# Patient Record
Sex: FEMALE | Race: BLACK OR AFRICAN AMERICAN | NOT HISPANIC OR LATINO | Employment: FULL TIME | URBAN - METROPOLITAN AREA
[De-identification: names, ages, dates, MRNs, and addresses within clinical notes are randomized per-mention and may not be internally consistent; named-entity substitution may affect disease eponyms.]

---

## 2017-02-20 RX ORDER — QUETIAPINE 400 MG/1
800 TABLET, FILM COATED, EXTENDED RELEASE ORAL DAILY
Qty: 60 TABLET | Refills: 0 | Status: SHIPPED | OUTPATIENT
Start: 2017-02-20 | End: 2017-03-20 | Stop reason: SDUPTHER

## 2017-03-13 ENCOUNTER — TELEPHONE (OUTPATIENT)
Dept: PSYCHIATRY | Facility: CLINIC | Age: 62
End: 2017-03-13

## 2017-03-13 NOTE — TELEPHONE ENCOUNTER
Patient reports she has been having symptoms consistent with anxiety and became very irritable and yelled a threat while at the [employer] recently (3 days ago).  She also reports symptoms consistent with orthostatic hypotension.  Encouraged more frequent appointments with MsJazlyn Keena to work on stress management.

## 2017-03-13 NOTE — TELEPHONE ENCOUNTER
----- Message from Suzy Callahan sent at 3/13/2017 12:28 PM CDT -----  Contact: Self  Pt said she is having non stop Anxiety attack and  would like a call back because she needs to speak with you soon as possible.    Pt can be reached at [phone]

## 2017-03-16 RX ORDER — BUSPIRONE HYDROCHLORIDE 15 MG/1
30 TABLET ORAL 2 TIMES DAILY
Qty: 120 TABLET | Refills: 2 | Status: SHIPPED | OUTPATIENT
Start: 2017-03-16 | End: 2017-03-20 | Stop reason: SDUPTHER

## 2017-03-17 ENCOUNTER — OFFICE VISIT (OUTPATIENT)
Dept: PSYCHIATRY | Facility: CLINIC | Age: 62
End: 2017-03-17
Payer: COMMERCIAL

## 2017-03-17 DIAGNOSIS — F31.81 BIPOLAR II DISORDER: Primary | ICD-10-CM

## 2017-03-17 DIAGNOSIS — F41.9 ANXIETY: ICD-10-CM

## 2017-03-17 PROCEDURE — 90834 PSYTX W PT 45 MINUTES: CPT | Mod: S$GLB,,, | Performed by: SOCIAL WORKER

## 2017-03-17 NOTE — PROGRESS NOTES
"Individual Psychotherapy (PhD/LCSW)    3/17/2017    Site:  [facility]         Therapeutic Intervention: Met with patient.  Outpatient - Insight oriented psychotherapy 45 min - CPT code 45810    Chief complaint/reason for encounter: mood swings, depression, anxiety     Interval history and content of current session: Pt arrives late, reports she has been off her buproprion x 1 month and is about to run out of Seroquel but will see Dr. Colunga the day she runs out. She reports that after waiting 6 hours at [facility] to resolve some old tickets, she got impatient and shouted that if she had a gun she would shoot everyone in the room. Security escorted her outside where she apologized and was allowed to leave. Pt feels she had "no filter" because of being off her meds. Pt's daughter may take a job in [place]. Pt states this would be the first time they were in different cities, feels anxious about this but feels she needs to let daughter go. 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 Overall she feels she is doing well.      Treatment plan:  · Target symptoms: depression, anxiety , mood swings  · Why chosen therapy is appropriate versus another modality: relevant to diagnosis  · Outcome monitoring methods: self-report  · Therapeutic intervention type: insight oriented psychotherapy    Risk parameters:  Patient reports no suicidal ideation  Patient reports no homicidal ideation  Patient reports no self-injurious behavior  Patient reports no violent behavior    Verbal deficits: None    Patient's response to intervention:  The patient's response to intervention is motivated    Progress toward goals and other mental status changes:  The patient's progress toward goals is mixed    Diagnosis:   Bipolar II, Anxiety    Plan:  individual psychotherapy, family therapy    Return to clinic: as needed  "

## 2017-03-20 ENCOUNTER — OFFICE VISIT (OUTPATIENT)
Dept: PSYCHIATRY | Facility: CLINIC | Age: 62
End: 2017-03-20
Payer: COMMERCIAL

## 2017-03-20 VITALS
HEART RATE: 120 BPM | DIASTOLIC BLOOD PRESSURE: 68 MMHG | BODY MASS INDEX: 22.68 KG/M2 | SYSTOLIC BLOOD PRESSURE: 124 MMHG | HEIGHT: 71 IN | WEIGHT: 162 LBS

## 2017-03-20 DIAGNOSIS — F41.1 ANXIETY STATE, UNSPECIFIED: ICD-10-CM

## 2017-03-20 DIAGNOSIS — F31.81 BIPOLAR II DISORDER: Primary | ICD-10-CM

## 2017-03-20 DIAGNOSIS — Z79.899 ENCOUNTER FOR LONG-TERM (CURRENT) USE OF MEDICATIONS: ICD-10-CM

## 2017-03-20 PROCEDURE — 90833 PSYTX W PT W E/M 30 MIN: CPT | Mod: ,,, | Performed by: PSYCHIATRY & NEUROLOGY

## 2017-03-20 PROCEDURE — 99214 OFFICE O/P EST MOD 30 MIN: CPT | Mod: S$GLB,,, | Performed by: PSYCHIATRY & NEUROLOGY

## 2017-03-20 PROCEDURE — 99999 PR PBB SHADOW E&M-EST. PATIENT-LVL II: CPT | Mod: PBBFAC,,, | Performed by: PSYCHIATRY & NEUROLOGY

## 2017-03-20 RX ORDER — HYDROXYZINE PAMOATE 25 MG/1
50 CAPSULE ORAL 3 TIMES DAILY PRN
Qty: 180 CAPSULE | Refills: 3 | Status: SHIPPED | OUTPATIENT
Start: 2017-03-20 | End: 2017-05-04 | Stop reason: SDUPTHER

## 2017-03-20 RX ORDER — QUETIAPINE 400 MG/1
800 TABLET, FILM COATED, EXTENDED RELEASE ORAL DAILY
Qty: 60 TABLET | Refills: 3 | Status: SHIPPED | OUTPATIENT
Start: 2017-03-20 | End: 2017-05-04 | Stop reason: SDUPTHER

## 2017-03-20 RX ORDER — BUSPIRONE HYDROCHLORIDE 15 MG/1
30 TABLET ORAL 2 TIMES DAILY
Qty: 120 TABLET | Refills: 3 | Status: SHIPPED | OUTPATIENT
Start: 2017-03-20 | End: 2017-05-04 | Stop reason: SDUPTHER

## 2017-03-20 RX ORDER — ESCITALOPRAM OXALATE 20 MG/1
20 TABLET ORAL DAILY
Qty: 30 TABLET | Refills: 3 | Status: SHIPPED | OUTPATIENT
Start: 2017-03-20 | End: 2017-05-04 | Stop reason: SDUPTHER

## 2017-03-20 NOTE — PROGRESS NOTES
Outpatient Psychiatry Follow-Up Visit (MD/NP)    3/20/2017    Clinical Status of Patient:  Outpatient (Ambulatory)    Chief Complaint:  Alix Patel is a 61 y.o. female who presents today for follow-up of mood swings and anxiety.      Met with patient alone.      Interval History and Content of Current Session:  Interim Events/Subjective Report/Content of Current Session:  Pt describes losing her temper at the [facility] about 11 days ago.  She reports today that she has been out of buspirone for a month.  Other than anxiety and irritability she is without significant complaints.  She is not engaged in any other threatening behavior and in fact returned to the [facility] recently and got her 's license.  She is looking forward to buying a vehicle which will enable her to perform more frequently.      Work has improved.  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  Her daughter is doing well and may be moving to [place].    Reviewed her consistent history of elevated pulse.      Psychotherapy:  · Target symptoms: depression, anxiety , mood swings  · Why chosen therapy is appropriate versus another modality: relevant to diagnosis  · Outcome monitoring methods: self-report, observation  · Therapeutic intervention type: supportive psychotherapy   · Topics discussed/themes: Dwelling less on the past, work stress, parenting issues, building skills sets for symptom management, symptom recognition, financial stressors  · The patient's response to the intervention is accepting. The patient's progress toward treatment goals is fair.   · Duration of intervention: 16 mins    Review of Systems   · PSYCHIATRIC: Pertinant items are noted in the narrative.    Past Medical, Family and Social History: The patient's past medical, family and social history have been reviewed and updated as appropriate within the electronic medical record - see encounter notes.    Compliance: yes    Side effects: None    Risk  "Parameters:  Patient reports no suicidal ideation  Patient reports no homicidal ideation  Patient reports no self-injurious behavior  Patient reports no violent behavior    Exam (detailed: at least 9 elements; comprehensive: all 15 elements)   Constitutional  Vitals:  Most recent vital signs, dated less than 90 days prior to this appointment, were reviewed.    Vitals:    03/20/17 1432   BP: 124/68   Pulse: (!) 120   Weight: 73.5 kg (162 lb)   Height: 5' 11" (1.803 m)      General:  age appropriate, well dressed, neatly groomed,     Musculoskeletal  Muscle Strength/Tone:  no dyskinesia, no tremor   Gait & Station:  non-ataxic     Psychiatric  Speech:  not pressured, or loud, calm tone, not rapid   Mood:   Affect:  anxious  appropriate, midline   Thought Process:  goal-directed, logical   Associations:  intact   Thought Content:  No SI, HI, AH or VH, no delusions, slightly self aggrandizing   Insight  Judgement:  fair, has awareness of illness  fair behavior appropriate for circumstances   Orientation:  grossly intact   Memory: intact for content of interview   Language: grossly intact   Attention Span & Concentration:  able to focus   Fund of Knowledge:  intact and appropriate to age and level of education     Assessment and Diagnosis   Status/Progress: Based on the examination today, the patient's problem(s) is/are adequately but not ideally controlled.  New problems have not been presented today.   Comorbidities are complicating management of the primary condition.  There are no active rule-out diagnoses for this patient at this time.    General Impression: the patient is a 61 y.o. female who was diagnosed with bipolar disorder during her admission in 2012 with homicidal ideation towards her landlord.  She has a history of treatment for depression and anxiety.  Her past use of alprazolam has been problematic and she has been slowly tapered off of this medication.  She has had some flexing of her fingers possibly " c/w TD.  She has been transitioned from risperidone to Seroquel thus far successfully.    Diagnosis:  Bipolar 2 disorder  Anxiety disorder not otherwise specified  type 2 diabetes  Tardive dyskinesia      Intervention/Counseling/Treatment Plan   · Restart buspirone and titrate back to 30 mg twice daily  · Continue Lexapro 20 mg daily, Seroquel  mg nightly, and Vistaril 25-50 mg 3 times daily as needed for anxiety  · CMP and lipid panel which were previously ordered and in addition TSH.    Return to Clinic: 2 months

## 2017-04-17 RX ORDER — QUETIAPINE 400 MG/1
TABLET, FILM COATED, EXTENDED RELEASE ORAL
Qty: 60 TABLET | Refills: 0 | OUTPATIENT
Start: 2017-04-17

## 2017-04-26 ENCOUNTER — HOSPITAL ENCOUNTER (OUTPATIENT)
Facility: HOSPITAL | Age: 62
Discharge: HOME | End: 2017-04-27
Attending: EMERGENCY MEDICINE | Admitting: HOSPITALIST
Payer: COMMERCIAL

## 2017-04-26 DIAGNOSIS — R07.9 CHEST PAIN: ICD-10-CM

## 2017-04-26 DIAGNOSIS — R07.9 CHEST PAIN, UNSPECIFIED TYPE: Primary | ICD-10-CM

## 2017-04-26 DIAGNOSIS — M62.838 MUSCLE SPASM: ICD-10-CM

## 2017-04-26 PROBLEM — E03.9 ACQUIRED HYPOTHYROIDISM: Status: ACTIVE | Noted: 2017-04-26

## 2017-04-26 LAB
ALBUMIN SERPL BCP-MCNC: 4 G/DL
ALP SERPL-CCNC: 81 U/L
ALT SERPL W/O P-5'-P-CCNC: 23 U/L
ANION GAP SERPL CALC-SCNC: 10 MMOL/L
AST SERPL-CCNC: 21 U/L
BASOPHILS # BLD AUTO: 0.02 K/UL
BASOPHILS NFR BLD: 0.3 %
BILIRUB SERPL-MCNC: 0.4 MG/DL
BNP SERPL-MCNC: <10 PG/ML
BUN SERPL-MCNC: 17 MG/DL
CALCIUM SERPL-MCNC: 9.7 MG/DL
CHLORIDE SERPL-SCNC: 104 MMOL/L
CO2 SERPL-SCNC: 24 MMOL/L
CREAT SERPL-MCNC: 0.8 MG/DL
D DIMER PPP IA.FEU-MCNC: <0.19 MG/L FEU
DIFFERENTIAL METHOD: ABNORMAL
EOSINOPHIL # BLD AUTO: 0.1 K/UL
EOSINOPHIL NFR BLD: 1.2 %
ERYTHROCYTE [DISTWIDTH] IN BLOOD BY AUTOMATED COUNT: 12.5 %
EST. GFR  (AFRICAN AMERICAN): >60 ML/MIN/1.73 M^2
EST. GFR  (NON AFRICAN AMERICAN): >60 ML/MIN/1.73 M^2
GLUCOSE SERPL-MCNC: 149 MG/DL
HCT VFR BLD AUTO: 36.4 %
HGB BLD-MCNC: 12.6 G/DL
INR PPP: 1.1
LYMPHOCYTES # BLD AUTO: 2.3 K/UL
LYMPHOCYTES NFR BLD: 34.7 %
MCH RBC QN AUTO: 29.6 PG
MCHC RBC AUTO-ENTMCNC: 34.6 %
MCV RBC AUTO: 86 FL
MONOCYTES # BLD AUTO: 0.5 K/UL
MONOCYTES NFR BLD: 7 %
NEUTROPHILS # BLD AUTO: 3.8 K/UL
NEUTROPHILS NFR BLD: 56.7 %
PLATELET # BLD AUTO: 232 K/UL
PMV BLD AUTO: 10.1 FL
POCT GLUCOSE: 70 MG/DL (ref 70–110)
POCT GLUCOSE: 90 MG/DL (ref 70–110)
POTASSIUM SERPL-SCNC: 4 MMOL/L
PROT SERPL-MCNC: 6.9 G/DL
PROTHROMBIN TIME: 11.4 SEC
RBC # BLD AUTO: 4.25 M/UL
SODIUM SERPL-SCNC: 138 MMOL/L
TROPONIN I SERPL DL<=0.01 NG/ML-MCNC: <0.006 NG/ML
WBC # BLD AUTO: 6.75 K/UL

## 2017-04-26 PROCEDURE — 36415 COLL VENOUS BLD VENIPUNCTURE: CPT

## 2017-04-26 PROCEDURE — 25000003 PHARM REV CODE 250: Performed by: HOSPITALIST

## 2017-04-26 PROCEDURE — 25000003 PHARM REV CODE 250: Performed by: PHYSICIAN ASSISTANT

## 2017-04-26 PROCEDURE — 99285 EMERGENCY DEPT VISIT HI MDM: CPT | Mod: ,,, | Performed by: EMERGENCY MEDICINE

## 2017-04-26 PROCEDURE — 99220 PR INITIAL OBSERVATION CARE,LEVL III: CPT | Mod: ,,, | Performed by: HOSPITALIST

## 2017-04-26 PROCEDURE — 80053 COMPREHEN METABOLIC PANEL: CPT

## 2017-04-26 PROCEDURE — 99285 EMERGENCY DEPT VISIT HI MDM: CPT | Mod: 25

## 2017-04-26 PROCEDURE — 84484 ASSAY OF TROPONIN QUANT: CPT

## 2017-04-26 PROCEDURE — 85025 COMPLETE CBC W/AUTO DIFF WBC: CPT

## 2017-04-26 PROCEDURE — 25000003 PHARM REV CODE 250: Performed by: EMERGENCY MEDICINE

## 2017-04-26 PROCEDURE — 83880 ASSAY OF NATRIURETIC PEPTIDE: CPT

## 2017-04-26 PROCEDURE — 85610 PROTHROMBIN TIME: CPT

## 2017-04-26 PROCEDURE — 93010 ELECTROCARDIOGRAM REPORT: CPT | Mod: 76,,, | Performed by: INTERNAL MEDICINE

## 2017-04-26 PROCEDURE — 93005 ELECTROCARDIOGRAM TRACING: CPT

## 2017-04-26 PROCEDURE — 84484 ASSAY OF TROPONIN QUANT: CPT | Mod: 91

## 2017-04-26 PROCEDURE — G0378 HOSPITAL OBSERVATION PER HR: HCPCS

## 2017-04-26 PROCEDURE — 96360 HYDRATION IV INFUSION INIT: CPT

## 2017-04-26 PROCEDURE — 85379 FIBRIN DEGRADATION QUANT: CPT

## 2017-04-26 PROCEDURE — 93010 ELECTROCARDIOGRAM REPORT: CPT | Mod: ,,, | Performed by: INTERNAL MEDICINE

## 2017-04-26 PROCEDURE — 94761 N-INVAS EAR/PLS OXIMETRY MLT: CPT

## 2017-04-26 RX ORDER — QUETIAPINE 400 MG/1
800 TABLET, FILM COATED, EXTENDED RELEASE ORAL DAILY
Status: DISCONTINUED | OUTPATIENT
Start: 2017-04-26 | End: 2017-04-26

## 2017-04-26 RX ORDER — ACETAMINOPHEN 325 MG/1
650 TABLET ORAL EVERY 4 HOURS PRN
Status: DISCONTINUED | OUTPATIENT
Start: 2017-04-26 | End: 2017-04-27 | Stop reason: HOSPADM

## 2017-04-26 RX ORDER — MORPHINE SULFATE 2 MG/ML
3 INJECTION, SOLUTION INTRAMUSCULAR; INTRAVENOUS EVERY 4 HOURS PRN
Status: DISCONTINUED | OUTPATIENT
Start: 2017-04-26 | End: 2017-04-27 | Stop reason: HOSPADM

## 2017-04-26 RX ORDER — IBUPROFEN 200 MG
24 TABLET ORAL
Status: DISCONTINUED | OUTPATIENT
Start: 2017-04-26 | End: 2017-04-27 | Stop reason: HOSPADM

## 2017-04-26 RX ORDER — LEVOTHYROXINE SODIUM 100 UG/1
100 TABLET ORAL
Status: DISCONTINUED | OUTPATIENT
Start: 2017-04-27 | End: 2017-04-27 | Stop reason: HOSPADM

## 2017-04-26 RX ORDER — ROSUVASTATIN CALCIUM 20 MG/1
20 TABLET, COATED ORAL NIGHTLY
Status: DISCONTINUED | OUTPATIENT
Start: 2017-04-26 | End: 2017-04-27 | Stop reason: HOSPADM

## 2017-04-26 RX ORDER — INSULIN ASPART 100 [IU]/ML
0-5 INJECTION, SOLUTION INTRAVENOUS; SUBCUTANEOUS
Status: DISCONTINUED | OUTPATIENT
Start: 2017-04-26 | End: 2017-04-27 | Stop reason: HOSPADM

## 2017-04-26 RX ORDER — GLUCAGON 1 MG
1 KIT INJECTION
Status: DISCONTINUED | OUTPATIENT
Start: 2017-04-26 | End: 2017-04-27 | Stop reason: HOSPADM

## 2017-04-26 RX ORDER — SODIUM CHLORIDE 0.9 % (FLUSH) 0.9 %
3 SYRINGE (ML) INJECTION EVERY 8 HOURS
Status: DISCONTINUED | OUTPATIENT
Start: 2017-04-26 | End: 2017-04-27 | Stop reason: HOSPADM

## 2017-04-26 RX ORDER — ESCITALOPRAM OXALATE 10 MG/1
20 TABLET ORAL DAILY
Status: DISCONTINUED | OUTPATIENT
Start: 2017-04-26 | End: 2017-04-27 | Stop reason: HOSPADM

## 2017-04-26 RX ORDER — ASPIRIN 325 MG
325 TABLET ORAL
Status: COMPLETED | OUTPATIENT
Start: 2017-04-26 | End: 2017-04-26

## 2017-04-26 RX ORDER — IBUPROFEN 200 MG
16 TABLET ORAL
Status: DISCONTINUED | OUTPATIENT
Start: 2017-04-26 | End: 2017-04-27 | Stop reason: HOSPADM

## 2017-04-26 RX ORDER — QUETIAPINE 400 MG/1
800 TABLET, FILM COATED, EXTENDED RELEASE ORAL DAILY
Status: DISCONTINUED | OUTPATIENT
Start: 2017-04-26 | End: 2017-04-27 | Stop reason: HOSPADM

## 2017-04-26 RX ORDER — NITROGLYCERIN 0.4 MG/1
0.4 TABLET SUBLINGUAL EVERY 5 MIN PRN
Status: COMPLETED | OUTPATIENT
Start: 2017-04-26 | End: 2017-04-26

## 2017-04-26 RX ADMIN — NITROGLYCERIN 0.4 MG: 0.4 TABLET SUBLINGUAL at 08:04

## 2017-04-26 RX ADMIN — NITROGLYCERIN 0.4 MG: 0.4 TABLET SUBLINGUAL at 10:04

## 2017-04-26 RX ADMIN — SODIUM CHLORIDE, PRESERVATIVE FREE 3 ML: 5 INJECTION INTRAVENOUS at 09:04

## 2017-04-26 RX ADMIN — ESCITALOPRAM OXALATE 20 MG: 10 TABLET ORAL at 03:04

## 2017-04-26 RX ADMIN — SODIUM CHLORIDE: 0.9 INJECTION, SOLUTION INTRAVENOUS at 10:04

## 2017-04-26 RX ADMIN — ASPIRIN 325 MG ORAL TABLET 325 MG: 325 PILL ORAL at 10:04

## 2017-04-26 RX ADMIN — ROSUVASTATIN CALCIUM 20 MG: 20 TABLET, FILM COATED ORAL at 09:04

## 2017-04-26 RX ADMIN — QUETIAPINE 800 MG: 400 TABLET, EXTENDED RELEASE ORAL at 06:04

## 2017-04-26 NOTE — ED NOTES
Patient identifiers verified and correct for Ms Patel  C/C chest tightness  APPEARANCE: awake and alert in NAD.  SKIN: warm, dry and intact. No breakdown or bruising.  MUSCULOSKELETAL: Patient moving all extremities spontaneously, no obvious swelling or deformities noted. Ambulates independently.  RESPIRATORY:states positive shortness of breath.Respirations unlabored. All breath sounds CTA bilaterally.  CARDIAC: heart tones normal. Regular rate and rhythm; 2+ distal pulses; no peripheral edema Pain RLE calf  ABDOMEN: S/ND/NT, Denies nausea, normoactive bowel sounds present in all four quadrants. Normal stool pattern.  : voids spontaneously without difficulty.  Neurologic: AAO x 4; follows commands equal strength in all extremities; denies numbness/tingling. PERRLA

## 2017-04-26 NOTE — ED NOTES
"Patient states chest "tightness' 9/10onset 0800, positive "sour taste"  Main c/o "stabbing pain" Pain radiates under left armpit to back.   "

## 2017-04-26 NOTE — ED NOTES
BG=70, patient remains NPO,. Patient declines Seroquel dose at present, states she takes it at 1800, not now due to being too sleepy after med taken,message sent to pharmacy.

## 2017-04-26 NOTE — IP AVS SNAPSHOT
[facility]  [address]  [facility] [address]  Phone: [phone]           Patient Discharge Instructions   Our goal is to set you up for success. This packet includes information on your condition, medications, and your home care.  It will help you care for yourself to prevent having to return to the hospital.     Please ask your nurse if you have any questions.      There are many details to remember when preparing to leave the hospital. Here is what you will need to do:    1. Take your medicine. If you are prescribed medications, review your Medication List on the following pages. You may have new medications to  at the pharmacy and others that you'll need to stop taking. Review the instructions for how and when to take your medications. Talk with your doctor or nurses if you are unsure of what to do.     2. Go to your follow-up appointments. Specific follow-up information is listed in the following pages. Your may be contacted by a nurse or clinical provider about future appointments. Be sure we have all of the phone numbers to reach you. Please contact your provider's office if you are unable to make an appointment.     3. Watch for warning signs. Your doctor or nurse will give you detailed warning signs to watch for and when to call for assistance. These instructions may also include educational information about your condition. If you experience any of warning signs to your health, call your doctor.           [facility]  Unless otherwise directed by your provider, please   contact [facility], our nurse care line   that is available for 24/7 assistance.     [phone] (toll-free)     Registered nurses in the [facility]   provide: appointment scheduling, clinical advisement, health education, and other advisory services.                  ** Verify the list of medication(s) below is accurate and up to date. Carry this with you in case of  emergency. If your medications have changed, please notify your healthcare provider.             Medication List      CHANGE how you take these medications        Additional Info    Begin Date AM Noon PM Bedtime    cyclobenzaprine 10 MG tablet   Commonly known as:  FLEXERIL   Quantity:  30 tablet   Refills:  0   Dose:  10 mg   What changed:    - medication strength  - how much to take  - when to take this  - reasons to take this    Last time this was given:  10 mg on 4/27/2017  1:20 PM   Instructions:  Take 1 tablet (10 mg total) by mouth 3 (three) times daily as needed for Muscle spasms.                                     metformin 500 MG tablet   Commonly known as:  GLUCOPHAGE   Quantity:  30 tablet   Refills:  0   Dose:  500 mg   What changed:  how much to take    Instructions:  Take 1 tablet (500 mg total) by mouth daily with breakfast.                                 CONTINUE taking these medications        Additional Info    Begin Date AM Noon PM Bedtime    busPIRone 15 MG tablet   Commonly known as:  BUSPAR   Quantity:  120 tablet   Refills:  3   Dose:  30 mg    Instructions:  Take 2 tablets (30 mg total) by mouth 2 (two) times daily.                                  CRESTOR 20 MG tablet   Refills:  3   Generic drug:  rosuvastatin    Last time this was given:  20 mg on 4/26/2017  9:07 PM                               diphenhydrAMINE 12.5 mg chewable tablet   Commonly known as:  BENADRYL   Refills:  0   Dose:  12.5 mg    Instructions:  Take 12.5 mg by mouth 4 (four) times daily as needed for Allergies.                            escitalopram oxalate 20 MG tablet   Commonly known as:  LEXAPRO   Quantity:  30 tablet   Refills:  3   Dose:  20 mg    Last time this was given:  20 mg on 4/27/2017  8:33 AM   Instructions:  Take 1 tablet (20 mg total) by mouth once daily.                               hydrOXYzine pamoate 25 MG Cap   Commonly known as:  VISTARIL   Quantity:  180 capsule   Refills:  3   Dose:  50 mg     Instructions:  Take 2 capsules (50 mg total) by mouth 3 (three) times daily as needed.                            levothyroxine 100 MCG tablet   Commonly known as:  SYNTHROID   Refills:  3    Last time this was given:  100 mcg on 4/27/2017  6:28 AM                               quetiapine 400 MG Tb24   Commonly known as:  SEROQUEL XR   Quantity:  60 tablet   Refills:  3   Dose:  800 mg    Last time this was given:  800 mg on 4/26/2017  6:53 PM   Instructions:  Take 2 tablets (800 mg total) by mouth once daily. Take at 6 pm                                    Where to Get Your Medications      These medications were sent to [facility] [address] [place], [place] [address]  [address] [place], [place] [address] [address]     Phone:  [phone]     cyclobenzaprine 10 MG tablet                  Please bring to all follow up appointments:    1. A copy of your discharge instructions.  2. All medicines you are currently taking in their original bottles.  3. Identification and insurance card.    Please arrive 15 minutes ahead of scheduled appointment time.    Please call 24 hours in advance if you must reschedule your appointment and/or time.        Follow-up Information     Follow up with Angélica Barakat MD.    Specialty:  Internal Medicine    Why:  dr angélica sheppard on MAY 22 at @2 PM as a new patient.    Contact information:    [address] [place] [address] [phone]          Discharge Instructions     Future Orders    Diet general     Questions:    Total calories:      Fat restriction, if any:      Protein restriction, if any:      Na restriction, if any:      Fluid restriction:      Additional restrictions:          Discharge Instructions       [facility]. The primary doctor who is taking care of you at the time of your discharge is Dawson Richards MD.     You were admitted to the hospital with Muscle  "spasm.     Please note your discharge instructions, including diet/activity restrictions, follow-up appointments, and medication changes.  If you have any questions about your medical issues, prescriptions, or any other questions, please feel free to contact the [facility] Medicine Dept at [phone] or after hours at [phone] and we will help.    If you are previously with Home health, outpatient PT/OT or under a therapy program, you are cleared to return to those programs.    Please direct all long term medication refills and follow up to your primary care provider, Hamilton Hayden Jr, MD. Thank you again for letting us take care of your health care needs.  Follow up appointment info with Dr. Barakat as follows.        Roshni Lindsay MD     Doctor in [place], [place]  Address: [address], [place] [address]  Phone: [phone]  Fax is [phone]      May 22 at 2pm is first available appt    Discharge References/Attachments     CHEST PAIN, NONCARDIAC  (ENGLISH)    MUSCLE SPASM (ENGLISH)        Primary Diagnosis     Your primary diagnosis was:  Muscle Spasm      Admission Information     Date & Time Provider Department CSN    4/26/2017  9:11 AM Dawson Richards MD [facility] [id number]      Care Providers     Provider Role Specialty Primary office phone    Dawson Richards MD Attending Provider Rheumatology [phone]    Dawson Richards MD Team Attending  Rheumatology [phone]      Your Vitals Were     BP Pulse Temp Resp Height Weight    112/69 101 98.6 °F (37 °C) (Oral) 20 5' 11" (1.803 m) 68.5 kg (151 lb)    SpO2 BMI             89% 21.06 kg/m2         Recent Lab Values        9/12/2013 1/12/2016 4/27/2017                     6:09 PM  6:52 AM  6:53 AM         A1C 6.8 (H) 5.9 6.7 (H)         Comment for A1C at  6:53 AM on 4/27/2017:  According to ADA guidelines, hemoglobin A1C <7.0% represents  optimal control in non-pregnant diabetic patients.  Different  metrics " may apply to specific populations.   Standards of Medical Care in Diabetes - 2016.  For the purpose of screening for the presence of diabetes:  <5.7%     Consistent with the absence of diabetes  5.7-6.4%  Consistent with increasing risk for diabetes   (prediabetes)  >or=6.5%  Consistent with diabetes  Currently no consensus exists for use of hemoglobin A1C  for diagnosis of diabetes for children.        Allergies as of 4/27/2017        Reactions    Lamictal [Lamotrigine] Rash    Per psychiatry notes      Advance Directives     An advance directive is a document which, in the event you are no longer able to make decisions for yourself, tells your healthcare team what kind of treatment you do or do not want to receive, or who you would like to make those decisions for you.  If you do not currently have an advance directive, [facility] encourages you to create one.  For more information call:  [phone]), [phone] ([phone]),  or log on to [email or web address].        Smoking Cessation     If you would like to quit smoking:   You may be eligible for free services if you are a [place] resident and started smoking cigarettes before September 1, 1988.  Call the Smoking Cessation Trust ([facility]) toll free at ([phone]) [phone] or ([phone]) [phone].   Call 5-427-QUIT-NOW if you do not meet the above criteria.   Contact us via email: [email or web address]   View our website for more information: [email or web address]        Language Assistance Services     ATTENTION: Language assistance services are available, free of charge. Please call [phone].      ATENCIÓN: Si habla español, tiene a chavarria disposición servicios gratuitos de asistencia lingüística. Llame al [phone].     CHÚ Ý: N?u b?n nói Ti?ng Vi?t, có các d?ch v? h? tr? ngôn ng? mi?n phí dành cho b?n. G?i s? [phone].        Diabetes Discharge Instructions                                   [facility] Sign-Up     Activating  your [facility] account is as easy as 1-2-3!     1) Visit [email or web address], select Sign Up Now, enter this activation code and your date of birth, then select Next.  [email or web address]  Expires: 6/11/2017  4:00 PM      2) Create a username and password to use when you visit [facility] in the future and select a security question in case you lose your password and select Next.    3) Enter your e-mail address and click Sign Up!    Additional Information  If you have questions, please e-mail [email or web address] or call [phone] to talk to our [facility] staff. Remember, [facility] is NOT to be used for urgent needs. For medical emergencies, dial 911.          [facility] complies with applicable Federal civil rights laws and does not discriminate on the basis of race, color, national origin, age, disability, or sex.

## 2017-04-26 NOTE — CONSULTS
"      Cardiology ER Consult Note  Attending Physician: Yin Ennis MD  Reason for Consult: Chest pain    HPI:   61 y.o. woman with history of DM, HLD, FHx CAD presenting with sudden onset chest pain. Episode started while walking around 8am, describes as "squeezing", felt clammy, + jaw radiation, SOB, has lasted >3 hr, 9/10 initially -> 8/10, positional component as well as pleuritic, NTG-> subjectively improved " less squeezing". EKG without change and initial troponin negative.     ROS:    Constitution: negative for - fever, chills, weight loss or weight gain  HENT: negative for - sore throat, rhinorrhea, or headache  Eyes: negative for - blurred or double vision  Cardiovascular: negative for - edema, loss of consciousness or palpitations  Pulmonary: negative for - cough, sputum changes or wheezing  Gastrointestinal: negative for - abdominal pain, nausea, vomiting, or diarrhea  : negative for - dysuria  Neurological: negative for - focal weakness or sensory changes  PMH:     Past Medical History:   Diagnosis Date    Anxiety     Depression     Diabetes mellitus     HEARING LOSS     pt states she has loss slighty in rt ear.    Insomnia     Rash      Past Surgical History:   Procedure Laterality Date    APPENDECTOMY      COLONOSCOPY      CYST REMOVAL      HYSTERECTOMY       Allergies:     Review of patient's allergies indicates:   Allergen Reactions    Lamictal [lamotrigine] Rash     Per psychiatry notes     Medications:     No current facility-administered medications on file prior to encounter.      Current Outpatient Prescriptions on File Prior to Encounter   Medication Sig Dispense Refill    busPIRone (BUSPAR) 15 MG tablet Take 2 tablets (30 mg total) by mouth 2 (two) times daily. 120 tablet 3    CRESTOR 20 mg tablet   3    escitalopram oxalate (LEXAPRO) 20 MG tablet Take 1 tablet (20 mg total) by mouth once daily. 30 tablet 3    hydrOXYzine pamoate (VISTARIL) 25 MG Cap Take 2 capsules " (50 mg total) by mouth 3 (three) times daily as needed. 180 capsule 3    levothyroxine (SYNTHROID) 100 MCG tablet   3    metformin (GLUCOPHAGE) 500 MG tablet Take 1 tablet (500 mg total) by mouth daily with breakfast. (Patient taking differently: Take 1,000 mg by mouth daily with breakfast. ) 30 tablet 0    quetiapine (SEROQUEL XR) 400 MG Tb24 Take 2 tablets (800 mg total) by mouth once daily. Take at 6 pm 60 tablet 3    CYCLOBENZAPRINE HCL (FLEXERIL ORAL) Take by mouth.      diphenhydrAMINE (BENADRYL) 12.5 mg chewable tablet Take 12.5 mg by mouth 4 (four) times daily as needed for Allergies.       Social History:     Social History   Substance Use Topics    Smoking status: Former Smoker     Packs/day: 0.25     Years: 34.00     Types: Cigarettes     Quit date: 8/27/2004    Smokeless tobacco: Never Used    Alcohol use 0.6 oz/week     1 Glasses of wine per week      Comment: very seldom only wine with dinner     Family History:     Family History   Problem Relation Age of Onset    Heart failure Father      Physical Exam:     Vitals:  Temp:  [98.2 °F (36.8 °C)]   Pulse:  []   Resp:  [16-18]   BP: ()/(56-84)   SpO2:  [94 %-99 %]  on RA I/O's:  No intake or output data in the 24 hours ending 04/26/17 1048     Constitutional: Appearing uncomfortable  HEENT: Sclera anicteric, PERRLA, EOMI  Neck: No JVD, no carotid bruits  CV: RRR, no murmur, normal S1/S2  Pulm: CTAB, no wheezes, rales, or ronchi  GI: Abdomen soft, NTND, +BS  Extremities: No LE edema, warm and well perfused  Skin: No ecchymosis, erythema, or ulcers  Psych: AOx3, appropriate affect  Neuro: CNII-XII intact, no focal deficits    Labs:       Recent Labs  Lab 04/26/17  0927      K 4.0      CO2 24   BUN 17   CREATININE 0.8   *   ANIONGAP 10       Recent Labs  Lab 04/26/17  0927   TROPONINI <0.006   BNP <10      Recent Labs  Lab 04/26/17 0927   WBC 6.75   HGB 12.6   HCT 36.4*          Recent Labs  Lab 04/26/17  0912    AST 21   ALT 23   ALKPHOS 81   BILITOT 0.4   ALBUMIN 4.0        Imaging:   No results found for: EF    EKG: normal sinus rhythm, no blocks or conduction defects, no ischemic changes    Emergency Department Assessment of Chest Pain Score (EDACS):     1. Age: 61-65  +10  2. Sex: Female  0  3. Known CAD or ?3 risk factors (FHx, HLD, DM, HTN, smoker): Yes  +4  4. Diaphoresis: Yes  +3  5. Pain radiates to arm, shoulder, neck or jaw: Yes  +5  6. Pain occurred or worsened with inspiration: Yes  -4  7. Pain is reproduced by palpation: No  0    EDACS Score: 18    Not Low Risk Cohort:  EDACS ?16 or   EKG shows new ischemia   0h or 2h troponin positive   Recommendation: further observation and evaluation    Than NINA et al. Emerg Med [place]. Development and validation of the Emergency Department Assessment of Chest pain Score and 2 h accelerated diagnostic protocol. 2014 Feb;26(1):34-44.    HEART Score for Major Cardiac Events:     1. History: Moderately suspicious  +1  2. EKG: Normal  3. Age: 45-65  +1  4. Risk factors (HLD, HTN, DM, smoker, FHx, obesity): 3 or more  +2  5. Troponin:  normal  0    HEART Score: 4    0-3: 0.9-1.7% risk of adverse cardiac event. In the HEART Score, these patients were discharged.   4-6: 12-16.6% risk of adverse cardiac event. In the HEART Score, these patients were admitted to the hospital.   ?7: 50-65% risk of adverse cardiac event. In the HEART Score, these patients were candidates for early invasive measures.    Gopi AJ, Portis BE, Pilar RANDY. Chest pain in the emergency room: value of the HEART score. Neth Heart J. 2008 Damon;16(6):191-6. PubMed PMID: [id number]; PubMed Central PMCID: [id number].    Assessment:   61 y.o. woman with history of DM, HLD, FHx CAD presenting with sudden onset chest pain with mixed features. Patient feels it was exertional, with radiation, clamminess, SOB, and relived by NTG. On the other hand, it is positional and has pleuritic component. She still has ongoing 8/10  chest pain. Doubt cardiac etiology but cannot exclude and has intermediate HEART and EDACS scores. PE considered but Well's score is 0.    Plan:   - Admit to obs for chest pain rule-out  - Trend troponins  - Would not start ACS therapy unless objective evidence of ischemia  - Consider alternative causes for chest pain, ? D dimer -- defer to ER    Signed:    Mj Swenson MD  Cardiology Fellow, PGY-6  Pager: [phone]  4/26/2017 10:48 AM

## 2017-04-26 NOTE — ED PROVIDER NOTES
"Encounter Date: 4/26/2017    SCRIBE #1 NOTE: I, Sofia Gallardo, am scribing for, and in the presence of,  Dr. Monteiro. I have scribed the following portions of the note - the EKG reading. Other sections scribed: the attending ED note.       History     Chief Complaint   Patient presents with    Chest Pain     Pt c/o CP since approx 0800, squeezing pain     Review of patient's allergies indicates:   Allergen Reactions    Lamictal [lamotrigine] Rash     Per psychiatry notes     HPI Comments: Patient is a 61-year-old female with a past medical history of diabetes, hyperlipidemia, anxiety who presents the ED with chest pain.  Patient states that she was walking to a coffee shop when she developed sudden onset chest pain at 800 today.  She complains of constant left-sided chest pain that radiates under her left arm.  Currently, her pain is 8/10 and she describes it as "fist squeezing her heart" and sharp/stabbing.  Patient endorses associated dizziness, but none currently. She notes onset of LLE pain 1 minute after onset of chest pains that have been constant. She has no history of chest pain.  She denies any recent travel, injury or trauma, estrogen use, tobacco use, hx of DVT/PE, nausea/vomiting, cough, SOB.    The history is provided by the patient.     Past Medical History:   Diagnosis Date    Anxiety     Anxiety     Bipolar 1 disorder     Depression     Diabetes mellitus     HEARING LOSS     pt states she has loss slighty in rt ear.    Insomnia     Rash      Past Surgical History:   Procedure Laterality Date    APPENDECTOMY      COLONOSCOPY      CYST REMOVAL      HYSTERECTOMY       Family History   Problem Relation Age of Onset    Heart failure Father      Social History   Substance Use Topics    Smoking status: Former Smoker     Packs/day: 0.25     Years: 34.00     Types: Cigarettes     Quit date: 8/27/2004    Smokeless tobacco: Never Used    Alcohol use 0.6 oz/week     1 Glasses of wine per week "      Comment: very seldom only wine with dinner     Review of Systems   Constitutional: Negative for chills and fever.   HENT: Negative for sore throat.    Eyes: Negative for photophobia.   Respiratory: Negative for cough and shortness of breath.    Cardiovascular: Positive for chest pain.   Gastrointestinal: Negative for abdominal pain and nausea.   Endocrine: Negative for polyuria.   Genitourinary: Negative for hematuria and urgency.   Musculoskeletal: Positive for myalgias.   Skin: Negative for pallor.   Neurological: Negative for weakness and numbness.       Physical Exam   Initial Vitals   BP Pulse Resp Temp SpO2   04/26/17 0908 04/26/17 0908 04/26/17 0908 04/26/17 0908 04/26/17 0908   109/81 94 16 98.2 °F (36.8 °C) 97 %     Physical Exam    Vitals reviewed.  Constitutional: She appears well-developed and well-nourished. She is not diaphoretic.   HENT:   Head: Normocephalic and atraumatic.   Nose: Nose normal.   Eyes: Conjunctivae and EOM are normal.   Neck: Normal range of motion.   Cardiovascular: Normal rate, regular rhythm and normal heart sounds. Exam reveals no friction rub.    No murmur heard.  Pulses:       Femoral pulses are 2+ on the right side, and 2+ on the left side.       Dorsalis pedis pulses are 2+ on the right side, and 2+ on the left side.   Pulmonary/Chest: Breath sounds normal. No respiratory distress. She has no wheezes. She has no rales. She exhibits tenderness.       Abdominal: Soft. Bowel sounds are normal. She exhibits no distension. There is no tenderness. There is no rebound.   Musculoskeletal: Normal range of motion.   Generalized muscular TTP of LLE from hip to ankle   Neurological: She is alert and oriented to person, place, and time. She has normal strength. No sensory deficit.   Skin: Skin is warm and dry. No erythema. No pallor.   Psychiatric: She has a normal mood and affect. Her behavior is normal. Judgment and thought content normal.         ED Course   Procedures  Labs  Reviewed   CBC W/ AUTO DIFFERENTIAL - Abnormal; Notable for the following:        Result Value    Hematocrit 36.4 (*)     All other components within normal limits    Narrative:     PLEASE REVIEW ORDER START TIME BEFORE MARKING SPECIMEN  COLLECTED.   COMPREHENSIVE METABOLIC PANEL - Abnormal; Notable for the following:     Glucose 149 (*)     All other components within normal limits    Narrative:     PLEASE REVIEW ORDER START TIME BEFORE MARKING SPECIMEN  COLLECTED.   PROTIME-INR    Narrative:     PLEASE REVIEW ORDER START TIME BEFORE MARKING SPECIMEN  COLLECTED.   TROPONIN I    Narrative:     PLEASE REVIEW ORDER START TIME BEFORE MARKING SPECIMEN  COLLECTED.   B-TYPE NATRIURETIC PEPTIDE    Narrative:     PLEASE REVIEW ORDER START TIME BEFORE MARKING SPECIMEN  COLLECTED.   TROPONIN I    Narrative:     PLEASE REVIEW ORDER START TIME BEFORE MARKING SPECIMEN  COLLECTED.   D DIMER, QUANTITATIVE   D DIMER, QUANTITATIVE    Narrative:     PLEASE REVIEW ORDER START TIME BEFORE MARKING SPECIMEN  COLLECTED.  Add on order [id number] D Dimer, Per Yin Monteiro MD  04/26/2017    12:00      EKG Readings: (Independently Interpreted)   NSR at 90, with no significant ST elevation and no T wave inversion. No change when compared to October 13th, 2016.          Medical Decision Making:   History:   Old Medical Records: I decided to obtain old medical records.  Independently Interpreted Test(s):   I have ordered and independently interpreted EKG Reading(s) - see prior notes  Clinical Tests:   Lab Tests: Ordered and Reviewed  Radiological Study: Ordered  Medical Tests: Ordered and Reviewed  Other:   I have discussed this case with another health care provider.       <> Summary of the Discussion: Cardiology    Imaging Results         X-Ray Chest AP Portable (Final result) Result time:  04/26/17 10:18:54    Final result by Irwin Gaytan MD (04/26/17 10:18:54)    Impression:     No significant intrathoracic abnormality referable to the  current history of chest pain.  No significant detrimental interval change in the appearance of the chest since 1/11/16.      Electronically signed by: Irwin Gaytan MD  Date:     04/26/17  Time:    10:18     Narrative:    Comparison is made to 1/11/16.    Heart size is normal, as is the appearance of the pulmonary vascularity.  Lung zones are clear, and free of significant airspace consolidation or volume loss.  No pleural fluid.  Mediastinal contour is unchanged from the examination referenced above, with no abnormal interval mediastinal widening noted.  No pneumothorax.  Calcification in the wall of the aortic arch is incidentally observed once again.                 APC / Resident Notes:    Patient is a 61-year-old female with past medical history of diabetes and hyperlipidemia who presents the ED with chest pain.  On exam, vital signs stable.  Chest wall tenderness to palpation.  Regular rate and rhythm without murmurs.  Lungs clear to auscultations bilaterally. Generalized muscular TTP from L hip to ankle. No peripheral edema.   DDX includes but is not limited to ACS, arrhythmias, pneumonia, bronchitis, pleurisy, costochondritis.  Will order labs and imaging and reassess.     ECG with NSR with rate of 93 and no T wave inversion.  CBC with no leukocytosis or anemia.  CMP with no electrolyte abnormalities.  Troponin negative. Will plan to trend given early onset. BNP negative. Negative CXR.   D-dimer pending. Negative D-dimer. Less likely PE.    Cardiology consult. The rec placing patient in observation to trending troponin and further evaluate. Consult appreciated.       Scribe Attestation:   Scribe #1: I performed the above scribed service and the documentation accurately describes the services I performed. I attest to the accuracy of the note.    Attending Attestation:           Physician Attestation for Scribe:  Physician Attestation Statement for Scribe #1: I, Dr. Monteiro, reviewed documentation, as scribed by  Sofia Gallardo in my presence, and it is both accurate and complete.         Attending ED Notes:   10:43 AM- I discussed with cardiology. They will come and evaluate the patient.          ED Course     Clinical Impression:   The primary encounter diagnosis was Chest pain, unspecified type. A diagnosis of Chest pain was also pertinent to this visit.    Disposition:   Disposition: Placed in Observation  Condition: Stable       Mita Doran PA-C  04/26/17 1402

## 2017-04-26 NOTE — ED TRIAGE NOTES
Pt experienced a squeezing pain left side of her chest radiating to her back while walking at work today.

## 2017-04-26 NOTE — ED NOTES
Patient with sudden onset stabbing left Cp radiating to left jaw, med per orders with nitro, pain 9/10.

## 2017-04-26 NOTE — ED NOTES
Update to physician regarding current B/P after 2 nitros, [facility] decr, Third EKG done

## 2017-04-27 VITALS
BODY MASS INDEX: 21.14 KG/M2 | DIASTOLIC BLOOD PRESSURE: 69 MMHG | SYSTOLIC BLOOD PRESSURE: 112 MMHG | TEMPERATURE: 99 F | WEIGHT: 151 LBS | OXYGEN SATURATION: 89 % | HEIGHT: 71 IN | RESPIRATION RATE: 20 BRPM | HEART RATE: 101 BPM

## 2017-04-27 PROBLEM — M62.838 MUSCLE SPASM: Status: ACTIVE | Noted: 2017-04-27

## 2017-04-27 LAB
CK SERPL-CCNC: 32 U/L
ESTIMATED AVG GLUCOSE: 146 MG/DL
HBA1C MFR BLD HPLC: 6.7 %
MAGNESIUM SERPL-MCNC: 2 MG/DL
POCT GLUCOSE: 167 MG/DL (ref 70–110)
POCT GLUCOSE: 226 MG/DL (ref 70–110)
POCT GLUCOSE: 94 MG/DL (ref 70–110)
POTASSIUM SERPL-SCNC: 4 MMOL/L

## 2017-04-27 PROCEDURE — 36415 COLL VENOUS BLD VENIPUNCTURE: CPT

## 2017-04-27 PROCEDURE — 84132 ASSAY OF SERUM POTASSIUM: CPT

## 2017-04-27 PROCEDURE — G0378 HOSPITAL OBSERVATION PER HR: HCPCS

## 2017-04-27 PROCEDURE — 99226 PR SUBSEQUENT OBSERVATION CARE,LEVEL III: CPT | Mod: ,,, | Performed by: HOSPITALIST

## 2017-04-27 PROCEDURE — 83735 ASSAY OF MAGNESIUM: CPT

## 2017-04-27 PROCEDURE — 25000003 PHARM REV CODE 250: Performed by: HOSPITALIST

## 2017-04-27 PROCEDURE — 83036 HEMOGLOBIN GLYCOSYLATED A1C: CPT

## 2017-04-27 PROCEDURE — 63600175 PHARM REV CODE 636 W HCPCS: Performed by: HOSPITALIST

## 2017-04-27 PROCEDURE — 82550 ASSAY OF CK (CPK): CPT

## 2017-04-27 RX ORDER — LORAZEPAM 2 MG/ML
1 INJECTION INTRAMUSCULAR ONCE
Status: COMPLETED | OUTPATIENT
Start: 2017-04-27 | End: 2017-04-27

## 2017-04-27 RX ORDER — CYCLOBENZAPRINE HCL 5 MG
10 TABLET ORAL 3 TIMES DAILY
Status: DISCONTINUED | OUTPATIENT
Start: 2017-04-27 | End: 2017-04-27 | Stop reason: HOSPADM

## 2017-04-27 RX ORDER — CYCLOBENZAPRINE HCL 10 MG
10 TABLET ORAL 3 TIMES DAILY PRN
Qty: 30 TABLET | Refills: 0 | Status: SHIPPED | OUTPATIENT
Start: 2017-04-27 | End: 2017-05-07

## 2017-04-27 RX ORDER — CYCLOBENZAPRINE HCL 5 MG
5 TABLET ORAL 3 TIMES DAILY
Status: DISCONTINUED | OUTPATIENT
Start: 2017-04-27 | End: 2017-04-27

## 2017-04-27 RX ADMIN — INSULIN ASPART 2 UNITS: 100 INJECTION, SOLUTION INTRAVENOUS; SUBCUTANEOUS at 04:04

## 2017-04-27 RX ADMIN — CYCLOBENZAPRINE HYDROCHLORIDE 10 MG: 5 TABLET, FILM COATED ORAL at 01:04

## 2017-04-27 RX ADMIN — MORPHINE SULFATE 3 MG: 2 INJECTION, SOLUTION INTRAMUSCULAR; INTRAVENOUS at 06:04

## 2017-04-27 RX ADMIN — SODIUM CHLORIDE, SODIUM LACTATE, POTASSIUM CHLORIDE, AND CALCIUM CHLORIDE 1000 ML: .6; .31; .03; .02 INJECTION, SOLUTION INTRAVENOUS at 12:04

## 2017-04-27 RX ADMIN — SODIUM CHLORIDE, PRESERVATIVE FREE 3 ML: 5 INJECTION INTRAVENOUS at 06:04

## 2017-04-27 RX ADMIN — ESCITALOPRAM OXALATE 20 MG: 10 TABLET ORAL at 08:04

## 2017-04-27 RX ADMIN — LORAZEPAM 1 MG: 2 INJECTION INTRAMUSCULAR; INTRAVENOUS at 11:04

## 2017-04-27 RX ADMIN — MORPHINE SULFATE 3 MG: 2 INJECTION, SOLUTION INTRAMUSCULAR; INTRAVENOUS at 11:04

## 2017-04-27 RX ADMIN — LEVOTHYROXINE SODIUM 100 MCG: 100 TABLET ORAL at 06:04

## 2017-04-27 RX ADMIN — CYCLOBENZAPRINE HYDROCHLORIDE 5 MG: 5 TABLET, FILM COATED ORAL at 09:04

## 2017-04-27 NOTE — ASSESSMENT & PLAN NOTE
Check blood glucose level q AC/HS.  Use Novolog Insulin Sliding Scale as needed.   Continue American Diabetic Association 1800 Kcal diet.

## 2017-04-27 NOTE — H&P
[facility] Hospital Medicine  History & Physical    Patient Name: Alix Patel  MRN: [id number]  Admission Date: 4/26/2017  Attending Physician: Dawson Richards MD  Primary Care Provider: Hamilton Hayden Jr, MD    [facility] Medicine Team: [facility] Dawson Richards MD     Patient information was obtained from patient and ER records.     Subjective:     Principal Problem:Chest pain    Chief Complaint:   Chief Complaint   Patient presents with    Chest Pain     Pt c/o CP since approx 0800, squeezing pain        HPI: 62 yo woman with h/o hypothyroidism, HLD, bipolar disorder who presents from coffee shop with acute onset chest pain since 8am.  She had a cup of coffee and then was leaving the coffee shop at this time. She walked to sidewalk and had sudden onset left sided chest pain that felt like a tearing pressure, 10/10 in severity, radiating to her jaw and her left axilla and around her back.  She fell to the ground and could not get up or move her pain was so severe.  Bystanders who saw her alerted EMS who came and brought her to [facility] ED. Pt states any movement whatsoever including breathing, shifting her weight make her pain much worse.      Prior to this event she had been in her normal state of health without any chest pain, fevers, chills, nausea, vomiting. She had not changed her meds other than starting a new diet which included taking potassium tablets.      ED course:  She was brought to the ED, given two of nitro and eventually her pain subsided to 6/10 but remained persistent. EKG without ischemic changes, first set of enzymes and D dimer negative.       Past Medical History:   Diagnosis Date    Anxiety     Anxiety     Bipolar 1 disorder     Depression     Diabetes mellitus     HEARING LOSS     pt states she has loss slighty in rt ear.    Insomnia     Rash        Past Surgical History:   Procedure Laterality Date    APPENDECTOMY      COLONOSCOPY      CYST REMOVAL       HYSTERECTOMY         Review of patient's allergies indicates:   Allergen Reactions    Lamictal [lamotrigine] Rash     Per psychiatry notes       No current facility-administered medications on file prior to encounter.      Current Outpatient Prescriptions on File Prior to Encounter   Medication Sig    busPIRone (BUSPAR) 15 MG tablet Take 2 tablets (30 mg total) by mouth 2 (two) times daily.    CRESTOR 20 mg tablet     escitalopram oxalate (LEXAPRO) 20 MG tablet Take 1 tablet (20 mg total) by mouth once daily.    hydrOXYzine pamoate (VISTARIL) 25 MG Cap Take 2 capsules (50 mg total) by mouth 3 (three) times daily as needed.    levothyroxine (SYNTHROID) 100 MCG tablet     metformin (GLUCOPHAGE) 500 MG tablet Take 1 tablet (500 mg total) by mouth daily with breakfast. (Patient taking differently: Take 1,000 mg by mouth daily with breakfast. )    quetiapine (SEROQUEL XR) 400 MG Tb24 Take 2 tablets (800 mg total) by mouth once daily. Take at 6 pm    CYCLOBENZAPRINE HCL (FLEXERIL ORAL) Take by mouth.    diphenhydrAMINE (BENADRYL) 12.5 mg chewable tablet Take 12.5 mg by mouth 4 (four) times daily as needed for Allergies.     Family History     Problem Relation (Age of Onset)    Heart failure Father        Social History Main Topics    Smoking status: Former Smoker     Packs/day: 0.25     Years: 34.00     Types: Cigarettes     Quit date: 8/27/2004    Smokeless tobacco: Never Used    Alcohol use 0.6 oz/week     1 Glasses of wine per week      Comment: very seldom only wine with dinner    Drug use: No    Sexual activity: Not Currently     Partners: Male     Birth control/ protection: None     Review of Systems   Constitutional: Negative for chills, diaphoresis, fatigue and fever.   HENT: Negative for nosebleeds, sinus pressure, sore throat and trouble swallowing.    Eyes: Negative for pain, redness and visual disturbance.   Respiratory: Negative for cough, shortness of breath and wheezing.    Cardiovascular:  Positive for chest pain (radiating to left neck and her left axilla to her arm. ). Negative for palpitations and leg swelling.   Gastrointestinal: Negative for abdominal distention, abdominal pain, diarrhea, nausea and vomiting.   Genitourinary: Negative for difficulty urinating and dysuria.   Musculoskeletal: Negative for arthralgias, back pain, joint swelling, myalgias, neck pain and neck stiffness.   Skin: Negative for color change and rash.   Neurological: Negative for weakness, numbness and headaches.   Hematological: Negative for adenopathy. Does not bruise/bleed easily.   Psychiatric/Behavioral: Positive for sleep disturbance. Negative for decreased concentration and dysphoric mood.     Objective:     Vital Signs (Most Recent):  Temp: 98.7 °F (37.1 °C) (04/26/17 1854)  Pulse: 93 (04/26/17 1854)  Resp: 18 (04/26/17 1854)  BP: 115/70 (04/26/17 1854)  SpO2: 99 % (04/26/17 1932) Vital Signs (24h Range):  Temp:  [97.9 °F (36.6 °C)-98.7 °F (37.1 °C)] 98.7 °F (37.1 °C)  Pulse:  [] 93  Resp:  [11-29] 18  SpO2:  [94 %-100 %] 99 %  BP: ()/(56-84) 115/70     Weight: 68.5 kg (151 lb)  Body mass index is 21.06 kg/(m^2).    Physical Exam   Constitutional: She is oriented to person, place, and time. She appears well-developed and well-nourished.   HENT:   Head: Normocephalic and atraumatic.   Eyes: Conjunctivae and EOM are normal. Pupils are equal, round, and reactive to light.   Neck: Normal range of motion. Neck supple. No JVD present.   Cardiovascular: Normal rate.    No murmur heard.  Pulmonary/Chest: Effort normal and breath sounds normal. No stridor. She has no wheezes. She has no rales. She exhibits tenderness (tender to palpation to left chest at area of described pain).   Abdominal: Soft. Bowel sounds are normal. She exhibits no distension. There is no tenderness.   Musculoskeletal: Normal range of motion. She exhibits no edema.   Neurological: She is alert and oriented to person, place, and time. No  cranial nerve deficit.   Skin: Skin is warm and dry. She is not diaphoretic. No erythema.   Psychiatric: She has a normal mood and affect. Her behavior is normal.        Significant Labs: All pertinent labs within the past 24 hours have been reviewed.    Significant Imaging: I have reviewed all pertinent imaging results/findings within the past 24 hours.    Assessment/Plan:     * Chest pain  Pain is reproducible on exam, left sided and sudden onset of severe intensity is atypical for acute coronary syndrome. No evidence of widened mediastinum on radiograph and hemodynamically stable.  Neg d dimer suggestive against DVT or PE.     Likely musculoskeletal pain. Prn analgesia and serial cardiac enzymes. Cardiology evaluated patient in ED, appreciate assistance.       Bipolar disorder  Stable. Chronic problem. Will continue chronic medications and monitor for any changes, adjusting as needed.        DM II (diabetes mellitus, type II), controlled  Check blood glucose level q AC/HS.  Use Novolog Insulin Sliding Scale as needed.   Continue American Diabetic Association 1800 Kcal diet.        Hyperlipidemia with target low density lipoprotein (LDL) cholesterol less than 100 mg/dL   Patient is chronically on statin. Will continue for now. Monitor clinically. Last LDL was   Lab Results   Component Value Date    LDLCALC 149.6 09/12/2013            Acquired hypothyroidism  Stable. Chronic problem. Will continue chronic medications and monitor for any changes, adjusting as needed.  TSH wnl      VTE Risk Mitigation         Ordered     Low Risk of VTE  Once      04/26/17 1928        Dawson Richards MD  Department of Hospital Medicine   [facility]

## 2017-04-27 NOTE — ASSESSMENT & PLAN NOTE
Pain is reproducible on exam, left sided and sudden onset of severe intensity is atypical for acute coronary syndrome. No evidence of widened mediastinum on radiograph and hemodynamically stable.  Neg d dimer suggestive against DVT or PE.     Likely musculoskeletal pain. Prn analgesia and serial cardiac enzymes. Cardiology evaluated patient in ED, appreciate assistance.

## 2017-04-27 NOTE — PLAN OF CARE
Problem: Patient Care Overview  Goal: Plan of Care Review  Outcome: Ongoing (interventions implemented as appropriate)  POC reviewed with pt. VS stable. Received LR 1L bolus. Pt remains free from falls, trauma, injury; pt tolerating POC well. Will continue to monitor.

## 2017-04-27 NOTE — SUBJECTIVE & OBJECTIVE
Past Medical History:   Diagnosis Date    Anxiety     Anxiety     Bipolar 1 disorder     Depression     Diabetes mellitus     HEARING LOSS     pt states she has loss slighty in rt ear.    Insomnia     Rash        Past Surgical History:   Procedure Laterality Date    APPENDECTOMY      COLONOSCOPY      CYST REMOVAL      HYSTERECTOMY         Review of patient's allergies indicates:   Allergen Reactions    Lamictal [lamotrigine] Rash     Per psychiatry notes       No current facility-administered medications on file prior to encounter.      Current Outpatient Prescriptions on File Prior to Encounter   Medication Sig    busPIRone (BUSPAR) 15 MG tablet Take 2 tablets (30 mg total) by mouth 2 (two) times daily.    CRESTOR 20 mg tablet     escitalopram oxalate (LEXAPRO) 20 MG tablet Take 1 tablet (20 mg total) by mouth once daily.    hydrOXYzine pamoate (VISTARIL) 25 MG Cap Take 2 capsules (50 mg total) by mouth 3 (three) times daily as needed.    levothyroxine (SYNTHROID) 100 MCG tablet     metformin (GLUCOPHAGE) 500 MG tablet Take 1 tablet (500 mg total) by mouth daily with breakfast. (Patient taking differently: Take 1,000 mg by mouth daily with breakfast. )    quetiapine (SEROQUEL XR) 400 MG Tb24 Take 2 tablets (800 mg total) by mouth once daily. Take at 6 pm    CYCLOBENZAPRINE HCL (FLEXERIL ORAL) Take by mouth.    diphenhydrAMINE (BENADRYL) 12.5 mg chewable tablet Take 12.5 mg by mouth 4 (four) times daily as needed for Allergies.     Family History     Problem Relation (Age of Onset)    Heart failure Father        Social History Main Topics    Smoking status: Former Smoker     Packs/day: 0.25     Years: 34.00     Types: Cigarettes     Quit date: 8/27/2004    Smokeless tobacco: Never Used    Alcohol use 0.6 oz/week     1 Glasses of wine per week      Comment: very seldom only wine with dinner    Drug use: No    Sexual activity: Not Currently     Partners: Male     Birth control/ protection:  None     Review of Systems   Constitutional: Negative for chills, diaphoresis, fatigue and fever.   HENT: Negative for nosebleeds, sinus pressure, sore throat and trouble swallowing.    Eyes: Negative for pain, redness and visual disturbance.   Respiratory: Negative for cough, shortness of breath and wheezing.    Cardiovascular: Positive for chest pain (radiating to left neck and her left axilla to her arm. ). Negative for palpitations and leg swelling.   Gastrointestinal: Negative for abdominal distention, abdominal pain, diarrhea, nausea and vomiting.   Genitourinary: Negative for difficulty urinating and dysuria.   Musculoskeletal: Negative for arthralgias, back pain, joint swelling, myalgias, neck pain and neck stiffness.   Skin: Negative for color change and rash.   Neurological: Negative for weakness, numbness and headaches.   Hematological: Negative for adenopathy. Does not bruise/bleed easily.   Psychiatric/Behavioral: Positive for sleep disturbance. Negative for decreased concentration and dysphoric mood.     Objective:     Vital Signs (Most Recent):  Temp: 98.7 °F (37.1 °C) (04/26/17 1854)  Pulse: 93 (04/26/17 1854)  Resp: 18 (04/26/17 1854)  BP: 115/70 (04/26/17 1854)  SpO2: 99 % (04/26/17 1932) Vital Signs (24h Range):  Temp:  [97.9 °F (36.6 °C)-98.7 °F (37.1 °C)] 98.7 °F (37.1 °C)  Pulse:  [] 93  Resp:  [11-29] 18  SpO2:  [94 %-100 %] 99 %  BP: ()/(56-84) 115/70     Weight: 68.5 kg (151 lb)  Body mass index is 21.06 kg/(m^2).    Physical Exam   Constitutional: She is oriented to person, place, and time. She appears well-developed and well-nourished.   HENT:   Head: Normocephalic and atraumatic.   Eyes: Conjunctivae and EOM are normal. Pupils are equal, round, and reactive to light.   Neck: Normal range of motion. Neck supple. No JVD present.   Cardiovascular: Normal rate.    No murmur heard.  Pulmonary/Chest: Effort normal and breath sounds normal. No stridor. She has no wheezes. She has no  rales. She exhibits tenderness (tender to palpation to left chest at area of described pain).   Abdominal: Soft. Bowel sounds are normal. She exhibits no distension. There is no tenderness.   Musculoskeletal: Normal range of motion. She exhibits no edema.   Neurological: She is alert and oriented to person, place, and time. No cranial nerve deficit.   Skin: Skin is warm and dry. She is not diaphoretic. No erythema.   Psychiatric: She has a normal mood and affect. Her behavior is normal.        Significant Labs: All pertinent labs within the past 24 hours have been reviewed.    Significant Imaging: I have reviewed all pertinent imaging results/findings within the past 24 hours.

## 2017-04-27 NOTE — PROGRESS NOTES
"Pt arrived to the floor from ED c/o constant chest pain 7/10, that "feels like an elephant is sitting on my chest". Pt /73 HR 87 O2 97% on 2L. STAT EKG ordered. Nitro given x1. Pt BP dropped to 117/72 HR 90 after 5mins with pt rating pain a 6/10. Rechecked BP 93/50. Notified Dr. Stevens that pt chest pain unrelieved by 1 nitro but BP has dropped. Instructed not to give another nitro, to get a STAT EKG and troponin, and to call with troponin results. Ordered MS 3mg for chest pain. Will monitor pt.    After about 15mins pt resting rating chest pain a 3/10. /83 HR 97 O2 95 on room air. Will continue to monitor pt.    EKG sinus tach, Troponin <0.006, notified Dr. Stevens with results. Does not believe chest pain to be cardiac. Will continue to monitor pt.  "

## 2017-04-27 NOTE — DISCHARGE INSTRUCTIONS
Thank you for choosing [facility] for your medical care. The primary doctor who is taking care of you at the time of your discharge is Dawson Richards MD.     You were admitted to the hospital with Muscle spasm.     Please note your discharge instructions, including diet/activity restrictions, follow-up appointments, and medication changes.  If you have any questions about your medical issues, prescriptions, or any other questions, please feel free to contact the [facility] Medicine Dept at [phone] or after hours at [phone] and we will help.    If you are previously with Home health, outpatient PT/OT or under a therapy program, you are cleared to return to those programs.    Please direct all long term medication refills and follow up to your primary care provider, Hamilton Hayden Jr, MD. Thank you again for letting us take care of your health care needs.  Follow up appointment info with Dr. Barakat as follows.        Roshni Lindsay MD     Doctor in [place], [place]  Address: [address] [place] [address] [address], [place] [address]  Phone: [phone]  Fax is [phone]      May 22 at 2pm is first available appt

## 2017-04-27 NOTE — ASSESSMENT & PLAN NOTE
Stable. Chronic problem. Will continue chronic medications and monitor for any changes, adjusting as needed.  TSH wnl

## 2017-04-27 NOTE — ASSESSMENT & PLAN NOTE
Stable. Chronic problem. Will continue chronic medications and monitor for any changes, adjusting as needed.

## 2017-04-27 NOTE — PROGRESS NOTES
Pt discharged per MD orders.  Tele and IV discontinued.  Catheter tip intact x1.  Medication list and prescriptions reviewed; prescriptions sent to pt preferred pharmacy and printed prescriptions provided.  Pt verbalizes understanding of all written and verbal discharge instructions.  MIS performed and documented in chart. Pt discharged with daughter.

## 2017-04-27 NOTE — ASSESSMENT & PLAN NOTE
Patient is chronically on statin. Will continue for now. Monitor clinically. Last LDL was   Lab Results   Component Value Date    LDLCALC 149.6 09/12/2013

## 2017-04-28 NOTE — PLAN OF CARE
Roshni Lindsay MD      Doctor in [place], [place]  Address: [address], [place] [address]  Phone: [phone]  Fax is [phone]        May 22 at 2pm is first available appt     04/28/17 1550   Final Note   Assessment Type Final Discharge Note   Discharge planning education complete? Yes   Hospital Follow Up  Appt(s) scheduled? Yes   Discharge plans and expectations educations in teach back method with documentation complete? Yes   Offered [facility] -- Bedside Delivery? n/a   Discharge/Hospital Encounter Summary to (non-[facility]) PCP n/a   Referral to Outpatient Case Management complete? n/a   Referral to / orders for Home Health Complete? n/a   30 day supply of medicines given at discharge, if documented non-compliance / non-adherence? n/a   Any social issues identified prior to discharge? n/a   Did you assess the readiness or willingness of the family or caregiver to support self management of care? Yes   Right Care Referral Info   Post Acute Recommendation No Care

## 2017-04-28 NOTE — PLAN OF CARE
Pt dc yesterday but appt was made prior.  Roshni Lindsay MD      Doctor in [place], [place]  Address: [address], [place] [address]  Phone: [phone]  Fax is [phone]        May 22 at 2pm is first available appt  Will fax dc summary to PCP

## 2017-04-28 NOTE — DISCHARGE SUMMARY
[facility] Hospital Medicine  Discharge Summary      Patient Name: Alix Patel  MRN: [id number]  Admission Date: 4/26/2017  Hospital Length of Stay: 0 days  Discharge Date and Time: 4/27/2017  5:59 PM  Attending Physician: Teressa att. providers found   Discharging Provider: Dawson Richards MD  Primary Care Provider: Hamilton Hayden Jr, MD  [facility] Medicine Team: [facility] Dawson Richards MD    HPI:   62 yo woman with h/o hypothyroidism, HLD, bipolar disorder who presents from coffee shop with acute onset chest pain since 8am.  She had a cup of coffee and then was leaving the coffee shop at this time. She walked to sidewalk and had sudden onset left sided chest pain that felt like a tearing pressure, 10/10 in severity, radiating to her jaw and her left axilla and around her back.  She fell to the ground and could not get up or move her pain was so severe.  Bystanders who saw her alerted EMS who came and brought her to [facility]. Pt states any movement whatsoever including breathing, shifting her weight make her pain much worse.      Prior to this event she had been in her normal state of health without any chest pain, fevers, chills, nausea, vomiting. She had not changed her meds other than starting a new diet which included taking potassium tablets.      ED course:  She was brought to the ED, given two of nitro and eventually her pain subsided to 6/10 but remained persistent. EKG without ischemic changes, first set of enzymes and D dimer negative.       * No surgery found *      Indwelling Lines/Drains at time of discharge:   Lines/Drains/Airways          No matching active lines, drains, or airways        Hospital Course:   Patient admitted overnight.  Serial enzymes obtained in patient with ongoing pain not totally relieved were normal as well as normal EKG.  CPK normal on subsequent am assessment. On am assessment she had several other areas of muscle spasm.  With administration of ativan,  flexeril and IVF her pain totally resolved.  Likely she was experiencing muscle spasm related to new ketogenic diet which provided her with potassium in hopes of staving off potential spasm/muscle cramping experienced with ketogenic diet.  Advised her to continue her statin and outpatient medication regimen and added prn flexeril for time being and advised her to adjust her diet regimen.  Discussed case with daughter and with cardiology fellow who had seen patient previously prior to discharge; no stress testing indicated, pain very atypical for cardiac etiology and more likely alternative explanation present.      Consults:   Consults         Status Ordering Provider     Inpatient consult to Cardiology  Once     Provider:  (Not yet assigned)    ROBIN Lao          Significant Diagnostic Studies: Labs:   Lipid Panel   Lab Results   Component Value Date    CHOL 222 (H) 09/12/2013    HDL 34 (L) 09/12/2013    LDLCALC 149.6 09/12/2013    TRIG 192 (H) 09/12/2013    CHOLHDL 15.3 (L) 09/12/2013   , Troponin   Recent Labs  Lab 04/26/17  2037   TROPONINI <0.006   , A1C:   Recent Labs  Lab 04/27/17  0653   HGBA1C 6.7*    and All labs within the past 24 hours have been reviewed    Pending Diagnostic Studies:     None        Final Active Diagnoses:    Diagnosis Date Noted POA    PRINCIPAL PROBLEM:  Muscle spasm [M62.838] 04/27/2017 Yes    Chest pain [R07.9] 04/26/2017 Yes    Acquired hypothyroidism [E03.9] 04/26/2017 Yes    Hyperlipidemia with target low density lipoprotein (LDL) cholesterol less than 100 mg/dL [E78.5] 09/13/2013 Yes    Bipolar disorder [F31.9] 04/28/2013 Yes    DM II (diabetes mellitus, type II), controlled [E11.9] 04/28/2013 Yes      Problems Resolved During this Admission:    Diagnosis Date Noted Date Resolved POA      * Muscle spasm  Improved with ativan, flexeril. Advised diet change and follow up with PCP.       Bipolar disorder  Stable. Chronic problem. Will continue chronic  medications and monitor for any changes, adjusting as needed.        DM II (diabetes mellitus, type II), controlled  Check blood glucose level q AC/HS.  Use Novolog Insulin Sliding Scale as needed.   Continue American Diabetic Association 1800 Kcal diet.        Hyperlipidemia with target low density lipoprotein (LDL) cholesterol less than 100 mg/dL   Patient is chronically on statin. Will continue for now. Monitor clinically. Last LDL was   Lab Results   Component Value Date    LDLCALC 149.6 09/12/2013            Chest pain  Pain is reproducible on exam, left sided and sudden onset of severe intensity is atypical for acute coronary syndrome. No evidence of widened mediastinum on radiograph and hemodynamically stable.  Neg d dimer suggestive against DVT or PE. Serial cardiac enzymes negative    Likely musculoskeletal pain.  Flexeril    Resolved presently      Acquired hypothyroidism  Stable. Chronic problem. Will continue chronic medications and monitor for any changes, adjusting as needed.  TSH wnl        Discharged Condition: good    Disposition: Home or Self Care    Follow Up:  Follow-up Information     Follow up with Angélica Barakat MD.    Specialty:  Internal Medicine    Why:  dr angélica sheppard on MAY 22 at @2 PM as a new patient.    Contact information:    [address] [place] [address] [phone]          Patient Instructions:     Diet general       Medications:  Reconciled Home Medications:   Discharge Medication List as of 4/27/2017  5:11 PM      CONTINUE these medications which have CHANGED    Details   cyclobenzaprine (FLEXERIL) 10 MG tablet Take 1 tablet (10 mg total) by mouth 3 (three) times daily as needed for Muscle spasms., Starting 4/27/2017, Until Sun 5/7/17, Normal         CONTINUE these medications which have NOT CHANGED    Details   busPIRone (BUSPAR) 15 MG tablet Take 2 tablets (30 mg total) by mouth 2 (two) times daily., Starting 3/20/2017, Until Discontinued, Normal       CRESTOR 20 mg tablet Starting 3/31/2015, Until Discontinued, Historical Med      escitalopram oxalate (LEXAPRO) 20 MG tablet Take 1 tablet (20 mg total) by mouth once daily., Starting 3/20/2017, Until Discontinued, Normal      hydrOXYzine pamoate (VISTARIL) 25 MG Cap Take 2 capsules (50 mg total) by mouth 3 (three) times daily as needed., Starting 3/20/2017, Until Discontinued, Normal      levothyroxine (SYNTHROID) 100 MCG tablet Starting 3/31/2015, Until Discontinued, Historical Med      metformin (GLUCOPHAGE) 500 MG tablet Take 1 tablet (500 mg total) by mouth daily with breakfast., Starting 11/7/2013, Until Discontinued, Normal      quetiapine (SEROQUEL XR) 400 MG Tb24 Take 2 tablets (800 mg total) by mouth once daily. Take at 6 pm, Starting 3/20/2017, Until Tue 3/20/18, Normal      diphenhydrAMINE (BENADRYL) 12.5 mg chewable tablet Take 12.5 mg by mouth 4 (four) times daily as needed for Allergies., Until Discontinued, Historical Med           Time spent on the discharge of patient: 33 minutes    Dawson Richards MD  Department of Hospital Medicine  [facility]

## 2017-05-04 ENCOUNTER — OFFICE VISIT (OUTPATIENT)
Dept: PSYCHIATRY | Facility: CLINIC | Age: 62
End: 2017-05-04
Payer: COMMERCIAL

## 2017-05-04 VITALS
DIASTOLIC BLOOD PRESSURE: 66 MMHG | BODY MASS INDEX: 21.84 KG/M2 | SYSTOLIC BLOOD PRESSURE: 103 MMHG | HEIGHT: 71 IN | HEART RATE: 108 BPM | WEIGHT: 156 LBS

## 2017-05-04 DIAGNOSIS — F41.1 ANXIETY STATE, UNSPECIFIED: ICD-10-CM

## 2017-05-04 DIAGNOSIS — F31.81 BIPOLAR II DISORDER: Primary | ICD-10-CM

## 2017-05-04 PROCEDURE — 99213 OFFICE O/P EST LOW 20 MIN: CPT | Mod: S$GLB,,, | Performed by: PSYCHIATRY & NEUROLOGY

## 2017-05-04 PROCEDURE — 99999 PR PBB SHADOW E&M-EST. PATIENT-LVL II: CPT | Mod: PBBFAC,,, | Performed by: PSYCHIATRY & NEUROLOGY

## 2017-05-04 PROCEDURE — 90833 PSYTX W PT W E/M 30 MIN: CPT | Mod: S$GLB,,, | Performed by: PSYCHIATRY & NEUROLOGY

## 2017-05-04 RX ORDER — ESCITALOPRAM OXALATE 20 MG/1
20 TABLET ORAL DAILY
Qty: 30 TABLET | Refills: 3 | Status: SHIPPED | OUTPATIENT
Start: 2017-05-04 | End: 2017-06-23 | Stop reason: SDUPTHER

## 2017-05-04 RX ORDER — HYDROXYZINE PAMOATE 25 MG/1
50 CAPSULE ORAL 3 TIMES DAILY PRN
Qty: 180 CAPSULE | Refills: 3 | Status: SHIPPED | OUTPATIENT
Start: 2017-05-04 | End: 2017-06-23 | Stop reason: SDUPTHER

## 2017-05-04 RX ORDER — BUSPIRONE HYDROCHLORIDE 15 MG/1
30 TABLET ORAL 2 TIMES DAILY
Qty: 120 TABLET | Refills: 3 | Status: SHIPPED | OUTPATIENT
Start: 2017-05-04 | End: 2017-06-23 | Stop reason: SDUPTHER

## 2017-05-04 RX ORDER — QUETIAPINE 400 MG/1
800 TABLET, FILM COATED, EXTENDED RELEASE ORAL DAILY
Qty: 60 TABLET | Refills: 3 | Status: SHIPPED | OUTPATIENT
Start: 2017-05-04 | End: 2017-06-23 | Stop reason: SDUPTHER

## 2017-05-04 NOTE — PROGRESS NOTES
"Outpatient Psychiatry Follow-Up Visit (MD/NP)    5/4/2017    Clinical Status of Patient:  Outpatient (Ambulatory)    Chief Complaint:  Alix Patel is a 61 y.o. female who presents today for follow-up of mood swings and anxiety.      Met with patient alone.      Interval History and Content of Current Session:  Interim Events/Subjective Report/Content of Current Session:    Pt recently had an ED stay for CP was found to be muscular.  She has taken little flexaril with relief.      She reports she is still a little nervous but believes it is in the coffee she drinks, 2 cups daily.  It however helps her to remain focused in the morning.  She reports no problems with anger recently despite a difficult situation with her daughter's landlady and continued challenges at work.  She got promoted at work.  Salary will eventually increase.  She is still sleeping well.  Taking meds as prescribed.   She is on a diet and is losing weight with success.      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     She will start to see a new PCP.       Psychotherapy:  · Target symptoms: depression, anxiety , mood swings  · Why chosen therapy is appropriate versus another modality: relevant to diagnosis  · Outcome monitoring methods: self-report, observation  · Therapeutic intervention type: supportive psychotherapy   · Topics discussed/themes: work stress, parenting issues, stress related to medical comorbidities, building skills sets for symptom management, symptom recognition, financial stressors  · The patient's response to the intervention is accepting. The patient's progress toward treatment goals is fair.   · Duration of intervention: 16 mins    Review of Systems   · PSYCHIATRIC: Pertinant items are noted in the narrative.    Past Medical, Family and Social History: The patient's past medical, family and social history have been reviewed and updated as appropriate " "within the electronic medical record - see encounter notes.    Compliance: yes    Side effects: None    Risk Parameters:  Patient reports no suicidal ideation  Patient reports no homicidal ideation  Patient reports no self-injurious behavior  Patient reports no violent behavior    Exam (detailed: at least 9 elements; comprehensive: all 15 elements)   Constitutional  Vitals:  Most recent vital signs, dated less than 90 days prior to this appointment, were reviewed.    Vitals:    05/04/17 1022   BP: 103/66   Pulse: 108   Weight: 70.8 kg (156 lb)   Height: 5' 11" (1.803 m)      General:  age appropriate, well dressed, neatly groomed,     Musculoskeletal  Muscle Strength/Tone:  no dyskinesia, no tremor   Gait & Station:  non-ataxic     Psychiatric  Speech:  not pressured, or loud, calm tone, not rapid   Mood:   Affect:  anxious  appropriate, midline   Thought Process:  goal-directed, logical   Associations:  intact   Thought Content:  No SI, HI, AH or VH, no delusions, slightly self aggrandizing   Insight  Judgement:  fair, has awareness of illness  fair behavior appropriate for circumstances   Orientation:  grossly intact   Memory: intact for content of interview   Language: grossly intact   Attention Span & Concentration:  able to focus   Fund of Knowledge:  intact and appropriate to age and level of education     Assessment and Diagnosis   Status/Progress: Based on the examination today, the patient's problem(s) is/are adequately but not ideally controlled.  New problems have not been presented today.   Comorbidities are complicating management of the primary condition.  There are no active rule-out diagnoses for this patient at this time.    General Impression: the patient is a 61 y.o. female who was diagnosed with bipolar disorder during her admission in 2012 with homicidal ideation towards her landlord.  She has a history of treatment for depression and anxiety.  Her past use of alprazolam has been problematic and " she has been slowly tapered off of this medication.  She has had some flexing of her fingers possibly c/w TD.  She has been transitioned from risperidone to Seroquel thus far successfully.    Diagnosis:  Bipolar 2 disorder  Anxiety disorder not otherwise specified  type 2 diabetes  Tardive dyskinesia      Intervention/Counseling/Treatment Plan   · Continue buspirone 30 mg twice daily, Lexapro 20 mg daily, Seroquel  mg nightly, and Vistaril 25-50 mg 3 times daily as needed for anxiety  · CMP and lipid panel which were previously ordered and in addition TSH.    Return to Clinic: 2 months

## 2017-05-29 ENCOUNTER — OFFICE VISIT (OUTPATIENT)
Dept: OTOLARYNGOLOGY | Facility: CLINIC | Age: 62
End: 2017-05-29
Payer: COMMERCIAL

## 2017-05-29 VITALS — HEART RATE: 116 BPM | DIASTOLIC BLOOD PRESSURE: 74 MMHG | SYSTOLIC BLOOD PRESSURE: 98 MMHG | TEMPERATURE: 98 F

## 2017-05-29 DIAGNOSIS — R49.9 VOICE DISORDER: Primary | ICD-10-CM

## 2017-05-29 DIAGNOSIS — R09.3 EXPECTORATION OF ABNORMAL SPUTUM: ICD-10-CM

## 2017-05-29 PROCEDURE — 31575 DIAGNOSTIC LARYNGOSCOPY: CPT | Mod: S$GLB,,, | Performed by: OTOLARYNGOLOGY

## 2017-05-29 PROCEDURE — 99213 OFFICE O/P EST LOW 20 MIN: CPT | Mod: 25,S$GLB,, | Performed by: OTOLARYNGOLOGY

## 2017-05-29 PROCEDURE — 99999 PR PBB SHADOW E&M-EST. PATIENT-LVL III: CPT | Mod: PBBFAC,,, | Performed by: OTOLARYNGOLOGY

## 2017-05-29 RX ORDER — ESCITALOPRAM OXALATE 20 MG/1
20 TABLET ORAL
COMMUNITY
End: 2017-06-23

## 2017-05-29 RX ORDER — BLOOD SUGAR DIAGNOSTIC
STRIP MISCELLANEOUS
Refills: 0 | Status: ON HOLD | COMMUNITY
Start: 2017-03-24 | End: 2021-07-22 | Stop reason: HOSPADM

## 2017-05-29 RX ORDER — METFORMIN HYDROCHLORIDE 500 MG/1
500 TABLET ORAL 2 TIMES DAILY WITH MEALS
Status: ON HOLD | COMMUNITY
End: 2021-07-22 | Stop reason: SDUPTHER

## 2017-05-29 RX ORDER — CYCLOBENZAPRINE HCL 10 MG
10 TABLET ORAL DAILY PRN
COMMUNITY
Start: 2017-05-22 | End: 2021-04-23

## 2017-05-29 NOTE — PATIENT INSTRUCTIONS
Endoscopy performed  Hydration/voice rest/humidification encouraged prn  Consider consultation re: voice with Dr. Cristi Curry + Anju Abernathy pending course;     card provided  Anti GERD measures reiterated  Sinus irrigations may help; instruction sheets provided  Use of NSS ( fluticasone)  May help; 1-2 sprays @ lateral nostril once a day x 3 weeks may help   Plain Mucinex BID may help thin secretions

## 2017-05-29 NOTE — PROGRESS NOTES
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  She has worked at [employer].  "I can't sing above and A".  She indicates a possible sinus condition exacerbating her voice problem.    She indicates dry mucous expectorated from her throat which is occasionally green and viscous and/or solid.  "I cough it up".  She denies bessie GERD symptoms.She indicates sweating "in fits".  I last evaluated her in June 2015 for hoarseness and difficulty with swallowing.  Endoscopy was performed at that time which indicated no evidence of true vocal cord leukoplakia or erythroplakia or nodularity.  There was some cobblestoning of the hypopharyngeal mucosa noted then.    Her medical problem list includes anxiety, depression, bipolar disorder, type 2 diabetes, chest pain, hyperlipidemia, thyroid nodule, muscle weakness    PE:BP 98/74 P 116 T 98.1  Gen: Alert and oriented lady in no acute distress; she does not sound hoarse when speaking.  She does not appear ill.  The patient has consented to an endoscpic study whichas been explained to her in detatil. Scope # [id number] was used. Pictures were recorded through the device.  Procedure: 4% Xylocaine and Gerardo-Synephrine sprayed in each nasal passage.  Cetacaine is applied to the posterior pharynx.  After waiting several minutes scoping is performed.  Both nasal passages are quite patent.  The left nasal passages more patent than the right.  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  There is evidence for some interarytenoid pachydermia.  There is no evidence of true vocal cord leukoplakia nor erythroplakia nor nodularity.  Some viscous mucus is stringing between the midportion of both vocal cords which clears easily with cough.  Upper " tracheal mucosa appears within normal limits. There is some posterior hypopharyngeal mucosal cobblestoning.    The scope is withdrawn.     Otologic exam is within normal limits.     The oropharyngeal exam is unremarkable for inflammation infection or ulceration.  The neck is palpated; there is no significant anterior posterior cervical adenopathy.  The trachea is palpated the midline.    DIAGNOSIS:     ICD-10-CM ICD-9-CM    1. Voice disorder R49.9 784.40     jazz vocalist cannot hit certain notes   2. Expectoration of abnormal sputum R09.3 786.4     thich/discolored/solid at times     PLAN: Endoscopy performed  Hydration/voice rest/humidification encouraged prn  Consider consultation re: voice with Dr. Cristi Curry + Anju Abernathy pending course;     card provided  Anti GERD measures reiterated  Sinus irrigations may help; instruction sheets provided  Use of NSS ( fluticasone)  May help; 1-2 sprays @ lateral nostril once a day x 3 weeks may help   Plain Mucinex BID may help thin secretions

## 2017-06-23 ENCOUNTER — OFFICE VISIT (OUTPATIENT)
Dept: PSYCHIATRY | Facility: CLINIC | Age: 62
End: 2017-06-23
Payer: COMMERCIAL

## 2017-06-23 VITALS
WEIGHT: 153.38 LBS | HEART RATE: 111 BPM | DIASTOLIC BLOOD PRESSURE: 71 MMHG | BODY MASS INDEX: 21.47 KG/M2 | HEIGHT: 71 IN | SYSTOLIC BLOOD PRESSURE: 108 MMHG

## 2017-06-23 DIAGNOSIS — E11.9 TYPE 2 DIABETES MELLITUS WITHOUT COMPLICATION, WITHOUT LONG-TERM CURRENT USE OF INSULIN: ICD-10-CM

## 2017-06-23 DIAGNOSIS — F31.81 BIPOLAR II DISORDER: Primary | ICD-10-CM

## 2017-06-23 DIAGNOSIS — F41.1 ANXIETY STATE, UNSPECIFIED: ICD-10-CM

## 2017-06-23 PROCEDURE — 99214 OFFICE O/P EST MOD 30 MIN: CPT | Mod: S$GLB,,, | Performed by: PSYCHIATRY & NEUROLOGY

## 2017-06-23 PROCEDURE — 3044F HG A1C LEVEL LT 7.0%: CPT | Mod: S$GLB,,, | Performed by: PSYCHIATRY & NEUROLOGY

## 2017-06-23 PROCEDURE — 99999 PR PBB SHADOW E&M-EST. PATIENT-LVL II: CPT | Mod: PBBFAC,,, | Performed by: PSYCHIATRY & NEUROLOGY

## 2017-06-23 PROCEDURE — 90833 PSYTX W PT W E/M 30 MIN: CPT | Mod: S$GLB,,, | Performed by: PSYCHIATRY & NEUROLOGY

## 2017-06-23 RX ORDER — HYDROXYZINE PAMOATE 25 MG/1
50 CAPSULE ORAL 3 TIMES DAILY PRN
Qty: 180 CAPSULE | Refills: 3 | Status: SHIPPED | OUTPATIENT
Start: 2017-06-23 | End: 2017-11-08 | Stop reason: SDUPTHER

## 2017-06-23 RX ORDER — QUETIAPINE 400 MG/1
800 TABLET, FILM COATED, EXTENDED RELEASE ORAL DAILY
Qty: 60 TABLET | Refills: 3 | Status: SHIPPED | OUTPATIENT
Start: 2017-06-23 | End: 2017-11-08 | Stop reason: SDUPTHER

## 2017-06-23 RX ORDER — BUSPIRONE HYDROCHLORIDE 15 MG/1
30 TABLET ORAL 2 TIMES DAILY
Qty: 120 TABLET | Refills: 3 | Status: SHIPPED | OUTPATIENT
Start: 2017-06-23 | End: 2017-11-08 | Stop reason: SDUPTHER

## 2017-06-23 RX ORDER — ESCITALOPRAM OXALATE 20 MG/1
20 TABLET ORAL DAILY
Qty: 30 TABLET | Refills: 3 | Status: SHIPPED | OUTPATIENT
Start: 2017-06-23 | End: 2017-11-08 | Stop reason: SDUPTHER

## 2017-06-23 NOTE — PROGRESS NOTES
"Outpatient Psychiatry Follow-Up Visit (MD/NP)    6/23/2017    Clinical Status of Patient:  Outpatient (Ambulatory)    Chief Complaint:  Alix Patel is a 61 y.o. female who presents today for follow-up of mood swings and anxiety.      Met with patient alone.      Interval History and Content of Current Session:  Interim Events/Subjective Report/Content of Current Session:  Pt seems a little disappointed with herself because she has spent some money on clothes recently and will have to make some sacrifices elsewhere.  She is working on her great job project.  She will have her voice evaluated soon that she hopes will be helpful for her singing.  Daughter ha moved to [place] working for the president of the [employer].  It is the first time she has lived apart from the patient.  She will be gone almost 3 weeks.  Pt feels she is adjusting well.  She will be getting a promotion and an almost doubled salary.  She will keep her job at the [employer] for now.      She reports her mood is "better".  She had an anxiety attack on Tuesday similar to vertigo and could not breath.  She has been taking dramamine with some success.   She is essentially without complaints.      She continues to try to lose weight.  She was able to decrease metformin dose recently.  Despite this her last A1c was 6.7 and last glucose (POCT) was elevated as well.  We discussed how seroquel will make getting off of DM meds more difficult.        Psychotherapy:  · Target symptoms: depression, anxiety , mood swings  · Why chosen therapy is appropriate versus another modality: relevant to diagnosis  · Outcome monitoring methods: self-report, observation  · Therapeutic intervention type: supportive psychotherapy   · Topics discussed/themes: progress toward long term goals, work stress, parenting issues, stress related to medical comorbidities, building skills sets for symptom management, symptom recognition, financial stressors  · The patient's " "response to the intervention is accepting. The patient's progress toward treatment goals is fair.   · Duration of intervention: 18 mins    Review of Systems   · PSYCHIATRIC: Pertinant items are noted in the narrative.    Past Medical, Family and Social History: The patient's past medical, family and social history have been reviewed and updated as appropriate within the electronic medical record - see encounter notes.    Compliance: yes    Side effects: None    Risk Parameters:  Patient reports no suicidal ideation  Patient reports no homicidal ideation  Patient reports no self-injurious behavior  Patient reports no violent behavior    Exam (detailed: at least 9 elements; comprehensive: all 15 elements)   Constitutional  Vitals:  Most recent vital signs, dated less than 90 days prior to this appointment, were reviewed.    Vitals:    06/23/17 1606   BP: 108/71   Pulse: (!) 111   Weight: 69.6 kg (153 lb 6.4 oz)   Height: 5' 11" (1.803 m)      General:  age appropriate, well dressed, neatly groomed,     Musculoskeletal  Muscle Strength/Tone:  no dyskinesia, no tremor   Gait & Station:  non-ataxic     Psychiatric  Speech:  not pressured, or loud, calm tone, not rapid   Mood:   Affect:  anxious  appropriate, midline   Thought Process:  goal-directed, logical   Associations:  intact   Thought Content:  No SI, HI, AH or VH, no delusions, slightly self aggrandizing   Insight  Judgement:  fair, has awareness of illness  fair behavior appropriate for circumstances   Orientation:  grossly intact   Memory: intact for content of interview   Language: grossly intact   Attention Span & Concentration:  able to focus   Fund of Knowledge:  intact and appropriate to age and level of education     Assessment and Diagnosis   Status/Progress: Based on the examination today, the patient's problem(s) is/are adequately but not ideally controlled.  New problems have not been presented today.   Comorbidities are complicating management of the " primary condition.  There are no active rule-out diagnoses for this patient at this time.    General Impression: the patient is a 61 y.o. female who was diagnosed with bipolar disorder during her admission in 2012 with homicidal ideation towards her landlord.  She has a history of treatment for depression and anxiety.  Her past use of alprazolam has been problematic and she has been slowly tapered off of this medication.  She has had some flexing of her fingers possibly c/w TD.  She has been transitioned from risperidone to Seroquel thus far successfully.    Diagnosis:  Bipolar 2 disorder  Anxiety disorder not otherwise specified  type 2 diabetes  Tardive dyskinesia      Intervention/Counseling/Treatment Plan   · We agreed to attempt to minimize her exposure to seroquel.   We discussed lowering the dose today to 600mg and after 3 weeks if doing well she may lower to 400mg.  We also discussed trials of alternatives which might not provide her with the sound sleep to which she has become acustomed.    · Continue buspirone 30 mg twice daily, Lexapro 20 mg daily, and Vistaril 25-50 mg 3 times daily as needed for anxiety      Return to Clinic: 2 months

## 2017-07-17 ENCOUNTER — INITIAL CONSULT (OUTPATIENT)
Dept: OTOLARYNGOLOGY | Facility: CLINIC | Age: 62
End: 2017-07-17
Payer: COMMERCIAL

## 2017-07-17 VITALS
HEART RATE: 116 BPM | WEIGHT: 146.81 LBS | TEMPERATURE: 97 F | SYSTOLIC BLOOD PRESSURE: 117 MMHG | DIASTOLIC BLOOD PRESSURE: 84 MMHG | BODY MASS INDEX: 20.48 KG/M2

## 2017-07-17 DIAGNOSIS — J38.4 VOCAL FOLD EDEMA: ICD-10-CM

## 2017-07-17 DIAGNOSIS — R49.9 VOICE DISTURBANCE: Primary | ICD-10-CM

## 2017-07-17 DIAGNOSIS — F44.4 HYPERFUNCTIONAL DYSPHONIA: ICD-10-CM

## 2017-07-17 PROCEDURE — 31579 LARYNGOSCOPY TELESCOPIC: CPT | Mod: S$GLB,,, | Performed by: OTOLARYNGOLOGY

## 2017-07-17 PROCEDURE — 99999 PR PBB SHADOW E&M-EST. PATIENT-LVL III: CPT | Mod: PBBFAC,,, | Performed by: OTOLARYNGOLOGY

## 2017-07-17 PROCEDURE — 99214 OFFICE O/P EST MOD 30 MIN: CPT | Mod: 25,S$GLB,, | Performed by: OTOLARYNGOLOGY

## 2017-07-17 NOTE — LETTER
July 17, 2017      Lennox Pinzon III, MD  [address]  [facility] [address]           [facility]  [address] [facility] [address]  [facility] [address]  Phone: [phone]  Fax: [phone]          Patient: Alix Patel   MR Number: [id number]   YOB: 1955   Date of Visit: 7/17/2017       Dear Dr. Lennox Pinzon III:    Thank you for referring Alix Patel to me for evaluation. Attached you will find relevant portions of my assessment and plan of care.    If you have questions, please do not hesitate to call me. I look forward to following Alix Patel along with you.    Sincerely,    Maximino Curry MD    Enclosure  CC:  Hamilton Hayden Jr., MD    If you would like to receive this communication electronically, please contact [email or web address] or [phone] to request more information on [employer] Link access.    For providers and/or their staff who would like to refer a patient to [facility], please contact us through our one-stop-shop provider referral line, [facility], at [phone].    If you feel you have received this communication in error or would no longer like to receive these types of communications, please e-mail [email or web address]

## 2017-07-17 NOTE — PROGRESS NOTES
"[facility]  Department of Otorhinolaryngology and Communication Sciences    Alix Patel is a 61 y.o. female who [facility] the [facility] for consultation at the kind request of Dr. Lennox Pinzon* for further management of hoarseness.     She complains of voice difficulty. She describes a raspy sound quality. In addition, she notes vocal fatigue, vocal strain, and a deficiency in her upper register beyond the key of A. She is more impaired with her singing than with her speaking. Onset was sudden. Duration is nearly 2 months, beginning with a "virus" infection. Time course is constant. Symptoms worsened initially but have since stabilized. Exacerbating factors include feeling tired. She denies any alleviating factors. Associated symptoms include some ulcers in her nose, which she has noticed only recently. She also notes that the back of her throat "aches" from trying to "push past" her limitations.    She is a . 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 However, she has recently obtained a fatoumata for her "[employer]" project, in which she travels to assisted living homes with an accompanist for performances.    She drinks 6-10 bottles of water per day. She consumes 2 cups of coffee per day. When prompted, she does acknowledge increased dryness in her mouth and throat since taking some of her psychotropic medications. In addition, she does fairly routinely take an Advil PM, which I suspect to contain diphenhydramine.    Voice Handicap Index-10 (VHI-10) score is 9.   Reflux Symptom Index (RSI) score is 11.   Eating Assessment Tool-10 (EAT-10) score is 0.   Dyspnea Index (DI) score is 0.  Cough Severity Index (CSI) score is 0.    Past Medical History  She " has a past medical history of Anxiety; Anxiety; Bipolar 1 disorder; Depression; Diabetes mellitus; HEARING LOSS; Insomnia; and Rash.    Past Surgical History  She has a past surgical history that includes Appendectomy; Hysterectomy; Colonoscopy; and Cyst Removal.    Family History  Her family history includes Heart failure in her father.    Social History  She reports that she quit smoking about 12 years ago. Her smoking use included Cigarettes. She has a 8.50 pack-year smoking history. She has never used smokeless tobacco. She reports that she drinks about 0.6 oz of alcohol per week . She reports that she does not use drugs.    Allergies  She is allergic to lamictal [lamotrigine].    Medications  She has a current medication list which includes the following prescription(s): buspirone, crestor, cyclobenzaprine, dimenhydrinate, diphenhydramine, escitalopram oxalate, hydroxyzine pamoate, levothyroxine, metformin, metformin, onetouch delica lancets, onetouch ultra test, and quetiapine.    Review of Systems   Constitutional: Negative for fever.   HENT: Negative for sore throat.    Eyes: Negative for visual disturbance.   Respiratory: Negative for wheezing.    Cardiovascular: Negative for chest pain.   Gastrointestinal: Negative for nausea.   Musculoskeletal: Negative for arthralgias.   Skin: Negative for rash.   Neurological: Negative for tremors.   Hematological: Does not bruise/bleed easily.   Psychiatric/Behavioral: The patient is nervous/anxious.           Objective:     /84 (Patient Position: Sitting)   Pulse (!) 116   Temp 96.9 °F (36.1 °C) (Tympanic)   Wt 66.6 kg (146 lb 13.2 oz)   BMI 20.48 kg/m²    Physical Exam    Constitutional: comfortable, well dressed  Psychiatric: appropriate affect  Respiratory: comfortably breathing, symmetric chest rise, no stridor  Voice: trace breathiness, trace variable roughness, mild strain; increased breathiness and strain at upper register  Cardiovascular: upper  extremities non-edematous  Lymphatic: no cervical lymphadenopathy  Neurologic: alert and oriented to time, place, person, and situation; cranial nerves 3-12 grossly intact  Head: normocephalic  Eyes: conjunctivae and sclerae clear  Ears: normal pinnae, normal external auditory canals, tympanic membranes intact  Nose: mucosa pink and noncongested, no masses, no mucopurulence, no polyps  Oral cavity / oropharynx: no mucosal lesions  Neck: soft, full range of motion, laryngotracheal complex palpable with appropriate landmarks, larynx elevates on swallowing  Indirect laryngoscopy: limited due to gag    Procedure  Rigid Laryngeal Videostroboscopy (66249): Laryngeal videostroboscopy is indicated to assess the laryngeal vibratory biomechanics and vocal fold oscillation, which cannot be assessed with a plain light examination. This was carried out with a 70 degree endoscope. After verbal consent was obtained, the patient was positioned and the tongue was gently secured with a gauze sponge. The endoscope was passed transorally and positioned to image the larynx and hypopharynx in detail. The following featured were examined: laryngeal and hypopharyngeal masses; vocal fold range and symmetry of motion; laryngeal mucosal edema, erythema, inflammation, and hydration; salivary pooling; and gross laryngeal sensation. During phonation, the vocal folds were assesses for glottal closure; mucosal wave; vocal fold lesions; vibratory periodicity, amplitude, and phase symmetry; and vertical height match. The equipment was removed. The patient tolerated the procedure well without complication. All findings were normal except:  - left vocal fold with mild midmembranous edema, broad-based  - right vocal fold with minor midmembranous vocal fold edema  - mild, bilateral, diffuse, ~symmetric vascular ectasia  - premature contact, irregular closure  - complete glottal closure at low/modal frequencies, with only mild vibratory asymmetry  -  trace glottal insufficiency, with an anterior/posterior gap and impaired vibration, at uppermost register  - pervasive supraglottic laryngeal hyperfunction, most evident at high frequencies          Assessment:     Alix Patel is a 61 y.o. female with chronic dypshonia. Exam in consistent with mild edema of the midmembranous vocal folds, left>right, and supraglottic laryngeal hyperfunction.       Plan:        I had a discussion with the patient regarding her condition and the further workup and management options.      I provided her with information on vocal hygiene. I recommended she try to discontinue the Advil PM and any other OTC medications containing antihistamines. As her psychotropic medications may also be contributing to her symptoms of dryness, I recommended she increase her PO hydration and take measures to increase humidification. While she may benefit from a short course of PO steroids, I deferred on this due to her comorbid DM, which is well controlled.     SLP voice evaluation and subsequent therapy sessions are medically necessary for restoration of laryngeal function. We will arrange for this to occur here at the [facility] in the coming weeks.    She will follow up with me in 2 month(s), or sooner if needed. Depending on her progress, she may emerge as a candidate for surgical intervention.    All questions were answered, and the patient is in agreement with the above.     This visit was 25 minutes in duration, with over 50% of the time spent in direct face-to-face counseling and coordination of care regarding the issues outlined above.    Maximino Curry M.D.  [facility]  Department of Otorhinolaryngology and Communication Sciences

## 2017-07-17 NOTE — PATIENT INSTRUCTIONS
VOCAL HYGIENE AND HYDRATION RECOMMENDATIONS     DO   · Drink plenty of waterabout  oz a day! If your urine is pale, you should be well-hydrated.  Try some of these tips to increase hydration as well.  · Eat wet snacks such as grapes, melon, cucumbers, apple slices   · Reduce intake coffee and other caffeinated and carbonated beverages   · Suck on pastille lozenges or sugar free candies (not mentholated lozenges)   · Use a room or personal humidifier   · Use sinus rinses/irrigations or nasal saline spray   · Inhale steam for a few minutes before speaking or singing   · Pay attention to how, and how much, you use your voice.   · Give yourself vocal breaks throughout the day.   · Breathe when talking, take frequent pauses for breath.   · Use a microphone when speaking in front of a group of 15 or more to reduce voice strain.   · Learn how to use your voice correctly to get loud with voice therapy techniques.  · Stay healthy. Eat right and get plenty of rest. Follow a reflux precaution diet if indicated.   ____________________________________________________________________    REDUCE   · Loud talking, yelling, cheering, or screaming. Voice injury can take place over a long time, or all at once.  If you need to talk loud or yell, be sure you are doing it properly.   · Clearing your throat and forceful coughing. The vocal folds collect mucus when irritated or inflamed and this can cause the urge to clear your throat. The trauma of clearing the throat creates more inflammation and mucus. See tips above for keeping hydrated to assist with cutting back on throat clearing.  Instead of clearing your throat, try these behaviors until the sensation passes:   · Take a sip of water   · Silently clear with a hard exhale making an hhh sound   · Swallow hard   · Drying agents such as anti-histamines or decongestant medications. You should always take medications as prescribed, but keep in mind these will dry you out,  so increase your hydration!   ____________________________________________________________________    AVOID   · Inhalant irritants such as smoke, strong fumes, and allergens. Take advantage of 1-800-QUIT-NOW if you are ready to stop smoking.   · Unnecessary non-speech noises, such as grunting during exercise, loud noises, or excessively high- or low-pitched sounds. Save your voice for talking and singing!   · Whispering. Whispering places your vocal folds in a tenser position than usual.                           WHAT TO EXPECT FROM VOICE THERAPY    Purpose  The purpose of voice therapy is to help you find a better, more efficient way to use your voice or to reduce symptoms such as coughing, throat clearing, or difficulty breathing.  Depending on your symptoms, you may learn how to produce clearer voice quality, how to reduce fatigue or pain associated with speaking, how to take care of your voice, and how to eliminate chronic coughing or throat clearing.      Process: Evaluation  First, you may go through some initial testing.  In most cases, a videostroboscopy will be performed in order to allow your speech pathologist and your physician to look at your vocal cords to aid in deciding if you would benefit from voice therapy.  Next, you may work with the speech pathologist to assess the current capabilities of your voice.  Following evaluation, your speech pathologist will design a therapeutic plan to improve your voice as well as other symptoms that may bother you.  At the time of evaluation, your speech pathologist may provide you with exercises to try at home.      Process: Therapy  Most patients benefit from 2-8 sessions over 1-3 months.  Voice therapy involves changing the behavior of your vocal cords and speaking habits, so it is very important that you attend your appointments and do home practices as instructed by your speech pathologist.  Home practice and participation in therapy are critical to meeting  your desired voice goals, so of course, we are able to work with you to schedule appointments that are convenient for you.

## 2017-07-21 RX ORDER — QUETIAPINE 400 MG/1
TABLET, FILM COATED, EXTENDED RELEASE ORAL
Qty: 60 TABLET | Refills: 0 | OUTPATIENT
Start: 2017-07-21

## 2017-07-21 RX ORDER — QUETIAPINE 400 MG/1
800 TABLET, FILM COATED, EXTENDED RELEASE ORAL DAILY
Qty: 60 TABLET | Refills: 3 | OUTPATIENT
Start: 2017-07-21 | End: 2018-07-21

## 2017-07-25 RX ORDER — QUETIAPINE 400 MG/1
800 TABLET, FILM COATED, EXTENDED RELEASE ORAL DAILY
Qty: 60 TABLET | Refills: 3 | OUTPATIENT
Start: 2017-07-25 | End: 2018-07-25

## 2017-07-25 RX ORDER — ESCITALOPRAM OXALATE 20 MG/1
20 TABLET ORAL DAILY
Qty: 30 TABLET | Refills: 3 | OUTPATIENT
Start: 2017-07-25

## 2017-08-04 ENCOUNTER — OFFICE VISIT (OUTPATIENT)
Dept: PSYCHIATRY | Facility: CLINIC | Age: 62
End: 2017-08-04
Payer: COMMERCIAL

## 2017-08-04 VITALS
BODY MASS INDEX: 20.3 KG/M2 | HEIGHT: 71 IN | WEIGHT: 145 LBS | HEART RATE: 112 BPM | SYSTOLIC BLOOD PRESSURE: 134 MMHG | DIASTOLIC BLOOD PRESSURE: 67 MMHG

## 2017-08-04 DIAGNOSIS — F41.1 ANXIETY STATE, UNSPECIFIED: ICD-10-CM

## 2017-08-04 DIAGNOSIS — F31.81 BIPOLAR II DISORDER: Primary | ICD-10-CM

## 2017-08-04 DIAGNOSIS — E11.9 TYPE 2 DIABETES MELLITUS WITHOUT COMPLICATION, WITHOUT LONG-TERM CURRENT USE OF INSULIN: ICD-10-CM

## 2017-08-04 PROCEDURE — 99214 OFFICE O/P EST MOD 30 MIN: CPT | Mod: S$GLB,,, | Performed by: PSYCHIATRY & NEUROLOGY

## 2017-08-04 PROCEDURE — 3044F HG A1C LEVEL LT 7.0%: CPT | Mod: S$GLB,,, | Performed by: PSYCHIATRY & NEUROLOGY

## 2017-08-04 PROCEDURE — 3008F BODY MASS INDEX DOCD: CPT | Mod: S$GLB,,, | Performed by: PSYCHIATRY & NEUROLOGY

## 2017-08-04 PROCEDURE — 99999 PR PBB SHADOW E&M-EST. PATIENT-LVL II: CPT | Mod: PBBFAC,,, | Performed by: PSYCHIATRY & NEUROLOGY

## 2017-08-04 NOTE — PROGRESS NOTES
Outpatient Psychiatry Follow-Up Visit (MD/NP)    8/4/2017    Clinical Status of Patient:  Outpatient (Ambulatory)    Chief Complaint:  Alix Patel is a 61 y.o. female who presents today for follow-up of mood swings and anxiety.      Met with patient alone.      Interval History and Content of Current Session:  Interim Events/Subjective Report/Content of Current Session:    At last appointment seroquel was decreased to 600 mg daily.  Pt reports she is depressed.  It started 2 wks ago.  She feels physically ill.  She is not happy.  She feels low.  She cannot focus.  She is angry and anxious.  She is sleeping well however.  She reports there are too many good things happening in her life to allow it to be derailed.  She has continued to lose weight and is a few pounds from her goal weight.      She did get her promotion but the paperwork has not gone through yet.      She is seeing doctors for her voice and will be seeing a speech pathologist who specializes in working with singers.        Psychotherapy:  · Target symptoms: depression, anxiety , mood swings  · Why chosen therapy is appropriate versus another modality: relevant to diagnosis  · Outcome monitoring methods: self-report, observation  · Therapeutic intervention type: supportive psychotherapy   · Topics discussed/themes: work stress, stress related to medical comorbidities, building skills sets for symptom management, symptom recognition, financial stressors  · The patient's response to the intervention is accepting. The patient's progress toward treatment goals is fair.   · Duration of intervention: 12 mins    Review of Systems   · PSYCHIATRIC: Pertinant items are noted in the narrative.    Past Medical, Family and Social History: The patient's past medical, family and social history have been reviewed and updated as appropriate within the electronic medical record - see encounter notes.    Compliance: yes    Side effects: None    Risk  "Parameters:  Patient reports no suicidal ideation  Patient reports no homicidal ideation  Patient reports no self-injurious behavior  Patient reports no violent behavior    Exam (detailed: at least 9 elements; comprehensive: all 15 elements)   Constitutional  Vitals:  Most recent vital signs, dated less than 90 days prior to this appointment, were reviewed.    Vitals:    08/04/17 1046   BP: 134/67   Pulse: (!) 112   Weight: 65.8 kg (145 lb)   Height: 5' 11" (1.803 m)      General:  age appropriate, well dressed, neatly groomed,     Musculoskeletal  Muscle Strength/Tone:  no dyskinesia, no tremor   Gait & Station:  non-ataxic     Psychiatric  Speech:  not pressured, or loud, calm tone, not rapid   Mood:   Affect:  anxious  appropriate, midline   Thought Process:  goal-directed, logical   Associations:  intact   Thought Content:  No SI, HI, AH or VH, no delusions, slightly self aggrandizing   Insight  Judgement:  fair, has awareness of illness  fair behavior appropriate for circumstances   Orientation:  grossly intact   Memory: intact for content of interview   Language: grossly intact   Attention Span & Concentration:  able to focus   Fund of Knowledge:  intact and appropriate to age and level of education     Assessment and Diagnosis   Status/Progress: Based on the examination today, the patient's problem(s) is/are inadequately controlled.  New problems have not been presented today.   Comorbidities are complicating management of the primary condition.  There are no active rule-out diagnoses for this patient at this time.    General Impression: the patient is a 61 y.o. female who was diagnosed with bipolar disorder during her admission in 2012 with homicidal ideation towards her landlord.  She has a history of treatment for depression and anxiety.  Her past use of alprazolam has been problematic and she has been slowly tapered off of this medication.  She has had some flexing of her fingers possibly c/w TD.  She has " been transitioned from risperidone to Seroquel.    Diagnosis:  Bipolar 2 disorder  Anxiety disorder not otherwise specified  type 2 diabetes  Tardive dyskinesia      Intervention/Counseling/Treatment Plan   · Return seroquel to 800 mg nightly for now.  Will likely try to again decrease the dose at a later date.    · Continue buspirone 30 mg twice daily, Lexapro 20 mg daily, and Vistaril 25-50 mg 3 times daily as needed for anxiety      Return to Clinic: 2 months

## 2017-08-07 ENCOUNTER — TELEPHONE (OUTPATIENT)
Dept: SPEECH THERAPY | Facility: HOSPITAL | Age: 62
End: 2017-08-07

## 2017-09-18 RX ORDER — ZOLPIDEM TARTRATE 5 MG/1
TABLET ORAL
Qty: 30 TABLET | Refills: 0 | OUTPATIENT
Start: 2017-09-18

## 2017-11-08 ENCOUNTER — OFFICE VISIT (OUTPATIENT)
Dept: PSYCHIATRY | Facility: CLINIC | Age: 62
End: 2017-11-08
Payer: COMMERCIAL

## 2017-11-08 VITALS
HEART RATE: 107 BPM | HEIGHT: 71 IN | WEIGHT: 152 LBS | SYSTOLIC BLOOD PRESSURE: 110 MMHG | BODY MASS INDEX: 21.28 KG/M2 | DIASTOLIC BLOOD PRESSURE: 74 MMHG

## 2017-11-08 DIAGNOSIS — F31.81 BIPOLAR II DISORDER: Primary | ICD-10-CM

## 2017-11-08 DIAGNOSIS — F41.9 ANXIETY: ICD-10-CM

## 2017-11-08 PROCEDURE — 90833 PSYTX W PT W E/M 30 MIN: CPT | Mod: PBBFAC | Performed by: PSYCHIATRY & NEUROLOGY

## 2017-11-08 PROCEDURE — 99999 PR PBB SHADOW E&M-EST. PATIENT-LVL II: CPT | Mod: PBBFAC,,, | Performed by: PSYCHIATRY & NEUROLOGY

## 2017-11-08 PROCEDURE — 99213 OFFICE O/P EST LOW 20 MIN: CPT | Mod: S$GLB,,, | Performed by: PSYCHIATRY & NEUROLOGY

## 2017-11-08 PROCEDURE — 90833 PSYTX W PT W E/M 30 MIN: CPT | Mod: S$GLB,,, | Performed by: PSYCHIATRY & NEUROLOGY

## 2017-11-08 RX ORDER — ESCITALOPRAM OXALATE 20 MG/1
20 TABLET ORAL DAILY
Qty: 30 TABLET | Refills: 3 | Status: SHIPPED | OUTPATIENT
Start: 2017-11-08 | End: 2018-03-22 | Stop reason: SDUPTHER

## 2017-11-08 RX ORDER — QUETIAPINE 400 MG/1
800 TABLET, FILM COATED, EXTENDED RELEASE ORAL DAILY
Qty: 60 TABLET | Refills: 3 | Status: SHIPPED | OUTPATIENT
Start: 2017-11-08 | End: 2018-03-22 | Stop reason: SDUPTHER

## 2017-11-08 RX ORDER — HYDROXYZINE PAMOATE 25 MG/1
50 CAPSULE ORAL 3 TIMES DAILY PRN
Qty: 180 CAPSULE | Refills: 3 | Status: SHIPPED | OUTPATIENT
Start: 2017-11-08 | End: 2019-03-11

## 2017-11-08 RX ORDER — BUSPIRONE HYDROCHLORIDE 15 MG/1
30 TABLET ORAL 2 TIMES DAILY
Qty: 120 TABLET | Refills: 3 | Status: SHIPPED | OUTPATIENT
Start: 2017-11-08 | End: 2018-03-22 | Stop reason: SDUPTHER

## 2017-11-08 NOTE — PROGRESS NOTES
"Outpatient Psychiatry Follow-Up Visit (MD/NP)    11/8/2017    Clinical Status of Patient:  Outpatient (Ambulatory)    Chief Complaint:  Alix Patel is a 61 y.o. female who presents today for follow-up of mood swings and anxiety.      Met with patient alone.      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  She receive the promotion but her staff  Became harder to deal with.  She feels there is something about her that indicates to others that they can treat her poorly.   She feels her performance at work is impaired by being required to do more than she should.  She is working 7 days a week and she is tired.  Feels like she is now able to see that others speak to her as if she were a "moron" and she feels she is responsible for allowing that to happen.      She reports she is depressed related to becoming aware of the above.  She retires at 7:30 to 5:30 with brief interruptions.  Denies SI.  Denies HI.  Denies crying.  Energy is up.  She has been exercising.      Daughter is doing very well with her job in [place].    Pt has a new PCP and reports her Heme A1c has been under 6.      Psychotherapy:  · Target symptoms: depression, anxiety , mood swings  · Why chosen therapy is appropriate versus another modality: relevant to diagnosis  · Outcome monitoring methods: self-report, observation  · Therapeutic intervention type: supportive psychotherapy   · Topics discussed/themes: work stress, stress related to medical comorbidities, building skills sets for symptom management, symptom recognition, financial stressors  · The patient's response to the intervention is accepting. The patient's progress toward treatment goals is fair.   · Duration of intervention: 16 mins    Review of Systems   · PSYCHIATRIC: Pertinant items are noted in the " "narrative.    Past Medical, Family and Social History: The patient's past medical, family and social history have been reviewed and updated as appropriate within the electronic medical record - see encounter notes.    Compliance: yes    Side effects: None    Risk Parameters:  Patient reports no suicidal ideation  Patient reports no homicidal ideation  Patient reports no self-injurious behavior  Patient reports no violent behavior    Exam (detailed: at least 9 elements; comprehensive: all 15 elements)   Constitutional  Vitals:  Most recent vital signs, dated less than 90 days prior to this appointment, were reviewed.    Vitals:    11/08/17 1003   BP: 110/74   Pulse: 107   Weight: 68.9 kg (152 lb)   Height: 5' 11" (1.803 m)      General:  age appropriate, well dressed, neatly groomed,     Musculoskeletal  Muscle Strength/Tone:  no dyskinesia, no tremor   Gait & Station:  non-ataxic     Psychiatric  Speech:  not pressured, or loud, calm tone, not rapid   Mood:   Affect:  anxious  appropriate, midline   Thought Process:  goal-directed, logical   Associations:  intact   Thought Content:  No SI, HI, AH or VH, no delusions, slightly self aggrandizing   Insight  Judgement:  fair, has awareness of illness  fair behavior appropriate for circumstances   Orientation:  grossly intact   Memory: intact for content of interview   Language: grossly intact   Attention Span & Concentration:  able to focus   Fund of Knowledge:  intact and appropriate to age and level of education     Assessment and Diagnosis   Status/Progress: Based on the examination today, the patient's problem(s) is/are inadequately controlled.  New problems have not been presented today.   Comorbidities are complicating management of the primary condition.  There are no active rule-out diagnoses for this patient at this time.    General Impression: the patient is a 61 y.o. female who was diagnosed with bipolar disorder during her admission in 2012 with homicidal " ideation towards her landlord.  She has a history of treatment for depression and anxiety.  Her past use of alprazolam has been problematic and she has been slowly tapered off of this medication.  She has had some flexing of her fingers possibly c/w TD.  She has been transitioned from risperidone to Seroquel.    Diagnosis:  Bipolar 2 disorder  Anxiety disorder not otherwise specified  type 2 diabetes  Tardive dyskinesia      Intervention/Counseling/Treatment Plan   · Continue seroquel 800 mg nightly for now.  Will likely try to again decrease the dose at a later date.  Discussed the risks of this med given DM  · Continue buspirone 30 mg twice daily, Lexapro 20 mg daily, and Vistaril 25-50 mg 3 times daily as needed for anxiety      Return to Clinic: 2 months

## 2018-03-22 ENCOUNTER — OFFICE VISIT (OUTPATIENT)
Dept: PSYCHIATRY | Facility: CLINIC | Age: 63
End: 2018-03-22
Payer: COMMERCIAL

## 2018-03-22 VITALS
SYSTOLIC BLOOD PRESSURE: 105 MMHG | BODY MASS INDEX: 21.08 KG/M2 | HEART RATE: 98 BPM | WEIGHT: 150.56 LBS | DIASTOLIC BLOOD PRESSURE: 65 MMHG | HEIGHT: 71 IN

## 2018-03-22 DIAGNOSIS — F41.9 ANXIETY: ICD-10-CM

## 2018-03-22 DIAGNOSIS — F31.81 BIPOLAR II DISORDER: Primary | ICD-10-CM

## 2018-03-22 DIAGNOSIS — E11.9 TYPE 2 DIABETES MELLITUS WITHOUT COMPLICATION, WITHOUT LONG-TERM CURRENT USE OF INSULIN: ICD-10-CM

## 2018-03-22 PROCEDURE — 99213 OFFICE O/P EST LOW 20 MIN: CPT | Mod: S$GLB,,, | Performed by: PSYCHIATRY & NEUROLOGY

## 2018-03-22 PROCEDURE — 3044F HG A1C LEVEL LT 7.0%: CPT | Mod: CPTII,S$GLB,, | Performed by: PSYCHIATRY & NEUROLOGY

## 2018-03-22 PROCEDURE — 99999 PR PBB SHADOW E&M-EST. PATIENT-LVL II: CPT | Mod: PBBFAC,,, | Performed by: PSYCHIATRY & NEUROLOGY

## 2018-03-22 RX ORDER — ESCITALOPRAM OXALATE 20 MG/1
20 TABLET ORAL DAILY
Qty: 30 TABLET | Refills: 3 | Status: SHIPPED | OUTPATIENT
Start: 2018-03-22 | End: 2018-07-23 | Stop reason: SDUPTHER

## 2018-03-22 RX ORDER — BUSPIRONE HYDROCHLORIDE 15 MG/1
30 TABLET ORAL 2 TIMES DAILY
Qty: 120 TABLET | Refills: 3 | Status: SHIPPED | OUTPATIENT
Start: 2018-03-22 | End: 2018-07-20 | Stop reason: SDUPTHER

## 2018-03-22 RX ORDER — QUETIAPINE 400 MG/1
800 TABLET, FILM COATED, EXTENDED RELEASE ORAL DAILY
Qty: 60 TABLET | Refills: 3 | Status: SHIPPED | OUTPATIENT
Start: 2018-03-22 | End: 2018-10-02 | Stop reason: ALTCHOICE

## 2018-03-22 NOTE — PROGRESS NOTES
Outpatient Psychiatry Follow-Up Visit (MD/NP)    3/22/2018    Clinical Status of Patient:  Outpatient (Ambulatory)    Chief Complaint:  Alix Patel is a 62 y.o. female who presents today for follow-up of mood swings and anxiety.      Met with patient alone.      Interval History and Content of Current Session:  Interim Events/Subjective Report/Content of Current Session:   Pt reports she has been okay.  She has not had bouts of crying.  She sometimes has to sit in a corner an dbreath when she is overwhelmed by things coming at her at work and at home.  She is sleeping pretty good.  She is managing to handle work.  She is walking and running for exercise 3.5 mi 3 times a week.  She is pleased with her weight and wants to lose about 8 more pounds.      Pt has a new PCP and reports her Heme A1c is 6.5 and Dr. Marina is okay with anything under 7.        Psychotherapy:  · Target symptoms: depression, anxiety , mood swings  · Why chosen therapy is appropriate versus another modality: relevant to diagnosis  · Outcome monitoring methods: self-report, observation  · Therapeutic intervention type: supportive psychotherapy   · Topics discussed/themes: work stress, stress related to medical comorbidities, building skills sets for symptom management  · The patient's response to the intervention is accepting. The patient's progress toward treatment goals is fair.   · Duration of intervention: 5 mins    Review of Systems   · PSYCHIATRIC: Pertinant items are noted in the narrative.    Past Medical, Family and Social History: The patient's past medical, family and social history have been reviewed and updated as appropriate within the electronic medical record - see encounter notes.    Compliance: yes    Side effects: None    Risk Parameters:  Patient reports no suicidal ideation  Patient reports no homicidal ideation  Patient reports no self-injurious behavior  Patient reports no violent behavior    Exam (detailed: at  "least 9 elements; comprehensive: all 15 elements)   Constitutional  Vitals:  Most recent vital signs, dated less than 90 days prior to this appointment, were reviewed.    Vitals:    03/22/18 0844   BP: 105/65   Pulse: 98   Weight: 68.3 kg (150 lb 9.2 oz)   Height: 5' 11" (1.803 m)      General:  age appropriate, well dressed, neatly groomed, 18 mins late for appt     Musculoskeletal  Muscle Strength/Tone:  no dyskinesia, no tremor   Gait & Station:  non-ataxic     Psychiatric  Speech:  not pressured, or loud, calm tone, not rapid   Mood:   Affect:  euthymic  appropriate, midline   Thought Process:  goal-directed, logical   Associations:  intact   Thought Content:  No SI, HI, AH or VH, no delusions, slightly self aggrandizing   Insight  Judgement:  fair, has awareness of illness  fair behavior appropriate for circumstances   Orientation:  grossly intact   Memory: intact for content of interview   Language: grossly intact   Attention Span & Concentration:  able to focus   Fund of Knowledge:  intact and appropriate to age and level of education     Assessment and Diagnosis   Status/Progress: Based on the examination today, the patient's problem(s) is/are inadequately controlled.  New problems have not been presented today.   Comorbidities are complicating management of the primary condition.  There are no active rule-out diagnoses for this patient at this time.    General Impression: the patient is a 62 y.o. female who was diagnosed with bipolar disorder during her admission in 2012 with homicidal ideation towards her landlord.  She has a history of treatment for depression and anxiety.  Her past use of alprazolam has been problematic and she has been slowly tapered off of this medication.  She has had some flexing of her fingers possibly c/w TD.  She has been transitioned from risperidone to Seroquel.    Diagnosis:  Bipolar 2 disorder  Anxiety disorder not otherwise specified  type 2 diabetes  Tardive " dyskinesia      Intervention/Counseling/Treatment Plan   · Continue seroquel 800 mg nightly for now.  Will likely try to again decrease the dose at a later date.  Discussed the risks of this med given DM  · Continue buspirone 30 mg twice daily, Lexapro 20 mg daily, and Vistaril 25-50 mg 3 times daily as needed for anxiety      Return to Clinic: 3 months

## 2018-04-19 ENCOUNTER — OFFICE VISIT (OUTPATIENT)
Dept: OTOLARYNGOLOGY | Facility: CLINIC | Age: 63
End: 2018-04-19
Payer: COMMERCIAL

## 2018-04-19 ENCOUNTER — HOSPITAL ENCOUNTER (OUTPATIENT)
Dept: RADIOLOGY | Facility: HOSPITAL | Age: 63
Discharge: HOME | End: 2018-04-19
Attending: OTOLARYNGOLOGY
Payer: COMMERCIAL

## 2018-04-19 VITALS
SYSTOLIC BLOOD PRESSURE: 122 MMHG | DIASTOLIC BLOOD PRESSURE: 80 MMHG | WEIGHT: 151.88 LBS | HEART RATE: 114 BPM | BODY MASS INDEX: 21.19 KG/M2

## 2018-04-19 DIAGNOSIS — R06.00 DYSPNEA, UNSPECIFIED TYPE: ICD-10-CM

## 2018-04-19 DIAGNOSIS — R05.9 COUGH: ICD-10-CM

## 2018-04-19 DIAGNOSIS — J38.4 VOCAL FOLD EDEMA: ICD-10-CM

## 2018-04-19 DIAGNOSIS — R49.0 DYSPHONIA: ICD-10-CM

## 2018-04-19 DIAGNOSIS — R05.9 COUGH: Primary | ICD-10-CM

## 2018-04-19 PROCEDURE — 71046 X-RAY EXAM CHEST 2 VIEWS: CPT | Mod: TC

## 2018-04-19 PROCEDURE — 31579 LARYNGOSCOPY TELESCOPIC: CPT | Mod: S$GLB,,, | Performed by: OTOLARYNGOLOGY

## 2018-04-19 PROCEDURE — 99999 PR PBB SHADOW E&M-EST. PATIENT-LVL III: CPT | Mod: PBBFAC,,, | Performed by: OTOLARYNGOLOGY

## 2018-04-19 PROCEDURE — 99214 OFFICE O/P EST MOD 30 MIN: CPT | Mod: 25,S$GLB,, | Performed by: OTOLARYNGOLOGY

## 2018-04-19 PROCEDURE — 71046 X-RAY EXAM CHEST 2 VIEWS: CPT | Mod: 26,,, | Performed by: RADIOLOGY

## 2018-04-19 RX ORDER — AMOXICILLIN AND CLAVULANATE POTASSIUM 875; 125 MG/1; MG/1
1 TABLET, FILM COATED ORAL 2 TIMES DAILY
Qty: 20 TABLET | Refills: 0 | Status: SHIPPED | OUTPATIENT
Start: 2018-04-19 | End: 2018-04-29

## 2018-04-19 NOTE — PROGRESS NOTES
"[facility]  Department of Otorhinolaryngology and Communication Sciences    Subjective:      Alix Patel is a 62 y.o. female who presents for follow-up. She has mild edema of the midmembranous vocal folds, left>right, and supraglottic laryngeal hyperfunction. She deferred on my recommendation for SLP voice evaluation/therapy.    Today she presents with a constellation of aerodigestive complaints, as follows:  - Cough; productive; greenish sputum  - Sore throat  - Throat feeling like it's going to close at 3-4 in the am  - Feeling "lumps" in the neck  - Felt "warm" this am, but no recorded fevers; no chills  - Nasal congestion  - Anosmia - maybe 2 months  - Hypogeusia  - Colored nasal discharge (creamy/light green)  - Postnasal drip  - left-sided facial pressure    Duration of her symptoms has been off and on for the last 7 months. No sick contacts.    She also complains of dyspnea; can't breathe deeply enough to get enough air; chest feels restrictred; gets light headed with minimal exertion; fatigue; occipital headache    Reports known allergies to goldenrod; denies the typical atopic symptoms in the last few years, since moving    Her voice is still raspy; cannot sing above a G above middle C    Does not use OTC or topical nasal medications    The patient's medications, allergies, past medical, surgical, social and family histories were reviewed and updated as appropriate.    A detailed review of systems was obtained with pertinent positives as per the above HPI, and otherwise negative.      Objective:     /80 (Patient Position: Sitting)   Pulse (!) 114   Wt 68.9 kg (151 lb 14.4 oz)   BMI 21.19 kg/m²      Constitutional: comfortable, well dressed  Psychiatric: appropriate affect  Respiratory: comfortably breathing, symmetric chest rise, no stridor  Voice: within normal range for age/gender; stable since last visit;  trace breathiness, trace variable roughness, mild strain; increased " breathiness and strain at upper register  Head: normocephalic  Eyes: conjunctivae and sclerae clear  Indirect laryngoscopy is limited due to gag    Procedure  Flexible Laryngeal Videostroboscopy (40439): Laryngeal videostroboscopy is indicated to assess laryngeal vibratory biomechanics and vocal fold oscillation, which cannot be assessed with a plain light examination. This was carried out transnasally with a distal chip videoendoscope. After verbal consent was obtained, the patient was positioned and the nose was topically decongested with 1% phenylephrine and topically anesthetized with 4% lidocaine. The endoscope was passed through the most patent nasal cavity and positioned to image the nasopharynx, larynx, and hypopharynx in detail. The following features were examined: nasopharyngeal, laryngeal, hypopharyngeal masses; velopharyngeal strength, closure, and symmetry of motion; vocal fold range and symmetry of motion; laryngeal mucosal edema, erythema, inflammation, and hydration; salivary pooling; and gross laryngeal sensation. During phonation, the vocal folds were assessed for glottal closure; mucosal wave; vocal fold lesions; vibratory periodicity, amplitude, and phase symmetry; and vertical height match. The equipment was removed. The patient tolerated the procedure well without complication. All findings were normal except:  - diffuse mild polypoid edema of the turbinates  - left OMC with thick mucoid discharge tracking beneath inferior turbinate into nasopharynx  - mild diffuse adenoiditis  - left vocal fold with mild midmembranous edema, broad-based; stable  - right vocal fold with minor midmembranous vocal fold edema; stable  - mild, bilateral, diffuse, ~symmetric vascular ectasia; stable  - premature contact, irregular closure  - complete glottal closure at low/modal frequencies, with only mild vibratory asymmetry  - trace glottal insufficiency, with an anterior/posterior gap and impaired vibration, at  uppermost register  - pervasive supraglottic laryngeal hyperfunction, most evident at high frequencies        Assessment:     Alix Patel is a 62 y.o. female with a constellation of aerodigestive symptoms. She has findings of chronic rhinosinusitis.     Plan:     I recommended empiric treatment with Augmentin and a Medrol dose pack. Further, I recommended regular use of Flonase and nasal saline irrigations.    I recommended she follow up with her PCP regarding her other symptoms (dyspnea, fatigue, lightheadedness). I did check a CXR due to her dypsnea, cough, and the protracted nature of her symptoms.    She will follow up with me in about 1 month, or sooner if needed.    All questions were answered, and the patient is in agreement with the plan.    This visit was 25 minutes in duration, with over 50% of the time spent in direct face-to-face counseling and coordination of care regarding the issues outlined above.    Maximino Curry M.D.  [facility]  Department of Otorhinolaryngology and Communication Sciences

## 2018-04-19 NOTE — PATIENT INSTRUCTIONS
Flonase - 2 sprays each nostril once a day  Nasal saline irrigations - NEILMED      Amoxicillin; Clavulanic Acid tablets  What is this medicine?  AMOXICILLIN; CLAVULANIC ACID (a mox i MACIE in; ANKITA woo AS id) is a penicillin antibiotic. It is used to treat certain kinds of bacterial infections. It will not work for colds, flu, or other viral infections.  How should I use this medicine?  Take this medicine by mouth with a full glass of water. Follow the directions on the prescription label. Take at the start of a meal. Do not crush or chew. If the tablet has a score line, you may cut it in half at the score line for easier swallowing. Take your medicine at regular intervals. Do not take your medicine more often than directed. Take all of your medicine as directed even if you think you are better. Do not skip doses or stop your medicine early.  Talk to your pediatrician regarding the use of this medicine in children. Special care may be needed.  What side effects may I notice from receiving this medicine?  Side effects that you should report to your doctor or health care professional as soon as possible:  · allergic reactions like skin rash, itching or hives, swelling of the face, lips, or tongue  · breathing problems  · dark urine  · fever or chills, sore throat  · redness, blistering, peeling or loosening of the skin, including inside the mouth  · seizures  · trouble passing urine or change in the amount of urine  · unusual bleeding, bruising  · unusually weak or tired  · white patches or sores in the mouth or throat  Side effects that usually do not require medical attention (report to your doctor or health care professional if they continue or are bothersome):  · diarrhea  · dizziness  · headache  · nausea, vomiting  · stomach upset  · vaginal or anal irritation  What may interact with this medicine?  · allopurinol  · anticoagulants  · birth control pills  · methotrexate  · probenecid  What if I miss a  dose?  If you miss a dose, take it as soon as you can. If it is almost time for your next dose, take only that dose. Do not take double or extra doses.  Where should I keep my medicine?  Keep out of the reach of children.  Store at room temperature below 25 degrees C (77 degrees F). Keep container tightly closed. Throw away any unused medicine after the expiration date.  What should I tell my health care provider before I take this medicine?  They need to know if you have any of these conditions:  · bowel disease, like colitis  · kidney disease  · liver disease  · mononucleosis  · an unusual or allergic reaction to amoxicillin, penicillin, cephalosporin, other antibiotics, clavulanic acid, other medicines, foods, dyes, or preservatives  · pregnant or trying to get pregnant  · breast-feeding  What should I watch for while using this medicine?  Tell your doctor or health care professional if your symptoms do not improve.  Do not treat diarrhea with over the counter products. Contact your doctor if you have diarrhea that lasts more than 2 days or if it is severe and watery.  If you have diabetes, you may get a false-positive result for sugar in your urine. Check with your doctor or health care professional.  Birth control pills may not work properly while you are taking this medicine. Talk to your doctor about using an extra method of birth control.  NOTE:This sheet is a summary. It may not cover all possible information. If you have questions about this medicine, talk to your doctor, pharmacist, or health care provider. Copyright© 2017 [employer]

## 2018-04-23 ENCOUNTER — TELEPHONE (OUTPATIENT)
Dept: OTOLARYNGOLOGY | Facility: CLINIC | Age: 63
End: 2018-04-23

## 2018-04-23 NOTE — TELEPHONE ENCOUNTER
----- Message from Nella Lay sent at 4/20/2018 10:55 AM CDT -----  Contact: patient  Please fax over notes from visit on yesterday also the x-rays to Dr.Stephanie Gupta Office [phone]  [phone] per patient put attention

## 2018-05-02 ENCOUNTER — TELEPHONE (OUTPATIENT)
Dept: OTOLARYNGOLOGY | Facility: CLINIC | Age: 63
End: 2018-05-02

## 2018-05-02 DIAGNOSIS — J32.0 CHRONIC MAXILLARY SINUSITIS: Primary | ICD-10-CM

## 2018-05-02 NOTE — PROGRESS NOTES
No improvement with 2 weeks of augmentin, a medrol dose pack, and topical nasal steroids.    Recommend assessment with CT sinuses.

## 2018-05-02 NOTE — TELEPHONE ENCOUNTER
----- Message from Maximino Curry MD sent at 5/2/2018  4:09 PM CDT -----  Contact: patient   Please encourage her to follow up with her PCP as I had recommended, especially since I am away the remainder of the week.    Otherwise, due to the chronicity of her problems and the lack of improvement with PO antibiotics, steroids, and topical medications, we can set her up for a sinus CT and follow up with Dr. Horta if necessary, depending on findings. I will put in the order.    ----- Message -----  From: Domonique Sethi RN  Sent: 5/2/2018   3:57 PM  To: Maximino Curry MD    See below and advise. Should I schedule a f/u visit?   -Blessing    ----- Message -----  From: Lexi Sutton  Sent: 5/2/2018   3:45 PM  To: Patricio KRUSE Staff    Patient is calling to inform the Dr that the prescribed Amoxicillin has done nothing for her. Patient states that she is still coughing up pus and blood. Patient states that she is still unable to sing. Please call patient back at [phone].

## 2018-05-15 ENCOUNTER — OFFICE VISIT (OUTPATIENT)
Dept: OTOLARYNGOLOGY | Facility: CLINIC | Age: 63
End: 2018-05-15
Payer: COMMERCIAL

## 2018-05-15 ENCOUNTER — HOSPITAL ENCOUNTER (OUTPATIENT)
Dept: RADIOLOGY | Facility: HOSPITAL | Age: 63
Discharge: HOME | End: 2018-05-15
Attending: OTOLARYNGOLOGY
Payer: COMMERCIAL

## 2018-05-15 ENCOUNTER — CLINICAL SUPPORT (OUTPATIENT)
Dept: AUDIOLOGY | Facility: CLINIC | Age: 63
End: 2018-05-15
Payer: COMMERCIAL

## 2018-05-15 ENCOUNTER — TELEPHONE (OUTPATIENT)
Dept: OTOLARYNGOLOGY | Facility: CLINIC | Age: 63
End: 2018-05-15

## 2018-05-15 VITALS
WEIGHT: 153.25 LBS | BODY MASS INDEX: 21.45 KG/M2 | DIASTOLIC BLOOD PRESSURE: 84 MMHG | HEIGHT: 71 IN | HEART RATE: 107 BPM | SYSTOLIC BLOOD PRESSURE: 123 MMHG

## 2018-05-15 DIAGNOSIS — H90.3 SENSORINEURAL HEARING LOSS OF BOTH EARS: ICD-10-CM

## 2018-05-15 DIAGNOSIS — J32.0 CHRONIC MAXILLARY SINUSITIS: ICD-10-CM

## 2018-05-15 DIAGNOSIS — R49.0 DYSPHONIA: ICD-10-CM

## 2018-05-15 DIAGNOSIS — J30.2 PERENNIAL ALLERGIC RHINITIS WITH SEASONAL VARIATION: ICD-10-CM

## 2018-05-15 DIAGNOSIS — J30.89 PERENNIAL ALLERGIC RHINITIS WITH SEASONAL VARIATION: ICD-10-CM

## 2018-05-15 DIAGNOSIS — H90.3 SENSORINEURAL HEARING LOSS, BILATERAL: Primary | ICD-10-CM

## 2018-05-15 DIAGNOSIS — J31.1 CHRONIC NASOPHARYNGITIS: Primary | ICD-10-CM

## 2018-05-15 PROCEDURE — 92567 TYMPANOMETRY: CPT | Mod: S$GLB,,, | Performed by: AUDIOLOGIST

## 2018-05-15 PROCEDURE — 87075 CULTR BACTERIA EXCEPT BLOOD: CPT

## 2018-05-15 PROCEDURE — 87070 CULTURE OTHR SPECIMN AEROBIC: CPT

## 2018-05-15 PROCEDURE — 87186 SC STD MICRODIL/AGAR DIL: CPT

## 2018-05-15 PROCEDURE — 92557 COMPREHENSIVE HEARING TEST: CPT | Mod: S$GLB,,, | Performed by: AUDIOLOGIST

## 2018-05-15 PROCEDURE — 31231 NASAL ENDOSCOPY DX: CPT | Mod: S$GLB,,, | Performed by: OTOLARYNGOLOGY

## 2018-05-15 PROCEDURE — 87077 CULTURE AEROBIC IDENTIFY: CPT

## 2018-05-15 PROCEDURE — 99214 OFFICE O/P EST MOD 30 MIN: CPT | Mod: 25,S$GLB,, | Performed by: OTOLARYNGOLOGY

## 2018-05-15 PROCEDURE — 99999 PR PBB SHADOW E&M-EST. PATIENT-LVL IV: CPT | Mod: PBBFAC,,, | Performed by: OTOLARYNGOLOGY

## 2018-05-15 PROCEDURE — 70486 CT MAXILLOFACIAL W/O DYE: CPT | Mod: TC

## 2018-05-15 PROCEDURE — 70486 CT MAXILLOFACIAL W/O DYE: CPT | Mod: 26,,, | Performed by: RADIOLOGY

## 2018-05-15 PROCEDURE — 99999 PR PBB SHADOW E&M-EST. PATIENT-LVL I: CPT | Mod: PBBFAC,,,

## 2018-05-15 NOTE — PROGRESS NOTES
Alix Patel was referred today by Dr. Horta for an audiological evaluation.      Audiological testing revealed a mild SNHL with good discrimination, bilaterally.      Tympanometry testing revealed a normal Type A tympanogram for the right ear and a flat Type B tympanogram for the left ear.      Recommendations:  1. Otologic evaluation  2. Annual hearing evaluation  3. Hearing protection when in noise   4. Hearing aid consultation, if Ms. Patel feels as though her hearing loss is negatively impacting her quality of life          Armando Red(Attending)

## 2018-05-15 NOTE — TELEPHONE ENCOUNTER
----- Message from Nella aLy sent at 5/15/2018  4:51 PM CDT -----  Contact: patient   found the infection get in touch him for information

## 2018-05-15 NOTE — LETTER
May 15, 2018    Maximino Curry MD  [address] [place] [address]           OTOLARYNGOLOGY AND COMMUNICATION SCIENCES    Sanjeev Griggs MD, FACS          SURGICAL AND MEDICAL DISEASES OF HEAD AND NECK  MD Sanjeev Bhatti MD, FACS  Lennox Pinzon III, MD    OTOLOGY, NEUROTOLOGY and SKULL-BASE SURGERY  Mohit Perkins MD, FACS  Richi Felix MD, Director    PEDIATRIC OTOLARYNGOLOGY & OTOLOGY  MARNIE Dumont MD, FAAP  Adria Kaur MD, FACS    FACIAL PLASTIC and RECONSTRUCTIVE SURGERY  FLOR Talley III, MD, FACS    RHINOLOGY and SINUS SURGERY  Lb Horta MD, MPH, FACS  Director   FLOR Talley III, MD, FACS    LARYNGOLOGY  Maximino Curry MD    SPEECH-LANGUAGE PATHOLOGY  Malena Saravia, PhD, [facility]-SLP  Anju Abernathy, MS, CCC-SLP  Josiane Viera, MS, CCC-SLP  Ruth De La Paz MA, [facility]-SLP    AUDIOLOGY SECTION  Yaima Wharton, MS, CCC-A  KAYLEE Lacey, CCC-A  Demetris Smith, CCC-A  Demetris Luna, CCC-A  Irwin Biggs Jr., KAYLEE, CCA-A  KAYLEE Pappas, CCC-A  Demetris Moreno, CCC-A    [facility]  Head and Neck Surgical Oncology  EMILIE Vitale, FNP-C  Pedatric Otolaryngology  EMILIE Urena, PNP-C       Re:  Alix Patel  :  [id number]    Dear Dr. Curry,    Thank you for referring your patient, Alix Patel, to us for evaluation and treatment.  I have enclosed a copy of my clinic note for your review and records.  If you have any questions please do not hesitate to contact our office.     Thank you for allowing me to participate in the care of your patient.    Sincerely,        Lb Horta MD, MPH, FACS    Director, Rhinology and Sinus Surgery  Department of Otorhinolaryngology  [facility]    Encl:  Progress note       [facility] [address] [place] [address]  phone [phone]  fax [phone]   [email or web address]

## 2018-05-15 NOTE — PROGRESS NOTES
Subjective:      Alix Patel is a 62 y.o. female who was referred to me by Dr. Maximino Curry in consultation for post-nasal drip.    She relates an over 6-month history of perennial, daily production of thick, green mucus casts which seem to collect in the throat for expectoration.  She sometimes awakens with these, and also produces as couple during the day.  She describes them as dime-sized or larger, with a plastic-like center and green purulent shell.  When they are released she describes a foul odor within the nose.  She notes also daily bifrontal aching headaches, marked aural fullness that is constant with perceived hearing loss, and sneezing, though denies hyposmia or classic sinus infections.  She was treated last month by Dr. Curry with Augmentin and a Medrol dose pack, which did not seem to help the symptoms at all.  She has not yet tried Flonase, but has used OTC antihistamines.  She had allergy testing in the past that showed sensitivity to goldenrod, and denies a history of asthma.  She has taken Nexium for heartburn in the past but has not seen a gastroenterologist.  She recalls nasal trauma a few years ago, which was treated at [facility] several months later with rhinoplasty.    SNOT-22 score = 59, NOSE score = 70%, ETDQ-7 score = 6.1    Global QOL = 25%    Days missed = Less than 5      Past Medical History  She has a past medical history of Anxiety; Anxiety; Bipolar 1 disorder; Depression; Diabetes mellitus; HEARING LOSS; Insomnia; and Rash.    Past Surgical History  She has a past surgical history that includes Appendectomy; Hysterectomy; Colonoscopy; and Cyst Removal.    Family History  Her family history includes Alzheimer's disease in her mother; Heart failure in her father; No Known Problems in her sister; Sarcoidosis in her sister; Sinus disease in her mother.    Social History  She reports that she quit smoking about 13 years ago. Her smoking use included Cigarettes. She has a 8.50  "pack-year smoking history. She has never used smokeless tobacco. She reports that she drinks about 0.6 oz of alcohol per week . She reports that she does not use drugs.    Allergies  She is allergic to lamictal [lamotrigine].    Medications   She has a current medication list which includes the following prescription(s): buspirone, crestor, cyclobenzaprine, escitalopram oxalate, hydroxyzine pamoate, levothyroxine, metformin, metformin, onetouch delica lancets, onetouch ultra test, and quetiapine.    Review of Systems  Review of Systems   Constitutional: Positive for fatigue. Negative for fever and unexpected weight change.   HENT: Positive for hearing loss, sinus pressure, sore throat, trouble swallowing and voice change. Negative for congestion, dental problem, ear discharge, ear pain, facial swelling, hoarse voice, nosebleeds, postnasal drip, rhinorrhea and tinnitus.    Eyes: Negative for photophobia, discharge, itching and visual disturbance.   Respiratory: Negative for apnea, cough, shortness of breath and wheezing.    Cardiovascular: Negative for chest pain and palpitations.   Gastrointestinal: Negative for abdominal pain, nausea and vomiting.   Endocrine: Negative for cold intolerance and heat intolerance.   Genitourinary: Negative for difficulty urinating.   Musculoskeletal: Positive for back pain and neck pain. Negative for arthralgias and myalgias.   Skin: Negative for rash.   Allergic/Immunologic: Negative for environmental allergies and food allergies.   Neurological: Positive for dizziness and headaches. Negative for seizures, syncope and weakness.   Hematological: Positive for adenopathy. Does not bruise/bleed easily.   Psychiatric/Behavioral: Negative for decreased concentration, dysphoric mood and sleep disturbance. The patient is not nervous/anxious.           Objective:     /84   Pulse 107   Ht 5' 11" (1.803 m)   Wt 69.5 kg (153 lb 3.5 oz)   BMI 21.37 kg/m²        Constitutional:   She " appears well-developed. She is cooperative. Normal speech.  No hoarse voice.      Head:  Normocephalic. Salivary glands normal.  Facial strength is normal.      Ears:    Right Ear: No drainage or tenderness. Tympanic membrane is not perforated. Tympanic membrane mobility is normal. No middle ear effusion. No decreased hearing is noted.   Left Ear: No drainage or tenderness. Tympanic membrane is not perforated. Tympanic membrane mobility is normal.  No middle ear effusion. No decreased hearing is noted.     Nose:  No mucosal edema, rhinorrhea, septal deviation or polyps. No epistaxis. Turbinates normal, no turbinate masses and no turbinate hypertrophy.  Right sinus exhibits no maxillary sinus tenderness and no frontal sinus tenderness. Left sinus exhibits no maxillary sinus tenderness and no frontal sinus tenderness.     Mouth/Throat  Oropharynx clear and moist without lesions or asymmetry and normal uvula midline. She does not have dentures. Normal dentition. No oral lesions or mucous membrane lesions. No oropharyngeal exudate or posterior oropharyngeal erythema. Mirror exam not performed due to patient tolerance.  Mirror exam not performed due to patient tolerance.      Neck:  Neck normal without thyromegaly masses, asymmetry, normal tracheal structure, crepitus, and tenderness, thyroid normal, trachea normal and no adenopathy. Normal range of motion present.     She has no cervical adenopathy.     Cardiovascular:   Regular rhythm.      Pulmonary/Chest:   Effort normal.     Psychiatric:   She has a normal mood and affect. Her speech is normal and behavior is normal.     Neurological:   No cranial nerve deficit.     Skin:   No rash noted.       Procedure    Nasal endoscopy performed.  See procedure note.     Left nasal valve     Left MT     Left ET with mucus and purulence     Right nasal valve     Right MT     Right ET        Data Reviewed    WBC (K/uL)   Date Value   04/26/2017 6.75     Eosinophil% (%)   Date  Value   04/26/2017 1.2     Eos # (K/uL)   Date Value   04/26/2017 0.1     Platelets (K/uL)   Date Value   04/26/2017 232     Glucose (mg/dL)   Date Value   04/26/2017 149 (H)     No results found for: IGE    I independently reviewed the images of the CT sinuses dated 5/15/18. Pertinent findings include clear sinuses and aplastic frontal sinuses.     I independently reviewed the tracings of the complete audiometric evaluation performed today.  I reviewed the audiogram with the patient as well.  Pertinent findings include mild SNHL bilatearlly with upward-sloping form and flat tymp on left.         Assessment:     1. Chronic nasopharyngitis    2. Perennial allergic rhinitis with seasonal variation    3. Dysphonia    4. Sensorineural hearing loss of both ears         Plan:     I had a long discussion with the patient regarding her condition and the further workup and management options.  I swabbed her nasopharyngeal exudate for culture and will use the results to direct antibiotic therapy as indicated.  I prescribed the twice daily use of isotonic saline sinus irrigation in a [employer] bottle.  We discussed that her hearing loss is independent from the sinonasal issue.    Follow-up in about 6 weeks (around 6/26/2018).

## 2018-05-15 NOTE — PROCEDURES
Nasal/sinus endoscopy  Date/Time: 5/15/2018 4:35 PM  Performed by: EVANGELIST BURCH  Authorized by: EVANGELIST BURCH     Consent Done?:  Yes (Verbal)  Anesthesia:     Local anesthetic:  Lidocaine 4% and Gerardo-Synephrine 1/2%    Patient tolerance:  Patient tolerated the procedure well with no immediate complications  Nose:     Procedure Performed:  Nasal Endoscopy  External:      No external nasal deformity  Intranasal:      Mucosa no polyps     Mucosa ulcers not present     No mucosa lesions present     Turbinates not enlarged     No septum gross deformity  Nasopharynx:      No mucosa lesions     Adenoids not present     Posterior choanae patent     Eustachian tube patent     Mucopurulent exudate posteriorly.  Thick yellow crust on posterior nasopharyngeal wall.

## 2018-05-15 NOTE — Clinical Note
May 15, 2018      Maximino Curry MD  [address]  [facility] [address]           [facility] O[facility]hin[facility]  [address]  [facility] [address]  Phone: [phone]  Fax: [phone]          Patient: Alix Patel   MR Number: [id number]   YOB: 1955   Date of Visit: 5/15/2018       Dear Dr. Maximino Curry:    Thank you for referring Alix Patel to me for evaluation. Attached you will find relevant portions of my assessment and plan of care.    If you have questions, please do not hesitate to call me. I look forward to following Alix Patel along with you.    Sincerely,    Lb Horta MD    Enclosure  CC:  No Recipients    If you would like to receive this communication electronically, please contact [email or web address] or [phone] to request more information on [employer] access.    For providers and/or their staff who would like to refer a patient to [facility], please contact us through our one-stop-shop provider referral line, [facility], at [phone].    If you feel you have received this communication in error or would no longer like to receive these types of communications, please e-mail [email or web address]

## 2018-05-17 ENCOUNTER — TELEPHONE (OUTPATIENT)
Dept: OTOLARYNGOLOGY | Facility: CLINIC | Age: 63
End: 2018-05-17

## 2018-05-17 DIAGNOSIS — J31.1 CHRONIC NASOPHARYNGITIS: Primary | ICD-10-CM

## 2018-05-17 LAB — BACTERIA SPEC AEROBE CULT: NORMAL

## 2018-05-17 RX ORDER — DOXYCYCLINE 100 MG/1
100 CAPSULE ORAL EVERY 12 HOURS
Qty: 56 CAPSULE | Refills: 0 | Status: SHIPPED | OUTPATIENT
Start: 2018-05-17 | End: 2018-06-14

## 2018-05-17 NOTE — TELEPHONE ENCOUNTER
The culture swab showed bacteria called staph.  I'm calling in doxycycline for a 28 day course to her [facility].  Please let her know.

## 2018-05-18 ENCOUNTER — TELEPHONE (OUTPATIENT)
Dept: OTOLARYNGOLOGY | Facility: CLINIC | Age: 63
End: 2018-05-18

## 2018-05-18 NOTE — TELEPHONE ENCOUNTER
Spoke with patient and advised her as per Dr. Horta regarding culture results.  Patient is asking how serious this is and is there any thing that she should be aware of or look for.  She reports that she is still coughing up the same stuff.

## 2018-05-21 LAB — BACTERIA SPEC ANAEROBE CULT: NORMAL

## 2018-05-24 ENCOUNTER — TELEPHONE (OUTPATIENT)
Dept: OTOLARYNGOLOGY | Facility: CLINIC | Age: 63
End: 2018-05-24

## 2018-07-06 ENCOUNTER — TELEPHONE (OUTPATIENT)
Dept: OTOLARYNGOLOGY | Facility: CLINIC | Age: 63
End: 2018-07-06

## 2018-07-13 ENCOUNTER — TELEPHONE (OUTPATIENT)
Dept: OTOLARYNGOLOGY | Facility: CLINIC | Age: 63
End: 2018-07-13

## 2018-07-13 DIAGNOSIS — J31.1 CHRONIC NASOPHARYNGITIS: Primary | ICD-10-CM

## 2018-07-13 NOTE — TELEPHONE ENCOUNTER
Patient states that she is still coughing up the green hard stuff, same symptoms as before never improved. 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

## 2018-07-17 RX ORDER — SULFAMETHOXAZOLE AND TRIMETHOPRIM 800; 160 MG/1; MG/1
1 TABLET ORAL 2 TIMES DAILY
Qty: 42 TABLET | Refills: 0 | Status: SHIPPED | OUTPATIENT
Start: 2018-07-17 | End: 2018-08-07

## 2018-07-18 ENCOUNTER — TELEPHONE (OUTPATIENT)
Dept: OTOLARYNGOLOGY | Facility: CLINIC | Age: 63
End: 2018-07-18

## 2018-07-18 NOTE — TELEPHONE ENCOUNTER
I'm calling in a course of a different antibiotic, Bactrim.  Sofia, please let her know and schedule a f/u visit with me in 3 weeks.

## 2018-07-20 ENCOUNTER — TELEPHONE (OUTPATIENT)
Dept: OTOLARYNGOLOGY | Facility: CLINIC | Age: 63
End: 2018-07-20

## 2018-07-20 RX ORDER — BUSPIRONE HYDROCHLORIDE 15 MG/1
TABLET ORAL
Qty: 120 TABLET | Refills: 0 | Status: SHIPPED | OUTPATIENT
Start: 2018-07-20 | End: 2018-07-23 | Stop reason: SDUPTHER

## 2018-07-20 NOTE — TELEPHONE ENCOUNTER
Spoke with patient.  She states that she will be at [facility] on Monday and would like to  another rinse bottle.

## 2018-07-23 ENCOUNTER — OFFICE VISIT (OUTPATIENT)
Dept: PSYCHIATRY | Facility: CLINIC | Age: 63
End: 2018-07-23
Payer: COMMERCIAL

## 2018-07-23 VITALS
WEIGHT: 158.19 LBS | DIASTOLIC BLOOD PRESSURE: 75 MMHG | SYSTOLIC BLOOD PRESSURE: 130 MMHG | BODY MASS INDEX: 22.06 KG/M2 | HEART RATE: 116 BPM

## 2018-07-23 DIAGNOSIS — G24.01 TARDIVE DYSKINESIA: ICD-10-CM

## 2018-07-23 DIAGNOSIS — F41.9 ANXIETY: ICD-10-CM

## 2018-07-23 DIAGNOSIS — E11.9 TYPE 2 DIABETES MELLITUS WITHOUT COMPLICATION, WITHOUT LONG-TERM CURRENT USE OF INSULIN: ICD-10-CM

## 2018-07-23 DIAGNOSIS — F31.81 BIPOLAR II DISORDER: Primary | ICD-10-CM

## 2018-07-23 PROCEDURE — 3008F BODY MASS INDEX DOCD: CPT | Mod: CPTII,S$GLB,, | Performed by: PSYCHIATRY & NEUROLOGY

## 2018-07-23 PROCEDURE — 90833 PSYTX W PT W E/M 30 MIN: CPT | Mod: S$GLB,,, | Performed by: PSYCHIATRY & NEUROLOGY

## 2018-07-23 PROCEDURE — 99214 OFFICE O/P EST MOD 30 MIN: CPT | Mod: S$GLB,,, | Performed by: PSYCHIATRY & NEUROLOGY

## 2018-07-23 PROCEDURE — 99999 PR PBB SHADOW E&M-EST. PATIENT-LVL III: CPT | Mod: PBBFAC,,, | Performed by: PSYCHIATRY & NEUROLOGY

## 2018-07-23 RX ORDER — LURASIDONE HYDROCHLORIDE 60 MG/1
60 TABLET, FILM COATED ORAL DAILY
Qty: 30 TABLET | Refills: 2 | Status: SHIPPED | OUTPATIENT
Start: 2018-07-23 | End: 2018-10-02 | Stop reason: SDUPTHER

## 2018-07-23 RX ORDER — ESCITALOPRAM OXALATE 20 MG/1
20 TABLET ORAL DAILY
Qty: 30 TABLET | Refills: 2 | Status: SHIPPED | OUTPATIENT
Start: 2018-07-23 | End: 2018-10-02 | Stop reason: SDUPTHER

## 2018-07-23 RX ORDER — BUSPIRONE HYDROCHLORIDE 15 MG/1
30 TABLET ORAL 2 TIMES DAILY
Qty: 120 TABLET | Refills: 2 | Status: SHIPPED | OUTPATIENT
Start: 2018-07-23 | End: 2018-10-02 | Stop reason: SDUPTHER
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
## 2018-07-23 NOTE — PATIENT INSTRUCTIONS
Start with one half tablet of latuda and decrease seroquel to one tablet nightly.  After one week increase latuda to one whole tablet nightly and reduce seroquel to one-half tablet nightly.  After one week discontinue seroquel.

## 2018-08-15 ENCOUNTER — HOSPITAL ENCOUNTER (OUTPATIENT)
Dept: RADIOLOGY | Facility: OTHER | Age: 63
Discharge: HOME | End: 2018-08-15
Attending: NURSE PRACTITIONER
Payer: COMMERCIAL

## 2018-08-15 DIAGNOSIS — M54.2 CERVICALGIA: ICD-10-CM

## 2018-08-15 DIAGNOSIS — M54.50 LOW BACK PAIN: ICD-10-CM

## 2018-08-15 DIAGNOSIS — S13.4XXA WHIPLASH INJURY TO NECK: ICD-10-CM

## 2018-08-15 DIAGNOSIS — M41.86 OTHER FORMS OF SCOLIOSIS, LUMBAR REGION: ICD-10-CM

## 2018-08-15 DIAGNOSIS — M51.36 DDD (DEGENERATIVE DISC DISEASE), LUMBAR: ICD-10-CM

## 2018-08-15 DIAGNOSIS — M54.16 LUMBAR RADICULOPATHY: ICD-10-CM

## 2018-08-15 DIAGNOSIS — M50.30 DDD (DEGENERATIVE DISC DISEASE), CERVICAL: ICD-10-CM

## 2018-08-15 PROCEDURE — 72141 MRI NECK SPINE W/O DYE: CPT | Mod: TC

## 2018-08-15 PROCEDURE — 72148 MRI LUMBAR SPINE W/O DYE: CPT | Mod: 26,,, | Performed by: RADIOLOGY

## 2018-08-15 PROCEDURE — 72141 MRI NECK SPINE W/O DYE: CPT | Mod: 26,,, | Performed by: RADIOLOGY

## 2018-08-15 PROCEDURE — 72148 MRI LUMBAR SPINE W/O DYE: CPT | Mod: TC

## 2018-08-29 ENCOUNTER — OFFICE VISIT (OUTPATIENT)
Dept: PSYCHIATRY | Facility: CLINIC | Age: 63
End: 2018-08-29
Payer: COMMERCIAL

## 2018-08-29 VITALS
WEIGHT: 155.31 LBS | HEART RATE: 107 BPM | DIASTOLIC BLOOD PRESSURE: 87 MMHG | BODY MASS INDEX: 21.74 KG/M2 | SYSTOLIC BLOOD PRESSURE: 140 MMHG | HEIGHT: 71 IN

## 2018-08-29 DIAGNOSIS — F41.9 ANXIETY: ICD-10-CM

## 2018-08-29 DIAGNOSIS — F31.81 BIPOLAR II DISORDER: Primary | ICD-10-CM

## 2018-08-29 PROCEDURE — 99213 OFFICE O/P EST LOW 20 MIN: CPT | Mod: S$GLB,,, | Performed by: PSYCHIATRY & NEUROLOGY

## 2018-08-29 PROCEDURE — 90833 PSYTX W PT W E/M 30 MIN: CPT | Mod: S$GLB,,, | Performed by: PSYCHIATRY & NEUROLOGY

## 2018-08-29 PROCEDURE — 3008F BODY MASS INDEX DOCD: CPT | Mod: CPTII,S$GLB,, | Performed by: PSYCHIATRY & NEUROLOGY

## 2018-08-29 PROCEDURE — 99999 PR PBB SHADOW E&M-EST. PATIENT-LVL II: CPT | Mod: PBBFAC,,, | Performed by: PSYCHIATRY & NEUROLOGY
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
## 2018-09-11 ENCOUNTER — TELEPHONE (OUTPATIENT)
Dept: OTOLARYNGOLOGY | Facility: CLINIC | Age: 63
End: 2018-09-11

## 2018-09-11 RX ORDER — QUETIAPINE 400 MG/1
TABLET, FILM COATED, EXTENDED RELEASE ORAL
Qty: 60 TABLET | Refills: 0 | OUTPATIENT
Start: 2018-09-11

## 2018-09-11 NOTE — TELEPHONE ENCOUNTER
----- Message from Lexi Sutton sent at 9/11/2018  9:32 AM CDT -----  Contact: patient   Needs Advice    Reason for call: Patient is requesting to speak with Dr. Curry as soon as possible. Patient states that it is urgent.        Communication Preference: [phone]

## 2018-09-12 ENCOUNTER — OFFICE VISIT (OUTPATIENT)
Dept: OTOLARYNGOLOGY | Facility: CLINIC | Age: 63
End: 2018-09-12
Payer: COMMERCIAL

## 2018-09-12 VITALS
WEIGHT: 153.44 LBS | HEART RATE: 107 BPM | TEMPERATURE: 98 F | SYSTOLIC BLOOD PRESSURE: 138 MMHG | DIASTOLIC BLOOD PRESSURE: 83 MMHG | BODY MASS INDEX: 21.4 KG/M2

## 2018-09-12 DIAGNOSIS — R06.00 DYSPNEA, UNSPECIFIED TYPE: ICD-10-CM

## 2018-09-12 DIAGNOSIS — R05.9 COUGH: ICD-10-CM

## 2018-09-12 DIAGNOSIS — J38.4 PRE-NODULAR EDEMA OF THE VOCAL FOLDS: ICD-10-CM

## 2018-09-12 DIAGNOSIS — R49.0 DYSPHONIA: Primary | ICD-10-CM

## 2018-09-12 PROCEDURE — 3008F BODY MASS INDEX DOCD: CPT | Mod: CPTII,S$GLB,, | Performed by: OTOLARYNGOLOGY

## 2018-09-12 PROCEDURE — 99214 OFFICE O/P EST MOD 30 MIN: CPT | Mod: 25,S$GLB,, | Performed by: OTOLARYNGOLOGY

## 2018-09-12 PROCEDURE — 31579 LARYNGOSCOPY TELESCOPIC: CPT | Mod: S$GLB,,, | Performed by: OTOLARYNGOLOGY

## 2018-09-12 PROCEDURE — 99999 PR PBB SHADOW E&M-EST. PATIENT-LVL III: CPT | Mod: PBBFAC,,, | Performed by: OTOLARYNGOLOGY

## 2018-09-12 NOTE — PROGRESS NOTES
"[facility]  Department of Otorhinolaryngology and Communication Sciences    Subjective:      Alix Patel is a 62 y.o. female who presents for follow-up. She has mild edema of the midmembranous vocal folds, left>right, and supraglottic laryngeal hyperfunction. In the past she has deferred on my recommendation for SLP voice evaluation/therapy, as has also been recommended by other providers in my department prior to my arrival here.    Dr. Horta has treated her with several antibiotics for a staph nasopharyngitis.    Today she continues to complain of vocal limitations. She cannot sing. Cannot get to F above middle C. It is scratchy; loss of control. Her speaking voice is lower pitch and rapsy. She feels like she has to push her voice louder in order to achieve more stability. There is a change in her where she is placing her resonance when speaking.    She also reports that if she goes up coupel of flights of stairs, she is "fighting" to catch her air. She also has a persistent cough which is often blood tinged. Feels like it comes from her throat. The center of it is hard like plastic and the inside is like jello. She has not smoked (anything) since 2010. She has a very remote and minimal tobacco smoking history.    She continues to report ongoing pressure in her midface (cheek region). She denies fevers, anosmia, hypogeusia. Was using Alvarado Med BID for a while. Did not feel it help. Has not used nasal steroid sprays. She wants to be admitted to the hospital for IV antibiotics. Feels her symptoms are getting worse.     I obtained a CT of the sinuses 5/15/2018. This was negative. Dr. Horta did not think she was a candidate for surgical intervention.  I obtained a CXR 4/19/2018. This was negative.    She has not seen pulmonology.    The patient's medications, allergies, past medical, surgical, social and family histories were reviewed and updated as appropriate.    A detailed review of systems was " obtained with pertinent positives as per the above HPI, and otherwise negative.      Objective:     /83   Pulse 107   Temp 97.7 °F (36.5 °C)   Wt 69.6 kg (153 lb 7 oz)   BMI 21.40 kg/m²      Constitutional: comfortable, well dressed  Psychiatric: appropriate affect  Respiratory: comfortably breathing, symmetric chest rise, no stridor  Voice: within normal range for age/gender; exuberant amplitude; stable since last visit;  trace breathiness, trace variable roughness, mild strain; increased breathiness and strain at upper register; unstable on higher pitched phonation   Head: normocephalic  Eyes: conjunctivae and sclerae clear  Indirect laryngoscopy is limited due to gag    Procedure  Flexible Laryngeal Videostroboscopy (52732): Laryngeal videostroboscopy is indicated to assess laryngeal vibratory biomechanics and vocal fold oscillation, which cannot be assessed with a plain light examination. This was carried out transnasally with a distal chip videoendoscope. After verbal consent was obtained, the patient was positioned and the nose was topically decongested with 1% phenylephrine and topically anesthetized with 4% lidocaine. The endoscope was passed through the most patent nasal cavity and positioned to image the nasopharynx, larynx, and hypopharynx in detail. The following features were examined: nasopharyngeal, laryngeal, hypopharyngeal masses; velopharyngeal strength, closure, and symmetry of motion; vocal fold range and symmetry of motion; laryngeal mucosal edema, erythema, inflammation, and hydration; salivary pooling; and gross laryngeal sensation. During phonation, the vocal folds were assessed for glottal closure; mucosal wave; vocal fold lesions; vibratory periodicity, amplitude, and phase symmetry; and vertical height match. The equipment was removed. The patient tolerated the procedure well without complication. All findings were normal except:  - no endonasal inflammation  - minimal erythema  of the adenoid; no purulence; no crust  - left vocal fold with mild midmembranous edema, broad-based; stable  - right vocal fold with minor midmembranous vocal fold edema; stable  - mild, bilateral, diffuse, ~symmetric vascular ectasia; stable  - premature contact, irregular closure  - complete glottal closure at low/modal frequencies, with only mild vibratory asymmetry  - trace glottal insufficiency, with an anterior/posterior gap and impaired vibration, at uppermost register  - pervasive supraglottic laryngeal hyperfunction, most evident at high frequencies        Assessment:     Alix Patel is a 62 y.o. female with a constellation of aerodigestive symptoms.     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    The etiology of the remainder of her additional aerodigestive symptoms is less clear. She has no signs of an acute upper respiratory infection today.     Plan:     I recommended regular use of Flonase and nasal saline irrigations.    As in the past, SLP voice evaluation and subsequent therapy sessions are medically necessary for restoration of laryngeal function. We will arrange for [facility] in the coming weeks.    Regarding her cough, sometimes blood tinged, accompanied by dyspnea, I recommended she undergo definitive pulmonology evaluation.    She will follow up with me in about 2-3 months, or sooner if needed.    All questions were answered, and the patient is in agreement with the plan.    This visit was over 25 minutes in duration, with over 50% of the time spent in direct face-to-face counseling and coordination of care regarding the issues outlined above.    Maximino Curry M.D.  [facility]  Department of Otorhinolaryngology and Communication Sciences

## 2018-09-12 NOTE — PATIENT INSTRUCTIONS
Use FLONASE nasal spray -- 2 sprays each nostril once a day  Follow up with pulmonology  Come in for SLP voice eval             BENIGN VOCAL FOLD LESIONS     What are benign vocal fold lesions?    Benign vocal fold lesions are non-cancerous growths that may or may not affect voice quality.     Nodules and polyps are common growths that develop at the middle of the vocal folds. Mature nodules are similar to calluses within the vocal fold tissues, usually on both sides. Polyps tend to be more fluid-filled than nodules and may have visible blood vessels feeding into them. Polyps can be on one or both sides. Typically, symptoms for both lesions include hoarseness, effortful voice, and rapid vocal fatigue.    Both nodules and polyps are most commonly caused by vocal fold trauma during voice use (talking or singing). The middle of the vocal folds receives the greatest impact during phonation, which is why nodules and polyps are most likely to develop there.  Smoking, alcohol use, caffeine intake, drying medications, allergies, exposure to chemicals, and reflux may also increase the likelihood that nodules or polyps will develop.    Cysts in the vocal folds are fluid-filled sacs surrounded by a layer of skin. A cyst typically forms in the middle of one vocal cord and causes reactive swelling on the other. Cysts are also likely caused by the forceful contact of the vocal folds.     How are they treated?    For nodules, voice therapy is the first line of treatment. Since nodules are commonly caused by trauma to the vocal folds, voice therapy works to reduce the amount of high-impact contact of the vocal folds, as well as to improve overall voice hygiene and maintenance. In some cases, voice therapy improves voice quality--but does not resolve the problem completely. In these cases, patients may discuss the possibility of microsurgery with their laryngologist.    Like with nodules, voice therapy may be the first line of  treatment for polyps. However, in some cases, surgical removal of the polyp is recommended first, followed by a course of voice therapy to ensure continued healthy, efficient voice use.    Cysts, unlike other types of benign lesions, do not go away with voice therapy alone. However, voice therapy is still a part of the treatment for 1) reduction of vocal fold swelling which will likely improve symptoms, and 2) differential diagnosis: if a lesion goes away with voice therapy, it was most likely not a cyst; if it does go away, it may be a cyst, and surgery may be appropriate.           WHAT TO EXPECT FROM VOICE THERAPY    Purpose  The purpose of voice therapy is to help you find a better, more efficient way to use your voice or to reduce symptoms such as coughing, throat clearing, or difficulty breathing.  Depending on your symptoms, you may learn how to produce clearer voice quality, how to reduce fatigue or pain associated with speaking, how to take care of your voice, and how to eliminate chronic coughing or throat clearing.      Process: Evaluation  First, you may go through some initial testing.  In most cases, a videostroboscopy will be performed in order to allow your speech pathologist and your physician to look at your vocal cords to aid in deciding if you would benefit from voice therapy.  Next, you may work with the speech pathologist to assess the current capabilities of your voice.  Following evaluation, your speech pathologist will design a therapeutic plan to improve your voice as well as other symptoms that may bother you.  At the time of evaluation, your speech pathologist may provide you with exercises to try at home.      Process: Therapy  Most patients benefit from 2-8 sessions over 1-3 months.  Voice therapy involves changing the behavior of your vocal cords and speaking habits, so it is very important that you attend your appointments and do home practices as instructed by your speech  pathologist.  Home practice and participation in therapy are critical to meeting your desired voice goals, so of course, we are able to work with you to schedule appointments that are convenient for you.

## 2018-09-14 ENCOUNTER — TELEPHONE (OUTPATIENT)
Dept: SPEECH THERAPY | Facility: HOSPITAL | Age: 63
End: 2018-09-14

## 2018-09-14 ENCOUNTER — CLINICAL SUPPORT (OUTPATIENT)
Dept: SPEECH THERAPY | Facility: HOSPITAL | Age: 63
End: 2018-09-14
Attending: OTOLARYNGOLOGY
Payer: COMMERCIAL

## 2018-09-14 DIAGNOSIS — R49.0 DYSPHONIA: Primary | ICD-10-CM

## 2018-09-14 DIAGNOSIS — J38.4 PRE-NODULAR EDEMA OF THE VOCAL FOLDS: ICD-10-CM

## 2018-09-14 DIAGNOSIS — R49.0 DYSPHONIA: ICD-10-CM

## 2018-09-14 PROCEDURE — G9172 VOICE GOAL STATUS: HCPCS | Mod: GN,CH | Performed by: SPEECH-LANGUAGE PATHOLOGIST

## 2018-09-14 PROCEDURE — G9171 VOICE CURRENT STATUS: HCPCS | Mod: GN,CJ | Performed by: SPEECH-LANGUAGE PATHOLOGIST

## 2018-09-14 PROCEDURE — 92524 BEHAVRAL QUALIT ANALYS VOICE: CPT | Mod: GN | Performed by: SPEECH-LANGUAGE PATHOLOGIST

## 2018-09-14 NOTE — PROGRESS NOTES
"Referring provider: Dr. Maximino Curry  Reason for visit:  Behavioral and qualitative analysis of voice and resonance (CPT 00892)    Subjective / History    Alix Patel is a 62 y.o. female referred for voice evaluation (CPT 50428) by Dr. Curry.  She presents with complaints of reduced pitch range, raspy voice which began a few weeks ago.  The patient also endorses: voice worse in afternoon/evening, fatigue with use, changes in modal pitch and difficulty with singing.  She has had a cough which she feels comes from the back of her nose and has been expelling mucus.  She has been undergoing treatment for staph nasopharyngitis.  She has a sore throat.  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  She has been having a hard time accessing F above middle C. It is scratchy; loss of control. Her speaking voice is lower pitch and rapsy. She feels like she has to push her voice louder in order to achieve more stability. There is a change in her where she is placing her resonance when speaking.    Swallowing: no c/o   Breathing: sometimes - on exertion    Smoking: quit 2010  Caffeine: 2 large cups of coffee/day  Reflux: yes  Water: "not as much as I should"    Stroboscopy findings (per Dr. Curry on 9/12/18)  - minimal erythema of the adenoid; no purulence; no crust  - left vocal fold with mild midmembranous edema, broad-based; stable  - right vocal fold with minor midmembranous vocal fold edema; stable  - mild, bilateral, diffuse, ~symmetric vascular ectasia; stable  - premature contact, irregular closure  - complete glottal closure at low/modal frequencies, with only mild vibratory asymmetry  - trace glottal insufficiency, with an anterior/posterior gap and impaired vibration, at uppermost register  - pervasive supraglottic laryngeal hyperfunction, most evident at high frequencies       Past Medical History:   Diagnosis Date    Anxiety     Anxiety     Bipolar 1 " "disorder     Depression     Diabetes mellitus     HEARING LOSS     pt states she has loss slighty in rt ear.    Insomnia     Rash      Current Outpatient Medications on File Prior to Visit   Medication Sig Dispense Refill    busPIRone (BUSPAR) 15 MG tablet Take 2 tablets (30 mg total) by mouth 2 (two) times daily. 120 tablet 2    CRESTOR 20 mg tablet Take 20 mg by mouth once daily.   3    cyclobenzaprine (FLEXERIL) 10 MG tablet Take 10 mg by mouth daily as needed.       escitalopram oxalate (LEXAPRO) 20 MG tablet Take 1 tablet (20 mg total) by mouth once daily. 30 tablet 2    hydrOXYzine pamoate (VISTARIL) 25 MG Cap Take 2 capsules (50 mg total) by mouth 3 (three) times daily as needed. 180 capsule 3    levothyroxine (SYNTHROID) 100 MCG tablet Take 100 mcg by mouth once daily.   3    lurasidone (LATUDA) 60 mg Tab tablet Take 1 tablet (60 mg total) by mouth once daily. 30 tablet 2    metformin (GLUCOPHAGE) 500 MG tablet Take 1 tablet (500 mg total) by mouth daily with breakfast. (Patient taking differently: Take 1,000 mg by mouth daily with breakfast. ) 30 tablet 0    metformin (GLUCOPHAGE) 500 MG tablet Take 500 mg by mouth.      ONETOUCH DELICA LANCETS 30 gauge Misc TEST BID UTD  0    ONETOUCH ULTRA TEST Strp TEST BID UTD.  0    QUEtiapine (SEROQUEL XR) 400 MG Tb24 Take 2 tablets (800 mg total) by mouth once daily. Take at 6 pm 60 tablet 3     No current facility-administered medications on file prior to visit.        Objective    Perceptual/behavioral assessment  -CAPE-V Overall Score: 28  -Quality: rough and strained  -Volume: appropriate for age and gender  -Pitch: appropriate for age and gender identity  -Flexibility: diminished  -Habitual respiratory pattern: chest/clavicular    Acoustic/aerodynamic assessment  Multi-Dimensional Voice Program (MDVP) Analysis (sustained "ah")   Baseline Post-trial tx Gender-matched norms (F)   Average fundamental frequency (Fo) 87.701 Hz 200.095 Hz Norm/SD: " 243.97 / 27.46   Relative average perturbation 0.156% 0.767% Norm/SD: 0.378 / 0.21   Shimmer percent 3.067% 3.705% Norm/SD: 1.997 / 0.79   Noise to harmonic ratio 0.135 0.14 Norm/SD: 0.112 / 0.01   Voice turbulence index 0.075 0.045 Norm/SD: 0.046 / 0.012     Education / Stimulability Trials   Discussed importance of vocal hygiene including: hydration, conservation and reducing throat clearing, coughing, other phonotraumatic behaviors. Patient was stimulable for improved voice using SOVT and resonant voice exercises.  She was most stimulable for cup bubble, straw phonation into water, and resonant /m/.  She was able to feel reduced vocal effort and mildly improved voice quality across SOVT/resonant trials.  Encouraged use of exercises daily as well as prior to and following rehearsals.  She plans to contact a local vocal  for singing-specific lessons.     Functional goals  Length Status Goal   Long term Initiated Patient will implement and adhere to vocal hygiene protocols on a daily basis, including the elimination of phonotraumatic behaviors.    Long term Initiated Patient and clinician will facilitate changes in vocal function in order to restore functional use of voice for daily occupational, social, and emotional demands.    Long term Initiated Patient will re-establish phonation with adequate balance of airflow and resonance with decreased muscle tension.    Short term Initiated Patient will reduce vocal effort and fatigue by decreasing upper body tension as evidenced by a decrease in symptoms and lack of observable/palpable signs of hyperkinetic muscular behaviors.   Short term Initiated Patient will complete SOVT exercises and/or resonant-focused exercises 3-5x daily to strengthen and balance the intrinsic laryngeal musculature and maximize glottic closure without medial hyperfunction.   Short term Initiated Patient will improve the quality, efficiency, and ease of phonation through resonant and/or  airflow exercises from the syllable to conversation level with 80% accuracy.   Short term Initiated Patient will discriminate between easy and strained phonation with 80% accuracy.        Assessment     Alix Patel presents with mild-moderate dysphonia.  Diagnoses of Dysphonia and Pre-nodular edema of the vocal folds were pertinent to this visit.  Prognosis for continued improvement is good.     Recommendations / POC    Recommend 2-4 sessions of voice/speech therapy over 4-6 weeks with a speech-language pathologist to optimize glottal postures for improved vocal function, vocal efficiency, and ease of phonation.  She should continue the exercises as discussed in session and should contact me with any further questions.      G-Codes for Voice  Current status:   - CJ   Projected status:  - CH

## 2018-09-18 RX ORDER — QUETIAPINE 400 MG/1
TABLET, FILM COATED, EXTENDED RELEASE ORAL
Qty: 60 TABLET | Refills: 0 | OUTPATIENT
Start: 2018-09-18

## 2018-10-02 ENCOUNTER — LAB VISIT (OUTPATIENT)
Dept: LAB | Facility: HOSPITAL | Age: 63
End: 2018-10-02
Attending: PSYCHIATRY & NEUROLOGY
Payer: COMMERCIAL

## 2018-10-02 ENCOUNTER — OFFICE VISIT (OUTPATIENT)
Dept: PSYCHIATRY | Facility: CLINIC | Age: 63
End: 2018-10-02
Payer: COMMERCIAL

## 2018-10-02 VITALS
HEIGHT: 71 IN | HEART RATE: 101 BPM | BODY MASS INDEX: 22.51 KG/M2 | WEIGHT: 160.81 LBS | SYSTOLIC BLOOD PRESSURE: 141 MMHG | DIASTOLIC BLOOD PRESSURE: 86 MMHG

## 2018-10-02 DIAGNOSIS — E11.9 TYPE 2 DIABETES MELLITUS WITHOUT COMPLICATION, WITHOUT LONG-TERM CURRENT USE OF INSULIN: ICD-10-CM

## 2018-10-02 DIAGNOSIS — F31.81 BIPOLAR II DISORDER: Primary | ICD-10-CM

## 2018-10-02 DIAGNOSIS — Z79.899 ENCOUNTER FOR LONG-TERM (CURRENT) USE OF MEDICATIONS: ICD-10-CM

## 2018-10-02 DIAGNOSIS — G24.01 TARDIVE DYSKINESIA: ICD-10-CM

## 2018-10-02 LAB
ALBUMIN SERPL BCP-MCNC: 4 G/DL
ALP SERPL-CCNC: 120 U/L
ALT SERPL W/O P-5'-P-CCNC: 30 U/L
ANION GAP SERPL CALC-SCNC: 7 MMOL/L
AST SERPL-CCNC: 27 U/L
BILIRUB SERPL-MCNC: 0.3 MG/DL
BUN SERPL-MCNC: 8 MG/DL
CALCIUM SERPL-MCNC: 9.8 MG/DL
CHLORIDE SERPL-SCNC: 103 MMOL/L
CO2 SERPL-SCNC: 29 MMOL/L
CREAT SERPL-MCNC: 0.7 MG/DL
EST. GFR  (AFRICAN AMERICAN): >60 ML/MIN/1.73 M^2
EST. GFR  (NON AFRICAN AMERICAN): >60 ML/MIN/1.73 M^2
GLUCOSE SERPL-MCNC: 124 MG/DL
POTASSIUM SERPL-SCNC: 4 MMOL/L
PROT SERPL-MCNC: 7 G/DL
SODIUM SERPL-SCNC: 139 MMOL/L

## 2018-10-02 PROCEDURE — 36415 COLL VENOUS BLD VENIPUNCTURE: CPT

## 2018-10-02 PROCEDURE — 3008F BODY MASS INDEX DOCD: CPT | Mod: CPTII,S$GLB,, | Performed by: PSYCHIATRY & NEUROLOGY

## 2018-10-02 PROCEDURE — 80053 COMPREHEN METABOLIC PANEL: CPT

## 2018-10-02 PROCEDURE — 99214 OFFICE O/P EST MOD 30 MIN: CPT | Mod: S$GLB,,, | Performed by: PSYCHIATRY & NEUROLOGY

## 2018-10-02 PROCEDURE — 99999 PR PBB SHADOW E&M-EST. PATIENT-LVL II: CPT | Mod: PBBFAC,,, | Performed by: PSYCHIATRY & NEUROLOGY

## 2018-10-02 RX ORDER — LURASIDONE HYDROCHLORIDE 60 MG/1
60 TABLET, FILM COATED ORAL DAILY
Qty: 30 TABLET | Refills: 2 | Status: SHIPPED | OUTPATIENT
Start: 2018-10-02 | End: 2019-04-08

## 2018-10-02 RX ORDER — BUSPIRONE HYDROCHLORIDE 15 MG/1
30 TABLET ORAL 2 TIMES DAILY
Qty: 120 TABLET | Refills: 2 | Status: SHIPPED | OUTPATIENT
Start: 2018-10-02 | End: 2019-03-11 | Stop reason: SDUPTHER

## 2018-10-02 RX ORDER — DIVALPROEX SODIUM 500 MG/1
1000 TABLET, FILM COATED, EXTENDED RELEASE ORAL NIGHTLY
Qty: 60 TABLET | Refills: 2 | Status: SHIPPED | OUTPATIENT
Start: 2018-10-02 | End: 2018-12-13 | Stop reason: SDUPTHER

## 2018-10-02 RX ORDER — ESCITALOPRAM OXALATE 20 MG/1
20 TABLET ORAL DAILY
Qty: 30 TABLET | Refills: 2 | Status: SHIPPED | OUTPATIENT
Start: 2018-10-02 | End: 2019-02-11 | Stop reason: SDUPTHER
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
## 2018-10-17 DIAGNOSIS — Z79.899 ENCOUNTER FOR LONG-TERM (CURRENT) USE OF MEDICATIONS: Primary | ICD-10-CM

## 2018-10-18 ENCOUNTER — LAB VISIT (OUTPATIENT)
Dept: LAB | Facility: HOSPITAL | Age: 63
End: 2018-10-18
Payer: COMMERCIAL

## 2018-10-18 DIAGNOSIS — Z79.899 ENCOUNTER FOR LONG-TERM (CURRENT) USE OF MEDICATIONS: ICD-10-CM

## 2018-10-18 LAB
ALBUMIN SERPL BCP-MCNC: 3.7 G/DL
ALP SERPL-CCNC: 157 U/L
ALT SERPL W/O P-5'-P-CCNC: 19 U/L
ANION GAP SERPL CALC-SCNC: 7 MMOL/L
AST SERPL-CCNC: 20 U/L
BASOPHILS # BLD AUTO: 0.03 K/UL
BASOPHILS NFR BLD: 0.5 %
BILIRUB SERPL-MCNC: 0.2 MG/DL
BUN SERPL-MCNC: 14 MG/DL
CALCIUM SERPL-MCNC: 9.4 MG/DL
CHLORIDE SERPL-SCNC: 105 MMOL/L
CO2 SERPL-SCNC: 27 MMOL/L
CREAT SERPL-MCNC: 0.8 MG/DL
DIFFERENTIAL METHOD: ABNORMAL
EOSINOPHIL # BLD AUTO: 0.1 K/UL
EOSINOPHIL NFR BLD: 1.8 %
ERYTHROCYTE [DISTWIDTH] IN BLOOD BY AUTOMATED COUNT: 13.5 %
EST. GFR  (AFRICAN AMERICAN): >60 ML/MIN/1.73 M^2
EST. GFR  (NON AFRICAN AMERICAN): >60 ML/MIN/1.73 M^2
GLUCOSE SERPL-MCNC: 294 MG/DL
HCT VFR BLD AUTO: 38.4 %
HGB BLD-MCNC: 11.7 G/DL
IMM GRANULOCYTES # BLD AUTO: 0.03 K/UL
IMM GRANULOCYTES NFR BLD AUTO: 0.5 %
LYMPHOCYTES # BLD AUTO: 2.1 K/UL
LYMPHOCYTES NFR BLD: 37.5 %
MCH RBC QN AUTO: 29.6 PG
MCHC RBC AUTO-ENTMCNC: 30.5 G/DL
MCV RBC AUTO: 97 FL
MONOCYTES # BLD AUTO: 0.6 K/UL
MONOCYTES NFR BLD: 10.9 %
NEUTROPHILS # BLD AUTO: 2.7 K/UL
NEUTROPHILS NFR BLD: 48.8 %
NRBC BLD-RTO: 0 /100 WBC
PLATELET # BLD AUTO: 198 K/UL
PMV BLD AUTO: 11.4 FL
POTASSIUM SERPL-SCNC: 4.2 MMOL/L
PROT SERPL-MCNC: 6.6 G/DL
RBC # BLD AUTO: 3.95 M/UL
SODIUM SERPL-SCNC: 139 MMOL/L
VALPROATE SERPL-MCNC: 42 UG/ML
WBC # BLD AUTO: 5.49 K/UL

## 2018-10-18 PROCEDURE — 36415 COLL VENOUS BLD VENIPUNCTURE: CPT

## 2018-10-18 PROCEDURE — 85025 COMPLETE CBC W/AUTO DIFF WBC: CPT

## 2018-10-18 PROCEDURE — 80164 ASSAY DIPROPYLACETIC ACD TOT: CPT

## 2018-10-18 PROCEDURE — 80053 COMPREHEN METABOLIC PANEL: CPT

## 2018-10-23 DIAGNOSIS — Z79.899 ENCOUNTER FOR LONG-TERM (CURRENT) USE OF MEDICATIONS: Primary | ICD-10-CM

## 2018-12-13 ENCOUNTER — TELEPHONE (OUTPATIENT)
Dept: OTOLARYNGOLOGY | Facility: CLINIC | Age: 63
End: 2018-12-13

## 2018-12-13 RX ORDER — DIVALPROEX SODIUM 500 MG/1
TABLET, FILM COATED, EXTENDED RELEASE ORAL
Qty: 60 TABLET | Refills: 0 | Status: SHIPPED | OUTPATIENT
Start: 2018-12-13 | End: 2019-01-11 | Stop reason: SDUPTHER

## 2018-12-14 RX ORDER — DIVALPROEX SODIUM 500 MG/1
TABLET, FILM COATED, EXTENDED RELEASE ORAL
Qty: 180 TABLET | Refills: 0 | OUTPATIENT
Start: 2018-12-14

## 2019-01-11 RX ORDER — DIVALPROEX SODIUM 500 MG/1
TABLET, FILM COATED, EXTENDED RELEASE ORAL
Qty: 60 TABLET | Refills: 0 | Status: SHIPPED | OUTPATIENT
Start: 2019-01-11 | End: 2019-02-01 | Stop reason: SDUPTHER

## 2019-01-27 ENCOUNTER — HOSPITAL ENCOUNTER (EMERGENCY)
Facility: OTHER | Age: 64
Discharge: HOME | End: 2019-01-27
Attending: EMERGENCY MEDICINE
Payer: COMMERCIAL

## 2019-01-27 VITALS
RESPIRATION RATE: 16 BRPM | TEMPERATURE: 98 F | SYSTOLIC BLOOD PRESSURE: 143 MMHG | WEIGHT: 155 LBS | DIASTOLIC BLOOD PRESSURE: 78 MMHG | OXYGEN SATURATION: 99 % | BODY MASS INDEX: 21.7 KG/M2 | HEART RATE: 88 BPM | HEIGHT: 71 IN
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
## 2019-01-27 DIAGNOSIS — S09.90XA INJURY OF HEAD, INITIAL ENCOUNTER: Primary | ICD-10-CM

## 2019-01-27 LAB — POCT GLUCOSE: 149 MG/DL (ref 70–110)

## 2019-01-27 PROCEDURE — 82962 GLUCOSE BLOOD TEST: CPT

## 2019-01-27 PROCEDURE — 25000003 PHARM REV CODE 250: Performed by: EMERGENCY MEDICINE

## 2019-01-27 PROCEDURE — 99284 EMERGENCY DEPT VISIT MOD MDM: CPT | Mod: 25

## 2019-01-27 RX ORDER — METHOCARBAMOL 500 MG/1
1000 TABLET, FILM COATED ORAL 3 TIMES DAILY PRN
Qty: 20 TABLET | Refills: 0 | Status: SHIPPED | OUTPATIENT
Start: 2019-01-27 | End: 2019-02-01

## 2019-01-27 RX ORDER — IBUPROFEN 400 MG/1
800 TABLET ORAL
Status: COMPLETED | OUTPATIENT
Start: 2019-01-27 | End: 2019-01-27

## 2019-01-27 RX ORDER — IBUPROFEN 800 MG/1
800 TABLET ORAL EVERY 6 HOURS PRN
Qty: 20 TABLET | Refills: 0 | Status: SHIPPED | OUTPATIENT
Start: 2019-01-27 | End: 2019-08-13

## 2019-01-27 RX ORDER — METHOCARBAMOL 500 MG/1
1000 TABLET, FILM COATED ORAL
Status: COMPLETED | OUTPATIENT
Start: 2019-01-27 | End: 2019-01-27

## 2019-01-27 RX ADMIN — IBUPROFEN 800 MG: 400 TABLET ORAL at 09:01

## 2019-01-27 RX ADMIN — METHOCARBAMOL TABLETS 1000 MG: 500 TABLET, COATED ORAL at 09:01
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
## 2019-01-28 NOTE — ED NOTES
Pt rounding complete.  Pain 9/10, declining medication at this time.  Restroom and comfort needs addressed.  Pt updated on plan of care.  Call light within reach.  Will continue to monitor.
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
## 2019-01-28 NOTE — DISCHARGE INSTRUCTIONS
We have prescribed medication for your pain. Please fill and take as directed.    Please return to the ER if you have chest pain, difficulty breathing, fevers, altered mental status, dizziness, weakness, or any other concerns.      Follow up with your primary care physician.

## 2019-02-01 RX ORDER — DIVALPROEX SODIUM 500 MG/1
TABLET, FILM COATED, EXTENDED RELEASE ORAL
Qty: 60 TABLET | Refills: 2 | Status: SHIPPED | OUTPATIENT
Start: 2019-02-01 | End: 2019-03-11 | Stop reason: SDUPTHER

## 2019-02-11 RX ORDER — ESCITALOPRAM OXALATE 20 MG/1
TABLET ORAL
Qty: 30 TABLET | Refills: 0 | Status: SHIPPED | OUTPATIENT
Start: 2019-02-11 | End: 2019-02-11 | Stop reason: SDUPTHER

## 2019-02-12 RX ORDER — ESCITALOPRAM OXALATE 20 MG/1
TABLET ORAL
Qty: 90 TABLET | Refills: 0 | Status: SHIPPED | OUTPATIENT
Start: 2019-02-12 | End: 2019-03-11 | Stop reason: SDUPTHER

## 2019-03-11 ENCOUNTER — NURSE TRIAGE (OUTPATIENT)
Dept: ADMINISTRATIVE | Facility: CLINIC | Age: 64
End: 2019-03-11

## 2019-03-11 ENCOUNTER — LAB VISIT (OUTPATIENT)
Dept: LAB | Facility: HOSPITAL | Age: 64
End: 2019-03-11
Attending: PSYCHIATRY & NEUROLOGY
Payer: COMMERCIAL

## 2019-03-11 ENCOUNTER — OFFICE VISIT (OUTPATIENT)
Dept: PSYCHIATRY | Facility: CLINIC | Age: 64
End: 2019-03-11
Payer: COMMERCIAL

## 2019-03-11 VITALS
HEIGHT: 71 IN | DIASTOLIC BLOOD PRESSURE: 79 MMHG | BODY MASS INDEX: 23.93 KG/M2 | HEART RATE: 117 BPM | SYSTOLIC BLOOD PRESSURE: 140 MMHG | WEIGHT: 170.94 LBS

## 2019-03-11 DIAGNOSIS — F31.81 BIPOLAR II DISORDER: Primary | ICD-10-CM

## 2019-03-11 DIAGNOSIS — G24.01 TARDIVE DYSKINESIA: ICD-10-CM

## 2019-03-11 DIAGNOSIS — F41.9 ANXIETY: ICD-10-CM

## 2019-03-11 DIAGNOSIS — Z79.899 ENCOUNTER FOR LONG-TERM (CURRENT) USE OF MEDICATIONS: ICD-10-CM

## 2019-03-11 DIAGNOSIS — E11.9 TYPE 2 DIABETES MELLITUS WITHOUT COMPLICATION, WITHOUT LONG-TERM CURRENT USE OF INSULIN: ICD-10-CM

## 2019-03-11 LAB
ALBUMIN SERPL BCP-MCNC: 3.8 G/DL
ALP SERPL-CCNC: 98 U/L
ALT SERPL W/O P-5'-P-CCNC: 31 U/L
AST SERPL-CCNC: 23 U/L
BILIRUB DIRECT SERPL-MCNC: <0.1 MG/DL
BILIRUB SERPL-MCNC: 0.2 MG/DL
PROT SERPL-MCNC: 7 G/DL

## 2019-03-11 PROCEDURE — 99214 PR OFFICE/OUTPT VISIT, EST, LEVL IV, 30-39 MIN: ICD-10-PCS | Mod: S$GLB,,, | Performed by: PSYCHIATRY & NEUROLOGY

## 2019-03-11 PROCEDURE — 99999 PR PBB SHADOW E&M-EST. PATIENT-LVL II: ICD-10-PCS | Mod: PBBFAC,,, | Performed by: PSYCHIATRY & NEUROLOGY

## 2019-03-11 PROCEDURE — 99999 PR PBB SHADOW E&M-EST. PATIENT-LVL II: CPT | Mod: PBBFAC,,, | Performed by: PSYCHIATRY & NEUROLOGY

## 2019-03-11 PROCEDURE — 99214 OFFICE O/P EST MOD 30 MIN: CPT | Mod: S$GLB,,, | Performed by: PSYCHIATRY & NEUROLOGY

## 2019-03-11 PROCEDURE — 36415 COLL VENOUS BLD VENIPUNCTURE: CPT

## 2019-03-11 PROCEDURE — 80076 HEPATIC FUNCTION PANEL: CPT

## 2019-03-11 RX ORDER — DIVALPROEX SODIUM 500 MG/1
1000 TABLET, FILM COATED, EXTENDED RELEASE ORAL NIGHTLY
Qty: 60 TABLET | Refills: 2 | Status: SHIPPED | OUTPATIENT
Start: 2019-03-11 | End: 2019-08-09

## 2019-03-11 RX ORDER — ESCITALOPRAM OXALATE 20 MG/1
20 TABLET ORAL DAILY
Qty: 90 TABLET | Refills: 0 | Status: SHIPPED | OUTPATIENT
Start: 2019-03-11 | End: 2019-05-21 | Stop reason: SDUPTHER

## 2019-03-11 RX ORDER — HYDROXYZINE PAMOATE 25 MG/1
CAPSULE ORAL
Qty: 60 CAPSULE | Refills: 2 | Status: SHIPPED | OUTPATIENT
Start: 2019-03-11 | End: 2019-04-08 | Stop reason: SDUPTHER

## 2019-03-11 RX ORDER — BUSPIRONE HYDROCHLORIDE 15 MG/1
30 TABLET ORAL 2 TIMES DAILY
Qty: 120 TABLET | Refills: 2 | Status: SHIPPED | OUTPATIENT
Start: 2019-03-11 | End: 2019-05-21 | Stop reason: SDUPTHER

## 2019-03-11 NOTE — TELEPHONE ENCOUNTER
On call back pt reported several of her meds were on back order and couldn't be filled by [facility]. [facility] has sent order over for meds to [facility] which pt has to . She is not sure which meds were sent over on call back. --    Advised to pick meds up -if any of them were unable to be sent over she is to f/u with clinic tomorrow.

## 2019-03-21 ENCOUNTER — OFFICE VISIT (OUTPATIENT)
Dept: INTERNAL MEDICINE | Facility: CLINIC | Age: 64
End: 2019-03-21
Payer: COMMERCIAL

## 2019-03-21 ENCOUNTER — TELEPHONE (OUTPATIENT)
Dept: OTOLARYNGOLOGY | Facility: CLINIC | Age: 64
End: 2019-03-21

## 2019-03-21 VITALS
SYSTOLIC BLOOD PRESSURE: 112 MMHG | HEIGHT: 71 IN | DIASTOLIC BLOOD PRESSURE: 68 MMHG | BODY MASS INDEX: 23.67 KG/M2 | WEIGHT: 169.06 LBS | TEMPERATURE: 99 F | HEART RATE: 104 BPM

## 2019-03-21 DIAGNOSIS — J01.40 ACUTE PANSINUSITIS, RECURRENCE NOT SPECIFIED: Primary | ICD-10-CM

## 2019-03-21 DIAGNOSIS — J31.1 CHRONIC NASOPHARYNGITIS: Primary | ICD-10-CM

## 2019-03-21 PROCEDURE — 99204 OFFICE O/P NEW MOD 45 MIN: CPT | Mod: S$GLB,,, | Performed by: FAMILY MEDICINE

## 2019-03-21 PROCEDURE — 99999 PR PBB SHADOW E&M-EST. PATIENT-LVL III: CPT | Mod: PBBFAC,,, | Performed by: FAMILY MEDICINE

## 2019-03-21 PROCEDURE — 99999 PR PBB SHADOW E&M-EST. PATIENT-LVL III: ICD-10-PCS | Mod: PBBFAC,,, | Performed by: FAMILY MEDICINE

## 2019-03-21 PROCEDURE — 99204 PR OFFICE/OUTPT VISIT, NEW, LEVL IV, 45-59 MIN: ICD-10-PCS | Mod: S$GLB,,, | Performed by: FAMILY MEDICINE

## 2019-03-21 PROCEDURE — 3008F PR BODY MASS INDEX (BMI) DOCUMENTED: ICD-10-PCS | Mod: CPTII,S$GLB,, | Performed by: FAMILY MEDICINE

## 2019-03-21 PROCEDURE — 3008F BODY MASS INDEX DOCD: CPT | Mod: CPTII,S$GLB,, | Performed by: FAMILY MEDICINE

## 2019-03-21 RX ORDER — DICLOFENAC POTASSIUM 50 MG/1
TABLET, FILM COATED ORAL
Refills: 2 | Status: ON HOLD | COMMUNITY
Start: 2019-02-07 | End: 2021-07-22 | Stop reason: HOSPADM

## 2019-03-21 RX ORDER — BENZONATATE 200 MG/1
200 CAPSULE ORAL 3 TIMES DAILY PRN
Qty: 30 CAPSULE | Refills: 0 | Status: SHIPPED | OUTPATIENT
Start: 2019-03-21 | End: 2019-03-31

## 2019-03-21 RX ORDER — AMOXICILLIN AND CLAVULANATE POTASSIUM 875; 125 MG/1; MG/1
1 TABLET, FILM COATED ORAL 2 TIMES DAILY
Qty: 20 TABLET | Refills: 0 | Status: SHIPPED | OUTPATIENT
Start: 2019-03-21 | End: 2019-03-31

## 2019-03-21 RX ORDER — GABAPENTIN 300 MG/1
CAPSULE ORAL
Refills: 2 | Status: ON HOLD | COMMUNITY
Start: 2019-02-25 | End: 2021-07-22 | Stop reason: HOSPADM

## 2019-03-21 RX ORDER — TRAMADOL HYDROCHLORIDE 50 MG/1
TABLET ORAL
Refills: 0 | Status: ON HOLD | COMMUNITY
Start: 2019-03-01 | End: 2021-07-22 | Stop reason: HOSPADM

## 2019-03-21 RX ORDER — HYDROCODONE BITARTRATE AND ACETAMINOPHEN 5; 325 MG/1; MG/1
TABLET ORAL
Refills: 0 | COMMUNITY
Start: 2019-02-01 | End: 2019-03-21

## 2019-03-21 NOTE — PROGRESS NOTES
Subjective:       Patient ID: Alix Patel is a 63 y.o. female.    Chief Complaint: Cough and Sore Throat    HPI 63-year-old white female presents to clinic today secondary to a complaint of chronic sinusitis that has been ongoing for the past 9 months for which she has been seeing ENT.  She reports that her recent symptoms began 3 weeks ago and she was seen by her PCP who prescribed her Tessalon Perles and a Medrol Dosepak which have not relieved her symptoms.  She continues to take Zyrtec and Mucinex daily.  She continues to report chills, nasal congestion, ear pressure, postnasal drip, runny nose, sinus pressure, sore throat, chest congestion, cough productive of greenish sputum, and shortness of breath.  Review of Systems   Constitutional: Positive for chills. Negative for appetite change, fatigue and fever.   HENT: Positive for congestion, ear pain, postnasal drip, rhinorrhea, sinus pressure and sore throat. Negative for hearing loss and tinnitus.    Eyes: Negative for redness, itching and visual disturbance.   Respiratory: Positive for cough (Greenish sputum), chest tightness and shortness of breath.    Cardiovascular: Negative for chest pain and palpitations.   Gastrointestinal: Negative for abdominal pain, constipation, diarrhea, nausea and vomiting.   Genitourinary: Negative for decreased urine volume, difficulty urinating, dysuria, frequency, hematuria and urgency.   Musculoskeletal: Negative for back pain, myalgias, neck pain and neck stiffness.   Skin: Negative for rash.   Neurological: Positive for light-headedness. Negative for dizziness and headaches.   Psychiatric/Behavioral: Negative.        Objective:      Physical Exam   Constitutional: She is oriented to person, place, and time. She appears well-developed and well-nourished. No distress.   HENT:   Head: Normocephalic and atraumatic.   Right Ear: External ear normal. A middle ear effusion is present.   Left Ear: External ear normal. A  middle ear effusion is present.   Nose: Mucosal edema and rhinorrhea present. No nose lacerations, sinus tenderness, nasal deformity, septal deviation or nasal septal hematoma. No epistaxis.  No foreign bodies. Right sinus exhibits maxillary sinus tenderness and frontal sinus tenderness. Left sinus exhibits maxillary sinus tenderness and frontal sinus tenderness.   Mouth/Throat: Oropharynx is clear and moist. No oropharyngeal exudate.   Eyes: Conjunctivae and EOM are normal. Pupils are equal, round, and reactive to light. Right eye exhibits no discharge. Left eye exhibits no discharge. No scleral icterus.   Neck: Normal range of motion. Neck supple. No JVD present. No tracheal deviation present. No thyromegaly present.   Cardiovascular: Normal rate, regular rhythm, normal heart sounds and intact distal pulses. Exam reveals no gallop and no friction rub.   No murmur heard.  Pulmonary/Chest: Effort normal and breath sounds normal. No stridor. No respiratory distress. She has no wheezes. She has no rales.   Abdominal: Soft. Bowel sounds are normal. She exhibits no distension and no mass. There is no tenderness. There is no rebound and no guarding.   Musculoskeletal: Normal range of motion. She exhibits no edema or tenderness.   Lymphadenopathy:     She has no cervical adenopathy.   Neurological: She is alert and oriented to person, place, and time.   Skin: Skin is warm and dry. No rash noted. She is not diaphoretic. No erythema. No pallor.   Psychiatric: She has a normal mood and affect. Her behavior is normal. Judgment and thought content normal.   Nursing note and vitals reviewed.      Assessment:       1. Acute pansinusitis, recurrence not specified        Plan:       Acute pansinusitis, recurrence not specified  -     amoxicillin-clavulanate 875-125mg (AUGMENTIN) 875-125 mg per tablet; Take 1 tablet by mouth 2 (two) times daily. for 10 days  Dispense: 20 tablet; Refill: 0  -     benzonatate (TESSALON) 200 MG  capsule; Take 1 capsule (200 mg total) by mouth 3 (three) times daily as needed for Cough.  Dispense: 30 capsule; Refill: 0      Continue Flonase nasal spray 2 sprays per nostril daily.  Saltwater or Listerine gargle as needed for sore throat.  Over-the-counter Claritin nightly.  Return to clinic as needed if symptoms persist or worsen.

## 2019-03-22 ENCOUNTER — TELEPHONE (OUTPATIENT)
Dept: OTOLARYNGOLOGY | Facility: CLINIC | Age: 64
End: 2019-03-22

## 2019-03-22 RX ORDER — DOXYCYCLINE HYCLATE 100 MG
100 TABLET ORAL 2 TIMES DAILY
Qty: 42 TABLET | Refills: 0 | Status: SHIPPED | OUTPATIENT
Start: 2019-03-22 | End: 2019-04-12

## 2019-04-02 RX ORDER — ESCITALOPRAM OXALATE 20 MG/1
TABLET ORAL
Qty: 90 TABLET | Refills: 0 | OUTPATIENT
Start: 2019-04-02

## 2019-04-08 ENCOUNTER — OFFICE VISIT (OUTPATIENT)
Dept: PSYCHIATRY | Facility: CLINIC | Age: 64
End: 2019-04-08
Payer: COMMERCIAL

## 2019-04-08 VITALS
HEIGHT: 71 IN | WEIGHT: 170 LBS | DIASTOLIC BLOOD PRESSURE: 67 MMHG | BODY MASS INDEX: 23.8 KG/M2 | HEART RATE: 110 BPM | SYSTOLIC BLOOD PRESSURE: 110 MMHG

## 2019-04-08 DIAGNOSIS — Z79.899 ENCOUNTER FOR LONG-TERM (CURRENT) USE OF MEDICATIONS: ICD-10-CM

## 2019-04-08 DIAGNOSIS — F41.9 ANXIETY: ICD-10-CM

## 2019-04-08 DIAGNOSIS — F31.81 BIPOLAR II DISORDER: Primary | ICD-10-CM

## 2019-04-08 DIAGNOSIS — E11.9 TYPE 2 DIABETES MELLITUS WITHOUT COMPLICATION, WITHOUT LONG-TERM CURRENT USE OF INSULIN: ICD-10-CM

## 2019-04-08 PROCEDURE — 99999 PR PBB SHADOW E&M-EST. PATIENT-LVL II: ICD-10-PCS | Mod: PBBFAC,,, | Performed by: PSYCHIATRY & NEUROLOGY

## 2019-04-08 PROCEDURE — 99214 PR OFFICE/OUTPT VISIT, EST, LEVL IV, 30-39 MIN: ICD-10-PCS | Mod: S$GLB,,, | Performed by: PSYCHIATRY & NEUROLOGY

## 2019-04-08 PROCEDURE — 99214 OFFICE O/P EST MOD 30 MIN: CPT | Mod: S$GLB,,, | Performed by: PSYCHIATRY & NEUROLOGY

## 2019-04-08 PROCEDURE — 99999 PR PBB SHADOW E&M-EST. PATIENT-LVL II: CPT | Mod: PBBFAC,,, | Performed by: PSYCHIATRY & NEUROLOGY

## 2019-04-08 RX ORDER — HYDROXYZINE PAMOATE 25 MG/1
CAPSULE ORAL
Qty: 60 CAPSULE | Refills: 2 | Status: SHIPPED | OUTPATIENT
Start: 2019-04-08 | End: 2019-06-11 | Stop reason: SDUPTHER
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
## 2019-05-21 ENCOUNTER — TELEPHONE (OUTPATIENT)
Dept: PSYCHIATRY | Facility: CLINIC | Age: 64
End: 2019-05-21

## 2019-05-21 RX ORDER — BUSPIRONE HYDROCHLORIDE 15 MG/1
30 TABLET ORAL 2 TIMES DAILY
Qty: 360 TABLET | Refills: 0 | Status: SHIPPED | OUTPATIENT
Start: 2019-05-21 | End: 2019-06-11 | Stop reason: SDUPTHER

## 2019-05-21 RX ORDER — ESCITALOPRAM OXALATE 20 MG/1
20 TABLET ORAL DAILY
Qty: 90 TABLET | Refills: 0 | Status: SHIPPED | OUTPATIENT
Start: 2019-05-21 | End: 2019-06-11 | Stop reason: SDUPTHER

## 2019-05-21 NOTE — TELEPHONE ENCOUNTER
----- Message from Lynne Castorena MA sent at 5/21/2019  3:08 PM CDT -----  Contact: Patient  Patient would like a call back today if possible. She did not give a reason. Pt can be reached at [phone]

## 2019-05-26 ENCOUNTER — OFFICE VISIT (OUTPATIENT)
Dept: URGENT CARE | Facility: CLINIC | Age: 64
End: 2019-05-26
Payer: COMMERCIAL

## 2019-05-26 VITALS
WEIGHT: 162 LBS | RESPIRATION RATE: 16 BRPM | SYSTOLIC BLOOD PRESSURE: 118 MMHG | HEART RATE: 113 BPM | BODY MASS INDEX: 22.68 KG/M2 | HEIGHT: 71 IN | OXYGEN SATURATION: 98 % | TEMPERATURE: 99 F | DIASTOLIC BLOOD PRESSURE: 85 MMHG

## 2019-05-26 DIAGNOSIS — N39.0 URINARY TRACT INFECTION WITH HEMATURIA, SITE UNSPECIFIED: Primary | ICD-10-CM

## 2019-05-26 DIAGNOSIS — R31.9 URINARY TRACT INFECTION WITH HEMATURIA, SITE UNSPECIFIED: Primary | ICD-10-CM

## 2019-05-26 DIAGNOSIS — R81 GLYCOSURIA: ICD-10-CM

## 2019-05-26 LAB
BILIRUB UR QL STRIP: NEGATIVE
GLUCOSE SERPL-MCNC: 205 MG/DL (ref 70–110)
GLUCOSE UR QL STRIP: POSITIVE
KETONES UR QL STRIP: POSITIVE
LEUKOCYTE ESTERASE UR QL STRIP: POSITIVE
PH, POC UA: 5.5 (ref 5–8)
POC BLOOD, URINE: POSITIVE
POC NITRATES, URINE: NEGATIVE
PROT UR QL STRIP: POSITIVE
SP GR UR STRIP: 1.02 (ref 1–1.03)
UROBILINOGEN UR STRIP-ACNC: ABNORMAL (ref 0.1–1.1)

## 2019-05-26 PROCEDURE — 82962 GLUCOSE BLOOD TEST: CPT | Mod: S$GLB,,, | Performed by: EMERGENCY MEDICINE

## 2019-05-26 PROCEDURE — 99214 OFFICE O/P EST MOD 30 MIN: CPT | Mod: S$GLB,,, | Performed by: EMERGENCY MEDICINE

## 2019-05-26 PROCEDURE — 87086 URINE CULTURE/COLONY COUNT: CPT

## 2019-05-26 PROCEDURE — 99214 PR OFFICE/OUTPT VISIT, EST, LEVL IV, 30-39 MIN: ICD-10-PCS | Mod: S$GLB,,, | Performed by: EMERGENCY MEDICINE

## 2019-05-26 PROCEDURE — 3008F PR BODY MASS INDEX (BMI) DOCUMENTED: ICD-10-PCS | Mod: CPTII,S$GLB,, | Performed by: EMERGENCY MEDICINE

## 2019-05-26 PROCEDURE — 81003 POCT URINALYSIS, DIPSTICK, AUTOMATED, W/O SCOPE: ICD-10-PCS | Mod: QW,S$GLB,, | Performed by: EMERGENCY MEDICINE

## 2019-05-26 PROCEDURE — 82962 POCT GLUCOSE, HAND-HELD DEVICE: ICD-10-PCS | Mod: S$GLB,,, | Performed by: EMERGENCY MEDICINE

## 2019-05-26 PROCEDURE — 3008F BODY MASS INDEX DOCD: CPT | Mod: CPTII,S$GLB,, | Performed by: EMERGENCY MEDICINE

## 2019-05-26 PROCEDURE — 81003 URINALYSIS AUTO W/O SCOPE: CPT | Mod: QW,S$GLB,, | Performed by: EMERGENCY MEDICINE

## 2019-05-26 RX ORDER — PHENAZOPYRIDINE HYDROCHLORIDE 200 MG/1
200 TABLET, FILM COATED ORAL 3 TIMES DAILY PRN
Qty: 6 TABLET | Refills: 0 | Status: SHIPPED | OUTPATIENT
Start: 2019-05-26 | End: 2019-05-28

## 2019-05-26 RX ORDER — SULFAMETHOXAZOLE AND TRIMETHOPRIM 800; 160 MG/1; MG/1
1 TABLET ORAL 2 TIMES DAILY
Qty: 10 TABLET | Refills: 0 | Status: SHIPPED | OUTPATIENT
Start: 2019-05-26 | End: 2019-05-31

## 2019-05-26 NOTE — LETTER
May 26, 2019      [facility]  [facility] [address] [facility] [address]  Phone: [phone]  Fax: [phone]       Patient: Alix Patel   YOB: 1955  Date of Visit: 05/26/2019    To Whom It May Concern:    Heath Patel  was at [facility] on 05/26/2019. She may return to work/school on 5/28/19, earlier if feels better with no restrictions. If you have any questions or concerns, or if I can be of further assistance, please do not hesitate to contact me.    Sincerely,            Caleb Ayoub MD

## 2019-05-26 NOTE — PROGRESS NOTES
"Subjective:       Patient ID: Alix Patel is a 63 y.o. female.    Vitals:  height is 5' 11" (1.803 m) and weight is 73.5 kg (162 lb). Her temperature is 98.5 °F (36.9 °C). Her blood pressure is 118/85 and her pulse is 113 (abnormal). Her respiration is 16 and oxygen saturation is 98%.     Chief Complaint: Urinary Tract Infection    1 d hx urinary urgency/frequency/burning with urination, no fever/back pain, not taking azo, NOC.    Urinary Tract Infection    This is a new problem. The current episode started today. The problem occurs every urination. The problem has been rapidly worsening. The quality of the pain is described as burning and aching. The pain is at a severity of 9/10. There is no history of pyelonephritis. Associated symptoms include frequency, hesitancy and urgency. Pertinent negatives include no behavior changes, chills, discharge, flank pain, hematuria, nausea, possible pregnancy, sweats, vomiting, constipation or rash. She has tried nothing for the symptoms. There is no history of catheterization, kidney stones or recurrent UTIs.       Constitution: Negative for chills and fever.   Neck: Negative for painful lymph nodes.   Gastrointestinal: Negative for abdominal pain, nausea, vomiting and constipation.   Genitourinary: Positive for dysuria, frequency, urgency, urine decreased and vaginal discharge. Negative for flank pain, hematuria, history of kidney stones, painful menstruation, irregular menstruation, missed menses, heavy menstrual bleeding, ovarian cysts, genital trauma, vaginal pain, vaginal bleeding, vaginal odor, painful intercourse, genital sore, painful ejaculation and pelvic pain.   Musculoskeletal: Negative for back pain.   Skin: Negative for rash and lesion.   Hematologic/Lymphatic: Negative for swollen lymph nodes.       Objective:      Physical Exam   Constitutional: She is oriented to person, place, and time. She appears well-developed and well-nourished.   HENT:   Head: " Normocephalic and atraumatic.   Neck: Normal range of motion. Neck supple.   Cardiovascular: Normal rate, regular rhythm and normal heart sounds.   Pulmonary/Chest: Breath sounds normal.   Abdominal: Soft. Bowel sounds are normal.   No bilat CVAT/bladder tenderness to palp   Musculoskeletal: Normal range of motion.   Neurological: She is alert and oriented to person, place, and time.   Skin: Skin is warm and dry.   Psychiatric: She has a normal mood and affect. Her behavior is normal.       Assessment:       1. Urinary tract infection with hematuria, site unspecified    2. Glycosuria        Plan:         Urinary tract infection with hematuria, site unspecified  -     POCT Urinalysis, Dipstick, Automated, W/O Scope  -     Urine culture    Glycosuria  -     POCT Glucose, Hand-Held Device        Caleb Ayoub MD  Go to the Emergency Department for any problems  Call your PCP for follow up next available.

## 2019-05-26 NOTE — PATIENT INSTRUCTIONS
"Caleb Ayoub MD  Go to the Emergency Department for any problems  Call your PCP for follow up next available.    Bladder Infection, Female (Adult)    Urine is normally doesn't have any bacteria in it. But bacteria can get into the urinary tract from the skin around the rectum. Or they can travel in the blood from elsewhere in the body. Once they are in your urinary tract, they can cause infection in the urethra (urethritis), the bladder (cystitis), or the kidneys (pyelonephritis).  The most common place for an infection is in the bladder. This is called a bladder infection. This is one of the most common infections in women. Most bladder infections are easily treated. They are not serious unless the infection spreads to the kidney.  The phrases "bladder infection," "UTI," and "cystitis" are often used to describe the same thing. But they are not always the same. Cystitis is an inflammation of the bladder. The most common cause of cystitis is an infection.  Symptoms  The infection causes inflammation in the urethra and bladder. This causes many of the symptoms. The most common symptoms of a bladder infection are:  · Pain or burning when urinating  · Having to urinate more often than usual  · Urgent need to urinate  · Only a small amount of urine comes out  · Blood in urine  · Abdominal discomfort. This is usually in the lower abdomen above the pubic bone.  · Cloudy urine  · Strong- or bad-smelling urine  · Unable to urinate (urinary retention)  · Unable to hold urine in (urinary incontinence)  · Fever  · Loss of appetite  · Confusion (in older adults)  Causes  Bladder infections are not contagious. You can't get one from someone else, from a toilet seat, or from sharing a bath.  The most common cause of bladder infections is bacteria from the bowels. The bacteria get onto the skin around the opening of the urethra. From there, they can get into the urine and travel up to the bladder, causing inflammation and " infection. This usually happens because of:  · Wiping improperly after urinating. Always wipe from front to back.  · Bowel incontinence  · Pregnancy  · Procedures such as having a catheter inserted  · Older age  · Not emptying your bladder. This can allow bacteria a chance to grow in your urine.  · Dehydration  · Constipation  · Sex  · Use of a diaphragm for birth control   Treatment  Bladder infections are diagnosed by a urine test. They are treated with antibiotics and usually clear up quickly without complications. Treatment helps prevent a more serious kidney infection.  Medicines  Medicines can help in the treatment of a bladder infection:  · Take antibiotics until they are used up, even if you feel better. It is important to finish them to make sure the infection has cleared.  · You can use acetaminophen or ibuprofen for pain, fever, or discomfort, unless another medicine was prescribed. If you have chronic liver or kidney disease, talk with your healthcare provider before using these medicines. Also talk with your provider if you've ever had a stomach ulcer or gastrointestinal bleeding, or are taking blood-thinner medicines.  · If you are given phenazopydridine to reduce burning with urination, it will cause your urine to become a bright orange color. This can stain clothing.  Care and prevention  These self-care steps can help prevent future infections:  · Drink plenty of fluids to prevent dehydration and flush out your bladder. Do this unless you must restrict fluids for other health reasons, or your doctor told you not to.  · Proper cleaning after going to the bathroom is important. Wipe from front to back after using the toilet to prevent the spread of bacteria.  · Urinate more often. Don't try to hold urine in for a long time.  · Wear loose-fitting clothes and cotton underwear. Avoid tight-fitting pants.  · Improve your diet and prevent constipation. Eat more fresh fruit and vegetables, and fiber, and  less junk and fatty foods.  · Avoid sex until your symptoms are gone.  · Avoid caffeine, alcohol, and spicy foods. These can irritate your bladder.  · Urinate right after intercourse to flush out your bladder.  · If you use birth control pills and have frequent bladder infections, discuss it with your doctor.  Follow-up care  Call your healthcare provider if all symptoms are not gone after 3 days of treatment. This is especially important if you have repeat infections.  If a culture was done, you will be told if your treatment needs to be changed. If directed, you can call to find out the results.  If X-rays were done, you will be told if the results will affect your treatment.  Call 911  Call 911 if any of the following occur:  · Trouble breathing  · Hard to wake up or confusion  · Fainting or loss of consciousness  · Rapid heart rate  When to seek medical advice  Call your healthcare provider right away if any of these occur:  · Fever of 100.4ºF (38.0ºC) or higher, or as directed by your healthcare provider  · Symptoms are not better by the third day of treatment  · Back or belly (abdominal) pain that gets worse  · Repeated vomiting, or unable to keep medicine down  · Weakness or dizziness  · Vaginal discharge  · Pain, redness, or swelling in the outer vaginal area (labia)  Date Last Reviewed: 10/1/2016  © 1790-2608 [email or web address]. [address] [place] [address], [place] [id number]. All rights reserved. This information is not intended as a substitute for professional medical care. Always follow your healthcare professional's instructions.        Urinary Tract Infections in Women    Urinary tract infections (UTIs) are most often caused by bacteria (germs). These bacteria enter the urinary tract. The bacteria may come from outside the body. Or they may travel from the skin outside the rectum or vagina into the urethra. Female anatomy makes it easy for bacteria from the bowel to enter a womans urinary tract,  which is the most common source of UTI. This means women develop UTIs more often than men. Pain in or around the urinary tract is a common UTI symptom. But the only way to know for sure if you have a UTI for the healthcare provider to test your urine. The two tests that may be done are the urinalysis and urine culture.  Types of UTIs  · Cystitis: A bladder infection (cystitis) is the most common UTI in women. You may have urgent or frequent urination. You may also have pain, burning when you urinate, and bloody urine.  · Urethritis: This is an inflamed urethra, which is the tube that carries urine from the bladder to outside the body. You may have lower stomach or back pain. You may also have urgent or frequent urination.  · Pyelonephritis: This is a kidney infection. If not treated, it can be serious and damage your kidneys. In severe cases, you may be hospitalized. You may have a fever and lower back pain.  Medicines to treat a UTI  Most UTIs are treated with antibiotics. These kill the bacteria. The length of time you need to take them depends on the type of infection. It may be as short as 3 days. If you have repeated UTIs, a low-dose antibiotic may be needed for several months. Take antibiotics exactly as directed. Dont stop taking them until all of the medicine is gone. If you stop taking the antibiotic too soon, the infection may not go away, and you may develop a resistance to the antibiotic. This can make it much harder to treat.  Lifestyle changes to treat and prevent UTIs  The lifestyle changes below will help get rid of your UTI. They may also help prevent future UTIs.  · Drink plenty of fluids. This includes water, juice, or other caffeine-free drinks. Fluids help flush bacteria out of your body.  · Empty your bladder. Always empty your bladder when you feel the urge to urinate. And always urinate before going to sleep. Urine that stays in your bladder can lead to infection. Try to urinate before and  after sex as well.  · Practice good personal hygiene. Wipe yourself from front to back after using the toilet. This helps keep bacteria from getting into the urethra.  · Use condoms during sex. These help prevent UTIs caused by sexually transmitted bacteria. Also, avoid using spermicides during sex. These can increase the risk of UTIs. Choose other forms of birth control instead. For women who tend to get UTIs after sex, a low-dose of a preventive antibiotic may be used. Be sure to discuss this option with your healthcare provider.  · Follow up with your healthcare provider as directed. He or she may test to make sure the infection has cleared. If needed, more treatment may be started.  Date Last Reviewed: 1/1/2017  © 4925-0524 [employer]. [address] [place] [address], [place] [address]. All rights reserved. This information is not intended as a substitute for professional medical care. Always follow your healthcare professional's instructions.      Caleb Ayoub MD  Go to the Emergency Department for any problems  Call your PCP for follow up next available.

## 2019-05-27 LAB
BACTERIA UR CULT: NORMAL
BACTERIA UR CULT: NORMAL

## 2019-05-28 NOTE — PROGRESS NOTES
Please call the patient regarding her normal result.  Urine culture did not grow any single bacteria in predominance.  Please ask how patient is feeling.

## 2019-05-29 ENCOUNTER — TELEPHONE (OUTPATIENT)
Dept: URGENT CARE | Facility: CLINIC | Age: 64
End: 2019-05-29

## 2019-05-29 NOTE — TELEPHONE ENCOUNTER
----- Message from Billy Meza NP sent at 5/28/2019 12:13 PM CDT -----  Please call the patient regarding her normal result.  Urine culture did not grow any single bacteria in predominance.  Please ask how patient is feeling.

## 2019-06-11 RX ORDER — ESCITALOPRAM OXALATE 20 MG/1
20 TABLET ORAL DAILY
Qty: 90 TABLET | Refills: 0 | Status: SHIPPED | OUTPATIENT
Start: 2019-06-11 | End: 2019-08-09 | Stop reason: SDUPTHER

## 2019-06-11 RX ORDER — BUSPIRONE HYDROCHLORIDE 15 MG/1
30 TABLET ORAL 2 TIMES DAILY
Qty: 360 TABLET | Refills: 0 | Status: SHIPPED | OUTPATIENT
Start: 2019-06-11 | End: 2019-08-09 | Stop reason: SDUPTHER

## 2019-06-11 RX ORDER — HYDROXYZINE PAMOATE 25 MG/1
CAPSULE ORAL
Qty: 60 CAPSULE | Refills: 2 | Status: SHIPPED | OUTPATIENT
Start: 2019-06-11 | End: 2019-08-09

## 2019-06-20 RX ORDER — DIVALPROEX SODIUM 500 MG/1
TABLET, FILM COATED, EXTENDED RELEASE ORAL
Qty: 180 TABLET | Refills: 0 | Status: SHIPPED | OUTPATIENT
Start: 2019-06-20 | End: 2019-08-09

## 2019-06-20 RX ORDER — DIVALPROEX SODIUM 500 MG/1
TABLET, FILM COATED, EXTENDED RELEASE ORAL
Qty: 60 TABLET | Refills: 0 | Status: SHIPPED | OUTPATIENT
Start: 2019-06-20 | End: 2019-06-20 | Stop reason: SDUPTHER

## 2019-07-26 ENCOUNTER — TELEPHONE (OUTPATIENT)
Dept: OTOLARYNGOLOGY | Facility: CLINIC | Age: 64
End: 2019-07-26

## 2019-07-26 NOTE — TELEPHONE ENCOUNTER
----- Message from Cary Lucero sent at 7/26/2019 12:00 PM CDT -----  Contact: PT  PT is calling requesting appointment asap  Reason: Sinus infection - hasn't gone away yet - lasting a year now.     Callback: [phone]

## 2019-08-05 ENCOUNTER — DOCUMENTATION ONLY (OUTPATIENT)
Dept: PSYCHIATRY | Facility: CLINIC | Age: 64
End: 2019-08-05

## 2019-08-05 NOTE — PROGRESS NOTES
Pt phones after time of appointment to say she missed today because she was being evicted from her apartment. Pt asks that I call her but does not answer, mailbox is full, and mobile phone has message stating call can not be completed, no VM option.

## 2019-08-08 ENCOUNTER — TELEPHONE (OUTPATIENT)
Dept: PSYCHIATRY | Facility: CLINIC | Age: 64
End: 2019-08-08

## 2019-08-09 ENCOUNTER — OFFICE VISIT (OUTPATIENT)
Dept: PSYCHIATRY | Facility: CLINIC | Age: 64
End: 2019-08-09
Payer: COMMERCIAL

## 2019-08-09 VITALS
SYSTOLIC BLOOD PRESSURE: 128 MMHG | BODY MASS INDEX: 23.07 KG/M2 | WEIGHT: 164.81 LBS | HEIGHT: 71 IN | DIASTOLIC BLOOD PRESSURE: 69 MMHG | HEART RATE: 119 BPM

## 2019-08-09 DIAGNOSIS — F31.81 BIPOLAR II DISORDER: Primary | ICD-10-CM

## 2019-08-09 DIAGNOSIS — F41.9 ANXIETY: ICD-10-CM

## 2019-08-09 PROCEDURE — 3008F BODY MASS INDEX DOCD: CPT | Mod: CPTII,S$GLB,, | Performed by: PSYCHIATRY & NEUROLOGY

## 2019-08-09 PROCEDURE — 99999 PR PBB SHADOW E&M-EST. PATIENT-LVL II: CPT | Mod: PBBFAC,,, | Performed by: PSYCHIATRY & NEUROLOGY

## 2019-08-09 PROCEDURE — 99214 OFFICE O/P EST MOD 30 MIN: CPT | Mod: S$GLB,,, | Performed by: PSYCHIATRY & NEUROLOGY

## 2019-08-09 PROCEDURE — 99999 PR PBB SHADOW E&M-EST. PATIENT-LVL II: ICD-10-PCS | Mod: PBBFAC,,, | Performed by: PSYCHIATRY & NEUROLOGY

## 2019-08-09 PROCEDURE — 99214 PR OFFICE/OUTPT VISIT, EST, LEVL IV, 30-39 MIN: ICD-10-PCS | Mod: S$GLB,,, | Performed by: PSYCHIATRY & NEUROLOGY

## 2019-08-09 PROCEDURE — 3008F PR BODY MASS INDEX (BMI) DOCUMENTED: ICD-10-PCS | Mod: CPTII,S$GLB,, | Performed by: PSYCHIATRY & NEUROLOGY

## 2019-08-09 RX ORDER — BUSPIRONE HYDROCHLORIDE 15 MG/1
30 TABLET ORAL 2 TIMES DAILY
Qty: 360 TABLET | Refills: 0 | Status: SHIPPED | OUTPATIENT
Start: 2019-08-09 | End: 2019-12-06 | Stop reason: SDUPTHER

## 2019-08-09 RX ORDER — ESCITALOPRAM OXALATE 20 MG/1
20 TABLET ORAL DAILY
Qty: 90 TABLET | Refills: 0 | Status: SHIPPED | OUTPATIENT
Start: 2019-08-09 | End: 2019-12-06 | Stop reason: SDUPTHER

## 2019-08-09 RX ORDER — DIVALPROEX SODIUM 500 MG/1
1000 TABLET, FILM COATED, EXTENDED RELEASE ORAL NIGHTLY
Qty: 180 TABLET | Refills: 0 | Status: SHIPPED | OUTPATIENT
Start: 2019-08-09 | End: 2019-08-22

## 2019-08-09 RX ORDER — HYDROXYZINE PAMOATE 25 MG/1
CAPSULE ORAL
Qty: 360 CAPSULE | Refills: 0 | Status: SHIPPED | OUTPATIENT
Start: 2019-08-09 | End: 2019-08-13 | Stop reason: SDUPTHER

## 2019-08-09 NOTE — PROGRESS NOTES
"Outpatient Psychiatry Follow-Up Visit (MD/NP)    8/9/2019    Clinical Status of Patient:  Outpatient (Ambulatory)    Chief Complaint:  Alix Patel is a 63 y.o. female who presents today for follow-up of mood swings and anxiety.      Met with patient alone.      Interval History and Content of Current Session:  Interim Events/Subjective Report/Content of Current Session:   Pt reports she got a promotion at work.  She was evicted from her apt and is now in process of moving.  She is not sleeping well and was not doing so even before all of the above. She is very anxious all the time, feels like she had difficulty breathing, episodes of not being able to catch her breath which are sometimes random but also with facing a stressor at work.  Taking benadryl to help her sleep.  Appetite is "finicky'.  Still walking some for exercise.  Cries inappropriately.  Denies desire for death.  Feels alone.        Psychotherapy:  · Target symptoms: depression, anxiety , mood swings  · Why chosen therapy is appropriate versus another modality: relevant to diagnosis  · Outcome monitoring methods: self-report, observation  · Therapeutic intervention type: supportive psychotherapy   · Topics discussed/themes: work stress, stress related to medical comorbidities, building skills sets for symptom management  · The patient's response to the intervention is accepting. The patient's progress toward treatment goals is fair.   · Duration of intervention: 16 mins    Review of Systems   · PSYCHIATRIC: Pertinant items are noted in the narrative.  · CONSTITUTIONAL: No weight gain or loss.   · NEUROLOGIC: Positive for tremor.  · CARDIOVASCULAR: No tachycardia or chest pain.    Past Medical, Family and Social History: The patient's past medical, family and social history have been reviewed and updated as appropriate within the electronic medical record - see encounter notes.    Compliance: yes    Side effects: None    Risk " "Parameters:  Patient reports no suicidal ideation  Patient reports no homicidal ideation  Patient reports no self-injurious behavior  Patient reports no violent behavior    Exam (detailed: at least 9 elements; comprehensive: all 15 elements)   Constitutional  Vitals:  Most recent vital signs, dated less than 90 days prior to this appointment, were reviewed.    Vitals:    08/09/19 1145   BP: 128/69   Pulse: (!) 119   Weight: 74.7 kg (164 lb 12.7 oz)   Height: 5' 11" (1.803 m)      General:  age appropriate, well dressed, neatly groomed, late for appointment     Musculoskeletal  Muscle Strength/Tone:  no dyskinesia, no tremor   Gait & Station:  non-ataxic     Psychiatric  Speech:  not loud or pressured clearly audible   Mood:   Affect:  euthymic  congruent and appropriate   Thought Process:  goal-directed, logical   Associations:  intact   Thought Content:  No SI, HI, AH or VH, no delusions   Insight  Judgement:  fair, has awareness of illness  fair behavior appropriate for circumstances   Orientation:  grossly intact   Memory: intact for content of interview   Language: grossly intact   Attention Span & Concentration:  able to focus   Fund of Knowledge:  intact and appropriate to age and level of education     Assessment and Diagnosis   Status/Progress: Based on the examination today, the patient's problem(s) is/are well controlled.  New problems have not been presented today.   Comorbidities are complicating management of the primary condition.  There are no active rule-out diagnoses for this patient at this time.    General Impression: the patient is a 63 y.o. female who was diagnosed with bipolar disorder during her admission in 2012 with homicidal ideation towards her landlord.  She has a history of treatment for depression and anxiety.  Her past use of alprazolam has been problematic and she has been slowly tapered off of this medication.  She has had some flexing of her fingers possibly c/w TD.  She " transitioned from risperidone to Seroquel with good control.  Despite improving her weight and regular exercise she has DM II and eventually became depressed.  Trial of latuda was ended when she became N/C.  It was not restarted.  Thus far her depression remains reasonably well controlled.      Diagnosis:  Bipolar 2 disorder  Anxiety disorder not otherwise specified  type 2 diabetes  Tardive dyskinesia      Intervention/Counseling/Treatment Plan   · Increase Vistaril to 25 mg twice daily as needed for anxiety and 50 mg nightly for sleep  · Continue Depakote ER 1000 mg nightly  · Continue buspirone 30 mg twice daily  · Continue Lexapro 20 mg daily  · Continue to hold Latuda  · Discussed transfer of care to another provider      Return to Clinic: 4-6 weeks

## 2019-08-13 ENCOUNTER — OFFICE VISIT (OUTPATIENT)
Dept: OTOLARYNGOLOGY | Facility: CLINIC | Age: 64
End: 2019-08-13
Payer: COMMERCIAL

## 2019-08-13 ENCOUNTER — TELEPHONE (OUTPATIENT)
Dept: PSYCHIATRY | Facility: CLINIC | Age: 64
End: 2019-08-13

## 2019-08-13 VITALS — HEART RATE: 97 BPM | SYSTOLIC BLOOD PRESSURE: 100 MMHG | DIASTOLIC BLOOD PRESSURE: 70 MMHG

## 2019-08-13 DIAGNOSIS — J31.1 CHRONIC NASOPHARYNGITIS: Primary | ICD-10-CM

## 2019-08-13 DIAGNOSIS — R49.0 DYSPHONIA: ICD-10-CM

## 2019-08-13 PROCEDURE — 87075 CULTR BACTERIA EXCEPT BLOOD: CPT

## 2019-08-13 PROCEDURE — 31575 DIAGNOSTIC LARYNGOSCOPY: CPT | Mod: S$GLB,,, | Performed by: OTOLARYNGOLOGY

## 2019-08-13 PROCEDURE — 31575 LARYNGOSCOPY: ICD-10-PCS | Mod: S$GLB,,, | Performed by: OTOLARYNGOLOGY

## 2019-08-13 PROCEDURE — 99213 OFFICE O/P EST LOW 20 MIN: CPT | Mod: 25,S$GLB,, | Performed by: OTOLARYNGOLOGY

## 2019-08-13 PROCEDURE — 87186 SC STD MICRODIL/AGAR DIL: CPT

## 2019-08-13 PROCEDURE — 87070 CULTURE OTHR SPECIMN AEROBIC: CPT

## 2019-08-13 PROCEDURE — 87076 CULTURE ANAEROBE IDENT EACH: CPT

## 2019-08-13 PROCEDURE — 99999 PR PBB SHADOW E&M-EST. PATIENT-LVL III: CPT | Mod: PBBFAC,,, | Performed by: OTOLARYNGOLOGY

## 2019-08-13 PROCEDURE — 87077 CULTURE AEROBIC IDENTIFY: CPT

## 2019-08-13 PROCEDURE — 99213 PR OFFICE/OUTPT VISIT, EST, LEVL III, 20-29 MIN: ICD-10-PCS | Mod: 25,S$GLB,, | Performed by: OTOLARYNGOLOGY

## 2019-08-13 PROCEDURE — 99999 PR PBB SHADOW E&M-EST. PATIENT-LVL III: ICD-10-PCS | Mod: PBBFAC,,, | Performed by: OTOLARYNGOLOGY

## 2019-08-13 RX ORDER — HYDROXYZINE PAMOATE 25 MG/1
CAPSULE ORAL
Qty: 360 CAPSULE | Refills: 0 | Status: SHIPPED | OUTPATIENT
Start: 2019-08-13 | End: 2019-12-06 | Stop reason: SDUPTHER

## 2019-08-13 NOTE — PROCEDURES
Laryngoscopy  Date/Time: 8/13/2019 10:31 AM  Performed by: Lb Horta MD  Authorized by: Lb Horta MD     Consent Done?:  Yes (Verbal)  Anesthesia:     Local anesthetic:  Lidocaine 4% and Gerardo-Synephrine 1/2%    Patient tolerance:  Patient tolerated the procedure well with no immediate complications  Laryngoscopy:     Areas examined:  Nasopharynx, oropharynx, hypopharynx, larynx, vocal cords and nasal cavities    Laryngoscope size:  4 mm  Nose Intranasal:      Mucosa no polyps     No mucosa lesions present     No septum gross deformity     Turbinates not enlarged  Nasopharynx:      No mucosa lesions     Adenoids not present     Posterior choanae patent     Eustachian tube patent  Larynx/hypopharynx:      No epiglottis lesions     No epiglottis edema     No AE folds lesions     No vocal cord polyps     Equal and normal bilateral     No hypopharynx lesions     No piriform sinus pooling     No piriform sinus lesions     No post cricoid edema     No post cricoid erythema     Yellow mucopurulent exudate in nasopharynx.  Middle meatus clear bilaterally.  Larynx with incomplete glottic closure, no nodules.

## 2019-08-14 NOTE — PROGRESS NOTES
Subjective:      Alix is a 63 y.o. female who comes for follow-up of postnasal drip.  Her last visit with me was on 5/15/2018.  For past year has ongoing drip, spits out large chunks of yellow mucus, voice constantly hoarse, saw Dr. Curry and due for f/u.  Chronic cough, daily nasal blockage bilaterally.    SNOT-22 score = 57, NOSE score = 90%, ETDQ-7 score = 5.0    The patient's medications, allergies, past medical, surgical, social and family histories were reviewed and updated as appropriate.    A detailed review of systems was obtained with pertinent positives as per the above HPI, and otherwise negative.        Objective:     /70   Pulse 97        Constitutional:   She appears well-developed. She is cooperative. Hoarse voice.      Head:  Normocephalic.     Nose:  No mucosal edema, rhinorrhea, septal deviation or polyps. No epistaxis. Turbinates normal, no turbinate masses and no turbinate hypertrophy.  Right sinus exhibits no maxillary sinus tenderness and no frontal sinus tenderness. Left sinus exhibits no maxillary sinus tenderness and no frontal sinus tenderness.     Mouth/Throat  Oropharynx clear and moist without lesions or asymmetry. No oropharyngeal exudate or posterior oropharyngeal erythema.     Neck:  No adenopathy. Normal range of motion present.     She has no cervical adenopathy.       Procedure    Flexible laryngoscopy performed.  See procedure note.     Left MT     Left nasopharynx      Right MT     Right nasopharynx     Larynx with incomplete glottic closure        Data Reviewed    WBC (K/uL)   Date Value   10/18/2018 5.49     Eosinophil% (%)   Date Value   10/18/2018 1.8     Eos # (K/uL)   Date Value   10/18/2018 0.1     Platelets (K/uL)   Date Value   10/18/2018 198     Glucose (mg/dL)   Date Value   10/18/2018 294 (H)     No results found for: IGE    No sinus imaging available.      Assessment:     1. Chronic nasopharyngitis    2. Dysphonia         Plan:     Cultures taken, will  Rx topical antibiotics in Nasoneb device based on results.  May consider curettage as last resort.  F/U Dr. Curry as discussed.  Follow up for test results.

## 2019-08-16 LAB
BACTERIA SPEC AEROBE CULT: ABNORMAL
BACTERIA SPEC AEROBE CULT: ABNORMAL

## 2019-08-17 ENCOUNTER — OFFICE VISIT (OUTPATIENT)
Dept: URGENT CARE | Facility: CLINIC | Age: 64
End: 2019-08-17
Payer: COMMERCIAL

## 2019-08-17 VITALS
OXYGEN SATURATION: 100 % | WEIGHT: 161 LBS | BODY MASS INDEX: 22.54 KG/M2 | SYSTOLIC BLOOD PRESSURE: 109 MMHG | HEART RATE: 107 BPM | TEMPERATURE: 98 F | RESPIRATION RATE: 16 BRPM | DIASTOLIC BLOOD PRESSURE: 78 MMHG | HEIGHT: 71 IN

## 2019-08-17 DIAGNOSIS — R30.0 DYSURIA: Primary | ICD-10-CM

## 2019-08-17 DIAGNOSIS — N76.1 SUBACUTE VAGINITIS: ICD-10-CM

## 2019-08-17 DIAGNOSIS — E11.65 CONTROLLED TYPE 2 DIABETES MELLITUS WITH HYPERGLYCEMIA, WITHOUT LONG-TERM CURRENT USE OF INSULIN: ICD-10-CM

## 2019-08-17 LAB
BILIRUB UR QL STRIP: NEGATIVE
GLUCOSE SERPL-MCNC: 173 MG/DL (ref 70–110)
GLUCOSE UR QL STRIP: POSITIVE
KETONES UR QL STRIP: NEGATIVE
LEUKOCYTE ESTERASE UR QL STRIP: POSITIVE
PH, POC UA: 5.5 (ref 5–8)
POC BLOOD, URINE: POSITIVE
POC NITRATES, URINE: NEGATIVE
PROT UR QL STRIP: NEGATIVE
SP GR UR STRIP: 1 (ref 1–1.03)
UROBILINOGEN UR STRIP-ACNC: NORMAL (ref 0.1–1.1)

## 2019-08-17 PROCEDURE — 99214 PR OFFICE/OUTPT VISIT, EST, LEVL IV, 30-39 MIN: ICD-10-PCS | Mod: S$GLB,,, | Performed by: EMERGENCY MEDICINE

## 2019-08-17 PROCEDURE — 99214 OFFICE O/P EST MOD 30 MIN: CPT | Mod: S$GLB,,, | Performed by: EMERGENCY MEDICINE

## 2019-08-17 PROCEDURE — 87086 URINE CULTURE/COLONY COUNT: CPT

## 2019-08-17 PROCEDURE — 3008F PR BODY MASS INDEX (BMI) DOCUMENTED: ICD-10-PCS | Mod: CPTII,S$GLB,, | Performed by: EMERGENCY MEDICINE

## 2019-08-17 PROCEDURE — 87077 CULTURE AEROBIC IDENTIFY: CPT

## 2019-08-17 PROCEDURE — 82962 GLUCOSE BLOOD TEST: CPT | Mod: S$GLB,,, | Performed by: EMERGENCY MEDICINE

## 2019-08-17 PROCEDURE — 87088 URINE BACTERIA CULTURE: CPT

## 2019-08-17 PROCEDURE — 81003 URINALYSIS AUTO W/O SCOPE: CPT | Mod: QW,S$GLB,, | Performed by: EMERGENCY MEDICINE

## 2019-08-17 PROCEDURE — 3008F BODY MASS INDEX DOCD: CPT | Mod: CPTII,S$GLB,, | Performed by: EMERGENCY MEDICINE

## 2019-08-17 PROCEDURE — 81003 POCT URINALYSIS, DIPSTICK, AUTOMATED, W/O SCOPE: ICD-10-PCS | Mod: QW,S$GLB,, | Performed by: EMERGENCY MEDICINE

## 2019-08-17 PROCEDURE — 87186 SC STD MICRODIL/AGAR DIL: CPT

## 2019-08-17 PROCEDURE — 82962 POCT GLUCOSE, HAND-HELD DEVICE: ICD-10-PCS | Mod: S$GLB,,, | Performed by: EMERGENCY MEDICINE

## 2019-08-17 RX ORDER — PHENAZOPYRIDINE HYDROCHLORIDE 200 MG/1
200 TABLET, FILM COATED ORAL 3 TIMES DAILY PRN
Qty: 6 TABLET | Refills: 0 | Status: SHIPPED | OUTPATIENT
Start: 2019-08-17 | End: 2019-08-17 | Stop reason: SDUPTHER

## 2019-08-17 RX ORDER — SULFAMETHOXAZOLE AND TRIMETHOPRIM 800; 160 MG/1; MG/1
1 TABLET ORAL 2 TIMES DAILY
Qty: 6 TABLET | Refills: 0 | Status: SHIPPED | OUTPATIENT
Start: 2019-08-17 | End: 2019-08-20

## 2019-08-17 RX ORDER — FLUCONAZOLE 150 MG/1
150 TABLET ORAL ONCE
Qty: 1 TABLET | Refills: 0 | Status: SHIPPED | OUTPATIENT
Start: 2019-08-17 | End: 2019-08-17 | Stop reason: SDUPTHER

## 2019-08-17 RX ORDER — PHENAZOPYRIDINE HYDROCHLORIDE 200 MG/1
200 TABLET, FILM COATED ORAL 3 TIMES DAILY PRN
Qty: 6 TABLET | Refills: 0 | Status: SHIPPED | OUTPATIENT
Start: 2019-08-17 | End: 2019-08-19

## 2019-08-17 RX ORDER — FLUCONAZOLE 150 MG/1
150 TABLET ORAL ONCE
Qty: 1 TABLET | Refills: 0 | Status: SHIPPED | OUTPATIENT
Start: 2019-08-17 | End: 2019-08-17

## 2019-08-17 RX ORDER — SULFAMETHOXAZOLE AND TRIMETHOPRIM 800; 160 MG/1; MG/1
1 TABLET ORAL 2 TIMES DAILY
Qty: 6 TABLET | Refills: 0 | Status: SHIPPED | OUTPATIENT
Start: 2019-08-17 | End: 2019-08-17 | Stop reason: SDUPTHER

## 2019-08-17 NOTE — PROGRESS NOTES
"Subjective:       Patient ID: Alix Patel is a 63 y.o. female.    Vitals:  height is 5' 11" (1.803 m) and weight is 73 kg (161 lb). Her temperature is 97.5 °F (36.4 °C). Her blood pressure is 109/78 and her pulse is 107. Her respiration is 16 and oxygen saturation is 100%.     Chief Complaint: Urinary Tract Infection    Patient is having UTI symptoms since last Sunday. Symptoms got worse yesterday. She took AZO with no relief. States has urinary urgency and discomfort, urine smells "yeasty" with small amt vag DC, no fever, OK with bactrim/pyridium/diflucan, NOC.    Urinary Tract Infection    This is a new problem. The current episode started 1 to 4 weeks ago. The problem has been gradually worsening. The quality of the pain is described as burning and stabbing. The pain is at a severity of 10/10. The pain is moderate. Associated symptoms include chills, a discharge, flank pain, frequency, sweats and urgency. Pertinent negatives include no behavior changes, hematuria, hesitancy, nausea, possible pregnancy, vomiting, weight loss, bubble bath use, constipation, rash or withholding. Treatments tried: AZO. The treatment provided no relief. Her past medical history is significant for diabetes mellitus.       Constitution: Positive for chills and sweating.   Neck: Negative for painful lymph nodes.   Gastrointestinal: Negative for nausea, vomiting and constipation.   Genitourinary: Positive for dysuria, frequency, urgency, flank pain and vaginal discharge. Negative for urine decreased, hematuria, history of kidney stones, painful menstruation, irregular menstruation, missed menses, heavy menstrual bleeding, ovarian cysts, genital trauma, vaginal pain, vaginal bleeding, vaginal odor, painful intercourse, genital sore, painful ejaculation and pelvic pain.   Musculoskeletal: Negative for back pain.   Skin: Negative for rash and lesion.   Hematologic/Lymphatic: Negative for swollen lymph nodes.       Objective:    "   Physical Exam   Constitutional: She is oriented to person, place, and time. She appears well-developed and well-nourished.   HENT:   Head: Normocephalic and atraumatic.   Cardiovascular: Normal rate, regular rhythm and normal heart sounds.   Pulmonary/Chest: Breath sounds normal.   Abdominal: Soft. There is no tenderness.   Right CVAT to palp.  No left CVAT/bladder tenderness to palp   Genitourinary:   Genitourinary Comments: Deferred   Musculoskeletal: Normal range of motion.   Neurological: She is alert and oriented to person, place, and time.   Skin: Skin is warm and dry.   Psychiatric: She has a normal mood and affect. Her behavior is normal.       Assessment:       1. Dysuria    2. Subacute vaginitis    3. Controlled type 2 diabetes mellitus with hyperglycemia, without long-term current use of insulin        Plan:         Dysuria  -     POCT Urinalysis, Dipstick, Automated, W/O Scope  -     Urine culture  -     Discontinue: sulfamethoxazole-trimethoprim 800-160mg (BACTRIM DS) 800-160 mg Tab; Take 1 tablet by mouth 2 (two) times daily. for 3 days  Dispense: 6 tablet; Refill: 0  -     Discontinue: phenazopyridine (PYRIDIUM) 200 MG tablet; Take 1 tablet (200 mg total) by mouth 3 (three) times daily as needed for Pain.  Dispense: 6 tablet; Refill: 0  -     phenazopyridine (PYRIDIUM) 200 MG tablet; Take 1 tablet (200 mg total) by mouth 3 (three) times daily as needed for Pain.  Dispense: 6 tablet; Refill: 0  -     sulfamethoxazole-trimethoprim 800-160mg (BACTRIM DS) 800-160 mg Tab; Take 1 tablet by mouth 2 (two) times daily. for 3 days  Dispense: 6 tablet; Refill: 0    Subacute vaginitis  -     Discontinue: fluconazole (DIFLUCAN) 150 MG Tab; Take 1 tablet (150 mg total) by mouth once. Take one time as needed for vaginal yeast infection for 1 dose  Dispense: 1 tablet; Refill: 0  -     fluconazole (DIFLUCAN) 150 MG Tab; Take 1 tablet (150 mg total) by mouth once. Take one time as needed for vaginal yeast infection  for 1 dose  Dispense: 1 tablet; Refill: 0    Controlled type 2 diabetes mellitus with hyperglycemia, without long-term current use of insulin  -     POCT Glucose, Hand-Held Device        Caleb Ayoub MD  Go to the Emergency Department for any problems  Call your PCP for follow up next available.

## 2019-08-17 NOTE — PATIENT INSTRUCTIONS
Caleb Ayoub MD  Go to the Emergency Department for any problems  Call your PCP for follow up next available.    Diet: Diabetes  Food is an important tool that you can use to control diabetes and stay healthy. Eating well-balanced meals in the correct amounts will help you control your blood glucose levels and prevent low blood sugar reactions. It will also help you reduce the health risks of diabetes. There is no one specific diet that is right for everyone with diabetes. But there are general guidelines to follow. A registered dietitian (CHARITY) will create a tailored diet approach thats just right for you. He or she will also help you plan healthy meals and snacks. If you have any questions, call your dietitian for advice.     Guidelines for success  Talk with your healthcare provider before starting a diabetes diet or weight loss program. If you haven't talked with a dietitian yet, ask your provider for a referral. The following guidelines can help you succeed:  · Select foods from the 6 food groups below. Your dietitian will help you find food choices within each group. He or she will also show you serving sizes and how many servings you can have at each meal.  ¨ Grains, beans, and starchy vegetables  ¨ Vegetables  ¨ Fruit  ¨ Milk or yogurt  ¨ Meat, poultry, fish, or tofu  ¨ Healthy fats  · Check your blood sugar levels as directed by your provider. Take any medicine as prescribed by your provider.  · Learn to read food labels and pick the right portion sizes.  · Eat only the amount of food in your meal plan. Eat about the same amount of food at regular times each day. Dont skip meals. Eat meals 4 to 5 hours apart, with snacks in between.  · Limit alcohol. It raises blood sugar levels. Drink water or calorie-free diet drinks that use safe sweeteners.  · Eat less fat to help lower your risk of heart disease. Use nonfat or low-fat dairy products and lean meats. Avoid fried foods. Use cooking oils that are  unsaturated, such as olive, canola, or peanut oil.  · Talk with your dietitian about safe sugar substitutes.  · Avoid added salt. It can contribute to high blood pressure, which can cause heart disease. People with diabetes already have a risk of high blood pressure and heart disease.  · Stay at a healthy weight. If you need to lose weight, cut down on your portion sizes. But dont skip meals. Exercise is an important part of any weight management program. Talk with your provider about an exercise program thats right for you.  · For more information about the best diet plan for you, talk with a registered dietitian (RD). To find an RD in your area, contact:  ¨ Academy of Nutrition and Dietetics www.eatright.org  ¨ The American Diabetes Association 827-610-6672 www.diabetes.org  Date Last Reviewed: 8/1/2016  © 5374-7749 [employer]. [address] [place] [address]. All rights reserved. This information is not intended as a substitute for professional medical care. Always follow your healthcare professional's instructions.        Dysuria with Uncertain Cause (Adult)    The urethra is the tube that allows urine to pass out of the body. In a woman, the urethra is the opening above the vagina. In men, the urethra is the opening on the tip of the penis. Dysuria is the feeling of pain or burning in the urethra when passing urine.  Dysuria can be caused by anything that irritates or inflames the urethra. An infection or chemical irritation can cause this reaction. A bladder infection is the most common cause of dysuria in adults. A urine test can diagnose this. A bladder infection needs antibiotic treatment.  Soaps, lotions, colognes and feminine hygiene products can cause dysuria. So can birth control jellies, creams, and foams. It will go away 1 to 3 days after using these irritants.  Sexually transmitted diseases (STDs) such as chlamydia or gonorrhea can cause dysuria. Your healthcare provider  may take a culture sample. Your provider may start you on antibiotic medicine before the culture test returns.  In women who have gone through menopause, dysuria can be from dryness in the lining of the urethra. This can be treated with hormones. Dysuria becomes long-term (chronic) when it lasts for weeks or months. You may need to see a specialist (urologist) to diagnose and treat chronic dysuria.  Home care  These home care tips may help:  · Don't use any chemicals or products that you think may be causing your symptoms.  · If you were given a prescription medicine, take as directed. Be sure to take it until it is all used up.  · If a culture was taken, don't have sex until you have been told that it is negative. This means you don't have an infection. Then follow your healthcare provider's advice to treat your condition.  If a culture was done and it is positive:  · Both you and your sexual partner may need to be treated. This is true even if your partner has no symptoms.  · Contact your healthcare provider or go to an urgent care clinic or the public health department to be looked at and treated.  · Don't have sex until both you and your partner(s) have finished all antibiotics and your healthcare provider says you are no longer contagious.  · Learn about and use safe sex practices. The safest sex is with a partner who has tested negative and only has sex with you. Condoms can prevent STDs from spreading, but they aren't a guarantee.  Follow-up care  Follow up with your healthcare provider, or as advised. If a culture was taken, you may call as directed for the results. If you have an STD, follow up with your provider or the public health department for a complete STD screening, including HIV testing. For more information, contact CDC-INFO at 143-973-3162.  When to seek medical advice  Call your healthcare provider right away if any of these occur:  · You aren't better after 3 days of treatment  · Fever of  100.4ºF (38ºC) or higher, or as directed by your healthcare provider  · Back or belly pain that gets worse  · You can't urinate because of pain  · New discharge from the urethra, vagina, or penis  · Painful sores on the penis  · Rash or joint pain  · Painful lumps (lymph nodes) in the groin  · Testicle pain or swelling of the scrotum  Date Last Reviewed: 11/1/2016 © 2000-2017 [employer]. [address] [place] [address]. All rights reserved. This information is not intended as a substitute for professional medical care. Always follow your healthcare professional's instructions.        Preventing Vaginitis     Use mild, unscented soap when you bathe or shower to avoid irritating your vagina.    Vaginitis is irritation or infection of the vagina or vulva (the outside opening of the vagina). Vaginitis can be caused by bacteria, viruses, parasites, or yeast. Chemicals (such as in perfumes or soaps or in spermicides) can sometimes be a cause. Vaginitis can be caused by hormone changes in pregnancy or with menopause. You can help prevent vaginitis. Follow the tips below. And see your healthcare provider if you have any symptoms.  Hygiene  · Avoid chemicals. Do not use vaginal sprays. Do not use scented toilet paper or tampons that are scented. Sprays and scents have chemicals that can irritate your vagina.  · Do not douche unless you are told to by your healthcare provider. Douching is rarely needed. And it upsets the normal balance in the vagina.  · Wash yourself well. Wash the outer vaginal area (vulva) every day with mild, unscented soap. Keep it as dry as possible.  · Wipe correctly. Make sure to wipe from front to back after a bowel movement. This helps keep from spreading bacteria from your anus to your vagina.  · Change your tampon often. During your period, make sure to change your tampon as often as directed on the package. This allows the normal flow of vaginal discharge and  blood.  Lifestyle  · Limit your number of sexual partners. The more partners you have, the greater your risk of infection. Using condoms helps reduce your risk.  · Get enough sleep. Sleep helps keep your bodys immune system healthy. This helps you fight infection.  · Lose weight, if needed. Excess weight can reduce air circulation around your vagina. This can increase your risk of infection.  · Exercise regularly. Regular activity helps keep your body healthy.  · Take antibiotics only as directed. Antibiotics can change the normal chemical balance in the vagina.    Clothing  · Dont sit in wet clothes. Yeast thrives when its warm and damp.  · Dont wear tight pants. And dont wear tights, leggings, or hose without a cotton crotch. These types of clothing trap warmth and moisture.  · Wear cotton underwear. Cotton lets air circulate around the vagina.  Symptoms of vaginitis  · Irritation, swelling, or itching of the genital area  · Vaginal discharge  · Bad vaginal odor  · Pain or burning during urination   Date Last Reviewed: 12/1/2016 © 2000-2017 [employer]. [address] [place] [address], [place] [address]. All rights reserved. This information is not intended as a substitute for professional medical care. Always follow your healthcare professional's instructions.

## 2019-08-17 NOTE — LETTER
August 17, 2019      [facility]  [facility] [address]  Phone: [phone]  Fax: [phone]       Patient: Alix Patel   YOB: 1955  Date of Visit: 08/17/2019    To Whom It May Concern:    Heath Patel  was at [facility] on 08/17/2019. She may return to work/school on 8/19/19, earlier if feels better with no restrictions. If you have any questions or concerns, or if I can be of further assistance, please do not hesitate to contact me.    Sincerely,          Caleb Ayoub MD

## 2019-08-19 LAB
BACTERIA SPEC ANAEROBE CULT: NORMAL
BACTERIA UR CULT: ABNORMAL

## 2019-08-20 ENCOUNTER — TELEPHONE (OUTPATIENT)
Dept: URGENT CARE | Facility: CLINIC | Age: 64
End: 2019-08-20

## 2019-08-20 ENCOUNTER — TELEPHONE (OUTPATIENT)
Dept: OTOLARYNGOLOGY | Facility: CLINIC | Age: 64
End: 2019-08-20

## 2019-08-21 ENCOUNTER — TELEPHONE (OUTPATIENT)
Dept: OTOLARYNGOLOGY | Facility: CLINIC | Age: 64
End: 2019-08-21

## 2019-08-21 NOTE — TELEPHONE ENCOUNTER
The sinus swab showed 2 bacteria.  I'm calling in a topical antibiotic, levofloxacin, for use in a Nasoneb devise.  I'll send a Rx to Prof Hylton.  Please let her know.  
Take Motrin 400-600mg every 6 hrs as needed for pain. Take with food  Follow up with PMD if any changes in symptoms.

## 2019-08-22 ENCOUNTER — TELEPHONE (OUTPATIENT)
Dept: PSYCHIATRY | Facility: CLINIC | Age: 64
End: 2019-08-22

## 2019-08-22 ENCOUNTER — LAB VISIT (OUTPATIENT)
Dept: LAB | Facility: HOSPITAL | Age: 64
End: 2019-08-22
Attending: PSYCHIATRY & NEUROLOGY
Payer: COMMERCIAL

## 2019-08-22 ENCOUNTER — TELEPHONE (OUTPATIENT)
Dept: OTOLARYNGOLOGY | Facility: CLINIC | Age: 64
End: 2019-08-22

## 2019-08-22 DIAGNOSIS — Z79.899 ENCOUNTER FOR LONG-TERM (CURRENT) USE OF MEDICATIONS: ICD-10-CM

## 2019-08-22 DIAGNOSIS — F31.81 BIPOLAR II DISORDER: Primary | ICD-10-CM

## 2019-08-22 LAB
ALBUMIN SERPL BCP-MCNC: 4.3 G/DL (ref 3.5–5.2)
ALP SERPL-CCNC: 90 U/L (ref 55–135)
ALT SERPL W/O P-5'-P-CCNC: 28 U/L (ref 10–44)
ANION GAP SERPL CALC-SCNC: 10 MMOL/L (ref 8–16)
AST SERPL-CCNC: 32 U/L (ref 10–40)
BILIRUB SERPL-MCNC: 0.4 MG/DL (ref 0.1–1)
BUN SERPL-MCNC: 14 MG/DL (ref 8–23)
CALCIUM SERPL-MCNC: 10.1 MG/DL (ref 8.7–10.5)
CHLORIDE SERPL-SCNC: 100 MMOL/L (ref 95–110)
CO2 SERPL-SCNC: 28 MMOL/L (ref 23–29)
CREAT SERPL-MCNC: 0.8 MG/DL (ref 0.5–1.4)
ERYTHROCYTE [DISTWIDTH] IN BLOOD BY AUTOMATED COUNT: 14.6 % (ref 11.5–14.5)
EST. GFR  (AFRICAN AMERICAN): >60 ML/MIN/1.73 M^2
EST. GFR  (NON AFRICAN AMERICAN): >60 ML/MIN/1.73 M^2
GLUCOSE SERPL-MCNC: 222 MG/DL (ref 70–110)
HCT VFR BLD AUTO: 40 % (ref 37–48.5)
HGB BLD-MCNC: 12.9 G/DL (ref 12–16)
MCH RBC QN AUTO: 30.4 PG (ref 27–31)
MCHC RBC AUTO-ENTMCNC: 32.3 G/DL (ref 32–36)
MCV RBC AUTO: 94 FL (ref 82–98)
PLATELET # BLD AUTO: 228 K/UL (ref 150–350)
PMV BLD AUTO: 11.4 FL (ref 9.2–12.9)
POTASSIUM SERPL-SCNC: 4.4 MMOL/L (ref 3.5–5.1)
PROT SERPL-MCNC: 7.6 G/DL (ref 6–8.4)
RBC # BLD AUTO: 4.24 M/UL (ref 4–5.4)
SODIUM SERPL-SCNC: 138 MMOL/L (ref 136–145)
VALPROATE SERPL-MCNC: <12.5 UG/ML (ref 50–100)
WBC # BLD AUTO: 5.27 K/UL (ref 3.9–12.7)

## 2019-08-22 PROCEDURE — 80164 ASSAY DIPROPYLACETIC ACD TOT: CPT

## 2019-08-22 PROCEDURE — 85027 COMPLETE CBC AUTOMATED: CPT

## 2019-08-22 PROCEDURE — 80053 COMPREHEN METABOLIC PANEL: CPT

## 2019-08-22 PROCEDURE — 36415 COLL VENOUS BLD VENIPUNCTURE: CPT

## 2019-08-22 RX ORDER — DIVALPROEX SODIUM 500 MG/1
2000 TABLET, FILM COATED, EXTENDED RELEASE ORAL NIGHTLY
Qty: 360 TABLET | Refills: 0 | Status: SHIPPED | OUTPATIENT
Start: 2019-08-22 | End: 2019-11-24 | Stop reason: SDUPTHER

## 2019-08-22 NOTE — TELEPHONE ENCOUNTER
Spoke patient yesterday who reported she was not sleeping very well despite being on 1500 mg of Depakote nightly.  She requested increase in dose.  She had valproic acid level drawn along with other labs this morning and it was below levels of detection.  She admits to having taken only 500 mg last night.  We agreed to increase the dose to 2000 mg nightly.  Instructed patient to get a repeat level in 5 days.

## 2019-08-22 NOTE — TELEPHONE ENCOUNTER
----- Message from Suzy Callahan MA sent at 8/21/2019  3:47 PM CDT -----  Contact: Patient [phone] or [phone]  Pt ask if you can please give her a call concerning her Medication. Pt ask if you can increase her Medication and change the dosage. because she is not sleeping at all.  divalproex ER (DEPAKOTE) 500 MG Tb24

## 2019-08-26 ENCOUNTER — OFFICE VISIT (OUTPATIENT)
Dept: GASTROENTEROLOGY | Facility: CLINIC | Age: 64
End: 2019-08-26
Payer: COMMERCIAL

## 2019-08-26 ENCOUNTER — TELEPHONE (OUTPATIENT)
Dept: URGENT CARE | Facility: CLINIC | Age: 64
End: 2019-08-26

## 2019-08-26 ENCOUNTER — OFFICE VISIT (OUTPATIENT)
Dept: OTOLARYNGOLOGY | Facility: CLINIC | Age: 64
End: 2019-08-26
Payer: COMMERCIAL

## 2019-08-26 ENCOUNTER — TELEPHONE (OUTPATIENT)
Dept: ENDOSCOPY | Facility: HOSPITAL | Age: 64
End: 2019-08-26

## 2019-08-26 VITALS
WEIGHT: 161 LBS | SYSTOLIC BLOOD PRESSURE: 123 MMHG | BODY MASS INDEX: 22.45 KG/M2 | HEART RATE: 100 BPM | DIASTOLIC BLOOD PRESSURE: 77 MMHG

## 2019-08-26 VITALS
HEIGHT: 71 IN | BODY MASS INDEX: 22.59 KG/M2 | WEIGHT: 161.38 LBS | DIASTOLIC BLOOD PRESSURE: 77 MMHG | RESPIRATION RATE: 16 BRPM | HEART RATE: 100 BPM | SYSTOLIC BLOOD PRESSURE: 123 MMHG

## 2019-08-26 DIAGNOSIS — R49.0 DYSPHONIA: Primary | ICD-10-CM

## 2019-08-26 DIAGNOSIS — Z12.11 COLON CANCER SCREENING: ICD-10-CM

## 2019-08-26 DIAGNOSIS — J38.4 PRE-NODULAR EDEMA OF THE VOCAL FOLDS: ICD-10-CM

## 2019-08-26 DIAGNOSIS — R13.14 PHARYNGOESOPHAGEAL DYSPHAGIA: ICD-10-CM

## 2019-08-26 DIAGNOSIS — K21.9 GASTROESOPHAGEAL REFLUX DISEASE WITHOUT ESOPHAGITIS: Primary | ICD-10-CM

## 2019-08-26 DIAGNOSIS — Z12.11 SPECIAL SCREENING FOR MALIGNANT NEOPLASMS, COLON: Primary | ICD-10-CM

## 2019-08-26 DIAGNOSIS — Z86.010 HX OF COLONIC POLYPS: ICD-10-CM

## 2019-08-26 DIAGNOSIS — J38.5 LARYNGEAL SPASM: ICD-10-CM

## 2019-08-26 PROCEDURE — 3008F PR BODY MASS INDEX (BMI) DOCUMENTED: ICD-10-PCS | Mod: CPTII,S$GLB,, | Performed by: PHYSICIAN ASSISTANT

## 2019-08-26 PROCEDURE — 99999 PR PBB SHADOW E&M-EST. PATIENT-LVL IV: ICD-10-PCS | Mod: PBBFAC,,, | Performed by: PHYSICIAN ASSISTANT

## 2019-08-26 PROCEDURE — 99213 PR OFFICE/OUTPT VISIT, EST, LEVL III, 20-29 MIN: ICD-10-PCS | Mod: 25,S$GLB,, | Performed by: OTOLARYNGOLOGY

## 2019-08-26 PROCEDURE — 99999 PR PBB SHADOW E&M-EST. PATIENT-LVL IV: CPT | Mod: PBBFAC,,, | Performed by: PHYSICIAN ASSISTANT

## 2019-08-26 PROCEDURE — 99204 PR OFFICE/OUTPT VISIT, NEW, LEVL IV, 45-59 MIN: ICD-10-PCS | Mod: S$GLB,,, | Performed by: PHYSICIAN ASSISTANT

## 2019-08-26 PROCEDURE — 31579 PR LARYNGOSCOPY, FLEX/RIGID TELESCOPIC, W/STROBOSCOPY: ICD-10-PCS | Mod: S$GLB,,, | Performed by: OTOLARYNGOLOGY

## 2019-08-26 PROCEDURE — 99213 OFFICE O/P EST LOW 20 MIN: CPT | Mod: 25,S$GLB,, | Performed by: OTOLARYNGOLOGY

## 2019-08-26 PROCEDURE — 99204 OFFICE O/P NEW MOD 45 MIN: CPT | Mod: S$GLB,,, | Performed by: PHYSICIAN ASSISTANT

## 2019-08-26 PROCEDURE — 3008F BODY MASS INDEX DOCD: CPT | Mod: CPTII,S$GLB,, | Performed by: OTOLARYNGOLOGY

## 2019-08-26 PROCEDURE — 99999 PR PBB SHADOW E&M-EST. PATIENT-LVL III: CPT | Mod: PBBFAC,,, | Performed by: OTOLARYNGOLOGY

## 2019-08-26 PROCEDURE — 3008F PR BODY MASS INDEX (BMI) DOCUMENTED: ICD-10-PCS | Mod: CPTII,S$GLB,, | Performed by: OTOLARYNGOLOGY

## 2019-08-26 PROCEDURE — 99999 PR PBB SHADOW E&M-EST. PATIENT-LVL III: ICD-10-PCS | Mod: PBBFAC,,, | Performed by: OTOLARYNGOLOGY

## 2019-08-26 PROCEDURE — 31579 LARYNGOSCOPY TELESCOPIC: CPT | Mod: S$GLB,,, | Performed by: OTOLARYNGOLOGY

## 2019-08-26 PROCEDURE — 3008F BODY MASS INDEX DOCD: CPT | Mod: CPTII,S$GLB,, | Performed by: PHYSICIAN ASSISTANT

## 2019-08-26 RX ORDER — POLYETHYLENE GLYCOL 3350, SODIUM SULFATE ANHYDROUS, SODIUM BICARBONATE, SODIUM CHLORIDE, POTASSIUM CHLORIDE 236; 22.74; 6.74; 5.86; 2.97 G/4L; G/4L; G/4L; G/4L; G/4L
4 POWDER, FOR SOLUTION ORAL ONCE
Qty: 4000 ML | Refills: 0 | Status: SHIPPED | OUTPATIENT
Start: 2019-08-26 | End: 2019-08-26

## 2019-08-26 RX ORDER — PANTOPRAZOLE SODIUM 40 MG/1
40 TABLET, DELAYED RELEASE ORAL DAILY
Qty: 30 TABLET | Refills: 3 | Status: SHIPPED | OUTPATIENT
Start: 2019-08-26 | End: 2019-12-04 | Stop reason: SDUPTHER

## 2019-08-26 NOTE — PROGRESS NOTES
[facility] Consultation Note    Reason for Consult:  The primary encounter diagnosis was Gastroesophageal reflux disease without esophagitis. Diagnoses of Colon cancer screening, Hx of colonic polyps, and Pharyngoesophageal dysphagia were also pertinent to this visit.    PCP:   Angélica Barakat   No address on file    Referring MD:  Aaareferral Self  No address on file    HPI:  This is a 63 y.o. female here for evaluation of GERD   Duration- 1 yr  + regurg   +hoarseness, + Pnd  + dysphagia x 6 months - food, wine, pills  No N, V, abd pain    Acid intake: tomatoes, radames, carbonated water    More frequent constipation since starting the ideal protein diet  Colonoscopy due in Dec 2019, history of colon polyps    Currently being treated for a sinus infection, experiencing some dizziness    ROS:  Constitutional: No fevers, chills, No weight loss  ENT: No allergies  CV: No chest pain  Pulm: No cough, No shortness of breath  Ophtho: No vision changes  GI: see HPI  Derm: No rash  Heme: No lymphadenopathy, No bruising  MSK: No arthritis  : No dysuria, No hematuria  Endo: No hot or cold intolerance  Neuro: No syncope, No seizure  Psych: No anxiety, No depression    Medical History:  has a past medical history of Anxiety, Anxiety, Bipolar 1 disorder, Depression, Diabetes mellitus, GERD (gastroesophageal reflux disease), HEARING LOSS, Insomnia, and Rash.    Surgical History:  has a past surgical history that includes Appendectomy; Hysterectomy; Colonoscopy; and Cyst Removal.    Family History: family history includes Alzheimer's disease in her mother; Heart failure in her father; No Known Problems in her sister; Sarcoidosis in her sister; Sinus disease in her mother..     Social History:  reports that she quit smoking about 15 years ago. Her smoking use included cigarettes. She has a 8.50 pack-year smoking history. She has never used smokeless tobacco. She reports that she drinks alcohol. She  "reports that she does not use drugs.    Review of patient's allergies indicates:   Allergen Reactions    Lamictal [lamotrigine] Rash     Per psychiatry notes       Current Outpatient Medications on File Prior to Visit   Medication Sig Dispense Refill    busPIRone (BUSPAR) 15 MG tablet Take 2 tablets (30 mg total) by mouth 2 (two) times daily. 360 tablet 0    CRESTOR 20 mg tablet Take 20 mg by mouth once daily.   3    cyclobenzaprine (FLEXERIL) 10 MG tablet Take 10 mg by mouth daily as needed.       diclofenac (CATAFLAM) 50 MG tablet TK 1 T PO  TID PRN P  2    divalproex ER (DEPAKOTE) 500 MG Tb24 Take 4 tablets (2,000 mg total) by mouth every evening. 360 tablet 0    escitalopram oxalate (LEXAPRO) 20 MG tablet Take 1 tablet (20 mg total) by mouth once daily. 90 tablet 0    gabapentin (NEURONTIN) 300 MG capsule TK ONE C PO  TID  2    hydrOXYzine pamoate (VISTARIL) 25 MG Cap Take one twice daily as needed for anxiety and one or two at bedtime as needed for sleep. 360 capsule 0    levothyroxine (SYNTHROID) 100 MCG tablet Take 100 mcg by mouth once daily.   3    metformin (GLUCOPHAGE) 500 MG tablet Take 500 mg by mouth.      ONETOUCH DELICA LANCETS 30 gauge Misc TEST BID UTD  0    ONETOUCH ULTRA TEST Strp TEST BID UTD.  0    traMADol (ULTRAM) 50 mg tablet TK 1 TABLET PO EVERY 4 TO 6 HOURS  0     No current facility-administered medications on file prior to visit.          Objective Findings:    Vital Signs:  /77 (BP Location: Left arm, Patient Position: Sitting)   Pulse 100   Resp 16   Ht 5' 11" (1.803 m)   Wt 73.2 kg (161 lb 6 oz)   BMI 22.51 kg/m²   Body mass index is 22.51 kg/m².    Physical Exam:  General Appearance: Well appearing in no acute distress  Head:   Normocephalic, without obvious abnormality  Eyes:    No scleral icterus  ENT: Neck supple, Lips, mucosa, and tongue normal  Lungs: CTA bilaterally in anterior and posterior fields, no wheezes, no crackles.  Heart:  Regular rate and " rhythm, S1, S2 normal, no murmurs heard  Abdomen: Soft, mild RLQ tenderness, non distended with positive bowel sounds in all four quadrants.   Extremities: no edema  Skin: No rash  Neurologic: AAO x 3      Labs:  Lab Results   Component Value Date    WBC 5.27 08/22/2019    HGB 12.9 08/22/2019    HCT 40.0 08/22/2019     08/22/2019    CHOL 222 (H) 09/12/2013    TRIG 192 (H) 09/12/2013    HDL 34 (L) 09/12/2013    ALT 28 08/22/2019    AST 32 08/22/2019     08/22/2019    K 4.4 08/22/2019     08/22/2019    CREATININE 0.8 08/22/2019    BUN 14 08/22/2019    CO2 28 08/22/2019    TSH 0.753 05/04/2015    INR 1.1 04/26/2017    HGBA1C 6.7 (H) 04/27/2017       Imaging:    Endoscopy:      Assessment:  1. Gastroesophageal reflux disease without esophagitis    2. Colon cancer screening    3. Hx of colonic polyps    4. Pharyngoesophageal dysphagia       63-year-old female presents with GERD, supraclavicular dysphagia x1 year.  She has not tried a PPI    Recommendations:  1.  Schedule EGD with possible dilation of the esophagus, rule out Vernon's  2.  Protonix 40 mg daily     Follow up in about 2 months (around 10/26/2019).      Order summary:  Orders Placed This Encounter    pantoprazole (PROTONIX) 40 MG tablet    Case request GI: EGD (ESOPHAGOGASTRODUODENOSCOPY), COLONOSCOPY         Thank you so much for allowing me to participate in the care of Alix Brand PA-C

## 2019-08-26 NOTE — PROGRESS NOTES
[facility]  Department of Otorhinolaryngology and Communication Sciences    Subjective:      Alix Patel is a 63 y.o. female who presents for follow-up. She has some mild chronic endolaryngeal changes and muscle tension dysphonia.    Dr. Horta just got a culture of some nasopharyngeal exudate and is managing this with topical nasal medications.    She is seeing GI, who have prescribed her acid suppression and diet mods. They are also planning an EGD.    She has worked with our SLP team a bit. She is now also working with 3 vocal coaches. She has made favorable progress with her voice and is pleased. She is anticipating continued improvements, especially as her nasopharyngitis improves.    The patient's medications, allergies, past medical, surgical, social and family histories were reviewed and updated as appropriate.    A detailed review of systems was obtained with pertinent positives as per the above HPI, and otherwise negative.      Objective:     /77 (Patient Position: Sitting)   Pulse 100   Wt 73 kg (161 lb)   BMI 22.45 kg/m²      Constitutional: comfortable, well dressed  Psychiatric: appropriate affect  Respiratory: comfortably breathing, symmetric chest rise, no stridor  Voice: minimal variable chambers; variable mild strain; marked imprpvements since initial evaluation  Head: normocephalic  Eyes: conjunctivae and sclerae clear  Indirect laryngoscopy is limited due to gag    Procedure  Rigid Laryngeal Videostroboscopy (10274): Laryngeal videostroboscopy is indicated to assess the laryngeal vibratory biomechanics and vocal fold oscillation, which cannot be assessed with a plain light examination. This was carried out with a 70 degree endoscope. After verbal consent was obtained, the patient was positioned and the tongue was gently secured with a gauze sponge. The endoscope was passed transorally and positioned to image the larynx and hypopharynx in detail. The following features were  examined: laryngeal and hypopharyngeal masses; vocal fold range and symmetry of motion; laryngeal mucosal edema, erythema, inflammation, and hydration; salivary pooling; and gross laryngeal sensation. During phonation, the vocal folds were assessed for glottal closure; mucosal wave; vocal fold lesions; vibratory periodicity, amplitude, and phase symmetry; and vertical height match. The equipment was removed. The patient tolerated the procedure well without complication. All findings were normal except:  - left vocal fold with mild midmembranous edema, broad-based  - right vocal fold with minor midmembranous vocal fold edema  - mild, bilateral, diffuse, ~symmetric vascular ectasia  - premature contact, irregular closure  - complete glottal closure at low/modal frequencies, with only mild vibratory asymmetry  - trace glottal insufficiency, with an anterior/posterior gap and impaired vibration, only at uppermost register  - mild supraglottic laryngeal hyperfunction      Assessment:     Alix Patel is a 63 y.o. female with  mild chronic endolaryngeal changes and muscle tension dysphonia. She is making progress with voice therapy and vocal technique training.     Plan:     Reassurance was provided. I recommended adherence to her SLP voice therapy treatment plan. As her endolaryngeal findings are so minor, and suspected to be longstanding, I am disinclined to consider surgical intervention.    I recommended she continue to follow up with Dr. Horta regarding her sinonasal symptoms.    She will follow up with me for reassessment of her voice and larynx in about 3-4 months.    All questions were answered, and the patient is in agreement with the plan.    Maximino Curry M.D.  [facility]  Department of Otorhinolaryngology and Communication Sciences

## 2019-08-26 NOTE — PATIENT INSTRUCTIONS
Take miralax 17g nightly the week prior to your colonoscopy    GERD  Worst Foods for Acid Reflux  Chocolate (milk chocolate worse than dark chocolate)  Soda (all carbonated beverages)  Alcohol (beer, liquor, wine)  Fried foods  Ramon, sausage, ribs  Cream sauce  Fatty meats (beef)  Butter, margarine, lard, shortening  Coffee, tea  Mint   High fat nuts  Hot sauces and pepper  Citrus fruit/juices      Acidic foods (pH - 1 is MORE acidic, 5 is LESS acidic)     Do not eat or drink these (lower numbers are worse)    Induction diet - For 2 weeks eat nothing below pH 5     Lemon juice 2.3  Grape cranberry juice 2.5  Stomach Acid 2.5  Gelatin Dessert 2.6  Lemon/lime 2.9/2.7  Vinegar 2.9  Gatorade 3.0  Fruits - plums, apricots, strawberries, cherries 3.0  Vitamin C (ascorbic acid) 3.0  Iced tea, Snapple 3.1  Mustard 3.2  Soft drinks 3.3  Nectarines 3.3  Pomegranate 3.3  Applesauce 3.4  Grapefruit 3.4  Kiwi 3.4  Barbecue sauce 3.4  Caesar dressing 3.5  Thousand island dressing 3.6  Strawberries 3.5  Pineapple juice 3.5  Beer 3.5  Wine 3.5  Grape 3.6  Apples 3.6  Pineapple 3.7  Pickle 3.7  Blackberries, blueberries 3.7  Ricketts 3.7  Orange 3.8  Cherries 3.9  Red Bull 3.9  Tomatoes 4.2  Coffee 5.1      These are Safe foods:  Agave  Aloe Vera  Apple (only red)  Bagels  Banana (worsens reflux in 1%)  Beans - black, red, lima, lentils  Bread - whole grain, rye  Caramel  Celery  Chamomile tea  Chicken - skinless, never fried  Chicken stock or bouillon  Coffee - one cup/day with milk  Fennel  Fish  Sandi  Green vegetables (no green peppers)  Herbs  Honey  Melon  Milk - skin, soy, or Lactaid skim milk  Mushrooms  Oatmeal  Olive oil  Parsley  Pasta  Pears  Popcorn  Potatoes  Red bell peppers  Rice  Soups  Tofu  Turkey Breast  Turnip  Vegetables - no onion, tomatoes, peppers  Vinaigrette  Water - non carbonated  Whole grain breads, crackers, breakfast cereals      Best Foods for Acid Reflux  Whole grain breads  Oatmeal  Aloe Vera  Salad  (no tomatoes, onions, cheese, or high fat dressing)  Banana  Melon  Fennel  Chicken and turkey (skinless, never fried)  Fish/seafood (never fried)  Celery  Parsley  Couscous and Rice    Maybe bad foods (Everyone is unique)  Tomatoes  Garlic  Onion  Nuts (macadamia nuts)  Apples (especially green)  Cucumber  Green peppers  Spicy food  Some herbal teas    GERD tips  Change what you eat:  Eat smaller meals  Eat slowly and chew thoroughly until food is almost liquid  Cut down on junk carbohydrates such as sugar and white flour  Use herbs in your cooking  Eat more raw foods (more than 10 ingredients is not a raw food)  Avoid trans fats and partially hydrogenated oils  Eat more fish and switch to grass fed beef  Switch your cooking oil to macadamia nut or olive oil  Watch extremes of salt intake (too high or too low is bad)    Change these habits:  Stop smoking  Eat dinner earlier (3-4 hours before lying down to sleep)  Elevate the head of your bed 6 inches (blocks under the head of the bed are better than pillows)  Exercise (but wait 2 hours after eating)  Drink more water (between meals)    Take these supplements:  Multi vitamin  Probiotic  Fish oil    Most common food allergens: milk, eggs, peanuts, tree nuts, fish, shellfish, wheat, and soy    All natural immediate relief:  Chew 2-3 soft probiotic capsules - Dr. Elizondo's Probiotics 12 Plus  Chew chewable DGL licorice tablet  Chew papaya tablet with high protein meal - [employer]  Drink 2 ounces of aloe vera juice  Swedish bitters  Prelief- reduce the acid in food to keep it form burning sensitive tissue  [employer]og[employer]  [place]  Drink Chamomile Tea  Teaspoon of baking soda in water  Spoonful of vinegar in water      All natural ulcer healers:  Zinc carnosine - 75.5 mg with food twice a day x 8 weeks   Jil Santoro - $8 for 60 pills  DGL (deglycyrrhizinated licorice) - 2 tablets before meals. Heals stomach lining   [employer] brand, Enzymatic therapy  brand.  Aloe Vera juice  - 2 to 8 ounces a day   Francisco or [place]

## 2019-08-26 NOTE — TELEPHONE ENCOUNTER
----- Message from Billy Meza NP sent at 8/20/2019  8:53 AM CDT -----  Please call the patient regarding her abnormal result.  The cultured bacteria is susceptible to the prescribed antibiotic. Please ask how patient is feeling.

## 2019-10-02 ENCOUNTER — ANESTHESIA (OUTPATIENT)
Dept: ENDOSCOPY | Facility: HOSPITAL | Age: 64
End: 2019-10-02
Payer: COMMERCIAL

## 2019-10-02 ENCOUNTER — ANESTHESIA EVENT (OUTPATIENT)
Dept: ENDOSCOPY | Facility: HOSPITAL | Age: 64
End: 2019-10-02
Payer: COMMERCIAL

## 2019-10-03 ENCOUNTER — TELEPHONE (OUTPATIENT)
Dept: GASTROENTEROLOGY | Facility: CLINIC | Age: 64
End: 2019-10-03

## 2019-10-03 NOTE — TELEPHONE ENCOUNTER
----- Message from Petrona Clemens sent at 10/3/2019  3:47 PM CDT -----  Contact: Pt tel: [phone]  Pt called in to reschedule  EGD that was scheduled for yesterday that she missed. Please call back.    Pt. Telephone# [phone]

## 2019-10-04 ENCOUNTER — TELEPHONE (OUTPATIENT)
Dept: ENDOSCOPY | Facility: HOSPITAL | Age: 64
End: 2019-10-04

## 2019-10-15 ENCOUNTER — OFFICE VISIT (OUTPATIENT)
Dept: PSYCHIATRY | Facility: CLINIC | Age: 64
End: 2019-10-15
Payer: COMMERCIAL

## 2019-10-15 DIAGNOSIS — F31.81 BIPOLAR II DISORDER: Primary | ICD-10-CM

## 2019-10-15 DIAGNOSIS — F41.9 ANXIETY: ICD-10-CM

## 2019-10-15 PROCEDURE — 90834 PR PSYCHOTHERAPY W/PATIENT, 45 MIN: ICD-10-PCS | Mod: S$GLB,,, | Performed by: SOCIAL WORKER

## 2019-10-15 PROCEDURE — 90834 PSYTX W PT 45 MINUTES: CPT | Mod: S$GLB,,, | Performed by: SOCIAL WORKER

## 2019-10-15 NOTE — PROGRESS NOTES
"Individual Psychotherapy (PhD/LCSW)    10/15/2019    Site:  [facility]         Therapeutic Intervention: Met with patient.  Outpatient - Insight oriented psychotherapy 45 min - CPT code 04224    Chief complaint/reason for encounter: mood swings, depression, anxiety     Interval history and content of current session: Pt comes in 12 minutes late, states she was caught in traffic last session and tried to call but switchboard was closed.  Pt states she has decided to "stop telling lies and face the truth", says she used to exaggerate her stories because she felt unworthy. She now tries to tell herself and others the truth about her past and her current situation. 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 Talk with pt about attendance at sessions. Pt is cooperative.    Treatment plan:  · Target symptoms: depression, anxiety , mood swings  · Why chosen therapy is appropriate versus another modality: relevant to diagnosis  · Outcome monitoring methods: self-report  · Therapeutic intervention type: insight oriented psychotherapy    Risk parameters:  Patient reports no suicidal ideation  Patient reports no homicidal ideation  Patient reports no self-injurious behavior  Patient reports no violent behavior    Verbal deficits: None    Patient's response to intervention:  The patient's response to intervention is motivated    Progress toward goals and other mental status changes:  The patient's progress toward goals is good    Diagnosis:   Bipolar II, Anxiety    Plan:  individual psychotherapy, family therapy    Return to clinic: as needed  "

## 2019-10-31 ENCOUNTER — HOSPITAL ENCOUNTER (OUTPATIENT)
Facility: HOSPITAL | Age: 64
Discharge: HOME | End: 2019-10-31
Attending: INTERNAL MEDICINE | Admitting: INTERNAL MEDICINE
Payer: COMMERCIAL

## 2019-10-31 VITALS
DIASTOLIC BLOOD PRESSURE: 88 MMHG | SYSTOLIC BLOOD PRESSURE: 160 MMHG | RESPIRATION RATE: 16 BRPM | HEIGHT: 71 IN | BODY MASS INDEX: 23.24 KG/M2 | HEART RATE: 74 BPM | WEIGHT: 166 LBS | TEMPERATURE: 98 F | OXYGEN SATURATION: 99 %

## 2019-10-31 DIAGNOSIS — K21.9 GERD (GASTROESOPHAGEAL REFLUX DISEASE): ICD-10-CM

## 2019-10-31 DIAGNOSIS — K21.9 GASTROESOPHAGEAL REFLUX DISEASE, ESOPHAGITIS PRESENCE NOT SPECIFIED: Primary | ICD-10-CM

## 2019-10-31 LAB — POCT GLUCOSE: 141 MG/DL (ref 70–110)

## 2019-10-31 PROCEDURE — 43239 EGD BIOPSY SINGLE/MULTIPLE: CPT | Performed by: INTERNAL MEDICINE

## 2019-10-31 PROCEDURE — 45385 PR COLONOSCOPY,REMV LESN,SNARE: ICD-10-PCS | Mod: 51,,, | Performed by: INTERNAL MEDICINE

## 2019-10-31 PROCEDURE — 43239 PR EGD, FLEX, W/BIOPSY, SGL/MULTI: ICD-10-PCS | Mod: ,,, | Performed by: INTERNAL MEDICINE

## 2019-10-31 PROCEDURE — 27201012 HC FORCEPS, HOT/COLD, DISP: Performed by: INTERNAL MEDICINE

## 2019-10-31 PROCEDURE — 45385 COLONOSCOPY W/LESION REMOVAL: CPT | Mod: 51,,, | Performed by: INTERNAL MEDICINE

## 2019-10-31 PROCEDURE — 88342 TISSUE SPECIMEN TO PATHOLOGY - SURGERY: ICD-10-PCS | Mod: 26,,, | Performed by: PATHOLOGY

## 2019-10-31 PROCEDURE — 37000008 HC ANESTHESIA 1ST 15 MINUTES: Performed by: INTERNAL MEDICINE

## 2019-10-31 PROCEDURE — 27201089 HC SNARE, DISP (ANY): Performed by: INTERNAL MEDICINE

## 2019-10-31 PROCEDURE — 37000009 HC ANESTHESIA EA ADD 15 MINS: Performed by: INTERNAL MEDICINE

## 2019-10-31 PROCEDURE — 88305 TISSUE EXAM BY PATHOLOGIST: CPT | Mod: 59 | Performed by: PATHOLOGY

## 2019-10-31 PROCEDURE — 25000003 PHARM REV CODE 250: Performed by: NURSE ANESTHETIST, CERTIFIED REGISTERED

## 2019-10-31 PROCEDURE — E9220 PRA ENDO ANESTHESIA: ICD-10-PCS | Mod: ,,, | Performed by: NURSE ANESTHETIST, CERTIFIED REGISTERED

## 2019-10-31 PROCEDURE — 63600175 PHARM REV CODE 636 W HCPCS: Performed by: INTERNAL MEDICINE

## 2019-10-31 PROCEDURE — 88342 IMHCHEM/IMCYTCHM 1ST ANTB: CPT | Mod: 26,,, | Performed by: PATHOLOGY

## 2019-10-31 PROCEDURE — 45385 COLONOSCOPY W/LESION REMOVAL: CPT | Performed by: INTERNAL MEDICINE

## 2019-10-31 PROCEDURE — 43239 EGD BIOPSY SINGLE/MULTIPLE: CPT | Mod: ,,, | Performed by: INTERNAL MEDICINE

## 2019-10-31 PROCEDURE — E9220 PRA ENDO ANESTHESIA: HCPCS | Mod: ,,, | Performed by: NURSE ANESTHETIST, CERTIFIED REGISTERED

## 2019-10-31 PROCEDURE — 63600175 PHARM REV CODE 636 W HCPCS: Performed by: NURSE ANESTHETIST, CERTIFIED REGISTERED

## 2019-10-31 PROCEDURE — 88305 TISSUE SPECIMEN TO PATHOLOGY - SURGERY: ICD-10-PCS | Mod: 26,,, | Performed by: PATHOLOGY

## 2019-10-31 RX ORDER — GLYCOPYRROLATE 0.2 MG/ML
INJECTION INTRAMUSCULAR; INTRAVENOUS
Status: DISCONTINUED | OUTPATIENT
Start: 2019-10-31 | End: 2019-10-31

## 2019-10-31 RX ORDER — SODIUM CHLORIDE 9 MG/ML
INJECTION, SOLUTION INTRAVENOUS CONTINUOUS
Status: DISCONTINUED | OUTPATIENT
Start: 2019-10-31 | End: 2019-10-31 | Stop reason: HOSPADM

## 2019-10-31 RX ORDER — LIDOCAINE HCL/PF 100 MG/5ML
SYRINGE (ML) INTRAVENOUS
Status: DISCONTINUED | OUTPATIENT
Start: 2019-10-31 | End: 2019-10-31

## 2019-10-31 RX ORDER — PROPOFOL 10 MG/ML
VIAL (ML) INTRAVENOUS
Status: DISCONTINUED | OUTPATIENT
Start: 2019-10-31 | End: 2019-10-31

## 2019-10-31 RX ORDER — PROPOFOL 10 MG/ML
VIAL (ML) INTRAVENOUS CONTINUOUS PRN
Status: DISCONTINUED | OUTPATIENT
Start: 2019-10-31 | End: 2019-10-31

## 2019-10-31 RX ADMIN — LIDOCAINE HYDROCHLORIDE 50 MG: 20 INJECTION, SOLUTION INTRAVENOUS at 02:10

## 2019-10-31 RX ADMIN — PROPOFOL 100 MG: 10 INJECTION, EMULSION INTRAVENOUS at 02:10

## 2019-10-31 RX ADMIN — GLYCOPYRROLATE 0.2 MG: 0.2 INJECTION, SOLUTION INTRAMUSCULAR; INTRAVENOUS at 02:10

## 2019-10-31 RX ADMIN — SODIUM CHLORIDE: 0.9 INJECTION, SOLUTION INTRAVENOUS at 01:10

## 2019-10-31 RX ADMIN — PROPOFOL 20 MG: 10 INJECTION, EMULSION INTRAVENOUS at 03:10

## 2019-10-31 RX ADMIN — PROPOFOL 200 MCG/KG/MIN: 10 INJECTION, EMULSION INTRAVENOUS at 02:10

## 2019-10-31 NOTE — [employer] PATIENT INSTRUCTIONS
Discharge Summary/Instructions after an Endoscopic Procedure  Patient Name: Alix Patel  Patient MRN: [id number]  Patient YOB: 1955 Thursday, October 31, 2019  Philippe Monteiro MD  RESTRICTIONS:  During your procedure today, you received medications for sedation.  These   medications may affect your judgment, balance and coordination.  Therefore,   for 24 hours, you have the following restrictions:   - DO NOT drive a car, operate machinery, make legal/financial decisions,   sign important papers or drink alcohol.    ACTIVITY:  Today: no heavy lifting, straining or running due to procedural   sedation/anesthesia.  The following day: return to full activity including work.  DIET:  Eat and drink normally unless instructed otherwise.     TREATMENT FOR COMMON SIDE EFFECTS:  - Mild abdominal pain, nausea, belching, bloating or excessive gas:  rest,   eat lightly and use a heating pad.  - Sore Throat: treat with throat lozenges and/or gargle with warm salt   water.  - Because air was used during the procedure, expelling large amounts of air   from your rectum or belching is normal.  - If a bowel prep was taken, you may not have a bowel movement for 1-3 days.    This is normal.  SYMPTOMS TO WATCH FOR AND REPORT TO YOUR PHYSICIAN:  1. Abdominal pain or bloating, other than gas cramps.  2. Chest pain.  3. Back pain.  4. Signs of infection such as: chills or fever occurring within 24 hours   after the procedure.  5. Rectal bleeding, which would show as bright red, maroon, or black stools.   (A tablespoon of blood from the rectum is not serious, especially if   hemorrhoids are present.)  6. Vomiting.  7. Weakness or dizziness.  GO DIRECTLY TO THE NEAREST EMERGENCY ROOM IF YOU HAVE ANY OF THE FOLLOWING:      Difficulty breathing              Chills and/or fever over 101 F   Persistent vomiting and/or vomiting blood   Severe abdominal pain   Severe chest pain   Black, tarry stools   Bleeding- more than one  tablespoon   Any other symptom or condition that you feel may need urgent attention  Your doctor recommends these additional instructions:  If any biopsies were taken, your doctors clinic will contact you in 1 to 2   weeks with any results.  - Patient has a contact number available for emergencies.  The signs and   symptoms of potential delayed complications were discussed with the   patient.  Return to normal activities tomorrow.  Written discharge   instructions were provided to the patient.   - Discharge patient to home.   - Resume previous diet.   - Continue present medications.   - Await pathology results.   For questions, problems or results please call your physician - Philippe Monteiro MD at Work:  [phone].  [facility], EMERGENCY ROOM PHONE NUMBER: [phone]  IF A COMPLICATION OR EMERGENCY SITUATION ARISES AND YOU ARE UNABLE TO REACH   YOUR PHYSICIAN - GO DIRECTLY TO THE EMERGENCY ROOM.  Philpipe Monteiro MD  10/31/2019 3:02:18 PM  This report has been verified and signed electronically.  [employer]

## 2019-10-31 NOTE — PROVATION PATIENT INSTRUCTIONS
Discharge Summary/Instructions after an Endoscopic Procedure  Patient Name: Alix Patel  Patient MRN: [id number]  Patient YOB: 1955 Thursday, October 31, 2019  Philippe Monteiro MD  RESTRICTIONS:  During your procedure today, you received medications for sedation.  These   medications may affect your judgment, balance and coordination.  Therefore,   for 24 hours, you have the following restrictions:   - DO NOT drive a car, operate machinery, make legal/financial decisions,   sign important papers or drink alcohol.    ACTIVITY:  Today: no heavy lifting, straining or running due to procedural   sedation/anesthesia.  The following day: return to full activity including work.  DIET:  Eat and drink normally unless instructed otherwise.     TREATMENT FOR COMMON SIDE EFFECTS:  - Mild abdominal pain, nausea, belching, bloating or excessive gas:  rest,   eat lightly and use a heating pad.  - Sore Throat: treat with throat lozenges and/or gargle with warm salt   water.  - Because air was used during the procedure, expelling large amounts of air   from your rectum or belching is normal.  - If a bowel prep was taken, you may not have a bowel movement for 1-3 days.    This is normal.  SYMPTOMS TO WATCH FOR AND REPORT TO YOUR PHYSICIAN:  1. Abdominal pain or bloating, other than gas cramps.  2. Chest pain.  3. Back pain.  4. Signs of infection such as: chills or fever occurring within 24 hours   after the procedure.  5. Rectal bleeding, which would show as bright red, maroon, or black stools.   (A tablespoon of blood from the rectum is not serious, especially if   hemorrhoids are present.)  6. Vomiting.  7. Weakness or dizziness.  GO DIRECTLY TO THE NEAREST EMERGENCY ROOM IF YOU HAVE ANY OF THE FOLLOWING:      Difficulty breathing              Chills and/or fever over 101 F   Persistent vomiting and/or vomiting blood   Severe abdominal pain   Severe chest pain   Black, tarry stools   Bleeding- more than one  tablespoon   Any other symptom or condition that you feel may need urgent attention  Your doctor recommends these additional instructions:  If any biopsies were taken, your doctors clinic will contact you in 1 to 2   weeks with any results.  - Patient has a contact number available for emergencies.  The signs and   symptoms of potential delayed complications were discussed with the   patient.  Return to normal activities tomorrow.  Written discharge   instructions were provided to the patient.   - Discharge patient to home.   - Resume previous diet.   - Continue present medications.   - Await pathology results.   - Repeat colonoscopy in 1 year for surveillance.   For questions, problems or results please call your physician - Philippe Monteiro MD at Work:  [phone].  [facility], EMERGENCY ROOM PHONE NUMBER: [phone]  IF A COMPLICATION OR EMERGENCY SITUATION ARISES AND YOU ARE UNABLE TO REACH   YOUR PHYSICIAN - GO DIRECTLY TO THE EMERGENCY ROOM.  Philippe Monteiro MD  10/31/2019 3:30:24 PM  This report has been verified and signed electronically.  [employer]

## 2019-10-31 NOTE — H&P
Short Stay Endoscopy History and Physical    PCP - Angélica Barakat MD    Procedure - EGD/Colonoscopy  Sedation: GA  ASA - per anesthesia  Mallampati - per anesthesia  History of Anesthesia problems - no  Family history Anesthesia problems -  no     HPI:  This is a 63 y.o. female here for evaluation of : GERD  with occasional dysphagia, Screening for CRC    Reflux - yes  Dysphagia - occ  Abdominal pain - no  Diarrhea - no    ROS:  Constitutional: No fevers, chills, No weight loss  ENT: No allergies  CV: No chest pain  Pulm: No cough, No shortness of breath  Ophtho: No vision changes  GI: see HPI  Medical History:  has a past medical history of Anxiety, Anxiety, Bipolar 1 disorder, Depression, Diabetes mellitus, GERD (gastroesophageal reflux disease), HEARING LOSS, Insomnia, and Rash.    Surgical History:  has a past surgical history that includes Appendectomy; Hysterectomy; Colonoscopy; and Cyst Removal.    Family History: family history includes Alzheimer's disease in her mother; Colon cancer in her maternal aunt; Heart failure in her father; No Known Problems in her sister; Sarcoidosis in her sister; Sinus disease in her mother.. Otherwise no colon cancer, inflammatory bowel disease, or GI malignancies.    Social History:  reports that she quit smoking about 15 years ago. Her smoking use included cigarettes. She has a 8.50 pack-year smoking history. She has never used smokeless tobacco. She reports that she drinks alcohol. She reports that she does not use drugs.    Review of patient's allergies indicates:   Allergen Reactions    Lamictal [lamotrigine] Rash     Per psychiatry notes       Medications:   Medications Prior to Admission   Medication Sig Dispense Refill Last Dose    busPIRone (BUSPAR) 15 MG tablet Take 2 tablets (30 mg total) by mouth 2 (two) times daily. 360 tablet 0 10/30/2019 at Unknown time    CRESTOR 20 mg tablet Take 20 mg by mouth once daily.   3 10/30/2019 at Unknown time     cyclobenzaprine (FLEXERIL) 10 MG tablet Take 10 mg by mouth daily as needed.    10/30/2019 at Unknown time    diclofenac (CATAFLAM) 50 MG tablet TK 1 T PO  TID PRN P  2 10/30/2019 at Unknown time    divalproex ER (DEPAKOTE) 500 MG Tb24 Take 4 tablets (2,000 mg total) by mouth every evening. 360 tablet 0 10/30/2019 at Unknown time    escitalopram oxalate (LEXAPRO) 20 MG tablet Take 1 tablet (20 mg total) by mouth once daily. 90 tablet 0 10/30/2019 at Unknown time    gabapentin (NEURONTIN) 300 MG capsule TK ONE C PO  TID  2 10/30/2019 at Unknown time    hydrOXYzine pamoate (VISTARIL) 25 MG Cap Take one twice daily as needed for anxiety and one or two at bedtime as needed for sleep. 360 capsule 0 10/30/2019 at Unknown time    levothyroxine (SYNTHROID) 100 MCG tablet Take 100 mcg by mouth once daily.   3 10/30/2019 at Unknown time    metformin (GLUCOPHAGE) 500 MG tablet Take 500 mg by mouth.   10/30/2019 at Unknown time    ONETOUCH DELICA LANCETS 30 gauge Misc TEST BID UTD  0 10/30/2019 at Unknown time    ONETOUCH ULTRA TEST Strp TEST BID UTD.  0 10/30/2019 at Unknown time    pantoprazole (PROTONIX) 40 MG tablet Take 1 tablet (40 mg total) by mouth once daily. 30 tablet 3 10/30/2019 at Unknown time    traMADol (ULTRAM) 50 mg tablet TK 1 TABLET PO EVERY 4 TO 6 HOURS  0 10/30/2019 at Unknown time       Objective Findings:    Vital Signs: Per nursing notes.    Physical Exam:  General Appearance: Well appearing in no acute distress  Head:   Normocephalic, without obvious abnormality  Eyes:    No scleral icterus  Airway: Open  Neck: No restriction in mobility  Lungs: CTA bilaterally in anterior and posterior fields, no wheezes, no crackles.  Heart:  Regular rate and rhythm, S1, S2 normal, no murmurs heard  Abdomen: Soft, non tender, non distended      Labs:  Lab Results   Component Value Date    WBC 5.27 08/22/2019    HGB 12.9 08/22/2019    HCT 40.0 08/22/2019     08/22/2019    CHOL 222 (H) 09/12/2013     TRIG 192 (H) 09/12/2013    HDL 34 (L) 09/12/2013    ALT 28 08/22/2019    AST 32 08/22/2019     08/22/2019    K 4.4 08/22/2019     08/22/2019    CREATININE 0.8 08/22/2019    BUN 14 08/22/2019    CO2 28 08/22/2019    TSH 0.753 05/04/2015    INR 1.1 04/26/2017    HGBA1C 6.7 (H) 04/27/2017         I have explained the risks and benefits of endoscopy procedures to the patient including but not limited to bleeding, perforation, infection, and death.    Thank you so much for allowing me to participate in the care of Alix Sania Monteiro MD

## 2019-10-31 NOTE — TRANSFER OF CARE
"Anesthesia Transfer of Care Note    Patient: Alix Patel    Procedure(s) Performed: Procedure(s) (LRB):  EGD (ESOPHAGOGASTRODUODENOSCOPY) (N/A)  COLONOSCOPY (N/A)    Patient location: PACU    Anesthesia Type: general    Transport from OR: Transported from OR on room air with adequate spontaneous ventilation    Post pain: adequate analgesia    Post assessment: no apparent anesthetic complications and tolerated procedure well    Post vital signs: stable    Level of consciousness: awake    Nausea/Vomiting: no nausea/vomiting    Complications: none    Transfer of care protocol was followed      Last vitals:   Visit Vitals  /77   Pulse 68   Temp 37 °C (98.6 °F)   Resp 15   Ht 5' 11" (1.803 m)   Wt 75.3 kg (166 lb)   SpO2 98%   BMI 23.15 kg/m²     "

## 2019-10-31 NOTE — ANESTHESIA PREPROCEDURE EVALUATION
10/31/2019  Alix Patel is a 63 y.o., female.    Anesthesia Evaluation    I have reviewed the Patient Summary Reports.    I have reviewed the Nursing Notes.   I have reviewed the Medications.     Review of Systems      Physical Exam  General:  Well nourished    Airway/Jaw/Neck:  Airway Findings: Mouth Opening: Normal Tongue: Normal  General Airway Assessment: Average  Mallampati: III  Improves to I with phonation.  TM Distance: Normal, at least 6 cm  Jaw/Neck Findings:  Neck ROM: Normal ROM            Mental Status:  Mental Status Findings:  Alert and Oriented       Patient Active Problem List   Diagnosis    Altered mental status    Muscle weakness of lower extremity    Anxiety    Depression    Bipolar disorder    DM II (diabetes mellitus, type II), controlled    Chest pain, central    Hyperlipidemia with target low density lipoprotein (LDL) cholesterol less than 100 mg/dL    Hoarseness    Thyroid nodule    Urinary tract infection without hematuria    Episodic tension-type headache, not intractable    Weakness    Chest pain    Acquired hypothyroidism    Muscle spasm    Voice disturbance    Vocal fold edema    Hyperfunctional dysphonia    Glycosuria    Urinary tract infection with hematuria    Subacute vaginitis    Dysuria    GERD (gastroesophageal reflux disease)         Anesthesia Plan  Type of Anesthesia, risks & benefits discussed:  Anesthesia Type:  general, MAC  Patient's Preference:   Intra-op Monitoring Plan: standard ASA monitors  Intra-op Monitoring Plan Comments:   Post Op Pain Control Plan:   Post Op Pain Control Plan Comments:   Induction:   IV  Beta Blocker:  Patient is not currently on a Beta-Blocker (No further documentation required).       Informed Consent: Patient understands risks and agrees with Anesthesia plan.  Questions answered. Anesthesia consent  signed with patient.  ASA Score: 3     Day of Surgery Review of History & Physical:    H&P update referred to the surgeon.         Ready For Surgery From Anesthesia Perspective.

## 2019-10-31 NOTE — DISCHARGE INSTRUCTIONS
Understanding Colon and Rectal Polyps    The colon (also called the large intestine) is a muscular tube that forms the last part of the digestive tract. It absorbs water and stores food waste. The colon is about 4 to 6 feet long. The rectum is the last 6 inches of the colon. The colon and rectum have a smooth lining composed of millions of cells. Changes in these cells can lead to growths in the colon that can become cancerous and should be removed. Multiple tests are available to screen for colon cancer, but the colonoscopy is the most recommended test. During colonoscopy, these polyps can be removed. How often you need this test depends on many things including your condition, your family history, symptoms, and what the findings were at the previous colonoscopy.   When the colon lining changes  Changes that happen in the cells that line the colon or rectum can lead to growths called polyps. Over a period of years, polyps can turn cancerous. Removing polyps early may prevent cancer from ever forming.  Polyps  Polyps are fleshy clumps of tissue that form on the lining of the colon or rectum. Small polyps are usually benign (not cancerous). However, over time, cells in a polyp can change and become cancerous. Certain types of polyps known as adenomatous polyps are premalignant. The risk for invasive cancer increases with the size of the polyp and certain cell and gene features. This means that they can become cancerous if they're not removed. Hyperplastic polyps are benign. They can grow quite large and not turn cancerous.   Cancer  Almost all colorectal cancers start when polyp cells begin growing abnormally. As a cancerous tumor grows, it may involve more and more of the colon or rectum. In time, cancer can also grow beyond the colon or rectum and spread to nearby organs or to glands called lymph nodes. The cells can also travel to other parts of the body. This is known as metastasis. The earlier a cancerous  tumor is removed, the better the chance of preventing its spread.    Date Last Reviewed: 8/1/2016  © 6382-8418 [employer]. [address] [place] [address], [place] [address]. All rights reserved. This information is not intended as a substitute for professional medical care. Always follow your healthcare professional's instructions.        Upper GI Endoscopy     During endoscopy, a long, flexible tube is used to view the inside of your upper GI tract.      Upper GI endoscopy allows your healthcare provider to look directly into the beginning of your gastrointestinal (GI) tract. The esophagus, stomach, and duodenum (the first part of the small intestine) make up the upper GI tract.   Before the exam  Follow these and any other instructions you are given before your endoscopy. If you dont follow the healthcare providers instructions carefully, the test may need to be canceled or done over:  · Don't eat or drink anything after midnight the night before your exam. If your exam is in the afternoon, drink only clear liquids in the morning. Don't eat or drink anything for 8 hours before the exam. In some cases, you may be able to take medicines with sips of water until 2 hours before the procedure. Speak with your healthcare provider about this.   · Bring your X-rays and any other test results you have.  · Because you will be sedated, arrange for an adult to drive you home after the exam.  · Tell your healthcare provider before the exam if you are taking any medicines or have any medical problems.  The procedure  Here is what to expect:  · You will lie on the endoscopy table. Usually patients lie on the left side.  · You will be monitored and given oxygen.  · Your throat may be numbed with a spray or gargle. You are given medicine through an intravenous (IV) line that will help you relax and remain comfortable. You may be awake or asleep during the procedure.  · The healthcare provider will put the endoscope in  your mouth and down your esophagus. It is thinner than most pieces of food that you swallow. It will not affect your breathing. The medicine helps keep you from gagging.  · Air is put into your GI tract to expand it. It can make you burp.  · During the procedure, the healthcare provider can take biopsies (tissue samples), remove abnormalities, such as polyps, or treat abnormalities through a variety of devices placed through the endoscope. You will not feel this.   · The endoscope carries images of your upper GI tract to a video screen. If you are awake, you may be able to look at the images.  · After the procedure is done, you will rest for a time. An adult must drive you home.  When to call your healthcare provider  Contact your healthcare provider if you have:  · Black or tarry stools, or blood in your stool  · Fever  · Pain in your belly that does not go away  · Nausea and vomiting, or vomiting blood   Date Last Reviewed: 7/1/2016  © 5453-8494 [employer]. [address] [place] [address], [place] [address]. All rights reserved. This information is not intended as a substitute for professional medical care. Always follow your healthcare professional's instructions.

## 2019-11-05 NOTE — ANESTHESIA POSTPROCEDURE EVALUATION
Anesthesia Post Evaluation    Patient: Alix Patel    Procedure(s) Performed: Procedure(s) (LRB):  EGD (ESOPHAGOGASTRODUODENOSCOPY) (N/A)  COLONOSCOPY (N/A)    Final Anesthesia Type: general  Patient location during evaluation: PACU  Patient participation: Yes- Able to Participate  Level of consciousness: awake and alert and oriented  Post-procedure vital signs: reviewed and stable  Pain management: adequate  Airway patency: patent  PONV status at discharge: No PONV  Anesthetic complications: no      Cardiovascular status: stable  Respiratory status: unassisted  Hydration status: euvolemic  Follow-up not needed.              Event Time     Out of Recovery 16:11:36          Pain/Andrea Score: No data recorded

## 2019-11-06 ENCOUNTER — TELEPHONE (OUTPATIENT)
Dept: GASTROENTEROLOGY | Facility: CLINIC | Age: 64
End: 2019-11-06

## 2019-11-06 NOTE — TELEPHONE ENCOUNTER
MA called pt about results pt did not answer and mailbox was full.----- Message from Philippe Monteiro MD sent at 11/6/2019  2:33 PM CST -----  Please notify patient, the stomach biopsies did not reveal any H.pylori.

## 2019-11-07 ENCOUNTER — TELEPHONE (OUTPATIENT)
Dept: ENDOSCOPY | Facility: HOSPITAL | Age: 64
End: 2019-11-07

## 2019-11-11 ENCOUNTER — TELEPHONE (OUTPATIENT)
Dept: GASTROENTEROLOGY | Facility: CLINIC | Age: 64
End: 2019-11-11

## 2019-11-12 ENCOUNTER — TELEPHONE (OUTPATIENT)
Dept: ENDOSCOPY | Facility: HOSPITAL | Age: 64
End: 2019-11-12

## 2019-11-13 ENCOUNTER — TELEPHONE (OUTPATIENT)
Dept: ENDOSCOPY | Facility: HOSPITAL | Age: 64
End: 2019-11-13

## 2019-11-19 ENCOUNTER — OFFICE VISIT (OUTPATIENT)
Dept: PSYCHIATRY | Facility: CLINIC | Age: 64
End: 2019-11-19
Payer: COMMERCIAL

## 2019-11-19 DIAGNOSIS — F31.81 BIPOLAR II DISORDER: Primary | ICD-10-CM

## 2019-11-19 DIAGNOSIS — F41.9 ANXIETY: ICD-10-CM

## 2019-11-19 PROCEDURE — 90834 PSYTX W PT 45 MINUTES: CPT | Mod: S$GLB,,, | Performed by: SOCIAL WORKER

## 2019-11-19 PROCEDURE — 90834 PR PSYCHOTHERAPY W/PATIENT, 45 MIN: ICD-10-PCS | Mod: S$GLB,,, | Performed by: SOCIAL WORKER

## 2019-11-19 NOTE — PROGRESS NOTES
Individual Psychotherapy (PhD/LCSW)    11/19/2019    Site:  [facility]         Therapeutic Intervention: Met with patient.  Outpatient - Insight oriented psychotherapy 45 min - CPT code 26180    Chief complaint/reason for encounter: mood swings, depression, anxiety     Interval history and content of current session: Pt on time, doing well, getting a promotion at work which will resolve some boundary issues in which she feels she was taken advantage of by her current boss. 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 She has been accepted and has met with professors in the program, feels excited about this. Current routine is to leave work, eat dinner, get in bed at 7 and wind down by watching TV. She plans to attend school and do her studio and homework in the evenings. Suggest pt activate now and get in bed later, to prepare herself for new routine.     Pt c/o poor sleep, even with current meds sleeps in 30 minute stretches until about 3am and then sleeps for a few hours. Pt will meet new psychiatrist and discuss meds and sleep issues.    Treatment plan:  · Target symptoms: depression, anxiety , mood swings  · Why chosen therapy is appropriate versus another modality: relevant to diagnosis  · Outcome monitoring methods: self-report  · Therapeutic intervention type: insight oriented psychotherapy    Risk parameters:  Patient reports no suicidal ideation  Patient reports no homicidal ideation  Patient reports no self-injurious behavior  Patient reports no violent behavior    Verbal deficits: None    Patient's response to intervention:  The patient's response to intervention is motivated    Progress toward goals and other mental status changes:  The patient's progress toward goals is good    Diagnosis:   Bipolar II, Anxiety    Plan:  individual psychotherapy, family therapy    Return to clinic: as needed

## 2019-11-24 DIAGNOSIS — F31.81 BIPOLAR II DISORDER: ICD-10-CM

## 2019-11-25 RX ORDER — DIVALPROEX SODIUM 500 MG/1
TABLET, FILM COATED, EXTENDED RELEASE ORAL
Qty: 360 TABLET | Refills: 0 | Status: SHIPPED | OUTPATIENT
Start: 2019-11-25 | End: 2019-12-06 | Stop reason: SDUPTHER

## 2019-12-04 ENCOUNTER — OFFICE VISIT (OUTPATIENT)
Dept: GASTROENTEROLOGY | Facility: CLINIC | Age: 64
End: 2019-12-04
Payer: COMMERCIAL

## 2019-12-04 ENCOUNTER — TELEPHONE (OUTPATIENT)
Dept: GASTROENTEROLOGY | Facility: CLINIC | Age: 64
End: 2019-12-04

## 2019-12-04 VITALS
WEIGHT: 169 LBS | HEIGHT: 71 IN | DIASTOLIC BLOOD PRESSURE: 76 MMHG | SYSTOLIC BLOOD PRESSURE: 112 MMHG | HEART RATE: 93 BPM | BODY MASS INDEX: 23.66 KG/M2

## 2019-12-04 DIAGNOSIS — K21.9 GASTROESOPHAGEAL REFLUX DISEASE WITHOUT ESOPHAGITIS: ICD-10-CM

## 2019-12-04 DIAGNOSIS — R19.00 ABDOMINAL SWELLING: ICD-10-CM

## 2019-12-04 DIAGNOSIS — R49.0 HOARSENESS: ICD-10-CM

## 2019-12-04 DIAGNOSIS — K29.70 GASTRITIS WITHOUT BLEEDING, UNSPECIFIED CHRONICITY, UNSPECIFIED GASTRITIS TYPE: Primary | ICD-10-CM

## 2019-12-04 PROCEDURE — 99215 OFFICE O/P EST HI 40 MIN: CPT | Mod: PBBFAC | Performed by: PHYSICIAN ASSISTANT

## 2019-12-04 PROCEDURE — 99999 PR PBB SHADOW E&M-EST. PATIENT-LVL V: ICD-10-PCS | Mod: PBBFAC,,, | Performed by: PHYSICIAN ASSISTANT

## 2019-12-04 PROCEDURE — 99213 OFFICE O/P EST LOW 20 MIN: CPT | Mod: S$PBB,,, | Performed by: PHYSICIAN ASSISTANT

## 2019-12-04 PROCEDURE — 99213 PR OFFICE/OUTPT VISIT, EST, LEVL III, 20-29 MIN: ICD-10-PCS | Mod: S$PBB,,, | Performed by: PHYSICIAN ASSISTANT

## 2019-12-04 PROCEDURE — 99999 PR PBB SHADOW E&M-EST. PATIENT-LVL V: CPT | Mod: PBBFAC,,, | Performed by: PHYSICIAN ASSISTANT

## 2019-12-04 RX ORDER — PANTOPRAZOLE SODIUM 40 MG/1
40 TABLET, DELAYED RELEASE ORAL 2 TIMES DAILY
Qty: 60 TABLET | Refills: 1 | Status: ON HOLD | OUTPATIENT
Start: 2019-12-04 | End: 2021-07-22 | Stop reason: HOSPADM

## 2019-12-04 RX ORDER — PANTOPRAZOLE SODIUM 40 MG/1
40 TABLET, DELAYED RELEASE ORAL DAILY
Qty: 60 TABLET | Refills: 1 | Status: SHIPPED | OUTPATIENT
Start: 2019-12-04 | End: 2019-12-04 | Stop reason: SDUPTHER

## 2019-12-04 NOTE — PROGRESS NOTES
[facility] Consultation Note    Reason for Consult:  The primary encounter diagnosis was Gastritis without bleeding, unspecified chronicity, unspecified gastritis type. Diagnoses of Gastroesophageal reflux disease without esophagitis, Abdominal swelling, and Hoarseness were also pertinent to this visit.    PCP:   Angélica Barakat       Referring MD:  No referring provider defined for this encounter.    HPI:  This is a 63 y.o. female here to follow-up on her GERD     Taking the protonix in the morning  GERD and dysphagia have improved  Still hoarse  Acidic intake: mcdonald, lemon and     08/26/2019 visit notes  Duration- 1 yr  + regurg   +hoarseness, + Pnd  + dysphagia x 6 months - food, wine, pills  No N, V, abd pain    Acid intake: tomatoes, radames, carbonated water    More frequent constipation since starting the ideal protein diet  Colonoscopy due in Dec 2019, history of colon polyps    Currently being treated for a sinus infection, experiencing some dizziness    ROS:  Constitutional: No fevers, chills, No weight loss  ENT: No allergies  CV: No chest pain  Pulm: No cough, No shortness of breath  Ophtho: No vision changes  GI: see HPI  Derm: No rash  Heme: No lymphadenopathy, No bruising  MSK: No arthritis  : No dysuria, No hematuria  Endo: No hot or cold intolerance  Neuro: No syncope, No seizure  Psych: No anxiety, No depression    Medical History:  has a past medical history of Anxiety, Anxiety, Bipolar 1 disorder, Depression, Diabetes mellitus, GERD (gastroesophageal reflux disease), HEARING LOSS, Insomnia, and Rash.    Surgical History:  has a past surgical history that includes Appendectomy; Hysterectomy; Colonoscopy; Cyst Removal; Esophagogastroduodenoscopy (N/A, 10/31/2019); and Colonoscopy (N/A, 10/31/2019).    Family History: family history includes Alzheimer's disease in her mother; Colon cancer in her maternal aunt; Heart failure in her father; No Known Problems in her sister;  "Sarcoidosis in her sister; Sinus disease in her mother..     Social History:  reports that she quit smoking about 15 years ago. Her smoking use included cigarettes. She has a 8.50 pack-year smoking history. She has never used smokeless tobacco. She reports that she drinks alcohol. She reports that she does not use drugs.    Review of patient's allergies indicates:   Allergen Reactions    Lamictal [lamotrigine] Rash     Per psychiatry notes       Current Outpatient Medications on File Prior to Visit   Medication Sig Dispense Refill    busPIRone (BUSPAR) 15 MG tablet Take 2 tablets (30 mg total) by mouth 2 (two) times daily. 360 tablet 0    cyclobenzaprine (FLEXERIL) 10 MG tablet Take 10 mg by mouth daily as needed.       divalproex ER (DEPAKOTE) 500 MG Tb24 TAKE 4 TABLETS BY MOUTH EVERY EVENING 360 tablet 0    escitalopram oxalate (LEXAPRO) 20 MG tablet Take 1 tablet (20 mg total) by mouth once daily. 90 tablet 0    gabapentin (NEURONTIN) 300 MG capsule TK ONE C PO  TID  2    hydrOXYzine pamoate (VISTARIL) 25 MG Cap Take one twice daily as needed for anxiety and one or two at bedtime as needed for sleep. 360 capsule 0    levothyroxine (SYNTHROID) 100 MCG tablet Take 100 mcg by mouth once daily.   3    metformin (GLUCOPHAGE) 500 MG tablet Take 500 mg by mouth.      ONETOUCH DELICA LANCETS 30 gauge Misc TEST BID UTD  0    ONETOUCH ULTRA TEST Strp TEST BID UTD.  0    traMADol (ULTRAM) 50 mg tablet TK 1 TABLET PO EVERY 4 TO 6 HOURS  0    CRESTOR 20 mg tablet Take 20 mg by mouth once daily.   3    diclofenac (CATAFLAM) 50 MG tablet TK 1 T PO  TID PRN P  2     No current facility-administered medications on file prior to visit.          Objective Findings:    Vital Signs:  /76   Pulse 93   Ht 5' 11" (1.803 m)   Wt 76.7 kg (169 lb)   BMI 23.57 kg/m²   Body mass index is 23.57 kg/m².    Physical Exam:  General Appearance: Well appearing in no acute distress  Head:   Normocephalic, without obvious " abnormality  Eyes:    No scleral icterus  ENT: Neck supple, Lips, mucosa, and tongue normal  Lungs: CTA bilaterally in anterior and posterior fields, no wheezes, no crackles.  Heart:  Regular rate and rhythm, S1, S2 normal, no murmurs heard  Abdomen: Soft, mild RLQ tenderness, non distended with positive bowel sounds in all four quadrants.   Extremities: no edema  Skin: No rash  Neurologic: AAO x 3      Labs:  Lab Results   Component Value Date    WBC 5.27 08/22/2019    HGB 12.9 08/22/2019    HCT 40.0 08/22/2019     08/22/2019    CHOL 222 (H) 09/12/2013    TRIG 192 (H) 09/12/2013    HDL 34 (L) 09/12/2013    ALT 28 08/22/2019    AST 32 08/22/2019     08/22/2019    K 4.4 08/22/2019     08/22/2019    CREATININE 0.8 08/22/2019    BUN 14 08/22/2019    CO2 28 08/22/2019    TSH 0.753 05/04/2015    INR 1.1 04/26/2017    HGBA1C 6.7 (H) 04/27/2017       Imaging:    Endoscopy:    10/31/2019 EGD-irregular Z-line ( biopsy revealed reflux, negative for Vernon's), erosive gastritis  10/31/2019 colonoscopy-fair prep, 1 polyp, diverticulosis, follow-up in 1 year  Assessment:  1. Gastritis without bleeding, unspecified chronicity, unspecified gastritis type    2. Gastroesophageal reflux disease without esophagitis    3. Abdominal swelling    4. Hoarseness       63-year-old female presented with GERD, supraclavicular dysphagia x1 year.  Now reporting significant improvement in her GERD and dysphagia since starting Protonix 40 mg.  She is still having hoarseness, possible LPR    Recommendations:  1.  CT scan to rule out ventral hernia. Will cancel ABD US  2.  Increased Protonix 40 mg to twice daily    No follow-ups on file.      Order summary:  Orders Placed This Encounter    US Abdomen Limited    CT Abdomen Pelvis With Contrast    CREATININE, SERUM         Thank you so much for allowing me to participate in the care of Alix Patel    Kierra Brand PA-C

## 2019-12-04 NOTE — PATIENT INSTRUCTIONS
Take your synthroid right when you wake up. Take the pantoprazole around 8:00-8:30 (30mins before you eat breakfast)

## 2019-12-04 NOTE — TELEPHONE ENCOUNTER
----- Message from Estrella Miller MA sent at 12/4/2019  3:41 PM CST -----  Contact: Kathryn galindo): [phone]      ----- Message -----  From: Petrona Clemens  Sent: 12/4/2019   2:59 PM CST  To: Sunday [facility] Staff    Called to clarify the directions for pantoprazole (PROTONIX) 40 MG     Please contact Kathryn galindo): [phone]

## 2019-12-06 ENCOUNTER — OFFICE VISIT (OUTPATIENT)
Dept: PSYCHIATRY | Facility: CLINIC | Age: 64
End: 2019-12-06
Payer: COMMERCIAL

## 2019-12-06 VITALS
WEIGHT: 172.5 LBS | SYSTOLIC BLOOD PRESSURE: 106 MMHG | HEART RATE: 104 BPM | BODY MASS INDEX: 24.06 KG/M2 | DIASTOLIC BLOOD PRESSURE: 67 MMHG

## 2019-12-06 DIAGNOSIS — F10.21 ALCOHOL USE DISORDER, MODERATE, IN SUSTAINED REMISSION: ICD-10-CM

## 2019-12-06 DIAGNOSIS — F32.A DEPRESSION, UNSPECIFIED DEPRESSION TYPE: Primary | ICD-10-CM

## 2019-12-06 DIAGNOSIS — F31.81 BIPOLAR II DISORDER: ICD-10-CM

## 2019-12-06 PROCEDURE — 90791 PR PSYCHIATRIC DIAGNOSTIC EVALUATION: ICD-10-PCS | Mod: S$GLB,,, | Performed by: PSYCHIATRY & NEUROLOGY

## 2019-12-06 PROCEDURE — 90791 PSYCH DIAGNOSTIC EVALUATION: CPT | Mod: S$GLB,,, | Performed by: PSYCHIATRY & NEUROLOGY

## 2019-12-06 PROCEDURE — 99999 PR PBB SHADOW E&M-EST. PATIENT-LVL II: CPT | Mod: PBBFAC,,, | Performed by: PSYCHIATRY & NEUROLOGY

## 2019-12-06 PROCEDURE — 99999 PR PBB SHADOW E&M-EST. PATIENT-LVL II: ICD-10-PCS | Mod: PBBFAC,,, | Performed by: PSYCHIATRY & NEUROLOGY

## 2019-12-06 RX ORDER — BUSPIRONE HYDROCHLORIDE 15 MG/1
30 TABLET ORAL 2 TIMES DAILY
Qty: 120 TABLET | Refills: 3 | Status: SHIPPED | OUTPATIENT
Start: 2019-12-06 | End: 2020-05-05 | Stop reason: DRUGHIGH

## 2019-12-06 RX ORDER — HYDROXYZINE PAMOATE 25 MG/1
CAPSULE ORAL
Qty: 360 CAPSULE | Refills: 0 | Status: SHIPPED | OUTPATIENT
Start: 2019-12-06 | End: 2020-02-18

## 2019-12-06 RX ORDER — ESCITALOPRAM OXALATE 20 MG/1
20 TABLET ORAL DAILY
Qty: 90 TABLET | Refills: 1 | Status: SHIPPED | OUTPATIENT
Start: 2019-12-06 | End: 2020-05-05 | Stop reason: SDUPTHER

## 2019-12-06 RX ORDER — DIVALPROEX SODIUM 500 MG/1
2000 TABLET, FILM COATED, EXTENDED RELEASE ORAL NIGHTLY
Qty: 360 TABLET | Refills: 1 | Status: SHIPPED | OUTPATIENT
Start: 2019-12-06 | End: 2021-01-11 | Stop reason: SDUPTHER

## 2019-12-06 NOTE — PROGRESS NOTES
"Outpatient Psychiatry Initial Visit (MD/NP)    12/6/2019    Alix Patel, a 63 y.o. female, for initial evaluation visit.  Patient is referred by Angélica Barakat MD       Reason for Encounter: Referral for treatment    Complaints:  She has been experiencing depression and generalized anxiety. Ms Patel was seen today for an initial evaluation of her bipolar disorder now characterized by more depressive symptoms manifested as sadnes and a sense of being defeated. She has had true manic episodes in the past, but no symptoms of mallory or psychosis at this time. She is in good self control and has no thoughts of harming herself or others, and no history of suicide attempts. She had one hospitalization in 2001, but not since that time. She is also concerned re her anxiety level now. 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 She denies med side effects but has not been taking her Buspar for over a month. I checked her last valproate level from August this year and it was low. I plan to repeat that early next year when she comes back in January. Ms Patel is a  and has one daughter. She is very bright, and admitted to a drinking problem in the past which is now in good control. She is motivated to improve her mood at this time.    Current Medications:  Medication List with Changes/Refills   Current Medications    CRESTOR 20 MG TABLET    Take 20 mg by mouth once daily.     CYCLOBENZAPRINE (FLEXERIL) 10 MG TABLET    Take 10 mg by mouth daily as needed.     DICLOFENAC (CATAFLAM) 50 MG TABLET    TK 1 T PO  TID PRN P    GABAPENTIN (NEURONTIN) 300 MG CAPSULE    TK ONE C PO  TID    LEVOTHYROXINE (SYNTHROID) 100 MCG TABLET    Take 100 mcg by mouth once daily.     METFORMIN (GLUCOPHAGE) 500 MG TABLET    Take 500 mg by mouth.    ONETOUCH DELICA LANCETS 30 GAUGE MISC    TEST BID UTD    ONETOUCH ULTRA TEST STRP    TEST BID UTD. "    PANTOPRAZOLE (PROTONIX) 40 MG TABLET    Take 1 tablet (40 mg total) by mouth 2 (two) times daily. 30mins before breakfast and 30mins before dinner    TRAMADOL (ULTRAM) 50 MG TABLET    TK 1 TABLET PO EVERY 4 TO 6 HOURS   Changed and/or Refilled Medications    Modified Medication Previous Medication    BUSPIRONE (BUSPAR) 15 MG TABLET busPIRone (BUSPAR) 15 MG tablet       Take 2 tablets (30 mg total) by mouth 2 (two) times daily.    Take 2 tablets (30 mg total) by mouth 2 (two) times daily.    DIVALPROEX ER (DEPAKOTE) 500 MG TB24 divalproex ER (DEPAKOTE) 500 MG Tb24       Take 4 tablets (2,000 mg total) by mouth every evening.    TAKE 4 TABLETS BY MOUTH EVERY EVENING    ESCITALOPRAM OXALATE (LEXAPRO) 20 MG TABLET escitalopram oxalate (LEXAPRO) 20 MG tablet       Take 1 tablet (20 mg total) by mouth once daily.    Take 1 tablet (20 mg total) by mouth once daily.    HYDROXYZINE PAMOATE (VISTARIL) 25 MG CAP hydrOXYzine pamoate (VISTARIL) 25 MG Cap       Take one twice daily as needed for anxiety and one or two at bedtime as needed for sleep.    Take one twice daily as needed for anxiety and one or two at bedtime as needed for sleep.        Stressors:  Mood instability and current anxiety and depression    History:     Past Psychiatric History:   Previous therapy: yes  Previous psychiatric treatment and medication trials: yes  Previous psychiatric hospitalizations: yes  Previous diagnoses: yes  Previous suicide attempts: no  History of violence: no  Currently in treatment with myself.  Education: some college  Other pertinent history: None  Depression screening was performed with standardized tool: Not Screened - Not Eligible/Appropriate    Substance Abuse History:  Recreational drugs: no recreational drug use  Use of alcohol: occasional, social use  Use of caffeine: coffee normal use /day  Tobacco use: no  Legal consequences of chemical use: no  Patient feels she ought to cut down on drinking and/or drug use:  no  Patient has been annoyed by others criticizing her drinking or drug use: no  Patient has felt bad or guilty about drinking or drug use:no  Patient has had a drink or used drugs as an eye opener first thing in the morning to steady nerves, get rid of a hangover or get the day started: no  Use of OTC medications: Not known    The following portions of the patient's history were reviewed and updated as appropriate: allergies, current medications, past family history, past medical history, past social history, past surgical history and problem list.    Record Review: moderate      Review Of Systems:     Medical Review Of Systems:  ROS     Psychiatric Review Of Systems:  Sleep: no  Appetite changes: no  Weight changes: yes  Energy: yes  Interest/pleasure/anhedonia: yes  Somatic symptoms: no  Libido: not questioned  Anxiety/panic: yes  Guilty/hopeless: no  Self-injurious behavior/risky behavior: no  Any drugs: no  Alcohol: no     Current Evaluation:       Mental Status Evaluation:  Appearance:  unremarkable, age appropriate, casually dressed, neatly groomed   Behavior:  normal, cooperative   Speech:  no latency; no press, spontaneous   Mood:  depressed   Affect:  congruent and appropriate   Thought Process:  normal and logical   Thought Content:  normal, no suicidality, no homicidality, delusions, or paranoia   Sensorium:  grossly intact   Cognition:  grossly intact   Insight:  has awareness of illness   Judgment:  behavior is adequate to circumstances     SIGECAPS  Sleep:   Interest:   Guilt:   Energy:   Concentration:   Appetite:   Psychomotor agitation:   Suicidal intentions: no  Suicidal plan: no    Physical/Somatic Complaints  The patient lists: no physical complaints.    Functioning in Relationships:  Spouse/partner:   Peers:   Employers:       Assessment - Diagnosis - Goals:       ICD-10-CM ICD-9-CM   1. Depression, unspecified depression type F32.9 311   2. Bipolar II disorder F31.81 296.89       Treatment  Goals:  Specify outcomes written in observable, behavioral terms:   Improve mood with medications and suporitive guidance re coping skills    Treatment Plan/Recommendations: Patient agreed to next appointment in January next year, and to continue: Depakote 2000 mg q hs, Lexapro 20 mg daily, Vistaril 25 mg bid prn anxiety, and to restart Buspar 30 mg bid. I reviewed the side effects of her medicines and advised she could call if she had problems with the medicines or her clinical condition.  Follow-up plan for depression was discussed with patient.    Review with patient: Treatment plan reviewed with the patient.  Medication risks/benefit reviewed with the patient    Stephen Sims Jr

## 2019-12-09 ENCOUNTER — DOCUMENTATION ONLY (OUTPATIENT)
Dept: PSYCHIATRY | Facility: CLINIC | Age: 64
End: 2019-12-09

## 2019-12-20 ENCOUNTER — OFFICE VISIT (OUTPATIENT)
Dept: CARDIOLOGY | Facility: CLINIC | Age: 64
End: 2019-12-20
Payer: COMMERCIAL

## 2019-12-20 ENCOUNTER — TELEPHONE (OUTPATIENT)
Dept: GASTROENTEROLOGY | Facility: CLINIC | Age: 64
End: 2019-12-20

## 2019-12-20 ENCOUNTER — LAB VISIT (OUTPATIENT)
Dept: LAB | Facility: HOSPITAL | Age: 64
End: 2019-12-20
Attending: INTERNAL MEDICINE
Payer: COMMERCIAL

## 2019-12-20 VITALS
HEIGHT: 71 IN | BODY MASS INDEX: 23.61 KG/M2 | OXYGEN SATURATION: 98 % | HEART RATE: 78 BPM | DIASTOLIC BLOOD PRESSURE: 65 MMHG | WEIGHT: 168.63 LBS | SYSTOLIC BLOOD PRESSURE: 99 MMHG

## 2019-12-20 DIAGNOSIS — R42 DIZZINESS: ICD-10-CM

## 2019-12-20 DIAGNOSIS — R07.9 CHEST PAIN, UNSPECIFIED TYPE: Primary | ICD-10-CM

## 2019-12-20 DIAGNOSIS — R42 EPISODIC LIGHTHEADEDNESS: ICD-10-CM

## 2019-12-20 DIAGNOSIS — E11.65 CONTROLLED TYPE 2 DIABETES MELLITUS WITH HYPERGLYCEMIA, WITHOUT LONG-TERM CURRENT USE OF INSULIN: ICD-10-CM

## 2019-12-20 DIAGNOSIS — E78.5 DYSLIPIDEMIA: ICD-10-CM

## 2019-12-20 LAB
CHOLEST SERPL-MCNC: 272 MG/DL (ref 120–199)
CHOLEST/HDLC SERPL: 5.9 {RATIO} (ref 2–5)
HDLC SERPL-MCNC: 46 MG/DL (ref 40–75)
HDLC SERPL: 16.9 % (ref 20–50)
LDLC SERPL CALC-MCNC: 187.8 MG/DL (ref 63–159)
NONHDLC SERPL-MCNC: 226 MG/DL
TRIGL SERPL-MCNC: 191 MG/DL (ref 30–150)

## 2019-12-20 PROCEDURE — 3008F BODY MASS INDEX DOCD: CPT | Mod: CPTII,S$GLB,, | Performed by: INTERNAL MEDICINE

## 2019-12-20 PROCEDURE — 99204 PR OFFICE/OUTPT VISIT, NEW, LEVL IV, 45-59 MIN: ICD-10-PCS | Mod: S$GLB,,, | Performed by: INTERNAL MEDICINE

## 2019-12-20 PROCEDURE — 93000 EKG 12-LEAD: ICD-10-PCS | Mod: S$GLB,,, | Performed by: INTERNAL MEDICINE

## 2019-12-20 PROCEDURE — 99999 PR PBB SHADOW E&M-EST. PATIENT-LVL IV: ICD-10-PCS | Mod: PBBFAC,,, | Performed by: INTERNAL MEDICINE

## 2019-12-20 PROCEDURE — 36415 COLL VENOUS BLD VENIPUNCTURE: CPT

## 2019-12-20 PROCEDURE — 99204 OFFICE O/P NEW MOD 45 MIN: CPT | Mod: S$GLB,,, | Performed by: INTERNAL MEDICINE

## 2019-12-20 PROCEDURE — 93000 ELECTROCARDIOGRAM COMPLETE: CPT | Mod: S$GLB,,, | Performed by: INTERNAL MEDICINE

## 2019-12-20 PROCEDURE — 80061 LIPID PANEL: CPT

## 2019-12-20 PROCEDURE — 3008F PR BODY MASS INDEX (BMI) DOCUMENTED: ICD-10-PCS | Mod: CPTII,S$GLB,, | Performed by: INTERNAL MEDICINE

## 2019-12-20 PROCEDURE — 99999 PR PBB SHADOW E&M-EST. PATIENT-LVL IV: CPT | Mod: PBBFAC,,, | Performed by: INTERNAL MEDICINE

## 2019-12-20 NOTE — TELEPHONE ENCOUNTER
Ma spoke with pt appt scheduled  Pt states she was calling to schedule her ultrasound and will wait for follow up

## 2019-12-20 NOTE — PROGRESS NOTES
"Subjective:   Patient ID:  Alix Patel is a 64 y.o. female who presents for evaluation of Shortness of Breath; Excessive Sweating; and Dizziness      HPI:   Alix Patel presents for evaluation of several symptoms. States that 10-12 times a day she will have a pressure sensation in her upper chest associated with a feeling of lightheaded . Occurs in " waves" and lasts 30 secs.. Present for the past several months. In addition, she will have episodes of feeling winded, sweat profusely, and become dizzy losing her balance with the room spinning. These occur throughout the day both at rest and with exertion. Has been present for 2 years usually lasting a minute . Occasional ' cramp " in her chest walking stairs.  She walks 4 times a week and does not have the symptoms then but notes she cannot keep up with the others who are doing  walk,jog, sprint intervals. Alix Patel has dyslipidemia with lipids not checked in years. Alix Patel has diabetes.  Review of Systems   Constitution: Negative for malaise/fatigue, weight gain and weight loss.   HENT:        Chronic siusitis.   Eyes: Negative for blurred vision.   Cardiovascular: Positive for chest pain. Negative for claudication, cyanosis, dyspnea on exertion, irregular heartbeat, leg swelling, near-syncope, orthopnea, palpitations, paroxysmal nocturnal dyspnea and syncope.   Respiratory: Positive for shortness of breath. Negative for cough and wheezing.    Musculoskeletal: Negative for falls and myalgias.   Gastrointestinal: Negative for abdominal pain, heartburn, nausea and vomiting.   Genitourinary: Negative for nocturia.   Neurological: Positive for dizziness and light-headedness. Negative for brief paralysis, focal weakness, headaches, numbness, paresthesias and weakness.   Psychiatric/Behavioral: Negative for altered mental status.        Bipolar       Current Outpatient Medications   Medication Sig    busPIRone (BUSPAR) 15 MG " "tablet Take 2 tablets (30 mg total) by mouth 2 (two) times daily.    CRESTOR 20 mg tablet Take 20 mg by mouth once daily.     cyclobenzaprine (FLEXERIL) 10 MG tablet Take 10 mg by mouth daily as needed.     diclofenac (CATAFLAM) 50 MG tablet TK 1 T PO  TID PRN P    divalproex ER (DEPAKOTE) 500 MG Tb24 Take 4 tablets (2,000 mg total) by mouth every evening.    escitalopram oxalate (LEXAPRO) 20 MG tablet Take 1 tablet (20 mg total) by mouth once daily.    gabapentin (NEURONTIN) 300 MG capsule TK ONE C PO  TID    hydrOXYzine pamoate (VISTARIL) 25 MG Cap Take one twice daily as needed for anxiety and one or two at bedtime as needed for sleep.    levothyroxine (SYNTHROID) 100 MCG tablet Take 100 mcg by mouth once daily.     metformin (GLUCOPHAGE) 500 MG tablet Take 500 mg by mouth 2 (two) times daily with meals.     multivitamin capsule Take 1 capsule by mouth once daily.    ONETOUCH DELICA LANCETS 30 gauge Misc TEST BID UTD    ONETOUCH ULTRA TEST Strp TEST BID UTD.    pantoprazole (PROTONIX) 40 MG tablet Take 1 tablet (40 mg total) by mouth 2 (two) times daily. 30mins before breakfast and 30mins before dinner    traMADol (ULTRAM) 50 mg tablet TK 1 TABLET PO EVERY 4 TO 6 HOURS     No current facility-administered medications for this visit.      Objective:   Physical Exam   Constitutional: She is oriented to person, place, and time. She appears well-developed. No distress.   BP 99/65 (BP Location: Left arm, Patient Position: Sitting, BP Method: Medium (Automatic))   Pulse 78   Ht 5' 11" (1.803 m)   Wt 76.5 kg (168 lb 10.4 oz)   SpO2 98%   BMI 23.52 kg/m²    HENT:   Head: Normocephalic.   Eyes: Pupils are equal, round, and reactive to light. Conjunctivae are normal. No scleral icterus.   Neck: Neck supple. No JVD present. No thyromegaly present.   Cardiovascular: Normal rate, regular rhythm, normal heart sounds and intact distal pulses. PMI is not displaced. Exam reveals no gallop and no friction rub. "   No murmur heard.  Pulmonary/Chest: Effort normal and breath sounds normal. No respiratory distress. She has no wheezes. She has no rales.   Abdominal: Soft. She exhibits no distension. There is no splenomegaly or hepatomegaly. There is no tenderness.   Musculoskeletal: She exhibits no edema or tenderness.   Gait normal   Neurological: She is alert and oriented to person, place, and time.   Skin: Skin is warm and dry. She is not diaphoretic.   Psychiatric: She has a normal mood and affect. Her behavior is normal.       Lab Results   Component Value Date     08/22/2019    K 4.4 08/22/2019     08/22/2019    CO2 28 08/22/2019    BUN 14 08/22/2019    CREATININE 0.8 08/22/2019     (H) 08/22/2019    HGBA1C 6.7 (H) 04/27/2017    MG 2.0 04/27/2017    AST 32 08/22/2019    ALT 28 08/22/2019    ALBUMIN 4.3 08/22/2019    PROT 7.6 08/22/2019    BILITOT 0.4 08/22/2019    WBC 5.27 08/22/2019    HGB 12.9 08/22/2019    HCT 40.0 08/22/2019    MCV 94 08/22/2019     08/22/2019    TSH 0.753 05/04/2015    CHOL 222 (H) 09/12/2013    HDL 34 (L) 09/12/2013    LDLCALC 149.6 09/12/2013    TRIG 192 (H) 09/12/2013       Assessment:     1. Chest pain, unspecified type ; chest pressure that is atypical for myocardial ischemia but has risk factors   2. Episodic lightheadedness ; R/O dysrhythmia   3. Dyslipidemia :  on high intensity statin   4. Controlled type 2 diabetes mellitus with hyperglycemia, without long-term current use of insulin    5. Dizziness ; Suggest vertigo with known chronic sinusitis       Plan:     Alix was seen today for shortness of breath, excessive sweating and dizziness.    Diagnoses and all orders for this visit:    Chest pain, unspecified type  -     Nuclear Stress - Cardiology Interpreted; Future  -     EKG 12-lead; Future    Episodic lightheadedness  -     Holter monitor - 48 hour; Future    Dyslipidemia  -     Lipid panel; Future; Expected date: 12/20/2019  Mediterranean Diet recommended  with carbohydrate restriction  Controlled type 2 diabetes mellitus with hyperglycemia, without long-term current use of insulin    Dizziness  Suggest she discuss sxs with her ENT  Other orders  -     multivitamin capsule; Take 1 capsule by mouth once daily.

## 2019-12-20 NOTE — LETTER
December 20, 2019      Angélica Barakat MD  [address] [facility] [address]           [facility]  [address]  [place] [address]  Phone: [phone]          Patient: Alix Patel   MR Number: [id number]   YOB: 1955   Date of Visit: 12/20/2019       Dear Dr. Angélica Barakat:    Thank you for referring Alix Patel to me for evaluation. Attached you will find relevant portions of my assessment and plan of care.    If you have questions, please do not hesitate to call me. I look forward to following Alix Patel along with you.    Sincerely,    Heraclio Morrison MD    Enclosure  CC:  No Recipients    If you would like to receive this communication electronically, please contact [email or web address] or [phone] to request more information on [employer] access.    For providers and/or their staff who would like to refer a patient to [facility], please contact us through our one-stop-shop provider referral line, [facility], at [phone].    If you feel you have received this communication in error or would no longer like to receive these types of communications, please e-mail [email or web address]

## 2019-12-20 NOTE — TELEPHONE ENCOUNTER
----- Message from Jacquelyn Lay MA sent at 12/19/2019  3:56 PM CST -----  Regarding: Appointment   Patient was seen by Sunday. Patient needs to be seen by MD for follow up.

## 2019-12-27 ENCOUNTER — TELEPHONE (OUTPATIENT)
Dept: CARDIOLOGY | Facility: CLINIC | Age: 64
End: 2019-12-27

## 2019-12-31 ENCOUNTER — CLINICAL SUPPORT (OUTPATIENT)
Dept: CARDIOLOGY | Facility: CLINIC | Age: 64
End: 2019-12-31
Attending: INTERNAL MEDICINE
Payer: COMMERCIAL

## 2019-12-31 ENCOUNTER — CLINICAL SUPPORT (OUTPATIENT)
Dept: CARDIOLOGY | Facility: HOSPITAL | Age: 64
End: 2019-12-31
Attending: INTERNAL MEDICINE
Payer: COMMERCIAL

## 2019-12-31 DIAGNOSIS — R07.9 CHEST PAIN, UNSPECIFIED TYPE: ICD-10-CM

## 2019-12-31 DIAGNOSIS — R42 EPISODIC LIGHTHEADEDNESS: ICD-10-CM

## 2019-12-31 LAB
CV PHARM DOSE: 0.4 MG
CV STRESS BASE HR: 77 BPM
DIASTOLIC BLOOD PRESSURE: 79 MMHG
END DIASTOLIC INDEX-HIGH: 170 ML/M2
END SYSTOLIC INDEX-HIGH: 70 ML/M2
NUC REST DIASTOLIC VOLUME INDEX: 31
NUC REST EJECTION FRACTION: 90
NUC REST SYSTOLIC VOLUME INDEX: 3
NUC STRESS DIASTOLIC VOLUME INDEX: 24
NUC STRESS EJECTION FRACTION: 90 %
NUC STRESS SYSTOLIC VOLUME INDEX: 2
OHS CV CPX 1 MINUTE RECOVERY HEART RATE: 93 BPM
OHS CV CPX 85 PERCENT MAX PREDICTED HEART RATE MALE: 127
OHS CV CPX MAX PREDICTED HEART RATE: 150
OHS CV CPX PATIENT IS FEMALE: 1
OHS CV CPX PATIENT IS MALE: 0
OHS CV CPX PEAK DIASTOLIC BLOOD PRESSURE: 83 MMHG
OHS CV CPX PEAK HEAR RATE: 80 BPM
OHS CV CPX PEAK RATE PRESSURE PRODUCT: 9120
OHS CV CPX PEAK SYSTOLIC BLOOD PRESSURE: 114 MMHG
OHS CV CPX PERCENT MAX PREDICTED HEART RATE ACHIEVED: 53
OHS CV CPX RATE PRESSURE PRODUCT PRESENTING: 7854
RETIRED EF AND QEF - SEE NOTES: 51 %
STRESS ECHO TARGET HR: 133 BPM
SYSTOLIC BLOOD PRESSURE: 102 MMHG

## 2019-12-31 PROCEDURE — 93227 HOLTER MONITOR - 48 HOUR (CUPID ONLY): ICD-10-PCS | Mod: ,,, | Performed by: INTERNAL MEDICINE

## 2019-12-31 PROCEDURE — 93226 XTRNL ECG REC<48 HR SCAN A/R: CPT

## 2019-12-31 PROCEDURE — 78452 HT MUSCLE IMAGE SPECT MULT: CPT | Mod: S$GLB,,, | Performed by: INTERNAL MEDICINE

## 2019-12-31 PROCEDURE — 93015 CV STRESS TEST SUPVJ I&R: CPT | Mod: S$GLB,,, | Performed by: INTERNAL MEDICINE

## 2019-12-31 PROCEDURE — 78452 STRESS TEST WITH MYOCARDIAL PERFUSION (CUPID ONLY): ICD-10-PCS | Mod: S$GLB,,, | Performed by: INTERNAL MEDICINE

## 2019-12-31 PROCEDURE — A9502 STRESS TEST WITH MYOCARDIAL PERFUSION (CUPID ONLY): ICD-10-PCS | Mod: S$GLB,,, | Performed by: INTERNAL MEDICINE

## 2019-12-31 PROCEDURE — 93015 STRESS TEST WITH MYOCARDIAL PERFUSION (CUPID ONLY): ICD-10-PCS | Mod: S$GLB,,, | Performed by: INTERNAL MEDICINE

## 2019-12-31 PROCEDURE — A9502 TC99M TETROFOSMIN: HCPCS | Mod: S$GLB,,, | Performed by: INTERNAL MEDICINE

## 2019-12-31 PROCEDURE — 93227 XTRNL ECG REC<48 HR R&I: CPT | Mod: ,,, | Performed by: INTERNAL MEDICINE

## 2019-12-31 RX ORDER — AMINOPHYLLINE 25 MG/ML
75 INJECTION, SOLUTION INTRAVENOUS
Status: COMPLETED | OUTPATIENT
Start: 2019-12-31 | End: 2019-12-31

## 2019-12-31 RX ORDER — REGADENOSON 0.08 MG/ML
0.4 INJECTION, SOLUTION INTRAVENOUS
Status: COMPLETED | OUTPATIENT
Start: 2019-12-31 | End: 2019-12-31

## 2019-12-31 RX ADMIN — AMINOPHYLLINE 75 MG: 25 INJECTION, SOLUTION INTRAVENOUS at 08:12

## 2019-12-31 RX ADMIN — REGADENOSON 0.4 MG: 0.08 INJECTION, SOLUTION INTRAVENOUS at 08:12

## 2020-01-03 LAB
OHS CV EVENT MONITOR DAY: 0
OHS CV HOLTER LENGTH DECIMAL HOURS: 24
OHS CV HOLTER LENGTH HOURS: 24
OHS CV HOLTER LENGTH MINUTES: 0

## 2020-01-07 ENCOUNTER — OFFICE VISIT (OUTPATIENT)
Dept: OTOLARYNGOLOGY | Facility: CLINIC | Age: 65
End: 2020-01-07
Payer: COMMERCIAL

## 2020-01-07 VITALS — HEART RATE: 95 BPM | SYSTOLIC BLOOD PRESSURE: 113 MMHG | DIASTOLIC BLOOD PRESSURE: 72 MMHG

## 2020-01-07 DIAGNOSIS — J31.1 CHRONIC NASOPHARYNGITIS: Primary | ICD-10-CM

## 2020-01-07 DIAGNOSIS — R05.9 COUGH: ICD-10-CM

## 2020-01-07 DIAGNOSIS — R49.0 DYSPHONIA: ICD-10-CM

## 2020-01-07 PROCEDURE — 99999 PR PBB SHADOW E&M-EST. PATIENT-LVL III: CPT | Mod: PBBFAC,,, | Performed by: OTOLARYNGOLOGY

## 2020-01-07 PROCEDURE — 87076 CULTURE ANAEROBE IDENT EACH: CPT

## 2020-01-07 PROCEDURE — 31231 NASAL/SINUS ENDOSCOPY: ICD-10-PCS | Mod: S$GLB,,, | Performed by: OTOLARYNGOLOGY

## 2020-01-07 PROCEDURE — 87075 CULTR BACTERIA EXCEPT BLOOD: CPT

## 2020-01-07 PROCEDURE — 99214 PR OFFICE/OUTPT VISIT, EST, LEVL IV, 30-39 MIN: ICD-10-PCS | Mod: 25,S$GLB,, | Performed by: OTOLARYNGOLOGY

## 2020-01-07 PROCEDURE — 87070 CULTURE OTHR SPECIMN AEROBIC: CPT

## 2020-01-07 PROCEDURE — 31231 NASAL ENDOSCOPY DX: CPT | Mod: S$GLB,,, | Performed by: OTOLARYNGOLOGY

## 2020-01-07 PROCEDURE — 99214 OFFICE O/P EST MOD 30 MIN: CPT | Mod: 25,S$GLB,, | Performed by: OTOLARYNGOLOGY

## 2020-01-07 PROCEDURE — 99999 PR PBB SHADOW E&M-EST. PATIENT-LVL III: ICD-10-PCS | Mod: PBBFAC,,, | Performed by: OTOLARYNGOLOGY

## 2020-01-07 NOTE — PROGRESS NOTES
Subjective:      Alix is a 64 y.o. female who comes for follow-up of postnasal drip.  Her last visit with me was on 8/13/2019.  Used levofloxacin in nebulized form for about 2 months, worked well, improved symptoms, then Nasoneb machine broke, has been using Navage without antibiotic since then.  Ongoing dysphonia, postnasal drip of scant thick mucus, nasal congestion.    SNOT Total Score : (P) 77  NOSE Scale Percentage : (P) 100  Mean Item Score : (P) 5    The patient's medications, allergies, past medical, surgical, social and family histories were reviewed and updated as appropriate.    A detailed review of systems was obtained with pertinent positives as per the above HPI, and otherwise negative.        Objective:     /72   Pulse 95        Constitutional:   She appears well-developed. She is cooperative.     Head:  Normocephalic.     Nose:  No mucosal edema, rhinorrhea, septal deviation or polyps. No epistaxis. Turbinates normal, no turbinate masses and no turbinate hypertrophy.  Right sinus exhibits no maxillary sinus tenderness and no frontal sinus tenderness. Left sinus exhibits no maxillary sinus tenderness and no frontal sinus tenderness.     Mouth/Throat  Oropharynx clear and moist without lesions or asymmetry. No oropharyngeal exudate or posterior oropharyngeal erythema.     Neck:  No adenopathy. Normal range of motion present.     She has no cervical adenopathy.       Procedure    Nasal endoscopy performed.  See procedure note.     Left nasal valve     Left MT     Nasopharynx, swabbed for culture        Data Reviewed    WBC (K/uL)   Date Value   08/22/2019 5.27     Eosinophil% (%)   Date Value   10/18/2018 1.8     Eos # (K/uL)   Date Value   10/18/2018 0.1     Platelets (K/uL)   Date Value   08/22/2019 228     Glucose (mg/dL)   Date Value   08/22/2019 222 (H)     No results found for: IGE    No sinus imaging available.      Assessment:     1. Chronic nasopharyngitis    2. Cough    3.  Dysphonia         Plan:     Cultures taken, will call in new antibiotics vs resume levofloxacin in Navage.  Discussed possible curettage if not improved.  Follow up in about 2 months (around 3/7/2020).

## 2020-01-07 NOTE — PROCEDURES
Nasal/sinus endoscopy  Date/Time: 1/7/2020 1:45 PM  Performed by: Lb Horta MD  Authorized by: Lb Horta MD     Consent Done?:  Yes (Verbal)  Anesthesia:     Local anesthetic:  Lidocaine 4% and Gerardo-Synephrine 1/2%    Patient tolerance:  Patient tolerated the procedure well with no immediate complications  Nose:     Procedure Performed:  Nasal Endoscopy  External:      No external nasal deformity  Intranasal:      Mucosa no polyps     Mucosa ulcers not present     No mucosa lesions present     Turbinates not enlarged     No septum gross deformity  Nasopharynx:      No mucosa lesions     Adenoids not present     Posterior choanae patent     Eustachian tube patent     Focal yellow mucus and crusts on nasopharynx posterior wall, swabbed for culture.  Scant crust on left MT head.

## 2020-01-09 LAB — BACTERIA SPEC AEROBE CULT: NORMAL

## 2020-01-13 LAB
BACTERIA SPEC ANAEROBE CULT: ABNORMAL
BACTERIA SPEC ANAEROBE CULT: ABNORMAL

## 2020-01-14 ENCOUNTER — PATIENT MESSAGE (OUTPATIENT)
Dept: OTOLARYNGOLOGY | Facility: CLINIC | Age: 65
End: 2020-01-14

## 2020-01-14 DIAGNOSIS — J31.1 CHRONIC NASOPHARYNGITIS: Primary | ICD-10-CM

## 2020-01-17 ENCOUNTER — OFFICE VISIT (OUTPATIENT)
Dept: PSYCHIATRY | Facility: CLINIC | Age: 65
End: 2020-01-17
Payer: COMMERCIAL

## 2020-01-17 VITALS
HEIGHT: 71 IN | HEART RATE: 99 BPM | WEIGHT: 173.19 LBS | SYSTOLIC BLOOD PRESSURE: 122 MMHG | BODY MASS INDEX: 24.25 KG/M2 | DIASTOLIC BLOOD PRESSURE: 63 MMHG

## 2020-01-17 DIAGNOSIS — F10.21 ALCOHOL USE DISORDER, MODERATE, IN SUSTAINED REMISSION: ICD-10-CM

## 2020-01-17 DIAGNOSIS — F31.81 BIPOLAR II DISORDER: ICD-10-CM

## 2020-01-17 DIAGNOSIS — F31.70 BIPOLAR DISORDER IN PARTIAL REMISSION, MOST RECENT EPISODE UNSPECIFIED TYPE: Primary | ICD-10-CM

## 2020-01-17 PROCEDURE — 99999 PR PBB SHADOW E&M-EST. PATIENT-LVL II: CPT | Mod: PBBFAC,,, | Performed by: PSYCHIATRY & NEUROLOGY

## 2020-01-17 PROCEDURE — 3008F BODY MASS INDEX DOCD: CPT | Mod: CPTII,S$GLB,, | Performed by: PSYCHIATRY & NEUROLOGY

## 2020-01-17 PROCEDURE — 99999 PR PBB SHADOW E&M-EST. PATIENT-LVL II: ICD-10-PCS | Mod: PBBFAC,,, | Performed by: PSYCHIATRY & NEUROLOGY

## 2020-01-17 PROCEDURE — 99214 PR OFFICE/OUTPT VISIT, EST, LEVL IV, 30-39 MIN: ICD-10-PCS | Mod: S$GLB,,, | Performed by: PSYCHIATRY & NEUROLOGY

## 2020-01-17 PROCEDURE — 99214 OFFICE O/P EST MOD 30 MIN: CPT | Mod: S$GLB,,, | Performed by: PSYCHIATRY & NEUROLOGY

## 2020-01-17 PROCEDURE — 3008F PR BODY MASS INDEX (BMI) DOCUMENTED: ICD-10-PCS | Mod: CPTII,S$GLB,, | Performed by: PSYCHIATRY & NEUROLOGY

## 2020-01-17 NOTE — PROGRESS NOTES
Outpatient Psychiatry Follow-Up Visit (MD/NP)    01/17/2020    Clinical Status of Patient:  Outpatient (Ambulatory)    Chief Complaint:  Ms Patel is a 64 y.o. female who presents today for follow-up of her mood disorder.     Met with patient at the office.      Interval History and Content of Current Session:  General impression:Ms Patel's mood is stable, and she is in good self control. She is doing well in her job, and has no thoughts of harming herself or others. She discussed concern re her short term memory loss and is seeing a Neurologist re that due to worries re possible Alzheimer's. I mentioned her medicine can contribute to cognitive dysfunction as well, and she agreed to a depakote level also. She is able to enjoy herself and is well oriented at this time. She is not drinking at this time. She feels calmer since restarting the Buspar.    Target symptoms: Mood instability.    Musculoskeletal: Ms Patel's gait was normal. She does have an ongoing hand tremor, which we discussed could be partially due to the depakote. She has no signs of dyskinesia at this time.        Compliance:  Pt reports she is taking medications as directed    Side effects:  Possible tremor and cognitive change    Risk Parameters:  Patient reports no suicidal ideation  Patient reports no homicidal ideation  Patient reports no self-injurious behavior  Patient reports no violent behavior    Patient's Response to Intervention:  The patient's response to intervention is accepting.    Progress Toward Goals and Other Mental Status Changes:  The patient's progress toward goals is good.    Vitals: Most recent vital signs, dated today were reviewed.     Mental Status Evaluation     Appearance:  Neatly dressed and groomed   Behavior:  cooperative   Speech:  normal   Mood:  Stable and hopeful   Affect:  Consistent with mood   Thought Process:  logical   Thought Content:  organized   Sensorium:  Grossly intact   Attention Span & Concentration  focused   Cognition:  Grossly intact   Insight:  Understands condition   Judgment:  Adequate to circumstances         Diagnosis:Bipolar II disorder  History of Alcohol Use Disorder, now in remission        Plan:(Medication and Therapy Recommendation)  · Continue Depakote 2000 mg q hs, Lexapro 20 mg daily, Vistaril 25 mg bid prn, Buspar 30 mg bid. I reviewed the side effects of her medicines and advised she could call if she had problems with her medicines or clinical condition.    Additional Notes: Supportive psychotherapy provided during this session.    Return to Clinic: 1 month

## 2020-01-20 ENCOUNTER — OFFICE VISIT (OUTPATIENT)
Dept: NEUROLOGY | Facility: CLINIC | Age: 65
End: 2020-01-20
Payer: COMMERCIAL

## 2020-01-20 VITALS
BODY MASS INDEX: 24.15 KG/M2 | HEIGHT: 71 IN | DIASTOLIC BLOOD PRESSURE: 90 MMHG | HEART RATE: 103 BPM | SYSTOLIC BLOOD PRESSURE: 109 MMHG

## 2020-01-20 DIAGNOSIS — R27.0 ATAXIA: Primary | ICD-10-CM

## 2020-01-20 DIAGNOSIS — R41.3 MEMORY DISORDER: ICD-10-CM

## 2020-01-20 PROCEDURE — 99205 OFFICE O/P NEW HI 60 MIN: CPT | Mod: S$GLB,,, | Performed by: PSYCHIATRY & NEUROLOGY

## 2020-01-20 PROCEDURE — 99205 PR OFFICE/OUTPT VISIT, NEW, LEVL V, 60-74 MIN: ICD-10-PCS | Mod: S$GLB,,, | Performed by: PSYCHIATRY & NEUROLOGY

## 2020-01-20 PROCEDURE — 3008F BODY MASS INDEX DOCD: CPT | Mod: CPTII,S$GLB,, | Performed by: PSYCHIATRY & NEUROLOGY

## 2020-01-20 PROCEDURE — 99999 PR PBB SHADOW E&M-EST. PATIENT-LVL IV: CPT | Mod: PBBFAC,,, | Performed by: PSYCHIATRY & NEUROLOGY

## 2020-01-20 PROCEDURE — 99999 PR PBB SHADOW E&M-EST. PATIENT-LVL IV: ICD-10-PCS | Mod: PBBFAC,,, | Performed by: PSYCHIATRY & NEUROLOGY

## 2020-01-20 PROCEDURE — 3008F PR BODY MASS INDEX (BMI) DOCUMENTED: ICD-10-PCS | Mod: CPTII,S$GLB,, | Performed by: PSYCHIATRY & NEUROLOGY

## 2020-01-20 NOTE — ASSESSMENT & PLAN NOTE
Progressive memory decline, which seems to be primarily affecting attention. Suggested f/u reversible labs. F/u neuropsych memory testing.   As she also has ataxia, and the cerebellum has cognitive functions, this may be the sole cause of memory issues.

## 2020-01-20 NOTE — PROGRESS NOTES
"MOVEMENT DISORDERS CLINIC NEW CONSULT NOTE    PCP/Referring Provider: Aaareferral Self  No address on file  Date of Service: 1/20/2020    Chief Complaint: memory and balance    HPI: Alix Patel is a L HANDED 64 y.o. female with a medical issues significant for bipolar (on depakote), DM2, headaches, hypothyroidism, who presents with balance and memory issues. She notes since 8 years ago she started noticing she could not remember conversations which happened 1 day prior. Forgets items around the house. Forgets what she's doing between rooms. She works as an admin support person. She's been having trouble multitasking recently. Forgets new people's names, old names are intact. Does not get lost, no leaving burners on the stove. When she reads, she must re-read lines in succession. No trouble managing her bills. She lives alone.  Balance has been poor since 1 year. When she stands, she usually pitches to the left. She notes it feels like she's "been drinking alcohol." She feels in no control of the direction she walk in. Does not falls. Struggles to go down stairs. She's also noted a propensity to move her feet.  She's had bilateral postural hand tremors since 1 year ago. Struggles to sign her name.      Mother (70's) and 2 cousins with alzheimer's disease    Medication history:  Currently on Depakote    Neuroleptic exposure:  10 year back was on abilify    PD Review of Symptoms:  Anosmia: Poor  Dysarthria/Hypophonia: None  Dysphagia/Sialorrhea: None  Depression: Yes  Cognitive slowing: As above  Hallucinations: none  Impulsivity: at times  Urinary changes: frequency  Constipation: None  Orthostasis: at times  Falls: none  Freezing: none  -RBD:none    Review of Systems:   Review of Systems   Constitutional: Negative for fever.   HENT: Negative for congestion.    Eyes: Negative for double vision.   Respiratory: Negative for cough and shortness of breath.    Cardiovascular: Negative for chest pain and leg " swelling.   Gastrointestinal: Negative for nausea.   Genitourinary: Negative for dysuria.   Musculoskeletal: Negative for falls.   Skin: Negative for rash.   Neurological: Positive for tremors. Negative for speech change and headaches.   Psychiatric/Behavioral: Positive for depression and memory loss.         Current Medications:  Outpatient Encounter Medications as of 1/20/2020   Medication Sig Dispense Refill    busPIRone (BUSPAR) 15 MG tablet Take 2 tablets (30 mg total) by mouth 2 (two) times daily. 120 tablet 3    CRESTOR 20 mg tablet Take 20 mg by mouth once daily.   3    diclofenac (CATAFLAM) 50 MG tablet TK 1 T PO  TID PRN P  2    divalproex ER (DEPAKOTE) 500 MG Tb24 Take 4 tablets (2,000 mg total) by mouth every evening. 360 tablet 1    escitalopram oxalate (LEXAPRO) 20 MG tablet Take 1 tablet (20 mg total) by mouth once daily. 90 tablet 1    gabapentin (NEURONTIN) 300 MG capsule TK ONE C PO  TID  2    hydrOXYzine pamoate (VISTARIL) 25 MG Cap Take one twice daily as needed for anxiety and one or two at bedtime as needed for sleep. 360 capsule 0    levothyroxine (SYNTHROID) 100 MCG tablet Take 100 mcg by mouth once daily.   3    metformin (GLUCOPHAGE) 500 MG tablet Take 500 mg by mouth 2 (two) times daily with meals.       multivitamin capsule Take 1 capsule by mouth once daily.      ONETOUCH DELICA LANCETS 30 gauge Misc TEST BID UTD  0    ONETOUCH ULTRA TEST Strp TEST BID UTD.  0    traMADol (ULTRAM) 50 mg tablet TK 1 TABLET PO EVERY 4 TO 6 HOURS  0    cyclobenzaprine (FLEXERIL) 10 MG tablet Take 10 mg by mouth daily as needed.       pantoprazole (PROTONIX) 40 MG tablet Take 1 tablet (40 mg total) by mouth 2 (two) times daily. 30mins before breakfast and 30mins before dinner (Patient not taking: Reported on 1/20/2020) 60 tablet 1     No facility-administered encounter medications on file as of 1/20/2020.        Past Medical History:  Patient Active Problem List   Diagnosis    Muscle weakness  of lower extremity    Anxiety    Depression    Bipolar disorder    DM II (diabetes mellitus, type II), controlled    Hyperlipidemia with target low density lipoprotein (LDL) cholesterol less than 100 mg/dL    Hoarseness    Thyroid nodule    Urinary tract infection without hematuria    Episodic tension-type headache, not intractable    Weakness    Chest pain    Acquired hypothyroidism    Muscle spasm    Voice disturbance    Vocal fold edema    Hyperfunctional dysphonia    Glycosuria    Urinary tract infection with hematuria    Subacute vaginitis    Dysuria    GERD (gastroesophageal reflux disease)    Alcohol use disorder, moderate, in sustained remission    Episodic lightheadedness    Dyslipidemia    Dizziness    Memory disorder    Ataxia       Past Surgical History:  Past Surgical History:   Procedure Laterality Date    APPENDECTOMY      COLONOSCOPY      COLONOSCOPY N/A 10/31/2019    Procedure: COLONOSCOPY;  Surgeon: Philippe Monteiro MD;  Location: [address]);  Service: Endoscopy;  Laterality: N/A;  PM prep-    CYST REMOVAL      ESOPHAGOGASTRODUODENOSCOPY N/A 10/31/2019    Procedure: EGD (ESOPHAGOGASTRODUODENOSCOPY);  Surgeon: Philippe Monteiro MD;  Location: [address]);  Service: Endoscopy;  Laterality: N/A;  Pm prep-    HYSTERECTOMY         Current Living Situation: home  Social:  Social History     Socioeconomic History    Marital status:      Spouse name: Not on file    Number of children: Not on file    Years of education: Not on file    Highest education level: Not on file   Occupational History    Not on file   Social Needs    Financial resource strain: Not on file    Food insecurity:     Worry: Not on file     Inability: Not on file    Transportation needs:     Medical: Not on file     Non-medical: Not on file   Tobacco Use    Smoking status: Former Smoker     Packs/day: 0.25     Years: 34.00     Pack years: 8.50     Types: Cigarettes     " Last attempt to quit: 8/27/2004     Years since quitting: 15.4    Smokeless tobacco: Never Used   Substance and Sexual Activity    Alcohol use: Yes     Comment: rarely    Drug use: No    Sexual activity: Not Currently     Partners: Male     Birth control/protection: None   Lifestyle    Physical activity:     Days per week: Not on file     Minutes per session: Not on file    Stress: Not on file   Relationships    Social connections:     Talks on phone: Not on file     Gets together: Not on file     Attends Hindu service: Not on file     Active member of club or organization: Not on file     Attends meetings of clubs or organizations: Not on file     Relationship status: Not on file   Other Topics Concern    Not on file   Social History Narrative    Not on file       Family History:  Family History   Problem Relation Age of Onset    Alzheimer's disease Mother     Sinus disease Mother     Heart failure Father     Hyperlipidemia Father     No Known Problems Sister     Sarcoidosis Sister     Colon cancer Maternal Aunt     Esophageal cancer Neg Hx        PHYSICAL:  BP (!) 109/90 (BP Location: Left arm, Patient Position: Sitting)   Pulse 103   Ht 5' 11" (1.803 m)   BMI 24.15 kg/m²     General Medical Examination:  General: Good hygiene, appropriate appearance.  HEENT: Normocephalic, atraumatic.   Neck: Supple.   Chest: Unlabored breathing.   CV: Symmetric pulses.   Ext: No clubbing, cyanosis, or edema.     Mental Status:  Mood/Affect: Appropriate/congruent.  Level of consciousness: Awake, alert.  Orientation: Oriented to person, place, time and situation.  Language: No Dysarthria    Cranial nerves:  I: Not tested  II: PERRL, VFF to counting  III, IV, VI: EOMI with conjugate gaze and no nystagmus on end gaze  V: Facial sensation intact and symmetric over the bilateral V1-V3  VII: Facial muscle activation intact and symmetric over the bilateral upper and lower face  VIII: Hearing intact in the b/l " ears and symmetrical to finger rub  IX, X, XII: TUP midline - no atrophy or fasiculations  X: SCMs and shoulder shrug full strength b/l and symmetric    Motor:   -UE: 5/5 deltoids; 5/5 biceps, triceps; 5/5 wrist flexors, extensors; 5/5 interosseous; 5/5   -LEs: 5/5 hip flexion, extension; 5/5 knee flexion, extension; 5/5 ankle flexion, extension    No TD-like mouth movements    DTRs:  ? Biceps Triceps Brachioradialis Knee Ankle   Left 2+ 2+ 2+ 2+ 2+   Right 2+ 2+ 2+ 2+ 2+       ? Finger taps Finger flicks MICKEY Heel taps   Left 0 0 0 0   Right 0 0 0 0     Neck tone: 0  ? Arm Leg   Left 0 0   Right 0 0     Sensation:   -Light touch: Intact and symmetric in the bilateral upper and lower extremities.  -Temp: Intact and symmetric in the bilateral upper and lower extremities.  -Vibration: Intact and symmetric in the bilateral upper and lower extremities.    Coordination:   -Finger to nose: prominent intention tremor R>L  Past-points r>L    Gait:  -Arises from chair without use of hands.  -Casual gait is: mildly wide based  Moderate ataxia  -Arm Swing: nl;  -Turning: slowwpy  -Tandem gait: cannot  No titubations    Laboratory Data:  NA    Imaging:  na    Assessment//Plan:   Problem List Items Addressed This Visit        Neuro    Memory disorder    Current Assessment & Plan     Progressive memory decline, which seems to be primarily affecting attention. Suggested f/u reversible labs. F/u neuropsych memory testing.   As she also has ataxia, and the cerebellum has cognitive functions, this may be the sole cause of memory issues.         Relevant Orders    MRI Brain W WO Contrast    Creatinine, serum    VITAMIN B1    RPR    TSH    Vitamin B12 Deficiency Panel    Ambulatory consult to Neuropsychology    Ataxia - Primary    Current Assessment & Plan     Moderate appendicular ataxia. Suggested PT and f/u brain MRI, reversible labs.         Relevant Orders    GLUTAMIC ACID DECARBOXYLASE    THYROID PEROXIDASE ANTIBODY     Ambulatory consult to Neuropsychology    VITAMIN A    VITAMIN E    Ambulatory consult to Physical Therapy          Follow-up: 2mos  Discussed the importance of ongoing exercise in efforts to improve mobility and balance.      Leela Vital MD, MS  [facility]  Department of Neurology  Movement Disorders

## 2020-01-21 ENCOUNTER — PATIENT MESSAGE (OUTPATIENT)
Dept: PSYCHIATRY | Facility: CLINIC | Age: 65
End: 2020-01-21

## 2020-01-21 ENCOUNTER — DOCUMENTATION ONLY (OUTPATIENT)
Dept: PSYCHIATRY | Facility: CLINIC | Age: 65
End: 2020-01-21

## 2020-01-21 NOTE — PROGRESS NOTES
Pt fails to keep second consecutive appointment. Will message pt that this can not continue if she is to remain in [place].

## 2020-01-25 ENCOUNTER — HOSPITAL ENCOUNTER (OUTPATIENT)
Dept: RADIOLOGY | Facility: HOSPITAL | Age: 65
Discharge: HOME | End: 2020-01-25
Attending: PSYCHIATRY & NEUROLOGY
Payer: COMMERCIAL

## 2020-01-25 DIAGNOSIS — R41.3 MEMORY DISORDER: ICD-10-CM

## 2020-01-25 PROCEDURE — 70553 MRI BRAIN STEM W/O & W/DYE: CPT | Mod: TC

## 2020-01-25 PROCEDURE — 25500020 PHARM REV CODE 255: Performed by: PSYCHIATRY & NEUROLOGY

## 2020-01-25 PROCEDURE — A9585 GADOBUTROL INJECTION: HCPCS | Performed by: PSYCHIATRY & NEUROLOGY

## 2020-01-25 PROCEDURE — 70553 MRI BRAIN W WO CONTRAST: ICD-10-PCS | Mod: 26,,, | Performed by: RADIOLOGY

## 2020-01-25 PROCEDURE — 70553 MRI BRAIN STEM W/O & W/DYE: CPT | Mod: 26,,, | Performed by: RADIOLOGY

## 2020-01-25 RX ORDER — GADOBUTROL 604.72 MG/ML
8 INJECTION INTRAVENOUS
Status: COMPLETED | OUTPATIENT
Start: 2020-01-25 | End: 2020-01-25

## 2020-01-25 RX ADMIN — GADOBUTROL 8 ML: 604.72 INJECTION INTRAVENOUS at 10:01

## 2020-01-27 ENCOUNTER — DOCUMENTATION ONLY (OUTPATIENT)
Dept: PSYCHIATRY | Facility: CLINIC | Age: 65
End: 2020-01-27

## 2020-01-27 NOTE — PROGRESS NOTES
Pt fails to keep third straight appointment, did not respond to my message asking her to contact me prior to scheduling again. Pt will be asked to seek care elsewhere, as this has been an issue in the past and we have discussed it in session.

## 2020-01-28 ENCOUNTER — DOCUMENTATION ONLY (OUTPATIENT)
Dept: NEUROLOGY | Facility: CLINIC | Age: 65
End: 2020-01-28

## 2020-01-29 ENCOUNTER — PATIENT MESSAGE (OUTPATIENT)
Dept: NEUROLOGY | Facility: CLINIC | Age: 65
End: 2020-01-29

## 2020-01-29 ENCOUNTER — DOCUMENTATION ONLY (OUTPATIENT)
Dept: REHABILITATION | Facility: HOSPITAL | Age: 65
End: 2020-01-29

## 2020-01-29 ENCOUNTER — TELEPHONE (OUTPATIENT)
Dept: NEUROLOGY | Facility: CLINIC | Age: 65
End: 2020-01-29

## 2020-01-29 NOTE — PROGRESS NOTES
Physical Therapy: No show/Cancellation of Visit  Date: 01/29/2020    Patient was a no show to today's PT ZIAL. Patient will be notified to reschedule if they so choose.     Cancel: 0  No show: 1    Therapist: Jony Ames, PT

## 2020-01-29 NOTE — TELEPHONE ENCOUNTER
----- Message from Leela Vital MD sent at 1/28/2020  4:59 PM CST -----  Can we move up her appt to next available?

## 2020-02-03 ENCOUNTER — OFFICE VISIT (OUTPATIENT)
Dept: GASTROENTEROLOGY | Facility: CLINIC | Age: 65
End: 2020-02-03
Payer: COMMERCIAL

## 2020-02-03 VITALS
HEIGHT: 71 IN | HEART RATE: 99 BPM | SYSTOLIC BLOOD PRESSURE: 122 MMHG | DIASTOLIC BLOOD PRESSURE: 87 MMHG | WEIGHT: 171.75 LBS | BODY MASS INDEX: 24.04 KG/M2

## 2020-02-03 DIAGNOSIS — R10.13 DYSPEPSIA: Primary | ICD-10-CM

## 2020-02-03 PROCEDURE — 3008F PR BODY MASS INDEX (BMI) DOCUMENTED: ICD-10-PCS | Mod: CPTII,S$GLB,,

## 2020-02-03 PROCEDURE — 3008F BODY MASS INDEX DOCD: CPT | Mod: CPTII,S$GLB,,

## 2020-02-03 PROCEDURE — 99213 OFFICE O/P EST LOW 20 MIN: CPT | Mod: S$GLB,,,

## 2020-02-03 PROCEDURE — 99999 PR PBB SHADOW E&M-EST. PATIENT-LVL III: CPT | Mod: PBBFAC,,,

## 2020-02-03 PROCEDURE — 99999 PR PBB SHADOW E&M-EST. PATIENT-LVL III: ICD-10-PCS | Mod: PBBFAC,,,

## 2020-02-03 PROCEDURE — 99213 PR OFFICE/OUTPT VISIT, EST, LEVL III, 20-29 MIN: ICD-10-PCS | Mod: S$GLB,,,

## 2020-02-03 NOTE — PROGRESS NOTES
I have examined the patient with the fellow and agree with their history, examination, and plan.

## 2020-02-03 NOTE — PROGRESS NOTES
[facility]    Reason for visit: There were no encounter diagnoses.  Referring Provider/PCP: Angélica Barakat MD    History of Present Illness:  Alix Patel is a 64 y.o. female with a history of non erosive GERD(per egd on 10/19) who is presenting for follow-up evaluation of GERD and dysphagia and voice hoarseness.     Last time she saw luis miguel in the office on 12/6/19, with dysphagia and symptoms of GERD improving, but still having hoarseness, with suspicion of LPR.  She was was increased to Protonix twice daily since that office visit.  CT abdomen was ordered to r/o ventral hernia. She saw ENT on 1/7/2020, no abnormalities on nasal endoscopy, but diagnosed with chronic nasopharyngitis.       She reports since increasing Protonix to twice daily for symptoms of heartburn, reflux, hoarseness, and abdominal discomfort have overall improved.  She still reports having abdominal discomfort at times with certain foods.  She denies dysphagia, odynophagia, unintentional weight loss.  She says she feels a hernia.    PEndoHx:  EGD on 10/31/19: erosive gastropathy, negative for h pylori, irregular Z-line negative for Vernon's.  Colonoscopy 10/31/19:  Prep was fair 1 small colon proper polyp resected, diverticulosis and entire colon, recommended a repeat colonoscopy in 1 years for surveillance    Review of Systems:   Constitutional: no fever, chills or change in weight   Eyes: no visual changes   ENT: no sore throat or dysphagia  Respiratory: no cough or shortness of breath   Cardiovascular: no chest pain or palpitations   Gastrointestinal: as per HPI  Hematologic/Lymphatic: no easy bruising or lymphadenopathy   Musculoskeletal: no arthralgias or myalgias   Neurological: no change in mental status  Behavioral/Psych: no change in mood    Medical History:  Past Medical History:   Diagnosis Date    Anxiety     Anxiety     Bipolar 1 disorder     Depression     Diabetes mellitus     GERD  (gastroesophageal reflux disease)     HEARING LOSS     pt states she has loss slighty in rt ear.    Insomnia     Rash        Past Surgical History:   Procedure Laterality Date    APPENDECTOMY      COLONOSCOPY      COLONOSCOPY N/A 10/31/2019    Procedure: COLONOSCOPY;  Surgeon: Philippe Monteiro MD;  Location: [address]);  Service: Endoscopy;  Laterality: N/A;  PM prep-MH    CYST REMOVAL      ESOPHAGOGASTRODUODENOSCOPY N/A 10/31/2019    Procedure: EGD (ESOPHAGOGASTRODUODENOSCOPY);  Surgeon: Philippe Monteiro MD;  Location: [facility] ([address] [place] [address]);  Service: Endoscopy;  Laterality: N/A;  Pm prep-MH    HYSTERECTOMY         Family History   Problem Relation Age of Onset    Alzheimer's disease Mother     Sinus disease Mother     Heart failure Father     Hyperlipidemia Father     No Known Problems Sister     Sarcoidosis Sister     Colon cancer Maternal Aunt     Esophageal cancer Neg Hx        Social History     Socioeconomic History    Marital status:      Spouse name: Not on file    Number of children: Not on file    Years of education: Not on file    Highest education level: Not on file   Occupational History    Not on file   Social Needs    Financial resource strain: Not on file    Food insecurity:     Worry: Not on file     Inability: Not on file    Transportation needs:     Medical: Not on file     Non-medical: Not on file   Tobacco Use    Smoking status: Former Smoker     Packs/day: 0.25     Years: 34.00     Pack years: 8.50     Types: Cigarettes     Last attempt to quit: 8/27/2004     Years since quitting: 15.4    Smokeless tobacco: Never Used   Substance and Sexual Activity    Alcohol use: Yes     Comment: rarely    Drug use: No    Sexual activity: Not Currently     Partners: Male     Birth control/protection: None   Lifestyle    Physical activity:     Days per week: Not on file     Minutes per session: Not on file    Stress: Not on file   Relationships    Social  connections:     Talks on phone: Not on file     Gets together: Not on file     Attends Cheondoism service: Not on file     Active member of club or organization: Not on file     Attends meetings of clubs or organizations: Not on file     Relationship status: Not on file   Other Topics Concern    Not on file   Social History Narrative    Not on file       Current Outpatient Medications on File Prior to Visit   Medication Sig Dispense Refill    busPIRone (BUSPAR) 15 MG tablet Take 2 tablets (30 mg total) by mouth 2 (two) times daily. 120 tablet 3    CRESTOR 20 mg tablet Take 20 mg by mouth once daily.   3    cyclobenzaprine (FLEXERIL) 10 MG tablet Take 10 mg by mouth daily as needed.       diclofenac (CATAFLAM) 50 MG tablet TK 1 T PO  TID PRN P  2    divalproex ER (DEPAKOTE) 500 MG Tb24 Take 4 tablets (2,000 mg total) by mouth every evening. 360 tablet 1    escitalopram oxalate (LEXAPRO) 20 MG tablet Take 1 tablet (20 mg total) by mouth once daily. 90 tablet 1    gabapentin (NEURONTIN) 300 MG capsule TK ONE C PO  TID  2    hydrOXYzine pamoate (VISTARIL) 25 MG Cap Take one twice daily as needed for anxiety and one or two at bedtime as needed for sleep. 360 capsule 0    levothyroxine (SYNTHROID) 100 MCG tablet Take 100 mcg by mouth once daily.   3    metformin (GLUCOPHAGE) 500 MG tablet Take 500 mg by mouth 2 (two) times daily with meals.       multivitamin capsule Take 1 capsule by mouth once daily.      ONETOUCH DELICA LANCETS 30 gauge Misc TEST BID UTD  0    ONETOUCH ULTRA TEST Strp TEST BID UTD.  0    pantoprazole (PROTONIX) 40 MG tablet Take 1 tablet (40 mg total) by mouth 2 (two) times daily. 30mins before breakfast and 30mins before dinner 60 tablet 1    traMADol (ULTRAM) 50 mg tablet TK 1 TABLET PO EVERY 4 TO 6 HOURS  0     No current facility-administered medications on file prior to visit.        Review of patient's allergies indicates:   Allergen Reactions    Lamictal [lamotrigine] Rash      Per psychiatry notes       Physical Exam:  General: Alert and Oriented x3, no distress   Vitals:    02/03/20 1509   BP: 122/87   Pulse: 99     HEENT: Normocephalic, Atraumatic. No scleral icterus.  Lymph: No cervical lymphadenopathy  Resp: Good air entry bilaterally, no adventitious sounds.  Cardiac: S1 and S2 normal.  Abdomen: Normoactive bowel sounds. Non-distended. Normal tympany. Soft. Non-tender. No peritoneal signs.  Extremities: No peripheral edema. Normal bilateral pedal and radial pulses.  Neurologic: No gross neurological Deficits  Psych: Calm, cooperative. Normal mood and affect.    Laboratory:  Lab Results   Component Value Date     08/22/2019    K 4.4 08/22/2019     08/22/2019    CO2 28 08/22/2019    BUN 14 08/22/2019    CREATININE 0.8 01/20/2020    CALCIUM 10.1 08/22/2019    ANIONGAP 10 08/22/2019    ESTGFRAFRICA >60.0 01/20/2020    EGFRNONAA >60.0 01/20/2020       Lab Results   Component Value Date    ALT 28 08/22/2019    AST 32 08/22/2019    ALKPHOS 90 08/22/2019    BILITOT 0.4 08/22/2019       Lab Results   Component Value Date    WBC 5.27 08/22/2019    HGB 12.9 08/22/2019    HCT 40.0 08/22/2019    MCV 94 08/22/2019     08/22/2019       Microbiology:  No Pertinent Microbiology    Imaging:  No Pertinent Imaging    Assessment:  Alix Patel is a 64 y.o. female who is presenting for follow-up evaluation of dyspepsia, GERD.  Her symptoms of dyspepsia, and GERD have improved significantly ever since increasing her Protonix dose to twice daily.  Patient is a ENT recently, negative for LPR.  At this point given recent endoscopy findings, that it appears that there is no clear indication for twice daily Protonix dosage for this patient and I have discussed with her today that it will be beneficial if we can titrated down to once daily.  She is agreeable to titrate during her 2nd dose of Protonix daily to every other day and increasing it  by another day until she is off.  She  reports intermittently feeling a left upper quadrant abdominal hernia, denies significant abdominal pain. We will get her standing ultrasound to rule out ventral hernia.      Plan:  1. Schedule ultrasound to rule out ventral hernia  2. Will work on decreasing Protonix to once daily  3. CRC Screening:  10/31/2020  No follow-ups on file.    Sharad Keenan MD  Gastroenterology Fellow  Phone: [phone]    No orders of the defined types were placed in this encounter.

## 2020-02-05 ENCOUNTER — OFFICE VISIT (OUTPATIENT)
Dept: NEUROLOGY | Facility: CLINIC | Age: 65
End: 2020-02-05
Payer: COMMERCIAL

## 2020-02-05 VITALS
HEART RATE: 98 BPM | HEIGHT: 71 IN | SYSTOLIC BLOOD PRESSURE: 152 MMHG | DIASTOLIC BLOOD PRESSURE: 98 MMHG | BODY MASS INDEX: 23.95 KG/M2

## 2020-02-05 DIAGNOSIS — R27.0 ATAXIA: ICD-10-CM

## 2020-02-05 DIAGNOSIS — R41.3 MEMORY DISORDER: Primary | ICD-10-CM

## 2020-02-05 PROCEDURE — 99999 PR PBB SHADOW E&M-EST. PATIENT-LVL III: ICD-10-PCS | Mod: PBBFAC,,, | Performed by: PSYCHIATRY & NEUROLOGY

## 2020-02-05 PROCEDURE — 3008F PR BODY MASS INDEX (BMI) DOCUMENTED: ICD-10-PCS | Mod: CPTII,S$GLB,, | Performed by: PSYCHIATRY & NEUROLOGY

## 2020-02-05 PROCEDURE — 99214 OFFICE O/P EST MOD 30 MIN: CPT | Mod: S$GLB,,, | Performed by: PSYCHIATRY & NEUROLOGY

## 2020-02-05 PROCEDURE — 3008F BODY MASS INDEX DOCD: CPT | Mod: CPTII,S$GLB,, | Performed by: PSYCHIATRY & NEUROLOGY

## 2020-02-05 PROCEDURE — 99214 PR OFFICE/OUTPT VISIT, EST, LEVL IV, 30-39 MIN: ICD-10-PCS | Mod: S$GLB,,, | Performed by: PSYCHIATRY & NEUROLOGY

## 2020-02-05 PROCEDURE — 99999 PR PBB SHADOW E&M-EST. PATIENT-LVL III: CPT | Mod: PBBFAC,,, | Performed by: PSYCHIATRY & NEUROLOGY

## 2020-02-05 NOTE — PROGRESS NOTES
"MOVEMENT DISORDERS CLINIC NEW CONSULT NOTE    PCP/Referring Provider: No referring provider defined for this encounter.  Date of Service: 2/5/2020    Chief Complaint: memory and balance    Interval; hx  Mild worsening of ataxia and tremor  MRI showed mild cerebellar and frontotemporal atrophy  Labs WNL      PriorHPI: Alix Patel is a L HANDED 64 y.o. female with a medical issues significant for bipolar (on depakote), DM2, headaches, hypothyroidism, who presents with balance and memory issues. She notes since 8 years ago she started noticing she could not remember conversations which happened 1 day prior. Forgets items around the house. Forgets what she's doing between rooms. She works as an admin support person. She's been having trouble multitasking recently. Forgets new people's names, old names are intact. Does not get lost, no leaving burners on the stove. When she reads, she must re-read lines in succession. No trouble managing her bills. She lives alone.  Balance has been poor since 1 year. When she stands, she usually pitches to the left. She notes it feels like she's "been drinking alcohol." She feels in no control of the direction she walk in. Does not falls. Struggles to go down stairs. She's also noted a propensity to move her feet.  She's had bilateral postural hand tremors since 1 year ago. Struggles to sign her name.      Mother (70's) and 2 cousins with alzheimer's disease  Mother is AA, Romansh,  heritage  Mother had also shaking hands    Medication history:  Currently on Depakote    Neuroleptic exposure:  10 year back was on abilify    PD Review of Symptoms:  Anosmia: Poor  Dysarthria/Hypophonia: None  Dysphagia/Sialorrhea: None  Depression: Yes  Cognitive slowing: As above  Hallucinations: none  Impulsivity: at times  Urinary changes: frequency  Constipation: None  Orthostasis: at times  Falls: none  Freezing: none  -RBD:none    Review of Systems:   Review of Systems "   Constitutional: Negative for fever.   HENT: Negative for congestion.    Eyes: Negative for double vision.   Respiratory: Negative for cough and shortness of breath.    Cardiovascular: Negative for chest pain and leg swelling.   Gastrointestinal: Negative for nausea.   Genitourinary: Negative for dysuria.   Musculoskeletal: Negative for falls.   Skin: Negative for rash.   Neurological: Positive for tremors. Negative for speech change and headaches.   Psychiatric/Behavioral: Positive for depression and memory loss.         Current Medications:  Outpatient Encounter Medications as of 2/5/2020   Medication Sig Dispense Refill    busPIRone (BUSPAR) 15 MG tablet Take 2 tablets (30 mg total) by mouth 2 (two) times daily. 120 tablet 3    CRESTOR 20 mg tablet Take 20 mg by mouth once daily.   3    cyclobenzaprine (FLEXERIL) 10 MG tablet Take 10 mg by mouth daily as needed.       diclofenac (CATAFLAM) 50 MG tablet TK 1 T PO  TID PRN P  2    divalproex ER (DEPAKOTE) 500 MG Tb24 Take 4 tablets (2,000 mg total) by mouth every evening. 360 tablet 1    escitalopram oxalate (LEXAPRO) 20 MG tablet Take 1 tablet (20 mg total) by mouth once daily. 90 tablet 1    gabapentin (NEURONTIN) 300 MG capsule TK ONE C PO  TID  2    hydrOXYzine pamoate (VISTARIL) 25 MG Cap Take one twice daily as needed for anxiety and one or two at bedtime as needed for sleep. 360 capsule 0    levothyroxine (SYNTHROID) 100 MCG tablet Take 100 mcg by mouth once daily.   3    metformin (GLUCOPHAGE) 500 MG tablet Take 500 mg by mouth 2 (two) times daily with meals.       multivitamin capsule Take 1 capsule by mouth once daily.      ONETOUCH DELICA LANCETS 30 gauge Misc TEST BID UTD  0    ONETOUCH ULTRA TEST Strp TEST BID UTD.  0    pantoprazole (PROTONIX) 40 MG tablet Take 1 tablet (40 mg total) by mouth 2 (two) times daily. 30mins before breakfast and 30mins before dinner 60 tablet 1    traMADol (ULTRAM) 50 mg tablet TK 1 TABLET PO EVERY 4 TO 6  HOURS  0     No facility-administered encounter medications on file as of 2/5/2020.        Past Medical History:  Patient Active Problem List   Diagnosis    Muscle weakness of lower extremity    Anxiety    Depression    Bipolar disorder    DM II (diabetes mellitus, type II), controlled    Hyperlipidemia with target low density lipoprotein (LDL) cholesterol less than 100 mg/dL    Hoarseness    Thyroid nodule    Urinary tract infection without hematuria    Episodic tension-type headache, not intractable    Weakness    Chest pain    Acquired hypothyroidism    Muscle spasm    Voice disturbance    Vocal fold edema    Hyperfunctional dysphonia    Glycosuria    Urinary tract infection with hematuria    Subacute vaginitis    Dysuria    GERD (gastroesophageal reflux disease)    Alcohol use disorder, moderate, in sustained remission    Episodic lightheadedness    Dyslipidemia    Dizziness    Memory disorder    Ataxia       Past Surgical History:  Past Surgical History:   Procedure Laterality Date    APPENDECTOMY      COLONOSCOPY      COLONOSCOPY N/A 10/31/2019    Procedure: COLONOSCOPY;  Surgeon: Philippe Monteiro MD;  Location: [address]);  Service: Endoscopy;  Laterality: N/A;  PM prep-    CYST REMOVAL      ESOPHAGOGASTRODUODENOSCOPY N/A 10/31/2019    Procedure: EGD (ESOPHAGOGASTRODUODENOSCOPY);  Surgeon: Philippe Mnoteiro MD;  Location: [address]);  Service: Endoscopy;  Laterality: N/A;  Pm prep-MH    HYSTERECTOMY         Current Living Situation: home  Social:  Social History     Socioeconomic History    Marital status:      Spouse name: Not on file    Number of children: Not on file    Years of education: Not on file    Highest education level: Not on file   Occupational History    Not on file   Social Needs    Financial resource strain: Not on file    Food insecurity:     Worry: Not on file     Inability: Not on file    Transportation needs:      "Medical: Not on file     Non-medical: Not on file   Tobacco Use    Smoking status: Former Smoker     Packs/day: 0.25     Years: 34.00     Pack years: 8.50     Types: Cigarettes     Last attempt to quit: 8/27/2004     Years since quitting: 15.4    Smokeless tobacco: Never Used   Substance and Sexual Activity    Alcohol use: Yes     Comment: rarely    Drug use: No    Sexual activity: Not Currently     Partners: Male     Birth control/protection: None   Lifestyle    Physical activity:     Days per week: Not on file     Minutes per session: Not on file    Stress: Not on file   Relationships    Social connections:     Talks on phone: Not on file     Gets together: Not on file     Attends Buddhist service: Not on file     Active member of club or organization: Not on file     Attends meetings of clubs or organizations: Not on file     Relationship status: Not on file   Other Topics Concern    Not on file   Social History Narrative    Not on file       Family History:  Family History   Problem Relation Age of Onset    Alzheimer's disease Mother     Sinus disease Mother     Heart failure Father     Hyperlipidemia Father     No Known Problems Sister     Sarcoidosis Sister     Colon cancer Maternal Aunt     Esophageal cancer Neg Hx        PHYSICAL:  BP (!) 152/98 (BP Location: Right arm, Patient Position: Sitting)   Pulse 98   Ht 5' 11" (1.803 m)   BMI 23.95 kg/m²     General Medical Examination:  General: Good hygiene, appropriate appearance.  HEENT: Normocephalic, atraumatic.   Neck: Supple.   Chest: Unlabored breathing.   CV: Symmetric pulses.   Ext: No clubbing, cyanosis, or edema.     Mental Status:  Mood/Affect: Appropriate/congruent.  Level of consciousness: Awake, alert.  Orientation: Oriented to person, place, time and situation.  Language: No Dysarthria    Cranial nerves:  I: Not tested  II: PERRL, VFF to counting  III, IV, VI: EOMI with conjugate gaze and no nystagmus on end gaze  V: Facial " sensation intact and symmetric over the bilateral V1-V3  VII: Facial muscle activation intact and symmetric over the bilateral upper and lower face  VIII: Hearing intact in the b/l ears and symmetrical to finger rub  IX, X, XII: TUP midline - no atrophy or fasiculations  X: SCMs and shoulder shrug full strength b/l and symmetric    Motor:   -UE: 5/5 deltoids; 5/5 biceps, triceps; 5/5 wrist flexors, extensors; 5/5 interosseous; 5/5   -LEs: 5/5 hip flexion, extension; 5/5 knee flexion, extension; 5/5 ankle flexion, extension    No TD-like mouth movements    DTRs:  ? Biceps Triceps Brachioradialis Knee Ankle   Left 2+ 2+ 2+ 2+ 2+   Right 2+ 2+ 2+ 2+ 2+       ? Finger taps Finger flicks MICKEY Heel taps   Left 0 0 0 0   Right 0 0 0 0     Neck tone: 0  ? Arm Leg   Left 0 0   Right 0 0     Sensation:   -Light touch: Intact and symmetric in the bilateral upper and lower extremities.  -Temp: Intact and symmetric in the bilateral upper and lower extremities.  -Vibration: Intact and symmetric in the bilateral upper and lower extremities.    Coordination:   -Finger to nose: prominent intention tremor R>L  Past-points r>L    Gait:  -Arises from chair without use of hands.  -Casual gait is: mildly wide based  Moderate ataxia  -Arm Swing: nl;  -Turning: slowwpy  -Tandem gait: cannot    No titubations    Laboratory Data:  NA    Imaging:  na    Assessment//Plan:   Problem List Items Addressed This Visit        Neuro    Memory disorder - Primary    Relevant Orders    Encephalopathy Autoimmune Eval    Neuropsychological testing    Ataxia    Current Assessment & Plan     Moderate appendicular ataxia. Brain MRI suggestive of neurodegenerative cause. Suggested SCA genetic panel.  F.u autoimmune workup  [facility]         Relevant Orders    Ambulatory consult to Physical Therapy          Follow-up: 2mos  Discussed the importance of ongoing exercise in efforts to improve mobility and balance.      Leela Vital MD, MS [facility]  [facility]  Department of Neurology  Movement Disorders

## 2020-02-06 NOTE — ASSESSMENT & PLAN NOTE
Moderate appendicular ataxia. Brain MRI suggestive of neurodegenerative cause. Suggested SCA genetic panel.  F.u autoimmune workup  [facility]

## 2020-02-11 ENCOUNTER — PATIENT MESSAGE (OUTPATIENT)
Dept: ADMINISTRATIVE | Facility: HOSPITAL | Age: 65
End: 2020-02-11

## 2020-02-12 ENCOUNTER — TELEPHONE (OUTPATIENT)
Dept: OTOLARYNGOLOGY | Facility: CLINIC | Age: 65
End: 2020-02-12

## 2020-02-13 ENCOUNTER — TELEPHONE (OUTPATIENT)
Dept: OTOLARYNGOLOGY | Facility: CLINIC | Age: 65
End: 2020-02-13

## 2020-02-18 RX ORDER — HYDROXYZINE PAMOATE 25 MG/1
CAPSULE ORAL
Qty: 360 CAPSULE | Refills: 0 | Status: SHIPPED | OUTPATIENT
Start: 2020-02-18 | End: 2021-04-23

## 2020-04-01 RX ORDER — HYDROXYZINE PAMOATE 25 MG/1
CAPSULE ORAL
Qty: 60 CAPSULE | OUTPATIENT
Start: 2020-04-01

## 2020-04-02 ENCOUNTER — TELEPHONE (OUTPATIENT)
Dept: NEUROLOGY | Facility: CLINIC | Age: 65
End: 2020-04-02

## 2020-04-02 NOTE — TELEPHONE ENCOUNTER
Lm informing pt appts are being converted to virtual visits through the my chart portal, there are no in preson appt at this time due to COVID 19 concerns, to contact the clinic to discuss converting appt. Phone number given in message.

## 2020-04-03 ENCOUNTER — TELEPHONE (OUTPATIENT)
Dept: NEUROLOGY | Facility: CLINIC | Age: 65
End: 2020-04-03

## 2020-04-03 NOTE — TELEPHONE ENCOUNTER
Spoke with MsJazlyn Jorge, she was informed there are no in person at this time due to COVID 19 concerns, appts are being converted to virtual appts through the portal. Ms. Patel voiced understanding converting her appt. Pt given username for portal.

## 2020-04-14 ENCOUNTER — TELEPHONE (OUTPATIENT)
Dept: NEUROLOGY | Facility: CLINIC | Age: 65
End: 2020-04-14

## 2020-05-05 RX ORDER — BUSPIRONE HYDROCHLORIDE 15 MG/1
30 TABLET ORAL 3 TIMES DAILY
Qty: 180 TABLET | Refills: 3 | Status: SHIPPED | OUTPATIENT
Start: 2020-05-05 | End: 2021-01-11 | Stop reason: SDUPTHER

## 2020-05-05 RX ORDER — HYDROXYZINE PAMOATE 25 MG/1
25 CAPSULE ORAL 4 TIMES DAILY
Qty: 120 CAPSULE | Refills: 2 | Status: SHIPPED | OUTPATIENT
Start: 2020-05-05 | End: 2020-07-21 | Stop reason: SDUPTHER

## 2020-05-05 RX ORDER — ESCITALOPRAM OXALATE 20 MG/1
20 TABLET ORAL DAILY
Qty: 90 TABLET | Refills: 1 | Status: SHIPPED | OUTPATIENT
Start: 2020-05-05 | End: 2021-01-11 | Stop reason: SDUPTHER

## 2020-05-05 NOTE — PROGRESS NOTES
05/05/2020: Patient had called yesterday and wanted to speak with me re her medications. I called back yesterday just before 5pm and this am at about 7:35 am as an effort to reach her but got only a recording and a message that her mailbox was full so I could not leave a message.

## 2020-05-05 NOTE — PROGRESS NOTES
05/05/2020: Finally reached patient today, and refilled her prescriptions. She also explained her increase in anxiety due to the virus pandemic, and I recommended she increase the Buspar to 30 mg tid, and continue her other meds as prescribed. She also agreed to make an appointment when possible.  Patient was in good self control despite the increase in stress.

## 2020-07-18 RX ORDER — HYDROXYZINE PAMOATE 25 MG/1
CAPSULE ORAL
Qty: 120 CAPSULE | Refills: 2 | OUTPATIENT
Start: 2020-07-18

## 2020-07-30 ENCOUNTER — OFFICE VISIT (OUTPATIENT)
Dept: URGENT CARE | Facility: CLINIC | Age: 65
End: 2020-07-30
Payer: COMMERCIAL

## 2020-07-30 VITALS
HEART RATE: 113 BPM | RESPIRATION RATE: 18 BRPM | OXYGEN SATURATION: 98 % | TEMPERATURE: 98 F | SYSTOLIC BLOOD PRESSURE: 128 MMHG | DIASTOLIC BLOOD PRESSURE: 88 MMHG

## 2020-07-30 DIAGNOSIS — R05.9 COUGH: ICD-10-CM

## 2020-07-30 DIAGNOSIS — R00.0 TACHYCARDIA: Primary | ICD-10-CM

## 2020-07-30 PROCEDURE — 99213 PR OFFICE/OUTPT VISIT, EST, LEVL III, 20-29 MIN: ICD-10-PCS | Mod: S$GLB,,, | Performed by: INTERNAL MEDICINE

## 2020-07-30 PROCEDURE — U0003 INFECTIOUS AGENT DETECTION BY NUCLEIC ACID (DNA OR RNA); SEVERE ACUTE RESPIRATORY SYNDROME CORONAVIRUS 2 (SARS-COV-2) (CORONAVIRUS DISEASE [COVID-19]), AMPLIFIED PROBE TECHNIQUE, MAKING USE OF HIGH THROUGHPUT TECHNOLOGIES AS DESCRIBED BY CMS-2020-01-R: HCPCS

## 2020-07-30 PROCEDURE — 93005 EKG 12-LEAD: ICD-10-PCS | Mod: S$GLB,,, | Performed by: INTERNAL MEDICINE

## 2020-07-30 PROCEDURE — 99213 OFFICE O/P EST LOW 20 MIN: CPT | Mod: S$GLB,,, | Performed by: INTERNAL MEDICINE

## 2020-07-30 PROCEDURE — 93005 ELECTROCARDIOGRAM TRACING: CPT | Mod: S$GLB,,, | Performed by: INTERNAL MEDICINE

## 2020-07-30 PROCEDURE — 93010 ELECTROCARDIOGRAM REPORT: CPT | Mod: S$GLB,,, | Performed by: INTERNAL MEDICINE

## 2020-07-30 PROCEDURE — 93010 EKG 12-LEAD: ICD-10-PCS | Mod: S$GLB,,, | Performed by: INTERNAL MEDICINE

## 2020-07-30 NOTE — PROGRESS NOTES
Subjective:       Patient ID: Alix Patel is a 64 y.o. female.    Vitals:  temperature is 98.1 °F (36.7 °C). Her pulse is 130 (abnormal). Her oxygen saturation is 94% (abnormal).     Chief Complaint: Cough    Patient presents with a mild cough and congestion. Patient is requesting a covid swab    Cough  This is a new problem. The current episode started today. The problem has been unchanged. The problem occurs constantly. Pertinent negatives include no chills, ear pain, eye redness, fever, hemoptysis, myalgias, rash, sore throat, shortness of breath or wheezing. Nothing aggravates the symptoms. She has tried nothing for the symptoms. The treatment provided no relief.       Constitution: Negative for chills, sweating, fatigue and fever.   HENT: Positive for congestion. Negative for ear pain, sinus pain, sinus pressure, sore throat and voice change.    Neck: Negative for painful lymph nodes.   Eyes: Negative for eye redness.   Respiratory: Positive for cough. Negative for chest tightness, sputum production, bloody sputum, COPD, shortness of breath, stridor, wheezing and asthma.    Gastrointestinal: Negative for nausea and vomiting.   Musculoskeletal: Negative for muscle ache.   Skin: Negative for rash.   Allergic/Immunologic: Negative for seasonal allergies and asthma.   Hematologic/Lymphatic: Negative for swollen lymph nodes.       Objective:      Physical Exam   Constitutional: She is oriented to person, place, and time.  Non-toxic appearance. She does not appear ill. No distress.   HENT:   Head: Normocephalic and atraumatic.   Mouth/Throat: Mucous membranes are moist. No oropharyngeal exudate. Oropharynx is clear.   Cardiovascular: Normal heart sounds and normal pulses. Tachycardia present. Exam reveals no gallop.   No murmur heard.  Pulmonary/Chest: Effort normal and breath sounds normal. No respiratory distress. She has no wheezes. She has no rales.   Neurological: She is alert and oriented to person,  place, and time.   Skin: Skin is warm. Capillary refill takes less than 2 seconds. Psychiatric: Her behavior is normal. Mood, judgment and thought content normal.         Assessment:       1. Tachycardia    2. Cough        Plan:         Tachycardia  -     EKG 12-lead    Cough  -     COVID-19 Routine Screening    Tachycardic on triage check and during exam. EKG showing sinus tachycardia ranging from 110-115 bpm, see prior Cardiology notes and Holter monitor study to work this up, which was non-revealing. Well appearing. Sp02 98%. Has cough so will rule out COVID, lungs clear. Instructions provided.

## 2020-07-30 NOTE — PATIENT INSTRUCTIONS
Instructions for Patients with Confirmed or Suspected COVID-19    If you are awaiting your test result, you will either be called or it will be released to the patient portal.  If you have any questions about your test, please visit [email or web address] or call our COVID-19 information line at [phone].      Instructions for non-hospitalized or discharged patients with confirmed or suspected COVID-19:       Stay home except to get medical care.    Separate yourself from other people and animals in your home.    Call ahead before visiting your doctor.    Wear a face mask.    Cover your coughs and sneezes.    Clean your hands often.    Avoid sharing personal household items.    Clean all high-touch surfaces every day.    Monitor your symptoms. Seek prompt medical attention if your illness is worsening (e.g., difficulty breathing). Before seeking care, call your healthcare provider.    If you have a medical emergency and must call 911, notify the dispatcher that you have or are being evaluated for COVID-19. If possible, put on a face mask before emergency medical services arrive.    Use the following symptom-based strategy to return to normal activity following a suspected or confirmed case of COVID-19. Continue isolation until:   o At least 3 days (72 hours) have passed since recovery defined as resolution of fever without the use of fever-reducing medications and improvement in respiratory symptoms (e.g. cough, shortness of breath), and   o At least 10 days have passed since the first positive test.       As one of the next steps, you will receive a call or text from the [place] Department of Health ([facility]) COVID-19 Tracing Team. See the contact information below so you know not to ignore the health departments call. It is important that you contact them back immediately so they can help.     Contact Tracer Number:  [phone]  Caller ID for most carriers: [place] Dept [facility]    What is  contact tracing?   Contact tracing is a process that helps identify everyone who has been in close contact with an infected person. Contact tracers let those people know they may have been exposed and guide them on next steps. Confidentiality is important for everyone; no one will be told who may have exposed them to the virus.   Your involvement is important. The more we know about where and how this virus is spreading, the better chance we have at stopping it from spreading further.  What does exposure mean?   Exposure means you have been within 6 feet for more than 15 minutes with a person who has or had COVID-19.  What kind of questions do the contact tracers ask?   A contact tracer will confirm your basic contact information including name, address, phone number, and next of kin, as well as asking about any symptoms you may have had. Theyll also ask you how you think you may have gotten sick, such as places where you may have been exposed to the virus, and people you were with. Those names will never be shared with anyone outside of that call, and will only be used to help trace and stop the spread of the virus.   I have privacy concerns. How will the state use my information?   Your privacy about your health is important. All calls are completed using call centers that use the appropriate health privacy protection measures (HIPAA compliance), meaning that your patient information is safe. No one will ever ask you any questions related to immigration status. Your health comes first.   Do I have to participate?   You do not have to participate, but we strongly encourage you to. Contact tracing can help us catch and control new outbreaks as theyre developing to keep your friends and family safe.   What if I dont hear from anyone?   If you dont receive a call within 24 hours, you can call the number above right away to inquire about your status. That line is open from 8:00 am - 8:00 p.m., 7 days a  week.  Contact tracing saves lives! Together, we have the power to beat this virus and keep our loved ones and neighbors safe.       Instructions for household members, intimate partners and caregivers in a non-healthcare setting of a patient with confirmed or suspected COVID-19:         Close contacts should monitor their health and call their healthcare provider right away if they develop symptoms suggestive of COVID-19 (e.g., fever, cough, shortness of breath).    Stay home except to get medical care. Separate yourself from other people and animals in the home.   Monitor the patients symptoms. If the patient is getting sicker, call his or her healthcare provider. If the patient has a medical emergency and you need to call 911, notify the dispatch personnel that the patient has or is being evaluated for COVID-19.    Wear a facemask when around other people such as sharing a room or vehicle and before entering a healthcare provider's office.   Cover coughs and sneezes with a tissue. Throw used tissues in a lined trash can immediately and wash hands.   Clean hands often with soap and water for at least 20 seconds or with an alcohol-based hand , rubbing hands together until they feel dry. Avoid touching your eyes, nose, and mouth with unwashed hands.   Clean all high-touch; surfaces every day, including counters, tabletops, doorknobs, bathroom fixtures, toilets, phones, keyboards, tablets, bedside tables, etc. Use a household cleaning spray or wipe according to label instructions.   Avoid sharing personal household items such as dishes, drinking glasses, cups, towels, bedding, etc. After these items are used, they should be washed thoroughly with soap and water.   Continue isolation until:   At least 3 days (72 hours) have passed since recovery defined as resolution of fever without the use of fever-reducing medications and improvement in respiratory symptoms (e.g. cough, shortness of breath),  and    At least 10 days have passed since the patients first positive test.    https://www.cdc.gov/coronavirus/2019-ncov/your-health/index.htm

## 2020-07-31 LAB — SARS-COV-2 RNA RESP QL NAA+PROBE: NOT DETECTED

## 2020-08-01 ENCOUNTER — TELEPHONE (OUTPATIENT)
Dept: URGENT CARE | Facility: CLINIC | Age: 65
End: 2020-08-01

## 2020-08-01 NOTE — TELEPHONE ENCOUNTER
Attempted to call patient to discuss negative COVID-19 results. No answer. Voice mailbox full, unable to leave VM. Will need to attempt to reach patient at a later time.

## 2020-08-03 ENCOUNTER — TELEPHONE (OUTPATIENT)
Dept: URGENT CARE | Facility: CLINIC | Age: 65
End: 2020-08-03

## 2020-08-21 DIAGNOSIS — F31.81 BIPOLAR II DISORDER: ICD-10-CM

## 2020-08-21 RX ORDER — DIVALPROEX SODIUM 500 MG/1
TABLET, FILM COATED, EXTENDED RELEASE ORAL
Qty: 360 TABLET | Refills: 1 | OUTPATIENT
Start: 2020-08-21

## 2020-11-12 ENCOUNTER — OFFICE VISIT (OUTPATIENT)
Dept: URGENT CARE | Facility: CLINIC | Age: 65
End: 2020-11-12
Payer: COMMERCIAL

## 2020-11-12 VITALS
TEMPERATURE: 98 F | HEART RATE: 106 BPM | BODY MASS INDEX: 23.94 KG/M2 | DIASTOLIC BLOOD PRESSURE: 80 MMHG | SYSTOLIC BLOOD PRESSURE: 140 MMHG | OXYGEN SATURATION: 96 % | WEIGHT: 171 LBS | HEIGHT: 71 IN

## 2020-11-12 DIAGNOSIS — Z11.59 SCREENING FOR VIRAL DISEASE: ICD-10-CM

## 2020-11-12 DIAGNOSIS — J02.9 SORE THROAT: ICD-10-CM

## 2020-11-12 DIAGNOSIS — J32.9 CHRONIC CONGESTION OF PARANASAL SINUS: Primary | ICD-10-CM

## 2020-11-12 DIAGNOSIS — J30.89 ALLERGIC RHINITIS DUE TO OTHER ALLERGIC TRIGGER, UNSPECIFIED SEASONALITY: ICD-10-CM

## 2020-11-12 LAB
CTP QC/QA: YES
SARS-COV-2 RDRP RESP QL NAA+PROBE: NEGATIVE

## 2020-11-12 PROCEDURE — 3008F BODY MASS INDEX DOCD: CPT | Mod: CPTII,S$GLB,, | Performed by: PHYSICIAN ASSISTANT

## 2020-11-12 PROCEDURE — U0002: ICD-10-PCS | Mod: QW,S$GLB,, | Performed by: PHYSICIAN ASSISTANT

## 2020-11-12 PROCEDURE — 99214 PR OFFICE/OUTPT VISIT, EST, LEVL IV, 30-39 MIN: ICD-10-PCS | Mod: S$GLB,,, | Performed by: PHYSICIAN ASSISTANT

## 2020-11-12 PROCEDURE — U0002 COVID-19 LAB TEST NON-CDC: HCPCS | Mod: QW,S$GLB,, | Performed by: PHYSICIAN ASSISTANT

## 2020-11-12 PROCEDURE — 3008F PR BODY MASS INDEX (BMI) DOCUMENTED: ICD-10-PCS | Mod: CPTII,S$GLB,, | Performed by: PHYSICIAN ASSISTANT

## 2020-11-12 PROCEDURE — 99214 OFFICE O/P EST MOD 30 MIN: CPT | Mod: S$GLB,,, | Performed by: PHYSICIAN ASSISTANT

## 2020-11-12 NOTE — PATIENT INSTRUCTIONS
PLEASE READ YOUR DISCHARGE INSTRUCTIONS ENTIRELY AS IT CONTAINS IMPORTANT INFORMATION.    - Rest.  Drink plenty of fluids.    - Tylenol or Ibuprofen (or other OTC anti-inflammatory) as directed as needed for fever/pain.  For Tylenol, do not exceed 4000 mg/ day. For ibuprofen, do not exceed 2400 mg/day.    -Below are OTC suggestions for symptomatic relief:              -Tylenol every 4 hours OR ibuprofen every 6 hours as needed for pain/fever.              -Salt water gargles to soothe throat pain.              -Chloroseptic spray, lozenges, or cough drops also helps to numb throat pain.              -Nasal saline spray reduces inflammation and dryness.              -Warm face compresses to help with facial sinus pain/pressure.              -Vicks vapor rub at night.              -Flonase OTC or Nasacort OTC for nasal congestion.              -Simple foods like chicken noodle soup.              -Mucinex (or with DM) during day time. Delsym helps with coughing at night              -Zyrtec/Claritin during the day & Benadryl at night may help with allergies.  -If you DO NOT have Hypertension or any history of palpitations, it is ok to take over the counter Sudafed or Mucinex D or Allegra-D or Claritin-D or Zyrtec-D.  -If you do take one of the above, it is ok to combine that with plain over the counter Mucinex or Allegra or Claritin or Zyrtec. If, for example, you are taking Zyrtec -D, you can combine that with Mucinex, but not Mucinex-D.  If you are taking Mucinex-D, you can combine that with plain Allegra or Claritin or Zyrtec.   -If you DO have Hypertension or palpitations, it is safe to take Coricidin HBP for relief of sinus symptoms.      -You must understand that you've received an Urgent Care treatment only and that you may be released before all your medical problems are known or treated. You, the patient, will arrange for follow up care as instructed. Please arrange follow up with your primary medical clinic  within 2-5 days if your signs and symptoms have not resolved or worsen.   - Follow up with your PCP or specialty clinic as directed.  You can call [phone] or [phone]  to schedule an appointment with the appropriate provider.    - If your condition worsens or fails to improve we recommend that you receive another evaluation at the emergency room immediately or contact your primary medical clinic to discuss your concerns.        RED FLAGS/WARNING SYMPTOMS DISCUSSED WITH PATIENT THAT WOULD WARRANT EMERGENT MEDICAL ATTENTION. Patient aware and verbalized understanding.

## 2020-11-12 NOTE — PROGRESS NOTES
"Subjective:       Patient ID: Alix Patel is a 64 y.o. female.    Vitals:  height is 5' 11" (1.803 m) and weight is 77.6 kg (171 lb). Her temperature is 97.9 °F (36.6 °C). Her blood pressure is 140/80 (abnormal) and her pulse is 106. Her oxygen saturation is 96%.     Chief Complaint: COVID-19 Concerns      64-year-old female with a history of former smoker, bipolar, anxiety, depression, diabetes, GERD, insomnia, hyperlipidemia, alcohol use disorder, tension headache, and GERD who presents urgent care clinic for evaluation.  Patient reports that she has chronic sinus issues for many years.  She has not been able to see her ENT specialist (Dr. Juarez) this entire year due to COVID.  She is complaining of her chronic nasal/sinus congestion, sinus pressure, postnasal drip, and intermittent cough/sore throat due to postnasal drip.  She has been taking Benadryl intermittently with minimal improvements.  No other associated symptoms.  She contact her PCP and was recommended to obtain COVID test in urgent care clinic.      Constitution: Negative for activity change, appetite change, chills, sweating, fatigue, fever and generalized weakness.   HENT: Positive for congestion, postnasal drip, sinus pressure and sore throat. Negative for ear pain, hearing loss, facial swelling, trouble swallowing and voice change.    Neck: Negative for neck pain, neck stiffness and painful lymph nodes.   Cardiovascular: Negative for chest pain, leg swelling, palpitations, sob on exertion and passing out.   Eyes: Negative for eye discharge, eye pain, photophobia, vision loss, double vision and blurred vision.   Respiratory: Positive for cough and sputum production. Negative for chest tightness, bloody sputum, COPD, shortness of breath, stridor, wheezing and asthma.    Gastrointestinal: Negative for abdominal pain, nausea, vomiting, constipation, diarrhea, bright red blood in stool, rectal bleeding, heartburn and bowel incontinence. "   Genitourinary: Negative for dysuria, frequency, urgency, urine decreased, flank pain, bladder incontinence, hematuria and history of kidney stones.   Musculoskeletal: Negative for trauma, joint pain, joint swelling, abnormal ROM of joint, muscle cramps and muscle ache.   Skin: Negative for color change, pale, rash, wound and bruising.   Allergic/Immunologic: Negative for seasonal allergies, asthma and immunocompromised state.   Neurological: Positive for headaches and history of migraines. Negative for dizziness, history of vertigo, light-headedness, passing out, facial drooping, speech difficulty, coordination disturbances, loss of balance, disorientation, altered mental status, loss of consciousness, numbness, tingling and seizures.   Hematologic/Lymphatic: Negative for swollen lymph nodes, easy bruising/bleeding and trouble clotting. Does not bruise/bleed easily.   Psychiatric/Behavioral: Negative for altered mental status, disorientation, nervous/anxious, sleep disturbance and depression. The patient is not nervous/anxious.        Objective:      Physical Exam   Constitutional: She is oriented to person, place, and time. She appears well-developed. She is cooperative.  Non-toxic appearance. She does not appear ill. No distress.   HENT:   Head: Normocephalic and atraumatic.   Ears:   Right Ear: Hearing, external ear and ear canal normal. No drainage, swelling or tenderness.   Left Ear: Hearing, external ear and ear canal normal. No drainage, swelling or tenderness.   Nose: Congestion present. No rhinorrhea or purulent discharge. Right sinus exhibits no maxillary sinus tenderness and no frontal sinus tenderness. Left sinus exhibits no maxillary sinus tenderness and no frontal sinus tenderness.   Mouth/Throat: Uvula is midline, oropharynx is clear and moist and mucous membranes are normal. Mucous membranes are moist. No oral lesions. No trismus in the jaw. No uvula swelling. No oropharyngeal exudate, posterior  oropharyngeal edema, posterior oropharyngeal erythema, tonsillar abscesses or cobblestoning. Tonsils are 1+ on the right. Tonsils are 1+ on the left. No tonsillar exudate. Oropharynx is clear.   Eyes: Pupils are equal, round, and reactive to light. Conjunctivae, EOM and lids are normal. Right eye exhibits no discharge. Left eye exhibits no discharge. No visual field deficit is present. Right conjunctiva is not injected. Right conjunctiva has no hemorrhage. Left conjunctiva is not injected. Left conjunctiva has no hemorrhage. extraocular movement intactvision grossly intactgaze aligned appropriately  Neck: Normal range of motion and full passive range of motion without pain. Neck supple. No neck rigidity.   Cardiovascular: Normal rate, regular rhythm, normal heart sounds and normal pulses.   Pulmonary/Chest: Effort normal and breath sounds normal. No accessory muscle usage or stridor. No respiratory distress. She has no wheezes. She exhibits no tenderness.   Abdominal: Soft. Normal appearance. She exhibits no distension and no mass. There is no splenomegaly or hepatomegaly. There is no abdominal tenderness. There is no rebound, no guarding, no tenderness at McBurney's point, negative Chiu's sign, no left CVA tenderness, negative Rovsing's sign and no right CVA tenderness.   Musculoskeletal: Normal range of motion.      Right lower leg: No edema.      Left lower leg: No edema.      Comments: Moves all extremities with normal tone, strength, and ROM.  Gait normal.   Lymphadenopathy:     She has no cervical adenopathy.   Neurological: She is alert and oriented to person, place, and time. She has normal motor skills, normal sensation and intact cranial nerves. She displays no weakness, facial symmetry and normal reflexes. No cranial nerve deficit or sensory deficit. She exhibits normal muscle tone. She has a normal Finger-Nose-Finger Test. She shows no pronator drift. She displays no seizure activity. Gait and  coordination normal. Coordination normal. GCS eye subscore is 4. GCS verbal subscore is 5. GCS motor subscore is 6.   Skin: Skin is warm, dry, not diaphoretic and no rash. Capillary refill takes less than 2 seconds. Psychiatric: Her speech is normal and behavior is normal. Mood and thought content normal.   Nursing note and vitals reviewed.    Results for orders placed or performed in visit on 11/12/20   POCT COVID-19 Rapid Screening   Result Value Ref Range    POC Rapid COVID Negative Negative     Acceptable Yes              Assessment:       1. Chronic congestion of paranasal sinus    2. Sore throat    3. Allergic rhinitis due to other allergic trigger, unspecified seasonality    4. Screening for viral disease        On exam, patient is nontoxic appearing and vitals are stable.  Patient is essentially neurovascularly intact on exam.  covid neg. Diagnostic testing results were independently reviewed and interpreted, which were discussed in depth with patient.    Patient was recommended nasal spray OTC, mucinex, and recommended OTC treatments for their symptoms.  If symptoms do not improve/worsens, patient was referred back to PCP and ENT for continued outpatient workup and management.     Patient was instructed to return for re-evaluation for any worsening or change in current symptoms. Strict ED versus clinic precautions given in depth. Discharge and follow-up instructions given verbally/printed with the patient who expressed understanding and willingness to comply with my recommendations.  Patient verbalized understanding and agreed with the entirety of plan of care.    Note dictated with voice recognition software, please excuse any grammatical errors.    Plan:         Chronic congestion of paranasal sinus    Sore throat  -     POCT COVID-19 Rapid Screening    Allergic rhinitis due to other allergic trigger, unspecified seasonality    Screening for viral disease

## 2020-12-09 ENCOUNTER — OFFICE VISIT (OUTPATIENT)
Dept: OTOLARYNGOLOGY | Facility: CLINIC | Age: 65
End: 2020-12-09
Payer: COMMERCIAL

## 2020-12-09 VITALS
HEART RATE: 111 BPM | DIASTOLIC BLOOD PRESSURE: 74 MMHG | BODY MASS INDEX: 23.68 KG/M2 | WEIGHT: 169.75 LBS | SYSTOLIC BLOOD PRESSURE: 108 MMHG

## 2020-12-09 DIAGNOSIS — J31.1 CHRONIC NASOPHARYNGITIS: Primary | ICD-10-CM

## 2020-12-09 PROCEDURE — 1157F PR ADVANCE CARE PLAN OR EQUIV PRESENT IN MEDICAL RECORD: ICD-10-PCS | Mod: S$GLB,,, | Performed by: OTOLARYNGOLOGY

## 2020-12-09 PROCEDURE — 3008F BODY MASS INDEX DOCD: CPT | Mod: CPTII,S$GLB,, | Performed by: OTOLARYNGOLOGY

## 2020-12-09 PROCEDURE — 3008F PR BODY MASS INDEX (BMI) DOCUMENTED: ICD-10-PCS | Mod: CPTII,S$GLB,, | Performed by: OTOLARYNGOLOGY

## 2020-12-09 PROCEDURE — 99999 PR PBB SHADOW E&M-EST. PATIENT-LVL II: ICD-10-PCS | Mod: PBBFAC,,, | Performed by: OTOLARYNGOLOGY

## 2020-12-09 PROCEDURE — 1101F PR PT FALLS ASSESS DOC 0-1 FALLS W/OUT INJ PAST YR: ICD-10-PCS | Mod: CPTII,S$GLB,, | Performed by: OTOLARYNGOLOGY

## 2020-12-09 PROCEDURE — 3288F PR FALLS RISK ASSESSMENT DOCUMENTED: ICD-10-PCS | Mod: CPTII,S$GLB,, | Performed by: OTOLARYNGOLOGY

## 2020-12-09 PROCEDURE — 99214 OFFICE O/P EST MOD 30 MIN: CPT | Mod: S$GLB,,, | Performed by: OTOLARYNGOLOGY

## 2020-12-09 PROCEDURE — 1101F PT FALLS ASSESS-DOCD LE1/YR: CPT | Mod: CPTII,S$GLB,, | Performed by: OTOLARYNGOLOGY

## 2020-12-09 PROCEDURE — 1126F PR PAIN SEVERITY QUANTIFIED, NO PAIN PRESENT: ICD-10-PCS | Mod: S$GLB,,, | Performed by: OTOLARYNGOLOGY

## 2020-12-09 PROCEDURE — 3288F FALL RISK ASSESSMENT DOCD: CPT | Mod: CPTII,S$GLB,, | Performed by: OTOLARYNGOLOGY

## 2020-12-09 PROCEDURE — 99214 PR OFFICE/OUTPT VISIT, EST, LEVL IV, 30-39 MIN: ICD-10-PCS | Mod: S$GLB,,, | Performed by: OTOLARYNGOLOGY

## 2020-12-09 PROCEDURE — 99999 PR PBB SHADOW E&M-EST. PATIENT-LVL II: CPT | Mod: PBBFAC,,, | Performed by: OTOLARYNGOLOGY

## 2020-12-09 PROCEDURE — 1157F ADVNC CARE PLAN IN RCRD: CPT | Mod: S$GLB,,, | Performed by: OTOLARYNGOLOGY

## 2020-12-09 PROCEDURE — 1126F AMNT PAIN NOTED NONE PRSNT: CPT | Mod: S$GLB,,, | Performed by: OTOLARYNGOLOGY

## 2020-12-09 NOTE — PROGRESS NOTES
Subjective:      Alix is a 65 y.o. female who comes for follow-up of chronic nasopharyngitis.  Her last visit with me was on 1/7/2020.  Had gradual improvements with 3 months of levofloxacin win nebulizer (which eventually broke) and then navage, but interrupted by onset of pandemic.  Now with full-fledged return of daily, moderately severe thick postnasal drip, stringy discharge with bits of blood, minimally improved with nasal rinse.  Interferes with singing voice.        %       The patient's medications, allergies, past medical, surgical, social and family histories were reviewed and updated as appropriate.    A detailed review of systems was obtained with pertinent positives as per the above HPI, and otherwise negative.        Objective:     /74   Pulse (!) 111   Wt 77 kg (169 lb 12.1 oz)   BMI 23.68 kg/m²        Constitutional:   She appears well-developed. She is cooperative.     Head:  Normocephalic.     Nose:  No mucosal edema, rhinorrhea, septal deviation or polyps. No epistaxis. Turbinates normal, no turbinate masses and no turbinate hypertrophy.  Right sinus exhibits no maxillary sinus tenderness and no frontal sinus tenderness. Left sinus exhibits no maxillary sinus tenderness and no frontal sinus tenderness.     Mouth/Throat  Oropharynx clear and moist without lesions or asymmetry. No oropharyngeal exudate or posterior oropharyngeal erythema.     Neck:  No adenopathy. Normal range of motion present.     She has no cervical adenopathy.       Procedure    None        Data Reviewed    WBC (K/uL)   Date Value   08/22/2019 5.27     Eosinophil % (%)   Date Value   10/18/2018 1.8     Eos # (K/uL)   Date Value   10/18/2018 0.1     Platelets (K/uL)   Date Value   08/22/2019 228     Glucose (mg/dL)   Date Value   08/22/2019 222 (H)     No results found for: IGE    I independently reviewed the images of the CT sinuses dated 1/27/19. Pertinent findings include clear sinsues.      Assessment:     1.  Chronic nasopharyngitis         Plan:     Discussed adenoidectomy with nasopharyngeal curettage as adjunct to topical antibiotic therapy.  Wishes to pursue but after pandemic has begun to wane, perhaps in April.  Will call to schedule.  In meantime, will Rx levofloxacin as before for use in [facility].  Follow up in about 3 months (around 3/9/2021).

## 2020-12-17 ENCOUNTER — HOSPITAL ENCOUNTER (OUTPATIENT)
Dept: RADIOLOGY | Facility: OTHER | Age: 65
Discharge: HOME | End: 2020-12-17
Attending: NURSE PRACTITIONER
Payer: COMMERCIAL

## 2020-12-17 DIAGNOSIS — Z12.31 BREAST CANCER SCREENING BY MAMMOGRAM: Primary | ICD-10-CM

## 2020-12-17 DIAGNOSIS — Z78.0 ASYMPTOMATIC MENOPAUSAL STATE: ICD-10-CM

## 2020-12-17 DIAGNOSIS — Z12.31 BREAST CANCER SCREENING BY MAMMOGRAM: ICD-10-CM

## 2020-12-17 PROCEDURE — 77080 DEXA BONE DENSITY SPINE HIP: ICD-10-PCS | Mod: 26,,, | Performed by: RADIOLOGY

## 2020-12-17 PROCEDURE — 77080 DXA BONE DENSITY AXIAL: CPT | Mod: TC

## 2020-12-17 PROCEDURE — 77080 DXA BONE DENSITY AXIAL: CPT | Mod: 26,,, | Performed by: RADIOLOGY

## 2020-12-22 ENCOUNTER — HOSPITAL ENCOUNTER (OUTPATIENT)
Dept: RADIOLOGY | Facility: OTHER | Age: 65
Discharge: HOME | End: 2020-12-22
Attending: NURSE PRACTITIONER
Payer: COMMERCIAL

## 2020-12-22 DIAGNOSIS — Z12.31 BREAST CANCER SCREENING BY MAMMOGRAM: ICD-10-CM

## 2020-12-22 PROCEDURE — 77067 MAMMO DIGITAL SCREENING BILAT WITH TOMO: ICD-10-PCS | Mod: 26,,, | Performed by: RADIOLOGY

## 2020-12-22 PROCEDURE — 77063 BREAST TOMOSYNTHESIS BI: CPT | Mod: 26,,, | Performed by: RADIOLOGY

## 2020-12-22 PROCEDURE — 77063 MAMMO DIGITAL SCREENING BILAT WITH TOMO: ICD-10-PCS | Mod: 26,,, | Performed by: RADIOLOGY

## 2020-12-22 PROCEDURE — 77067 SCR MAMMO BI INCL CAD: CPT | Mod: TC

## 2020-12-22 PROCEDURE — 77067 SCR MAMMO BI INCL CAD: CPT | Mod: 26,,, | Performed by: RADIOLOGY

## 2020-12-29 ENCOUNTER — HOSPITAL ENCOUNTER (OUTPATIENT)
Dept: RADIOLOGY | Facility: HOSPITAL | Age: 65
Discharge: HOME | End: 2020-12-29
Attending: INTERNAL MEDICINE
Payer: COMMERCIAL

## 2020-12-29 DIAGNOSIS — R92.2 INCONCLUSIVE MAMMOGRAM: ICD-10-CM

## 2020-12-29 PROCEDURE — 77061 BREAST TOMOSYNTHESIS UNI: CPT | Mod: TC,LT

## 2020-12-29 PROCEDURE — 77065 DX MAMMO INCL CAD UNI: CPT | Mod: TC,LT

## 2020-12-29 PROCEDURE — 77065 MAMMO DIGITAL DIAGNOSTIC LEFT WITH TOMO: ICD-10-PCS | Mod: 26,LT,, | Performed by: RADIOLOGY

## 2020-12-29 PROCEDURE — 77065 DX MAMMO INCL CAD UNI: CPT | Mod: 26,LT,, | Performed by: RADIOLOGY

## 2020-12-29 PROCEDURE — 77061 BREAST TOMOSYNTHESIS UNI: CPT | Mod: 26,LT,, | Performed by: RADIOLOGY

## 2020-12-29 PROCEDURE — 77061 MAMMO DIGITAL DIAGNOSTIC LEFT WITH TOMO: ICD-10-PCS | Mod: 26,LT,, | Performed by: RADIOLOGY

## 2020-12-30 ENCOUNTER — TELEPHONE (OUTPATIENT)
Dept: RADIOLOGY | Facility: HOSPITAL | Age: 65
End: 2020-12-30

## 2020-12-30 NOTE — TELEPHONE ENCOUNTER
Called patient in reference to her breast imaging and scheduling a breast biopsy. No answer. Voice mail box is full, unable to leave a message.

## 2020-12-31 ENCOUNTER — TELEPHONE (OUTPATIENT)
Dept: RADIOLOGY | Facility: HOSPITAL | Age: 65
End: 2020-12-31

## 2020-12-31 NOTE — TELEPHONE ENCOUNTER
Spoke with patient. Reviewed breast biopsy procedure and reviewed instructions for breast biopsy. Patient expressed understanding and all questions were answered. Provided patient with my phone number to call for any further concerns or questions.   Patient scheduled breast biopsy at the [facility] on 1/19/2021.

## 2021-01-11 RX ORDER — ESCITALOPRAM OXALATE 20 MG/1
20 TABLET ORAL DAILY
Qty: 90 TABLET | Refills: 1 | Status: SHIPPED | OUTPATIENT
Start: 2021-01-11 | End: 2021-03-03 | Stop reason: SDUPTHER

## 2021-01-11 RX ORDER — BUSPIRONE HYDROCHLORIDE 15 MG/1
30 TABLET ORAL 3 TIMES DAILY
Qty: 180 TABLET | Refills: 3 | Status: SHIPPED | OUTPATIENT
Start: 2021-01-11 | End: 2021-03-03 | Stop reason: SDUPTHER

## 2021-01-11 RX ORDER — DIVALPROEX SODIUM 500 MG/1
2000 TABLET, FILM COATED, EXTENDED RELEASE ORAL NIGHTLY
Qty: 360 TABLET | Refills: 1 | Status: SHIPPED | OUTPATIENT
Start: 2021-01-11 | End: 2021-03-03 | Stop reason: SDUPTHER

## 2021-01-11 RX ORDER — HYDROXYZINE PAMOATE 25 MG/1
25 CAPSULE ORAL EVERY 8 HOURS PRN
Qty: 180 CAPSULE | Refills: 0 | Status: SHIPPED | OUTPATIENT
Start: 2021-01-11 | End: 2021-02-17 | Stop reason: SDUPTHER

## 2021-01-11 NOTE — PROGRESS NOTES
01/11/2021: Patient called re her stress due to the fear that she may have breast cancer. She has not seen me in a year and we discussed the need to make an appointment and she agreed to do so. She has no symptoms of mallory or psychosis and no active thoughts of harming herself. She agrees to continue her meds, the depakote and hydroxyzine which she has not been taking, along with her regular doses of Buspar and Lexapro. She is in good self control and also agreed to try and make an appointment with Dr Bazzi as well.

## 2021-01-19 ENCOUNTER — HOSPITAL ENCOUNTER (OUTPATIENT)
Dept: RADIOLOGY | Facility: HOSPITAL | Age: 66
Discharge: HOME | End: 2021-01-19
Attending: INTERNAL MEDICINE
Payer: COMMERCIAL

## 2021-01-19 DIAGNOSIS — R92.8 ABNORMAL MAMMOGRAM: Primary | ICD-10-CM

## 2021-01-19 PROCEDURE — 27200939 MAMMO BREAST STEREOTACTIC BREAST BIOPSY LEFT

## 2021-01-19 PROCEDURE — 88305 TISSUE EXAM BY PATHOLOGIST: CPT | Mod: 59 | Performed by: PATHOLOGY

## 2021-01-19 PROCEDURE — 19081 BX BREAST 1ST LESION STRTCTC: CPT | Mod: LT,,, | Performed by: RADIOLOGY

## 2021-01-19 PROCEDURE — 19081 MAMMO BREAST STEREOTACTIC BREAST BIOPSY LEFT: ICD-10-PCS | Mod: LT,,, | Performed by: RADIOLOGY

## 2021-01-19 PROCEDURE — 88305 TISSUE EXAM BY PATHOLOGIST: CPT | Mod: 26,,, | Performed by: PATHOLOGY

## 2021-01-19 PROCEDURE — 25000003 PHARM REV CODE 250: Performed by: INTERNAL MEDICINE

## 2021-01-19 PROCEDURE — 88305 TISSUE EXAM BY PATHOLOGIST: ICD-10-PCS | Mod: 26,,, | Performed by: PATHOLOGY

## 2021-01-19 RX ORDER — LIDOCAINE HYDROCHLORIDE 10 MG/ML
1 INJECTION, SOLUTION EPIDURAL; INFILTRATION; INTRACAUDAL; PERINEURAL ONCE
Status: COMPLETED | OUTPATIENT
Start: 2021-01-19 | End: 2021-01-19

## 2021-01-19 RX ORDER — LIDOCAINE HYDROCHLORIDE AND EPINEPHRINE 20; 10 MG/ML; UG/ML
18 INJECTION, SOLUTION INFILTRATION; PERINEURAL ONCE
Status: COMPLETED | OUTPATIENT
Start: 2021-01-19 | End: 2021-01-19

## 2021-01-19 RX ADMIN — LIDOCAINE HYDROCHLORIDE,EPINEPHRINE BITARTRATE 18 ML: 20; .01 INJECTION, SOLUTION INFILTRATION; PERINEURAL at 04:01

## 2021-01-19 RX ADMIN — LIDOCAINE HYDROCHLORIDE 2 ML: 10 INJECTION, SOLUTION EPIDURAL; INFILTRATION; INTRACAUDAL; PERINEURAL at 03:01

## 2021-01-20 ENCOUNTER — TELEPHONE (OUTPATIENT)
Dept: RADIOLOGY | Facility: HOSPITAL | Age: 66
End: 2021-01-20

## 2021-01-21 LAB
FINAL PATHOLOGIC DIAGNOSIS: NORMAL
GROSS: NORMAL

## 2021-01-26 ENCOUNTER — TELEPHONE (OUTPATIENT)
Dept: SURGERY | Facility: CLINIC | Age: 66
End: 2021-01-26

## 2021-02-28 ENCOUNTER — OFFICE VISIT (OUTPATIENT)
Dept: URGENT CARE | Facility: CLINIC | Age: 66
End: 2021-02-28
Payer: COMMERCIAL

## 2021-02-28 VITALS
DIASTOLIC BLOOD PRESSURE: 75 MMHG | HEART RATE: 85 BPM | TEMPERATURE: 98 F | HEIGHT: 71 IN | WEIGHT: 162 LBS | SYSTOLIC BLOOD PRESSURE: 106 MMHG | RESPIRATION RATE: 18 BRPM | OXYGEN SATURATION: 97 % | BODY MASS INDEX: 22.68 KG/M2

## 2021-02-28 DIAGNOSIS — R52 BODY ACHES: Primary | ICD-10-CM

## 2021-02-28 LAB
CTP QC/QA: YES
SARS-COV-2 RDRP RESP QL NAA+PROBE: NEGATIVE

## 2021-02-28 PROCEDURE — 99213 OFFICE O/P EST LOW 20 MIN: CPT | Mod: S$GLB,,, | Performed by: NURSE PRACTITIONER

## 2021-02-28 PROCEDURE — 1157F ADVNC CARE PLAN IN RCRD: CPT | Mod: S$GLB,,, | Performed by: NURSE PRACTITIONER

## 2021-02-28 PROCEDURE — U0002: ICD-10-PCS | Mod: QW,S$GLB,, | Performed by: NURSE PRACTITIONER

## 2021-02-28 PROCEDURE — U0002 COVID-19 LAB TEST NON-CDC: HCPCS | Mod: QW,S$GLB,, | Performed by: NURSE PRACTITIONER

## 2021-02-28 PROCEDURE — 99213 PR OFFICE/OUTPT VISIT, EST, LEVL III, 20-29 MIN: ICD-10-PCS | Mod: S$GLB,,, | Performed by: NURSE PRACTITIONER

## 2021-02-28 PROCEDURE — 1157F PR ADVANCE CARE PLAN OR EQUIV PRESENT IN MEDICAL RECORD: ICD-10-PCS | Mod: S$GLB,,, | Performed by: NURSE PRACTITIONER

## 2021-02-28 PROCEDURE — 3008F PR BODY MASS INDEX (BMI) DOCUMENTED: ICD-10-PCS | Mod: CPTII,S$GLB,, | Performed by: NURSE PRACTITIONER

## 2021-02-28 PROCEDURE — 3008F BODY MASS INDEX DOCD: CPT | Mod: CPTII,S$GLB,, | Performed by: NURSE PRACTITIONER

## 2021-02-28 RX ORDER — BLOOD-GLUCOSE CONTROL, NORMAL
EACH MISCELLANEOUS
Status: ON HOLD | COMMUNITY
End: 2021-07-22 | Stop reason: HOSPADM

## 2021-02-28 RX ORDER — DIPHENHYDRAMINE HCL 25 MG
TABLET ORAL
Status: ON HOLD | COMMUNITY
Start: 2020-12-17 | End: 2021-07-22 | Stop reason: HOSPADM

## 2021-03-03 ENCOUNTER — OFFICE VISIT (OUTPATIENT)
Dept: PSYCHIATRY | Facility: CLINIC | Age: 66
End: 2021-03-03
Payer: COMMERCIAL

## 2021-03-03 DIAGNOSIS — F31.70 BIPOLAR DISORDER IN PARTIAL REMISSION, MOST RECENT EPISODE UNSPECIFIED TYPE: Primary | ICD-10-CM

## 2021-03-03 DIAGNOSIS — F10.21 ALCOHOL USE DISORDER, MODERATE, IN SUSTAINED REMISSION: ICD-10-CM

## 2021-03-03 DIAGNOSIS — F31.81 BIPOLAR II DISORDER: ICD-10-CM

## 2021-03-03 PROCEDURE — 90833 PSYTX W PT W E/M 30 MIN: CPT | Mod: 95,,, | Performed by: PSYCHIATRY & NEUROLOGY

## 2021-03-03 PROCEDURE — 90833 PR PSYCHOTHERAPY W/PATIENT W/E&M, 30 MIN (ADD ON): ICD-10-PCS | Mod: 95,,, | Performed by: PSYCHIATRY & NEUROLOGY

## 2021-03-03 PROCEDURE — 99214 PR OFFICE/OUTPT VISIT, EST, LEVL IV, 30-39 MIN: ICD-10-PCS | Mod: 95,,, | Performed by: PSYCHIATRY & NEUROLOGY

## 2021-03-03 PROCEDURE — 1157F ADVNC CARE PLAN IN RCRD: CPT | Mod: 95,,, | Performed by: PSYCHIATRY & NEUROLOGY

## 2021-03-03 PROCEDURE — 1157F PR ADVANCE CARE PLAN OR EQUIV PRESENT IN MEDICAL RECORD: ICD-10-PCS | Mod: 95,,, | Performed by: PSYCHIATRY & NEUROLOGY

## 2021-03-03 PROCEDURE — 99214 OFFICE O/P EST MOD 30 MIN: CPT | Mod: 95,,, | Performed by: PSYCHIATRY & NEUROLOGY

## 2021-03-03 RX ORDER — DIVALPROEX SODIUM 500 MG/1
2000 TABLET, FILM COATED, EXTENDED RELEASE ORAL NIGHTLY
Qty: 360 TABLET | Refills: 1 | Status: SHIPPED | OUTPATIENT
Start: 2021-03-03 | End: 2021-07-29 | Stop reason: DRUGHIGH

## 2021-03-03 RX ORDER — HYDROXYZINE PAMOATE 25 MG/1
25 CAPSULE ORAL EVERY 8 HOURS PRN
Qty: 180 CAPSULE | Refills: 0 | Status: SHIPPED | OUTPATIENT
Start: 2021-03-03 | End: 2021-06-01

## 2021-03-03 RX ORDER — ESCITALOPRAM OXALATE 20 MG/1
20 TABLET ORAL DAILY
Qty: 90 TABLET | Refills: 1 | Status: SHIPPED | OUTPATIENT
Start: 2021-03-03 | End: 2021-07-29 | Stop reason: SDUPTHER

## 2021-03-03 RX ORDER — BUSPIRONE HYDROCHLORIDE 15 MG/1
30 TABLET ORAL 3 TIMES DAILY
Qty: 180 TABLET | Refills: 3 | Status: ON HOLD | OUTPATIENT
Start: 2021-03-03 | End: 2021-07-22

## 2021-03-15 ENCOUNTER — OFFICE VISIT (OUTPATIENT)
Dept: PSYCHIATRY | Facility: CLINIC | Age: 66
End: 2021-03-15
Payer: COMMERCIAL

## 2021-03-15 DIAGNOSIS — F41.1 GAD (GENERALIZED ANXIETY DISORDER): ICD-10-CM

## 2021-03-15 DIAGNOSIS — F32.A DEPRESSION, UNSPECIFIED DEPRESSION TYPE: Primary | ICD-10-CM

## 2021-03-15 PROCEDURE — 1157F PR ADVANCE CARE PLAN OR EQUIV PRESENT IN MEDICAL RECORD: ICD-10-PCS | Mod: 95,,, | Performed by: SOCIAL WORKER

## 2021-03-15 PROCEDURE — 90791 PSYCH DIAGNOSTIC EVALUATION: CPT | Mod: 95,,, | Performed by: SOCIAL WORKER

## 2021-03-15 PROCEDURE — 1157F ADVNC CARE PLAN IN RCRD: CPT | Mod: 95,,, | Performed by: SOCIAL WORKER

## 2021-03-15 PROCEDURE — 90791 PR PSYCHIATRIC DIAGNOSTIC EVALUATION: ICD-10-PCS | Mod: 95,,, | Performed by: SOCIAL WORKER

## 2021-04-05 ENCOUNTER — OFFICE VISIT (OUTPATIENT)
Dept: URGENT CARE | Facility: CLINIC | Age: 66
End: 2021-04-05
Payer: COMMERCIAL

## 2021-04-05 VITALS
WEIGHT: 162 LBS | DIASTOLIC BLOOD PRESSURE: 88 MMHG | HEIGHT: 71 IN | BODY MASS INDEX: 22.68 KG/M2 | SYSTOLIC BLOOD PRESSURE: 136 MMHG | OXYGEN SATURATION: 97 % | TEMPERATURE: 99 F | HEART RATE: 73 BPM

## 2021-04-05 DIAGNOSIS — S63.92XA SPRAIN OF LEFT HAND, INITIAL ENCOUNTER: Primary | ICD-10-CM

## 2021-04-05 DIAGNOSIS — M79.642 PAIN OF LEFT HAND: ICD-10-CM

## 2021-04-05 PROCEDURE — 99214 OFFICE O/P EST MOD 30 MIN: CPT | Mod: S$GLB,,, | Performed by: FAMILY MEDICINE

## 2021-04-05 PROCEDURE — 73130 X-RAY EXAM OF HAND: CPT | Mod: FY,LT,S$GLB, | Performed by: RADIOLOGY

## 2021-04-05 PROCEDURE — 1157F ADVNC CARE PLAN IN RCRD: CPT | Mod: S$GLB,,, | Performed by: FAMILY MEDICINE

## 2021-04-05 PROCEDURE — 73130 XR HAND COMPLETE 3 VIEW LEFT: ICD-10-PCS | Mod: FY,LT,S$GLB, | Performed by: RADIOLOGY

## 2021-04-05 PROCEDURE — 3008F BODY MASS INDEX DOCD: CPT | Mod: CPTII,S$GLB,, | Performed by: FAMILY MEDICINE

## 2021-04-05 PROCEDURE — 3008F PR BODY MASS INDEX (BMI) DOCUMENTED: ICD-10-PCS | Mod: CPTII,S$GLB,, | Performed by: FAMILY MEDICINE

## 2021-04-05 PROCEDURE — 99214 PR OFFICE/OUTPT VISIT, EST, LEVL IV, 30-39 MIN: ICD-10-PCS | Mod: S$GLB,,, | Performed by: FAMILY MEDICINE

## 2021-04-05 PROCEDURE — 1157F PR ADVANCE CARE PLAN OR EQUIV PRESENT IN MEDICAL RECORD: ICD-10-PCS | Mod: S$GLB,,, | Performed by: FAMILY MEDICINE

## 2021-04-09 ENCOUNTER — OFFICE VISIT (OUTPATIENT)
Dept: URGENT CARE | Facility: CLINIC | Age: 66
End: 2021-04-09
Payer: OTHER MISCELLANEOUS

## 2021-04-09 VITALS
HEIGHT: 71 IN | WEIGHT: 162 LBS | TEMPERATURE: 99 F | HEART RATE: 69 BPM | DIASTOLIC BLOOD PRESSURE: 84 MMHG | BODY MASS INDEX: 22.68 KG/M2 | SYSTOLIC BLOOD PRESSURE: 127 MMHG | OXYGEN SATURATION: 98 %

## 2021-04-09 DIAGNOSIS — M79.642 LEFT HAND PAIN: ICD-10-CM

## 2021-04-09 DIAGNOSIS — S63.92XA SPRAIN OF LEFT HAND, INITIAL ENCOUNTER: Primary | ICD-10-CM

## 2021-04-09 PROCEDURE — 73130 XR HAND COMPLETE 3 VIEW LEFT: ICD-10-PCS | Mod: FY,LT,S$GLB, | Performed by: RADIOLOGY

## 2021-04-09 PROCEDURE — 99214 PR OFFICE/OUTPT VISIT, EST, LEVL IV, 30-39 MIN: ICD-10-PCS | Mod: S$GLB,,, | Performed by: FAMILY MEDICINE

## 2021-04-09 PROCEDURE — 99214 OFFICE O/P EST MOD 30 MIN: CPT | Mod: S$GLB,,, | Performed by: FAMILY MEDICINE

## 2021-04-09 PROCEDURE — 73130 X-RAY EXAM OF HAND: CPT | Mod: FY,LT,S$GLB, | Performed by: RADIOLOGY

## 2021-04-16 ENCOUNTER — PATIENT MESSAGE (OUTPATIENT)
Dept: RESEARCH | Facility: HOSPITAL | Age: 66
End: 2021-04-16

## 2021-04-23 ENCOUNTER — OFFICE VISIT (OUTPATIENT)
Dept: URGENT CARE | Facility: CLINIC | Age: 66
End: 2021-04-23
Payer: COMMERCIAL

## 2021-04-23 VITALS
OXYGEN SATURATION: 96 % | DIASTOLIC BLOOD PRESSURE: 77 MMHG | HEIGHT: 71 IN | WEIGHT: 162 LBS | HEART RATE: 95 BPM | BODY MASS INDEX: 22.68 KG/M2 | TEMPERATURE: 98 F | SYSTOLIC BLOOD PRESSURE: 123 MMHG

## 2021-04-23 DIAGNOSIS — S63.92XA SPRAIN OF LEFT HAND, INITIAL ENCOUNTER: Primary | ICD-10-CM

## 2021-04-23 DIAGNOSIS — M54.50 ACUTE EXACERBATION OF CHRONIC LOW BACK PAIN: ICD-10-CM
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
## 2021-04-23 DIAGNOSIS — G89.29 ACUTE EXACERBATION OF CHRONIC LOW BACK PAIN: ICD-10-CM
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
## 2021-04-23 PROCEDURE — 99213 OFFICE O/P EST LOW 20 MIN: CPT | Mod: S$GLB,,, | Performed by: NURSE PRACTITIONER

## 2021-04-23 PROCEDURE — 99213 PR OFFICE/OUTPT VISIT, EST, LEVL III, 20-29 MIN: ICD-10-PCS | Mod: S$GLB,,, | Performed by: NURSE PRACTITIONER

## 2021-04-23 PROCEDURE — 72100 X-RAY EXAM L-S SPINE 2/3 VWS: CPT | Mod: S$GLB,,, | Performed by: RADIOLOGY

## 2021-04-23 PROCEDURE — 73130 XR HAND COMPLETE 3 VIEW LEFT: ICD-10-PCS | Mod: LT,S$GLB,, | Performed by: RADIOLOGY

## 2021-04-23 PROCEDURE — 73130 X-RAY EXAM OF HAND: CPT | Mod: LT,S$GLB,, | Performed by: RADIOLOGY

## 2021-04-23 PROCEDURE — 72100 XR LUMBAR SPINE 2 OR 3 VIEWS: ICD-10-PCS | Mod: S$GLB,,, | Performed by: RADIOLOGY

## 2021-04-23 RX ORDER — DICLOFENAC SODIUM 50 MG/1
50 TABLET, DELAYED RELEASE ORAL 2 TIMES DAILY
Qty: 30 TABLET | Refills: 0 | Status: SHIPPED | OUTPATIENT
Start: 2021-04-23 | End: 2021-04-30 | Stop reason: SDUPTHER

## 2021-04-23 RX ORDER — CYCLOBENZAPRINE HCL 10 MG
10 TABLET ORAL 3 TIMES DAILY PRN
Qty: 21 TABLET | Refills: 0 | Status: SHIPPED | OUTPATIENT
Start: 2021-04-23 | End: 2021-04-30 | Stop reason: SDUPTHER

## 2021-04-30 ENCOUNTER — OFFICE VISIT (OUTPATIENT)
Dept: URGENT CARE | Facility: CLINIC | Age: 66
End: 2021-04-30
Payer: OTHER MISCELLANEOUS

## 2021-04-30 VITALS
SYSTOLIC BLOOD PRESSURE: 109 MMHG | HEIGHT: 71 IN | OXYGEN SATURATION: 96 % | TEMPERATURE: 99 F | DIASTOLIC BLOOD PRESSURE: 64 MMHG | RESPIRATION RATE: 16 BRPM | HEART RATE: 99 BPM | WEIGHT: 176 LBS | BODY MASS INDEX: 24.64 KG/M2

## 2021-04-30 DIAGNOSIS — M54.50 ACUTE EXACERBATION OF CHRONIC LOW BACK PAIN: ICD-10-CM

## 2021-04-30 DIAGNOSIS — S63.92XA SPRAIN OF LEFT HAND, INITIAL ENCOUNTER: Primary | ICD-10-CM

## 2021-04-30 DIAGNOSIS — G89.29 ACUTE EXACERBATION OF CHRONIC LOW BACK PAIN: ICD-10-CM
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
## 2021-04-30 PROCEDURE — 99214 PR OFFICE/OUTPT VISIT, EST, LEVL IV, 30-39 MIN: ICD-10-PCS | Mod: S$GLB,,, | Performed by: NURSE PRACTITIONER

## 2021-04-30 PROCEDURE — 99214 OFFICE O/P EST MOD 30 MIN: CPT | Mod: S$GLB,,, | Performed by: NURSE PRACTITIONER

## 2021-04-30 RX ORDER — MELOXICAM 15 MG/1
TABLET ORAL
Status: ON HOLD | COMMUNITY
End: 2021-07-22 | Stop reason: HOSPADM

## 2021-04-30 RX ORDER — DEXAMETHASONE 4 MG/1
TABLET ORAL
Status: ON HOLD | COMMUNITY
End: 2021-07-22 | Stop reason: HOSPADM

## 2021-04-30 RX ORDER — GLIPIZIDE 5 MG/1
TABLET ORAL
Status: ON HOLD | COMMUNITY
Start: 2021-03-22 | End: 2021-07-22 | Stop reason: SDUPTHER

## 2021-04-30 RX ORDER — DICLOFENAC SODIUM 50 MG/1
50 TABLET, DELAYED RELEASE ORAL 2 TIMES DAILY
Qty: 30 TABLET | Refills: 0 | Status: SHIPPED | OUTPATIENT
Start: 2021-04-30 | End: 2021-05-21 | Stop reason: SDUPTHER

## 2021-04-30 RX ORDER — EMPAGLIFLOZIN 25 MG/1
TABLET, FILM COATED ORAL
Status: ON HOLD | COMMUNITY
End: 2021-07-22 | Stop reason: HOSPADM

## 2021-04-30 RX ORDER — IBUPROFEN 800 MG/1
TABLET ORAL
Status: ON HOLD | COMMUNITY
Start: 2021-03-24 | End: 2021-07-22 | Stop reason: HOSPADM

## 2021-04-30 RX ORDER — BUTALBITAL, ACETAMINOPHEN AND CAFFEINE 50; 325; 40 MG/1; MG/1; MG/1
TABLET ORAL
Status: ON HOLD | COMMUNITY
End: 2021-07-22 | Stop reason: HOSPADM

## 2021-04-30 RX ORDER — GLIPIZIDE 5 MG/1
TABLET ORAL
Status: ON HOLD | COMMUNITY
End: 2021-07-22 | Stop reason: HOSPADM

## 2021-04-30 RX ORDER — MELOXICAM 15 MG/1
15 TABLET ORAL DAILY
COMMUNITY
Start: 2021-03-31 | End: 2021-11-26

## 2021-04-30 RX ORDER — IBUPROFEN 800 MG/1
TABLET ORAL
Status: ON HOLD | COMMUNITY
End: 2021-07-22 | Stop reason: HOSPADM

## 2021-04-30 RX ORDER — CYCLOBENZAPRINE HCL 10 MG
10 TABLET ORAL 3 TIMES DAILY PRN
Qty: 21 TABLET | Refills: 0 | Status: SHIPPED | OUTPATIENT
Start: 2021-04-30 | End: 2021-05-07

## 2021-04-30 RX ORDER — HYDROCODONE BITARTRATE AND ACETAMINOPHEN 5; 325 MG/1; MG/1
TABLET ORAL
Status: ON HOLD | COMMUNITY
Start: 2021-03-24 | End: 2021-07-22 | Stop reason: HOSPADM

## 2021-04-30 RX ORDER — HYDROCODONE BITARTRATE AND ACETAMINOPHEN 5; 325 MG/1; MG/1
TABLET ORAL
Status: ON HOLD | COMMUNITY
End: 2021-07-22 | Stop reason: HOSPADM

## 2021-04-30 RX ORDER — EMPAGLIFLOZIN 25 MG/1
25 TABLET, FILM COATED ORAL DAILY
Status: ON HOLD | COMMUNITY
Start: 2021-02-24 | End: 2021-07-22 | Stop reason: HOSPADM

## 2021-04-30 RX ORDER — GENTAMICIN SULFATE 3 MG/ML
SOLUTION/ DROPS OPHTHALMIC
Status: ON HOLD | COMMUNITY
End: 2021-07-22 | Stop reason: HOSPADM

## 2021-05-06 ENCOUNTER — CLINICAL SUPPORT (OUTPATIENT)
Dept: REHABILITATION | Facility: OTHER | Age: 66
End: 2021-05-06
Payer: OTHER MISCELLANEOUS

## 2021-05-06 DIAGNOSIS — R29.3 POOR POSTURE: ICD-10-CM

## 2021-05-06 DIAGNOSIS — M54.42 ACUTE BILATERAL LOW BACK PAIN WITH BILATERAL SCIATICA: ICD-10-CM

## 2021-05-06 DIAGNOSIS — M62.81 MUSCLE WEAKNESS OF LOWER EXTREMITY: Primary | ICD-10-CM

## 2021-05-06 DIAGNOSIS — M54.41 ACUTE BILATERAL LOW BACK PAIN WITH BILATERAL SCIATICA: ICD-10-CM

## 2021-05-06 PROCEDURE — 97162 PT EVAL MOD COMPLEX 30 MIN: CPT | Mod: PN

## 2021-05-10 ENCOUNTER — HOSPITAL ENCOUNTER (OUTPATIENT)
Dept: RADIOLOGY | Facility: HOSPITAL | Age: 66
Discharge: HOME | End: 2021-05-10
Attending: ORTHOPAEDIC SURGERY
Payer: COMMERCIAL

## 2021-05-10 ENCOUNTER — OFFICE VISIT (OUTPATIENT)
Dept: ORTHOPEDICS | Facility: CLINIC | Age: 66
End: 2021-05-10
Payer: OTHER MISCELLANEOUS

## 2021-05-10 VITALS
DIASTOLIC BLOOD PRESSURE: 77 MMHG | WEIGHT: 176 LBS | SYSTOLIC BLOOD PRESSURE: 109 MMHG | HEART RATE: 118 BPM | BODY MASS INDEX: 24.55 KG/M2

## 2021-05-10 DIAGNOSIS — S62.347D CLOSED NONDISPLACED FRACTURE OF BASE OF FIFTH METACARPAL BONE OF LEFT HAND WITH ROUTINE HEALING: ICD-10-CM

## 2021-05-10 DIAGNOSIS — M79.642 HAND PAIN, LEFT: ICD-10-CM

## 2021-05-10 DIAGNOSIS — M79.642 HAND PAIN, LEFT: Primary | ICD-10-CM

## 2021-05-10 PROCEDURE — 73130 XR HAND COMPLETE 3 VIEW LEFT: ICD-10-PCS | Mod: 26,LT,, | Performed by: RADIOLOGY

## 2021-05-10 PROCEDURE — 99203 OFFICE O/P NEW LOW 30 MIN: CPT | Mod: S$GLB,,, | Performed by: ORTHOPAEDIC SURGERY

## 2021-05-10 PROCEDURE — 99203 PR OFFICE/OUTPT VISIT, NEW, LEVL III, 30-44 MIN: ICD-10-PCS | Mod: S$GLB,,, | Performed by: ORTHOPAEDIC SURGERY

## 2021-05-10 PROCEDURE — 73130 X-RAY EXAM OF HAND: CPT | Mod: TC,PN,LT

## 2021-05-10 PROCEDURE — 99999 PR PBB SHADOW E&M-EST. PATIENT-LVL V: CPT | Mod: PBBFAC,,, | Performed by: ORTHOPAEDIC SURGERY

## 2021-05-10 PROCEDURE — 73130 X-RAY EXAM OF HAND: CPT | Mod: 26,LT,, | Performed by: RADIOLOGY

## 2021-05-10 PROCEDURE — 99999 PR PBB SHADOW E&M-EST. PATIENT-LVL V: ICD-10-PCS | Mod: PBBFAC,,, | Performed by: ORTHOPAEDIC SURGERY

## 2021-05-10 RX ORDER — TRAMADOL HYDROCHLORIDE 50 MG/1
50 TABLET ORAL EVERY 6 HOURS PRN
Qty: 40 TABLET | Refills: 0 | Status: SHIPPED | OUTPATIENT
Start: 2021-05-10 | End: 2021-05-20

## 2021-05-11 ENCOUNTER — PATIENT MESSAGE (OUTPATIENT)
Dept: REHABILITATION | Facility: OTHER | Age: 66
End: 2021-05-11

## 2021-05-11 ENCOUNTER — DOCUMENTATION ONLY (OUTPATIENT)
Dept: REHABILITATION | Facility: OTHER | Age: 66
End: 2021-05-11

## 2021-05-11 ENCOUNTER — TELEPHONE (OUTPATIENT)
Dept: ORTHOPEDICS | Facility: CLINIC | Age: 66
End: 2021-05-11

## 2021-05-12 ENCOUNTER — TELEPHONE (OUTPATIENT)
Dept: ORTHOPEDICS | Facility: CLINIC | Age: 66
End: 2021-05-12

## 2021-05-12 ENCOUNTER — CLINICAL SUPPORT (OUTPATIENT)
Dept: REHABILITATION | Facility: OTHER | Age: 66
End: 2021-05-12
Payer: COMMERCIAL

## 2021-05-12 DIAGNOSIS — M54.42 ACUTE BILATERAL LOW BACK PAIN WITH BILATERAL SCIATICA: ICD-10-CM

## 2021-05-12 DIAGNOSIS — M54.41 ACUTE BILATERAL LOW BACK PAIN WITH BILATERAL SCIATICA: ICD-10-CM

## 2021-05-12 DIAGNOSIS — R29.3 POOR POSTURE: ICD-10-CM

## 2021-05-12 PROCEDURE — 97110 THERAPEUTIC EXERCISES: CPT | Mod: PN

## 2021-05-13 ENCOUNTER — DOCUMENTATION ONLY (OUTPATIENT)
Dept: REHABILITATION | Facility: OTHER | Age: 66
End: 2021-05-13

## 2021-05-18 ENCOUNTER — CLINICAL SUPPORT (OUTPATIENT)
Dept: REHABILITATION | Facility: HOSPITAL | Age: 66
End: 2021-05-18
Attending: ORTHOPAEDIC SURGERY
Payer: COMMERCIAL

## 2021-05-18 DIAGNOSIS — M79.642 LEFT HAND PAIN: ICD-10-CM

## 2021-05-18 DIAGNOSIS — S62.347D CLOSED NONDISPLACED FRACTURE OF BASE OF FIFTH METACARPAL BONE OF LEFT HAND WITH ROUTINE HEALING: ICD-10-CM

## 2021-05-18 PROCEDURE — 97165 OT EVAL LOW COMPLEX 30 MIN: CPT | Mod: PO

## 2021-05-18 PROCEDURE — 97110 THERAPEUTIC EXERCISES: CPT | Mod: PO

## 2021-05-19 ENCOUNTER — DOCUMENTATION ONLY (OUTPATIENT)
Dept: REHABILITATION | Facility: OTHER | Age: 66
End: 2021-05-19

## 2021-05-19 ENCOUNTER — OFFICE VISIT (OUTPATIENT)
Dept: PSYCHIATRY | Facility: CLINIC | Age: 66
End: 2021-05-19
Payer: COMMERCIAL

## 2021-05-19 DIAGNOSIS — F31.70 BIPOLAR DISORDER IN PARTIAL REMISSION, MOST RECENT EPISODE UNSPECIFIED TYPE: Primary | ICD-10-CM

## 2021-05-19 DIAGNOSIS — F10.21 ALCOHOL USE DISORDER, MODERATE, IN SUSTAINED REMISSION: ICD-10-CM

## 2021-05-19 PROCEDURE — 1157F PR ADVANCE CARE PLAN OR EQUIV PRESENT IN MEDICAL RECORD: ICD-10-PCS | Mod: 95,,, | Performed by: PSYCHIATRY & NEUROLOGY

## 2021-05-19 PROCEDURE — 99214 PR OFFICE/OUTPT VISIT, EST, LEVL IV, 30-39 MIN: ICD-10-PCS | Mod: 95,,, | Performed by: PSYCHIATRY & NEUROLOGY

## 2021-05-19 PROCEDURE — 1157F ADVNC CARE PLAN IN RCRD: CPT | Mod: 95,,, | Performed by: PSYCHIATRY & NEUROLOGY

## 2021-05-19 PROCEDURE — 99499 RISK ADDL DX/OHS AUDIT: ICD-10-PCS | Mod: 95,,, | Performed by: PSYCHIATRY & NEUROLOGY

## 2021-05-19 PROCEDURE — 99499 UNLISTED E&M SERVICE: CPT | Mod: 95,,, | Performed by: PSYCHIATRY & NEUROLOGY

## 2021-05-19 PROCEDURE — 99214 OFFICE O/P EST MOD 30 MIN: CPT | Mod: 95,,, | Performed by: PSYCHIATRY & NEUROLOGY

## 2021-05-19 PROCEDURE — 90833 PR PSYCHOTHERAPY W/PATIENT W/E&M, 30 MIN (ADD ON): ICD-10-PCS | Mod: 95,,, | Performed by: PSYCHIATRY & NEUROLOGY

## 2021-05-19 PROCEDURE — 90833 PSYTX W PT W E/M 30 MIN: CPT | Mod: 95,,, | Performed by: PSYCHIATRY & NEUROLOGY

## 2021-05-21 ENCOUNTER — OFFICE VISIT (OUTPATIENT)
Dept: URGENT CARE | Facility: CLINIC | Age: 66
End: 2021-05-21
Payer: OTHER MISCELLANEOUS

## 2021-05-21 ENCOUNTER — CLINICAL SUPPORT (OUTPATIENT)
Dept: REHABILITATION | Facility: OTHER | Age: 66
End: 2021-05-21
Payer: OTHER MISCELLANEOUS

## 2021-05-21 VITALS
DIASTOLIC BLOOD PRESSURE: 63 MMHG | SYSTOLIC BLOOD PRESSURE: 110 MMHG | WEIGHT: 176 LBS | OXYGEN SATURATION: 98 % | HEART RATE: 98 BPM | BODY MASS INDEX: 24.64 KG/M2 | TEMPERATURE: 98 F | HEIGHT: 71 IN | RESPIRATION RATE: 16 BRPM
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
## 2021-05-21 DIAGNOSIS — G89.29 ACUTE EXACERBATION OF CHRONIC LOW BACK PAIN: ICD-10-CM

## 2021-05-21 DIAGNOSIS — M54.2 NECK PAIN: Primary | ICD-10-CM

## 2021-05-21 DIAGNOSIS — M54.41 ACUTE BILATERAL LOW BACK PAIN WITH BILATERAL SCIATICA: ICD-10-CM

## 2021-05-21 DIAGNOSIS — M54.50 ACUTE EXACERBATION OF CHRONIC LOW BACK PAIN: ICD-10-CM

## 2021-05-21 DIAGNOSIS — R29.3 POOR POSTURE: ICD-10-CM

## 2021-05-21 DIAGNOSIS — M54.42 ACUTE BILATERAL LOW BACK PAIN WITH BILATERAL SCIATICA: ICD-10-CM
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
## 2021-05-21 PROCEDURE — 72040 XR CERVICAL SPINE AP LATERAL: ICD-10-PCS | Mod: S$GLB,,, | Performed by: RADIOLOGY

## 2021-05-21 PROCEDURE — 99214 OFFICE O/P EST MOD 30 MIN: CPT | Mod: S$GLB,,, | Performed by: NURSE PRACTITIONER

## 2021-05-21 PROCEDURE — 99214 PR OFFICE/OUTPT VISIT, EST, LEVL IV, 30-39 MIN: ICD-10-PCS | Mod: S$GLB,,, | Performed by: NURSE PRACTITIONER

## 2021-05-21 PROCEDURE — 97110 THERAPEUTIC EXERCISES: CPT | Mod: PN,CQ

## 2021-05-21 PROCEDURE — 72040 X-RAY EXAM NECK SPINE 2-3 VW: CPT | Mod: S$GLB,,, | Performed by: RADIOLOGY

## 2021-05-21 RX ORDER — DICLOFENAC SODIUM 50 MG/1
50 TABLET, DELAYED RELEASE ORAL 2 TIMES DAILY
Qty: 30 TABLET | Refills: 0 | Status: ON HOLD | OUTPATIENT
Start: 2021-05-21 | End: 2021-07-22 | Stop reason: HOSPADM

## 2021-05-21 RX ORDER — CYCLOBENZAPRINE HCL 10 MG
10 TABLET ORAL 3 TIMES DAILY PRN
Qty: 21 TABLET | Refills: 0 | Status: SHIPPED | OUTPATIENT
Start: 2021-05-21 | End: 2021-05-28

## 2021-05-24 ENCOUNTER — OFFICE VISIT (OUTPATIENT)
Dept: PSYCHIATRY | Facility: CLINIC | Age: 66
End: 2021-05-24
Payer: COMMERCIAL

## 2021-05-24 DIAGNOSIS — F41.9 ANXIETY: ICD-10-CM

## 2021-05-24 DIAGNOSIS — F32.A DEPRESSION, UNSPECIFIED DEPRESSION TYPE: Primary | ICD-10-CM

## 2021-05-24 PROCEDURE — 90791 PSYCH DIAGNOSTIC EVALUATION: CPT | Mod: 95,,, | Performed by: SOCIAL WORKER

## 2021-05-24 PROCEDURE — 1157F PR ADVANCE CARE PLAN OR EQUIV PRESENT IN MEDICAL RECORD: ICD-10-PCS | Mod: 95,,, | Performed by: SOCIAL WORKER

## 2021-05-24 PROCEDURE — 90791 PR PSYCHIATRIC DIAGNOSTIC EVALUATION: ICD-10-PCS | Mod: 95,,, | Performed by: SOCIAL WORKER

## 2021-05-24 PROCEDURE — 1157F ADVNC CARE PLAN IN RCRD: CPT | Mod: 95,,, | Performed by: SOCIAL WORKER

## 2021-05-25 ENCOUNTER — CLINICAL SUPPORT (OUTPATIENT)
Dept: REHABILITATION | Facility: OTHER | Age: 66
End: 2021-05-25
Payer: OTHER MISCELLANEOUS

## 2021-05-25 DIAGNOSIS — M54.42 ACUTE BILATERAL LOW BACK PAIN WITH BILATERAL SCIATICA: ICD-10-CM

## 2021-05-25 DIAGNOSIS — M54.41 ACUTE BILATERAL LOW BACK PAIN WITH BILATERAL SCIATICA: ICD-10-CM

## 2021-05-25 DIAGNOSIS — R29.3 POOR POSTURE: ICD-10-CM

## 2021-05-25 PROCEDURE — 97110 THERAPEUTIC EXERCISES: CPT | Mod: PN

## 2021-05-31 ENCOUNTER — CLINICAL SUPPORT (OUTPATIENT)
Dept: REHABILITATION | Facility: OTHER | Age: 66
End: 2021-05-31
Payer: OTHER MISCELLANEOUS

## 2021-05-31 DIAGNOSIS — M54.42 ACUTE BILATERAL LOW BACK PAIN WITH BILATERAL SCIATICA: ICD-10-CM

## 2021-05-31 DIAGNOSIS — R29.3 POOR POSTURE: ICD-10-CM

## 2021-05-31 DIAGNOSIS — M54.41 ACUTE BILATERAL LOW BACK PAIN WITH BILATERAL SCIATICA: ICD-10-CM

## 2021-05-31 PROCEDURE — 97110 THERAPEUTIC EXERCISES: CPT | Mod: PN,CQ

## 2021-06-01 ENCOUNTER — CLINICAL SUPPORT (OUTPATIENT)
Dept: REHABILITATION | Facility: HOSPITAL | Age: 66
End: 2021-06-01
Attending: ORTHOPAEDIC SURGERY
Payer: COMMERCIAL

## 2021-06-01 DIAGNOSIS — M79.642 LEFT HAND PAIN: ICD-10-CM

## 2021-06-01 PROCEDURE — 97022 WHIRLPOOL THERAPY: CPT | Mod: PO

## 2021-06-01 PROCEDURE — 97110 THERAPEUTIC EXERCISES: CPT | Mod: PO

## 2021-06-04 ENCOUNTER — PATIENT MESSAGE (OUTPATIENT)
Dept: REHABILITATION | Facility: OTHER | Age: 66
End: 2021-06-04

## 2021-06-04 ENCOUNTER — DOCUMENTATION ONLY (OUTPATIENT)
Dept: REHABILITATION | Facility: OTHER | Age: 66
End: 2021-06-04

## 2021-06-07 ENCOUNTER — CLINICAL SUPPORT (OUTPATIENT)
Dept: REHABILITATION | Facility: OTHER | Age: 66
End: 2021-06-07
Payer: OTHER MISCELLANEOUS

## 2021-06-07 ENCOUNTER — OFFICE VISIT (OUTPATIENT)
Dept: PODIATRY | Facility: CLINIC | Age: 66
End: 2021-06-07
Payer: COMMERCIAL

## 2021-06-07 VITALS
SYSTOLIC BLOOD PRESSURE: 138 MMHG | BODY MASS INDEX: 24.64 KG/M2 | HEIGHT: 71 IN | HEART RATE: 88 BPM | WEIGHT: 176 LBS | DIASTOLIC BLOOD PRESSURE: 75 MMHG

## 2021-06-07 DIAGNOSIS — B35.1 DERMATOPHYTOSIS OF NAIL: Primary | ICD-10-CM

## 2021-06-07 DIAGNOSIS — R29.3 POOR POSTURE: ICD-10-CM

## 2021-06-07 DIAGNOSIS — M54.42 ACUTE BILATERAL LOW BACK PAIN WITH BILATERAL SCIATICA: ICD-10-CM

## 2021-06-07 DIAGNOSIS — L60.3 DYSTROPHIC NAIL: ICD-10-CM

## 2021-06-07 DIAGNOSIS — L85.3 DRY SKIN: ICD-10-CM

## 2021-06-07 DIAGNOSIS — M54.41 ACUTE BILATERAL LOW BACK PAIN WITH BILATERAL SCIATICA: ICD-10-CM

## 2021-06-07 PROCEDURE — 99203 OFFICE O/P NEW LOW 30 MIN: CPT | Mod: 25,S$GLB,, | Performed by: PODIATRIST

## 2021-06-07 PROCEDURE — 99999 PR PBB SHADOW E&M-EST. PATIENT-LVL V: CPT | Mod: PBBFAC,,, | Performed by: PODIATRIST

## 2021-06-07 PROCEDURE — 11730 NAIL REMOVAL: ICD-10-PCS | Mod: T5,S$GLB,, | Performed by: PODIATRIST

## 2021-06-07 PROCEDURE — 1126F AMNT PAIN NOTED NONE PRSNT: CPT | Mod: S$GLB,,, | Performed by: PODIATRIST

## 2021-06-07 PROCEDURE — 1157F PR ADVANCE CARE PLAN OR EQUIV PRESENT IN MEDICAL RECORD: ICD-10-PCS | Mod: S$GLB,,, | Performed by: PODIATRIST

## 2021-06-07 PROCEDURE — 3008F BODY MASS INDEX DOCD: CPT | Mod: CPTII,S$GLB,, | Performed by: PODIATRIST

## 2021-06-07 PROCEDURE — 99203 PR OFFICE/OUTPT VISIT, NEW, LEVL III, 30-44 MIN: ICD-10-PCS | Mod: 25,S$GLB,, | Performed by: PODIATRIST

## 2021-06-07 PROCEDURE — 3008F PR BODY MASS INDEX (BMI) DOCUMENTED: ICD-10-PCS | Mod: CPTII,S$GLB,, | Performed by: PODIATRIST

## 2021-06-07 PROCEDURE — 99999 PR PBB SHADOW E&M-EST. PATIENT-LVL V: ICD-10-PCS | Mod: PBBFAC,,, | Performed by: PODIATRIST

## 2021-06-07 PROCEDURE — 11730 AVULSION NAIL PLATE SIMPLE 1: CPT | Mod: T5,S$GLB,, | Performed by: PODIATRIST

## 2021-06-07 PROCEDURE — 1126F PR PAIN SEVERITY QUANTIFIED, NO PAIN PRESENT: ICD-10-PCS | Mod: S$GLB,,, | Performed by: PODIATRIST

## 2021-06-07 PROCEDURE — 1157F ADVNC CARE PLAN IN RCRD: CPT | Mod: S$GLB,,, | Performed by: PODIATRIST

## 2021-06-07 PROCEDURE — 97110 THERAPEUTIC EXERCISES: CPT | Mod: PN,CQ

## 2021-06-07 RX ORDER — METRONIDAZOLE 500 MG/1
TABLET ORAL
Status: ON HOLD | COMMUNITY
End: 2021-07-22 | Stop reason: HOSPADM

## 2021-06-07 RX ORDER — ZOSTER VACCINE RECOMBINANT, ADJUVANTED 50 MCG/0.5
KIT INTRAMUSCULAR
Status: ON HOLD | COMMUNITY
End: 2021-07-22 | Stop reason: HOSPADM

## 2021-06-07 RX ORDER — PROMETHAZINE HYDROCHLORIDE 25 MG/1
TABLET ORAL
Status: ON HOLD | COMMUNITY
End: 2021-07-22 | Stop reason: HOSPADM

## 2021-06-07 RX ORDER — PHENAZOPYRIDINE HYDROCHLORIDE 200 MG/1
TABLET, FILM COATED ORAL
Status: ON HOLD | COMMUNITY
End: 2021-07-22 | Stop reason: HOSPADM

## 2021-06-07 RX ORDER — NITROFURANTOIN 25; 75 MG/1; MG/1
CAPSULE ORAL
Status: ON HOLD | COMMUNITY
End: 2021-07-22 | Stop reason: HOSPADM

## 2021-06-07 RX ORDER — INFLUENZA VIRUS VACCINE 15; 15; 15; 15 UG/.5ML; UG/.5ML; UG/.5ML; UG/.5ML
SUSPENSION INTRAMUSCULAR
Status: ON HOLD | COMMUNITY
End: 2021-07-22 | Stop reason: HOSPADM

## 2021-06-07 RX ORDER — METHYLPREDNISOLONE 4 MG/1
TABLET ORAL
Status: ON HOLD | COMMUNITY
Start: 2021-03-24 | End: 2021-07-22 | Stop reason: HOSPADM

## 2021-06-07 RX ORDER — SODIUM, POTASSIUM,MAG SULFATES 17.5-3.13G
SOLUTION, RECONSTITUTED, ORAL ORAL
Status: ON HOLD | COMMUNITY
Start: 2021-05-06 | End: 2021-07-22 | Stop reason: HOSPADM

## 2021-06-08 ENCOUNTER — DOCUMENTATION ONLY (OUTPATIENT)
Dept: REHABILITATION | Facility: HOSPITAL | Age: 66
End: 2021-06-08

## 2021-06-08 ENCOUNTER — TELEPHONE (OUTPATIENT)
Dept: OTOLARYNGOLOGY | Facility: CLINIC | Age: 66
End: 2021-06-08

## 2021-06-09 ENCOUNTER — CLINICAL SUPPORT (OUTPATIENT)
Dept: REHABILITATION | Facility: OTHER | Age: 66
End: 2021-06-09
Payer: OTHER MISCELLANEOUS

## 2021-06-09 DIAGNOSIS — M54.41 ACUTE BILATERAL LOW BACK PAIN WITH BILATERAL SCIATICA: ICD-10-CM

## 2021-06-09 DIAGNOSIS — R29.3 POOR POSTURE: ICD-10-CM

## 2021-06-09 DIAGNOSIS — M54.42 ACUTE BILATERAL LOW BACK PAIN WITH BILATERAL SCIATICA: ICD-10-CM

## 2021-06-09 PROCEDURE — 97110 THERAPEUTIC EXERCISES: CPT | Mod: PN,CQ

## 2021-06-10 ENCOUNTER — CLINICAL SUPPORT (OUTPATIENT)
Dept: REHABILITATION | Facility: HOSPITAL | Age: 66
End: 2021-06-10
Attending: ORTHOPAEDIC SURGERY
Payer: COMMERCIAL

## 2021-06-10 ENCOUNTER — TELEPHONE (OUTPATIENT)
Dept: REHABILITATION | Facility: HOSPITAL | Age: 66
End: 2021-06-10

## 2021-06-10 DIAGNOSIS — M79.642 LEFT HAND PAIN: ICD-10-CM

## 2021-06-10 PROCEDURE — 97022 WHIRLPOOL THERAPY: CPT | Mod: PO

## 2021-06-10 PROCEDURE — 97110 THERAPEUTIC EXERCISES: CPT | Mod: PO

## 2021-06-11 ENCOUNTER — PATIENT MESSAGE (OUTPATIENT)
Dept: REHABILITATION | Facility: OTHER | Age: 66
End: 2021-06-11

## 2021-06-11 ENCOUNTER — DOCUMENTATION ONLY (OUTPATIENT)
Dept: REHABILITATION | Facility: OTHER | Age: 66
End: 2021-06-11

## 2021-06-11 DIAGNOSIS — G89.29 ACUTE EXACERBATION OF CHRONIC LOW BACK PAIN: Primary | ICD-10-CM

## 2021-06-11 DIAGNOSIS — M54.50 ACUTE EXACERBATION OF CHRONIC LOW BACK PAIN: Primary | ICD-10-CM

## 2021-06-14 ENCOUNTER — CLINICAL SUPPORT (OUTPATIENT)
Dept: REHABILITATION | Facility: OTHER | Age: 66
End: 2021-06-14
Payer: OTHER MISCELLANEOUS

## 2021-06-14 DIAGNOSIS — M54.41 ACUTE BILATERAL LOW BACK PAIN WITH BILATERAL SCIATICA: ICD-10-CM

## 2021-06-14 DIAGNOSIS — M54.42 ACUTE BILATERAL LOW BACK PAIN WITH BILATERAL SCIATICA: ICD-10-CM

## 2021-06-14 DIAGNOSIS — R29.3 POOR POSTURE: ICD-10-CM

## 2021-06-14 PROCEDURE — 97110 THERAPEUTIC EXERCISES: CPT | Mod: PN,CQ

## 2021-06-15 ENCOUNTER — OFFICE VISIT (OUTPATIENT)
Dept: URGENT CARE | Facility: CLINIC | Age: 66
End: 2021-06-15
Payer: COMMERCIAL

## 2021-06-15 VITALS
TEMPERATURE: 98 F | DIASTOLIC BLOOD PRESSURE: 62 MMHG | OXYGEN SATURATION: 96 % | RESPIRATION RATE: 16 BRPM | HEART RATE: 112 BPM | SYSTOLIC BLOOD PRESSURE: 96 MMHG

## 2021-06-15 DIAGNOSIS — G89.29 ACUTE EXACERBATION OF CHRONIC LOW BACK PAIN: ICD-10-CM

## 2021-06-15 DIAGNOSIS — M54.2 NECK PAIN: ICD-10-CM
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
## 2021-06-15 DIAGNOSIS — M54.50 ACUTE EXACERBATION OF CHRONIC LOW BACK PAIN: ICD-10-CM

## 2021-06-15 DIAGNOSIS — M79.642 LEFT HAND PAIN: Primary | ICD-10-CM

## 2021-06-15 PROCEDURE — 99214 PR OFFICE/OUTPT VISIT, EST, LEVL IV, 30-39 MIN: ICD-10-PCS | Mod: S$GLB,,, | Performed by: NURSE PRACTITIONER

## 2021-06-15 PROCEDURE — 73110 X-RAY EXAM OF WRIST: CPT | Mod: LT,S$GLB,, | Performed by: RADIOLOGY

## 2021-06-15 PROCEDURE — 73110 XR WRIST COMPLETE 3 VIEWS LEFT: ICD-10-PCS | Mod: LT,S$GLB,, | Performed by: RADIOLOGY

## 2021-06-15 PROCEDURE — 99214 OFFICE O/P EST MOD 30 MIN: CPT | Mod: S$GLB,,, | Performed by: NURSE PRACTITIONER

## 2021-06-17 ENCOUNTER — CLINICAL SUPPORT (OUTPATIENT)
Dept: REHABILITATION | Facility: HOSPITAL | Age: 66
End: 2021-06-17
Attending: ORTHOPAEDIC SURGERY
Payer: COMMERCIAL

## 2021-06-17 DIAGNOSIS — M79.642 LEFT HAND PAIN: ICD-10-CM

## 2021-06-17 PROCEDURE — 97022 WHIRLPOOL THERAPY: CPT | Mod: PO

## 2021-06-17 PROCEDURE — 97110 THERAPEUTIC EXERCISES: CPT | Mod: PO

## 2021-06-18 ENCOUNTER — CLINICAL SUPPORT (OUTPATIENT)
Dept: REHABILITATION | Facility: OTHER | Age: 66
End: 2021-06-18
Payer: OTHER MISCELLANEOUS

## 2021-06-18 ENCOUNTER — TELEPHONE (OUTPATIENT)
Dept: ORTHOPEDICS | Facility: CLINIC | Age: 66
End: 2021-06-18

## 2021-06-18 DIAGNOSIS — R29.3 POOR POSTURE: ICD-10-CM

## 2021-06-18 DIAGNOSIS — M54.42 ACUTE BILATERAL LOW BACK PAIN WITH BILATERAL SCIATICA: Primary | ICD-10-CM

## 2021-06-18 DIAGNOSIS — S63.92XD SPRAIN OF LEFT HAND, SUBSEQUENT ENCOUNTER: Primary | ICD-10-CM

## 2021-06-18 DIAGNOSIS — M54.41 ACUTE BILATERAL LOW BACK PAIN WITH BILATERAL SCIATICA: Primary | ICD-10-CM

## 2021-06-18 PROCEDURE — 97110 THERAPEUTIC EXERCISES: CPT | Mod: PN

## 2021-06-18 PROCEDURE — 97140 MANUAL THERAPY 1/> REGIONS: CPT | Mod: PN

## 2021-06-21 ENCOUNTER — HOSPITAL ENCOUNTER (OUTPATIENT)
Dept: RADIOLOGY | Facility: HOSPITAL | Age: 66
Discharge: HOME | End: 2021-06-21
Attending: ORTHOPAEDIC SURGERY
Payer: COMMERCIAL

## 2021-06-21 ENCOUNTER — OFFICE VISIT (OUTPATIENT)
Dept: ORTHOPEDICS | Facility: CLINIC | Age: 66
End: 2021-06-21
Payer: COMMERCIAL

## 2021-06-21 ENCOUNTER — CLINICAL SUPPORT (OUTPATIENT)
Dept: REHABILITATION | Facility: OTHER | Age: 66
End: 2021-06-21
Payer: OTHER MISCELLANEOUS

## 2021-06-21 ENCOUNTER — OFFICE VISIT (OUTPATIENT)
Dept: PODIATRY | Facility: CLINIC | Age: 66
End: 2021-06-21
Payer: COMMERCIAL

## 2021-06-21 VITALS — HEIGHT: 71 IN | BODY MASS INDEX: 24.63 KG/M2 | WEIGHT: 175.94 LBS

## 2021-06-21 VITALS
DIASTOLIC BLOOD PRESSURE: 75 MMHG | HEART RATE: 84 BPM | SYSTOLIC BLOOD PRESSURE: 113 MMHG | WEIGHT: 176 LBS | BODY MASS INDEX: 24.64 KG/M2 | HEIGHT: 71 IN

## 2021-06-21 DIAGNOSIS — R29.3 POOR POSTURE: ICD-10-CM

## 2021-06-21 DIAGNOSIS — M54.42 ACUTE BILATERAL LOW BACK PAIN WITH BILATERAL SCIATICA: ICD-10-CM

## 2021-06-21 DIAGNOSIS — S63.92XD SPRAIN OF LEFT HAND, SUBSEQUENT ENCOUNTER: ICD-10-CM

## 2021-06-21 DIAGNOSIS — S62.347D CLOSED NONDISPLACED FRACTURE OF BASE OF FIFTH METACARPAL BONE OF LEFT HAND WITH ROUTINE HEALING: Primary | ICD-10-CM

## 2021-06-21 DIAGNOSIS — Z09 FOLLOW-UP EXAM AFTER TREATMENT: Primary | ICD-10-CM

## 2021-06-21 DIAGNOSIS — M54.41 ACUTE BILATERAL LOW BACK PAIN WITH BILATERAL SCIATICA: ICD-10-CM

## 2021-06-21 DIAGNOSIS — L60.3 DYSTROPHIC NAIL: ICD-10-CM

## 2021-06-21 PROCEDURE — 99999 PR PBB SHADOW E&M-EST. PATIENT-LVL V: ICD-10-PCS | Mod: PBBFAC,,, | Performed by: PODIATRIST

## 2021-06-21 PROCEDURE — 3288F FALL RISK ASSESSMENT DOCD: CPT | Mod: CPTII,S$GLB,, | Performed by: PODIATRIST

## 2021-06-21 PROCEDURE — 1100F PR PT FALLS ASSESS DOC 2+ FALLS/FALL W/INJURY/YR: ICD-10-PCS | Mod: CPTII,S$GLB,, | Performed by: PODIATRIST

## 2021-06-21 PROCEDURE — 99999 PR PBB SHADOW E&M-EST. PATIENT-LVL IV: CPT | Mod: PBBFAC,,, | Performed by: ORTHOPAEDIC SURGERY

## 2021-06-21 PROCEDURE — 3008F PR BODY MASS INDEX (BMI) DOCUMENTED: ICD-10-PCS | Mod: CPTII,S$GLB,, | Performed by: PODIATRIST

## 2021-06-21 PROCEDURE — 1157F PR ADVANCE CARE PLAN OR EQUIV PRESENT IN MEDICAL RECORD: ICD-10-PCS | Mod: S$GLB,,, | Performed by: PODIATRIST

## 2021-06-21 PROCEDURE — 73110 XR WRIST COMPLETE 3 VIEWS LEFT: ICD-10-PCS | Mod: 26,LT,, | Performed by: RADIOLOGY

## 2021-06-21 PROCEDURE — 99024 POSTOP FOLLOW-UP VISIT: CPT | Mod: S$GLB,,, | Performed by: PODIATRIST

## 2021-06-21 PROCEDURE — 1126F PR PAIN SEVERITY QUANTIFIED, NO PAIN PRESENT: ICD-10-PCS | Mod: S$GLB,,, | Performed by: PODIATRIST

## 2021-06-21 PROCEDURE — 73110 X-RAY EXAM OF WRIST: CPT | Mod: 26,LT,, | Performed by: RADIOLOGY

## 2021-06-21 PROCEDURE — 99999 PR PBB SHADOW E&M-EST. PATIENT-LVL IV: ICD-10-PCS | Mod: PBBFAC,,, | Performed by: ORTHOPAEDIC SURGERY

## 2021-06-21 PROCEDURE — 99213 PR OFFICE/OUTPT VISIT, EST, LEVL III, 20-29 MIN: ICD-10-PCS | Mod: S$GLB,ICN,, | Performed by: ORTHOPAEDIC SURGERY

## 2021-06-21 PROCEDURE — 97110 THERAPEUTIC EXERCISES: CPT | Mod: PN

## 2021-06-21 PROCEDURE — 99214 OFFICE O/P EST MOD 30 MIN: CPT | Mod: PBBFAC,25,PN | Performed by: ORTHOPAEDIC SURGERY

## 2021-06-21 PROCEDURE — 99999 PR PBB SHADOW E&M-EST. PATIENT-LVL V: CPT | Mod: PBBFAC,,, | Performed by: PODIATRIST

## 2021-06-21 PROCEDURE — 1126F AMNT PAIN NOTED NONE PRSNT: CPT | Mod: S$GLB,,, | Performed by: PODIATRIST

## 2021-06-21 PROCEDURE — 3008F BODY MASS INDEX DOCD: CPT | Mod: CPTII,S$GLB,, | Performed by: PODIATRIST

## 2021-06-21 PROCEDURE — 73110 X-RAY EXAM OF WRIST: CPT | Mod: TC,PN,LT

## 2021-06-21 PROCEDURE — 1100F PTFALLS ASSESS-DOCD GE2>/YR: CPT | Mod: CPTII,S$GLB,, | Performed by: PODIATRIST

## 2021-06-21 PROCEDURE — 99213 OFFICE O/P EST LOW 20 MIN: CPT | Mod: S$GLB,ICN,, | Performed by: ORTHOPAEDIC SURGERY

## 2021-06-21 PROCEDURE — 1157F ADVNC CARE PLAN IN RCRD: CPT | Mod: S$GLB,,, | Performed by: PODIATRIST

## 2021-06-21 PROCEDURE — 99024 PR POST-OP FOLLOW-UP VISIT: ICD-10-PCS | Mod: S$GLB,,, | Performed by: PODIATRIST

## 2021-06-21 PROCEDURE — 3288F PR FALLS RISK ASSESSMENT DOCUMENTED: ICD-10-PCS | Mod: CPTII,S$GLB,, | Performed by: PODIATRIST

## 2021-06-21 RX ORDER — IBUPROFEN 800 MG/1
800 TABLET ORAL 2 TIMES DAILY WITH MEALS
Qty: 60 TABLET | Refills: 1 | Status: ON HOLD | OUTPATIENT
Start: 2021-06-21 | End: 2021-07-22 | Stop reason: HOSPADM

## 2021-06-22 ENCOUNTER — TELEPHONE (OUTPATIENT)
Dept: OTOLARYNGOLOGY | Facility: CLINIC | Age: 66
End: 2021-06-22

## 2021-06-23 ENCOUNTER — DOCUMENTATION ONLY (OUTPATIENT)
Dept: REHABILITATION | Facility: OTHER | Age: 66
End: 2021-06-23

## 2021-06-24 ENCOUNTER — DOCUMENTATION ONLY (OUTPATIENT)
Dept: REHABILITATION | Facility: HOSPITAL | Age: 66
End: 2021-06-24

## 2021-06-25 ENCOUNTER — CLINICAL SUPPORT (OUTPATIENT)
Dept: REHABILITATION | Facility: OTHER | Age: 66
End: 2021-06-25
Payer: OTHER MISCELLANEOUS

## 2021-06-25 DIAGNOSIS — R29.3 POOR POSTURE: ICD-10-CM

## 2021-06-25 DIAGNOSIS — M54.41 ACUTE BILATERAL LOW BACK PAIN WITH BILATERAL SCIATICA: ICD-10-CM

## 2021-06-25 DIAGNOSIS — M54.42 ACUTE BILATERAL LOW BACK PAIN WITH BILATERAL SCIATICA: ICD-10-CM

## 2021-06-25 PROCEDURE — 97110 THERAPEUTIC EXERCISES: CPT | Mod: PN,CQ

## 2021-06-28 ENCOUNTER — CLINICAL SUPPORT (OUTPATIENT)
Dept: REHABILITATION | Facility: OTHER | Age: 66
End: 2021-06-28
Payer: OTHER MISCELLANEOUS

## 2021-06-28 DIAGNOSIS — M54.41 ACUTE BILATERAL LOW BACK PAIN WITH BILATERAL SCIATICA: Primary | ICD-10-CM

## 2021-06-28 DIAGNOSIS — R29.3 POOR POSTURE: ICD-10-CM

## 2021-06-28 DIAGNOSIS — M54.42 ACUTE BILATERAL LOW BACK PAIN WITH BILATERAL SCIATICA: Primary | ICD-10-CM

## 2021-06-28 PROCEDURE — 97140 MANUAL THERAPY 1/> REGIONS: CPT | Mod: PN

## 2021-06-28 PROCEDURE — 97112 NEUROMUSCULAR REEDUCATION: CPT | Mod: PN

## 2021-06-28 PROCEDURE — 97110 THERAPEUTIC EXERCISES: CPT | Mod: PN

## 2021-06-29 ENCOUNTER — DOCUMENTATION ONLY (OUTPATIENT)
Dept: REHABILITATION | Facility: OTHER | Age: 66
End: 2021-06-29

## 2021-06-30 ENCOUNTER — CLINICAL SUPPORT (OUTPATIENT)
Dept: REHABILITATION | Facility: OTHER | Age: 66
End: 2021-06-30
Payer: OTHER MISCELLANEOUS

## 2021-06-30 DIAGNOSIS — M54.42 ACUTE BILATERAL LOW BACK PAIN WITH BILATERAL SCIATICA: ICD-10-CM

## 2021-06-30 DIAGNOSIS — R29.3 POOR POSTURE: ICD-10-CM

## 2021-06-30 DIAGNOSIS — M54.41 ACUTE BILATERAL LOW BACK PAIN WITH BILATERAL SCIATICA: ICD-10-CM

## 2021-06-30 PROCEDURE — 97110 THERAPEUTIC EXERCISES: CPT | Mod: PN

## 2021-07-07 ENCOUNTER — OFFICE VISIT (OUTPATIENT)
Dept: PSYCHIATRY | Facility: CLINIC | Age: 66
End: 2021-07-07
Payer: COMMERCIAL

## 2021-07-07 DIAGNOSIS — F41.9 ANXIETY: Primary | ICD-10-CM

## 2021-07-07 PROCEDURE — 99999 PR PBB SHADOW E&M-EST. PATIENT-LVL III: CPT | Mod: PBBFAC,,, | Performed by: SOCIAL WORKER

## 2021-07-07 PROCEDURE — 1157F PR ADVANCE CARE PLAN OR EQUIV PRESENT IN MEDICAL RECORD: ICD-10-PCS | Mod: S$GLB,,, | Performed by: SOCIAL WORKER

## 2021-07-07 PROCEDURE — 1157F ADVNC CARE PLAN IN RCRD: CPT | Mod: S$GLB,,, | Performed by: SOCIAL WORKER

## 2021-07-07 PROCEDURE — 90832 PR PSYCHOTHERAPY W/PATIENT, 30 MIN: ICD-10-PCS | Mod: S$GLB,,, | Performed by: SOCIAL WORKER

## 2021-07-07 PROCEDURE — 99999 PR PBB SHADOW E&M-EST. PATIENT-LVL III: ICD-10-PCS | Mod: PBBFAC,,, | Performed by: SOCIAL WORKER

## 2021-07-07 PROCEDURE — 90832 PSYTX W PT 30 MINUTES: CPT | Mod: S$GLB,,, | Performed by: SOCIAL WORKER

## 2021-07-09 ENCOUNTER — PATIENT MESSAGE (OUTPATIENT)
Dept: REHABILITATION | Facility: OTHER | Age: 66
End: 2021-07-09

## 2021-07-09 ENCOUNTER — DOCUMENTATION ONLY (OUTPATIENT)
Dept: REHABILITATION | Facility: OTHER | Age: 66
End: 2021-07-09

## 2021-07-12 ENCOUNTER — CLINICAL SUPPORT (OUTPATIENT)
Dept: REHABILITATION | Facility: OTHER | Age: 66
End: 2021-07-12
Payer: COMMERCIAL

## 2021-07-12 DIAGNOSIS — R29.3 POOR POSTURE: ICD-10-CM

## 2021-07-12 DIAGNOSIS — M54.42 ACUTE BILATERAL LOW BACK PAIN WITH BILATERAL SCIATICA: ICD-10-CM

## 2021-07-12 DIAGNOSIS — M54.41 ACUTE BILATERAL LOW BACK PAIN WITH BILATERAL SCIATICA: ICD-10-CM

## 2021-07-12 DIAGNOSIS — M79.642 LEFT HAND PAIN: Primary | ICD-10-CM

## 2021-07-12 PROCEDURE — 97110 THERAPEUTIC EXERCISES: CPT | Mod: PN

## 2021-07-12 PROCEDURE — 97140 MANUAL THERAPY 1/> REGIONS: CPT | Mod: PN

## 2021-07-12 PROCEDURE — 97112 NEUROMUSCULAR REEDUCATION: CPT | Mod: PN

## 2021-07-14 ENCOUNTER — DOCUMENTATION ONLY (OUTPATIENT)
Dept: REHABILITATION | Facility: OTHER | Age: 66
End: 2021-07-14

## 2021-07-19 ENCOUNTER — PATIENT MESSAGE (OUTPATIENT)
Dept: REHABILITATION | Facility: OTHER | Age: 66
End: 2021-07-19

## 2021-07-19 RX ORDER — TRAZODONE HYDROCHLORIDE 50 MG/1
TABLET ORAL
Qty: 30 TABLET | Refills: 2 | Status: SHIPPED | OUTPATIENT
Start: 2021-07-19 | End: 2022-01-07

## 2021-07-20 ENCOUNTER — HOSPITAL ENCOUNTER (INPATIENT)
Facility: OTHER | Age: 66
LOS: 4 days | Discharge: HOME HEALTH | DRG: 918 | End: 2021-07-27
Attending: EMERGENCY MEDICINE | Admitting: EMERGENCY MEDICINE
Payer: COMMERCIAL

## 2021-07-20 DIAGNOSIS — T50.901A ACCIDENTAL DRUG OVERDOSE, INITIAL ENCOUNTER: ICD-10-CM

## 2021-07-20 DIAGNOSIS — F31.30 BIPOLAR AFFECTIVE DISORDER, CURRENT EPISODE DEPRESSED, CURRENT EPISODE SEVERITY UNSPECIFIED: ICD-10-CM

## 2021-07-20 DIAGNOSIS — E03.9 ACQUIRED HYPOTHYROIDISM: ICD-10-CM

## 2021-07-20 DIAGNOSIS — R41.3 MEMORY DISORDER: ICD-10-CM

## 2021-07-20 DIAGNOSIS — F31.70 BIPOLAR DISORDER IN PARTIAL REMISSION, MOST RECENT EPISODE UNSPECIFIED TYPE: ICD-10-CM

## 2021-07-20 DIAGNOSIS — R42 DIZZINESS: ICD-10-CM

## 2021-07-20 DIAGNOSIS — T50.901A OVERDOSE: ICD-10-CM

## 2021-07-20 DIAGNOSIS — R41.0 DISORIENTATION: Primary | ICD-10-CM

## 2021-07-20 LAB
AMPHET+METHAMPHET UR QL: NEGATIVE
ANION GAP SERPL CALC-SCNC: 15 MMOL/L (ref 8–16)
BACTERIA #/AREA URNS HPF: ABNORMAL /HPF
BARBITURATES UR QL SCN>200 NG/ML: NEGATIVE
BASOPHILS # BLD AUTO: 0.03 K/UL (ref 0–0.2)
BASOPHILS NFR BLD: 0.4 % (ref 0–1.9)
BENZODIAZ UR QL SCN>200 NG/ML: NEGATIVE
BILIRUB UR QL STRIP: NEGATIVE
BUN SERPL-MCNC: 9 MG/DL (ref 8–23)
BZE UR QL SCN: NEGATIVE
CALCIUM SERPL-MCNC: 9.4 MG/DL (ref 8.7–10.5)
CANNABINOIDS UR QL SCN: NEGATIVE
CHLORIDE SERPL-SCNC: 98 MMOL/L (ref 95–110)
CLARITY UR: CLEAR
CO2 SERPL-SCNC: 20 MMOL/L (ref 23–29)
COLOR UR: YELLOW
CREAT SERPL-MCNC: 0.9 MG/DL (ref 0.5–1.4)
CREAT UR-MCNC: 11.5 MG/DL (ref 15–325)
DIFFERENTIAL METHOD: ABNORMAL
EOSINOPHIL # BLD AUTO: 0 K/UL (ref 0–0.5)
EOSINOPHIL NFR BLD: 0.4 % (ref 0–8)
ERYTHROCYTE [DISTWIDTH] IN BLOOD BY AUTOMATED COUNT: 16.6 % (ref 11.5–14.5)
EST. GFR  (AFRICAN AMERICAN): >60 ML/MIN/1.73 M^2
EST. GFR  (NON AFRICAN AMERICAN): >60 ML/MIN/1.73 M^2
ETHANOL SERPL-MCNC: <10 MG/DL
GLUCOSE SERPL-MCNC: 343 MG/DL (ref 70–110)
GLUCOSE UR QL STRIP: ABNORMAL
HCT VFR BLD AUTO: 39.7 % (ref 37–48.5)
HCV AB SERPL QL IA: NEGATIVE
HGB BLD-MCNC: 13.1 G/DL (ref 12–16)
HGB UR QL STRIP: NEGATIVE
IMM GRANULOCYTES # BLD AUTO: 0.04 K/UL (ref 0–0.04)
IMM GRANULOCYTES NFR BLD AUTO: 0.5 % (ref 0–0.5)
KETONES UR QL STRIP: ABNORMAL
LEUKOCYTE ESTERASE UR QL STRIP: NEGATIVE
LYMPHOCYTES # BLD AUTO: 1.5 K/UL (ref 1–4.8)
LYMPHOCYTES NFR BLD: 18.6 % (ref 18–48)
MCH RBC QN AUTO: 29.3 PG (ref 27–31)
MCHC RBC AUTO-ENTMCNC: 33 G/DL (ref 32–36)
MCV RBC AUTO: 89 FL (ref 82–98)
METHADONE UR QL SCN>300 NG/ML: NEGATIVE
MICROSCOPIC COMMENT: ABNORMAL
MONOCYTES # BLD AUTO: 0.9 K/UL (ref 0.3–1)
MONOCYTES NFR BLD: 11.4 % (ref 4–15)
NEUTROPHILS # BLD AUTO: 5.7 K/UL (ref 1.8–7.7)
NEUTROPHILS NFR BLD: 68.7 % (ref 38–73)
NITRITE UR QL STRIP: NEGATIVE
NRBC BLD-RTO: 0 /100 WBC
OPIATES UR QL SCN: NEGATIVE
PCP UR QL SCN>25 NG/ML: NEGATIVE
PH UR STRIP: 6 [PH] (ref 5–8)
PLATELET # BLD AUTO: 190 K/UL (ref 150–450)
PMV BLD AUTO: 10.1 FL (ref 9.2–12.9)
POCT GLUCOSE: 358 MG/DL (ref 70–110)
POTASSIUM SERPL-SCNC: 5.5 MMOL/L (ref 3.5–5.1)
PROT UR QL STRIP: NEGATIVE
RBC # BLD AUTO: 4.47 M/UL (ref 4–5.4)
SODIUM SERPL-SCNC: 133 MMOL/L (ref 136–145)
SP GR UR STRIP: <=1.005 (ref 1–1.03)
SQUAMOUS #/AREA URNS HPF: 1 /HPF
TOXICOLOGY INFORMATION: ABNORMAL
URN SPEC COLLECT METH UR: ABNORMAL
UROBILINOGEN UR STRIP-ACNC: NEGATIVE EU/DL
WBC # BLD AUTO: 8.26 K/UL (ref 3.9–12.7)
WBC #/AREA URNS HPF: 2 /HPF (ref 0–5)
YEAST URNS QL MICRO: ABNORMAL

## 2021-07-20 PROCEDURE — 82962 GLUCOSE BLOOD TEST: CPT

## 2021-07-20 PROCEDURE — 63600175 PHARM REV CODE 636 W HCPCS: Performed by: EMERGENCY MEDICINE

## 2021-07-20 PROCEDURE — 25000003 PHARM REV CODE 250: Performed by: EMERGENCY MEDICINE

## 2021-07-20 PROCEDURE — 93005 ELECTROCARDIOGRAM TRACING: CPT

## 2021-07-20 PROCEDURE — 86803 HEPATITIS C AB TEST: CPT | Performed by: EMERGENCY MEDICINE

## 2021-07-20 PROCEDURE — 85025 COMPLETE CBC W/AUTO DIFF WBC: CPT | Performed by: EMERGENCY MEDICINE

## 2021-07-20 PROCEDURE — 99285 EMERGENCY DEPT VISIT HI MDM: CPT

## 2021-07-20 PROCEDURE — 80048 BASIC METABOLIC PNL TOTAL CA: CPT | Performed by: EMERGENCY MEDICINE

## 2021-07-20 PROCEDURE — 80307 DRUG TEST PRSMV CHEM ANLYZR: CPT | Performed by: EMERGENCY MEDICINE

## 2021-07-20 PROCEDURE — 90471 IMMUNIZATION ADMIN: CPT | Performed by: EMERGENCY MEDICINE

## 2021-07-20 PROCEDURE — 82077 ASSAY SPEC XCP UR&BREATH IA: CPT | Performed by: EMERGENCY MEDICINE

## 2021-07-20 PROCEDURE — 90715 TDAP VACCINE 7 YRS/> IM: CPT | Performed by: EMERGENCY MEDICINE

## 2021-07-20 PROCEDURE — 81000 URINALYSIS NONAUTO W/SCOPE: CPT | Mod: 59 | Performed by: EMERGENCY MEDICINE

## 2021-07-20 RX ORDER — ACETAMINOPHEN 500 MG
1000 TABLET ORAL
Status: COMPLETED | OUTPATIENT
Start: 2021-07-20 | End: 2021-07-20

## 2021-07-20 RX ORDER — SODIUM CHLORIDE 9 MG/ML
INJECTION, SOLUTION INTRAVENOUS
Status: COMPLETED | OUTPATIENT
Start: 2021-07-20 | End: 2021-07-20

## 2021-07-20 RX ADMIN — ACETAMINOPHEN 1000 MG: 500 TABLET, FILM COATED ORAL at 11:07

## 2021-07-20 RX ADMIN — SODIUM CHLORIDE: 0.9 INJECTION, SOLUTION INTRAVENOUS at 10:07

## 2021-07-20 RX ADMIN — CLOSTRIDIUM TETANI TOXOID ANTIGEN (FORMALDEHYDE INACTIVATED), CORYNEBACTERIUM DIPHTHERIAE TOXOID ANTIGEN (FORMALDEHYDE INACTIVATED), BORDETELLA PERTUSSIS TOXOID ANTIGEN (GLUTARALDEHYDE INACTIVATED), BORDETELLA PERTUSSIS FILAMENTOUS HEMAGGLUTININ ANTIGEN (FORMALDEHYDE INACTIVATED), BORDETELLA PERTUSSIS PERTACTIN ANTIGEN, AND BORDETELLA PERTUSSIS FIMBRIAE 2/3 ANTIGEN 0.5 ML: 5; 2; 2.5; 5; 3; 5 INJECTION, SUSPENSION INTRAMUSCULAR at 11:07

## 2021-07-21 ENCOUNTER — DOCUMENTATION ONLY (OUTPATIENT)
Dept: REHABILITATION | Facility: OTHER | Age: 66
End: 2021-07-21

## 2021-07-21 PROBLEM — E11.65 TYPE 2 DIABETES MELLITUS WITH HYPERGLYCEMIA, WITHOUT LONG-TERM CURRENT USE OF INSULIN: Status: ACTIVE | Noted: 2021-07-21

## 2021-07-21 PROBLEM — R41.0 DISORIENTATION: Status: ACTIVE | Noted: 2021-07-21

## 2021-07-21 LAB
ANION GAP SERPL CALC-SCNC: 12 MMOL/L (ref 8–16)
ASCENDING AORTA: 3.15 CM
AV INDEX (PROSTH): 0.85
AV MEAN GRADIENT: 3 MMHG
AV PEAK GRADIENT: 6 MMHG
AV VALVE AREA: 2.32 CM2
AV VELOCITY RATIO: 0.86
BASOPHILS # BLD AUTO: 0.02 K/UL (ref 0–0.2)
BASOPHILS NFR BLD: 0.3 % (ref 0–1.9)
BSA FOR ECHO PROCEDURE: 1.84 M2
BUN SERPL-MCNC: 7 MG/DL (ref 8–23)
CALCIUM SERPL-MCNC: 9.3 MG/DL (ref 8.7–10.5)
CHLORIDE SERPL-SCNC: 102 MMOL/L (ref 95–110)
CO2 SERPL-SCNC: 24 MMOL/L (ref 23–29)
CREAT SERPL-MCNC: 0.8 MG/DL (ref 0.5–1.4)
CV ECHO LV RWT: 0.53 CM
DIFFERENTIAL METHOD: ABNORMAL
DOP CALC AO PEAK VEL: 1.2 M/S
DOP CALC AO VTI: 20.38 CM
DOP CALC LVOT AREA: 2.7 CM2
DOP CALC LVOT DIAMETER: 1.87 CM
DOP CALC LVOT PEAK VEL: 1.03 M/S
DOP CALC LVOT STROKE VOLUME: 47.3 CM3
DOP CALCLVOT PEAK VEL VTI: 17.23 CM
E WAVE DECELERATION TIME: 190.12 MSEC
E/A RATIO: 0.64
E/E' RATIO: 7.41 M/S
ECHO LV POSTERIOR WALL: 0.98 CM (ref 0.6–1.1)
EJECTION FRACTION: 70 %
EOSINOPHIL # BLD AUTO: 0.1 K/UL (ref 0–0.5)
EOSINOPHIL NFR BLD: 1.3 % (ref 0–8)
ERYTHROCYTE [DISTWIDTH] IN BLOOD BY AUTOMATED COUNT: 16.9 % (ref 11.5–14.5)
EST. GFR  (AFRICAN AMERICAN): >60 ML/MIN/1.73 M^2
EST. GFR  (NON AFRICAN AMERICAN): >60 ML/MIN/1.73 M^2
ESTIMATED AVG GLUCOSE: 269 MG/DL (ref 68–131)
FRACTIONAL SHORTENING: 39 % (ref 28–44)
GLUCOSE SERPL-MCNC: 308 MG/DL (ref 70–110)
HBA1C MFR BLD: 11 % (ref 4–5.6)
HCT VFR BLD AUTO: 38.1 % (ref 37–48.5)
HGB BLD-MCNC: 12.3 G/DL (ref 12–16)
IMM GRANULOCYTES # BLD AUTO: 0.03 K/UL (ref 0–0.04)
IMM GRANULOCYTES NFR BLD AUTO: 0.5 % (ref 0–0.5)
INTERVENTRICULAR SEPTUM: 1.04 CM (ref 0.6–1.1)
IVRT: 107.21 MSEC
LA MAJOR: 5.23 CM
LA MINOR: 5.35 CM
LA WIDTH: 2.88 CM
LEFT ATRIUM SIZE: 3.13 CM
LEFT ATRIUM VOLUME INDEX MOD: 25.4 ML/M2
LEFT ATRIUM VOLUME INDEX: 22.4 ML/M2
LEFT ATRIUM VOLUME MOD: 46 CM3
LEFT ATRIUM VOLUME: 40.53 CM3
LEFT INTERNAL DIMENSION IN SYSTOLE: 2.26 CM (ref 2.1–4)
LEFT VENTRICLE DIASTOLIC VOLUME INDEX: 32.51 ML/M2
LEFT VENTRICLE DIASTOLIC VOLUME: 58.84 ML
LEFT VENTRICLE MASS INDEX: 64 G/M2
LEFT VENTRICLE SYSTOLIC VOLUME INDEX: 9.5 ML/M2
LEFT VENTRICLE SYSTOLIC VOLUME: 17.25 ML
LEFT VENTRICULAR INTERNAL DIMENSION IN DIASTOLE: 3.72 CM (ref 3.5–6)
LEFT VENTRICULAR MASS: 115.12 G
LV LATERAL E/E' RATIO: 6.3 M/S
LV SEPTAL E/E' RATIO: 9 M/S
LYMPHOCYTES # BLD AUTO: 2 K/UL (ref 1–4.8)
LYMPHOCYTES NFR BLD: 32.9 % (ref 18–48)
MAGNESIUM SERPL-MCNC: 1.8 MG/DL (ref 1.6–2.6)
MCH RBC QN AUTO: 28.8 PG (ref 27–31)
MCHC RBC AUTO-ENTMCNC: 32.3 G/DL (ref 32–36)
MCV RBC AUTO: 89 FL (ref 82–98)
MONOCYTES # BLD AUTO: 1 K/UL (ref 0.3–1)
MONOCYTES NFR BLD: 16.3 % (ref 4–15)
MV PEAK A VEL: 0.98 M/S
MV PEAK E VEL: 0.63 M/S
MV STENOSIS PRESSURE HALF TIME: 55.14 MS
MV VALVE AREA P 1/2 METHOD: 3.99 CM2
NEUTROPHILS # BLD AUTO: 3 K/UL (ref 1.8–7.7)
NEUTROPHILS NFR BLD: 48.7 % (ref 38–73)
NRBC BLD-RTO: 0 /100 WBC
PHOSPHATE SERPL-MCNC: 3.3 MG/DL (ref 2.7–4.5)
PLATELET # BLD AUTO: 182 K/UL (ref 150–450)
PMV BLD AUTO: 10.1 FL (ref 9.2–12.9)
POCT GLUCOSE: 307 MG/DL (ref 70–110)
POCT GLUCOSE: 311 MG/DL (ref 70–110)
POCT GLUCOSE: 347 MG/DL (ref 70–110)
POTASSIUM SERPL-SCNC: 4.2 MMOL/L (ref 3.5–5.1)
PULM VEIN S/D RATIO: 1.41
PV PEAK D VEL: 0.41 M/S
PV PEAK S VEL: 0.58 M/S
PV PEAK VELOCITY: 0.97 CM/S
RA PRESSURE: 3 MMHG
RBC # BLD AUTO: 4.27 M/UL (ref 4–5.4)
RIGHT VENTRICULAR END-DIASTOLIC DIMENSION: 2.82 CM
RV TISSUE DOPPLER FREE WALL SYSTOLIC VELOCITY 1 (APICAL 4 CHAMBER VIEW): 10.37 CM/S
SINUS: 2.84 CM
SODIUM SERPL-SCNC: 138 MMOL/L (ref 136–145)
STJ: 2.62 CM
T4 FREE SERPL-MCNC: 0.91 NG/DL (ref 0.71–1.51)
TDI LATERAL: 0.1 M/S
TDI SEPTAL: 0.07 M/S
TDI: 0.09 M/S
TRICUSPID ANNULAR PLANE SYSTOLIC EXCURSION: 1.53 CM
TSH SERPL DL<=0.005 MIU/L-ACNC: 9.86 UIU/ML (ref 0.4–4)
VALPROATE SERPL-MCNC: 15.5 UG/ML (ref 50–100)
WBC # BLD AUTO: 6.14 K/UL (ref 3.9–12.7)

## 2021-07-21 PROCEDURE — G0378 HOSPITAL OBSERVATION PER HR: HCPCS

## 2021-07-21 PROCEDURE — C9399 UNCLASSIFIED DRUGS OR BIOLOG: HCPCS | Performed by: INTERNAL MEDICINE

## 2021-07-21 PROCEDURE — 96360 HYDRATION IV INFUSION INIT: CPT

## 2021-07-21 PROCEDURE — 36415 COLL VENOUS BLD VENIPUNCTURE: CPT | Performed by: INTERNAL MEDICINE

## 2021-07-21 PROCEDURE — 80048 BASIC METABOLIC PNL TOTAL CA: CPT | Performed by: INTERNAL MEDICINE

## 2021-07-21 PROCEDURE — 85025 COMPLETE CBC W/AUTO DIFF WBC: CPT | Performed by: INTERNAL MEDICINE

## 2021-07-21 PROCEDURE — 84443 ASSAY THYROID STIM HORMONE: CPT | Performed by: INTERNAL MEDICINE

## 2021-07-21 PROCEDURE — 96372 THER/PROPH/DIAG INJ SC/IM: CPT | Mod: 59 | Performed by: EMERGENCY MEDICINE

## 2021-07-21 PROCEDURE — 82962 GLUCOSE BLOOD TEST: CPT

## 2021-07-21 PROCEDURE — 63600175 PHARM REV CODE 636 W HCPCS: Performed by: INTERNAL MEDICINE

## 2021-07-21 PROCEDURE — 83036 HEMOGLOBIN GLYCOSYLATED A1C: CPT | Performed by: INTERNAL MEDICINE

## 2021-07-21 PROCEDURE — 83735 ASSAY OF MAGNESIUM: CPT | Performed by: INTERNAL MEDICINE

## 2021-07-21 PROCEDURE — 80164 ASSAY DIPROPYLACETIC ACD TOT: CPT | Performed by: INTERNAL MEDICINE

## 2021-07-21 PROCEDURE — 25000003 PHARM REV CODE 250: Performed by: INTERNAL MEDICINE

## 2021-07-21 PROCEDURE — 99220 PR INITIAL OBSERVATION CARE,LEVL III: CPT | Mod: ,,, | Performed by: INTERNAL MEDICINE

## 2021-07-21 PROCEDURE — 99220 PR INITIAL OBSERVATION CARE,LEVL III: ICD-10-PCS | Mod: ,,, | Performed by: INTERNAL MEDICINE

## 2021-07-21 PROCEDURE — 84439 ASSAY OF FREE THYROXINE: CPT | Performed by: INTERNAL MEDICINE

## 2021-07-21 PROCEDURE — 84100 ASSAY OF PHOSPHORUS: CPT | Performed by: INTERNAL MEDICINE

## 2021-07-21 PROCEDURE — 96372 THER/PROPH/DIAG INJ SC/IM: CPT

## 2021-07-21 RX ORDER — GABAPENTIN 300 MG/1
300 CAPSULE ORAL NIGHTLY
Status: DISCONTINUED | OUTPATIENT
Start: 2021-07-21 | End: 2021-07-23

## 2021-07-21 RX ORDER — BUSPIRONE HYDROCHLORIDE 5 MG/1
15 TABLET ORAL 3 TIMES DAILY
Status: DISCONTINUED | OUTPATIENT
Start: 2021-07-21 | End: 2021-07-23

## 2021-07-21 RX ORDER — GLUCAGON 1 MG
1 KIT INJECTION
Status: DISCONTINUED | OUTPATIENT
Start: 2021-07-21 | End: 2021-07-27 | Stop reason: HOSPADM

## 2021-07-21 RX ORDER — ATORVASTATIN CALCIUM 20 MG/1
80 TABLET, FILM COATED ORAL DAILY
Status: DISCONTINUED | OUTPATIENT
Start: 2021-07-21 | End: 2021-07-27 | Stop reason: HOSPADM

## 2021-07-21 RX ORDER — DIVALPROEX SODIUM 500 MG/1
2000 TABLET, FILM COATED, EXTENDED RELEASE ORAL NIGHTLY
Status: DISCONTINUED | OUTPATIENT
Start: 2021-07-21 | End: 2021-07-23

## 2021-07-21 RX ORDER — LEVOTHYROXINE SODIUM 100 UG/1
100 TABLET ORAL DAILY
Status: DISCONTINUED | OUTPATIENT
Start: 2021-07-21 | End: 2021-07-23

## 2021-07-21 RX ORDER — INSULIN ASPART 100 [IU]/ML
0-5 INJECTION, SOLUTION INTRAVENOUS; SUBCUTANEOUS
Status: DISCONTINUED | OUTPATIENT
Start: 2021-07-21 | End: 2021-07-27 | Stop reason: HOSPADM

## 2021-07-21 RX ORDER — IBUPROFEN 200 MG
24 TABLET ORAL
Status: DISCONTINUED | OUTPATIENT
Start: 2021-07-21 | End: 2021-07-27 | Stop reason: HOSPADM

## 2021-07-21 RX ORDER — ESCITALOPRAM OXALATE 10 MG/1
20 TABLET ORAL DAILY
Status: DISCONTINUED | OUTPATIENT
Start: 2021-07-21 | End: 2021-07-27 | Stop reason: HOSPADM

## 2021-07-21 RX ORDER — METFORMIN HYDROCHLORIDE 500 MG/1
500 TABLET ORAL 2 TIMES DAILY WITH MEALS
Status: DISCONTINUED | OUTPATIENT
Start: 2021-07-21 | End: 2021-07-23

## 2021-07-21 RX ORDER — IBUPROFEN 200 MG
16 TABLET ORAL
Status: DISCONTINUED | OUTPATIENT
Start: 2021-07-21 | End: 2021-07-27 | Stop reason: HOSPADM

## 2021-07-21 RX ADMIN — METFORMIN HYDROCHLORIDE 500 MG: 500 TABLET ORAL at 11:07

## 2021-07-21 RX ADMIN — BUSPIRONE HYDROCHLORIDE 15 MG: 5 TABLET ORAL at 08:07

## 2021-07-21 RX ADMIN — ESCITALOPRAM OXALATE 20 MG: 10 TABLET ORAL at 11:07

## 2021-07-21 RX ADMIN — INSULIN ASPART 4 UNITS: 100 INJECTION, SOLUTION INTRAVENOUS; SUBCUTANEOUS at 06:07

## 2021-07-21 RX ADMIN — INSULIN ASPART 4 UNITS: 100 INJECTION, SOLUTION INTRAVENOUS; SUBCUTANEOUS at 09:07

## 2021-07-21 RX ADMIN — INSULIN ASPART 4 UNITS: 100 INJECTION, SOLUTION INTRAVENOUS; SUBCUTANEOUS at 12:07

## 2021-07-21 RX ADMIN — INSULIN ASPART 2 UNITS: 100 INJECTION, SOLUTION INTRAVENOUS; SUBCUTANEOUS at 10:07

## 2021-07-21 RX ADMIN — INSULIN DETEMIR 10 UNITS: 100 INJECTION, SOLUTION SUBCUTANEOUS at 05:07

## 2021-07-21 RX ADMIN — LEVOTHYROXINE SODIUM 100 MCG: 100 TABLET ORAL at 11:07

## 2021-07-21 RX ADMIN — GABAPENTIN 300 MG: 300 CAPSULE ORAL at 08:07

## 2021-07-21 RX ADMIN — BUSPIRONE HYDROCHLORIDE 15 MG: 5 TABLET ORAL at 05:07

## 2021-07-21 RX ADMIN — TRAZODONE HYDROCHLORIDE 25 MG: 50 TABLET ORAL at 08:07

## 2021-07-21 RX ADMIN — DIVALPROEX SODIUM 2000 MG: 500 TABLET, FILM COATED, EXTENDED RELEASE ORAL at 08:07

## 2021-07-21 RX ADMIN — ATORVASTATIN CALCIUM 80 MG: 20 TABLET, FILM COATED ORAL at 11:07

## 2021-07-22 ENCOUNTER — PATIENT MESSAGE (OUTPATIENT)
Dept: REHABILITATION | Facility: OTHER | Age: 66
End: 2021-07-22

## 2021-07-22 LAB
ANION GAP SERPL CALC-SCNC: 12 MMOL/L (ref 8–16)
BASOPHILS # BLD AUTO: 0.02 K/UL (ref 0–0.2)
BASOPHILS NFR BLD: 0.3 % (ref 0–1.9)
BUN SERPL-MCNC: 8 MG/DL (ref 8–23)
CALCIUM SERPL-MCNC: 9.2 MG/DL (ref 8.7–10.5)
CHLORIDE SERPL-SCNC: 102 MMOL/L (ref 95–110)
CO2 SERPL-SCNC: 24 MMOL/L (ref 23–29)
CREAT SERPL-MCNC: 0.8 MG/DL (ref 0.5–1.4)
DIFFERENTIAL METHOD: ABNORMAL
EOSINOPHIL # BLD AUTO: 0.2 K/UL (ref 0–0.5)
EOSINOPHIL NFR BLD: 3.4 % (ref 0–8)
ERYTHROCYTE [DISTWIDTH] IN BLOOD BY AUTOMATED COUNT: 16.9 % (ref 11.5–14.5)
EST. GFR  (AFRICAN AMERICAN): >60 ML/MIN/1.73 M^2
EST. GFR  (NON AFRICAN AMERICAN): >60 ML/MIN/1.73 M^2
GLUCOSE SERPL-MCNC: 297 MG/DL (ref 70–110)
HCT VFR BLD AUTO: 36.4 % (ref 37–48.5)
HGB BLD-MCNC: 11.7 G/DL (ref 12–16)
IMM GRANULOCYTES # BLD AUTO: 0.03 K/UL (ref 0–0.04)
IMM GRANULOCYTES NFR BLD AUTO: 0.5 % (ref 0–0.5)
LYMPHOCYTES # BLD AUTO: 3.3 K/UL (ref 1–4.8)
LYMPHOCYTES NFR BLD: 50.2 % (ref 18–48)
MAGNESIUM SERPL-MCNC: 1.8 MG/DL (ref 1.6–2.6)
MCH RBC QN AUTO: 29.1 PG (ref 27–31)
MCHC RBC AUTO-ENTMCNC: 32.1 G/DL (ref 32–36)
MCV RBC AUTO: 91 FL (ref 82–98)
MONOCYTES # BLD AUTO: 1 K/UL (ref 0.3–1)
MONOCYTES NFR BLD: 15 % (ref 4–15)
NEUTROPHILS # BLD AUTO: 2 K/UL (ref 1.8–7.7)
NEUTROPHILS NFR BLD: 30.6 % (ref 38–73)
NRBC BLD-RTO: 1 /100 WBC
PHOSPHATE SERPL-MCNC: 4.2 MG/DL (ref 2.7–4.5)
PLATELET # BLD AUTO: 196 K/UL (ref 150–450)
PMV BLD AUTO: 10.7 FL (ref 9.2–12.9)
POCT GLUCOSE: 291 MG/DL (ref 70–110)
POCT GLUCOSE: 295 MG/DL (ref 70–110)
POCT GLUCOSE: 338 MG/DL (ref 70–110)
POCT GLUCOSE: 339 MG/DL (ref 70–110)
POCT GLUCOSE: 413 MG/DL (ref 70–110)
POTASSIUM SERPL-SCNC: 4.1 MMOL/L (ref 3.5–5.1)
RBC # BLD AUTO: 4.02 M/UL (ref 4–5.4)
SARS-COV-2 RDRP RESP QL NAA+PROBE: NEGATIVE
SODIUM SERPL-SCNC: 138 MMOL/L (ref 136–145)
WBC # BLD AUTO: 6.47 K/UL (ref 3.9–12.7)

## 2021-07-22 PROCEDURE — G0378 HOSPITAL OBSERVATION PER HR: HCPCS

## 2021-07-22 PROCEDURE — 84100 ASSAY OF PHOSPHORUS: CPT | Performed by: INTERNAL MEDICINE

## 2021-07-22 PROCEDURE — 99226 PR SUBSEQUENT OBSERVATION CARE,LEVEL III: CPT | Mod: ,,, | Performed by: INTERNAL MEDICINE

## 2021-07-22 PROCEDURE — 85025 COMPLETE CBC W/AUTO DIFF WBC: CPT | Performed by: INTERNAL MEDICINE

## 2021-07-22 PROCEDURE — 63600175 PHARM REV CODE 636 W HCPCS: Performed by: INTERNAL MEDICINE

## 2021-07-22 PROCEDURE — U0002 COVID-19 LAB TEST NON-CDC: HCPCS | Performed by: INTERNAL MEDICINE

## 2021-07-22 PROCEDURE — G0425 PR INPT TELEHEALTH CONSULT 30M: ICD-10-PCS | Mod: GT,,, | Performed by: NURSE PRACTITIONER

## 2021-07-22 PROCEDURE — 96372 THER/PROPH/DIAG INJ SC/IM: CPT | Mod: 59 | Performed by: EMERGENCY MEDICINE

## 2021-07-22 PROCEDURE — 83735 ASSAY OF MAGNESIUM: CPT | Performed by: INTERNAL MEDICINE

## 2021-07-22 PROCEDURE — G0425 INPT/ED TELECONSULT30: HCPCS | Mod: GT,,, | Performed by: NURSE PRACTITIONER

## 2021-07-22 PROCEDURE — 99226 PR SUBSEQUENT OBSERVATION CARE,LEVEL III: ICD-10-PCS | Mod: ,,, | Performed by: INTERNAL MEDICINE

## 2021-07-22 PROCEDURE — 80048 BASIC METABOLIC PNL TOTAL CA: CPT | Performed by: INTERNAL MEDICINE

## 2021-07-22 PROCEDURE — 25000003 PHARM REV CODE 250: Performed by: NURSE PRACTITIONER

## 2021-07-22 PROCEDURE — 36415 COLL VENOUS BLD VENIPUNCTURE: CPT | Performed by: INTERNAL MEDICINE

## 2021-07-22 PROCEDURE — 25000003 PHARM REV CODE 250: Performed by: INTERNAL MEDICINE

## 2021-07-22 RX ORDER — BUSPIRONE HYDROCHLORIDE 15 MG/1
15 TABLET ORAL 3 TIMES DAILY
Qty: 90 TABLET | Refills: 0 | Status: SHIPPED | OUTPATIENT
Start: 2021-07-22 | End: 2021-07-29 | Stop reason: SDUPTHER

## 2021-07-22 RX ORDER — INSULIN ASPART 100 [IU]/ML
5 INJECTION, SOLUTION INTRAVENOUS; SUBCUTANEOUS
Status: DISCONTINUED | OUTPATIENT
Start: 2021-07-22 | End: 2021-07-23

## 2021-07-22 RX ORDER — LEVOTHYROXINE SODIUM 100 UG/1
100 TABLET ORAL DAILY
Qty: 30 TABLET | Refills: 1 | Status: SHIPPED | OUTPATIENT
Start: 2021-07-22

## 2021-07-22 RX ORDER — METFORMIN HYDROCHLORIDE 500 MG/1
1000 TABLET ORAL 2 TIMES DAILY WITH MEALS
Qty: 120 TABLET | Refills: 1 | Status: SHIPPED | OUTPATIENT
Start: 2021-07-22

## 2021-07-22 RX ORDER — GLIPIZIDE 10 MG/1
10 TABLET ORAL
Qty: 30 TABLET | Refills: 1 | Status: SHIPPED | OUTPATIENT
Start: 2021-07-22 | End: 2022-01-07

## 2021-07-22 RX ORDER — ACETAMINOPHEN 325 MG/1
650 TABLET ORAL EVERY 6 HOURS PRN
Status: DISCONTINUED | OUTPATIENT
Start: 2021-07-22 | End: 2021-07-27 | Stop reason: HOSPADM

## 2021-07-22 RX ADMIN — INSULIN ASPART 3 UNITS: 100 INJECTION, SOLUTION INTRAVENOUS; SUBCUTANEOUS at 10:07

## 2021-07-22 RX ADMIN — BUSPIRONE HYDROCHLORIDE 15 MG: 5 TABLET ORAL at 02:07

## 2021-07-22 RX ADMIN — GABAPENTIN 300 MG: 300 CAPSULE ORAL at 08:07

## 2021-07-22 RX ADMIN — LEVOTHYROXINE SODIUM 100 MCG: 100 TABLET ORAL at 08:07

## 2021-07-22 RX ADMIN — ACETAMINOPHEN 650 MG: 325 TABLET, FILM COATED ORAL at 05:07

## 2021-07-22 RX ADMIN — ATORVASTATIN CALCIUM 80 MG: 20 TABLET, FILM COATED ORAL at 08:07

## 2021-07-22 RX ADMIN — INSULIN ASPART 5 UNITS: 100 INJECTION, SOLUTION INTRAVENOUS; SUBCUTANEOUS at 04:07

## 2021-07-22 RX ADMIN — ESCITALOPRAM OXALATE 20 MG: 10 TABLET ORAL at 08:07

## 2021-07-22 RX ADMIN — INSULIN ASPART 3 UNITS: 100 INJECTION, SOLUTION INTRAVENOUS; SUBCUTANEOUS at 12:07

## 2021-07-22 RX ADMIN — TRAZODONE HYDROCHLORIDE 25 MG: 50 TABLET ORAL at 08:07

## 2021-07-22 RX ADMIN — INSULIN ASPART 4 UNITS: 100 INJECTION, SOLUTION INTRAVENOUS; SUBCUTANEOUS at 04:07

## 2021-07-22 RX ADMIN — METFORMIN HYDROCHLORIDE 500 MG: 500 TABLET ORAL at 04:07

## 2021-07-22 RX ADMIN — INSULIN ASPART 3 UNITS: 100 INJECTION, SOLUTION INTRAVENOUS; SUBCUTANEOUS at 08:07

## 2021-07-22 RX ADMIN — DIVALPROEX SODIUM 2000 MG: 500 TABLET, FILM COATED, EXTENDED RELEASE ORAL at 08:07

## 2021-07-22 RX ADMIN — BUSPIRONE HYDROCHLORIDE 15 MG: 5 TABLET ORAL at 08:07

## 2021-07-22 RX ADMIN — METFORMIN HYDROCHLORIDE 500 MG: 500 TABLET ORAL at 08:07

## 2021-07-22 RX ADMIN — INSULIN DETEMIR 10 UNITS: 100 INJECTION, SOLUTION SUBCUTANEOUS at 08:07

## 2021-07-23 PROBLEM — T50.901A ACCIDENTAL DRUG OVERDOSE: Status: ACTIVE | Noted: 2021-07-23

## 2021-07-23 PROBLEM — R41.0 DISORIENTATION: Status: ACTIVE | Noted: 2021-07-23

## 2021-07-23 LAB
ALBUMIN SERPL BCP-MCNC: 3.4 G/DL (ref 3.5–5.2)
ALLENS TEST: ABNORMAL
ALP SERPL-CCNC: 77 U/L (ref 55–135)
ALT SERPL W/O P-5'-P-CCNC: 56 U/L (ref 10–44)
ANION GAP SERPL CALC-SCNC: 11 MMOL/L (ref 8–16)
ANION GAP SERPL CALC-SCNC: 11 MMOL/L (ref 8–16)
ANION GAP SERPL CALC-SCNC: 16 MMOL/L (ref 8–16)
AST SERPL-CCNC: 38 U/L (ref 10–40)
BASOPHILS # BLD AUTO: 0.03 K/UL (ref 0–0.2)
BASOPHILS NFR BLD: 0.5 % (ref 0–1.9)
BILIRUB SERPL-MCNC: 0.3 MG/DL (ref 0.1–1)
BUN SERPL-MCNC: 11 MG/DL (ref 8–23)
BUN SERPL-MCNC: 5 MG/DL (ref 8–23)
BUN SERPL-MCNC: 8 MG/DL (ref 8–23)
CALCIUM SERPL-MCNC: 9.1 MG/DL (ref 8.7–10.5)
CALCIUM SERPL-MCNC: 9.2 MG/DL (ref 8.7–10.5)
CALCIUM SERPL-MCNC: 9.4 MG/DL (ref 8.7–10.5)
CHLORIDE SERPL-SCNC: 102 MMOL/L (ref 95–110)
CHLORIDE SERPL-SCNC: 102 MMOL/L (ref 95–110)
CHLORIDE SERPL-SCNC: 103 MMOL/L (ref 95–110)
CK SERPL-CCNC: 48 U/L (ref 20–180)
CO2 SERPL-SCNC: 20 MMOL/L (ref 23–29)
CO2 SERPL-SCNC: 25 MMOL/L (ref 23–29)
CO2 SERPL-SCNC: 27 MMOL/L (ref 23–29)
CREAT SERPL-MCNC: 0.7 MG/DL (ref 0.5–1.4)
CREAT SERPL-MCNC: 0.8 MG/DL (ref 0.5–1.4)
CREAT SERPL-MCNC: 0.9 MG/DL (ref 0.5–1.4)
CRP SERPL-MCNC: 6.3 MG/L (ref 0–8.2)
DELSYS: ABNORMAL
DELSYS: ABNORMAL
DIFFERENTIAL METHOD: ABNORMAL
EOSINOPHIL # BLD AUTO: 0.3 K/UL (ref 0–0.5)
EOSINOPHIL NFR BLD: 4.1 % (ref 0–8)
ERYTHROCYTE [DISTWIDTH] IN BLOOD BY AUTOMATED COUNT: 16.7 % (ref 11.5–14.5)
ERYTHROCYTE [SEDIMENTATION RATE] IN BLOOD: 0 MM/HR (ref 0–20)
EST. GFR  (AFRICAN AMERICAN): >60 ML/MIN/1.73 M^2
EST. GFR  (NON AFRICAN AMERICAN): >60 ML/MIN/1.73 M^2
FIO2: 21
FOLATE SERPL-MCNC: 17.3 NG/ML (ref 4–24)
GLUCOSE SERPL-MCNC: 192 MG/DL (ref 70–110)
GLUCOSE SERPL-MCNC: 251 MG/DL (ref 70–110)
GLUCOSE SERPL-MCNC: 298 MG/DL (ref 70–110)
HCO3 UR-SCNC: 25.9 MMOL/L (ref 24–28)
HCO3 UR-SCNC: 26.3 MMOL/L (ref 24–28)
HCT VFR BLD AUTO: 37.1 % (ref 37–48.5)
HCT VFR BLD CALC: 40 %PCV (ref 36–54)
HGB BLD-MCNC: 11.9 G/DL (ref 12–16)
HGB BLD-MCNC: 14 G/DL
HIV1+2 IGG SERPL QL IA.RAPID: NORMAL
IMM GRANULOCYTES # BLD AUTO: 0.01 K/UL (ref 0–0.04)
IMM GRANULOCYTES NFR BLD AUTO: 0.2 % (ref 0–0.5)
LACTATE SERPL-SCNC: 2.5 MMOL/L (ref 0.5–2.2)
LACTATE SERPL-SCNC: 3.9 MMOL/L (ref 0.5–2.2)
LACTATE SERPL-SCNC: 5.7 MMOL/L (ref 0.5–2.2)
LACTATE SERPL-SCNC: 7 MMOL/L (ref 0.5–2.2)
LYMPHOCYTES # BLD AUTO: 3.2 K/UL (ref 1–4.8)
LYMPHOCYTES NFR BLD: 50.5 % (ref 18–48)
MAGNESIUM SERPL-MCNC: 1.8 MG/DL (ref 1.6–2.6)
MCH RBC QN AUTO: 29.1 PG (ref 27–31)
MCHC RBC AUTO-ENTMCNC: 32.1 G/DL (ref 32–36)
MCV RBC AUTO: 91 FL (ref 82–98)
MODE: ABNORMAL
MODE: ABNORMAL
MONOCYTES # BLD AUTO: 0.9 K/UL (ref 0.3–1)
MONOCYTES NFR BLD: 13.9 % (ref 4–15)
NEUTROPHILS # BLD AUTO: 2 K/UL (ref 1.8–7.7)
NEUTROPHILS NFR BLD: 30.8 % (ref 38–73)
NRBC BLD-RTO: 1 /100 WBC
PCO2 BLDA: 42.7 MMHG (ref 35–45)
PCO2 BLDA: 46.5 MMHG (ref 35–45)
PH SMN: 7.36 [PH] (ref 7.35–7.45)
PH SMN: 7.39 [PH] (ref 7.35–7.45)
PHOSPHATE SERPL-MCNC: 5.1 MG/DL (ref 2.7–4.5)
PLATELET # BLD AUTO: 207 K/UL (ref 150–450)
PMV BLD AUTO: 10.3 FL (ref 9.2–12.9)
PO2 BLDA: 35 MMHG (ref 40–60)
PO2 BLDA: 59 MMHG (ref 40–60)
POC BE: 1 MMOL/L
POC BE: 1 MMOL/L
POC SATURATED O2: 64 % (ref 95–100)
POC SATURATED O2: 90 % (ref 95–100)
POC TCO2: 27 MMOL/L (ref 24–29)
POC TCO2: 28 MMOL/L (ref 24–29)
POCT GLUCOSE: 176 MG/DL (ref 70–110)
POCT GLUCOSE: 196 MG/DL (ref 70–110)
POCT GLUCOSE: 269 MG/DL (ref 70–110)
POCT GLUCOSE: 274 MG/DL (ref 70–110)
POCT GLUCOSE: 312 MG/DL (ref 70–110)
POCT GLUCOSE: 332 MG/DL (ref 70–110)
POTASSIUM SERPL-SCNC: 3.5 MMOL/L (ref 3.5–5.1)
POTASSIUM SERPL-SCNC: 3.8 MMOL/L (ref 3.5–5.1)
POTASSIUM SERPL-SCNC: 4 MMOL/L (ref 3.5–5.1)
PROT SERPL-MCNC: 6.3 G/DL (ref 6–8.4)
RBC # BLD AUTO: 4.09 M/UL (ref 4–5.4)
RPR SER QL: NORMAL
SAMPLE: ABNORMAL
SAMPLE: ABNORMAL
SITE: ABNORMAL
SITE: ABNORMAL
SODIUM SERPL-SCNC: 138 MMOL/L (ref 136–145)
SODIUM SERPL-SCNC: 139 MMOL/L (ref 136–145)
SODIUM SERPL-SCNC: 140 MMOL/L (ref 136–145)
T4 FREE SERPL-MCNC: 1.25 NG/DL (ref 0.71–1.51)
TSH SERPL DL<=0.005 MIU/L-ACNC: 2.6 UIU/ML (ref 0.4–4)
VIT B12 SERPL-MCNC: 810 PG/ML (ref 210–950)
WBC # BLD AUTO: 6.34 K/UL (ref 3.9–12.7)

## 2021-07-23 PROCEDURE — 20000000 HC ICU ROOM

## 2021-07-23 PROCEDURE — S5010 5% DEXTROSE AND 0.45% SALINE: HCPCS | Performed by: INTERNAL MEDICINE

## 2021-07-23 PROCEDURE — 82550 ASSAY OF CK (CPK): CPT | Performed by: INTERNAL MEDICINE

## 2021-07-23 PROCEDURE — 99291 PR CRITICAL CARE, E/M 30-74 MINUTES: ICD-10-PCS | Mod: ,,, | Performed by: INTERNAL MEDICINE

## 2021-07-23 PROCEDURE — 82803 BLOOD GASES ANY COMBINATION: CPT

## 2021-07-23 PROCEDURE — 93010 ELECTROCARDIOGRAM REPORT: CPT | Mod: ,,, | Performed by: INTERNAL MEDICINE

## 2021-07-23 PROCEDURE — 80048 BASIC METABOLIC PNL TOTAL CA: CPT | Mod: 91 | Performed by: INTERNAL MEDICINE

## 2021-07-23 PROCEDURE — 80074 ACUTE HEPATITIS PANEL: CPT | Performed by: INTERNAL MEDICINE

## 2021-07-23 PROCEDURE — 82746 ASSAY OF FOLIC ACID SERUM: CPT | Performed by: INTERNAL MEDICINE

## 2021-07-23 PROCEDURE — 80053 COMPREHEN METABOLIC PANEL: CPT | Performed by: INTERNAL MEDICINE

## 2021-07-23 PROCEDURE — 93005 ELECTROCARDIOGRAM TRACING: CPT

## 2021-07-23 PROCEDURE — 82607 VITAMIN B-12: CPT | Performed by: INTERNAL MEDICINE

## 2021-07-23 PROCEDURE — 83605 ASSAY OF LACTIC ACID: CPT | Mod: 91 | Performed by: INTERNAL MEDICINE

## 2021-07-23 PROCEDURE — 96372 THER/PROPH/DIAG INJ SC/IM: CPT | Mod: 59 | Performed by: EMERGENCY MEDICINE

## 2021-07-23 PROCEDURE — 93010 EKG 12-LEAD: ICD-10-PCS | Mod: ,,, | Performed by: INTERNAL MEDICINE

## 2021-07-23 PROCEDURE — 99900035 HC TECH TIME PER 15 MIN (STAT)

## 2021-07-23 PROCEDURE — 85651 RBC SED RATE NONAUTOMATED: CPT | Performed by: INTERNAL MEDICINE

## 2021-07-23 PROCEDURE — 86703 HIV-1/HIV-2 1 RESULT ANTBDY: CPT | Performed by: INTERNAL MEDICINE

## 2021-07-23 PROCEDURE — 86592 SYPHILIS TEST NON-TREP QUAL: CPT | Performed by: INTERNAL MEDICINE

## 2021-07-23 PROCEDURE — 97162 PT EVAL MOD COMPLEX 30 MIN: CPT

## 2021-07-23 PROCEDURE — 36415 COLL VENOUS BLD VENIPUNCTURE: CPT | Performed by: INTERNAL MEDICINE

## 2021-07-23 PROCEDURE — 83735 ASSAY OF MAGNESIUM: CPT | Performed by: INTERNAL MEDICINE

## 2021-07-23 PROCEDURE — 25000003 PHARM REV CODE 250: Performed by: INTERNAL MEDICINE

## 2021-07-23 PROCEDURE — 84439 ASSAY OF FREE THYROXINE: CPT | Performed by: INTERNAL MEDICINE

## 2021-07-23 PROCEDURE — 86140 C-REACTIVE PROTEIN: CPT | Performed by: INTERNAL MEDICINE

## 2021-07-23 PROCEDURE — 84100 ASSAY OF PHOSPHORUS: CPT | Performed by: INTERNAL MEDICINE

## 2021-07-23 PROCEDURE — 99291 CRITICAL CARE FIRST HOUR: CPT | Mod: ,,, | Performed by: INTERNAL MEDICINE

## 2021-07-23 PROCEDURE — 85025 COMPLETE CBC W/AUTO DIFF WBC: CPT | Performed by: INTERNAL MEDICINE

## 2021-07-23 PROCEDURE — 84443 ASSAY THYROID STIM HORMONE: CPT | Performed by: INTERNAL MEDICINE

## 2021-07-23 PROCEDURE — 94761 N-INVAS EAR/PLS OXIMETRY MLT: CPT

## 2021-07-23 RX ORDER — SODIUM CHLORIDE 9 MG/ML
INJECTION, SOLUTION INTRAVENOUS CONTINUOUS
Status: DISCONTINUED | OUTPATIENT
Start: 2021-07-23 | End: 2021-07-23

## 2021-07-23 RX ORDER — ENOXAPARIN SODIUM 100 MG/ML
40 INJECTION SUBCUTANEOUS EVERY 24 HOURS
Status: DISCONTINUED | OUTPATIENT
Start: 2021-07-23 | End: 2021-07-27 | Stop reason: HOSPADM

## 2021-07-23 RX ORDER — DIVALPROEX SODIUM 500 MG/1
500 TABLET, FILM COATED, EXTENDED RELEASE ORAL NIGHTLY
Status: DISCONTINUED | OUTPATIENT
Start: 2021-07-23 | End: 2021-07-24

## 2021-07-23 RX ORDER — DEXTROSE MONOHYDRATE AND SODIUM CHLORIDE 5; .45 G/100ML; G/100ML
INJECTION, SOLUTION INTRAVENOUS CONTINUOUS
Status: DISCONTINUED | OUTPATIENT
Start: 2021-07-23 | End: 2021-07-24

## 2021-07-23 RX ORDER — LORAZEPAM 2 MG/ML
2 INJECTION INTRAMUSCULAR EVERY 4 HOURS PRN
Status: DISCONTINUED | OUTPATIENT
Start: 2021-07-23 | End: 2021-07-27 | Stop reason: HOSPADM

## 2021-07-23 RX ADMIN — ATORVASTATIN CALCIUM 80 MG: 20 TABLET, FILM COATED ORAL at 08:07

## 2021-07-23 RX ADMIN — INSULIN ASPART 3 UNITS: 100 INJECTION, SOLUTION INTRAVENOUS; SUBCUTANEOUS at 08:07

## 2021-07-23 RX ADMIN — DEXTROSE AND SODIUM CHLORIDE: 5; .45 INJECTION, SOLUTION INTRAVENOUS at 09:07

## 2021-07-23 RX ADMIN — INSULIN ASPART 4 UNITS: 100 INJECTION, SOLUTION INTRAVENOUS; SUBCUTANEOUS at 11:07

## 2021-07-23 RX ADMIN — LEVOTHYROXINE SODIUM 100 MCG: 100 TABLET ORAL at 08:07

## 2021-07-23 RX ADMIN — DEXTROSE AND SODIUM CHLORIDE: 5; .45 INJECTION, SOLUTION INTRAVENOUS at 06:07

## 2021-07-23 RX ADMIN — METFORMIN HYDROCHLORIDE 500 MG: 500 TABLET ORAL at 08:07

## 2021-07-23 RX ADMIN — ESCITALOPRAM OXALATE 20 MG: 10 TABLET ORAL at 08:07

## 2021-07-23 RX ADMIN — BUSPIRONE HYDROCHLORIDE 15 MG: 5 TABLET ORAL at 08:07

## 2021-07-23 RX ADMIN — ACTIVATED CHARCOAL 50 G: 208 SUSPENSION ORAL at 04:07

## 2021-07-23 RX ADMIN — INSULIN ASPART 5 UNITS: 100 INJECTION, SOLUTION INTRAVENOUS; SUBCUTANEOUS at 08:07

## 2021-07-23 RX ADMIN — INSULIN ASPART 5 UNITS: 100 INJECTION, SOLUTION INTRAVENOUS; SUBCUTANEOUS at 11:07

## 2021-07-23 RX ADMIN — SODIUM CHLORIDE: 0.9 INJECTION, SOLUTION INTRAVENOUS at 03:07

## 2021-07-23 RX ADMIN — DIVALPROEX SODIUM 500 MG: 500 TABLET, FILM COATED, EXTENDED RELEASE ORAL at 08:07

## 2021-07-24 LAB
ANION GAP SERPL CALC-SCNC: 9 MMOL/L (ref 8–16)
BASOPHILS # BLD AUTO: 0.02 K/UL (ref 0–0.2)
BASOPHILS NFR BLD: 0.3 % (ref 0–1.9)
BUN SERPL-MCNC: 4 MG/DL (ref 8–23)
CALCIUM SERPL-MCNC: 9.2 MG/DL (ref 8.7–10.5)
CHLORIDE SERPL-SCNC: 102 MMOL/L (ref 95–110)
CO2 SERPL-SCNC: 27 MMOL/L (ref 23–29)
CREAT SERPL-MCNC: 0.8 MG/DL (ref 0.5–1.4)
DIFFERENTIAL METHOD: ABNORMAL
EOSINOPHIL # BLD AUTO: 0.3 K/UL (ref 0–0.5)
EOSINOPHIL NFR BLD: 3.9 % (ref 0–8)
ERYTHROCYTE [DISTWIDTH] IN BLOOD BY AUTOMATED COUNT: 16.5 % (ref 11.5–14.5)
EST. GFR  (AFRICAN AMERICAN): >60 ML/MIN/1.73 M^2
EST. GFR  (NON AFRICAN AMERICAN): >60 ML/MIN/1.73 M^2
GLUCOSE SERPL-MCNC: 274 MG/DL (ref 70–110)
HCT VFR BLD AUTO: 37.9 % (ref 37–48.5)
HGB BLD-MCNC: 12.1 G/DL (ref 12–16)
IMM GRANULOCYTES # BLD AUTO: 0.02 K/UL (ref 0–0.04)
IMM GRANULOCYTES NFR BLD AUTO: 0.3 % (ref 0–0.5)
LACTATE SERPL-SCNC: 1.5 MMOL/L (ref 0.5–2.2)
LYMPHOCYTES # BLD AUTO: 3 K/UL (ref 1–4.8)
LYMPHOCYTES NFR BLD: 44.7 % (ref 18–48)
MAGNESIUM SERPL-MCNC: 1.7 MG/DL (ref 1.6–2.6)
MCH RBC QN AUTO: 29.2 PG (ref 27–31)
MCHC RBC AUTO-ENTMCNC: 31.9 G/DL (ref 32–36)
MCV RBC AUTO: 91 FL (ref 82–98)
MONOCYTES # BLD AUTO: 1 K/UL (ref 0.3–1)
MONOCYTES NFR BLD: 15.4 % (ref 4–15)
NEUTROPHILS # BLD AUTO: 2.4 K/UL (ref 1.8–7.7)
NEUTROPHILS NFR BLD: 35.4 % (ref 38–73)
NRBC BLD-RTO: 0 /100 WBC
PHOSPHATE SERPL-MCNC: 4.3 MG/DL (ref 2.7–4.5)
PLATELET # BLD AUTO: 235 K/UL (ref 150–450)
PMV BLD AUTO: 10.6 FL (ref 9.2–12.9)
POCT GLUCOSE: 237 MG/DL (ref 70–110)
POCT GLUCOSE: 256 MG/DL (ref 70–110)
POCT GLUCOSE: 299 MG/DL (ref 70–110)
POCT GLUCOSE: 331 MG/DL (ref 70–110)
POCT GLUCOSE: 380 MG/DL (ref 70–110)
POCT GLUCOSE: 393 MG/DL (ref 70–110)
POTASSIUM SERPL-SCNC: 3.8 MMOL/L (ref 3.5–5.1)
RBC # BLD AUTO: 4.15 M/UL (ref 4–5.4)
SODIUM SERPL-SCNC: 138 MMOL/L (ref 136–145)
WBC # BLD AUTO: 6.67 K/UL (ref 3.9–12.7)

## 2021-07-24 PROCEDURE — 94761 N-INVAS EAR/PLS OXIMETRY MLT: CPT

## 2021-07-24 PROCEDURE — 99223 PR INITIAL HOSPITAL CARE,LEVL III: ICD-10-PCS | Mod: ,,, | Performed by: STUDENT IN AN ORGANIZED HEALTH CARE EDUCATION/TRAINING PROGRAM

## 2021-07-24 PROCEDURE — 80048 BASIC METABOLIC PNL TOTAL CA: CPT | Performed by: INTERNAL MEDICINE

## 2021-07-24 PROCEDURE — 25000003 PHARM REV CODE 250: Performed by: INTERNAL MEDICINE

## 2021-07-24 PROCEDURE — 63600175 PHARM REV CODE 636 W HCPCS: Performed by: INTERNAL MEDICINE

## 2021-07-24 PROCEDURE — 83735 ASSAY OF MAGNESIUM: CPT | Performed by: INTERNAL MEDICINE

## 2021-07-24 PROCEDURE — 99223 1ST HOSP IP/OBS HIGH 75: CPT | Mod: ,,, | Performed by: STUDENT IN AN ORGANIZED HEALTH CARE EDUCATION/TRAINING PROGRAM

## 2021-07-24 PROCEDURE — C9399 UNCLASSIFIED DRUGS OR BIOLOG: HCPCS | Performed by: INTERNAL MEDICINE

## 2021-07-24 PROCEDURE — 84100 ASSAY OF PHOSPHORUS: CPT | Performed by: INTERNAL MEDICINE

## 2021-07-24 PROCEDURE — 36415 COLL VENOUS BLD VENIPUNCTURE: CPT | Performed by: INTERNAL MEDICINE

## 2021-07-24 PROCEDURE — 21400001 HC TELEMETRY ROOM

## 2021-07-24 PROCEDURE — 99233 SBSQ HOSP IP/OBS HIGH 50: CPT | Mod: ,,, | Performed by: INTERNAL MEDICINE

## 2021-07-24 PROCEDURE — 85025 COMPLETE CBC W/AUTO DIFF WBC: CPT | Performed by: INTERNAL MEDICINE

## 2021-07-24 PROCEDURE — 99233 PR SUBSEQUENT HOSPITAL CARE,LEVL III: ICD-10-PCS | Mod: ,,, | Performed by: INTERNAL MEDICINE

## 2021-07-24 PROCEDURE — 25000003 PHARM REV CODE 250: Performed by: NURSE PRACTITIONER

## 2021-07-24 PROCEDURE — 83605 ASSAY OF LACTIC ACID: CPT | Performed by: INTERNAL MEDICINE

## 2021-07-24 RX ORDER — GABAPENTIN 300 MG/1
300 CAPSULE ORAL NIGHTLY
Status: DISCONTINUED | OUTPATIENT
Start: 2021-07-24 | End: 2021-07-27 | Stop reason: HOSPADM

## 2021-07-24 RX ORDER — DIVALPROEX SODIUM 500 MG/1
2000 TABLET, FILM COATED, EXTENDED RELEASE ORAL NIGHTLY
Status: DISCONTINUED | OUTPATIENT
Start: 2021-07-24 | End: 2021-07-27 | Stop reason: HOSPADM

## 2021-07-24 RX ADMIN — INSULIN ASPART 3 UNITS: 100 INJECTION, SOLUTION INTRAVENOUS; SUBCUTANEOUS at 09:07

## 2021-07-24 RX ADMIN — DIVALPROEX SODIUM 2000 MG: 500 TABLET, FILM COATED, EXTENDED RELEASE ORAL at 09:07

## 2021-07-24 RX ADMIN — GABAPENTIN 300 MG: 300 CAPSULE ORAL at 09:07

## 2021-07-24 RX ADMIN — INSULIN ASPART 5 UNITS: 100 INJECTION, SOLUTION INTRAVENOUS; SUBCUTANEOUS at 04:07

## 2021-07-24 RX ADMIN — ESCITALOPRAM OXALATE 20 MG: 10 TABLET ORAL at 08:07

## 2021-07-24 RX ADMIN — ENOXAPARIN SODIUM 40 MG: 40 INJECTION SUBCUTANEOUS at 04:07

## 2021-07-24 RX ADMIN — INSULIN ASPART 4 UNITS: 100 INJECTION, SOLUTION INTRAVENOUS; SUBCUTANEOUS at 11:07

## 2021-07-24 RX ADMIN — ENOXAPARIN SODIUM 40 MG: 40 INJECTION SUBCUTANEOUS at 12:07

## 2021-07-24 RX ADMIN — ATORVASTATIN CALCIUM 80 MG: 20 TABLET, FILM COATED ORAL at 08:07

## 2021-07-24 RX ADMIN — LORAZEPAM 2 MG: 2 INJECTION INTRAMUSCULAR; INTRAVENOUS at 09:07

## 2021-07-24 RX ADMIN — ACETAMINOPHEN 650 MG: 325 TABLET, FILM COATED ORAL at 09:07

## 2021-07-24 RX ADMIN — INSULIN DETEMIR 10 UNITS: 100 INJECTION, SOLUTION SUBCUTANEOUS at 04:07

## 2021-07-25 LAB
ANION GAP SERPL CALC-SCNC: 13 MMOL/L (ref 8–16)
BASOPHILS # BLD AUTO: 0.02 K/UL (ref 0–0.2)
BASOPHILS NFR BLD: 0.4 % (ref 0–1.9)
BUN SERPL-MCNC: 6 MG/DL (ref 8–23)
CALCIUM SERPL-MCNC: 9.8 MG/DL (ref 8.7–10.5)
CHLORIDE SERPL-SCNC: 102 MMOL/L (ref 95–110)
CO2 SERPL-SCNC: 26 MMOL/L (ref 23–29)
CREAT SERPL-MCNC: 0.7 MG/DL (ref 0.5–1.4)
DIFFERENTIAL METHOD: ABNORMAL
EOSINOPHIL # BLD AUTO: 0.2 K/UL (ref 0–0.5)
EOSINOPHIL NFR BLD: 4.3 % (ref 0–8)
ERYTHROCYTE [DISTWIDTH] IN BLOOD BY AUTOMATED COUNT: 16.3 % (ref 11.5–14.5)
EST. GFR  (AFRICAN AMERICAN): >60 ML/MIN/1.73 M^2
EST. GFR  (NON AFRICAN AMERICAN): >60 ML/MIN/1.73 M^2
GLUCOSE SERPL-MCNC: 266 MG/DL (ref 70–110)
HCT VFR BLD AUTO: 37.2 % (ref 37–48.5)
HGB BLD-MCNC: 12.1 G/DL (ref 12–16)
IMM GRANULOCYTES # BLD AUTO: 0.01 K/UL (ref 0–0.04)
IMM GRANULOCYTES NFR BLD AUTO: 0.2 % (ref 0–0.5)
LYMPHOCYTES # BLD AUTO: 2.7 K/UL (ref 1–4.8)
LYMPHOCYTES NFR BLD: 48 % (ref 18–48)
MAGNESIUM SERPL-MCNC: 1.8 MG/DL (ref 1.6–2.6)
MCH RBC QN AUTO: 29 PG (ref 27–31)
MCHC RBC AUTO-ENTMCNC: 32.5 G/DL (ref 32–36)
MCV RBC AUTO: 89 FL (ref 82–98)
MONOCYTES # BLD AUTO: 0.8 K/UL (ref 0.3–1)
MONOCYTES NFR BLD: 15 % (ref 4–15)
NEUTROPHILS # BLD AUTO: 1.8 K/UL (ref 1.8–7.7)
NEUTROPHILS NFR BLD: 32.1 % (ref 38–73)
NRBC BLD-RTO: 0 /100 WBC
PHOSPHATE SERPL-MCNC: 4.5 MG/DL (ref 2.7–4.5)
PLATELET # BLD AUTO: 233 K/UL (ref 150–450)
PMV BLD AUTO: 10.8 FL (ref 9.2–12.9)
POCT GLUCOSE: 243 MG/DL (ref 70–110)
POCT GLUCOSE: 254 MG/DL (ref 70–110)
POCT GLUCOSE: 288 MG/DL (ref 70–110)
POCT GLUCOSE: 292 MG/DL (ref 70–110)
POTASSIUM SERPL-SCNC: 3.8 MMOL/L (ref 3.5–5.1)
RBC # BLD AUTO: 4.17 M/UL (ref 4–5.4)
SODIUM SERPL-SCNC: 141 MMOL/L (ref 136–145)
WBC # BLD AUTO: 5.61 K/UL (ref 3.9–12.7)

## 2021-07-25 PROCEDURE — 36415 COLL VENOUS BLD VENIPUNCTURE: CPT | Performed by: INTERNAL MEDICINE

## 2021-07-25 PROCEDURE — 25000003 PHARM REV CODE 250: Performed by: NURSE PRACTITIONER

## 2021-07-25 PROCEDURE — C9399 UNCLASSIFIED DRUGS OR BIOLOG: HCPCS | Performed by: INTERNAL MEDICINE

## 2021-07-25 PROCEDURE — 63600175 PHARM REV CODE 636 W HCPCS: Performed by: INTERNAL MEDICINE

## 2021-07-25 PROCEDURE — 21400001 HC TELEMETRY ROOM

## 2021-07-25 PROCEDURE — 84100 ASSAY OF PHOSPHORUS: CPT | Performed by: INTERNAL MEDICINE

## 2021-07-25 PROCEDURE — 83735 ASSAY OF MAGNESIUM: CPT | Performed by: INTERNAL MEDICINE

## 2021-07-25 PROCEDURE — 25000003 PHARM REV CODE 250: Performed by: INTERNAL MEDICINE

## 2021-07-25 PROCEDURE — 99233 SBSQ HOSP IP/OBS HIGH 50: CPT | Mod: ,,, | Performed by: INTERNAL MEDICINE

## 2021-07-25 PROCEDURE — 85025 COMPLETE CBC W/AUTO DIFF WBC: CPT | Performed by: INTERNAL MEDICINE

## 2021-07-25 PROCEDURE — 99233 PR SUBSEQUENT HOSPITAL CARE,LEVL III: ICD-10-PCS | Mod: ,,, | Performed by: INTERNAL MEDICINE

## 2021-07-25 PROCEDURE — 80048 BASIC METABOLIC PNL TOTAL CA: CPT | Performed by: INTERNAL MEDICINE

## 2021-07-25 RX ORDER — TRAZODONE HYDROCHLORIDE 50 MG/1
50 TABLET ORAL NIGHTLY
Status: DISCONTINUED | OUTPATIENT
Start: 2021-07-25 | End: 2021-07-27 | Stop reason: HOSPADM

## 2021-07-25 RX ORDER — BUSPIRONE HYDROCHLORIDE 5 MG/1
15 TABLET ORAL 3 TIMES DAILY
Status: DISCONTINUED | OUTPATIENT
Start: 2021-07-26 | End: 2021-07-27 | Stop reason: HOSPADM

## 2021-07-25 RX ORDER — MUPIROCIN 20 MG/G
OINTMENT TOPICAL 2 TIMES DAILY
Status: DISCONTINUED | OUTPATIENT
Start: 2021-07-25 | End: 2021-07-27 | Stop reason: HOSPADM

## 2021-07-25 RX ADMIN — DIVALPROEX SODIUM 2000 MG: 500 TABLET, FILM COATED, EXTENDED RELEASE ORAL at 10:07

## 2021-07-25 RX ADMIN — INSULIN DETEMIR 10 UNITS: 100 INJECTION, SOLUTION SUBCUTANEOUS at 09:07

## 2021-07-25 RX ADMIN — INSULIN ASPART 1 UNITS: 100 INJECTION, SOLUTION INTRAVENOUS; SUBCUTANEOUS at 09:07

## 2021-07-25 RX ADMIN — GABAPENTIN 300 MG: 300 CAPSULE ORAL at 09:07

## 2021-07-25 RX ADMIN — ENOXAPARIN SODIUM 40 MG: 40 INJECTION SUBCUTANEOUS at 04:07

## 2021-07-25 RX ADMIN — INSULIN ASPART 2 UNITS: 100 INJECTION, SOLUTION INTRAVENOUS; SUBCUTANEOUS at 04:07

## 2021-07-25 RX ADMIN — MUPIROCIN: 20 OINTMENT TOPICAL at 04:07

## 2021-07-25 RX ADMIN — TRAZODONE HYDROCHLORIDE 50 MG: 50 TABLET ORAL at 09:07

## 2021-07-25 RX ADMIN — ESCITALOPRAM OXALATE 20 MG: 10 TABLET ORAL at 08:07

## 2021-07-25 RX ADMIN — INSULIN ASPART 3 UNITS: 100 INJECTION, SOLUTION INTRAVENOUS; SUBCUTANEOUS at 09:07

## 2021-07-25 RX ADMIN — ATORVASTATIN CALCIUM 80 MG: 20 TABLET, FILM COATED ORAL at 08:07

## 2021-07-25 RX ADMIN — INSULIN ASPART 3 UNITS: 100 INJECTION, SOLUTION INTRAVENOUS; SUBCUTANEOUS at 12:07

## 2021-07-25 RX ADMIN — ACETAMINOPHEN 650 MG: 325 TABLET, FILM COATED ORAL at 02:07

## 2021-07-26 PROBLEM — R41.3 MEMORY LOSS: Status: ACTIVE | Noted: 2019-12-20

## 2021-07-26 LAB
ANION GAP SERPL CALC-SCNC: 11 MMOL/L (ref 8–16)
BASOPHILS # BLD AUTO: 0.03 K/UL (ref 0–0.2)
BASOPHILS NFR BLD: 0.4 % (ref 0–1.9)
BUN SERPL-MCNC: 10 MG/DL (ref 8–23)
CALCIUM SERPL-MCNC: 9.7 MG/DL (ref 8.7–10.5)
CHLORIDE SERPL-SCNC: 101 MMOL/L (ref 95–110)
CHOLEST SERPL-MCNC: 175 MG/DL (ref 120–199)
CHOLEST/HDLC SERPL: 6 {RATIO} (ref 2–5)
CO2 SERPL-SCNC: 27 MMOL/L (ref 23–29)
CREAT SERPL-MCNC: 0.7 MG/DL (ref 0.5–1.4)
DIFFERENTIAL METHOD: ABNORMAL
EOSINOPHIL # BLD AUTO: 0.3 K/UL (ref 0–0.5)
EOSINOPHIL NFR BLD: 4.7 % (ref 0–8)
ERYTHROCYTE [DISTWIDTH] IN BLOOD BY AUTOMATED COUNT: 15.9 % (ref 11.5–14.5)
EST. GFR  (AFRICAN AMERICAN): >60 ML/MIN/1.73 M^2
EST. GFR  (NON AFRICAN AMERICAN): >60 ML/MIN/1.73 M^2
GLUCOSE SERPL-MCNC: 205 MG/DL (ref 70–110)
HAV IGM SERPL QL IA: NEGATIVE
HBV CORE IGM SERPL QL IA: NEGATIVE
HBV SURFACE AG SERPL QL IA: NEGATIVE
HCT VFR BLD AUTO: 38 % (ref 37–48.5)
HCV AB SERPL QL IA: NEGATIVE
HDLC SERPL-MCNC: 29 MG/DL (ref 40–75)
HDLC SERPL: 16.6 % (ref 20–50)
HGB BLD-MCNC: 12.5 G/DL (ref 12–16)
IMM GRANULOCYTES # BLD AUTO: 0.02 K/UL (ref 0–0.04)
IMM GRANULOCYTES NFR BLD AUTO: 0.3 % (ref 0–0.5)
LDLC SERPL CALC-MCNC: 112.2 MG/DL (ref 63–159)
LYMPHOCYTES # BLD AUTO: 3.3 K/UL (ref 1–4.8)
LYMPHOCYTES NFR BLD: 49.1 % (ref 18–48)
MAGNESIUM SERPL-MCNC: 1.9 MG/DL (ref 1.6–2.6)
MCH RBC QN AUTO: 29.1 PG (ref 27–31)
MCHC RBC AUTO-ENTMCNC: 32.9 G/DL (ref 32–36)
MCV RBC AUTO: 89 FL (ref 82–98)
MONOCYTES # BLD AUTO: 0.9 K/UL (ref 0.3–1)
MONOCYTES NFR BLD: 13.3 % (ref 4–15)
NEUTROPHILS # BLD AUTO: 2.2 K/UL (ref 1.8–7.7)
NEUTROPHILS NFR BLD: 32.2 % (ref 38–73)
NONHDLC SERPL-MCNC: 146 MG/DL
NRBC BLD-RTO: 0 /100 WBC
PHOSPHATE SERPL-MCNC: 4.4 MG/DL (ref 2.7–4.5)
PLATELET # BLD AUTO: 271 K/UL (ref 150–450)
PMV BLD AUTO: 11.2 FL (ref 9.2–12.9)
POCT GLUCOSE: 217 MG/DL (ref 70–110)
POCT GLUCOSE: 299 MG/DL (ref 70–110)
POCT GLUCOSE: 382 MG/DL (ref 70–110)
POTASSIUM SERPL-SCNC: 3.8 MMOL/L (ref 3.5–5.1)
RBC # BLD AUTO: 4.29 M/UL (ref 4–5.4)
SODIUM SERPL-SCNC: 139 MMOL/L (ref 136–145)
T4 FREE SERPL-MCNC: 1.11 NG/DL (ref 0.71–1.51)
TRIGL SERPL-MCNC: 169 MG/DL (ref 30–150)
TSH SERPL DL<=0.005 MIU/L-ACNC: 2.56 UIU/ML (ref 0.4–4)
WBC # BLD AUTO: 6.76 K/UL (ref 3.9–12.7)

## 2021-07-26 PROCEDURE — 36415 COLL VENOUS BLD VENIPUNCTURE: CPT | Performed by: INTERNAL MEDICINE

## 2021-07-26 PROCEDURE — 21400001 HC TELEMETRY ROOM

## 2021-07-26 PROCEDURE — 99233 SBSQ HOSP IP/OBS HIGH 50: CPT | Mod: 95,,, | Performed by: PSYCHIATRY & NEUROLOGY

## 2021-07-26 PROCEDURE — 84443 ASSAY THYROID STIM HORMONE: CPT | Performed by: INTERNAL MEDICINE

## 2021-07-26 PROCEDURE — 84439 ASSAY OF FREE THYROXINE: CPT | Performed by: INTERNAL MEDICINE

## 2021-07-26 PROCEDURE — 92523 SPEECH SOUND LANG COMPREHEN: CPT

## 2021-07-26 PROCEDURE — 97535 SELF CARE MNGMENT TRAINING: CPT

## 2021-07-26 PROCEDURE — 97165 OT EVAL LOW COMPLEX 30 MIN: CPT

## 2021-07-26 PROCEDURE — 80048 BASIC METABOLIC PNL TOTAL CA: CPT | Performed by: INTERNAL MEDICINE

## 2021-07-26 PROCEDURE — 84100 ASSAY OF PHOSPHORUS: CPT | Performed by: INTERNAL MEDICINE

## 2021-07-26 PROCEDURE — 83735 ASSAY OF MAGNESIUM: CPT | Performed by: INTERNAL MEDICINE

## 2021-07-26 PROCEDURE — 85025 COMPLETE CBC W/AUTO DIFF WBC: CPT | Performed by: INTERNAL MEDICINE

## 2021-07-26 PROCEDURE — 25000003 PHARM REV CODE 250: Performed by: INTERNAL MEDICINE

## 2021-07-26 PROCEDURE — 99233 SBSQ HOSP IP/OBS HIGH 50: CPT | Mod: ,,, | Performed by: INTERNAL MEDICINE

## 2021-07-26 PROCEDURE — 99233 PR SUBSEQUENT HOSPITAL CARE,LEVL III: ICD-10-PCS | Mod: 95,,, | Performed by: PSYCHIATRY & NEUROLOGY

## 2021-07-26 PROCEDURE — 99233 PR SUBSEQUENT HOSPITAL CARE,LEVL III: ICD-10-PCS | Mod: ,,, | Performed by: INTERNAL MEDICINE

## 2021-07-26 PROCEDURE — 80061 LIPID PANEL: CPT | Performed by: INTERNAL MEDICINE

## 2021-07-26 PROCEDURE — 97164 PT RE-EVAL EST PLAN CARE: CPT

## 2021-07-26 PROCEDURE — 63600175 PHARM REV CODE 636 W HCPCS: Performed by: INTERNAL MEDICINE

## 2021-07-26 RX ORDER — POLYETHYLENE GLYCOL 3350 17 G/17G
17 POWDER, FOR SOLUTION ORAL DAILY
Status: DISCONTINUED | OUTPATIENT
Start: 2021-07-27 | End: 2021-07-26

## 2021-07-26 RX ORDER — LANOLIN ALCOHOL/MO/W.PET/CERES
200 CREAM (GRAM) TOPICAL DAILY
Status: DISCONTINUED | OUTPATIENT
Start: 2021-07-26 | End: 2021-07-27 | Stop reason: HOSPADM

## 2021-07-26 RX ORDER — POLYETHYLENE GLYCOL 3350 17 G/17G
17 POWDER, FOR SOLUTION ORAL DAILY
Status: DISCONTINUED | OUTPATIENT
Start: 2021-07-26 | End: 2021-07-27 | Stop reason: HOSPADM

## 2021-07-26 RX ORDER — LEVOTHYROXINE SODIUM 50 UG/1
50 TABLET ORAL
Status: DISCONTINUED | OUTPATIENT
Start: 2021-07-27 | End: 2021-07-27 | Stop reason: HOSPADM

## 2021-07-26 RX ADMIN — DIVALPROEX SODIUM 2000 MG: 500 TABLET, FILM COATED, EXTENDED RELEASE ORAL at 09:07

## 2021-07-26 RX ADMIN — Medication 200 MG: at 07:07

## 2021-07-26 RX ADMIN — ACETAMINOPHEN 650 MG: 325 TABLET, FILM COATED ORAL at 08:07

## 2021-07-26 RX ADMIN — ESCITALOPRAM OXALATE 20 MG: 10 TABLET ORAL at 08:07

## 2021-07-26 RX ADMIN — INSULIN ASPART 2 UNITS: 100 INJECTION, SOLUTION INTRAVENOUS; SUBCUTANEOUS at 09:07

## 2021-07-26 RX ADMIN — THERA TABS 1 TABLET: TAB at 12:07

## 2021-07-26 RX ADMIN — TRAZODONE HYDROCHLORIDE 50 MG: 50 TABLET ORAL at 09:07

## 2021-07-26 RX ADMIN — INSULIN ASPART 2 UNITS: 100 INJECTION, SOLUTION INTRAVENOUS; SUBCUTANEOUS at 08:07

## 2021-07-26 RX ADMIN — BUSPIRONE HYDROCHLORIDE 15 MG: 5 TABLET ORAL at 08:07

## 2021-07-26 RX ADMIN — POLYETHYLENE GLYCOL 3350 17 G: 17 POWDER, FOR SOLUTION ORAL at 07:07

## 2021-07-26 RX ADMIN — ACETAMINOPHEN 650 MG: 325 TABLET, FILM COATED ORAL at 01:07

## 2021-07-26 RX ADMIN — MUPIROCIN: 20 OINTMENT TOPICAL at 09:07

## 2021-07-26 RX ADMIN — ENOXAPARIN SODIUM 40 MG: 40 INJECTION SUBCUTANEOUS at 05:07

## 2021-07-26 RX ADMIN — BUSPIRONE HYDROCHLORIDE 15 MG: 5 TABLET ORAL at 02:07

## 2021-07-26 RX ADMIN — INSULIN ASPART 3 UNITS: 100 INJECTION, SOLUTION INTRAVENOUS; SUBCUTANEOUS at 05:07

## 2021-07-26 RX ADMIN — GABAPENTIN 300 MG: 300 CAPSULE ORAL at 09:07

## 2021-07-26 RX ADMIN — INSULIN ASPART 5 UNITS: 100 INJECTION, SOLUTION INTRAVENOUS; SUBCUTANEOUS at 12:07

## 2021-07-26 RX ADMIN — BUSPIRONE HYDROCHLORIDE 15 MG: 5 TABLET ORAL at 09:07

## 2021-07-26 RX ADMIN — MUPIROCIN: 20 OINTMENT TOPICAL at 08:07

## 2021-07-26 RX ADMIN — ATORVASTATIN CALCIUM 80 MG: 20 TABLET, FILM COATED ORAL at 08:07

## 2021-07-27 ENCOUNTER — DOCUMENTATION ONLY (OUTPATIENT)
Dept: REHABILITATION | Facility: OTHER | Age: 66
End: 2021-07-27

## 2021-07-27 VITALS
HEIGHT: 71 IN | BODY MASS INDEX: 24.45 KG/M2 | TEMPERATURE: 98 F | DIASTOLIC BLOOD PRESSURE: 81 MMHG | SYSTOLIC BLOOD PRESSURE: 125 MMHG | OXYGEN SATURATION: 97 % | HEART RATE: 78 BPM | WEIGHT: 174.63 LBS | RESPIRATION RATE: 16 BRPM

## 2021-07-27 LAB
POCT GLUCOSE: 225 MG/DL (ref 70–110)
POCT GLUCOSE: 321 MG/DL (ref 70–110)

## 2021-07-27 PROCEDURE — 25000003 PHARM REV CODE 250: Performed by: INTERNAL MEDICINE

## 2021-07-27 PROCEDURE — 97530 THERAPEUTIC ACTIVITIES: CPT | Mod: CQ

## 2021-07-27 PROCEDURE — 92507 TX SP LANG VOICE COMM INDIV: CPT

## 2021-07-27 PROCEDURE — 99239 HOSP IP/OBS DSCHRG MGMT >30: CPT | Mod: ,,, | Performed by: INTERNAL MEDICINE

## 2021-07-27 PROCEDURE — 99239 PR HOSPITAL DISCHARGE DAY,>30 MIN: ICD-10-PCS | Mod: ,,, | Performed by: INTERNAL MEDICINE

## 2021-07-27 RX ADMIN — Medication 200 MG: at 09:07

## 2021-07-27 RX ADMIN — MUPIROCIN: 20 OINTMENT TOPICAL at 09:07

## 2021-07-27 RX ADMIN — THERA TABS 1 TABLET: TAB at 09:07

## 2021-07-27 RX ADMIN — BUSPIRONE HYDROCHLORIDE 15 MG: 5 TABLET ORAL at 09:07

## 2021-07-27 RX ADMIN — ATORVASTATIN CALCIUM 80 MG: 20 TABLET, FILM COATED ORAL at 09:07

## 2021-07-27 RX ADMIN — INSULIN ASPART 2 UNITS: 100 INJECTION, SOLUTION INTRAVENOUS; SUBCUTANEOUS at 09:07

## 2021-07-27 RX ADMIN — LEVOTHYROXINE SODIUM 50 MCG: 0.05 TABLET ORAL at 05:07

## 2021-07-27 RX ADMIN — POLYETHYLENE GLYCOL 3350 17 G: 17 POWDER, FOR SOLUTION ORAL at 09:07

## 2021-07-27 RX ADMIN — ESCITALOPRAM OXALATE 20 MG: 10 TABLET ORAL at 09:07

## 2021-07-28 ENCOUNTER — PATIENT MESSAGE (OUTPATIENT)
Dept: PSYCHIATRY | Facility: CLINIC | Age: 66
End: 2021-07-28

## 2021-07-28 ENCOUNTER — PATIENT OUTREACH (OUTPATIENT)
Dept: ADMINISTRATIVE | Facility: CLINIC | Age: 66
End: 2021-07-28

## 2021-07-28 ENCOUNTER — OFFICE VISIT (OUTPATIENT)
Dept: ORTHOPEDICS | Facility: CLINIC | Age: 66
End: 2021-07-28
Payer: COMMERCIAL

## 2021-07-28 ENCOUNTER — TELEPHONE (OUTPATIENT)
Dept: NEUROLOGY | Facility: CLINIC | Age: 66
End: 2021-07-28

## 2021-07-28 VITALS — HEIGHT: 71 IN | WEIGHT: 169.19 LBS | BODY MASS INDEX: 23.69 KG/M2

## 2021-07-28 DIAGNOSIS — M54.12 RADICULOPATHY, CERVICAL REGION: ICD-10-CM

## 2021-07-28 DIAGNOSIS — M54.12 CERVICAL RADICULOPATHY: Primary | ICD-10-CM

## 2021-07-28 PROCEDURE — 99204 PR OFFICE/OUTPT VISIT, NEW, LEVL IV, 45-59 MIN: ICD-10-PCS | Mod: S$GLB,ICN,, | Performed by: ORTHOPAEDIC SURGERY

## 2021-07-28 PROCEDURE — 99213 OFFICE O/P EST LOW 20 MIN: CPT | Mod: PBBFAC | Performed by: ORTHOPAEDIC SURGERY

## 2021-07-28 PROCEDURE — 99999 PR PBB SHADOW E&M-EST. PATIENT-LVL III: CPT | Mod: PBBFAC,,, | Performed by: ORTHOPAEDIC SURGERY

## 2021-07-28 PROCEDURE — G0180 MD CERTIFICATION HHA PATIENT: HCPCS | Mod: ,,, | Performed by: INTERNAL MEDICINE

## 2021-07-28 PROCEDURE — 99999 PR PBB SHADOW E&M-EST. PATIENT-LVL III: ICD-10-PCS | Mod: PBBFAC,,, | Performed by: ORTHOPAEDIC SURGERY

## 2021-07-28 PROCEDURE — 99204 OFFICE O/P NEW MOD 45 MIN: CPT | Mod: S$GLB,ICN,, | Performed by: ORTHOPAEDIC SURGERY

## 2021-07-28 PROCEDURE — G0180 PR HOME HEALTH MD CERTIFICATION: ICD-10-PCS | Mod: ,,, | Performed by: INTERNAL MEDICINE

## 2021-07-29 ENCOUNTER — TELEPHONE (OUTPATIENT)
Dept: NEUROLOGY | Facility: CLINIC | Age: 66
End: 2021-07-29

## 2021-07-29 ENCOUNTER — OFFICE VISIT (OUTPATIENT)
Dept: PSYCHIATRY | Facility: CLINIC | Age: 66
End: 2021-07-29
Payer: COMMERCIAL

## 2021-07-29 DIAGNOSIS — F31.30 BIPOLAR AFFECTIVE DISORDER, CURRENT EPISODE DEPRESSED, CURRENT EPISODE SEVERITY UNSPECIFIED: Primary | ICD-10-CM

## 2021-07-29 DIAGNOSIS — R41.3 MEMORY DISORDER: ICD-10-CM

## 2021-07-29 PROCEDURE — 3046F HEMOGLOBIN A1C LEVEL >9.0%: CPT | Mod: CPTII,95,, | Performed by: PSYCHIATRY & NEUROLOGY

## 2021-07-29 PROCEDURE — 90833 PSYTX W PT W E/M 30 MIN: CPT | Mod: 95,,, | Performed by: PSYCHIATRY & NEUROLOGY

## 2021-07-29 PROCEDURE — 1157F PR ADVANCE CARE PLAN OR EQUIV PRESENT IN MEDICAL RECORD: ICD-10-PCS | Mod: CPTII,95,, | Performed by: PSYCHIATRY & NEUROLOGY

## 2021-07-29 PROCEDURE — 99499 RISK ADDL DX/OHS AUDIT: ICD-10-PCS | Mod: 95,,, | Performed by: PSYCHIATRY & NEUROLOGY

## 2021-07-29 PROCEDURE — 3046F PR MOST RECENT HEMOGLOBIN A1C LEVEL > 9.0%: ICD-10-PCS | Mod: CPTII,95,, | Performed by: PSYCHIATRY & NEUROLOGY

## 2021-07-29 PROCEDURE — 99215 PR OFFICE/OUTPT VISIT, EST, LEVL V, 40-54 MIN: ICD-10-PCS | Mod: 95,,, | Performed by: PSYCHIATRY & NEUROLOGY

## 2021-07-29 PROCEDURE — 1157F ADVNC CARE PLAN IN RCRD: CPT | Mod: CPTII,95,, | Performed by: PSYCHIATRY & NEUROLOGY

## 2021-07-29 PROCEDURE — 1159F PR MEDICATION LIST DOCUMENTED IN MEDICAL RECORD: ICD-10-PCS | Mod: CPTII,95,, | Performed by: PSYCHIATRY & NEUROLOGY

## 2021-07-29 PROCEDURE — 90833 PR PSYCHOTHERAPY W/PATIENT W/E&M, 30 MIN (ADD ON): ICD-10-PCS | Mod: 95,,, | Performed by: PSYCHIATRY & NEUROLOGY

## 2021-07-29 PROCEDURE — 99215 OFFICE O/P EST HI 40 MIN: CPT | Mod: 95,,, | Performed by: PSYCHIATRY & NEUROLOGY

## 2021-07-29 PROCEDURE — 1160F PR REVIEW ALL MEDS BY PRESCRIBER/CLIN PHARMACIST DOCUMENTED: ICD-10-PCS | Mod: CPTII,95,, | Performed by: PSYCHIATRY & NEUROLOGY

## 2021-07-29 PROCEDURE — 1111F DSCHRG MED/CURRENT MED MERGE: CPT | Mod: CPTII,95,, | Performed by: PSYCHIATRY & NEUROLOGY

## 2021-07-29 PROCEDURE — 99499 UNLISTED E&M SERVICE: CPT | Mod: 95,,, | Performed by: PSYCHIATRY & NEUROLOGY

## 2021-07-29 PROCEDURE — 1160F RVW MEDS BY RX/DR IN RCRD: CPT | Mod: CPTII,95,, | Performed by: PSYCHIATRY & NEUROLOGY

## 2021-07-29 PROCEDURE — 1111F PR DISCHARGE MEDS RECONCILED W/ CURRENT OUTPATIENT MED LIST: ICD-10-PCS | Mod: CPTII,95,, | Performed by: PSYCHIATRY & NEUROLOGY

## 2021-07-29 PROCEDURE — 1159F MED LIST DOCD IN RCRD: CPT | Mod: CPTII,95,, | Performed by: PSYCHIATRY & NEUROLOGY

## 2021-07-29 RX ORDER — ESCITALOPRAM OXALATE 20 MG/1
20 TABLET ORAL DAILY
Qty: 90 TABLET | Refills: 1 | Status: SHIPPED | OUTPATIENT
Start: 2021-07-29 | End: 2022-03-03 | Stop reason: SDUPTHER

## 2021-07-29 RX ORDER — DIVALPROEX SODIUM 500 MG/1
1000 TABLET, FILM COATED, EXTENDED RELEASE ORAL DAILY
Qty: 60 TABLET | Refills: 5 | Status: SHIPPED | OUTPATIENT
Start: 2021-07-29 | End: 2022-03-03 | Stop reason: SDUPTHER

## 2021-07-29 RX ORDER — BUSPIRONE HYDROCHLORIDE 15 MG/1
15 TABLET ORAL 2 TIMES DAILY
Qty: 60 TABLET | Refills: 3 | Status: SHIPPED | OUTPATIENT
Start: 2021-07-29 | End: 2022-01-07

## 2021-07-30 ENCOUNTER — HOSPITAL ENCOUNTER (EMERGENCY)
Facility: HOSPITAL | Age: 66
End: 2021-07-30
Attending: EMERGENCY MEDICINE
Payer: COMMERCIAL

## 2021-07-30 ENCOUNTER — PATIENT MESSAGE (OUTPATIENT)
Dept: PSYCHIATRY | Facility: CLINIC | Age: 66
End: 2021-07-30

## 2021-07-30 VITALS
OXYGEN SATURATION: 98 % | RESPIRATION RATE: 18 BRPM | SYSTOLIC BLOOD PRESSURE: 124 MMHG | HEIGHT: 71 IN | HEART RATE: 77 BPM | TEMPERATURE: 98 F | BODY MASS INDEX: 25.2 KG/M2 | DIASTOLIC BLOOD PRESSURE: 67 MMHG | WEIGHT: 180 LBS

## 2021-07-30 DIAGNOSIS — F19.10 DRUG ABUSE: ICD-10-CM

## 2021-07-30 DIAGNOSIS — F03.91 DEMENTIA WITH BEHAVIORAL DISTURBANCE, UNSPECIFIED DEMENTIA TYPE: Primary | ICD-10-CM

## 2021-07-30 DIAGNOSIS — B99.9 INFECTION: ICD-10-CM

## 2021-07-30 DIAGNOSIS — S62.347D CLOSED NONDISPLACED FRACTURE OF BASE OF FIFTH METACARPAL BONE OF LEFT HAND WITH ROUTINE HEALING: Primary | ICD-10-CM

## 2021-07-30 LAB
ALBUMIN SERPL BCP-MCNC: 3.8 G/DL (ref 3.5–5.2)
ALP SERPL-CCNC: 89 U/L (ref 55–135)
ALT SERPL W/O P-5'-P-CCNC: 101 U/L (ref 10–44)
AMPHET+METHAMPHET UR QL: NEGATIVE
ANION GAP SERPL CALC-SCNC: 13 MMOL/L (ref 8–16)
AST SERPL-CCNC: 155 U/L (ref 10–40)
BACTERIA #/AREA URNS AUTO: ABNORMAL /HPF
BARBITURATES UR QL SCN>200 NG/ML: NEGATIVE
BASOPHILS # BLD AUTO: 0.04 K/UL (ref 0–0.2)
BASOPHILS NFR BLD: 0.6 % (ref 0–1.9)
BENZODIAZ UR QL SCN>200 NG/ML: NEGATIVE
BILIRUB SERPL-MCNC: 0.3 MG/DL (ref 0.1–1)
BILIRUB UR QL STRIP: NEGATIVE
BUN SERPL-MCNC: 11 MG/DL (ref 8–23)
BUN SERPL-MCNC: 12 MG/DL (ref 6–30)
BZE UR QL SCN: NEGATIVE
CALCIUM SERPL-MCNC: 9.8 MG/DL (ref 8.7–10.5)
CANNABINOIDS UR QL SCN: NEGATIVE
CHLORIDE SERPL-SCNC: 103 MMOL/L (ref 95–110)
CHLORIDE SERPL-SCNC: 99 MMOL/L (ref 95–110)
CLARITY UR REFRACT.AUTO: ABNORMAL
CO2 SERPL-SCNC: 22 MMOL/L (ref 23–29)
COLOR UR AUTO: YELLOW
CREAT SERPL-MCNC: 0.5 MG/DL (ref 0.5–1.4)
CREAT SERPL-MCNC: 0.8 MG/DL (ref 0.5–1.4)
CREAT UR-MCNC: 86 MG/DL (ref 15–325)
CTP QC/QA: YES
DIFFERENTIAL METHOD: ABNORMAL
EOSINOPHIL # BLD AUTO: 0.2 K/UL (ref 0–0.5)
EOSINOPHIL NFR BLD: 3.1 % (ref 0–8)
ERYTHROCYTE [DISTWIDTH] IN BLOOD BY AUTOMATED COUNT: 15.9 % (ref 11.5–14.5)
EST. GFR  (AFRICAN AMERICAN): >60 ML/MIN/1.73 M^2
EST. GFR  (NON AFRICAN AMERICAN): >60 ML/MIN/1.73 M^2
ESTIMATED AVG GLUCOSE: 272 MG/DL (ref 68–131)
GLUCOSE SERPL-MCNC: 213 MG/DL (ref 70–110)
GLUCOSE SERPL-MCNC: 226 MG/DL (ref 70–110)
GLUCOSE UR QL STRIP: ABNORMAL
HBA1C MFR BLD: 11.1 % (ref 4–5.6)
HCT VFR BLD AUTO: 40.5 % (ref 37–48.5)
HCT VFR BLD CALC: 42 %PCV (ref 36–54)
HGB BLD-MCNC: 13 G/DL (ref 12–16)
HGB UR QL STRIP: NEGATIVE
IMM GRANULOCYTES # BLD AUTO: 0.02 K/UL (ref 0–0.04)
IMM GRANULOCYTES NFR BLD AUTO: 0.3 % (ref 0–0.5)
KETONES UR QL STRIP: ABNORMAL
LEUKOCYTE ESTERASE UR QL STRIP: ABNORMAL
LYMPHOCYTES # BLD AUTO: 2.6 K/UL (ref 1–4.8)
LYMPHOCYTES NFR BLD: 41.3 % (ref 18–48)
MCH RBC QN AUTO: 28.9 PG (ref 27–31)
MCHC RBC AUTO-ENTMCNC: 32.1 G/DL (ref 32–36)
MCV RBC AUTO: 90 FL (ref 82–98)
METHADONE UR QL SCN>300 NG/ML: NEGATIVE
MICROSCOPIC COMMENT: ABNORMAL
MONOCYTES # BLD AUTO: 0.8 K/UL (ref 0.3–1)
MONOCYTES NFR BLD: 12.6 % (ref 4–15)
NEUTROPHILS # BLD AUTO: 2.7 K/UL (ref 1.8–7.7)
NEUTROPHILS NFR BLD: 42.1 % (ref 38–73)
NITRITE UR QL STRIP: NEGATIVE
NRBC BLD-RTO: 0 /100 WBC
OPIATES UR QL SCN: NEGATIVE
PCP UR QL SCN>25 NG/ML: NEGATIVE
PH UR STRIP: 5 [PH] (ref 5–8)
PLATELET # BLD AUTO: 245 K/UL (ref 150–450)
PMV BLD AUTO: 11.1 FL (ref 9.2–12.9)
POC IONIZED CALCIUM: 1.12 MMOL/L (ref 1.06–1.42)
POC TCO2 (MEASURED): 26 MMOL/L (ref 23–29)
POTASSIUM BLD-SCNC: 4.2 MMOL/L (ref 3.5–5.1)
POTASSIUM SERPL-SCNC: 4.2 MMOL/L (ref 3.5–5.1)
PROT SERPL-MCNC: 7.3 G/DL (ref 6–8.4)
PROT UR QL STRIP: NEGATIVE
RBC # BLD AUTO: 4.5 M/UL (ref 4–5.4)
SAMPLE: ABNORMAL
SARS-COV-2 RDRP RESP QL NAA+PROBE: NEGATIVE
SODIUM BLD-SCNC: 138 MMOL/L (ref 136–145)
SODIUM SERPL-SCNC: 138 MMOL/L (ref 136–145)
SP GR UR STRIP: 1.02 (ref 1–1.03)
SQUAMOUS #/AREA URNS AUTO: 1 /HPF
TOXICOLOGY INFORMATION: NORMAL
URN SPEC COLLECT METH UR: ABNORMAL
WBC # BLD AUTO: 6.35 K/UL (ref 3.9–12.7)
WBC #/AREA URNS AUTO: 19 /HPF (ref 0–5)
YEAST UR QL AUTO: ABNORMAL

## 2021-07-30 PROCEDURE — 99285 EMERGENCY DEPT VISIT HI MDM: CPT | Mod: 25

## 2021-07-30 PROCEDURE — 82330 ASSAY OF CALCIUM: CPT

## 2021-07-30 PROCEDURE — 85025 COMPLETE CBC W/AUTO DIFF WBC: CPT

## 2021-07-30 PROCEDURE — 93005 ELECTROCARDIOGRAM TRACING: CPT

## 2021-07-30 PROCEDURE — U0002 COVID-19 LAB TEST NON-CDC: HCPCS

## 2021-07-30 PROCEDURE — 80053 COMPREHEN METABOLIC PANEL: CPT

## 2021-07-30 PROCEDURE — 81001 URINALYSIS AUTO W/SCOPE: CPT | Mod: 59

## 2021-07-30 PROCEDURE — 93010 ELECTROCARDIOGRAM REPORT: CPT | Mod: ,,, | Performed by: INTERNAL MEDICINE

## 2021-07-30 PROCEDURE — 83036 HEMOGLOBIN GLYCOSYLATED A1C: CPT

## 2021-07-30 PROCEDURE — 93010 EKG 12-LEAD: ICD-10-PCS | Mod: ,,, | Performed by: INTERNAL MEDICINE

## 2021-07-30 PROCEDURE — 87086 URINE CULTURE/COLONY COUNT: CPT

## 2021-07-30 PROCEDURE — 80307 DRUG TEST PRSMV CHEM ANLYZR: CPT

## 2021-07-30 PROCEDURE — 99285 EMERGENCY DEPT VISIT HI MDM: CPT | Mod: CS,,, | Performed by: EMERGENCY MEDICINE

## 2021-07-30 PROCEDURE — 80047 BASIC METABLC PNL IONIZED CA: CPT

## 2021-07-30 PROCEDURE — 99285 PR EMERGENCY DEPT VISIT,LEVEL V: ICD-10-PCS | Mod: CS,,, | Performed by: EMERGENCY MEDICINE

## 2021-07-30 RX ORDER — HYDROXYZINE PAMOATE 25 MG/1
CAPSULE ORAL
COMMUNITY
Start: 2021-07-01 | End: 2022-01-07

## 2021-07-31 LAB
BACTERIA UR CULT: NORMAL
BACTERIA UR CULT: NORMAL

## 2021-08-02 ENCOUNTER — TELEPHONE (OUTPATIENT)
Dept: NEUROLOGY | Facility: CLINIC | Age: 66
End: 2021-08-02

## 2021-08-04 ENCOUNTER — TELEPHONE (OUTPATIENT)
Dept: NEUROLOGY | Facility: CLINIC | Age: 66
End: 2021-08-04

## 2021-08-06 ENCOUNTER — EXTERNAL HOME HEALTH (OUTPATIENT)
Dept: HOME HEALTH SERVICES | Facility: HOSPITAL | Age: 66
End: 2021-08-06
Payer: COMMERCIAL

## 2021-08-10 ENCOUNTER — OFFICE VISIT (OUTPATIENT)
Dept: PSYCHIATRY | Facility: CLINIC | Age: 66
End: 2021-08-10
Payer: COMMERCIAL

## 2021-08-10 ENCOUNTER — PATIENT MESSAGE (OUTPATIENT)
Dept: PSYCHIATRY | Facility: CLINIC | Age: 66
End: 2021-08-10

## 2021-08-10 DIAGNOSIS — R41.3 MEMORY DISORDER: ICD-10-CM

## 2021-08-10 DIAGNOSIS — F31.30 BIPOLAR AFFECTIVE DISORDER, CURRENT EPISODE DEPRESSED, CURRENT EPISODE SEVERITY UNSPECIFIED: Primary | ICD-10-CM

## 2021-08-10 PROCEDURE — 1111F DSCHRG MED/CURRENT MED MERGE: CPT | Mod: CPTII,95,, | Performed by: PSYCHIATRY & NEUROLOGY

## 2021-08-10 PROCEDURE — 1159F MED LIST DOCD IN RCRD: CPT | Mod: CPTII,95,, | Performed by: PSYCHIATRY & NEUROLOGY

## 2021-08-10 PROCEDURE — 99214 PR OFFICE/OUTPT VISIT, EST, LEVL IV, 30-39 MIN: ICD-10-PCS | Mod: 95,,, | Performed by: PSYCHIATRY & NEUROLOGY

## 2021-08-10 PROCEDURE — 1157F PR ADVANCE CARE PLAN OR EQUIV PRESENT IN MEDICAL RECORD: ICD-10-PCS | Mod: CPTII,95,, | Performed by: PSYCHIATRY & NEUROLOGY

## 2021-08-10 PROCEDURE — 90833 PSYTX W PT W E/M 30 MIN: CPT | Mod: 95,,, | Performed by: PSYCHIATRY & NEUROLOGY

## 2021-08-10 PROCEDURE — 1111F PR DISCHARGE MEDS RECONCILED W/ CURRENT OUTPATIENT MED LIST: ICD-10-PCS | Mod: CPTII,95,, | Performed by: PSYCHIATRY & NEUROLOGY

## 2021-08-10 PROCEDURE — 1160F RVW MEDS BY RX/DR IN RCRD: CPT | Mod: CPTII,95,, | Performed by: PSYCHIATRY & NEUROLOGY

## 2021-08-10 PROCEDURE — 3046F HEMOGLOBIN A1C LEVEL >9.0%: CPT | Mod: CPTII,95,, | Performed by: PSYCHIATRY & NEUROLOGY

## 2021-08-10 PROCEDURE — 99214 OFFICE O/P EST MOD 30 MIN: CPT | Mod: 95,,, | Performed by: PSYCHIATRY & NEUROLOGY

## 2021-08-10 PROCEDURE — 90833 PR PSYCHOTHERAPY W/PATIENT W/E&M, 30 MIN (ADD ON): ICD-10-PCS | Mod: 95,,, | Performed by: PSYCHIATRY & NEUROLOGY

## 2021-08-10 PROCEDURE — 1160F PR REVIEW ALL MEDS BY PRESCRIBER/CLIN PHARMACIST DOCUMENTED: ICD-10-PCS | Mod: CPTII,95,, | Performed by: PSYCHIATRY & NEUROLOGY

## 2021-08-10 PROCEDURE — 1159F PR MEDICATION LIST DOCUMENTED IN MEDICAL RECORD: ICD-10-PCS | Mod: CPTII,95,, | Performed by: PSYCHIATRY & NEUROLOGY

## 2021-08-10 PROCEDURE — 3046F PR MOST RECENT HEMOGLOBIN A1C LEVEL > 9.0%: ICD-10-PCS | Mod: CPTII,95,, | Performed by: PSYCHIATRY & NEUROLOGY

## 2021-08-10 PROCEDURE — 1157F ADVNC CARE PLAN IN RCRD: CPT | Mod: CPTII,95,, | Performed by: PSYCHIATRY & NEUROLOGY

## 2021-08-11 ENCOUNTER — PATIENT MESSAGE (OUTPATIENT)
Dept: PSYCHIATRY | Facility: CLINIC | Age: 66
End: 2021-08-11

## 2021-08-12 ENCOUNTER — OFFICE VISIT (OUTPATIENT)
Dept: PSYCHIATRY | Facility: CLINIC | Age: 66
End: 2021-08-12
Payer: COMMERCIAL

## 2021-08-12 ENCOUNTER — LAB VISIT (OUTPATIENT)
Dept: LAB | Facility: HOSPITAL | Age: 66
End: 2021-08-12
Attending: PSYCHIATRY & NEUROLOGY
Payer: COMMERCIAL

## 2021-08-12 ENCOUNTER — OFFICE VISIT (OUTPATIENT)
Dept: NEUROLOGY | Facility: CLINIC | Age: 66
End: 2021-08-12
Payer: COMMERCIAL

## 2021-08-12 VITALS
HEART RATE: 100 BPM | WEIGHT: 167.44 LBS | DIASTOLIC BLOOD PRESSURE: 73 MMHG | BODY MASS INDEX: 23.44 KG/M2 | HEIGHT: 71 IN | SYSTOLIC BLOOD PRESSURE: 100 MMHG

## 2021-08-12 DIAGNOSIS — R41.3 MEMORY DISORDER: Primary | ICD-10-CM

## 2021-08-12 DIAGNOSIS — F31.30 BIPOLAR AFFECTIVE DISORDER, CURRENT EPISODE DEPRESSED, CURRENT EPISODE SEVERITY UNSPECIFIED: Primary | ICD-10-CM

## 2021-08-12 DIAGNOSIS — R41.3 MEMORY DISORDER: ICD-10-CM

## 2021-08-12 DIAGNOSIS — R27.0 ATAXIA: ICD-10-CM

## 2021-08-12 LAB
ALBUMIN SERPL BCP-MCNC: 4.4 G/DL (ref 3.5–5.2)
ALP SERPL-CCNC: 80 U/L (ref 55–135)
ALT SERPL W/O P-5'-P-CCNC: 51 U/L (ref 10–44)
AMMONIA PLAS-SCNC: 47 UMOL/L (ref 10–50)
ANION GAP SERPL CALC-SCNC: 12 MMOL/L (ref 8–16)
AST SERPL-CCNC: 50 U/L (ref 10–40)
BILIRUB SERPL-MCNC: 0.3 MG/DL (ref 0.1–1)
BUN SERPL-MCNC: 11 MG/DL (ref 8–23)
CALCIUM SERPL-MCNC: 10.5 MG/DL (ref 8.7–10.5)
CHLORIDE SERPL-SCNC: 99 MMOL/L (ref 95–110)
CO2 SERPL-SCNC: 27 MMOL/L (ref 23–29)
CREAT SERPL-MCNC: 0.8 MG/DL (ref 0.5–1.4)
EST. GFR  (AFRICAN AMERICAN): >60 ML/MIN/1.73 M^2
EST. GFR  (NON AFRICAN AMERICAN): >60 ML/MIN/1.73 M^2
FOLATE SERPL-MCNC: 18.2 NG/ML (ref 4–24)
GLUCOSE SERPL-MCNC: 129 MG/DL (ref 70–110)
HCYS SERPL-SCNC: 4.4 UMOL/L (ref 4–15.5)
POTASSIUM SERPL-SCNC: 4 MMOL/L (ref 3.5–5.1)
PROT SERPL-MCNC: 8 G/DL (ref 6–8.4)
SODIUM SERPL-SCNC: 138 MMOL/L (ref 136–145)
VALPROATE SERPL-MCNC: 56.9 UG/ML (ref 50–100)
VIT B12 SERPL-MCNC: 736 PG/ML (ref 210–950)

## 2021-08-12 PROCEDURE — 99215 PR OFFICE/OUTPT VISIT, EST, LEVL V, 40-54 MIN: ICD-10-PCS | Mod: S$PBB,,, | Performed by: PSYCHIATRY & NEUROLOGY

## 2021-08-12 PROCEDURE — 3046F HEMOGLOBIN A1C LEVEL >9.0%: CPT | Mod: CPTII,95,, | Performed by: SOCIAL WORKER

## 2021-08-12 PROCEDURE — 83921 ORGANIC ACID SINGLE QUANT: CPT | Performed by: PSYCHIATRY & NEUROLOGY

## 2021-08-12 PROCEDURE — 1157F ADVNC CARE PLAN IN RCRD: CPT | Mod: CPTII,95,, | Performed by: SOCIAL WORKER

## 2021-08-12 PROCEDURE — 90847 PR FAMILY PSYCHOTHERAPY W/ PT, 50 MIN: ICD-10-PCS | Mod: 95,,, | Performed by: SOCIAL WORKER

## 2021-08-12 PROCEDURE — 36415 COLL VENOUS BLD VENIPUNCTURE: CPT | Performed by: PSYCHIATRY & NEUROLOGY

## 2021-08-12 PROCEDURE — 99215 OFFICE O/P EST HI 40 MIN: CPT | Mod: S$PBB,,, | Performed by: PSYCHIATRY & NEUROLOGY

## 2021-08-12 PROCEDURE — 1157F PR ADVANCE CARE PLAN OR EQUIV PRESENT IN MEDICAL RECORD: ICD-10-PCS | Mod: CPTII,95,, | Performed by: SOCIAL WORKER

## 2021-08-12 PROCEDURE — 80053 COMPREHEN METABOLIC PANEL: CPT | Performed by: PSYCHIATRY & NEUROLOGY

## 2021-08-12 PROCEDURE — 82140 ASSAY OF AMMONIA: CPT | Performed by: PSYCHIATRY & NEUROLOGY

## 2021-08-12 PROCEDURE — 82746 ASSAY OF FOLIC ACID SERUM: CPT | Performed by: PSYCHIATRY & NEUROLOGY

## 2021-08-12 PROCEDURE — 83090 ASSAY OF HOMOCYSTEINE: CPT | Performed by: PSYCHIATRY & NEUROLOGY

## 2021-08-12 PROCEDURE — 99417 PROLNG OP E/M EACH 15 MIN: CPT | Mod: S$PBB,,, | Performed by: PSYCHIATRY & NEUROLOGY

## 2021-08-12 PROCEDURE — 99999 PR PBB SHADOW E&M-EST. PATIENT-LVL IV: CPT | Mod: PBBFAC,,, | Performed by: PSYCHIATRY & NEUROLOGY

## 2021-08-12 PROCEDURE — 82607 VITAMIN B-12: CPT | Performed by: PSYCHIATRY & NEUROLOGY

## 2021-08-12 PROCEDURE — 80164 ASSAY DIPROPYLACETIC ACD TOT: CPT | Performed by: PSYCHIATRY & NEUROLOGY

## 2021-08-12 PROCEDURE — 99999 PR PBB SHADOW E&M-EST. PATIENT-LVL IV: ICD-10-PCS | Mod: PBBFAC,,, | Performed by: PSYCHIATRY & NEUROLOGY

## 2021-08-12 PROCEDURE — 99417 PR PROLONGED SVC, OUTPT, W/WO DIRECT PT CONTACT,  EA ADDTL 15 MIN: ICD-10-PCS | Mod: S$PBB,,, | Performed by: PSYCHIATRY & NEUROLOGY

## 2021-08-12 PROCEDURE — 90847 FAMILY PSYTX W/PT 50 MIN: CPT | Mod: 95,,, | Performed by: SOCIAL WORKER

## 2021-08-12 PROCEDURE — 3046F PR MOST RECENT HEMOGLOBIN A1C LEVEL > 9.0%: ICD-10-PCS | Mod: CPTII,95,, | Performed by: SOCIAL WORKER

## 2021-08-17 ENCOUNTER — PATIENT MESSAGE (OUTPATIENT)
Dept: NEUROLOGY | Facility: CLINIC | Age: 66
End: 2021-08-17

## 2021-08-17 ENCOUNTER — DOCUMENT SCAN (OUTPATIENT)
Dept: HOME HEALTH SERVICES | Facility: HOSPITAL | Age: 66
End: 2021-08-17
Payer: COMMERCIAL

## 2021-08-17 LAB — METHYLMALONATE SERPL-SCNC: 0.12 UMOL/L

## 2021-08-18 LAB
ACYLCARNITINE SERPL-SCNC: 9 UMOL/L (ref 5–29)
CARN ESTERS/C0 SERPL-SRTO: 0.5 {RATIO} (ref 0.1–1)
CARNITINE FREE SERPL-SCNC: 18 UMOL/L (ref 25–60)
CARNITINE SERPL-SCNC: 27 UMOL/L (ref 34–86)

## 2021-09-04 ENCOUNTER — PATIENT MESSAGE (OUTPATIENT)
Dept: NEUROLOGY | Facility: CLINIC | Age: 66
End: 2021-09-04

## 2021-09-27 ENCOUNTER — PATIENT MESSAGE (OUTPATIENT)
Dept: NEUROLOGY | Facility: CLINIC | Age: 66
End: 2021-09-27

## 2021-09-30 ENCOUNTER — TELEPHONE (OUTPATIENT)
Dept: PSYCHIATRY | Facility: CLINIC | Age: 66
End: 2021-09-30

## 2021-10-14 ENCOUNTER — TELEPHONE (OUTPATIENT)
Dept: NEUROLOGY | Facility: CLINIC | Age: 66
End: 2021-10-14

## 2021-10-19 ENCOUNTER — TELEPHONE (OUTPATIENT)
Dept: NEUROLOGY | Facility: CLINIC | Age: 66
End: 2021-10-19

## 2021-10-20 ENCOUNTER — TELEPHONE (OUTPATIENT)
Dept: NEUROLOGY | Facility: CLINIC | Age: 66
End: 2021-10-20

## 2021-10-21 ENCOUNTER — TELEPHONE (OUTPATIENT)
Dept: NEUROLOGY | Facility: CLINIC | Age: 66
End: 2021-10-21

## 2021-10-28 NOTE — TELEPHONE ENCOUNTER
----- Message from Donna Otero MA sent at 8/13/2019  1:56 PM CDT -----  Contact: pt  Pt stated that she lost her RX while moving . Asked if you can call over her meds to the pharmacy . hydrOXYzine pamoate (VISTARIL) 25 MG Cap   Home

## 2021-11-15 ENCOUNTER — TELEPHONE (OUTPATIENT)
Dept: NEUROLOGY | Facility: CLINIC | Age: 66
End: 2021-11-15
Payer: COMMERCIAL

## 2021-11-16 ENCOUNTER — TELEPHONE (OUTPATIENT)
Dept: NEUROLOGY | Facility: CLINIC | Age: 66
End: 2021-11-16
Payer: COMMERCIAL

## 2021-11-23 ENCOUNTER — OFFICE VISIT (OUTPATIENT)
Dept: URGENT CARE | Facility: CLINIC | Age: 66
End: 2021-11-23
Payer: COMMERCIAL

## 2021-11-23 VITALS
WEIGHT: 160 LBS | HEART RATE: 94 BPM | BODY MASS INDEX: 22.4 KG/M2 | DIASTOLIC BLOOD PRESSURE: 81 MMHG | SYSTOLIC BLOOD PRESSURE: 128 MMHG | TEMPERATURE: 98 F | RESPIRATION RATE: 18 BRPM | HEIGHT: 71 IN | OXYGEN SATURATION: 95 %

## 2021-11-23 DIAGNOSIS — S39.012A LUMBAR STRAIN, INITIAL ENCOUNTER: Primary | ICD-10-CM

## 2021-11-23 PROCEDURE — 99213 PR OFFICE/OUTPT VISIT, EST, LEVL III, 20-29 MIN: ICD-10-PCS | Mod: 25,S$GLB,,

## 2021-11-23 PROCEDURE — 1157F PR ADVANCE CARE PLAN OR EQUIV PRESENT IN MEDICAL RECORD: ICD-10-PCS | Mod: CPTII,S$GLB,,

## 2021-11-23 PROCEDURE — 96372 PR INJECTION,THERAP/PROPH/DIAG2ST, IM OR SUBCUT: ICD-10-PCS | Mod: S$GLB,,,

## 2021-11-23 PROCEDURE — 99213 OFFICE O/P EST LOW 20 MIN: CPT | Mod: 25,S$GLB,,

## 2021-11-23 PROCEDURE — 96372 THER/PROPH/DIAG INJ SC/IM: CPT | Mod: S$GLB,,,

## 2021-11-23 PROCEDURE — 1157F ADVNC CARE PLAN IN RCRD: CPT | Mod: CPTII,S$GLB,,

## 2021-11-23 RX ORDER — METHOCARBAMOL 500 MG/1
500 TABLET, FILM COATED ORAL 3 TIMES DAILY
Qty: 30 TABLET | Refills: 0 | Status: SHIPPED | OUTPATIENT
Start: 2021-11-23 | End: 2021-11-23

## 2021-11-23 RX ORDER — METHOCARBAMOL 500 MG/1
500 TABLET, FILM COATED ORAL 3 TIMES DAILY
Qty: 30 TABLET | Refills: 0 | Status: SHIPPED | OUTPATIENT
Start: 2021-11-23 | End: 2021-12-03

## 2021-11-23 RX ORDER — KETOROLAC TROMETHAMINE 30 MG/ML
30 INJECTION, SOLUTION INTRAMUSCULAR; INTRAVENOUS ONCE
Status: COMPLETED | OUTPATIENT
Start: 2021-11-23 | End: 2021-11-23

## 2021-11-23 RX ADMIN — KETOROLAC TROMETHAMINE 30 MG: 30 INJECTION, SOLUTION INTRAMUSCULAR; INTRAVENOUS at 05:11

## 2021-11-26 ENCOUNTER — CLINICAL SUPPORT (OUTPATIENT)
Dept: URGENT CARE | Facility: CLINIC | Age: 66
End: 2021-11-26
Payer: COMMERCIAL

## 2021-11-26 VITALS
DIASTOLIC BLOOD PRESSURE: 90 MMHG | OXYGEN SATURATION: 98 % | HEIGHT: 71 IN | BODY MASS INDEX: 22.4 KG/M2 | TEMPERATURE: 99 F | WEIGHT: 160 LBS | RESPIRATION RATE: 18 BRPM | HEART RATE: 110 BPM | SYSTOLIC BLOOD PRESSURE: 147 MMHG

## 2021-11-26 DIAGNOSIS — R73.9 HYPERGLYCEMIA: Primary | ICD-10-CM

## 2021-11-26 DIAGNOSIS — R73.09 ELEVATED SERUM GLUCOSE WITH GLUCOSURIA: ICD-10-CM

## 2021-11-26 DIAGNOSIS — R07.81 RIB PAIN ON RIGHT SIDE: ICD-10-CM

## 2021-11-26 DIAGNOSIS — R81 ELEVATED SERUM GLUCOSE WITH GLUCOSURIA: ICD-10-CM

## 2021-11-26 DIAGNOSIS — R82.4 KETONURIA: ICD-10-CM

## 2021-11-26 DIAGNOSIS — M62.838 OTHER MUSCLE SPASM: ICD-10-CM

## 2021-11-26 LAB
BILIRUB UR QL STRIP: NEGATIVE
GLUCOSE SERPL-MCNC: 248 MG/DL (ref 70–110)
GLUCOSE SERPL-MCNC: 252 MG/DL (ref 70–110)
GLUCOSE UR QL STRIP: POSITIVE
KETONES UR QL STRIP: POSITIVE
LEUKOCYTE ESTERASE UR QL STRIP: NEGATIVE
PH, POC UA: 5.5 (ref 5–8)
POC ANION GAP: 22 MMOL/L (ref 10–20)
POC BLOOD, URINE: NEGATIVE
POC BUN: 5 MMOL/L (ref 8–26)
POC CHLORIDE: 98 MMOL/L (ref 98–109)
POC CREATININE: 0.4 MG/DL (ref 0.6–1.3)
POC HEMATOCRIT: 43 %PCV (ref 37–47)
POC HEMOGLOBIN: 14.6 G/DL (ref 12.5–16)
POC ICA: 1.22 MMOL/L (ref 1.12–1.32)
POC NITRATES, URINE: NEGATIVE
POC POTASSIUM: 3.8 MMOL/L (ref 3.5–4.9)
POC SODIUM: 139 MMOL/L (ref 138–146)
POC TCO2: 24 MMOL/L (ref 24–29)
PROT UR QL STRIP: NEGATIVE
SP GR UR STRIP: 1.02 (ref 1–1.03)
UROBILINOGEN UR STRIP-ACNC: NORMAL (ref 0.1–1.1)

## 2021-11-26 PROCEDURE — 82962 POCT GLUCOSE, HAND-HELD DEVICE: ICD-10-PCS | Mod: S$GLB,,, | Performed by: INTERNAL MEDICINE

## 2021-11-26 PROCEDURE — 96372 THER/PROPH/DIAG INJ SC/IM: CPT | Mod: S$GLB,,, | Performed by: INTERNAL MEDICINE

## 2021-11-26 PROCEDURE — 81003 URINALYSIS AUTO W/O SCOPE: CPT | Mod: QW,S$GLB,, | Performed by: INTERNAL MEDICINE

## 2021-11-26 PROCEDURE — 80047 POCT CHEMISTRY PANEL: ICD-10-PCS | Mod: QW,S$GLB,, | Performed by: INTERNAL MEDICINE

## 2021-11-26 PROCEDURE — 71101 XR RIBS MIN 3 VIEWS W/ PA CHEST RIGHT: ICD-10-PCS | Mod: RT,S$GLB,, | Performed by: RADIOLOGY

## 2021-11-26 PROCEDURE — 99213 OFFICE O/P EST LOW 20 MIN: CPT | Mod: 25,S$GLB,, | Performed by: INTERNAL MEDICINE

## 2021-11-26 PROCEDURE — 82962 GLUCOSE BLOOD TEST: CPT | Mod: S$GLB,,, | Performed by: INTERNAL MEDICINE

## 2021-11-26 PROCEDURE — 99213 PR OFFICE/OUTPT VISIT, EST, LEVL III, 20-29 MIN: ICD-10-PCS | Mod: 25,S$GLB,, | Performed by: INTERNAL MEDICINE

## 2021-11-26 PROCEDURE — 80047 BASIC METABLC PNL IONIZED CA: CPT | Mod: QW,S$GLB,, | Performed by: INTERNAL MEDICINE

## 2021-11-26 PROCEDURE — 96372 PR INJECTION,THERAP/PROPH/DIAG2ST, IM OR SUBCUT: ICD-10-PCS | Mod: S$GLB,,, | Performed by: INTERNAL MEDICINE

## 2021-11-26 PROCEDURE — 71101 X-RAY EXAM UNILAT RIBS/CHEST: CPT | Mod: RT,S$GLB,, | Performed by: RADIOLOGY

## 2021-11-26 PROCEDURE — 81003 POCT URINALYSIS, DIPSTICK, AUTOMATED, W/O SCOPE: ICD-10-PCS | Mod: QW,S$GLB,, | Performed by: INTERNAL MEDICINE

## 2021-11-26 RX ORDER — ORPHENADRINE CITRATE 100 MG/1
100 TABLET, EXTENDED RELEASE ORAL 2 TIMES DAILY PRN
Qty: 20 TABLET | Refills: 0 | Status: SHIPPED | OUTPATIENT
Start: 2021-11-26 | End: 2021-11-26

## 2021-11-26 RX ORDER — MELOXICAM 7.5 MG/1
7.5 TABLET ORAL DAILY
Qty: 5 TABLET | Refills: 0 | Status: SHIPPED | OUTPATIENT
Start: 2021-11-26 | End: 2021-11-26

## 2021-11-26 RX ORDER — ORPHENADRINE CITRATE 100 MG/1
100 TABLET, EXTENDED RELEASE ORAL 2 TIMES DAILY PRN
Qty: 20 TABLET | Refills: 0 | Status: SHIPPED | OUTPATIENT
Start: 2021-11-26 | End: 2021-12-06

## 2021-11-26 RX ORDER — KETOROLAC TROMETHAMINE 30 MG/ML
30 INJECTION, SOLUTION INTRAMUSCULAR; INTRAVENOUS
Status: COMPLETED | OUTPATIENT
Start: 2021-11-26 | End: 2021-11-26

## 2021-11-26 RX ORDER — MELOXICAM 7.5 MG/1
7.5 TABLET ORAL DAILY
Qty: 5 TABLET | Refills: 0 | Status: SHIPPED | OUTPATIENT
Start: 2021-11-26 | End: 2021-12-01

## 2021-11-26 RX ADMIN — KETOROLAC TROMETHAMINE 30 MG: 30 INJECTION, SOLUTION INTRAMUSCULAR; INTRAVENOUS at 07:11

## 2021-12-01 ENCOUNTER — TELEPHONE (OUTPATIENT)
Dept: OTOLARYNGOLOGY | Facility: CLINIC | Age: 66
End: 2021-12-01
Payer: COMMERCIAL

## 2021-12-08 ENCOUNTER — CLINICAL SUPPORT (OUTPATIENT)
Dept: URGENT CARE | Facility: CLINIC | Age: 66
End: 2021-12-08
Payer: COMMERCIAL

## 2021-12-08 DIAGNOSIS — Z11.9 SCREENING EXAMINATION FOR UNSPECIFIED INFECTIOUS DISEASE: Primary | ICD-10-CM

## 2021-12-08 LAB
CTP QC/QA: YES
SARS-COV-2 RDRP RESP QL NAA+PROBE: NEGATIVE

## 2021-12-08 PROCEDURE — 99211 OFF/OP EST MAY X REQ PHY/QHP: CPT | Mod: S$GLB,,, | Performed by: INTERNAL MEDICINE

## 2021-12-08 PROCEDURE — U0002 COVID-19 LAB TEST NON-CDC: HCPCS | Mod: QW,S$GLB,, | Performed by: INTERNAL MEDICINE

## 2021-12-08 PROCEDURE — U0002: ICD-10-PCS | Mod: QW,S$GLB,, | Performed by: INTERNAL MEDICINE

## 2021-12-08 PROCEDURE — 99211 PR OFFICE/OUTPT VISIT, EST, LEVL I: ICD-10-PCS | Mod: S$GLB,,, | Performed by: INTERNAL MEDICINE

## 2021-12-09 ENCOUNTER — TELEPHONE (OUTPATIENT)
Dept: URGENT CARE | Facility: CLINIC | Age: 66
End: 2021-12-09
Payer: COMMERCIAL

## 2021-12-14 ENCOUNTER — TELEPHONE (OUTPATIENT)
Dept: NEUROLOGY | Facility: CLINIC | Age: 66
End: 2021-12-14
Payer: COMMERCIAL

## 2022-01-07 ENCOUNTER — OFFICE VISIT (OUTPATIENT)
Dept: NEUROLOGY | Facility: CLINIC | Age: 67
End: 2022-01-07
Payer: COMMERCIAL

## 2022-01-07 VITALS
HEIGHT: 71 IN | SYSTOLIC BLOOD PRESSURE: 121 MMHG | DIASTOLIC BLOOD PRESSURE: 78 MMHG | HEART RATE: 90 BPM | BODY MASS INDEX: 22.32 KG/M2

## 2022-01-07 DIAGNOSIS — E71.40 CARNITINE DEFICIENCY: ICD-10-CM

## 2022-01-07 DIAGNOSIS — R41.3 MEMORY DISORDER: Primary | ICD-10-CM

## 2022-01-07 PROBLEM — R92.8 ABNORMAL MAMMOGRAM: Status: ACTIVE | Noted: 2022-01-07

## 2022-01-07 PROBLEM — G56.90 DISORDER OF PERIPHERAL NERVE OF UPPER EXTREMITY: Status: ACTIVE | Noted: 2022-01-07

## 2022-01-07 PROBLEM — R15.9 INCONTINENCE OF FECES: Status: ACTIVE | Noted: 2022-01-07

## 2022-01-07 PROBLEM — F51.01 PRIMARY INSOMNIA: Status: ACTIVE | Noted: 2022-01-07

## 2022-01-07 PROBLEM — J43.9 PULMONARY EMPHYSEMA: Status: ACTIVE | Noted: 2022-01-07

## 2022-01-07 PROCEDURE — 1126F AMNT PAIN NOTED NONE PRSNT: CPT | Mod: CPTII,S$GLB,, | Performed by: PSYCHIATRY & NEUROLOGY

## 2022-01-07 PROCEDURE — 3078F PR MOST RECENT DIASTOLIC BLOOD PRESSURE < 80 MM HG: ICD-10-PCS | Mod: CPTII,S$GLB,, | Performed by: PSYCHIATRY & NEUROLOGY

## 2022-01-07 PROCEDURE — 3288F PR FALLS RISK ASSESSMENT DOCUMENTED: ICD-10-PCS | Mod: CPTII,S$GLB,, | Performed by: PSYCHIATRY & NEUROLOGY

## 2022-01-07 PROCEDURE — 1157F ADVNC CARE PLAN IN RCRD: CPT | Mod: CPTII,S$GLB,, | Performed by: PSYCHIATRY & NEUROLOGY

## 2022-01-07 PROCEDURE — 3008F PR BODY MASS INDEX (BMI) DOCUMENTED: ICD-10-PCS | Mod: CPTII,S$GLB,, | Performed by: PSYCHIATRY & NEUROLOGY

## 2022-01-07 PROCEDURE — 1126F PR PAIN SEVERITY QUANTIFIED, NO PAIN PRESENT: ICD-10-PCS | Mod: CPTII,S$GLB,, | Performed by: PSYCHIATRY & NEUROLOGY

## 2022-01-07 PROCEDURE — 1101F PT FALLS ASSESS-DOCD LE1/YR: CPT | Mod: CPTII,S$GLB,, | Performed by: PSYCHIATRY & NEUROLOGY

## 2022-01-07 PROCEDURE — 3074F PR MOST RECENT SYSTOLIC BLOOD PRESSURE < 130 MM HG: ICD-10-PCS | Mod: CPTII,S$GLB,, | Performed by: PSYCHIATRY & NEUROLOGY

## 2022-01-07 PROCEDURE — 1157F PR ADVANCE CARE PLAN OR EQUIV PRESENT IN MEDICAL RECORD: ICD-10-PCS | Mod: CPTII,S$GLB,, | Performed by: PSYCHIATRY & NEUROLOGY

## 2022-01-07 PROCEDURE — 3074F SYST BP LT 130 MM HG: CPT | Mod: CPTII,S$GLB,, | Performed by: PSYCHIATRY & NEUROLOGY

## 2022-01-07 PROCEDURE — 1159F PR MEDICATION LIST DOCUMENTED IN MEDICAL RECORD: ICD-10-PCS | Mod: CPTII,S$GLB,, | Performed by: PSYCHIATRY & NEUROLOGY

## 2022-01-07 PROCEDURE — 3288F FALL RISK ASSESSMENT DOCD: CPT | Mod: CPTII,S$GLB,, | Performed by: PSYCHIATRY & NEUROLOGY

## 2022-01-07 PROCEDURE — 3078F DIAST BP <80 MM HG: CPT | Mod: CPTII,S$GLB,, | Performed by: PSYCHIATRY & NEUROLOGY

## 2022-01-07 PROCEDURE — 99215 OFFICE O/P EST HI 40 MIN: CPT | Mod: S$GLB,,, | Performed by: PSYCHIATRY & NEUROLOGY

## 2022-01-07 PROCEDURE — 99999 PR PBB SHADOW E&M-EST. PATIENT-LVL III: CPT | Mod: PBBFAC,,, | Performed by: PSYCHIATRY & NEUROLOGY

## 2022-01-07 PROCEDURE — 1101F PR PT FALLS ASSESS DOC 0-1 FALLS W/OUT INJ PAST YR: ICD-10-PCS | Mod: CPTII,S$GLB,, | Performed by: PSYCHIATRY & NEUROLOGY

## 2022-01-07 PROCEDURE — 99999 PR PBB SHADOW E&M-EST. PATIENT-LVL III: ICD-10-PCS | Mod: PBBFAC,,, | Performed by: PSYCHIATRY & NEUROLOGY

## 2022-01-07 PROCEDURE — 99215 PR OFFICE/OUTPT VISIT, EST, LEVL V, 40-54 MIN: ICD-10-PCS | Mod: S$GLB,,, | Performed by: PSYCHIATRY & NEUROLOGY

## 2022-01-07 PROCEDURE — 1159F MED LIST DOCD IN RCRD: CPT | Mod: CPTII,S$GLB,, | Performed by: PSYCHIATRY & NEUROLOGY

## 2022-01-07 PROCEDURE — 3008F BODY MASS INDEX DOCD: CPT | Mod: CPTII,S$GLB,, | Performed by: PSYCHIATRY & NEUROLOGY

## 2022-01-07 RX ORDER — WITCH HAZEL 50 %
500 PADS, MEDICATED (EA) TOPICAL DAILY
Qty: 1 TABLET | Refills: 11 | Status: SHIPPED | OUTPATIENT
Start: 2022-01-07 | End: 2023-01-19

## 2022-01-07 NOTE — PROGRESS NOTES
[facility]    Name: Alix Patel  : 1955  MRN: [id number]    Date: 2022      Assessment:     Ms. Patel is a 66 y.o. left handed female with a history of bipolar disorder, alcohol use disorder, depression, anxiety, mTBI, HTN, DM2, HLD, and COPD who presents today to the [facility] due to memory difficulty. Ms. Patel has been experiencing progressively worsening cognitive impairment for an unspecified period of time. On prior evaluation, she reported memory trouble since , however, on todays evaluation she reports trouble since mid-. Regardless, her initial symptom was memory difficulty and later exhibited symptoms of executive dysfunction. These symptoms led to her forgetting to pay numerous bills, resulting in her owing $5000, and having her vehicle repossessed. Ms. Ureña also experienced trouble with walking and balance as well as lightheadedness when rising from a chair or getting out of bed since early-.     Physical exam today was unremarkable. On the MMSE, she scored 26/30, losing points on orientation to time (-1), delayed recall (-2), and figure copying (-1). Her labs were notable for recently elevated AST/ALT and A1c of 11.1.  Additionally, carnitine performed on 2021 was low. MRI from 2020 and 2021 showed a similar degree of generalized volume loss, with involvement of the medial temporal lobes (MTA score ~1-2), compensatory enlargement of the lateral ventricles, and T2/FLAIR hyperintensity in the santa likely secondary to chronic microvascular ischemia.    The underlying cause of Ms. Ureña cognitive symptoms is unclear at this time, however, there is concern for a neurodegenerative disorder given her progressively worsening cognitive symptoms over the past several years. Her initial report of memory impairment and family history of Alzheimers disease in her mother and two maternal cousins raises suspicion for AD,  however, is not enough evidence at this time to make this diagnosis.  However, her reported improvement in cognition since her last appointment is uncharacteristic of a neurodegenerative disease.  Other potential contributors include heavy alcohol use, overtaking medications, valproic acid (particularly in the setting of carnitine deficiency), and uncontrolled diabetes. Ms. Patel does not fulfill any of the McKeith core criteria for DLB. There is no compelling evidence for any variant of frontotemporal dementia. She has a history of elevated thyroperoxidase antibodies, however, there is no other evidence to suggest Hashimotos encephalopathy. Lastly, given the very mild degree of white matter disease, Ms. Ureña symptoms are unlikely to be secondary to vascular disease.    Additional workup will be performed, including neuropsychological testing. Amyloid PET would be helpful and determining if Alzheimer's disease was the underlying cause.     Recommendations:     Workup  · Neuropsychological testing.  · Amyloid PET through [facility] study.  Research coordinator will reach out.    Treatment   L-carnitine 500 mg daily    Safety-related   We recommend that a medication management system be put in place to avoid errors in taking medication. Helpful services include [facility] ([email or web address]; free service) and [facility] ([employer]; paid subscription service).   We recommend that Ms. Patel not drive.    Health maintenance    Continue to follow-up with primary care doctor to monitor and treat vascular risk factors.   Recommend performing moderate-intensity cardiovascular exercise 30 minutes per day, 5 days per week.   Recommend staying socially and cognitively active.   Recommend eating a healthy and balanced diet (Mediterranean or DASH diet).    Follow-up: Follow up in about 3 months (around 4/7/2022). I provided Ms. Patel and her daughter, Kayla, with our contact information should they have any questions or  concerns.      Lei Figueroa MD   Behavioral Neurology & Neuropsychiatry  [facility]    Subjective:      Chief complaint:  No chief complaint on file.    Consultation requested by: No ref. provider found    History of present illness:  Ms. Patel is a 66 y.o. left handed female with a history of  bipolar disorder, alcohol use disorder, depression, anxiety, mTBI, HTN, DM2, HLD, and COPD who presents today to the [facility] due to concerns regarding memory deficits. Ms. Patel is accompanied by her daughter, Kayla, who participates in providing history. Additional information is obtained by reviewing available medical records.     HPI from her initial evaluation on 08/12/2021:  Ms. Patel was born in [place] on 1955. She grew up in [place] and was raised by her parents. She went to high school at [employer] and graduated in 1973. After completing high school, Ms. Patel attended [employer] but did not graduate. She went on to study music at [employer] in [facility] and spends much of her time performing as a . 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 She stopped working due to sustaining a head injury (discussed below).    Ms. Patel reports that she began experiencing trouble with memory around mid-2019, however, on her previous evaluation by Dr. Vital, she reported that she began experiencing memory trouble around 2012. Ms. Patel describes initially experiencing trouble with remembering details of conversations and remembering to complete tasks. She reports that on one occasion in late-2019, she cooked a rabbit and left it in during conversation, and repeatedly asking the same questions and making the same statements. She has forgotten to pay bills for rent, electricity, and water which has led to her owing $5000 in missed payments. Additionally, her vehicle was repossessed  due to forgetting to pay the note.    In addition to memory difficulty, Ms. Ureña cognitive processing speed has slowed since 2019, and she describes progressive trouble with attention, concentration, and distractibility. Her ability to multitask has worsened and she has become increasingly disorganized. She also describes experiencing frequent episodes of depression and significant anxiety. Her balance has been poor since early-2019 and she describes feeling as if she is drunk. She has trouble going up and down stairs and reportedly shuffles when she walks. She previously had a high frequency, low amplitude postural tremor predominantly in her right hand, though this has largely resolved. She describes frequently experiencing lightheadedness when she gets out of bed or gets up from a chair.    Ms. Patel was recently admitted to [facility] on 07/21/21 after falling in a department store while shopping. Patient reportedly felt dizzy, could not maintain her balance, fell into the shelves, and bruised the left side of her face. She denied losing consciousness at the time. On admission, she reported that she did not take her oral diabetic medication that morning and her blood sugars were noted to be high by EMS. Ms. Patel improved and was going to be discharged, however, after pharmacy brought her medications to bedside, the patient overdosed on metformin, glipizide, levothyroxine, and buspirone for an unspecified reason. She does not recall taking these medications. She improved over time and was discharged on 07/27/21. On 07/30/21, Ms. Patel was brought to the ED by her daughter after she was seen attempting to ingest a large quantity of medications. At the time of her presentation, she said that she attempted to take the medications because she had not taken any of her medications that day and had no thoughts of wanting to harm herself or end her life.     She reports that she was diagnosed with bipolar  "disorder about 10 years ago. The earliest documentation of this in the medical record is from August 2012 by Dr. Naresh Colunga. She is currently being treated with Depakote ER 1000 mg daily.    Interval history (02/21/2022):  Patient is back at work, has vehicle back, will be moving into a new place soon. She reports that work is going well, but everyone is being COVID is rampant which is concerning for her. Patient reports that memory is significantly improved. Not forgetting to pay bills. Only has three bills a month to pay and has not missed any payments. Boss called patient into her office due to being concerned about the patient because she thought the patient was "slipping back to how she was before she left." Patient reports conflict with her boss and says that her boss is out to get her. She says that her boss demands that she produces things more quickly than possible. For example, she was supposed to get signed memos from all employees in the dept and give them to her boss, but this took her longer than one day which upset her boss. Kayla reports that the patient is largely back to baseline.  Patient is functionally independent at this time.    Review of systems:  · Review of systems negative except as noted in the HPI    Past Medical History:   Diagnosis Date    Anxiety     Bipolar 1 disorder     COPD (chronic obstructive pulmonary disease)     Depression     Diabetes mellitus     GERD (gastroesophageal reflux disease)     HEARING LOSS     pt states she has loss slighty in rt ear.    Hypertension     Insomnia     Rash      Past Surgical History:   Procedure Laterality Date    APPENDECTOMY      BREAST BIOPSY      COLONOSCOPY      COLONOSCOPY N/A 10/31/2019    Procedure: COLONOSCOPY;  Surgeon: Philippe Monteiro MD;  Location: [address];  Service: Endoscopy;  Laterality: N/A;  PM prep-    CYST REMOVAL      ESOPHAGOGASTRODUODENOSCOPY N/A 10/31/2019    Procedure: EGD " (ESOPHAGOGASTRODUODENOSCOPY);  Surgeon: Philippe Monteiro MD;  Location: [facility] ([address] [place] [address]);  Service: Endoscopy;  Laterality: N/A;  Pm prep-MH    HYSTERECTOMY       Family History   Problem Relation Age of Onset    Alzheimer's disease Mother     Sinus disease Mother     Heart failure Father     Hyperlipidemia Father     Heart attack Father     No Known Problems Sister     Sarcoidosis Sister     Colon cancer Maternal Aunt     Alzheimer's disease Maternal Cousin     Alzheimer's disease Maternal Cousin     Anxiety disorder Daughter     Depression Daughter     Esophageal cancer Neg Hx      Social History     Socioeconomic History    Marital status:      Spouse name: N/A    Number of children: 1    Years of education: 13    Highest education level: Some college, no degree   Occupational History    Occupation:      Employer: [employer]   Tobacco Use    Smoking status: Former Smoker     Packs/day: 0.50     Years: 34.00     Pack years: 17.00     Types: Cigarettes     Start date: 1965     Quit date: 2012     Years since quitting: 10.0    Smokeless tobacco: Never Used   Substance and Sexual Activity    Alcohol use: Yes     Comment: Rare    Drug use: Not Currently     Types: Marijuana, Cocaine    Sexual activity: Not Currently     Partners: Male     Birth control/protection: None   Social History Narrative    Ms. Patel was born in [place] on 1955. She grew up in [place] and was raised by her parents. She went to high school at [employer] and graduated in 1973. After completing high school, Ms. Patel attended [employer] but did not graduate. 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 She would like to return to [employer] in the future to get degree in music.      Current Outpatient Medications:     blood sugar diagnostic (TRUE METRIX GLUCOSE TEST STRIP MISC), True Metrix  "Glucose Test Strip  TEST TWICE DAILY AS DIRECTED, Disp: , Rfl:     blood-glucose meter (TRUE METRIX AIR GLUCOSE METER MISC), True Metrix Air Glucose Meter  USE UNDER THE SKIN TWICE DAILY AS DIRECTED, Disp: , Rfl:     CRESTOR 20 mg tablet, Take 20 mg by mouth once daily. , Disp: , Rfl: 3    divalproex ER (DEPAKOTE) 500 MG Tb24, Take 2 tablets (1,000 mg total) by mouth once daily. (Patient taking differently: Take 500 mg by mouth once daily.), Disp: 60 tablet, Rfl: 5    EScitalopram oxalate (LEXAPRO) 20 MG tablet, Take 1 tablet (20 mg total) by mouth once daily., Disp: 90 tablet, Rfl: 1    levothyroxine (SYNTHROID) 100 MCG tablet, Take 1 tablet (100 mcg total) by mouth once daily., Disp: 30 tablet, Rfl: 1    metFORMIN (GLUCOPHAGE) 500 MG tablet, Take 2 tablets (1,000 mg total) by mouth 2 (two) times daily with meals., Disp: 120 tablet, Rfl: 1    Allergies:  Lamictal [lamotrigine]    Objective:     Vital signs:  Blood pressure 121/78, pulse 90, height 5' 11" (1.803 m).    Neurological examination:  Mental status: Ms. Patel was appropriate and pleasant. She was alert and oriented to person, place, and time. Attention and concentration were intact. Knowledge of recent autobiographical events and current events was intact.  Language: Ms. Dickersons speech was fluent, non-effortful, and grammatically intact. She did not have word-finding difficulty or make word-substitutions. Her comprehension was intact to syntactically complex sentences.  Cranial nerves: Extraocular movements were intact. There was no eyelid dysfunction. Her smile was symmetric, and she had normal facial expression (no masking/hypomimia). Hearing was intact to voice. The tongue protruded in the midline with intact movement bilaterally. She did not have slurring or other speech abnormalities.  Motor examination: Muscle bulk was normal in the upper extremities. She moved all limbs symmetrically, and these movements were at least anti-gravity. She did " not have resting tremor, postural tremor, or other involuntary movements.  Coordination/sensory:  Deferred  Station/Gait:  Deferred    Neuroimaging:  Results for orders placed or performed during the hospital encounter of 07/30/21   CT Head Without Contrast    Narrative    EXAMINATION:  CT HEAD WITHOUT CONTRAST    CLINICAL HISTORY:  Altered mental status;Head trauma, abnormal mental status (Age 19-64y);    TECHNIQUE:  Low dose axial CT images obtained throughout the head without intravenous contrast. Sagittal and coronal reconstructions were performed.    COMPARISON:  MRI brain 07/21/2021 and head CT 07/20/2021    FINDINGS:  Patient motion with beam hardening and streak artifact slightly limits evaluation, which mostly resolved with repeat scanning.    Intracranial compartment:    Generalized cerebral volume loss.  Ventricles are midline and stable in size and configuration without distortion by mass effect or acute hydrocephalus.  No extra-axial blood or fluid collections.    Grossly stable distribution of patchy hypoattenuation of the supratentorial white matter consistent with chronic microvascular ischemic change.  No definite new focal areas of abnormal parenchymal attenuation.  No parenchymal mass, hemorrhage, edema or major vascular distribution infarct.    Skull/extracranial contents (limited evaluation): No fracture. Mastoid air cells and paranasal sinuses are essentially clear.      Impression    No acute large vascular territory infarct or intracranial hemorrhage identified.  Further evaluation/follow-up as warranted.      Electronically signed by: Sanjeev Sarabia MD  Date:    07/30/2021  Time:    14:55   Results for orders placed or performed during the hospital encounter of 07/20/21   MRI Brain Without Contrast    Narrative    EXAMINATION:  MRI BRAIN WITHOUT CONTRAST    CLINICAL HISTORY:  Dizziness, non-specific;    TECHNIQUE:  Multiplanar multisequence MR imaging of the brain was performed without  contrast.    COMPARISON:  CT 07/20/2021, MRI 01/05/2020    FINDINGS:  There is motion artifact.    There is no intracranial mass, or acute hemorrhage.  There are punctate foci of restricted diffusion within the brainstem, new since the previous examination however are suspected to reflect that of artifact or related to T2 shine through.  There is generalized cerebral volume loss.  There are scattered punctate foci of T2/FLAIR signal abnormality within the supratentorial white matter as well as within the santa suggesting sequela of chronic microvascular ischemic change.  There is no hydrocephalus. There are no significant extra-axial or extracranial abnormalities.    The globes, orbits, pituitary gland, pineal gland and craniocervical junction are normal in configuration.  There is fluid layering within the bilateral maxillary sinuses.  The major vascular flow voids are patent.  Please see separately dictated report for details of maxillofacial injuries.      Impression    1. No convincing acute intracranial abnormalities, no findings to suggest acute major vascular territory infarct or hemorrhage.  2. High T2 signal within the santa and brainstem, the appearance of which suggests that of chronic microvascular ischemic change or possible artifact caudally.  3. Sequela of chronic microvascular ischemic change and senescent change.  4. Sinus disease.  5. Please see separately dictated CT for details of the maxillofacial injuries.      Electronically signed by: Sylvester Caceres MD  Date:    07/21/2021  Time:    15:57   Results for orders placed or performed during the hospital encounter of 01/25/20   MRI Brain W WO Contrast    Narrative    EXAMINATION:  MRI BRAIN W WO CONTRAST    CLINICAL HISTORY:  ataxia; Other amnesia    TECHNIQUE:  Multiplanar multisequence MR imaging of the brain was performed before and after the administration of 8 mL Gadavist intravenous contrast.    COMPARISON:  Head CT  01/27/2019    FINDINGS:  There is no evidence of diffusion restriction to suggest recent ischemia.  There is mild age advanced cerebral atrophy.  Minimal patchy T2 hyperintensity without enhancement is seen within the belly of the santa.  The brain parenchyma and CSF spaces are otherwise unremarkable.  No pathologic enhancement is seen after contrast administration.  Major vascular pathways are patent.      Impression    Mild cerebral atrophy and nonspecific white matter changes within the santa.      Electronically signed by: Tim Kirk  Date:    01/25/2020  Time:    13:54     Laboratory studies:  Clinical Support on 12/08/2021   Component Date Value Ref Range Status    POC Rapid COVID 12/08/2021 Negative  Negative Final     Acceptable 12/08/2021 Yes   Final       I performed a total of 66 minutes on Ms. Patel's care on the day of their visit excluding time spent related to any billed procedures. This time includes time spent with the patient as well as time spent documenting in the medical record, reviewing patient's records and tests, obtaining history, placing orders, communicating with other healthcare professionals, counseling the patient, family, or caregiver, and/or care coordination for the diagnoses above.    This note was dictated using [employer] Fluency speech recognition software. Please excuse any spelling or grammatical errors. Word recognition mistakes are occasionally missed on review.

## 2022-01-20 DIAGNOSIS — E11.69 TYPE 2 DIABETES MELLITUS WITH OTHER SPECIFIED COMPLICATION, UNSPECIFIED WHETHER LONG TERM INSULIN USE: Primary | ICD-10-CM

## 2022-01-21 ENCOUNTER — TELEPHONE (OUTPATIENT)
Dept: NEUROLOGY | Facility: CLINIC | Age: 67
End: 2022-01-21
Payer: COMMERCIAL

## 2022-01-24 ENCOUNTER — TELEPHONE (OUTPATIENT)
Dept: NEUROLOGY | Facility: CLINIC | Age: 67
End: 2022-01-24
Payer: COMMERCIAL

## 2022-01-24 NOTE — TELEPHONE ENCOUNTER
----- Message from Sergio Chan sent at 1/21/2022  2:56 PM CST -----  Regarding: call back  Contact: self  Pt called to inquire about a PET scan(no orders in Epic)  she stated that Dr. Figueroa is requesting her to have this scan and other testing but she wasn't sure what other test/imaging that is needed pt would like a call back at [phone]      
Spoke with MsJazlyn Jorge, she was informed per Dr. Figueroa the PET will be performed through research. There is a small amount of regulatory paperwork that needs to be completed before we will be able to perform the PET scan. We will contact Ms. Patel once we are able to schedule her, she will be contacted if anything is needed before the scheduling the appt. Ms. Patel verbalized understanding.     
26-Apr-2017

## 2022-02-07 ENCOUNTER — TELEPHONE (OUTPATIENT)
Dept: NEUROLOGY | Facility: CLINIC | Age: 67
End: 2022-02-07
Payer: COMMERCIAL

## 2022-02-07 NOTE — TELEPHONE ENCOUNTER
"Spoke with Ms. Patel she states "I was suppose to have an PET scan". Ms. Patel was informed this PET scan is to be done through research, someone is to call her to set it up, a message will be sent to Dr. Figueroa for an update. She verbalized understanding   "

## 2022-02-07 NOTE — TELEPHONE ENCOUNTER
----- Message from Mirza Piedra sent at 2/7/2022  2:44 PM CST -----  Type:  Sooner Apoointment Request    Caller is requesting a sooner appointment.  Caller declined first available appointment listed below.  Caller will not accept being placed on the waitlist and is requesting a message be sent to doctor.  Name of Caller:Alix Patel     When is the first available appointment?no available appointment     Symptoms:Ct and a follow up visit     Would the patient rather a call back or a response via [facility]? Call back     Best Call Back Number:[phone] (mobile)     Additional Information:

## 2022-02-10 ENCOUNTER — PATIENT MESSAGE (OUTPATIENT)
Dept: RESEARCH | Facility: HOSPITAL | Age: 67
End: 2022-02-10
Payer: COMMERCIAL

## 2022-02-16 ENCOUNTER — TELEPHONE (OUTPATIENT)
Dept: ORTHOPEDICS | Facility: CLINIC | Age: 67
End: 2022-02-16
Payer: COMMERCIAL

## 2022-02-16 DIAGNOSIS — M79.642 LEFT HAND PAIN: Primary | ICD-10-CM

## 2022-02-21 ENCOUNTER — PATIENT MESSAGE (OUTPATIENT)
Dept: NEUROLOGY | Facility: CLINIC | Age: 67
End: 2022-02-21
Payer: COMMERCIAL

## 2022-02-22 ENCOUNTER — OFFICE VISIT (OUTPATIENT)
Dept: PSYCHIATRY | Facility: CLINIC | Age: 67
End: 2022-02-22
Payer: COMMERCIAL

## 2022-02-22 DIAGNOSIS — F41.9 ANXIETY: Primary | ICD-10-CM

## 2022-02-22 PROCEDURE — 1159F MED LIST DOCD IN RCRD: CPT | Mod: CPTII,95,, | Performed by: SOCIAL WORKER

## 2022-02-22 PROCEDURE — 90832 PR PSYCHOTHERAPY W/PATIENT, 30 MIN: ICD-10-PCS | Mod: 95,,, | Performed by: SOCIAL WORKER

## 2022-02-22 PROCEDURE — 1157F PR ADVANCE CARE PLAN OR EQUIV PRESENT IN MEDICAL RECORD: ICD-10-PCS | Mod: CPTII,95,, | Performed by: SOCIAL WORKER

## 2022-02-22 PROCEDURE — 1159F PR MEDICATION LIST DOCUMENTED IN MEDICAL RECORD: ICD-10-PCS | Mod: CPTII,95,, | Performed by: SOCIAL WORKER

## 2022-02-22 PROCEDURE — 1157F ADVNC CARE PLAN IN RCRD: CPT | Mod: CPTII,95,, | Performed by: SOCIAL WORKER

## 2022-02-22 PROCEDURE — 90832 PSYTX W PT 30 MINUTES: CPT | Mod: 95,,, | Performed by: SOCIAL WORKER

## 2022-02-22 NOTE — PROGRESS NOTES
Individual Psychotherapy (PhD/LCSW)    2/22/2022    Site:  [facility]         Therapeutic Intervention: Met with patient.  Outpatient - Insight oriented psychotherapy 30 min - CPT code 68311    Chief complaint/reason for encounter: depression, anxiety, behavior and interpersonal     Interval history and content of current session: client has had a rough time since I saw her, and is now doing better, has her own apartment, is attending school, and doing music, and getting structure in her life and has connected with a few people who are becoming friends, she needs additional time to get her life in a positive direction and heal the past and recent past. Coping skills,  Positive things going on, and what her hope are addressed, taking care of herself, being safe, and continuing in a good direction, coping skills and taking good care of herself all focused on.    Treatment plan:  · Target symptoms: depression, anxiety , adjustment, grief  · Why chosen therapy is appropriate versus another modality: relevant to diagnosis, patient responds to this modality, evidence based practice  · Outcome monitoring methods: self-report, observation  · Therapeutic intervention type: insight oriented psychotherapy    Risk parameters:  Patient reports no suicidal ideation  Patient reports no homicidal ideation  Patient reports no self-injurious behavior  Patient reports no violent behavior    Verbal deficits: None    Patient's response to intervention:  The patient's response to intervention is accepting.    Progress toward goals and other mental status changes:  The patient's progress toward goals is limited.    Diagnosis:     ICD-10-CM ICD-9-CM   1. Anxiety  F41.9 300.00       Plan:  individual psychotherapy    Return to clinic: 3 weeks    Length of Service (minutes): 30  The patient location is: [place], [place].  The chief complaint leading to consultation is: stress, changes, anxiety, mood, and adjustments    Visit type:  audio only    Face to Face time with patient: 30 minutes, could not make the video work.   minutes of total time spent on the encounter, which includes face to face time and non-face to face time preparing to see the patient (eg, review of tests), Obtaining and/or reviewing separately obtained history, Documenting clinical information in the electronic or other health record, Independently interpreting results (not separately reported) and communicating results to the patient/family/caregiver, or Care coordination (not separately reported).         Each patient to whom he or she provides medical services by telemedicine is:  (1) informed of the relationship between the physician and patient and the respective role of any other health care provider with respect to management of the patient; and (2) notified that he or she may decline to receive medical services by telemedicine and may withdraw from such care at any time.    Notes: doing better and more work is needed.

## 2022-03-03 ENCOUNTER — OFFICE VISIT (OUTPATIENT)
Dept: PSYCHIATRY | Facility: CLINIC | Age: 67
End: 2022-03-03
Payer: COMMERCIAL

## 2022-03-03 DIAGNOSIS — F10.21 ALCOHOL USE DISORDER, MODERATE, IN SUSTAINED REMISSION: ICD-10-CM

## 2022-03-03 DIAGNOSIS — F31.30 BIPOLAR AFFECTIVE DISORDER, CURRENT EPISODE DEPRESSED, CURRENT EPISODE SEVERITY UNSPECIFIED: Primary | ICD-10-CM

## 2022-03-03 PROCEDURE — 99213 PR OFFICE/OUTPT VISIT, EST, LEVL III, 20-29 MIN: ICD-10-PCS | Mod: 95,,, | Performed by: PSYCHIATRY & NEUROLOGY

## 2022-03-03 PROCEDURE — 1159F MED LIST DOCD IN RCRD: CPT | Mod: CPTII,95,, | Performed by: PSYCHIATRY & NEUROLOGY

## 2022-03-03 PROCEDURE — 1157F PR ADVANCE CARE PLAN OR EQUIV PRESENT IN MEDICAL RECORD: ICD-10-PCS | Mod: CPTII,95,, | Performed by: PSYCHIATRY & NEUROLOGY

## 2022-03-03 PROCEDURE — 1159F PR MEDICATION LIST DOCUMENTED IN MEDICAL RECORD: ICD-10-PCS | Mod: CPTII,95,, | Performed by: PSYCHIATRY & NEUROLOGY

## 2022-03-03 PROCEDURE — 90833 PSYTX W PT W E/M 30 MIN: CPT | Mod: 95,,, | Performed by: PSYCHIATRY & NEUROLOGY

## 2022-03-03 PROCEDURE — 99213 OFFICE O/P EST LOW 20 MIN: CPT | Mod: 95,,, | Performed by: PSYCHIATRY & NEUROLOGY

## 2022-03-03 PROCEDURE — 1157F ADVNC CARE PLAN IN RCRD: CPT | Mod: CPTII,95,, | Performed by: PSYCHIATRY & NEUROLOGY

## 2022-03-03 PROCEDURE — 90833 PR PSYCHOTHERAPY W/PATIENT W/E&M, 30 MIN (ADD ON): ICD-10-PCS | Mod: 95,,, | Performed by: PSYCHIATRY & NEUROLOGY

## 2022-03-03 PROCEDURE — 1160F RVW MEDS BY RX/DR IN RCRD: CPT | Mod: CPTII,95,, | Performed by: PSYCHIATRY & NEUROLOGY

## 2022-03-03 PROCEDURE — 1160F PR REVIEW ALL MEDS BY PRESCRIBER/CLIN PHARMACIST DOCUMENTED: ICD-10-PCS | Mod: CPTII,95,, | Performed by: PSYCHIATRY & NEUROLOGY

## 2022-03-03 RX ORDER — DIVALPROEX SODIUM 500 MG/1
1000 TABLET, FILM COATED, EXTENDED RELEASE ORAL DAILY
Qty: 60 TABLET | Refills: 5 | Status: SHIPPED | OUTPATIENT
Start: 2022-03-03 | End: 2022-06-14 | Stop reason: SDUPTHER

## 2022-03-03 RX ORDER — ESCITALOPRAM OXALATE 20 MG/1
20 TABLET ORAL DAILY
Qty: 90 TABLET | Refills: 1 | Status: SHIPPED | OUTPATIENT
Start: 2022-03-03 | End: 2022-06-14 | Stop reason: SDUPTHER

## 2022-03-03 NOTE — PROGRESS NOTES
"Outpatient Psychiatry Follow-Up Visit (MD/NP)    3/3/2022Phone visit. Patient could not use the virtual system at this time. (24" with 4" on meds and 20" for supportive counseling and update of history and mental status)    Clinical Status of Patient:  Outpatient (Ambulatory)    Chief Complaint:  Alix Patel is a 66 y.o. female who presents today for follow-up of mood disorder and substance problems.  Met with patient and no relatives present for interview.      Interval History and Content of Current Session:  Interim Events/Subjective Report/Content of Current Session: Patient has not been seen by myself since last August. Patient is now taking Lexapro 20 mg q am and Depakote 1.0 gm q hs. Patient was in a program in [place] late last summer and early Fall. Patient feels "I am doing a bit better". Patient denies side effects from her meds and is back to her old job with the [employer]. Patient states "I am able to perform on my job and the people at work seem to be satisfied with her work". Patient is back at school to study music at [employer]. Patient denies depression now and denies thoughts of harming herself at this time and has no signs of mallory or psychosis. Patient states the program she attended in [facility] was "OK". Patient is pleased with her new apartment here in . Patient walks in the park and along the [facility]. Patient denies new physical problems. Patient is scheduled for a PET scan by Neurology. Patient was rational in her responses at this time. Patient denies drinking alcohol at this time and has not had a drink now for 4 to 5 months. Patient is not attending AA meetings at this time.  Patient sleeps and eats well at this time. Patient is in good self control at this time. We discussed my half-way. We discussed the need for a depakote level and patient agreed to have that done.    Psychotherapy:  · Target symptoms: alcohol abuse, mood disorder  · Why chosen therapy is appropriate versus another " modality: relevant to diagnosis, patient responds to this modality, evidence based practice  · Outcome monitoring methods: self-report  · Therapeutic intervention type: supportive psychotherapy  · Topics discussed/themes: mood and memory concerns  · The patient's response to the intervention is accepting. The patient's progress toward treatment goals is fair.   · Duration of intervention: 24 minutes.    Review of Systems   · PSYCHIATRIC: Pertinant items are noted in the narrative.    Past Medical, Family and Social History: The patient's past medical, family and social history have been reviewed and updated as appropriate within the electronic medical record - see encounter notes.    Compliance: yes    Side effects: None    Risk Parameters:  Patient reports no suicidal ideation  Patient reports no homicidal ideation  Patient reports no self-injurious behavior  Patient reports no violent behavior    Exam (detailed: at least 9 elements; comprehensive: all 15 elements)   Constitutional  Vitals:  Most recent vital signs, dated less than 90 days prior to this appointment, were reviewed.   There were no vitals filed for this visit.Patient reports stable vital signs     General:  unremarkable, age appropriate, could not assess appearance     Musculoskeletal  Muscle Strength/Tone:  patient reports adequate muscle tone and strength   Gait & Station:  non-ataxic     Psychiatric  Speech:  no latency; no press, spontaneous   Mood & Affect:  steady  congruent and appropriate   Thought Process:  normal and logical   Associations:  intact   Thought Content:  normal, no suicidality, no homicidality, delusions, or paranoia   Insight:  has awareness of illness   Judgement: behavior is adequate to circumstances   Orientation:  grossly intact   Memory: intact for content of interview   Language: grossly intact   Attention Span & Concentration:  able to focus   Fund of Knowledge:  not tested     Assessment and Diagnosis    Status/Progress: Based on the examination today, the patient's problem(s) is/are adequately but not ideally controlled.  New problems have not been presented today.   no new obstacles are not complicating management of the primary condition.  The working differential for this patient includes bipolar disorder and alcohol use issue.     General Impression: Patient is improved since our last visit in August of 2021. Patient is in good self control, back to work, and indicates she has a good sense of enjoyment and is active physically as well. Patient is pleased with her current condition.      ICD-10-CM ICD-9-CM   1. Bipolar affective disorder, current episode depressed, current episode severity unspecified  F31.30 296.50   2. Alcohol use disorder, moderate, in sustained remission  F10.21 305.03       Intervention/Counseling/Treatment Plan   · Medication Management: The risks and benefits of medication were discussed with the patient. Patient agrees to continue Depakote 1.0 gm q hs and Lexapro 20 mg q am and to obtain depakote level as ordered.      Return to Clinic: 2 to 3 months

## 2022-03-11 ENCOUNTER — TELEPHONE (OUTPATIENT)
Dept: ORTHOPEDICS | Facility: CLINIC | Age: 67
End: 2022-03-11
Payer: COMMERCIAL

## 2022-03-11 DIAGNOSIS — M79.642 LEFT HAND PAIN: Primary | ICD-10-CM

## 2022-03-30 DIAGNOSIS — E11.69 TYPE 2 DIABETES MELLITUS WITH OTHER SPECIFIED COMPLICATION, UNSPECIFIED WHETHER LONG TERM INSULIN USE: Primary | ICD-10-CM

## 2022-04-04 ENCOUNTER — OFFICE VISIT (OUTPATIENT)
Dept: URGENT CARE | Facility: CLINIC | Age: 67
End: 2022-04-04
Payer: COMMERCIAL

## 2022-04-04 VITALS
TEMPERATURE: 98 F | OXYGEN SATURATION: 96 % | SYSTOLIC BLOOD PRESSURE: 100 MMHG | HEIGHT: 71 IN | WEIGHT: 160 LBS | RESPIRATION RATE: 16 BRPM | BODY MASS INDEX: 22.4 KG/M2 | HEART RATE: 98 BPM | DIASTOLIC BLOOD PRESSURE: 63 MMHG

## 2022-04-04 DIAGNOSIS — G44.319 ACUTE POST-TRAUMATIC HEADACHE, NOT INTRACTABLE: ICD-10-CM

## 2022-04-04 DIAGNOSIS — S05.11XA EYE BRUISE, RIGHT, INITIAL ENCOUNTER: ICD-10-CM

## 2022-04-04 DIAGNOSIS — S09.93XA FACIAL TRAUMA, INITIAL ENCOUNTER: Primary | ICD-10-CM

## 2022-04-04 DIAGNOSIS — S00.83XA TRAUMATIC HEMATOMA OF FOREHEAD, INITIAL ENCOUNTER: ICD-10-CM

## 2022-04-04 DIAGNOSIS — S00.81XA ABRASION OF FACE, INITIAL ENCOUNTER: ICD-10-CM

## 2022-04-04 DIAGNOSIS — S05.12XA EYE BRUISE, LEFT, INITIAL ENCOUNTER: ICD-10-CM

## 2022-04-04 PROCEDURE — 1157F ADVNC CARE PLAN IN RCRD: CPT | Mod: CPTII,S$GLB,, | Performed by: NURSE PRACTITIONER

## 2022-04-04 PROCEDURE — 3078F PR MOST RECENT DIASTOLIC BLOOD PRESSURE < 80 MM HG: ICD-10-PCS | Mod: CPTII,S$GLB,, | Performed by: NURSE PRACTITIONER

## 2022-04-04 PROCEDURE — 1125F AMNT PAIN NOTED PAIN PRSNT: CPT | Mod: CPTII,S$GLB,, | Performed by: NURSE PRACTITIONER

## 2022-04-04 PROCEDURE — 99214 OFFICE O/P EST MOD 30 MIN: CPT | Mod: S$GLB,,, | Performed by: NURSE PRACTITIONER

## 2022-04-04 PROCEDURE — 3078F DIAST BP <80 MM HG: CPT | Mod: CPTII,S$GLB,, | Performed by: NURSE PRACTITIONER

## 2022-04-04 PROCEDURE — 70150 X-RAY EXAM OF FACIAL BONES: CPT | Mod: S$GLB,,, | Performed by: RADIOLOGY

## 2022-04-04 PROCEDURE — 3008F BODY MASS INDEX DOCD: CPT | Mod: CPTII,S$GLB,, | Performed by: NURSE PRACTITIONER

## 2022-04-04 PROCEDURE — 1160F PR REVIEW ALL MEDS BY PRESCRIBER/CLIN PHARMACIST DOCUMENTED: ICD-10-PCS | Mod: CPTII,S$GLB,, | Performed by: NURSE PRACTITIONER

## 2022-04-04 PROCEDURE — 1125F PR PAIN SEVERITY QUANTIFIED, PAIN PRESENT: ICD-10-PCS | Mod: CPTII,S$GLB,, | Performed by: NURSE PRACTITIONER

## 2022-04-04 PROCEDURE — 3008F PR BODY MASS INDEX (BMI) DOCUMENTED: ICD-10-PCS | Mod: CPTII,S$GLB,, | Performed by: NURSE PRACTITIONER

## 2022-04-04 PROCEDURE — 3074F SYST BP LT 130 MM HG: CPT | Mod: CPTII,S$GLB,, | Performed by: NURSE PRACTITIONER

## 2022-04-04 PROCEDURE — 3074F PR MOST RECENT SYSTOLIC BLOOD PRESSURE < 130 MM HG: ICD-10-PCS | Mod: CPTII,S$GLB,, | Performed by: NURSE PRACTITIONER

## 2022-04-04 PROCEDURE — 1160F RVW MEDS BY RX/DR IN RCRD: CPT | Mod: CPTII,S$GLB,, | Performed by: NURSE PRACTITIONER

## 2022-04-04 PROCEDURE — 99214 PR OFFICE/OUTPT VISIT, EST, LEVL IV, 30-39 MIN: ICD-10-PCS | Mod: S$GLB,,, | Performed by: NURSE PRACTITIONER

## 2022-04-04 PROCEDURE — 1159F PR MEDICATION LIST DOCUMENTED IN MEDICAL RECORD: ICD-10-PCS | Mod: CPTII,S$GLB,, | Performed by: NURSE PRACTITIONER

## 2022-04-04 PROCEDURE — 70150 XR FACIAL BONES 3 OR MORE VIEW: ICD-10-PCS | Mod: S$GLB,,, | Performed by: RADIOLOGY

## 2022-04-04 PROCEDURE — 1159F MED LIST DOCD IN RCRD: CPT | Mod: CPTII,S$GLB,, | Performed by: NURSE PRACTITIONER

## 2022-04-04 PROCEDURE — 1157F PR ADVANCE CARE PLAN OR EQUIV PRESENT IN MEDICAL RECORD: ICD-10-PCS | Mod: CPTII,S$GLB,, | Performed by: NURSE PRACTITIONER

## 2022-04-04 RX ORDER — IBUPROFEN 600 MG/1
600 TABLET ORAL EVERY 6 HOURS PRN
Qty: 20 TABLET | Refills: 0 | Status: SHIPPED | OUTPATIENT
Start: 2022-04-04 | End: 2022-04-09

## 2022-04-04 NOTE — PROGRESS NOTES
"Subjective:       Patient ID: Alix Patel is a 66 y.o. female.    Vitals:  height is 5' 11" (1.803 m) and weight is 72.6 kg (160 lb). Her temperature is 98.1 °F (36.7 °C). Her blood pressure is 100/63 and her pulse is 98. Her respiration is 16 and oxygen saturation is 96%.     Chief Complaint: Bleeding/Bruising    Patient reports she fell a week ago and has a large bump on her forehead and has bruising around her eyes. She wants something to take to get the bruises under her eyes to go away. Patient also reports constant headache    66 year old female presents to clinic with reports of a fall on 3/26/ 2022 on cement with abrasions to left facial area and hematoma to forehead. Denies loss of consciounses, double vision, blurred vision spots or floaters, complaints of frontal headache that's throbbing, no treatment tried, starts at 8 am and with her all day. Eye doctor visit on 3/28/22 told to just let it heal. Placing triple antibioits ointment     Other  This is a new problem. The current episode started 1 to 4 weeks ago. Associated symptoms include headaches. Pertinent negatives include no fatigue, myalgias, vertigo or weakness. Nothing aggravates the symptoms. Treatments tried: triple antibiotic. The treatment provided no relief.       Constitution: Negative for activity change, fatigue and generalized weakness.   Eyes: Negative for photophobia, vision loss, double vision and blurred vision.   Musculoskeletal: Negative for muscle ache.   Skin: Positive for abrasion.   Neurological: Positive for headaches. Negative for dizziness, history of vertigo, light-headedness, passing out, facial drooping, speech difficulty, coordination disturbances, loss of balance, history of migraines, disorientation and altered mental status.   Psychiatric/Behavioral: Negative for altered mental status, disorientation, confusion and agitation.       Objective:      Physical Exam   Constitutional: She is oriented to person, " place, and time. She appears well-developed. She is cooperative.  Non-toxic appearance. She does not appear ill. No distress. normalawake  HENT:   Head: Normocephalic. Head is without abrasion, without contusion and without laceration.   Ears:   Right Ear: Hearing normal. No hemotympanum.   Left Ear: Hearing normal. No hemotympanum.   Nose: Nose normal. No mucosal edema, rhinorrhea or nasal deformity. No epistaxis. Right sinus exhibits no maxillary sinus tenderness and no frontal sinus tenderness. Left sinus exhibits no maxillary sinus tenderness and no frontal sinus tenderness.   Mouth/Throat: No trismus in the jaw. Normal dentition. No uvula swelling. No posterior oropharyngeal erythema.   Eyes: Conjunctivae, EOM and lids are normal. Pupils are equal, round, and reactive to light. Right eye exhibits no discharge. Left eye exhibits no discharge. No scleral icterus.          extraocular movement intact gaze aligned appropriately   Neck: Trachea normal and phonation normal. Neck supple. No tracheal deviation present. No neck rigidity present. No spinous process tenderness present. No muscular tenderness present.   Cardiovascular: Normal rate, regular rhythm, normal heart sounds and normal pulses.   Pulmonary/Chest: Effort normal and breath sounds normal. No respiratory distress.   Abdominal: Normal appearance and bowel sounds are normal. She exhibits no distension, no pulsatile midline mass and no mass. Soft. There is no abdominal tenderness.   Musculoskeletal: Normal range of motion.         General: No deformity. Normal range of motion.   Neurological: no focal deficit. She is alert and oriented to person, place, and time. She has normal sensation, normal strength and intact cranial nerves. No cranial nerve deficit or sensory deficit. She exhibits normal muscle tone. She displays no seizure activity. Gait and coordination normal. Coordination normal. GCS eye subscore is 4. GCS verbal subscore is 5. GCS motor  subscore is 6.   Skin: Skin is warm, dry, intact, not diaphoretic and not pale. Capillary refill takes less than 2 seconds. Abrasions - head:  Forehead and face      No abrasion, No burn, No bruising and No ecchymosis   Psychiatric: Her speech is normal and behavior is normal. Judgment and thought content normal.   Nursing note and vitals reviewed.            Assessment:       1. Facial trauma, initial encounter    2. Abrasion of face, initial encounter    3. Traumatic hematoma of forehead, initial encounter    4. Acute post-traumatic headache, not intractable    5. Eye bruise, left, initial encounter    6. Eye bruise, right, initial encounter          Plan:         Facial trauma, initial encounter  -     XR FACIAL BONES 3 OR MORE VIEW; Future; Expected date: 04/04/2022    Abrasion of face, initial encounter    Traumatic hematoma of forehead, initial encounter    Acute post-traumatic headache, not intractable    Eye bruise, left, initial encounter    Eye bruise, right, initial encounter    Other orders  -     ibuprofen (ADVIL,MOTRIN) 600 MG tablet; Take 1 tablet (600 mg total) by mouth every 6 (six) hours as needed for Pain.  Dispense: 20 tablet; Refill: 0    XR FACIAL BONES 3 OR MORE VIEW    Result Date: 4/4/2022  EXAMINATION: XR FACIAL BONES 3 OR MORE VIEW CLINICAL HISTORY: Unspecified injury of face, initial encounter TECHNIQUE: Four views of the facial bones were performed. COMPARISON: None FINDINGS: The bone mineralization is within normal limits.  There is no cortical step-off.  There is no evidence of periostitis. There is no evidence of a displaced maxillofacial structures.     No acute process.  Follow-up, as clinically warranted. Electronically signed by: Diego Marinelli MD Date:    04/04/2022 Time:    18:15  Patient Instructions   Please return here or go to the Emergency Department for any concerns or worsening of condition.    Please drink plenty of fluids.    Please get plenty of rest.    If not allergic,  please take over the counter Tylenol (Acetaminophen) and/or Motrin (Ibuprofen) as directed for control of pain and/or fever.    Please follow up with your primary care doctor or specialist as needed.    Cool compression to hematoma

## 2022-04-04 NOTE — PATIENT INSTRUCTIONS
Please return here or go to the Emergency Department for any concerns or worsening of condition.    Please drink plenty of fluids.    Please get plenty of rest.    If not allergic, please take over the counter Tylenol (Acetaminophen) and/or Motrin (Ibuprofen) as directed for control of pain and/or fever.    Please follow up with your primary care doctor or specialist as needed.    Cool compression to hematoma

## 2022-05-09 RX ORDER — HYDROXYZINE PAMOATE 25 MG/1
CAPSULE ORAL
Qty: 30 CAPSULE | Refills: 1 | Status: SHIPPED | OUTPATIENT
Start: 2022-05-09 | End: 2022-07-20 | Stop reason: SDUPTHER

## 2022-05-09 NOTE — PROGRESS NOTES
05/09/2022: Patient called due to trouble falling asleep and denies side effects from her medicines. Patient agreed to continue [id number] mg q hs and [id number] mg q am, and to start hydroxyzine 25-50 mg q hs prn only for sleep.

## 2022-06-03 ENCOUNTER — TELEPHONE (OUTPATIENT)
Dept: PODIATRY | Facility: CLINIC | Age: 67
End: 2022-06-03
Payer: COMMERCIAL

## 2022-06-03 NOTE — TELEPHONE ENCOUNTER
----- Message from Mylene Cuevas sent at 6/3/2022 12:11 PM CDT -----  Regarding: Patient call back  Who called:HAILEY GONZALEZ [[id number]]    What is the request in detail: Patient is requesting a call back. Patient states her nail has grown back and gotten out of control. She states she would like to discuss a soak that was previously going to be prescribed for her. Can the clinic reply by [facility]? No    Best call back number: [phone]    Additional Information: N/A

## 2022-06-13 ENCOUNTER — OFFICE VISIT (OUTPATIENT)
Dept: PSYCHIATRY | Facility: CLINIC | Age: 67
End: 2022-06-13
Payer: COMMERCIAL

## 2022-06-13 DIAGNOSIS — F41.1 GAD (GENERALIZED ANXIETY DISORDER): Primary | ICD-10-CM

## 2022-06-13 DIAGNOSIS — F32.A DEPRESSION, UNSPECIFIED DEPRESSION TYPE: ICD-10-CM

## 2022-06-13 PROCEDURE — 90832 PSYTX W PT 30 MINUTES: CPT | Mod: 95,,, | Performed by: SOCIAL WORKER

## 2022-06-13 PROCEDURE — 90832 PR PSYCHOTHERAPY W/PATIENT, 30 MIN: ICD-10-PCS | Mod: 95,,, | Performed by: SOCIAL WORKER
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
## 2022-06-13 NOTE — PROGRESS NOTES
"06/13/2022:4:34pm. Patient had called about 45" ago requesting a call back re a "personal issue". I returned her call at this time and only got a message that her mail box is full and I could not leave a message for her. I will try again tomorrow am. There was no indication in her message to myself that this was an emergency.    "

## 2022-06-14 ENCOUNTER — OFFICE VISIT (OUTPATIENT)
Dept: PODIATRY | Facility: CLINIC | Age: 67
End: 2022-06-14
Payer: COMMERCIAL

## 2022-06-14 VITALS
WEIGHT: 160 LBS | SYSTOLIC BLOOD PRESSURE: 113 MMHG | HEIGHT: 71 IN | HEART RATE: 97 BPM | DIASTOLIC BLOOD PRESSURE: 76 MMHG | BODY MASS INDEX: 22.4 KG/M2

## 2022-06-14 DIAGNOSIS — E11.9 ENCOUNTER FOR DIABETIC FOOT EXAM: ICD-10-CM

## 2022-06-14 DIAGNOSIS — E11.65 TYPE 2 DIABETES MELLITUS WITH HYPERGLYCEMIA, WITHOUT LONG-TERM CURRENT USE OF INSULIN: Primary | ICD-10-CM

## 2022-06-14 DIAGNOSIS — L85.3 DRY SKIN: ICD-10-CM

## 2022-06-14 DIAGNOSIS — L84 CORN OR CALLUS: ICD-10-CM

## 2022-06-14 DIAGNOSIS — B35.1 DERMATOPHYTOSIS OF NAIL: ICD-10-CM

## 2022-06-14 PROCEDURE — 11057 PARNG/CUTG B9 HYPRKR LES >4: CPT | Mod: S$GLB,,, | Performed by: PODIATRIST

## 2022-06-14 PROCEDURE — 11721 ROUTINE FOOT CARE: ICD-10-PCS | Mod: 59,S$GLB,, | Performed by: PODIATRIST

## 2022-06-14 PROCEDURE — 99999 PR PBB SHADOW E&M-EST. PATIENT-LVL V: CPT | Mod: PBBFAC,,, | Performed by: PODIATRIST

## 2022-06-14 PROCEDURE — 99999 PR PBB SHADOW E&M-EST. PATIENT-LVL V: ICD-10-PCS | Mod: PBBFAC,,, | Performed by: PODIATRIST

## 2022-06-14 PROCEDURE — 11721 DEBRIDE NAIL 6 OR MORE: CPT | Mod: 59,S$GLB,, | Performed by: PODIATRIST

## 2022-06-14 PROCEDURE — 99213 PR OFFICE/OUTPT VISIT, EST, LEVL III, 20-29 MIN: ICD-10-PCS | Mod: 25,S$GLB,, | Performed by: PODIATRIST

## 2022-06-14 PROCEDURE — 99213 OFFICE O/P EST LOW 20 MIN: CPT | Mod: 25,S$GLB,, | Performed by: PODIATRIST

## 2022-06-14 PROCEDURE — 11057 ROUTINE FOOT CARE: ICD-10-PCS | Mod: S$GLB,,, | Performed by: PODIATRIST

## 2022-06-14 RX ORDER — IBUPROFEN 800 MG/1
800 TABLET ORAL 3 TIMES DAILY PRN
COMMUNITY
Start: 2023-03-28 | End: 2023-03-28 | Stop reason: SDUPTHER

## 2022-06-14 RX ORDER — OFLOXACIN 3 MG/ML
SOLUTION/ DROPS OPHTHALMIC
COMMUNITY
End: 2023-01-19

## 2022-06-14 RX ORDER — PENICILLIN V POTASSIUM 500 MG/1
TABLET, FILM COATED ORAL
COMMUNITY
End: 2022-07-14

## 2022-06-14 RX ORDER — GLIPIZIDE 5 MG/1
TABLET ORAL
COMMUNITY
End: 2022-09-09

## 2022-06-14 RX ORDER — ESCITALOPRAM OXALATE 20 MG/1
20 TABLET ORAL DAILY
Qty: 90 TABLET | Refills: 1 | Status: SHIPPED | OUTPATIENT
Start: 2022-06-14 | End: 2023-01-19 | Stop reason: SDUPTHER

## 2022-06-14 RX ORDER — ITRACONAZOLE 100 MG/1
CAPSULE ORAL
COMMUNITY
End: 2023-01-19

## 2022-06-14 RX ORDER — LISINOPRIL 2.5 MG/1
1 TABLET ORAL EVERY MORNING
COMMUNITY
End: 2022-09-09

## 2022-06-14 RX ORDER — METOPROLOL SUCCINATE 25 MG/1
TABLET, EXTENDED RELEASE ORAL
COMMUNITY
Start: 2022-01-06 | End: 2023-01-19

## 2022-06-14 RX ORDER — DICLOFENAC SODIUM 50 MG/1
1 TABLET, DELAYED RELEASE ORAL 2 TIMES DAILY
COMMUNITY
End: 2023-03-28

## 2022-06-14 RX ORDER — EMPAGLIFLOZIN 10 MG/1
TABLET, FILM COATED ORAL
COMMUNITY
End: 2022-09-09

## 2022-06-14 RX ORDER — MELOXICAM 15 MG/1
1 TABLET ORAL DAILY
COMMUNITY
End: 2023-03-28 | Stop reason: ALTCHOICE

## 2022-06-14 RX ORDER — DULAGLUTIDE 0.75 MG/.5ML
0.75 INJECTION, SOLUTION SUBCUTANEOUS
COMMUNITY
Start: 2022-01-10

## 2022-06-14 RX ORDER — DIVALPROEX SODIUM 500 MG/1
1000 TABLET, FILM COATED, EXTENDED RELEASE ORAL DAILY
Qty: 60 TABLET | Refills: 5 | Status: SHIPPED | OUTPATIENT
Start: 2022-06-14 | End: 2023-01-19

## 2022-06-14 RX ORDER — TRAZODONE HYDROCHLORIDE 50 MG/1
TABLET ORAL
Qty: 30 TABLET | Refills: 5 | Status: SHIPPED | OUTPATIENT
Start: 2022-06-14 | End: 2022-06-24 | Stop reason: DRUGHIGH

## 2022-06-14 RX ORDER — PROMETHAZINE HYDROCHLORIDE 25 MG/1
TABLET ORAL
Status: ON HOLD | COMMUNITY
End: 2023-05-05

## 2022-06-14 RX ORDER — METRONIDAZOLE 500 MG/1
TABLET ORAL
COMMUNITY
End: 2022-07-14

## 2022-06-14 RX ORDER — MELOXICAM 7.5 MG/1
TABLET ORAL
COMMUNITY
End: 2022-07-20 | Stop reason: SDUPTHER

## 2022-06-14 RX ORDER — GENTAMICIN SULFATE 3 MG/ML
SOLUTION/ DROPS OPHTHALMIC
COMMUNITY
End: 2023-01-19

## 2022-06-14 RX ORDER — AMOXICILLIN 500 MG/1
CAPSULE ORAL
COMMUNITY
End: 2022-07-14

## 2022-06-14 RX ORDER — UREA 40 %
CREAM (GRAM) TOPICAL DAILY
Qty: 85 G | Refills: 10 | Status: ON HOLD | OUTPATIENT
Start: 2022-06-14 | End: 2023-05-05

## 2022-06-14 RX ORDER — ASPIRIN 81 MG/1
1 TABLET ORAL
Status: ON HOLD | COMMUNITY
End: 2023-05-09 | Stop reason: SDUPTHER

## 2022-06-14 RX ORDER — DONEPEZIL HYDROCHLORIDE 10 MG/1
1 TABLET, FILM COATED ORAL NIGHTLY
COMMUNITY
End: 2023-01-19

## 2022-06-14 RX ORDER — SULFAMETHOXAZOLE AND TRIMETHOPRIM 800; 160 MG/1; MG/1
1 TABLET ORAL 2 TIMES DAILY
COMMUNITY
End: 2023-01-19

## 2022-06-14 RX ORDER — PREDNISOLONE ACETATE 10 MG/ML
SUSPENSION/ DROPS OPHTHALMIC
COMMUNITY
End: 2023-01-19

## 2022-06-14 RX ORDER — BUSPIRONE HYDROCHLORIDE 15 MG/1
TABLET ORAL
COMMUNITY
End: 2022-07-20

## 2022-06-14 RX ORDER — BUTALBITAL, ACETAMINOPHEN AND CAFFEINE 50; 325; 40 MG/1; MG/1; MG/1
TABLET ORAL
COMMUNITY
End: 2023-03-28 | Stop reason: ALTCHOICE

## 2022-06-14 RX ORDER — METHOCARBAMOL 500 MG/1
TABLET, FILM COATED ORAL
Status: ON HOLD | COMMUNITY
End: 2023-03-25 | Stop reason: HOSPADM

## 2022-06-14 RX ORDER — ESTRADIOL 0.1 MG/G
CREAM VAGINAL
Status: ON HOLD | COMMUNITY
End: 2023-05-05

## 2022-06-14 RX ORDER — ORPHENADRINE CITRATE 100 MG/1
TABLET, EXTENDED RELEASE ORAL
COMMUNITY
End: 2023-01-19

## 2022-06-14 RX ORDER — DONEPEZIL HYDROCHLORIDE 5 MG/1
1 TABLET, FILM COATED ORAL NIGHTLY
COMMUNITY
End: 2023-01-19

## 2022-06-14 RX ORDER — HYDROCODONE BITARTRATE AND ACETAMINOPHEN 5; 325 MG/1; MG/1
TABLET ORAL
COMMUNITY
End: 2023-01-19

## 2022-06-14 RX ORDER — DEXAMETHASONE 4 MG/1
TABLET ORAL
COMMUNITY
End: 2022-09-09

## 2022-06-14 RX ORDER — NITROFURANTOIN 25; 75 MG/1; MG/1
CAPSULE ORAL
COMMUNITY
End: 2023-01-19

## 2022-06-14 RX ORDER — ZOSTER VACCINE RECOMBINANT, ADJUVANTED 50 MCG/0.5
KIT INTRAMUSCULAR
Status: ON HOLD | COMMUNITY
End: 2023-05-05 | Stop reason: ALTCHOICE

## 2022-06-14 RX ORDER — TRAMADOL HYDROCHLORIDE 50 MG/1
1 TABLET ORAL
Status: ON HOLD | COMMUNITY
End: 2023-03-25 | Stop reason: HOSPADM

## 2022-06-14 RX ORDER — GABAPENTIN 600 MG/1
TABLET ORAL
COMMUNITY
End: 2023-01-19

## 2022-06-14 RX ORDER — EMPAGLIFLOZIN 25 MG/1
1 TABLET, FILM COATED ORAL DAILY
COMMUNITY
End: 2022-09-09

## 2022-06-14 RX ORDER — GLIPIZIDE 10 MG/1
1 TABLET ORAL EVERY MORNING
COMMUNITY
End: 2022-09-09

## 2022-06-14 RX ORDER — PHENAZOPYRIDINE HYDROCHLORIDE 200 MG/1
TABLET, FILM COATED ORAL
COMMUNITY
End: 2023-01-19

## 2022-06-14 RX ORDER — METOPROLOL SUCCINATE 25 MG/1
TABLET, EXTENDED RELEASE ORAL
COMMUNITY
End: 2022-07-20 | Stop reason: SDUPTHER

## 2022-06-14 RX ORDER — CYCLOBENZAPRINE HCL 10 MG
1 TABLET ORAL 3 TIMES DAILY PRN
COMMUNITY
End: 2023-03-28 | Stop reason: ALTCHOICE

## 2022-06-14 RX ORDER — AMMONIUM LACTATE 12 G/100G
CREAM TOPICAL
Status: ON HOLD | COMMUNITY
End: 2023-05-05

## 2022-06-14 RX ORDER — POLYETHYLENE GLYCOL 3350 17 G/17G
POWDER, FOR SOLUTION ORAL
COMMUNITY
End: 2023-01-19

## 2022-06-14 RX ORDER — KETOROLAC TROMETHAMINE 5 MG/ML
SOLUTION OPHTHALMIC
COMMUNITY
End: 2023-01-19

## 2022-06-14 RX ORDER — INFLUENZA VIRUS VACCINE 15; 15; 15; 15 UG/.5ML; UG/.5ML; UG/.5ML; UG/.5ML
SUSPENSION INTRAMUSCULAR
Status: ON HOLD | COMMUNITY
End: 2023-05-05 | Stop reason: ALTCHOICE

## 2022-06-14 NOTE — PATIENT INSTRUCTIONS
How to Check Your Feet    Below are tips to help you look for foot problems. Try to check your feet at the same time each day, such as when you get out of bed in the morning.    Check the top of each foot. The tops of toes, back of the heel, and outer edge of the foot can get a lot of rubbing from poor-fitting shoes.    Check the bottom of each foot. Daily wear and tear often leads to problems at pressure spots.    Check the toes and nails. Fungal infections often occur between toes. Toenail problems can also be a sign of fungal infections or lead to breaks in the skin.    Check your shoes, too. Loose objects inside a shoe can injure the foot. Use your hand to feel inside your shoes for things like ceci, loose stitching, or rough areas that could irritate your skin.        Diabetic Foot Care    Diabetes can lead to a number of different foot complications. Fortunately, most of these complications can be prevented with a little extra foot care. If diabetes is not well controlled, the high blood sugar can cause damage to blood vessels and result in poor circulation to the foot. When the skin does not get enough blood flow, it becomes prone to pressure sores and ulcers, which heal slowly.  High blood sugar can also damage nerves, interfering with the ability to feel pain and pressure. When you cant feel your foot normally, it is easy to injure your skin, bones and joints without knowing it. For these reasons diabetes increases the risk of fungal infections, bunions and ulcers. Deep ulcers can lead to bone infection. Gangrene is the most serious foot complication of diabetes. It usually occurs on the tips of the toes as blacked areas of skin. The black area is dead tissue. In severe cases, gangrene spreads to involve the entire toe, other toes and the entire foot. Foot or toe amputation may be required. Good foot care and blood sugar control can prevent this.    Home Care  Wear comfortable, proper fitting  shoes.  Wash your feet daily with warm water and mild soap.  After drying, apply a moisturizing cream or lotion.  Check your feet daily for skin breaks, blisters, swelling, or redness. Look between your toes also.  Wear cotton socks and change them every day.  Trim toe nails carefully and do not cut your cuticles.  Strive to keep your blood sugar under control with a combination of medicines, diet and activity.  If you smoke and have diabetes, it is very important that you stop. Smoking reduces blood flow to your foot.  Avoid activities that increase your risk of foot injury:  Do not walk barefoot.  Do not use heating pads or hot water bottles on your feet.  Do not put your foot in a hot tub without first checking the temperature with your hand.  10) Schedule yearly foot exams.    Follow Up  with your doctor or as advised by our staff. Report any cut, puncture, scrape, other injury, blister, ingrown toenail or ulcer on your foot.    Get Prompt Medical Attention  if any of the following occur:  -- Open ulcer with pus draining from the wound  -- Increasing foot or leg pain  -- New areas of redness or swelling or tender areas of the foot    © 4814-3072 [employer]. [address] [place] [address], [place] [address]. All rights reserved. This information is not intended as a substitute for professional medical care. Always follow your healthcare professional's instructions.     General nail care measures for abnormal nails include:  Keeping nails trimmed short, but avoid overzealous trimming  Avoiding trauma   Avoiding contact irritants   Keeping nails dry (avoiding wet work)  Avoiding all nail cosmetics  Wearing shoes that fit well at the toe box.  Avoid tight shoes.

## 2022-06-14 NOTE — PROGRESS NOTES
Chief Complaint   Patient presents with    Diabetic Foot Exam     Dr Barakat 5-17-22              HPI:   The patient is a 66 y.o.  female  who presents for a foot exam.     Patient reports no presence of abnormal sensation to the feet .    History of foot surgery: none.     Shoes worn today: flats.  She is concerned dystrophic darkened right hallux toenail, present for a long time.  Possible trauma to the toe in her 20s.   Had toenail removed last year but didn't grow out properly.   She would also like a soak for her foot.  Was prescribed soak last year but wasn't able to do it.     Diabetic Foot Exam (Dr Barakat 5-17-22)              Patient Active Problem List   Diagnosis    Muscle weakness of lower extremity    Anxiety    Depression    Bipolar disorder    DM II (diabetes mellitus, type II), controlled    Hyperlipidemia with target low density lipoprotein (LDL) cholesterol less than 100 mg/dL    Hoarseness    Thyroid nodule    Urinary tract infection without hematuria    Episodic tension-type headache, not intractable    Weakness    Chest pain    Hypothyroidism    Muscle spasm    Voice disturbance    Vocal fold edema    Hyperfunctional dysphonia    Glycosuria    Urinary tract infection with hematuria    Subacute vaginitis    Dysuria    GERD (gastroesophageal reflux disease)    Alcohol use disorder, moderate, in sustained remission    Episodic lightheadedness    Dyslipidemia    Memory loss    Memory disorder    Ataxia    LILLY (generalized anxiety disorder)    Acute bilateral low back pain with bilateral sciatica    Poor posture    Closed nondisplaced fracture of base of fifth metacarpal bone of left hand with routine healing    Left hand pain    Type 2 diabetes mellitus with hyperglycemia, without long-term current use of insulin    Accidental drug overdose    Disorientation    Abnormal mammogram    Disorder of peripheral nerve of upper extremity    Incontinence of feces     Loss of teeth due to cavities    Primary insomnia    Pulmonary emphysema    TMJ pain dysfunction syndrome           Current Outpatient Medications on File Prior to Visit   Medication Sig Dispense Refill    ammonium lactate 12 % Crea SAMSON EXT AA  OF FEET ONCE DAILY      amoxicillin (AMOXIL) 500 MG capsule       aspirin (ECOTRIN) 81 MG EC tablet Take 1 tablet by mouth once daily.      blood sugar diagnostic (TRUE METRIX GLUCOSE TEST STRIP MISC) True Metrix Glucose Test Strip   TEST TWICE DAILY AS DIRECTED      blood-glucose meter (TRUE METRIX AIR GLUCOSE METER MISC) True Metrix Air Glucose Meter   USE UNDER THE SKIN TWICE DAILY AS DIRECTED      busPIRone (BUSPAR) 15 MG tablet TK 2 TS PO TID      butalbital-acetaminophen-caffeine -40 mg (FIORICET, ESGIC) -40 mg per tablet TK 1 T PO  Q 6  H PRN      CRESTOR 20 mg tablet Take 20 mg by mouth once daily.   3    cyclobenzaprine (FLEXERIL) 10 MG tablet Take 1 tablet by mouth 3 (three) times daily as needed.      dexAMETHasone (DECADRON) 4 MG Tab TK 2 TS PO IMMEDIATELY.  TK 2 TS PO ON DAY 2.  TK 1 T PO ON DAY 3.  TK 1 T PO ON DAY 4      diclofenac (VOLTAREN) 50 MG EC tablet Take 1 tablet by mouth 2 (two) times daily.      divalproex ER (DEPAKOTE) 500 MG Tb24 Take 2 tablets (1,000 mg total) by mouth once daily. 60 tablet 5    donepeziL (ARICEPT) 10 MG tablet Take 1 tablet by mouth every evening.      donepeziL (ARICEPT) 5 MG tablet Take 1 tablet by mouth nightly.      dulaglutide (TRULICITY) 0.75 mg/0.5 mL pen injector as directed      empagliflozin (JARDIANCE) 10 mg tablet TK 1 T PO  QD      empagliflozin (JARDIANCE) 25 mg tablet Take 1 tablet by mouth once daily.      EScitalopram oxalate (LEXAPRO) 20 MG tablet Take 1 tablet (20 mg total) by mouth once daily. 90 tablet 1    estradioL (ESTRACE) 0.01 % (0.1 mg/gram) vaginal cream USE 1 GRAM VAGINALLY 3 TIMES A WEEK      flu vacc [id number] 6mos up,PF, (FLUARIX QUAD [id number], PF,) 60 mcg (15  mcg x 4)/0.5 mL Syrg ADM 0.5ML IM UTD      gabapentin (NEURONTIN) 600 MG tablet TK 1 T PO TID      gentamicin (GARAMYCIN) 0.3 % ophthalmic solution PLACE 1 TO 2 GTS AEY 3 TO 4 H UTD      glipiZIDE (GLUCOTROL) 10 MG tablet Take 1 tablet by mouth every morning.      glipiZIDE (GLUCOTROL) 5 MG tablet TAKE 1 TABLET BY MOUTH EVERY DAY 30 MINUTES BEFORE BREAKFAST      HYDROcodone-acetaminophen (NORCO) 5-325 mg per tablet TAKE 1 TABLET BY MOUTH EVERY 4 TO 6 HOURS PER PAIN      hydrOXYzine pamoate (VISTARIL) 25 MG Cap Take 1 to 2 caps q hs prn difficulty sleeping 30 capsule 1    ibuprofen (ADVIL,MOTRIN) 800 MG tablet TAKE 1 TABLET BY MOUTH EVERY 6 TO 8 HOURS FOR PAIN      itraconazole (SPORANOX) 100 mg Cap TK 2 CS PO  BID FOR 1 WEEK THEN STOP FOR 3 WEEKS THEN REFILL      ketorolac 0.5% (ACULAR) 0.5 % Drop       levOCARNitine (L-CARNITINE) 500 mg Tab Take 500 mg by mouth once daily. 1 tablet 11    levothyroxine (SYNTHROID) 100 MCG tablet Take 1 tablet (100 mcg total) by mouth once daily. 30 tablet 1    lisinopriL (PRINIVIL,ZESTRIL) 2.5 MG tablet Take 1 tablet by mouth every morning.      meloxicam (MOBIC) 15 MG tablet Take 1 tablet by mouth once daily.      meloxicam (MOBIC) 7.5 MG tablet       metFORMIN (GLUCOPHAGE) 500 MG tablet Take 2 tablets (1,000 mg total) by mouth 2 (two) times daily with meals. 120 tablet 1    methocarbamoL (ROBAXIN) 500 MG Tab       metoprolol succinate (TOPROL-XL) 25 MG 24 hr tablet       metoprolol succinate (TOPROL-XL) 25 MG 24 hr tablet       metroNIDAZOLE (FLAGYL) 500 MG tablet       nitrofurantoin, macrocrystal-monohydrate, (MACROBID) 100 MG capsule TK 1 C PO BID FOR 5 DAYS      ofloxacin (OCUFLOX) 0.3 % ophthalmic solution       orphenadrine (NORFLEX) 100 mg tablet       penicillin v potassium (VEETID) 500 MG tablet       phenazopyridine (PYRIDIUM) 200 MG tablet TK 1 T PO Q 8 H PRF URINARY DISCOMFORT      polyethylene glycol (GLYCOLAX) 17 gram/dose powder USE AS  "DIRECTED ON PACKAGE DAILY      prednisoLONE acetate (PRED FORTE) 1 % DrpS       promethazine (PHENERGAN) 25 MG tablet TK 1 T PO  Q 12 H PRN      sulfamethoxazole-trimethoprim 800-160mg (BACTRIM DS) 800-160 mg Tab Take 1 tablet by mouth 2 (two) times daily.      traMADoL (ULTRAM) 50 mg tablet Take 1 tablet by mouth every 6 to 8 hours as needed.      traZODone (DESYREL) 50 MG tablet Take 1 to 2 tabs q hs prn sleep. 30 tablet 5    varicella-zoster [id number], PF, (SHINGRIX, PF,) 50 mcg/0.5 mL injection ADM 0.5ML IM UTD      [DISCONTINUED] divalproex ER (DEPAKOTE) 500 MG Tb24 Take 2 tablets (1,000 mg total) by mouth once daily. 60 tablet 5    [DISCONTINUED] EScitalopram oxalate (LEXAPRO) 20 MG tablet Take 1 tablet (20 mg total) by mouth once daily. 90 tablet 1     No current facility-administered medications on file prior to visit.           Review of patient's allergies indicates:   Allergen Reactions    Lamictal [lamotrigine] Rash     Per psychiatry notes             ROS:  General ROS: negative  Respiratory ROS: no cough, shortness of breath, or wheezing  Cardiovascular ROS: no chest pain or dyspnea on exertion  Musculoskeletal ROS: negative  Neurological ROS:   negative for - impaired coordination/balance  Dermatological ROS: negative      LAST HbA1c:   Lab Results   Component Value Date    HGBA1C 11.1 (H) 07/30/2021           EXAM:   Vitals:    06/14/22 0903   BP: 113/76   Pulse: 97   Weight: 72.6 kg (160 lb)   Height: 5' 11" (1.803 m)       General: alert, no distress, cooperative    Vascular:    Dorsalis Pedis:  present      Posterior Tibial:  thready   Capillary refill time:  3 seconds   Temperature of toes warm to touch   Edema: Minimal bilateral       Neurological:      Sharp touch:  normal   Light touch: normal   Tinels Sign:  Absent   Mulders Click:   Absent   [place]:  (no) deficits to sharp/dull, light touch or vibratory sensation feet, ten points tested.  +paresthesias (abnormal sensation " to the feet)         Dermatological:    Skin: thin; intact   Hair growth:  normal   Wounds/Ulcers:  Absent   Bruising:  Absent   Erythema:  Absent   Toenails:  Elongated;  right hallux toenail discolored and dystrophic and mostly loose from nail bed.  Nail bed intact without open wounds.  There is subungual debris .   Absent paronychia      Musculoskeletal:    Metatarsophalangeal range of motion:   full range of motion   Subtalar joint range of motion: full range of motion   Ankle joint range of motion:  full range of motion   Bunions:  mild   Hammertoes: mild, flexible.                ASSESSMENT/PLAN:          Problem List Items Addressed This Visit     Type 2 diabetes mellitus with hyperglycemia, without long-term current use of insulin - Primary    Relevant Medications    dulaglutide (TRULICITY) 0.75 mg/0.5 mL pen injector    glipiZIDE (GLUCOTROL) 10 MG tablet    glipiZIDE (GLUCOTROL) 5 MG tablet    Other Relevant Orders    Routine Foot Care      Other Visit Diagnoses     Dermatophytosis of nail        Relevant Orders    Routine Foot Care    [place]lett or callus        Relevant Orders    Routine Foot Care    Dry skin        Relevant Medications    urea (CARMOL) 40 % Crea    Encounter for diabetic foot exam                 I counseled the patient on the patient's conditions, their implications and medical management.   Continue good nutrition and blood sugar control to help prevent podiatric complications of diabetes.   Maintain proper foot hygiene.   Continue wearing proper shoe gear, daily foot inspections, never walking without protective shoe gear, never putting sharp instruments to feet.    General nail care measures for abnormal nails include:    ? Keeping nails trimmed short    ? Avoiding trauma     ? Avoiding contact irritants     ? Keeping nails dry (avoiding wet work)    ? Avoiding all nail cosmetics         Routine Foot Care    Date/Time: 6/14/2022 9:00 AM  Performed by: Katie Kelley  DPM  Authorized by: Katie Kelley DPM     Consent Done?:  Yes (Verbal)  Hyperkeratotic Skin Lesions?: Yes    Number of trimmed lesions:  6  Location(s):  Left 5th Metatarsal Head, Right 5th Metatarsal Head, Left Heel, Right Heel, Left 1st Toe and Right 1st Toe    Nail Care Type:  Debride  Location(s): All  (Left 1st Toe, Left 3rd Toe, Left 2nd Toe, Left 4th Toe, Left 5th Toe, Right 1st Toe, Right 2nd Toe, Right 3rd Toe, Right 4th Toe and Right 5th Toe)  Patient tolerance:  Patient tolerated the procedure well with no immediate complications     With patient's permission, the toenails mentioned above were reduced and debrided using a nail nipper, removing offending nail and debris.   Utilizing a #15 scalpel, I trimmed the corns and calluses at the above mentioned location.      The patient will continue to monitor the areas daily, inspect the feet, wear protective shoe gear when ambulatory, and moisturizer to maintain skin integrity.

## 2022-06-14 NOTE — PROGRESS NOTES
06/14/2022: I did reach patient by phone at about 7:15am today. Patient explained traumatic event of earlier this year and I advised that she use trazadone  mg q hs prn sleep. Additionally patient has not been taking the Lexapro and I advised she return to that med at 20 mg daily for both anxiety and depression and continue the Depakote 1.0 gm q hs as previously ordered. Patient has no med side effects at this time and has used trazadone in the past effectively. Patient has no current active thoughts of harming herself and is seeing Dr Bazzi now for ongoing counseling.

## 2022-06-23 ENCOUNTER — TELEPHONE (OUTPATIENT)
Dept: NEUROLOGY | Facility: CLINIC | Age: 67
End: 2022-06-23
Payer: COMMERCIAL

## 2022-06-23 NOTE — TELEPHONE ENCOUNTER
----- Message from Jenn Grewal sent at 6/3/2022 12:57 PM CDT -----  Type:  Needs Medical Advice    Who Called: pt  Symptoms (please be specific): pt is following up on appt and a class and pet scan    Would the patient rather a call back or a response via [facility]? Please call  Best Call Back Number: [phone]  Additional Information: Jamari Schmid

## 2022-06-24 ENCOUNTER — TELEPHONE (OUTPATIENT)
Dept: NEUROLOGY | Facility: CLINIC | Age: 67
End: 2022-06-24
Payer: COMMERCIAL

## 2022-06-24 RX ORDER — TRAZODONE HYDROCHLORIDE 150 MG/1
150 TABLET ORAL NIGHTLY
Qty: 30 TABLET | Refills: 3 | Status: SHIPPED | OUTPATIENT
Start: 2022-06-24 | End: 2022-10-20 | Stop reason: SDUPTHER

## 2022-06-24 NOTE — TELEPHONE ENCOUNTER
----- Message from Pauline Madsen sent at 6/24/2022  8:23 AM CDT -----  Contact: [phone]  Type:  Needs Medical Advice    Who Called: Pt   Reason for call: regarding scheduling a appt for a soon as possible for speaking with the Dr regarding her Dizziness and other issues going on and foe a PET Scan, pt states she has been calling regarding a appt  Would the patient rather a call back or a response via [facility]? CalL back  Best Call Back Number: [phone]

## 2022-06-24 NOTE — PROGRESS NOTES
06/24/2022: Patient called to say she was not sleeping well with 100 mg of trazadone . I advised increase dose to 150 mg q hs. Patient did not report side effects from med at this time.

## 2022-07-01 ENCOUNTER — PATIENT MESSAGE (OUTPATIENT)
Dept: RESEARCH | Facility: HOSPITAL | Age: 67
End: 2022-07-01
Payer: COMMERCIAL

## 2022-07-08 ENCOUNTER — TELEPHONE (OUTPATIENT)
Dept: RESEARCH | Facility: HOSPITAL | Age: 67
End: 2022-07-08
Payer: COMMERCIAL

## 2022-07-08 NOTE — TELEPHONE ENCOUNTER
Called Ms. Alix Patel and spoke to her about the New IDEAS study. She expressed interest in participating. We scheduled her first visit for 14-Jul-2022 at 11 AM.

## 2022-07-14 ENCOUNTER — TELEPHONE (OUTPATIENT)
Dept: RESEARCH | Facility: HOSPITAL | Age: 67
End: 2022-07-14

## 2022-07-14 ENCOUNTER — OFFICE VISIT (OUTPATIENT)
Dept: OTOLARYNGOLOGY | Facility: CLINIC | Age: 67
End: 2022-07-14
Payer: COMMERCIAL

## 2022-07-14 ENCOUNTER — RESEARCH ENCOUNTER (OUTPATIENT)
Dept: RESEARCH | Facility: HOSPITAL | Age: 67
End: 2022-07-14
Payer: COMMERCIAL

## 2022-07-14 VITALS — BODY MASS INDEX: 22.9 KG/M2 | HEIGHT: 70 IN | WEIGHT: 160 LBS

## 2022-07-14 DIAGNOSIS — H61.23 IMPACTED CERUMEN OF BOTH EARS: ICD-10-CM

## 2022-07-14 DIAGNOSIS — J01.90 ACUTE SINUSITIS, RECURRENCE NOT SPECIFIED, UNSPECIFIED LOCATION: ICD-10-CM

## 2022-07-14 DIAGNOSIS — J34.3 NASAL TURBINATE HYPERTROPHY: ICD-10-CM

## 2022-07-14 DIAGNOSIS — J31.0 CHRONIC RHINITIS: Primary | ICD-10-CM

## 2022-07-14 PROCEDURE — 1160F RVW MEDS BY RX/DR IN RCRD: CPT | Mod: CPTII,S$GLB,, | Performed by: OTOLARYNGOLOGY

## 2022-07-14 PROCEDURE — 3008F PR BODY MASS INDEX (BMI) DOCUMENTED: ICD-10-PCS | Mod: CPTII,S$GLB,, | Performed by: OTOLARYNGOLOGY

## 2022-07-14 PROCEDURE — 1101F PT FALLS ASSESS-DOCD LE1/YR: CPT | Mod: CPTII,S$GLB,, | Performed by: OTOLARYNGOLOGY

## 2022-07-14 PROCEDURE — 1159F MED LIST DOCD IN RCRD: CPT | Mod: CPTII,S$GLB,, | Performed by: OTOLARYNGOLOGY

## 2022-07-14 PROCEDURE — 99214 PR OFFICE/OUTPT VISIT, EST, LEVL IV, 30-39 MIN: ICD-10-PCS | Mod: 25,S$GLB,, | Performed by: OTOLARYNGOLOGY

## 2022-07-14 PROCEDURE — 31231 NASAL/SINUS ENDOSCOPY: ICD-10-PCS | Mod: S$GLB,,, | Performed by: OTOLARYNGOLOGY

## 2022-07-14 PROCEDURE — 1159F PR MEDICATION LIST DOCUMENTED IN MEDICAL RECORD: ICD-10-PCS | Mod: CPTII,S$GLB,, | Performed by: OTOLARYNGOLOGY

## 2022-07-14 PROCEDURE — 1157F PR ADVANCE CARE PLAN OR EQUIV PRESENT IN MEDICAL RECORD: ICD-10-PCS | Mod: CPTII,S$GLB,, | Performed by: OTOLARYNGOLOGY

## 2022-07-14 PROCEDURE — 3288F FALL RISK ASSESSMENT DOCD: CPT | Mod: CPTII,S$GLB,, | Performed by: OTOLARYNGOLOGY

## 2022-07-14 PROCEDURE — 1126F PR PAIN SEVERITY QUANTIFIED, NO PAIN PRESENT: ICD-10-PCS | Mod: CPTII,S$GLB,, | Performed by: OTOLARYNGOLOGY

## 2022-07-14 PROCEDURE — 99214 OFFICE O/P EST MOD 30 MIN: CPT | Mod: 25,S$GLB,, | Performed by: OTOLARYNGOLOGY

## 2022-07-14 PROCEDURE — 69210 EAR CERUMEN REMOVAL: ICD-10-PCS | Mod: 51,S$GLB,, | Performed by: OTOLARYNGOLOGY

## 2022-07-14 PROCEDURE — 3008F BODY MASS INDEX DOCD: CPT | Mod: CPTII,S$GLB,, | Performed by: OTOLARYNGOLOGY

## 2022-07-14 PROCEDURE — 4010F PR ACE/ARB THEARPY RXD/TAKEN: ICD-10-PCS | Mod: CPTII,S$GLB,, | Performed by: OTOLARYNGOLOGY

## 2022-07-14 PROCEDURE — 31231 NASAL ENDOSCOPY DX: CPT | Mod: S$GLB,,, | Performed by: OTOLARYNGOLOGY

## 2022-07-14 PROCEDURE — 1101F PR PT FALLS ASSESS DOC 0-1 FALLS W/OUT INJ PAST YR: ICD-10-PCS | Mod: CPTII,S$GLB,, | Performed by: OTOLARYNGOLOGY

## 2022-07-14 PROCEDURE — 4010F ACE/ARB THERAPY RXD/TAKEN: CPT | Mod: CPTII,S$GLB,, | Performed by: OTOLARYNGOLOGY

## 2022-07-14 PROCEDURE — 1160F PR REVIEW ALL MEDS BY PRESCRIBER/CLIN PHARMACIST DOCUMENTED: ICD-10-PCS | Mod: CPTII,S$GLB,, | Performed by: OTOLARYNGOLOGY

## 2022-07-14 PROCEDURE — 1126F AMNT PAIN NOTED NONE PRSNT: CPT | Mod: CPTII,S$GLB,, | Performed by: OTOLARYNGOLOGY

## 2022-07-14 PROCEDURE — 99999 PR PBB SHADOW E&M-EST. PATIENT-LVL V: ICD-10-PCS | Mod: PBBFAC,,, | Performed by: OTOLARYNGOLOGY

## 2022-07-14 PROCEDURE — 69210 REMOVE IMPACTED EAR WAX UNI: CPT | Mod: 51,S$GLB,, | Performed by: OTOLARYNGOLOGY

## 2022-07-14 PROCEDURE — 3288F PR FALLS RISK ASSESSMENT DOCUMENTED: ICD-10-PCS | Mod: CPTII,S$GLB,, | Performed by: OTOLARYNGOLOGY

## 2022-07-14 PROCEDURE — 1157F ADVNC CARE PLAN IN RCRD: CPT | Mod: CPTII,S$GLB,, | Performed by: OTOLARYNGOLOGY

## 2022-07-14 PROCEDURE — 99999 PR PBB SHADOW E&M-EST. PATIENT-LVL V: CPT | Mod: PBBFAC,,, | Performed by: OTOLARYNGOLOGY

## 2022-07-14 RX ORDER — FLUTICASONE PROPIONATE 50 MCG
2 SPRAY, SUSPENSION (ML) NASAL DAILY
Qty: 16 G | Refills: 2 | Status: SHIPPED | OUTPATIENT
Start: 2022-07-14 | End: 2022-09-19

## 2022-07-14 RX ORDER — AMOXICILLIN AND CLAVULANATE POTASSIUM 875; 125 MG/1; MG/1
1 TABLET, FILM COATED ORAL 2 TIMES DAILY
Qty: 20 TABLET | Refills: 0 | Status: SHIPPED | OUTPATIENT
Start: 2022-07-14 | End: 2022-08-27

## 2022-07-14 NOTE — PROCEDURES
Ear Cerumen Removal    Date/Time: 7/14/2022 11:00 AM  Performed by: Lb Horta MD  Authorized by: Lb Horta MD     Consent Done?:  Yes (Verbal)    Local anesthetic:  None  Location details:  Both ears  Procedure type: curette    Cerumen  Removal Results:  Cerumen completely removed  Patient tolerance:  Patient tolerated the procedure well with no immediate complications

## 2022-07-14 NOTE — TELEPHONE ENCOUNTER
Called Ms. Alix Patel to remind her of her New IDEAS visit today (14-Jul-2022) at 11:00 AM. She understood.

## 2022-07-14 NOTE — PROCEDURES
Nasal/sinus endoscopy    Date/Time: 7/14/2022 11:00 AM  Performed by: Lb Horta MD  Authorized by: Lb Horta MD     Consent Done?:  Yes (Verbal)  Anesthesia:     Local anesthetic:  Lidocaine 4% and Gerardo-Synephrine 1/2%    Patient tolerance:  Patient tolerated the procedure well with no immediate complications  Nose:     Procedure Performed:  Nasal Endoscopy  External:      No external nasal deformity  Intranasal:      Mucosa no polyps     Mucosa ulcers not present     No mucosa lesions present     Enlarged turbinates     Septum gross deformity  Nasopharynx:      No mucosa lesions     Adenoids not present     Posterior choanae patent     Eustachian tube patent     Large ITs bilaterally  No bessie purulence  Right-sided mucoid exudate and drip  Right septal vomerian spur  No polyps  Nasopharynx clear

## 2022-07-14 NOTE — RESEARCH
Protocol: New IDEAS: Imaging Dementia--Evidence for Amyloid Scanning Study  A Study to Improve Precision in Amyloid PET Coverage and Patient Care    PI: Lei Figueroa MD  Sub-I: Johnathan Rae     Coordinator:  Alina Hanna      IRB #:  [id number]  IRB initial approval: 04-Aug-2021    Informed Consent Process   Did the subject sign the Informed Consent? yes    o Date: 14-Jul-2022  o Time: 10:07 AM   o Protocol Version: [id number]     Is LAR Consenting for Subject:no     Prior to the Informed Consent (ICF) being signed, or any protocol required testing, procedure, or intervention being performed, the following was done or discussed:   Purpose of the Study, Qualifications to Participate    Study Design, Schedule and Procedures    Risks, Benefits, Alternative Treatments, Compensation and Costs    Confidentiality and HIPAA Authorization for Release of Medical Records for the research trial/subject's right/study related injury    Study related contact information   Voluntary Participation and Withdrawal from the research trial at any time   Optional samples/procedures (if applicable)   Patient has been offered the opportunity to ask questions regarding the study and all questions were answered satisfactorily   Patient verbalizes understanding of the study/procedures and agrees to participate    CRC and PI/Sub-I contact information given to patient   Signed copy given to patient   Copy in patient's chart and original uploaded to Epic     Person Obtaining Consent: Alina Hanna        Visit 1: Eligibility/ Registration   In-office clinic visit is the preferred best practice for conducting visit, however, can be conducted via video conference or telemedicine platform.    Case Registration Form  *Please see Case Registration Form *   Was the Case Registration Form completed? yes   Was the Case Registration Form sent? yes    Inclusion / Exclusion:   Did subject meet all inclusion criteria? yes    Screen  Failure   Did subject screen fail?   no        If Yes, what was the date and reason for screen failure?    Reason: N/A     Adverse Event   Physician Decision   Pregnancy   Study Terminated by Sponsor   Technical Problem   Trial Screen Failure   Trial Site Terminated by Sponsor   Withdrawal of Consent by Subject   Other: Please Specify          Socio-Demographic Form *Please see Socio-Demographic Form *   Was the Socio-Demographic Form completed? yes     Amyloid PET Referral   Was the Amyloid PET Referral completed?yes   o Referred by: Dr. Carolina potter Date: TBD         Ms. Alix Patel completed the visit 1 per protocol and was thanked for their time and participation. Ms. Alix Patel  was reminded to call [facility] should there be any questions about this clinical trial. Subject was reminded to call if any adverse events are observed. Alix Patel   was reminded of their next visit, which is the Amyloid PET scan within 60 days.

## 2022-07-14 NOTE — PROGRESS NOTES
"  Subjective:      Alix is a 66 y.o. female who comes for follow-up of sinusitis.  Her last visit with me was on 12/9/2020.  Fine for a while, now over past few weeks has increasing bilateral nasal congestion, thick green mucus discharge and PND on right, aural fullness, bilateral facial pressure.  No hyposmia.    SNOT-22 score: : (P) 68  NOSE score:: (P) 65%  ETDQ-7 score:: (P) 2.4    The patient's medications, allergies, past medical, surgical, social and family histories were reviewed and updated as appropriate.    A detailed review of systems was obtained with pertinent positives as per the above HPI, and otherwise negative.        Objective:     Ht 5' 10" (1.778 m)   Wt 72.6 kg (160 lb)   BMI 22.96 kg/m²        Constitutional:   She appears well-developed. She is cooperative. Normal speech.  No hoarse voice.      Head:  Normocephalic. Salivary glands normal.  Facial strength is normal.      Ears:    Right Ear: No drainage or tenderness. Tympanic membrane is not perforated. Tympanic membrane mobility is normal. No middle ear effusion. No decreased hearing is noted.   Left Ear: No drainage or tenderness. Tympanic membrane is not perforated. Tympanic membrane mobility is normal.  No middle ear effusion. No decreased hearing is noted.     Nose:  Septal deviation present. No mucosal edema, rhinorrhea or polyps. No epistaxis. Turbinate hypertrophy.  Turbinates normal and no turbinate masses.  Right sinus exhibits no maxillary sinus tenderness and no frontal sinus tenderness. Left sinus exhibits no maxillary sinus tenderness and no frontal sinus tenderness.     Mouth/Throat  Oropharynx clear and moist without lesions or asymmetry and normal uvula midline. She does not have dentures. Normal dentition. No oral lesions or mucous membrane lesions. No oropharyngeal exudate or posterior oropharyngeal erythema. Mirror exam not performed due to patient tolerance.  Mirror exam not performed due to patient tolerance.  "     Neck:  Neck normal without thyromegaly masses, asymmetry, normal tracheal structure, crepitus, and tenderness, thyroid normal, trachea normal and no adenopathy. Normal range of motion present.     She has no cervical adenopathy.     Cardiovascular:   Regular rhythm.      Pulmonary/Chest:   Effort normal.     Psychiatric:   She has a normal mood and affect. Her speech is normal and behavior is normal.     Neurological:   No cranial nerve deficit.     Skin:   No rash noted.       Procedure    Cerumen impaction removed.  See procedure note.    Nasal endoscopy performed.  See procedure note.     Left nasal valve     Left MT     Left ET     Right nasal valve     Right MT     Right septal spur     Right ET w/ PND        Data Reviewed    WBC (K/uL)   Date Value   07/30/2021 6.35     Eosinophil % (%)   Date Value   07/30/2021 3.1     Eos # (K/uL)   Date Value   07/30/2021 0.2     Platelets (K/uL)   Date Value   07/30/2021 245     Glucose (mg/dL)   Date Value   08/12/2021 129 (H)     No results found for: IGE    No sinus imaging available.      Assessment:     1. Chronic rhinitis    2. Nasal turbinate hypertrophy    3. Acute sinusitis, recurrence not specified, unspecified location    4. Impacted cerumen of both ears         Plan:     Rx augmentin 10 day course.  Rx flonase daily.  Follow up if symptoms worsen or fail to improve.

## 2022-07-19 ENCOUNTER — TELEPHONE (OUTPATIENT)
Dept: RESEARCH | Facility: HOSPITAL | Age: 67
End: 2022-07-19
Payer: COMMERCIAL

## 2022-07-19 NOTE — TELEPHONE ENCOUNTER
Called Ms. Alix Patel to schedule a virtual visit with Dr. Figueroa for the New IDEAS study. She said tomorrow after her appointment at 11 AM will be fine. I will confirm with Dr. Figueroa to make sure tomorrow, 19-Jul-2022, at 12:30 PM works before scheduling the appointment.

## 2022-07-20 ENCOUNTER — OFFICE VISIT (OUTPATIENT)
Dept: NEUROLOGY | Facility: CLINIC | Age: 67
End: 2022-07-20
Payer: COMMERCIAL

## 2022-07-20 ENCOUNTER — OFFICE VISIT (OUTPATIENT)
Dept: PSYCHIATRY | Facility: CLINIC | Age: 67
End: 2022-07-20
Payer: OTHER MISCELLANEOUS

## 2022-07-20 DIAGNOSIS — F10.21 ALCOHOL USE DISORDER, MODERATE, IN SUSTAINED REMISSION: ICD-10-CM

## 2022-07-20 DIAGNOSIS — F31.30 BIPOLAR AFFECTIVE DISORDER, CURRENT EPISODE DEPRESSED, CURRENT EPISODE SEVERITY UNSPECIFIED: Primary | ICD-10-CM

## 2022-07-20 DIAGNOSIS — R41.3 MEMORY DISORDER: Primary | ICD-10-CM

## 2022-07-20 PROBLEM — R41.0 DISORIENTATION: Status: RESOLVED | Noted: 2021-07-23 | Resolved: 2022-07-20

## 2022-07-20 PROBLEM — R30.0 DYSURIA: Status: RESOLVED | Noted: 2019-08-17 | Resolved: 2022-07-20

## 2022-07-20 PROBLEM — R92.8 ABNORMAL MAMMOGRAM: Status: RESOLVED | Noted: 2022-01-07 | Resolved: 2022-07-20

## 2022-07-20 PROBLEM — R07.9 CHEST PAIN: Status: RESOLVED | Noted: 2017-04-26 | Resolved: 2022-07-20

## 2022-07-20 PROBLEM — N76.1 SUBACUTE VAGINITIS: Status: RESOLVED | Noted: 2019-08-17 | Resolved: 2022-07-20

## 2022-07-20 PROBLEM — N39.0 URINARY TRACT INFECTION WITH HEMATURIA: Status: RESOLVED | Noted: 2019-05-26 | Resolved: 2022-07-20

## 2022-07-20 PROBLEM — E55.9 VITAMIN D DEFICIENCY: Status: ACTIVE | Noted: 2022-07-20

## 2022-07-20 PROBLEM — R31.9 URINARY TRACT INFECTION WITH HEMATURIA: Status: RESOLVED | Noted: 2019-05-26 | Resolved: 2022-07-20

## 2022-07-20 PROCEDURE — 99212 PR OFFICE/OUTPT VISIT, EST, LEVL II, 10-19 MIN: ICD-10-PCS | Mod: 95,,, | Performed by: PSYCHIATRY & NEUROLOGY

## 2022-07-20 PROCEDURE — 3051F HG A1C>EQUAL 7.0%<8.0%: CPT | Mod: CPTII,95,, | Performed by: PSYCHIATRY & NEUROLOGY

## 2022-07-20 PROCEDURE — 90833 PSYTX W PT W E/M 30 MIN: CPT | Mod: 95,,, | Performed by: PSYCHIATRY & NEUROLOGY

## 2022-07-20 PROCEDURE — 99213 OFFICE O/P EST LOW 20 MIN: CPT | Mod: 95,,, | Performed by: PSYCHIATRY & NEUROLOGY

## 2022-07-20 PROCEDURE — 1157F PR ADVANCE CARE PLAN OR EQUIV PRESENT IN MEDICAL RECORD: ICD-10-PCS | Mod: CPTII,95,, | Performed by: PSYCHIATRY & NEUROLOGY

## 2022-07-20 PROCEDURE — 3051F PR MOST RECENT HEMOGLOBIN A1C LEVEL 7.0 - < 8.0%: ICD-10-PCS | Mod: CPTII,95,, | Performed by: PSYCHIATRY & NEUROLOGY

## 2022-07-20 PROCEDURE — 99212 OFFICE O/P EST SF 10 MIN: CPT | Mod: 95,,, | Performed by: PSYCHIATRY & NEUROLOGY

## 2022-07-20 PROCEDURE — 1157F ADVNC CARE PLAN IN RCRD: CPT | Mod: CPTII,95,, | Performed by: PSYCHIATRY & NEUROLOGY

## 2022-07-20 PROCEDURE — 90833 PR PSYCHOTHERAPY W/PATIENT W/E&M, 30 MIN (ADD ON): ICD-10-PCS | Mod: 95,,, | Performed by: PSYCHIATRY & NEUROLOGY

## 2022-07-20 PROCEDURE — 99213 PR OFFICE/OUTPT VISIT, EST, LEVL III, 20-29 MIN: ICD-10-PCS | Mod: 95,,, | Performed by: PSYCHIATRY & NEUROLOGY

## 2022-07-20 RX ORDER — HYDROXYZINE PAMOATE 25 MG/1
CAPSULE ORAL
Qty: 30 CAPSULE | Refills: 3 | Status: SHIPPED | OUTPATIENT
Start: 2022-07-20 | End: 2023-06-15

## 2022-07-20 NOTE — PROGRESS NOTES
"Outpatient Psychiatry Follow-Up Visit (MD/NP)    7/20/2022Phone visit. Patient tried and could not get into virtual system. (19", with 2" on meds and 17" for supportive counseling and update of history and mental status)    Clinical Status of Patient:  Outpatient (Ambulatory)    Chief Complaint:  Alix Patel is a 66 y.o. female who presents today for follow-up of mood disorder.  Met with patient and no relatives present for interview.      Interval History and Content of Current Session:  Interim Events/Subjective Report/Content of Current Session: Patient is at work today and feels she is sleeping better now. Patient continues to work regularly. Patient did discuss her work stressors. Patient has no thoughts of harming herself nor current signs of mallory or psychosis. Patient is in good self control, however, and is trying the best she can to deal with her concerns. Patient denies med side effects. Patient had some complaints of "vertigo", however. Patient is being evaluated medically by Neurology at [facility] for this concern. Patient has a good appetite. Patient enjoys her cats. Patient does have friends with whom she visits at times. We again discussed my M[facility]. Patient agrees to schedule an appointment with another psychiatrist. Patient is also seeing Dr Bazzi regularly too. Patient is not drinking now, and states she has not had a drink in a week.     Psychotherapy:  · Target symptoms: alcohol abuse, mood disorder  · Why chosen therapy is appropriate versus another modality: relevant to diagnosis, patient responds to this modality, evidence based practice  · Outcome monitoring methods: self-report  · Therapeutic intervention type: supportive psychotherapy  · Topics discussed/themes: mood and drinking issues, stress related to medical comorbidities  · The patient's response to the intervention is accepting. The patient's progress toward treatment goals is fair.   · Duration of intervention: 19 " minutes.    Review of Systems   · PSYCHIATRIC: Pertinant items are noted in the narrative.    Past Medical, Family and Social History: The patient's past medical, family and social history have been reviewed and updated as appropriate within the electronic medical record - see encounter notes.    Compliance: yes    Side effects: possible  vertigo    Risk Parameters:  Patient reports no suicidal ideation  Patient reports no homicidal ideation  Patient reports no self-injurious behavior  Patient reports no violent behavior    Exam (detailed: at least 9 elements; comprehensive: all 15 elements)   Constitutional  Vitals:  Most recent vital signs, dated less than 90 days prior to this appointment, were reviewed.   There were no vitals filed for this visit. Patient reports stable vital signs     General:  unremarkable, age appropriate, could not assess appearance     Musculoskeletal  Muscle Strength/Tone:  patient reports some loss of muscle tone and strength   Gait & Station:  non-ataxic     Psychiatric  Speech:  no latency; no press, spontaneous   Mood & Affect:  steady  congruent and appropriate   Thought Process:  normal and logical   Associations:  intact   Thought Content:  normal, no suicidality, no homicidality, delusions, or paranoia   Insight:  has awareness of illness   Judgement: behavior is adequate to circumstances   Orientation:  grossly intact   Memory: intact for content of interview   Language: grossly intact   Attention Span & Concentration:  able to focus   Fund of Knowledge:  not tested     Assessment and Diagnosis   Status/Progress: Based on the examination today, the patient's problem(s) is/are adequately but not ideally controlled.  New problems have not been presented today.   no new obstacles are not complicating management of the primary condition.  The working differential for this patient includes mood disorder and alcohol use.     General Impression: Patient has a stable mood at this time and  is in good self control. Patient is not an active danger to self or others at this time and is working on reducing her alcohol intake as well. Patient is motivated to maintain her mood stability, and continue her counseling with Dr Bazzi as well.      ICD-10-CM ICD-9-CM   1. Bipolar affective disorder, current episode depressed, current episode severity unspecified  F31.30 296.50   2. Alcohol use disorder, moderate, in sustained remission  F10.21 305.03       Intervention/Counseling/Treatment Plan   · Medication Management: The risks and benefits of medication were discussed with the patient. Patient agrees to continue Lexapro 20 mg q am, trazadone 150 mg q hs, Depakote 1.0 gm q hs, and hydroxyzine 25-50 mg q hs prn only difficulty sleeping.      Return to Clinic: Patient will see another doctor due to my M[facility]

## 2022-07-22 NOTE — PROGRESS NOTES
Established Patient - Audio Only Telehealth Visit     The patient location is: [address]  [place] [address]   The chief complaint leading to consultation is: Cognitive impairment and dizziness  Visit type: Virtual visit with audio only (telephone)  Total time spent with patient: 12 min       The reason for the audio only service rather than synchronous audio and video virtual visit was related to technical difficulties or patient preference/necessity.     Each patient to whom I provide medical services by telemedicine is:  (1) informed of the relationship between the physician and patient and the respective role of any other health care provider with respect to management of the patient; and (2) notified that they may decline to receive medical services by telemedicine and may withdraw from such care at any time. Patient verbally consented to receive this service via voice-only telephone call.       HPI: Ms. Patel is a 66 y.o. left handed female with a history of bipolar disorder, alcohol use disorder, depression, anxiety, mTBI, HTN, DM2, HLD, and COPD who presents today to the [facility] due to memory difficulty. Ms. Patel has been experiencing progressively worsening cognitive impairment for an unspecified period of time. On prior evaluation, she reported memory trouble since 2012, however, on todays evaluation she reports trouble since mid-2019. Regardless, her initial symptom was memory difficulty and later exhibited symptoms of executive dysfunction. These symptoms led to her forgetting to pay numerous bills, resulting in her owing $5000 and having her vehicle repossessed. Ms. Patel has also experienced trouble with walking due to imbalance as well as lightheadedness when rising from a chair or getting out of bed since early-2019.     MMSE 26/30 on initial eval with points lost on orientation to time (-1), delayed recall (-2), and figure copying (-1). Carnitine  "performed on 08/12/2021 was low. MRI from January 2020 and July 2021 showed a similar degree of generalized volume loss, with involvement of the medial temporal lobes (MTA score ~1-2), compensatory enlargement of the lateral ventricles, and T2/FLAIR hyperintensity in the santa likely secondary to chronic microvascular ischemia.    Interval history (07/20/22):  Pt reports feeling "loopy" and describes having dizziness when she gets up out of bed and stand from sitting. She says that she is now having to take a moment to get her bearings. It began around one year ago. It has worsened then plateaued. Intermittent hand tremor bilaterally which is worse in heat. Patient says that she now takes Trulicity. She denies trouble with attention, concentration, or problem solving.      Assessment and plan:    No changes in plan at this time.   Pt consented to amyloid PET through [facility] study on 7/14/22.      This service was not originating from a related E/M service provided within the previous 7 days nor will  to an E/M service or procedure within the next 24 hours or my soonest available appointment.  Prevailing standard of care was able to be met in this audio-only visit.        Lei Figueroa MD   Behavioral Neurology & Neuropsychiatry  [facility]  "

## 2022-07-26 ENCOUNTER — TELEPHONE (OUTPATIENT)
Dept: PSYCHIATRY | Facility: CLINIC | Age: 67
End: 2022-07-26
Payer: COMMERCIAL

## 2022-07-26 NOTE — TELEPHONE ENCOUNTER
Called patient to schedule an appointment to establish care with another provider. Current psychiatrist has recently retired. No answer. Message left. Instructed to return call to Maryan for scheduling. Contact information provided.

## 2022-08-09 ENCOUNTER — OFFICE VISIT (OUTPATIENT)
Dept: NEUROLOGY | Facility: CLINIC | Age: 67
End: 2022-08-09
Payer: COMMERCIAL

## 2022-08-09 VITALS
HEIGHT: 70 IN | DIASTOLIC BLOOD PRESSURE: 86 MMHG | SYSTOLIC BLOOD PRESSURE: 128 MMHG | BODY MASS INDEX: 22.96 KG/M2 | HEART RATE: 91 BPM

## 2022-08-09 DIAGNOSIS — R41.3 MEMORY DISORDER: Primary | ICD-10-CM

## 2022-08-09 PROCEDURE — 3079F PR MOST RECENT DIASTOLIC BLOOD PRESSURE 80-89 MM HG: ICD-10-PCS | Mod: CPTII,S$GLB,, | Performed by: PSYCHIATRY & NEUROLOGY

## 2022-08-09 PROCEDURE — 99212 PR OFFICE/OUTPT VISIT, EST, LEVL II, 10-19 MIN: ICD-10-PCS | Mod: S$GLB,,, | Performed by: PSYCHIATRY & NEUROLOGY

## 2022-08-09 PROCEDURE — 99999 PR PBB SHADOW E&M-EST. PATIENT-LVL IV: CPT | Mod: PBBFAC,,, | Performed by: PSYCHIATRY & NEUROLOGY

## 2022-08-09 PROCEDURE — 3008F BODY MASS INDEX DOCD: CPT | Mod: CPTII,S$GLB,, | Performed by: PSYCHIATRY & NEUROLOGY

## 2022-08-09 PROCEDURE — 3288F FALL RISK ASSESSMENT DOCD: CPT | Mod: CPTII,S$GLB,, | Performed by: PSYCHIATRY & NEUROLOGY

## 2022-08-09 PROCEDURE — 3074F SYST BP LT 130 MM HG: CPT | Mod: CPTII,S$GLB,, | Performed by: PSYCHIATRY & NEUROLOGY

## 2022-08-09 PROCEDURE — 1101F PT FALLS ASSESS-DOCD LE1/YR: CPT | Mod: CPTII,S$GLB,, | Performed by: PSYCHIATRY & NEUROLOGY

## 2022-08-09 PROCEDURE — 3008F PR BODY MASS INDEX (BMI) DOCUMENTED: ICD-10-PCS | Mod: CPTII,S$GLB,, | Performed by: PSYCHIATRY & NEUROLOGY

## 2022-08-09 PROCEDURE — 3074F PR MOST RECENT SYSTOLIC BLOOD PRESSURE < 130 MM HG: ICD-10-PCS | Mod: CPTII,S$GLB,, | Performed by: PSYCHIATRY & NEUROLOGY

## 2022-08-09 PROCEDURE — 3079F DIAST BP 80-89 MM HG: CPT | Mod: CPTII,S$GLB,, | Performed by: PSYCHIATRY & NEUROLOGY

## 2022-08-09 PROCEDURE — 99999 PR PBB SHADOW E&M-EST. PATIENT-LVL IV: ICD-10-PCS | Mod: PBBFAC,,, | Performed by: PSYCHIATRY & NEUROLOGY

## 2022-08-09 PROCEDURE — 1157F ADVNC CARE PLAN IN RCRD: CPT | Mod: CPTII,S$GLB,, | Performed by: PSYCHIATRY & NEUROLOGY

## 2022-08-09 PROCEDURE — 1126F PR PAIN SEVERITY QUANTIFIED, NO PAIN PRESENT: ICD-10-PCS | Mod: CPTII,S$GLB,, | Performed by: PSYCHIATRY & NEUROLOGY

## 2022-08-09 PROCEDURE — 1157F PR ADVANCE CARE PLAN OR EQUIV PRESENT IN MEDICAL RECORD: ICD-10-PCS | Mod: CPTII,S$GLB,, | Performed by: PSYCHIATRY & NEUROLOGY

## 2022-08-09 PROCEDURE — 1126F AMNT PAIN NOTED NONE PRSNT: CPT | Mod: CPTII,S$GLB,, | Performed by: PSYCHIATRY & NEUROLOGY

## 2022-08-09 PROCEDURE — 3051F PR MOST RECENT HEMOGLOBIN A1C LEVEL 7.0 - < 8.0%: ICD-10-PCS | Mod: CPTII,S$GLB,, | Performed by: PSYCHIATRY & NEUROLOGY

## 2022-08-09 PROCEDURE — 3288F PR FALLS RISK ASSESSMENT DOCUMENTED: ICD-10-PCS | Mod: CPTII,S$GLB,, | Performed by: PSYCHIATRY & NEUROLOGY

## 2022-08-09 PROCEDURE — 99212 OFFICE O/P EST SF 10 MIN: CPT | Mod: S$GLB,,, | Performed by: PSYCHIATRY & NEUROLOGY

## 2022-08-09 PROCEDURE — 1101F PR PT FALLS ASSESS DOC 0-1 FALLS W/OUT INJ PAST YR: ICD-10-PCS | Mod: CPTII,S$GLB,, | Performed by: PSYCHIATRY & NEUROLOGY

## 2022-08-09 PROCEDURE — 3051F HG A1C>EQUAL 7.0%<8.0%: CPT | Mod: CPTII,S$GLB,, | Performed by: PSYCHIATRY & NEUROLOGY

## 2022-08-09 NOTE — PROGRESS NOTES
[facility]    Name: Alix Patel  : 1955  MRN: [id number]    Date: 2022    Follow-up Visit    Assessment:     Ms. Patel is a 67 y.o. left handed female with a history of bipolar disorder, alcohol use disorder, depression, anxiety, mTBI, HTN, DM2, HLD, and COPD who presents today to the [facility] due to memory difficulty. Ms. Patel has been experiencing progressively worsening cognitive impairment for an unspecified period of time. On prior evaluation, she reported memory trouble since , however, on todays evaluation she reports trouble since mid-. Regardless, her initial symptom was memory difficulty and later exhibited symptoms of executive dysfunction. These symptoms led to her forgetting to pay numerous bills, resulting in her owing $5000, and having her vehicle repossessed. Ms. Ureña also experienced trouble with walking and balance as well as lightheadedness when rising from a chair or getting out of bed since early-.     On initial eval, MMSE 26/30 with points lost on orientation to time (-1), delayed recall (-2), and figure copying (-1). Her labs were notable for recently elevated AST/ALT and A1c of 11.1.  Additionally, carnitine performed on 2021 was low. MRI from 2020 and 2021 showed a similar degree of generalized volume loss, with involvement of the medial temporal lobes (MTA score ~1-2), compensatory enlargement of the lateral ventricles, and T2/FLAIR hyperintensity in the santa likely secondary to chronic microvascular ischemia.    The underlying cause of Ms. Ureña cognitive symptoms is unclear at this time, however, there is concern for a neurodegenerative disorder given her progressively worsening cognitive symptoms over the past several years. Her initial report of memory impairment and family history of Alzheimers disease in her mother and two maternal cousins raises suspicion for AD, however, is not  enough evidence at this time to make this diagnosis.  However, her reported improvement in cognition since her last appointment is uncharacteristic of a neurodegenerative disease.  Other potential contributors include heavy alcohol use, overtaking medications, valproic acid (particularly in the setting of carnitine deficiency), and uncontrolled diabetes.  Additional workup will be performed, including neuropsychological testing. Amyloid PET.    Recommendations:     Workup  Amyloid PET through [facility] study pending.    Treatment  L-carnitine 500 mg daily    Safety-related  We recommend that a medication management system be put in place to avoid errors in taking medication. Helpful services include PillPack (pillpack.com; free service) and [employer] ([employer]; paid subscription service).  We recommend that Ms. Patel not drive.    Health maintenance   Continue to follow-up with primary care doctor to monitor and treat vascular risk factors.  Recommend performing moderate-intensity cardiovascular exercise 30 minutes per day, 5 days per week.  Recommend staying socially and cognitively active.  Recommend eating a healthy and balanced diet (Mediterranean or DASH diet).    Follow-up: Follow up after amyloid PET.     Subjective:      Chief complaint:  No chief complaint on file.    Consultation requested by: No ref. provider found    History of present illness:  Ms. Patel is a 67 y.o. left handed female with a history of  bipolar disorder, alcohol use disorder, depression, anxiety, mTBI, HTN, DM2, HLD, and COPD who presents today to the [facility] due to concerns regarding memory deficits. Ms. Patel is accompanied by her daughter, Kayla, who participates in providing history. Additional information is obtained by reviewing available medical records.     HPI from her initial evaluation on 08/12/2021:  Ms. Patel was born in [place] on 1955. She grew up in [place] and was raised by  her parents. She went to high school at [employer] and graduated in 1973. After completing high school, Ms. Patel attended [employer] but did not graduate. She went on to study music at [employer] in [facility] and spends much of her time performing as a . 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 She stopped working due to sustaining a head injury (discussed below).    Ms. Patel reports that she began experiencing trouble with memory around mid-2019, however, on her previous evaluation by Dr. Vital, she reported that she began experiencing memory trouble around 2012. Ms. Patel describes initially experiencing trouble with remembering details of conversations and remembering to complete tasks. She reports that on one occasion in late-2019, she cooked a rabbit and left it in during conversation, and repeatedly asking the same questions and making the same statements. She has forgotten to pay bills for rent, electricity, and water which has led to her owing $5000 in missed payments. Additionally, her vehicle was repossessed due to forgetting to pay the note.    In addition to memory difficulty, Ms. Ureña cognitive processing speed has slowed since 2019, and she describes progressive trouble with attention, concentration, and distractibility. Her ability to multitask has worsened and she has become increasingly disorganized. She also describes experiencing frequent episodes of depression and significant anxiety. Her balance has been poor since early-2019 and she describes feeling as if she is drunk. She has trouble going up and down stairs and reportedly shuffles when she walks. She previously had a high frequency, low amplitude postural tremor predominantly in her right hand, though this has largely resolved. She describes frequently experiencing lightheadedness when she gets out of bed or gets up from a chair.    Ms. Patel was  "recently admitted to [facility] on 07/21/21 after falling in a department store while shopping. Patient reportedly felt dizzy, could not maintain her balance, fell into the shelves, and bruised the left side of her face. She denied losing consciousness at the time. On admission, she reported that she did not take her oral diabetic medication that morning and her blood sugars were noted to be high by EMS. Ms. Patel improved and was going to be discharged, however, after pharmacy brought her medications to bedside, the patient overdosed on metformin, glipizide, levothyroxine, and buspirone for an unspecified reason. She does not recall taking these medications. She improved over time and was discharged on 07/27/21. On 07/30/21, Ms. Patel was brought to the ED by her daughter after she was seen attempting to ingest a large quantity of medications. At the time of her presentation, she said that she attempted to take the medications because she had not taken any of her medications that day and had no thoughts of wanting to harm herself or end her life.     She reports that she was diagnosed with bipolar disorder about 10 years ago. The earliest documentation of this in the medical record is from August 2012 by Dr. Naresh Colunga. She is currently being treated with Depakote ER 1000 mg daily.    Interval history (01/07/2022):  Patient is back at work, has vehicle back, will be moving into a new place soon. She reports that work is going well, but everyone is being COVID is rampant which is concerning for her. Patient reports that memory is significantly improved. Not forgetting to pay bills. Only has three bills a month to pay and has not missed any payments. Boss called patient into her office due to being concerned about the patient because she thought the patient was "slipping back to how she was before she left." Patient reports conflict with her boss and says that her boss is out to get her. She says that her " boss demands that she produces things more quickly than possible. For example, she was supposed to get signed memos from all employees in the dept and give them to her boss, but this took her longer than one day which upset her boss. Kayla reports that the patient is largely back to baseline.  Patient is functionally independent at this time.    Interval history (08/09/2022):  At last appointment, patient reported memory had improved. However, she reports that memory has worsened since last appointment. Daughter confirms this. At last visit, discussed undergoing amyloid PET through [facility] study. Pt consented 7/14/22.     Review of systems:  Review of systems negative except as noted in the HPI    Past Medical History:   Diagnosis Date    Anxiety     Bipolar 1 disorder     COPD (chronic obstructive pulmonary disease)     Depression     Diabetes mellitus     GERD (gastroesophageal reflux disease)     Grade I open displaced transverse fracture of shaft of left humerus 3/20/2023    HEARING LOSS     pt states she has loss slighty in rt ear.    Hypertension     Insomnia     Rash      Past Surgical History:   Procedure Laterality Date    APPENDECTOMY      BREAST BIOPSY      COLONOSCOPY      COLONOSCOPY N/A 10/31/2019    Procedure: COLONOSCOPY;  Surgeon: Philippe Monteiro MD;  Location: [address]);  Service: Endoscopy;  Laterality: N/A;  PM prep-MH    CYST REMOVAL      ESOPHAGOGASTRODUODENOSCOPY N/A 10/31/2019    Procedure: EGD (ESOPHAGOGASTRODUODENOSCOPY);  Surgeon: Philippe Monteiro MD;  Location: [address]);  Service: Endoscopy;  Laterality: N/A;  Pm prep-MH    HYSTERECTOMY      IRRIGATION AND DEBRIDEMENT OF UPPER EXTREMITY Left 3/21/2023    Procedure: IRRIGATION AND DEBRIDEMENT, UPPER EXTREMITY;  Surgeon: Ousmane Crawford MD;  Location: [address];  Service: Orthopedics;  Laterality: Left;    ORIF HUMERUS FRACTURE Left 3/21/2023    Procedure: ORIF, FRACTURE, HUMERUS;  Surgeon:  Ousmane Crawford MD;  Location: [address];  Service: Orthopedics;  Laterality: Left;     Family History   Problem Relation Age of Onset    Alzheimer's disease Mother     Sinus disease Mother     Heart failure Father     Hyperlipidemia Father     Heart attack Father     No Known Problems Sister     Sarcoidosis Sister     Colon cancer Maternal Aunt     Alzheimer's disease Maternal Cousin     Alzheimer's disease Maternal Cousin     Anxiety disorder Daughter     Depression Daughter     Esophageal cancer Neg Hx      Social History     Socioeconomic History    Marital status:      Spouse name: N/A    Number of children: 1    Years of education: 13    Highest education level: Some college, no degree   Occupational History    Occupation:      Employer: [employer]   Tobacco Use    Smoking status: Former     Packs/day: 0.50     Years: 34.00     Pack years: 17.00     Types: Cigarettes     Start date:      Quit date:      Years since quittin.2     Passive exposure: Never    Smokeless tobacco: Never   Substance and Sexual Activity    Alcohol use: Yes     Comment: Rare    Drug use: Not Currently     Types: Marijuana, Cocaine    Sexual activity: Not Currently     Partners: Male     Birth control/protection: None   Social History Narrative    Ms. Patel was born in [place] on 1955. She grew up in [place] and was raised by her parents. She went to high school at [employer] and graduated in . After completing high school, Ms. Patel attended [employer] but did not graduate. 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 She would like to return to [employer] in the future to get degree in music.      Current Outpatient Medications:     ammonium lactate 12 % Crea, SAMSON EXT AA  OF FEET ONCE DAILY, Disp: , Rfl:     blood sugar diagnostic (TRUE METRIX GLUCOSE TEST STRIP MISC), True  Metrix Glucose Test Strip  TEST TWICE DAILY AS DIRECTED, Disp: , Rfl:     blood-glucose meter (TRUE METRIX AIR GLUCOSE METER MISC), True Metrix Air Glucose Meter  USE UNDER THE SKIN TWICE DAILY AS DIRECTED, Disp: , Rfl:     CRESTOR 20 mg tablet, Take 20 mg by mouth once daily. , Disp: , Rfl: 3    dulaglutide (TRULICITY) 0.75 mg/0.5 mL pen injector, as directed, Disp: , Rfl:     estradioL (ESTRACE) 0.01 % (0.1 mg/gram) vaginal cream, USE 1 GRAM VAGINALLY 3 TIMES A WEEK, Disp: , Rfl:     hydrOXYzine pamoate (VISTARIL) 25 MG Cap, Take 1 to 2 caps q hs prn difficulty sleeping, Disp: 30 capsule, Rfl: 3    levothyroxine (SYNTHROID) 100 MCG tablet, Take 1 tablet (100 mcg total) by mouth once daily., Disp: 30 tablet, Rfl: 1    metFORMIN (GLUCOPHAGE) 500 MG tablet, Take 2 tablets (1,000 mg total) by mouth 2 (two) times daily with meals., Disp: 120 tablet, Rfl: 1    acetaminophen (TYLENOL) 500 MG tablet, Take 1 tablet (500 mg total) by mouth every 8 (eight) hours as needed for Pain., Disp: 30 tablet, Rfl: 0    aspirin (ECOTRIN) 81 MG EC tablet, Take 1 tablet by mouth once daily., Disp: , Rfl:     EScitalopram oxalate (LEXAPRO) 20 MG tablet, Take 1 tablet (20 mg total) by mouth once daily., Disp: 90 tablet, Rfl: 3    flu vacc [id number] 6mos up,PF, (FLUARIX QUAD [id number], PF,) 60 mcg (15 mcg x 4)/0.5 mL Syrg, ADM 0.5ML IM UTD, Disp: , Rfl:     gabapentin (NEURONTIN) 300 MG capsule, Take 1 capsule (300 mg total) by mouth 3 (three) times daily. for 10 days, Disp: 30 capsule, Rfl: 0    ibuprofen (ADVIL,MOTRIN) 800 MG tablet, Take 1 tablet (800 mg total) by mouth 3 (three) times daily as needed for Pain., Disp: 30 tablet, Rfl: 0    promethazine (PHENERGAN) 25 MG tablet, TK 1 T PO  Q 12 H PRN, Disp: , Rfl:     tiZANidine (ZANAFLEX) 2 MG tablet, Take 1 tablet (2 mg total) by mouth every 8 (eight) hours as needed (muscle spams)., Disp: 21 tablet, Rfl: 0    traZODone (DESYREL) 150 MG tablet, TAKE 1 TABLET(150 MG) BY MOUTH EVERY  "EVENING, Disp: 90 tablet, Rfl: 2    urea (CARMOL) 40 % Crea, Apply topically once daily. To dry skin on the feet., Disp: 85 g, Rfl: 10    varicella-zoster [id number], PF, (SHINGRIX, PF,) 50 mcg/0.5 mL injection, ADM 0.5ML IM UTD, Disp: , Rfl:     Allergies:  Lamictal [lamotrigine]    Objective:     Vital signs:  Blood pressure 128/86, pulse 91, height 5' 10" (1.778 m).    Neurological examination:  Deferred    Neuroimaging:  Results for orders placed or performed during the hospital encounter of 07/30/21   CT Head Without Contrast    Narrative    EXAMINATION:  CT HEAD WITHOUT CONTRAST    CLINICAL HISTORY:  Altered mental status;Head trauma, abnormal mental status (Age 19-64y);    TECHNIQUE:  Low dose axial CT images obtained throughout the head without intravenous contrast. Sagittal and coronal reconstructions were performed.    COMPARISON:  MRI brain 07/21/2021 and head CT 07/20/2021    FINDINGS:  Patient motion with beam hardening and streak artifact slightly limits evaluation, which mostly resolved with repeat scanning.    Intracranial compartment:    Generalized cerebral volume loss.  Ventricles are midline and stable in size and configuration without distortion by mass effect or acute hydrocephalus.  No extra-axial blood or fluid collections.    Grossly stable distribution of patchy hypoattenuation of the supratentorial white matter consistent with chronic microvascular ischemic change.  No definite new focal areas of abnormal parenchymal attenuation.  No parenchymal mass, hemorrhage, edema or major vascular distribution infarct.    Skull/extracranial contents (limited evaluation): No fracture. Mastoid air cells and paranasal sinuses are essentially clear.      Impression    No acute large vascular territory infarct or intracranial hemorrhage identified.  Further evaluation/follow-up as warranted.      Electronically signed by: Sanjeev Sarabia MD  Date:    07/30/2021  Time:    14:55   Results for orders placed or " performed during the hospital encounter of 07/20/21   MRI Brain Without Contrast    Narrative    EXAMINATION:  MRI BRAIN WITHOUT CONTRAST    CLINICAL HISTORY:  Dizziness, non-specific;    TECHNIQUE:  Multiplanar multisequence MR imaging of the brain was performed without contrast.    COMPARISON:  CT 07/20/2021, MRI 01/05/2020    FINDINGS:  There is motion artifact.    There is no intracranial mass, or acute hemorrhage.  There are punctate foci of restricted diffusion within the brainstem, new since the previous examination however are suspected to reflect that of artifact or related to T2 shine through.  There is generalized cerebral volume loss.  There are scattered punctate foci of T2/FLAIR signal abnormality within the supratentorial white matter as well as within the santa suggesting sequela of chronic microvascular ischemic change.  There is no hydrocephalus. There are no significant extra-axial or extracranial abnormalities.    The globes, orbits, pituitary gland, pineal gland and craniocervical junction are normal in configuration.  There is fluid layering within the bilateral maxillary sinuses.  The major vascular flow voids are patent.  Please see separately dictated report for details of maxillofacial injuries.      Impression    1. No convincing acute intracranial abnormalities, no findings to suggest acute major vascular territory infarct or hemorrhage.  2. High T2 signal within the santa and brainstem, the appearance of which suggests that of chronic microvascular ischemic change or possible artifact caudally.  3. Sequela of chronic microvascular ischemic change and senescent change.  4. Sinus disease.  5. Please see separately dictated CT for details of the maxillofacial injuries.      Electronically signed by: Sylvester Caceres MD  Date:    07/21/2021  Time:    15:57   Results for orders placed or performed during the hospital encounter of 01/25/20   MRI Brain W WO Contrast    Narrative     EXAMINATION:  MRI BRAIN W WO CONTRAST    CLINICAL HISTORY:  ataxia; Other amnesia    TECHNIQUE:  Multiplanar multisequence MR imaging of the brain was performed before and after the administration of 8 mL Gadavist intravenous contrast.    COMPARISON:  Head CT 01/27/2019    FINDINGS:  There is no evidence of diffusion restriction to suggest recent ischemia.  There is mild age advanced cerebral atrophy.  Minimal patchy T2 hyperintensity without enhancement is seen within the belly of the santa.  The brain parenchyma and CSF spaces are otherwise unremarkable.  No pathologic enhancement is seen after contrast administration.  Major vascular pathways are patent.      Impression    Mild cerebral atrophy and nonspecific white matter changes within the santa.      Electronically signed by: Tim Kirk  Date:    01/25/2020  Time:    13:54     Laboratory studies:  No visits with results within 6 Week(s) from this visit.   Latest known visit with results is:   Clinical Support on 12/08/2021   Component Date Value Ref Range Status    POC Rapid COVID 12/08/2021 Negative  Negative Final     Acceptable 12/08/2021 Yes   Final                       I performed a total of 12 minutes on Ms. Patel's care on the day of their visit excluding time spent related to any billed procedures. This time includes time spent with the patient as well as time spent documenting in the medical record, reviewing patient's records and tests, obtaining history, placing orders, communicating with other healthcare professionals, counseling the patient, family, or caregiver, and/or care coordination for the diagnoses above.    This note was dictated using [employer] speech recognition software. Please excuse any spelling or grammatical errors. Word recognition mistakes are occasionally missed on review.      Lei Figueroa MD   Behavioral Neurology & Neuropsychiatry  [facility]

## 2022-08-16 DIAGNOSIS — F02.80 AD (ALZHEIMER'S DISEASE): ICD-10-CM

## 2022-08-16 DIAGNOSIS — G30.9 AD (ALZHEIMER'S DISEASE): ICD-10-CM

## 2022-08-16 DIAGNOSIS — Z00.6 EXAMINATION OF PARTICIPANT IN CLINICAL TRIAL: Primary | ICD-10-CM

## 2022-08-18 ENCOUNTER — TELEPHONE (OUTPATIENT)
Dept: OTOLARYNGOLOGY | Facility: CLINIC | Age: 67
End: 2022-08-18
Payer: COMMERCIAL

## 2022-08-18 NOTE — TELEPHONE ENCOUNTER
I spoke with her.  She is reportedly being worked up by her neurologist, Dr. Figueroa, for early Alzheimer's with a scan upcoming later this month.  She believes that he should be made aware of the images that I took during endoscopy at her last visit.  I advised her that they are readily available within the notes in Epic.  (I am, however, unable to read his most current note dated 8/9/22 as it has apparently not yet been signed.)  At her suggestion, I offered to reach out to Dr. Figueroa to discuss her case and the relative contribution of ENT-related disease to her condition.

## 2022-08-18 NOTE — TELEPHONE ENCOUNTER
----- Message from Diana Benitez LPN sent at 8/17/2022  4:26 PM CDT -----  Regarding: FW: Needing Scan Reviewd  Contact: pt @ [phone]    ----- Message -----  From: Marcela Lay  Sent: 8/17/2022   4:16 PM CDT  To: [facility] Staff  Subject: Needing Scan Reviewd                             Pt is requesting a call back regarding images from Dr. Figueroa office , trying to determine if its sinus or if it neurologic issues . Pt also stated that she has Pet scan reviews before august 31. Please call to discuss further

## 2022-08-25 ENCOUNTER — TELEPHONE (OUTPATIENT)
Dept: OTOLARYNGOLOGY | Facility: CLINIC | Age: 67
End: 2022-08-25
Payer: COMMERCIAL

## 2022-08-25 NOTE — TELEPHONE ENCOUNTER
----- Message from Lb Grfifith MD sent at 8/24/2022  4:49 PM CDT -----  I gave her Augmentin last month.  Did that help her?    I reached out to Dr. Figueroa but haven't heard back.  Will try again.    ----- Message -----  From: Diana Benitez LPN  Sent: 8/24/2022   1:14 PM CDT  To: Lb Griffith MD      ----- Message -----  From: Brittany Perdomo  Sent: 8/24/2022  12:06 PM CDT  To: [facility] Staff    HAILEY LISA calling regarding Patient Advice (message) for sinus pressure and pain.  She want to know if you can call something in for this.   call back [phone]    She also want to know if Dr. Griffith and Dr. Figueroa had a chance to talk?      [id number] [address]  [address] [place] [id number]  Phone: [phone] Fax: [phone]

## 2022-08-31 ENCOUNTER — HOSPITAL ENCOUNTER (OUTPATIENT)
Dept: RADIOLOGY | Facility: HOSPITAL | Age: 67
Discharge: HOME | End: 2022-08-31
Attending: PSYCHIATRY & NEUROLOGY
Payer: COMMERCIAL

## 2022-08-31 DIAGNOSIS — F02.80 AD (ALZHEIMER'S DISEASE): ICD-10-CM

## 2022-08-31 DIAGNOSIS — G30.9 AD (ALZHEIMER'S DISEASE): ICD-10-CM

## 2022-08-31 DIAGNOSIS — Z00.6 EXAMINATION OF PARTICIPANT IN CLINICAL TRIAL: ICD-10-CM

## 2022-08-31 LAB — POCT GLUCOSE: 139 MG/DL (ref 70–110)

## 2022-08-31 PROCEDURE — 78814: ICD-10-PCS | Mod: 26,PI,, | Performed by: RADIOLOGY

## 2022-08-31 PROCEDURE — 78814 PET IMAGE W/CT LMTD: CPT | Mod: 26,PI,, | Performed by: RADIOLOGY

## 2022-08-31 PROCEDURE — 78814 PET IMAGE W/CT LMTD: CPT | Mod: TC

## 2022-09-01 ENCOUNTER — DOCUMENTATION ONLY (OUTPATIENT)
Dept: RESEARCH | Facility: HOSPITAL | Age: 67
End: 2022-09-01

## 2022-09-01 NOTE — PROGRESS NOTES
Ms.Estella Sania Patel is participating in the [facility] study. Participant # [id number]     Informed Consent was done on 14-Jul-2022  Registration form was done on 14-Jul-2022  Pre-PET form was done 21-Jul-2022  Amyloid PET was done on 31-Aug-2022  PET Completion Form was done on 31-Aug-2022  PET Report Form was done on 31-Aug-2022  PET Assessment Form was done on 31-Aug-2022     I will update this note when additional forms for this patient was completed.      Alina Hanna, MS  Senior Clinical Research Coordinator  Neuroscience Research Department  [facility] [address] [place] [address]  [email or web address]  [phone]      RICHARD 01-Sep-2022

## 2022-09-02 ENCOUNTER — TELEPHONE (OUTPATIENT)
Dept: RESEARCH | Facility: HOSPITAL | Age: 67
End: 2022-09-02
Payer: COMMERCIAL

## 2022-09-02 NOTE — TELEPHONE ENCOUNTER
"Ms. Alix Patel called regarding the New IDEAS Amyloid PET results. I told her that Dr. Figueroa will discuss the results with her on Wednesday during her visit with him.     She expressed she was anxious about the test results and how she was "waking up in the middle of the night". She also told me how multiple people in her family have suffered from Alzheimer's.     To help ease her anxiety, I let her know that Dr. Figueroa informed me that the scan looked negative, but again he will have to go more in depth since I am not a doctor.     I reminded her that her visit with him is for 07-Sep-2022 at 1PM.      "

## 2022-09-09 ENCOUNTER — TELEPHONE (OUTPATIENT)
Dept: OTOLARYNGOLOGY | Facility: CLINIC | Age: 67
End: 2022-09-09
Payer: COMMERCIAL

## 2022-09-09 ENCOUNTER — OFFICE VISIT (OUTPATIENT)
Dept: ENDOCRINOLOGY | Facility: CLINIC | Age: 67
End: 2022-09-09
Payer: COMMERCIAL

## 2022-09-09 ENCOUNTER — LAB VISIT (OUTPATIENT)
Dept: LAB | Facility: HOSPITAL | Age: 67
End: 2022-09-09
Attending: INTERNAL MEDICINE
Payer: COMMERCIAL

## 2022-09-09 VITALS
RESPIRATION RATE: 16 BRPM | SYSTOLIC BLOOD PRESSURE: 124 MMHG | OXYGEN SATURATION: 99 % | DIASTOLIC BLOOD PRESSURE: 82 MMHG | HEART RATE: 68 BPM | BODY MASS INDEX: 20.95 KG/M2 | WEIGHT: 146 LBS

## 2022-09-09 DIAGNOSIS — E78.5 HYPERLIPIDEMIA WITH TARGET LOW DENSITY LIPOPROTEIN (LDL) CHOLESTEROL LESS THAN 100 MG/DL: ICD-10-CM

## 2022-09-09 DIAGNOSIS — E04.1 THYROID NODULE: ICD-10-CM

## 2022-09-09 DIAGNOSIS — E03.9 HYPOTHYROIDISM, UNSPECIFIED TYPE: ICD-10-CM

## 2022-09-09 DIAGNOSIS — E11.65 TYPE 2 DIABETES MELLITUS WITH HYPERGLYCEMIA, WITHOUT LONG-TERM CURRENT USE OF INSULIN: Primary | ICD-10-CM

## 2022-09-09 DIAGNOSIS — E11.65 TYPE 2 DIABETES MELLITUS WITH HYPERGLYCEMIA, WITHOUT LONG-TERM CURRENT USE OF INSULIN: ICD-10-CM

## 2022-09-09 PROBLEM — R81 GLYCOSURIA: Status: RESOLVED | Noted: 2019-05-26 | Resolved: 2022-09-09

## 2022-09-09 LAB
ALBUMIN SERPL BCP-MCNC: 3.8 G/DL (ref 3.5–5.2)
ALP SERPL-CCNC: 93 U/L (ref 55–135)
ALT SERPL W/O P-5'-P-CCNC: 25 U/L (ref 10–44)
ANION GAP SERPL CALC-SCNC: 10 MMOL/L (ref 8–16)
AST SERPL-CCNC: 25 U/L (ref 10–40)
BILIRUB SERPL-MCNC: 0.3 MG/DL (ref 0.1–1)
BUN SERPL-MCNC: 5 MG/DL (ref 8–23)
CALCIUM SERPL-MCNC: 9.4 MG/DL (ref 8.7–10.5)
CHLORIDE SERPL-SCNC: 104 MMOL/L (ref 95–110)
CHOLEST SERPL-MCNC: 189 MG/DL (ref 120–199)
CHOLEST/HDLC SERPL: 5.1 {RATIO} (ref 2–5)
CO2 SERPL-SCNC: 28 MMOL/L (ref 23–29)
CREAT SERPL-MCNC: 0.7 MG/DL (ref 0.5–1.4)
EST. GFR  (NO RACE VARIABLE): >60 ML/MIN/1.73 M^2
ESTIMATED AVG GLUCOSE: 137 MG/DL (ref 68–131)
GLUCOSE SERPL-MCNC: 167 MG/DL (ref 70–110)
HBA1C MFR BLD: 6.4 % (ref 4–5.6)
HDLC SERPL-MCNC: 37 MG/DL (ref 40–75)
HDLC SERPL: 19.6 % (ref 20–50)
LDLC SERPL CALC-MCNC: 134.4 MG/DL (ref 63–159)
NONHDLC SERPL-MCNC: 152 MG/DL
POTASSIUM SERPL-SCNC: 3.9 MMOL/L (ref 3.5–5.1)
PROT SERPL-MCNC: 6.6 G/DL (ref 6–8.4)
SODIUM SERPL-SCNC: 142 MMOL/L (ref 136–145)
TRIGL SERPL-MCNC: 88 MG/DL (ref 30–150)
TSH SERPL DL<=0.005 MIU/L-ACNC: 3.16 UIU/ML (ref 0.4–4)

## 2022-09-09 PROCEDURE — 1125F AMNT PAIN NOTED PAIN PRSNT: CPT | Mod: CPTII,S$GLB,, | Performed by: INTERNAL MEDICINE

## 2022-09-09 PROCEDURE — 3074F SYST BP LT 130 MM HG: CPT | Mod: CPTII,S$GLB,, | Performed by: INTERNAL MEDICINE

## 2022-09-09 PROCEDURE — 99204 OFFICE O/P NEW MOD 45 MIN: CPT | Mod: S$GLB,,, | Performed by: INTERNAL MEDICINE

## 2022-09-09 PROCEDURE — 3074F PR MOST RECENT SYSTOLIC BLOOD PRESSURE < 130 MM HG: ICD-10-PCS | Mod: CPTII,S$GLB,, | Performed by: INTERNAL MEDICINE

## 2022-09-09 PROCEDURE — 99204 PR OFFICE/OUTPT VISIT, NEW, LEVL IV, 45-59 MIN: ICD-10-PCS | Mod: S$GLB,,, | Performed by: INTERNAL MEDICINE

## 2022-09-09 PROCEDURE — 80061 LIPID PANEL: CPT | Performed by: INTERNAL MEDICINE

## 2022-09-09 PROCEDURE — 1125F PR PAIN SEVERITY QUANTIFIED, PAIN PRESENT: ICD-10-PCS | Mod: CPTII,S$GLB,, | Performed by: INTERNAL MEDICINE

## 2022-09-09 PROCEDURE — 3079F DIAST BP 80-89 MM HG: CPT | Mod: CPTII,S$GLB,, | Performed by: INTERNAL MEDICINE

## 2022-09-09 PROCEDURE — 80053 COMPREHEN METABOLIC PANEL: CPT | Performed by: INTERNAL MEDICINE

## 2022-09-09 PROCEDURE — 99999 PR PBB SHADOW E&M-EST. PATIENT-LVL V: CPT | Mod: PBBFAC,,, | Performed by: INTERNAL MEDICINE

## 2022-09-09 PROCEDURE — 36415 COLL VENOUS BLD VENIPUNCTURE: CPT | Performed by: INTERNAL MEDICINE

## 2022-09-09 PROCEDURE — 1160F RVW MEDS BY RX/DR IN RCRD: CPT | Mod: CPTII,S$GLB,, | Performed by: INTERNAL MEDICINE

## 2022-09-09 PROCEDURE — 1159F MED LIST DOCD IN RCRD: CPT | Mod: CPTII,S$GLB,, | Performed by: INTERNAL MEDICINE

## 2022-09-09 PROCEDURE — 3008F BODY MASS INDEX DOCD: CPT | Mod: CPTII,S$GLB,, | Performed by: INTERNAL MEDICINE

## 2022-09-09 PROCEDURE — 3079F PR MOST RECENT DIASTOLIC BLOOD PRESSURE 80-89 MM HG: ICD-10-PCS | Mod: CPTII,S$GLB,, | Performed by: INTERNAL MEDICINE

## 2022-09-09 PROCEDURE — 83036 HEMOGLOBIN GLYCOSYLATED A1C: CPT | Performed by: INTERNAL MEDICINE

## 2022-09-09 PROCEDURE — 84443 ASSAY THYROID STIM HORMONE: CPT | Performed by: INTERNAL MEDICINE

## 2022-09-09 PROCEDURE — 1157F PR ADVANCE CARE PLAN OR EQUIV PRESENT IN MEDICAL RECORD: ICD-10-PCS | Mod: CPTII,S$GLB,, | Performed by: INTERNAL MEDICINE

## 2022-09-09 PROCEDURE — 3008F PR BODY MASS INDEX (BMI) DOCUMENTED: ICD-10-PCS | Mod: CPTII,S$GLB,, | Performed by: INTERNAL MEDICINE

## 2022-09-09 PROCEDURE — 1157F ADVNC CARE PLAN IN RCRD: CPT | Mod: CPTII,S$GLB,, | Performed by: INTERNAL MEDICINE

## 2022-09-09 PROCEDURE — 1159F PR MEDICATION LIST DOCUMENTED IN MEDICAL RECORD: ICD-10-PCS | Mod: CPTII,S$GLB,, | Performed by: INTERNAL MEDICINE

## 2022-09-09 PROCEDURE — 1160F PR REVIEW ALL MEDS BY PRESCRIBER/CLIN PHARMACIST DOCUMENTED: ICD-10-PCS | Mod: CPTII,S$GLB,, | Performed by: INTERNAL MEDICINE

## 2022-09-09 PROCEDURE — 99999 PR PBB SHADOW E&M-EST. PATIENT-LVL V: ICD-10-PCS | Mod: PBBFAC,,, | Performed by: INTERNAL MEDICINE

## 2022-09-09 NOTE — PROGRESS NOTES
Alix Patel is a 66 y.o. female with HTN referred by Marni Adam for evaluation of thyroid nodule, hypothyroidism, T2DM      Seen by Dr. Ramires in 2015    Comes in today to discuss possibility of stopping medications as feel she is taking too much  Many medications listed in her chart which she says she is not taking but she is unsure about these and exactly what she is taking    History of Present Illness  Thyroid nodule  Imaging outside of system but per notes multiple nodules in 2015  Had FNA right middle lobe nodule in 2015 which was benign    No repeat imaging done    Hypothyroidism due to Hashimoto's thyroiditis  +TPO antibody  Taking LT4 100 mcg daily    Lab Results   Component Value Date    TSH 2.563 07/26/2021       T2DM  Would like to reduce medications if possible. Last A1c 11% but done one year ago, not checking sugars often and now on few medications than in the past    +Polyuria, polydipsia    Current Diabetes Regimen:  Metformin 1000 mg BID  Trulicity 0.75 mg     Prior mediations tried:  Jardiance  Glipizide      Recent Hgb A1C:  Lab Results   Component Value Date    HGBA1C 11.1 (H) 07/30/2021       Glucose Monitoring:  Not checking regularly, reports when she does not over 135    Hypoglycemic Episodes: denies    Screening / DM Complications:    Nephropathy:  ACEi/ARB: Taking  No results found for: MICALBCREAT    Last Lipid Panel:  Statin: Taking  Lab Results   Component Value Date    LDLCALC 112.2 07/26/2021       Last foot exam : 06/14/2022  Last eye exam : 06/04/2019;  no laser surgery or DR    B12:  Lab Results   Component Value Date    JOCDXQEL27 736 08/12/2021       Aspirin: yes            Current Outpatient Medications:     ammonium lactate 12 % Crea, SAMSON EXT AA  OF FEET ONCE DAILY, Disp: , Rfl:     amoxicillin-clavulanate 875-125mg (AUGMENTIN) 875-125 mg per tablet, TAKE 1 TABLET BY MOUTH TWICE DAILY FOR 10 DAYS, Disp: 20 tablet, Rfl: 0    butalbital-acetaminophen-caffeine  -40 mg (FIORICET, ESGIC) -40 mg per tablet, TK 1 T PO  Q 6  H PRN, Disp: , Rfl:     CRESTOR 20 mg tablet, Take 20 mg by mouth once daily. , Disp: , Rfl: 3    cyclobenzaprine (FLEXERIL) 10 MG tablet, Take 1 tablet by mouth 3 (three) times daily as needed., Disp: , Rfl:     diclofenac (VOLTAREN) 50 MG EC tablet, Take 1 tablet by mouth 2 (two) times daily., Disp: , Rfl:     donepeziL (ARICEPT) 10 MG tablet, Take 1 tablet by mouth every evening., Disp: , Rfl:     donepeziL (ARICEPT) 5 MG tablet, Take 1 tablet by mouth nightly., Disp: , Rfl:     dulaglutide (TRULICITY) 0.75 mg/0.5 mL pen injector, as directed, Disp: , Rfl:     EScitalopram oxalate (LEXAPRO) 20 MG tablet, Take 1 tablet (20 mg total) by mouth once daily., Disp: 90 tablet, Rfl: 1    estradioL (ESTRACE) 0.01 % (0.1 mg/gram) vaginal cream, USE 1 GRAM VAGINALLY 3 TIMES A WEEK, Disp: , Rfl:     flu vacc [id number] 6mos up,PF, (FLUARIX QUAD [id number], PF,) 60 mcg (15 mcg x 4)/0.5 mL Syrg, ADM 0.5ML IM UTD, Disp: , Rfl:     fluticasone propionate (FLONASE) 50 mcg/actuation nasal spray, 2 sprays (100 mcg total) by Each Nostril route once daily., Disp: 16 g, Rfl: 2    gentamicin (GARAMYCIN) 0.3 % ophthalmic solution, PLACE 1 TO 2 GTS AEY 3 TO 4 H UTD, Disp: , Rfl:     HYDROcodone-acetaminophen (NORCO) 5-325 mg per tablet, TAKE 1 TABLET BY MOUTH EVERY 4 TO 6 HOURS PER PAIN, Disp: , Rfl:     hydrOXYzine pamoate (VISTARIL) 25 MG Cap, Take 1 to 2 caps q hs prn difficulty sleeping, Disp: 30 capsule, Rfl: 3    ibuprofen (ADVIL,MOTRIN) 800 MG tablet, TAKE 1 TABLET BY MOUTH EVERY 6 TO 8 HOURS FOR PAIN, Disp: , Rfl:     itraconazole (SPORANOX) 100 mg Cap, TK 2 CS PO  BID FOR 1 WEEK THEN STOP FOR 3 WEEKS THEN REFILL, Disp: , Rfl:     ketorolac 0.5% (ACULAR) 0.5 % Drop, , Disp: , Rfl:     levOCARNitine (L-CARNITINE) 500 mg Tab, Take 500 mg by mouth once daily., Disp: 1 tablet, Rfl: 11    levothyroxine (SYNTHROID) 100 MCG tablet, Take 1 tablet (100 mcg total) by  mouth once daily., Disp: 30 tablet, Rfl: 1    meloxicam (MOBIC) 15 MG tablet, Take 1 tablet by mouth once daily., Disp: , Rfl:     metFORMIN (GLUCOPHAGE) 500 MG tablet, Take 2 tablets (1,000 mg total) by mouth 2 (two) times daily with meals., Disp: 120 tablet, Rfl: 1    methocarbamoL (ROBAXIN) 500 MG Tab, , Disp: , Rfl:     metoprolol succinate (TOPROL-XL) 25 MG 24 hr tablet, , Disp: , Rfl:     nitrofurantoin, macrocrystal-monohydrate, (MACROBID) 100 MG capsule, TK 1 C PO BID FOR 5 DAYS, Disp: , Rfl:     ofloxacin (OCUFLOX) 0.3 % ophthalmic solution, , Disp: , Rfl:     orphenadrine (NORFLEX) 100 mg tablet, , Disp: , Rfl:     phenazopyridine (PYRIDIUM) 200 MG tablet, TK 1 T PO Q 8 H PRF URINARY DISCOMFORT, Disp: , Rfl:     polyethylene glycol (GLYCOLAX) 17 gram/dose powder, USE AS DIRECTED ON PACKAGE DAILY, Disp: , Rfl:     prednisoLONE acetate (PRED FORTE) 1 % DrpS, , Disp: , Rfl:     promethazine (PHENERGAN) 25 MG tablet, TK 1 T PO  Q 12 H PRN, Disp: , Rfl:     sulfamethoxazole-trimethoprim 800-160mg (BACTRIM DS) 800-160 mg Tab, Take 1 tablet by mouth 2 (two) times daily., Disp: , Rfl:     traMADoL (ULTRAM) 50 mg tablet, Take 1 tablet by mouth every 6 to 8 hours as needed., Disp: , Rfl:     traZODone (DESYREL) 150 MG tablet, Take 1 tablet (150 mg total) by mouth every evening., Disp: 30 tablet, Rfl: 3    urea (CARMOL) 40 % Crea, Apply topically once daily. To dry skin on the feet., Disp: 85 g, Rfl: 10    varicella-zoster [id number], PF, (SHINGRIX, PF,) 50 mcg/0.5 mL injection, ADM 0.5ML IM UTD, Disp: , Rfl:     aspirin (ECOTRIN) 81 MG EC tablet, Take 1 tablet by mouth once daily., Disp: , Rfl:     blood sugar diagnostic (TRUE METRIX GLUCOSE TEST STRIP MISC), True Metrix Glucose Test Strip  TEST TWICE DAILY AS DIRECTED, Disp: , Rfl:     blood-glucose meter (TRUE METRIX AIR GLUCOSE METER MISC), True Metrix Air Glucose Meter  USE UNDER THE SKIN TWICE DAILY AS DIRECTED, Disp: , Rfl:     divalproex ER (DEPAKOTE) 500 MG  Tb24, Take 2 tablets (1,000 mg total) by mouth once daily., Disp: 60 tablet, Rfl: 5    gabapentin (NEURONTIN) 600 MG tablet, TK 1 T PO TID, Disp: , Rfl:     ROS as above    Objective:     Vitals:    09/09/22 0846   BP: 124/82   Pulse: 68   Resp: 16     Wt Readings from Last 3 Encounters:   09/09/22 66.2 kg (146 lb)   07/14/22 72.6 kg (160 lb)   06/14/22 72.6 kg (160 lb)     Body mass index is 20.95 kg/m².  Physical Exam  Constitutional:       Appearance: She is well-developed.   HENT:      Head: Normocephalic.   Eyes:      Conjunctiva/sclera: Conjunctivae normal.   Pulmonary:      Effort: Pulmonary effort is normal.   Musculoskeletal:         General: Normal range of motion.   Skin:     General: Skin is warm.      Findings: No rash.   Neurological:      Mental Status: She is alert and oriented to person, place, and time.       Labs    Chemistry        Component Value Date/Time     08/12/2021 1520    K 4.0 08/12/2021 1520    CL 99 08/12/2021 1520    CO2 27 08/12/2021 1520    BUN 11 08/12/2021 1520    CREATININE 0.8 08/12/2021 1520     (H) 08/12/2021 1520        Component Value Date/Time    CALCIUM 10.5 08/12/2021 1520    ALKPHOS 80 08/12/2021 1520    AST 50 (H) 08/12/2021 1520    ALT 51 (H) 08/12/2021 1520    BILITOT 0.3 08/12/2021 1520    ESTGFRAFRICA >60.0 08/12/2021 1520    EGFRNONAA >60.0 08/12/2021 1520              Assessment and Plan     Type 2 diabetes mellitus with hyperglycemia, without long-term current use of insulin  No data. Her reported sugars are discordant from last A1c although this was done one year ago  Will update labs today  Asked the she check sugars twice daily for next week and bring log to next visit  Cont metformin and Trulicity with further adjustments pending lab results, glucoses    Thyroid nodule  Previous benign FNA of right thyroid nodule  Update US    Hypothyroidism  Check TSH today  Cont LT4 100 mcg daily with dose titration pending lab results    Hyperlipidemia with  target low density lipoprotein (LDL) cholesterol less than 100 mg/dL  Check lipids  Cont statin        RTC 1 week with labs, US and her medications so we can ensure list is accurate        Rosi Newton MD

## 2022-09-09 NOTE — PATIENT INSTRUCTIONS
Bring all medications when you come for next clinic visit so we can make sure accurate    Visit next week. Please check sugars twice a day (fasting and before dinner or bedtime) and bring log to visit

## 2022-09-09 NOTE — ASSESSMENT & PLAN NOTE
No data. Her reported sugars are discordant from last A1c although this was done one year ago  Will update labs today  Asked the she check sugars twice daily for next week and bring log to next visit  Cont metformin and Trulicity with further adjustments pending lab results, glucoses

## 2022-09-14 ENCOUNTER — TELEPHONE (OUTPATIENT)
Dept: ENDOCRINOLOGY | Facility: CLINIC | Age: 67
End: 2022-09-14
Payer: COMMERCIAL

## 2022-09-14 NOTE — TELEPHONE ENCOUNTER
Spoke with patient, explained that it would be better to reschedule because the visit will be mostly about going over the u/s so better to have that done prior to the visit. Rescheduled pt for 10/6. Pt verbalized understanding and accepted new appt date.

## 2022-09-23 ENCOUNTER — HOSPITAL ENCOUNTER (OUTPATIENT)
Dept: RADIOLOGY | Facility: HOSPITAL | Age: 67
Discharge: HOME | End: 2022-09-23
Attending: INTERNAL MEDICINE
Payer: COMMERCIAL

## 2022-09-23 DIAGNOSIS — E04.1 THYROID NODULE: ICD-10-CM

## 2022-09-23 PROCEDURE — 76536 US EXAM OF HEAD AND NECK: CPT | Mod: TC

## 2022-09-23 PROCEDURE — 76536 US EXAM OF HEAD AND NECK: CPT | Mod: 26,,, | Performed by: RADIOLOGY

## 2022-09-23 PROCEDURE — 76536 US SOFT TISSUE HEAD NECK THYROID: ICD-10-PCS | Mod: 26,,, | Performed by: RADIOLOGY

## 2022-09-26 ENCOUNTER — OFFICE VISIT (OUTPATIENT)
Dept: OTOLARYNGOLOGY | Facility: CLINIC | Age: 67
End: 2022-09-26
Payer: COMMERCIAL

## 2022-09-26 ENCOUNTER — TELEPHONE (OUTPATIENT)
Dept: OTOLARYNGOLOGY | Facility: CLINIC | Age: 67
End: 2022-09-26
Payer: COMMERCIAL

## 2022-09-26 VITALS — WEIGHT: 150.13 LBS | BODY MASS INDEX: 21.49 KG/M2 | HEIGHT: 70 IN

## 2022-09-26 DIAGNOSIS — J32.8 OTHER CHRONIC SINUSITIS: Primary | ICD-10-CM

## 2022-09-26 PROCEDURE — 1160F PR REVIEW ALL MEDS BY PRESCRIBER/CLIN PHARMACIST DOCUMENTED: ICD-10-PCS | Mod: CPTII,S$GLB,, | Performed by: OTOLARYNGOLOGY

## 2022-09-26 PROCEDURE — 3044F PR MOST RECENT HEMOGLOBIN A1C LEVEL <7.0%: ICD-10-PCS | Mod: CPTII,S$GLB,, | Performed by: OTOLARYNGOLOGY

## 2022-09-26 PROCEDURE — 31231 NASAL/SINUS ENDOSCOPY: ICD-10-PCS | Mod: S$GLB,,, | Performed by: OTOLARYNGOLOGY

## 2022-09-26 PROCEDURE — 99214 PR OFFICE/OUTPT VISIT, EST, LEVL IV, 30-39 MIN: ICD-10-PCS | Mod: 25,S$GLB,, | Performed by: OTOLARYNGOLOGY

## 2022-09-26 PROCEDURE — 1159F PR MEDICATION LIST DOCUMENTED IN MEDICAL RECORD: ICD-10-PCS | Mod: CPTII,S$GLB,, | Performed by: OTOLARYNGOLOGY

## 2022-09-26 PROCEDURE — 3288F FALL RISK ASSESSMENT DOCD: CPT | Mod: CPTII,S$GLB,, | Performed by: OTOLARYNGOLOGY

## 2022-09-26 PROCEDURE — 1160F RVW MEDS BY RX/DR IN RCRD: CPT | Mod: CPTII,S$GLB,, | Performed by: OTOLARYNGOLOGY

## 2022-09-26 PROCEDURE — 3008F BODY MASS INDEX DOCD: CPT | Mod: CPTII,S$GLB,, | Performed by: OTOLARYNGOLOGY

## 2022-09-26 PROCEDURE — 1157F ADVNC CARE PLAN IN RCRD: CPT | Mod: CPTII,S$GLB,, | Performed by: OTOLARYNGOLOGY

## 2022-09-26 PROCEDURE — 99999 PR PBB SHADOW E&M-EST. PATIENT-LVL V: CPT | Mod: PBBFAC,,, | Performed by: OTOLARYNGOLOGY

## 2022-09-26 PROCEDURE — 1101F PT FALLS ASSESS-DOCD LE1/YR: CPT | Mod: CPTII,S$GLB,, | Performed by: OTOLARYNGOLOGY

## 2022-09-26 PROCEDURE — 1159F MED LIST DOCD IN RCRD: CPT | Mod: CPTII,S$GLB,, | Performed by: OTOLARYNGOLOGY

## 2022-09-26 PROCEDURE — 3288F PR FALLS RISK ASSESSMENT DOCUMENTED: ICD-10-PCS | Mod: CPTII,S$GLB,, | Performed by: OTOLARYNGOLOGY

## 2022-09-26 PROCEDURE — 1157F PR ADVANCE CARE PLAN OR EQUIV PRESENT IN MEDICAL RECORD: ICD-10-PCS | Mod: CPTII,S$GLB,, | Performed by: OTOLARYNGOLOGY

## 2022-09-26 PROCEDURE — 1125F AMNT PAIN NOTED PAIN PRSNT: CPT | Mod: CPTII,S$GLB,, | Performed by: OTOLARYNGOLOGY

## 2022-09-26 PROCEDURE — 31231 NASAL ENDOSCOPY DX: CPT | Mod: S$GLB,,, | Performed by: OTOLARYNGOLOGY

## 2022-09-26 PROCEDURE — 87075 CULTR BACTERIA EXCEPT BLOOD: CPT | Performed by: OTOLARYNGOLOGY

## 2022-09-26 PROCEDURE — 3044F HG A1C LEVEL LT 7.0%: CPT | Mod: CPTII,S$GLB,, | Performed by: OTOLARYNGOLOGY

## 2022-09-26 PROCEDURE — 99214 OFFICE O/P EST MOD 30 MIN: CPT | Mod: 25,S$GLB,, | Performed by: OTOLARYNGOLOGY

## 2022-09-26 PROCEDURE — 1125F PR PAIN SEVERITY QUANTIFIED, PAIN PRESENT: ICD-10-PCS | Mod: CPTII,S$GLB,, | Performed by: OTOLARYNGOLOGY

## 2022-09-26 PROCEDURE — 3008F PR BODY MASS INDEX (BMI) DOCUMENTED: ICD-10-PCS | Mod: CPTII,S$GLB,, | Performed by: OTOLARYNGOLOGY

## 2022-09-26 PROCEDURE — 87070 CULTURE OTHR SPECIMN AEROBIC: CPT | Performed by: OTOLARYNGOLOGY

## 2022-09-26 PROCEDURE — 1101F PR PT FALLS ASSESS DOC 0-1 FALLS W/OUT INJ PAST YR: ICD-10-PCS | Mod: CPTII,S$GLB,, | Performed by: OTOLARYNGOLOGY

## 2022-09-26 PROCEDURE — 99999 PR PBB SHADOW E&M-EST. PATIENT-LVL V: ICD-10-PCS | Mod: PBBFAC,,, | Performed by: OTOLARYNGOLOGY

## 2022-09-26 RX ORDER — DOXYCYCLINE 100 MG/1
100 CAPSULE ORAL EVERY 12 HOURS
Qty: 42 CAPSULE | Refills: 0 | Status: SHIPPED | OUTPATIENT
Start: 2022-09-26 | End: 2022-10-17

## 2022-09-26 NOTE — TELEPHONE ENCOUNTER
----- Message from Rylee Valentine sent at 9/26/2022  8:31 AM CDT -----  Regarding: Late  Pt is running llate will be there in 15-20 mints

## 2022-09-26 NOTE — PROCEDURES
Nasal/sinus endoscopy    Date/Time: 9/26/2022 8:45 AM  Performed by: Lb Horta MD  Authorized by: Lb Horta MD     Consent Done?:  Yes (Verbal)  Anesthesia:     Local anesthetic:  Lidocaine 4% and Gerardo-Synephrine 1/2%    Patient tolerance:  Patient tolerated the procedure well with no immediate complications  Nose:     Procedure Performed:  Nasal Endoscopy  External:      No external nasal deformity  Intranasal:      Mucosa no polyps     Mucosa ulcers not present     No mucosa lesions present     Turbinates not enlarged     Septum gross deformity  Nasopharynx:      No mucosa lesions     Adenoids not present     Posterior choanae patent     Eustachian tube patent     Scanty white purulence on left MT, swabbed for culture

## 2022-09-26 NOTE — PROGRESS NOTES
"  Subjective:      Alix is a 66 y.o. female who comes for follow-up of sinus headaches.  Her last visit with me was on 7/14/2022.  Describes ongoing dizziness, imbalance, persistent headache and tenderness in face, bilateral temples, frontal region, and all teeth.  No vision changes.  Notes green nasal discharge and postnasal drip with expectoration.  Had PET/CT, saw Dr. Figueroa who reportedly reassured her that she doesn't have Alzheimer's.    SNOT-22 score: : (P) 67  NOSE score:: (P) 65%  ETDQ-7 score:: (P) 5.3    The patient's medications, allergies, past medical, surgical, social and family histories were reviewed and updated as appropriate.    A detailed review of systems was obtained with pertinent positives as per the above HPI, and otherwise negative.        Objective:     Ht 5' 10" (1.778 m)   Wt 68.1 kg (150 lb 2.1 oz)   BMI 21.54 kg/m²        Constitutional:   She appears well-developed. She is cooperative. Normal speech.  No hoarse voice.      Head:  Normocephalic. Salivary glands normal.  Facial strength is normal.      Ears:    Right Ear: No drainage or tenderness. Tympanic membrane is not perforated. Tympanic membrane mobility is normal. No middle ear effusion. No decreased hearing is noted.   Left Ear: No drainage or tenderness. Tympanic membrane is not perforated. Tympanic membrane mobility is normal.  No middle ear effusion. No decreased hearing is noted.     Nose:  Septal deviation present. No mucosal edema, rhinorrhea or polyps. No epistaxis. Turbinates normal, no turbinate masses and no turbinate hypertrophy.  Right sinus exhibits maxillary sinus tenderness and frontal sinus tenderness. Left sinus exhibits maxillary sinus tenderness and frontal sinus tenderness.     Mouth/Throat  Oropharynx clear and moist without lesions or asymmetry and normal uvula midline. She does not have dentures. Normal dentition. No oral lesions or mucous membrane lesions. No oropharyngeal exudate or posterior " oropharyngeal erythema. Mirror exam not performed due to patient tolerance.  Mirror exam not performed due to patient tolerance.      Neck:  Neck normal without thyromegaly masses, asymmetry, normal tracheal structure, crepitus, and tenderness, thyroid normal, trachea normal and no adenopathy. Normal range of motion present.     She has no cervical adenopathy.     Cardiovascular:    Regular rhythm.              Pulmonary/Chest:   Effort normal.     Psychiatric:   She has a normal mood and affect. Her speech is normal and behavior is normal.     Neurological:   No cranial nerve deficit.     Skin:   No rash noted.     Procedure    Nasal endoscopy performed.  See procedure note.        Data Reviewed    WBC (K/uL)   Date Value   07/30/2021 6.35     Eosinophil % (%)   Date Value   07/30/2021 3.1     Eos # (K/uL)   Date Value   07/30/2021 0.2     Platelets (K/uL)   Date Value   07/30/2021 245     Glucose (mg/dL)   Date Value   09/09/2022 167 (H)     No results found for: IGE    I independently reviewed the images of the CT sinuses dated 7/20/21. Pertinent findings include clear sinuses.      Assessment:     1. Other chronic sinusitis         Plan:     Cultures taken, will consider for antibiotic direction.  Rx doxycycline 21 day course.  Continue fluticasone.  Consult Dr. Ndiaye for dizziness.  Follow up for test results.

## 2022-09-28 LAB — BACTERIA SPEC AEROBE CULT: NORMAL

## 2022-09-30 ENCOUNTER — PATIENT MESSAGE (OUTPATIENT)
Dept: OTOLARYNGOLOGY | Facility: CLINIC | Age: 67
End: 2022-09-30
Payer: COMMERCIAL

## 2022-09-30 LAB — BACTERIA SPEC ANAEROBE CULT: NORMAL

## 2022-10-12 ENCOUNTER — TELEPHONE (OUTPATIENT)
Dept: PODIATRY | Facility: CLINIC | Age: 67
End: 2022-10-12
Payer: COMMERCIAL

## 2022-10-12 NOTE — TELEPHONE ENCOUNTER
Left message for patient to give a callback.      ----- Message from Tasha Skinner sent at 10/12/2022 11:41 AM CDT -----  Regarding: Pharmacy Information  Name of Who is Calling: Alix Patel              What is the request in detail: Pt requesting a call back with Pharmacy information              Can the clinic reply by [facility]: No              What Number to Call Back if not in [facility]: [phone]

## 2022-10-12 NOTE — TELEPHONE ENCOUNTER
Staff informed patient she has 10 refills for the urea cream and a message was sent to Dr. Kelley about the foot soak.    Patient verbalized understanding.          ----- Message from Fiordaliza Lay sent at 10/12/2022 12:12 PM CDT -----  Regarding: rt a missed call   Type:  Patient Returning Call    Who Called: HAILEY GONZALEZ [[id number]]    Who Left Message for Patient:Fatou    Does the patient know what this is regarding? yes    Best Call Back Number:[phone]    Additional Information:

## 2022-10-13 ENCOUNTER — TELEPHONE (OUTPATIENT)
Dept: PODIATRY | Facility: CLINIC | Age: 67
End: 2022-10-13
Payer: COMMERCIAL

## 2022-10-13 NOTE — TELEPHONE ENCOUNTER
Staff informed patient The foot soak originally prescribed was from [facility] which is no longer in business.    She can soak her foot once a week in epsom salt water.  Per Dr. Kelley.    Patient verbalized understanding.          ----- Message from Katie Kelley DPM sent at 10/13/2022  9:39 AM CDT -----  Regarding: RE: foot soak    The foot soak originally prescribed was from [facility] which is no longer in business.    She can soak her foot once a week in epsom salt water.       ----- Message -----  From: Fatou Hayden MA  Sent: 10/12/2022   2:06 PM CDT  To: Katie Kelley DPM  Subject: foot soak                                        Please review and advice.    Patient is asking for a foot soak that was suppose to be order.

## 2022-10-17 ENCOUNTER — TELEPHONE (OUTPATIENT)
Dept: PSYCHIATRY | Facility: CLINIC | Age: 67
End: 2022-10-17
Payer: COMMERCIAL

## 2022-10-17 NOTE — TELEPHONE ENCOUNTER
----- Message from Maryan John sent at 10/14/2022  3:15 PM CDT -----  Dr Colunga,    Pt was calling requesting to speak with you regarding her meds  traMADoL. I advise pt you were out of  office.   Pt  stated she was out of meds and was needing to schedule .    I advise pt I can assist her with scheduling since her last visit was in July w/ Dr Sims she  stated you were asisiting her in getting a  new dr.    I  advised her again that you were out and that I was your  and that I could help her to be schedule with a new provider so she agreed but she still want jered to call her       Pt is schedule to see MOODY Ashton 10/24/22 (virtual visit)    Pt rogelio #   [phone]

## 2022-10-20 RX ORDER — TRAZODONE HYDROCHLORIDE 150 MG/1
150 TABLET ORAL NIGHTLY
Qty: 30 TABLET | Refills: 3 | Status: SHIPPED | OUTPATIENT
Start: 2022-10-20 | End: 2023-01-03

## 2022-10-25 ENCOUNTER — CLINICAL SUPPORT (OUTPATIENT)
Dept: AUDIOLOGY | Facility: CLINIC | Age: 67
End: 2022-10-25
Payer: COMMERCIAL

## 2022-10-25 ENCOUNTER — OFFICE VISIT (OUTPATIENT)
Dept: OTOLARYNGOLOGY | Facility: CLINIC | Age: 67
End: 2022-10-25
Payer: COMMERCIAL

## 2022-10-25 VITALS — BODY MASS INDEX: 21.52 KG/M2 | WEIGHT: 150 LBS

## 2022-10-25 DIAGNOSIS — R26.89 BALANCE PROBLEM: ICD-10-CM

## 2022-10-25 DIAGNOSIS — H90.3 SENSORINEURAL HEARING LOSS OF BOTH EARS: Primary | ICD-10-CM

## 2022-10-25 DIAGNOSIS — H90.3 SENSORINEURAL HEARING LOSS, BILATERAL: ICD-10-CM

## 2022-10-25 DIAGNOSIS — H81.8X9 PERSISTENT POSTURAL-PERCEPTUAL DIZZINESS: Primary | ICD-10-CM

## 2022-10-25 PROCEDURE — 92567 PR TYMPA2METRY: ICD-10-PCS | Mod: S$GLB,,, | Performed by: AUDIOLOGIST

## 2022-10-25 PROCEDURE — 1159F MED LIST DOCD IN RCRD: CPT | Mod: CPTII,S$GLB,, | Performed by: OTOLARYNGOLOGY

## 2022-10-25 PROCEDURE — 3044F PR MOST RECENT HEMOGLOBIN A1C LEVEL <7.0%: ICD-10-PCS | Mod: CPTII,S$GLB,, | Performed by: OTOLARYNGOLOGY

## 2022-10-25 PROCEDURE — 92567 TYMPANOMETRY: CPT | Mod: S$GLB,,, | Performed by: AUDIOLOGIST

## 2022-10-25 PROCEDURE — 3288F PR FALLS RISK ASSESSMENT DOCUMENTED: ICD-10-PCS | Mod: CPTII,S$GLB,, | Performed by: OTOLARYNGOLOGY

## 2022-10-25 PROCEDURE — 99214 OFFICE O/P EST MOD 30 MIN: CPT | Mod: S$GLB,,, | Performed by: OTOLARYNGOLOGY

## 2022-10-25 PROCEDURE — 1101F PR PT FALLS ASSESS DOC 0-1 FALLS W/OUT INJ PAST YR: ICD-10-PCS | Mod: CPTII,S$GLB,, | Performed by: OTOLARYNGOLOGY

## 2022-10-25 PROCEDURE — 99999 PR PBB SHADOW E&M-EST. PATIENT-LVL IV: CPT | Mod: PBBFAC,,, | Performed by: OTOLARYNGOLOGY

## 2022-10-25 PROCEDURE — 99999 PR PBB SHADOW E&M-EST. PATIENT-LVL I: ICD-10-PCS | Mod: PBBFAC,,, | Performed by: AUDIOLOGIST

## 2022-10-25 PROCEDURE — 1126F AMNT PAIN NOTED NONE PRSNT: CPT | Mod: CPTII,S$GLB,, | Performed by: OTOLARYNGOLOGY

## 2022-10-25 PROCEDURE — 1157F ADVNC CARE PLAN IN RCRD: CPT | Mod: CPTII,S$GLB,, | Performed by: OTOLARYNGOLOGY

## 2022-10-25 PROCEDURE — 3044F HG A1C LEVEL LT 7.0%: CPT | Mod: CPTII,S$GLB,, | Performed by: OTOLARYNGOLOGY

## 2022-10-25 PROCEDURE — 92557 PR COMPREHENSIVE HEARING TEST: ICD-10-PCS | Mod: S$GLB,,, | Performed by: AUDIOLOGIST

## 2022-10-25 PROCEDURE — 99999 PR PBB SHADOW E&M-EST. PATIENT-LVL IV: ICD-10-PCS | Mod: PBBFAC,,, | Performed by: OTOLARYNGOLOGY

## 2022-10-25 PROCEDURE — 92557 COMPREHENSIVE HEARING TEST: CPT | Mod: S$GLB,,, | Performed by: AUDIOLOGIST

## 2022-10-25 PROCEDURE — 1126F PR PAIN SEVERITY QUANTIFIED, NO PAIN PRESENT: ICD-10-PCS | Mod: CPTII,S$GLB,, | Performed by: OTOLARYNGOLOGY

## 2022-10-25 PROCEDURE — 3288F FALL RISK ASSESSMENT DOCD: CPT | Mod: CPTII,S$GLB,, | Performed by: OTOLARYNGOLOGY

## 2022-10-25 PROCEDURE — 99214 PR OFFICE/OUTPT VISIT, EST, LEVL IV, 30-39 MIN: ICD-10-PCS | Mod: S$GLB,,, | Performed by: OTOLARYNGOLOGY

## 2022-10-25 PROCEDURE — 1159F PR MEDICATION LIST DOCUMENTED IN MEDICAL RECORD: ICD-10-PCS | Mod: CPTII,S$GLB,, | Performed by: OTOLARYNGOLOGY

## 2022-10-25 PROCEDURE — 1157F PR ADVANCE CARE PLAN OR EQUIV PRESENT IN MEDICAL RECORD: ICD-10-PCS | Mod: CPTII,S$GLB,, | Performed by: OTOLARYNGOLOGY

## 2022-10-25 PROCEDURE — 99999 PR PBB SHADOW E&M-EST. PATIENT-LVL I: CPT | Mod: PBBFAC,,, | Performed by: AUDIOLOGIST

## 2022-10-25 PROCEDURE — 1101F PT FALLS ASSESS-DOCD LE1/YR: CPT | Mod: CPTII,S$GLB,, | Performed by: OTOLARYNGOLOGY

## 2022-10-25 NOTE — PROGRESS NOTES
Ms. Alix Patel was seen in the clinic today for an audiological evaluation.    Audiological testing revealed a mild to moderately-severe sensorineural hearing loss for the right ear and a mild to moderately-severe sensorineural hearing loss for the left ear.  A speech reception threshold was obtained at 25 dBHL for the right ear and at 25 dBHL for the left ear.  Speech discrimination was 100% for the right ear and 100% for the left ear.      Tympanometry testing revealed a Type A tympanogram for the right ear and a Type A tympanogram for the left ear.      Recommendations:  1. Otologic evaluation  2. Annual audiological evaluation  3. Hearing protection when in noise   4. Hearing aid consultation

## 2022-10-27 NOTE — PROGRESS NOTES
Subjective:       Patient ID: Alix Patel is a 66 y.o. female.    Chief Complaint: Dizziness    Dizziness: no fever and no chest pain.    Alix Patel is a 66 y.o. female presents for dizziness.  She reports dizziness began 8-9 months ago.  Symptoms occur most days.  Describes it as a constant state of rocking [place] dizziness.  Denies room spinning vertigo.  She states that she had a very severe sinus infection initially and wonders if this is related.  She reports it is worsened with upright posture and walking.  She feels better when she lays down.  She reports a history of general anxiety disorder and depression she takes Lexapro for this.  She denies ear pain fluctuating hearing loss or unilateral tinnitus.    Review of Systems   Constitutional:  Negative for chills and fever.   HENT:  Negative for sore throat and trouble swallowing.    Respiratory:  Negative for apnea and chest tightness.    Cardiovascular:  Negative for chest pain.   Neurological:  Positive for dizziness.       Objective:      Physical Exam  Vitals and nursing note reviewed.   Constitutional:       Appearance: Normal appearance.   HENT:      Head: Normocephalic and atraumatic.      Right Ear: Tympanic membrane, ear canal and external ear normal. There is no impacted cerumen.      Left Ear: Tympanic membrane, ear canal and external ear normal. There is no impacted cerumen.   Eyes:      Extraocular Movements:      Right eye: Normal extraocular motion and no nystagmus.      Left eye: Normal extraocular motion and no nystagmus.   Neurological:      Mental Status: She is alert.      Coordination: Romberg sign positive.      Gait: Tandem walk abnormal.      Comments:   Canalith testing:  Kelso-Hallpike: negative AU  Supine Roll: negative AU  Head Hang: negative       Data Reviewed:          Audiogram tracings independently reviewed and discussed with patient shows mild to moderate sensorineural hearing loss      MRI brain images  7/2021  independently reviewed by me.  No evidence of IAC or inner ear anomaly, no evidence of acoustic neuroma.      Assessment:       Problem List Items Addressed This Visit    None  Visit Diagnoses       Persistent postural-perceptual dizziness    -  Primary    Relevant Orders    Ambulatory referral/consult to Physical/Occupational Therapy    Sensorineural hearing loss, bilateral        Balance problem                  Plan:        In summary this is a pleasant 66 y.o. with chronic dizziness.  Her symptoms are not consistent with a peripheral weakness.  But clinically sound like a functional disorder vestibular system.  Her symptoms are most consistent with 3 PD although she does not meet formalin diagnosis as she denies sensitivity to visual spatial stimuli.    Recommend vestibular rehab evaluation with treatment based on eval findings.      Follow up in 3 months to reassess progress

## 2022-11-01 ENCOUNTER — PATIENT MESSAGE (OUTPATIENT)
Dept: NEUROLOGY | Facility: CLINIC | Age: 67
End: 2022-11-01
Payer: COMMERCIAL

## 2022-11-05 ENCOUNTER — HOSPITAL ENCOUNTER (EMERGENCY)
Facility: HOSPITAL | Age: 67
Discharge: HOME | End: 2022-11-05
Attending: EMERGENCY MEDICINE
Payer: COMMERCIAL

## 2022-11-05 VITALS
SYSTOLIC BLOOD PRESSURE: 150 MMHG | TEMPERATURE: 99 F | OXYGEN SATURATION: 99 % | HEIGHT: 70 IN | WEIGHT: 149.94 LBS | BODY MASS INDEX: 21.47 KG/M2 | HEART RATE: 72 BPM | DIASTOLIC BLOOD PRESSURE: 80 MMHG | RESPIRATION RATE: 18 BRPM

## 2022-11-05 DIAGNOSIS — R51.9 NONINTRACTABLE HEADACHE, UNSPECIFIED CHRONICITY PATTERN, UNSPECIFIED HEADACHE TYPE: Primary | ICD-10-CM

## 2022-11-05 LAB
BUN SERPL-MCNC: 5 MG/DL (ref 6–30)
CHLORIDE SERPL-SCNC: 102 MMOL/L (ref 95–110)
CREAT SERPL-MCNC: 0.5 MG/DL (ref 0.5–1.4)
GLUCOSE SERPL-MCNC: 97 MG/DL (ref 70–110)
HCT VFR BLD CALC: 42 %PCV (ref 36–54)
HCV AB SERPL QL IA: NORMAL
HIV 1+2 AB+HIV1 P24 AG SERPL QL IA: NORMAL
POC IONIZED CALCIUM: 1.18 MMOL/L (ref 1.06–1.42)
POC TCO2 (MEASURED): 25 MMOL/L (ref 23–29)
POTASSIUM BLD-SCNC: 3.7 MMOL/L (ref 3.5–5.1)
SAMPLE: ABNORMAL
SARS-COV-2 RDRP RESP QL NAA+PROBE: NEGATIVE
SODIUM BLD-SCNC: 140 MMOL/L (ref 136–145)

## 2022-11-05 PROCEDURE — 80047 BASIC METABLC PNL IONIZED CA: CPT

## 2022-11-05 PROCEDURE — 25500020 PHARM REV CODE 255: Performed by: EMERGENCY MEDICINE

## 2022-11-05 PROCEDURE — 99285 EMERGENCY DEPT VISIT HI MDM: CPT | Mod: 25

## 2022-11-05 PROCEDURE — U0002 COVID-19 LAB TEST NON-CDC: HCPCS | Performed by: EMERGENCY MEDICINE

## 2022-11-05 PROCEDURE — 99284 EMERGENCY DEPT VISIT MOD MDM: CPT | Mod: ,,, | Performed by: EMERGENCY MEDICINE

## 2022-11-05 PROCEDURE — 99284 PR EMERGENCY DEPT VISIT,LEVEL IV: ICD-10-PCS | Mod: ,,, | Performed by: EMERGENCY MEDICINE

## 2022-11-05 PROCEDURE — 87389 HIV-1 AG W/HIV-1&-2 AB AG IA: CPT | Performed by: EMERGENCY MEDICINE

## 2022-11-05 PROCEDURE — 25000003 PHARM REV CODE 250: Performed by: EMERGENCY MEDICINE

## 2022-11-05 PROCEDURE — 63600175 PHARM REV CODE 636 W HCPCS: Performed by: EMERGENCY MEDICINE

## 2022-11-05 PROCEDURE — 96361 HYDRATE IV INFUSION ADD-ON: CPT

## 2022-11-05 PROCEDURE — 96375 TX/PRO/DX INJ NEW DRUG ADDON: CPT | Mod: 59

## 2022-11-05 PROCEDURE — 96374 THER/PROPH/DIAG INJ IV PUSH: CPT | Mod: 59

## 2022-11-05 PROCEDURE — 86803 HEPATITIS C AB TEST: CPT | Performed by: EMERGENCY MEDICINE

## 2022-11-05 RX ORDER — DIPHENHYDRAMINE HYDROCHLORIDE 50 MG/ML
25 INJECTION INTRAMUSCULAR; INTRAVENOUS
Status: COMPLETED | OUTPATIENT
Start: 2022-11-05 | End: 2022-11-05

## 2022-11-05 RX ORDER — PROCHLORPERAZINE EDISYLATE 5 MG/ML
10 INJECTION INTRAMUSCULAR; INTRAVENOUS
Status: COMPLETED | OUTPATIENT
Start: 2022-11-05 | End: 2022-11-05

## 2022-11-05 RX ADMIN — IOHEXOL 100 ML: 350 INJECTION, SOLUTION INTRAVENOUS at 09:11

## 2022-11-05 RX ADMIN — PROCHLORPERAZINE EDISYLATE 10 MG: 5 INJECTION INTRAMUSCULAR; INTRAVENOUS at 09:11

## 2022-11-05 RX ADMIN — DIPHENHYDRAMINE HYDROCHLORIDE 25 MG: 50 INJECTION, SOLUTION INTRAMUSCULAR; INTRAVENOUS at 09:11

## 2022-11-05 RX ADMIN — SODIUM CHLORIDE 1000 ML: 0.9 INJECTION, SOLUTION INTRAVENOUS at 09:11

## 2022-11-06 NOTE — ED PROVIDER NOTES
Encounter Date: 11/5/2022    SCRIBE #1 NOTE: I, Dianne Fowler, am scribing for, and in the presence of,  Jessica Allred MD. I have scribed the following portions of the note - Other sections scribed: HPI, ROS, PE.     History     Chief Complaint   Patient presents with    Headache     Reports headache x 1 day. Stabbing pain in Right ear, not new complaint, hx of Vertigo. No blurry vision, no other complaints.      66 y.o. female with past medical history of DM, depression, bipolar 1 disorder, COPD, vertigo presents to the ED with a complaint of worsening dull headache with onset 2.5 hours ago.  Pain started light and then worsened over hours. Now it is terrible The pain occurs across the front of her head and at the base of her skull. She also reports a stabbing pain to her right ear and dizziness when she stands up. She has never had pain this severe with her previous vertigo. She took pain medication from her neighbor but is unsure what it was called. She doesn't think it helped much. She also reports that she has not eaten today. She denies vision changes.    The history is provided by the patient and medical records. No  was used.   Review of patient's allergies indicates:   Allergen Reactions    Lamictal [lamotrigine] Rash     Per psychiatry notes     Past Medical History:   Diagnosis Date    Anxiety     Bipolar 1 disorder     COPD (chronic obstructive pulmonary disease)     Depression     Diabetes mellitus     GERD (gastroesophageal reflux disease)     HEARING LOSS     pt states she has loss slighty in rt ear.    Hypertension     Insomnia     Rash      Past Surgical History:   Procedure Laterality Date    APPENDECTOMY      BREAST BIOPSY      COLONOSCOPY      COLONOSCOPY N/A 10/31/2019    Procedure: COLONOSCOPY;  Surgeon: Philippe Monteiro MD;  Location: [address]);  Service: Endoscopy;  Laterality: N/A;  PM prep-    CYST REMOVAL      ESOPHAGOGASTRODUODENOSCOPY N/A  10/31/2019    Procedure: EGD (ESOPHAGOGASTRODUODENOSCOPY);  Surgeon: Philippe Monteiro MD;  Location: [facility] ([address] [place] [address]);  Service: Endoscopy;  Laterality: N/A;  Pm prep-MH    HYSTERECTOMY       Family History   Problem Relation Age of Onset    Alzheimer's disease Mother     Sinus disease Mother     Heart failure Father     Hyperlipidemia Father     Heart attack Father     No Known Problems Sister     Sarcoidosis Sister     Colon cancer Maternal Aunt     Alzheimer's disease Maternal Cousin     Alzheimer's disease Maternal Cousin     Anxiety disorder Daughter     Depression Daughter     Esophageal cancer Neg Hx      Social History     Tobacco Use    Smoking status: Former     Packs/day: 0.50     Years: 34.00     Pack years: 17.00     Types: Cigarettes     Start date: 1965     Quit date: 2012     Years since quitting: 10.8     Passive exposure: Never    Smokeless tobacco: Never   Substance Use Topics    Alcohol use: Yes     Comment: Rare    Drug use: Not Currently     Types: Marijuana, Cocaine     Review of Systems   Constitutional:  Negative for fever.   HENT:  Positive for ear pain (right). Negative for sore throat.    Eyes:  Negative for visual disturbance.   Respiratory:  Negative for shortness of breath.    Cardiovascular:  Negative for chest pain.   Gastrointestinal:  Negative for nausea.   Genitourinary:  Negative for dysuria.   Musculoskeletal:  Negative for back pain.   Skin:  Negative for rash.   Neurological:  Positive for dizziness (when she stands up) and headaches. Negative for weakness.   Hematological:  Does not bruise/bleed easily.     Physical Exam     Initial Vitals [11/05/22 2029]   BP Pulse Resp Temp SpO2   (!) 174/87 79 20 98.6 °F (37 °C) 96 %      MAP       --         Physical Exam    Nursing note and vitals reviewed.  Constitutional: She appears well-developed and well-nourished.   HENT:   Head: Normocephalic and atraumatic.   Mouth/Throat: Oropharynx is clear and moist.   Eyes: Conjunctivae  and EOM are normal. Pupils are equal, round, and reactive to light.   No nystagmus    Neck: Phonation normal. Neck supple.   No meningeal signs    Normal range of motion.  Cardiovascular:  Normal rate, regular rhythm, normal heart sounds and normal pulses.           Pulmonary/Chest: Breath sounds normal. No respiratory distress.   Abdominal: Abdomen is soft. Bowel sounds are normal. There is no abdominal tenderness.   Musculoskeletal:         General: Normal range of motion.      Cervical back: Normal range of motion and neck supple.     Neurological: She is alert and oriented to person, place, and time. She has normal strength. GCS score is 15. GCS eye subscore is 4. GCS verbal subscore is 5. GCS motor subscore is 6.   Skin: Skin is warm and dry.       ED Course   Procedures  Labs Reviewed   ISTAT PROCEDURE - Abnormal; Notable for the following components:       Result Value    POC BUN 5 (*)     All other components within normal limits   HIV 1 / 2 ANTIBODY    Narrative:     Release to patient->Immediate   HEPATITIS C ANTIBODY    Narrative:     Release to patient->Immediate   SARS-COV-2 RNA AMPLIFICATION, QUAL   ISTAT CHEM8          Imaging Results              CTA Head and Neck (xpd) (Final result)  Result time 11/05/22 22:46:55      Final result by Jorge Carrillo MD (11/05/22 22:46:55)                   Impression:      No acute intracranial pathology.  Chronic changes of the brain parenchyma.    CTA demonstrates patent anterior and posterior circulations.  No significant stenoses or aneurysm.    Yellow Pulmonary Nodule 2017 Alert: 0.4 centimeter solid nodule    Yellow Actionable Finding (Radiology: Volume 284: Number 1-July 2017)    Fleischner Guidelines do not apply in patients younger than 35 years, immunocompromised patients or patients with cancer. Risk assignment based on ACCP with low risk being less than 5% and high risk combining intermediate and high risk categories.    UNEXPECTED FINDINGS:   Incidental Pulmonary Nodule Single Solid <6mm (1)    RECOMMENDATIONS:  If Low Risk No routine follow up, if High Risk optional CT Chest without contrast 12 months    Electronically signed by resident: Ben Russ  Date:    11/05/2022  Time:    21:55    Electronically signed by: Jorge Carrillo MD  Date:    11/05/2022  Time:    22:46               Narrative:    EXAMINATION:  CTA HEAD AND NECK (XPD)    CLINICAL HISTORY:  Subdural hemorrhage;Vertigo, central;    TECHNIQUE:  Axial CT images obtained throughout the region of the head before and after the administration of intravenous contrast.  CT angiogram was performed from through the cervical and intracranial vasculature during the IV bolus administration of 100mL of Omnipaque 350.  Multiplanar MPR and MIP reformats were performed.    COMPARISON:  CT head 07/30/2021    FINDINGS:  The brain parenchyma appears unchanged, demonstrating similar amount of mild cerebral volume loss. Mild patchy hypoattenuation in the supratentorial white matter, nonspecific but could reflect chronic microvascular ischemic changes.    No new hemorrhage or edema.  No new mass.  No sulcal effacement.  Gray-white matter differentiation is preserved.  No acute major vascular distribution infarct.    Ventricles are prominent and unchanged in size.  No hydrocephalus.    No new extra-axial blood or fluid collections are identified.    The sellar region and craniocervical junction are unremarkable.    No displaced calvarial fracture.    Mastoid air cells and paranasal sinuses are essentially clear.    Salivary glands are without significant abnormality.    No cervical lymphadenopathy.    Multinodular thyroid.    Trachea is patent.    Paraseptal emphysematous changes of the lung apices.  0.4 cm nodule in the right upper lobe (series 6, image 100).    Reversal of normal lordosis.  Degenerative changes of the cervical spine, including vertebral body height loss of C5 and multilevel disease of  C5-C6.      CTA:    Left-sided aortic arch with 2 branch vessels.    The common and internal carotid arteries are normal in course and caliber. No significant stenosis in either carotid bifurcation.  Scattered calcifications of the distal internal carotid arteries.    The vertebral origins are patent with punctate calcifications at the origins..  The cervical vertebral arteries are normal in course and caliber. Vertebrobasilar system is within normal limits without focal abnormality.    The anterior, middle, and posterior cerebral arteries are within normal limits, without evidence of significant stenosis, focal occlusion, or intracranial aneurysm formation.                                       Medications   sodium chloride 0.9% bolus 1,000 mL (0 mLs Intravenous Stopped 11/5/22 2240)   diphenhydrAMINE injection 25 mg (25 mg Intravenous Given 11/5/22 2119)   prochlorperazine injection Soln 10 mg (10 mg Intravenous Given 11/5/22 2119)   iohexoL (OMNIPAQUE 350) injection 100 mL (100 mLs Intravenous Given 11/5/22 2143)     Medical Decision Making:   History:   Old Medical Records: I decided to obtain old medical records.  Initial Assessment:   Emergent evaluation of headache   Differential Diagnosis:   Migraine, vertigo, doubt SAH given indolent onset but she is not to have elevated BP   Clinical Tests:   Lab Tests: Ordered and Reviewed  Radiological Study: Ordered and Reviewed  ED Management:  A head CTA was ordered to evaluate for intracranial etiology of her headache. This was unremarkable. I have a low suspicion for SAH. She requested covid testing which was negative. She was given fluids and medication in the ED which resolved her symptoms. She will be discharged in good condition and follow up with neurology as needed.         Scribe Attestation:   Scribe #1: I performed the above scribed service and the documentation accurately describes the services I performed. I attest to the accuracy of the note.                    Clinical Impression:   Final diagnoses:  [R51.9] Nonintractable headache, unspecified chronicity pattern, unspecified headache type (Primary)      ED Disposition Condition    Discharge Stable          ED Prescriptions    None       Follow-up Information       Follow up With Specialties Details Why Contact Info    Angélica Barakat MD Internal Medicine   [address] [place] [id number]  [phone]               Jessica Allred MD  11/06/22 0042

## 2022-11-06 NOTE — ED NOTES
[id number]   RN noticed pt gasping for breath- SpO2 was >90 on HFNC settings of 30% & 40L, pt was tachypnic into the 50s-60s RR & significant JVD present- CVTS called to bedside immediatley, cards 2 also arrived    Chest xray ordered & completed  Bedside echo completed by CVTS MD  CVP initiated  4mg Bumex given  Bipap initiated  Lactic/VBG & ABG drawn see EMR for results  Plan to continue to monitor, recheck labs       Alix Patel, an 66 y.o. female presents to the ED reports sudden dull headache 10/10 onset 2 hrs ago. Reports nausea without vomiting. Pt states she feels like she has vertigo. Denies chestpain    Chief Complaint   Patient presents with    Headache     Reports headache x 1 day. Stabbing pain in Right ear, not new complaint, hx of Vertigo. No blurry vision, no other complaints.      Review of patient's allergies indicates:   Allergen Reactions    Lamictal [lamotrigine] Rash     Per psychiatry notes     Past Medical History:   Diagnosis Date    Anxiety     Bipolar 1 disorder     COPD (chronic obstructive pulmonary disease)     Depression     Diabetes mellitus     GERD (gastroesophageal reflux disease)     HEARING LOSS     pt states she has loss slighty in rt ear.    Hypertension     Insomnia     Rash        LOC: The patient is awake, alert, aware of environment with an appropriate affect. Oriented x4, speaking appropriately  APPEARANCE: Pt resting comfortably, in no acute distress, pt is clean and well groomed, clothing properly fastened  SKIN:The skin is warm and dry, color consistent with ethnicity, patient has normal skin turgor and moist mucus membranes  RESPIRATORY:Airway is open and patent, respirations are spontaneous, patient has a normal effort and rate, no accessory muscle use noted.  CARDIAC: Normal rate and rhythm, no peripheral edema noted, capillary refill < 3 seconds, bilateral radial pulses 2+.  ABDOMEN: Soft, non tender, non distended.+nausea without vomiting  NEUROLOGIC: PERRLA, facial expression is symmetrical, patient moving all extremities spontaneously, normal sensation in all extremities when touched with a finger.  Follows all commands appropriately. Reports dull headache  MUSCULOSKELETAL: Patient moving all extremities spontaneously, no obvious swelling or deformities noted.

## 2022-11-06 NOTE — DISCHARGE INSTRUCTIONS
Drink lots of water   Take home medications for headache and follow up with neurology team      How Severe Is Your Bcc?: mild Additional History: \\n\\nPatient wants to discuss other treatment options for BCC on right nasal ala.

## 2022-11-11 ENCOUNTER — TELEPHONE (OUTPATIENT)
Dept: NEUROLOGY | Facility: CLINIC | Age: 67
End: 2022-11-11
Payer: COMMERCIAL

## 2022-11-11 NOTE — TELEPHONE ENCOUNTER
----- Message from Vero Talamantes sent at 11/11/2022  1:40 PM CST -----  Contact: pt  Pt returning callback           Confirmed contact below:  Contact Name:Alix Jorge  Phone Number: [phone]

## 2022-11-11 NOTE — TELEPHONE ENCOUNTER
"Spoke with Ms. Patel, she states "I was in the portal waiting to have my appt, the doctor did not come on". Ms. Patel informed our end it does not show that she logged into the portal, return her call shortly after her appt, there was no answer, mailbox is full, I will forward a message to Dr. Figueroa for review.   "

## 2022-11-11 NOTE — TELEPHONE ENCOUNTER
----- Message from Darius Tremayne Brown sent at 11/11/2022  1:25 PM CST -----  Regarding: pt appt  Contact: pt @ [phone]  Pt Alix Patel is calling in regards to schedule VIRTUAL appt with Dr. Figueroa. Awaiting 's arrival for appt. Please call.

## 2022-11-28 ENCOUNTER — TELEPHONE (OUTPATIENT)
Dept: NEUROLOGY | Facility: CLINIC | Age: 67
End: 2022-11-28
Payer: COMMERCIAL

## 2022-11-28 NOTE — TELEPHONE ENCOUNTER
----- Message from Rylee Valentine sent at 11/28/2022  3:30 PM CST -----  Regarding: Appt  Contact: pt @ [phone]  Pt feel the need to be seen today or tomorrow. Pt have appt in January and have been reaching out to office to get sooner appt. Asking for urgent call back

## 2023-01-19 ENCOUNTER — OFFICE VISIT (OUTPATIENT)
Dept: PSYCHIATRY | Facility: CLINIC | Age: 68
End: 2023-01-19
Payer: COMMERCIAL

## 2023-01-19 DIAGNOSIS — F31.30 BIPOLAR AFFECTIVE DISORDER, CURRENT EPISODE DEPRESSED, CURRENT EPISODE SEVERITY UNSPECIFIED: Primary | ICD-10-CM

## 2023-01-19 DIAGNOSIS — F32.A DEPRESSION, UNSPECIFIED DEPRESSION TYPE: ICD-10-CM

## 2023-01-19 DIAGNOSIS — F10.21 ALCOHOL USE DISORDER, MODERATE, IN SUSTAINED REMISSION: ICD-10-CM

## 2023-01-19 DIAGNOSIS — F41.1 GAD (GENERALIZED ANXIETY DISORDER): ICD-10-CM

## 2023-01-19 PROCEDURE — 99214 OFFICE O/P EST MOD 30 MIN: CPT | Mod: 95,,, | Performed by: NURSE PRACTITIONER

## 2023-01-19 PROCEDURE — 99214 PR OFFICE/OUTPT VISIT, EST, LEVL IV, 30-39 MIN: ICD-10-PCS | Mod: 95,,, | Performed by: NURSE PRACTITIONER

## 2023-01-19 PROCEDURE — 1157F ADVNC CARE PLAN IN RCRD: CPT | Mod: CPTII,95,, | Performed by: NURSE PRACTITIONER

## 2023-01-19 PROCEDURE — 1157F PR ADVANCE CARE PLAN OR EQUIV PRESENT IN MEDICAL RECORD: ICD-10-PCS | Mod: CPTII,95,, | Performed by: NURSE PRACTITIONER

## 2023-01-19 RX ORDER — ESCITALOPRAM OXALATE 20 MG/1
20 TABLET ORAL DAILY
Qty: 90 TABLET | Refills: 3 | Status: SHIPPED | OUTPATIENT
Start: 2023-01-19 | End: 2023-08-21 | Stop reason: SDUPTHER

## 2023-01-19 RX ORDER — TRAZODONE HYDROCHLORIDE 150 MG/1
TABLET ORAL
Qty: 90 TABLET | Refills: 2 | Status: SHIPPED | OUTPATIENT
Start: 2023-01-19 | End: 2023-06-16

## 2023-01-19 NOTE — PROGRESS NOTES
"Outpatient Psychiatry Follow-Up Visit (MD/NP)     1/19/2023 Phone visit. Patient tried and could not get into virtual system.      Clinical Status of Patient:  Outpatient (Ambulatory)     Chief Complaint:  Alix Patel is a 67 y.o. female who presents today for follow-up of mood disorder.  Met with patient and no relatives present for interview.       Interval History and Content of Current Session:  Interim Events/Subjective Report/Content of Current Session: Patient presents as a transfer from Dr. Sims, last seen in 7/2022. 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 Reports want to date, but previous marriage is stopping from doing this,  for 10 years. Reports ex  was "the type of person who would have come home to murder his family and kill himself." Daughter is no longer in contact with ex-. Strained relationship with daughter, "we love each other."     History of alcohol abuse, last alcohol use since 7/2022, "think about having a drink but I don't act." "It doesn't keep me up at night, doesn't occupy a great portion of my thoughts, fleeting thoughts." Currently read, watch movies to cope with loneliness currently. No smoking, tobacco products.    Reports moved out of apartment recently.     Reports had stopped depakote, "didn't interact well with the other medications I've been taking." Reports falls began occurring and was feeling weak and now no longer having falls. Reports no longer having mood issues.     Reports about 8 hours of sleep per night currently.      Psychotherapy:  Target symptoms: alcohol abuse, mood disorder  Why chosen therapy is appropriate versus another modality: relevant to diagnosis, patient " responds to this modality, evidence based practice  Outcome monitoring methods: self-report  Therapeutic intervention type: supportive psychotherapy  Topics discussed/themes: mood and drinking issues, stress related to medical comorbidities  The patient's response to the intervention is accepting. The patient's progress toward treatment goals is fair.   Duration of intervention: 19 minutes.     Review of Systems   PSYCHIATRIC: Pertinant items are noted in the narrative.     Past Medical, Family and Social History: The patient's past medical, family and social history have been reviewed and updated as appropriate within the electronic medical record - see encounter notes.    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 Work full-time, been with [employer] for 10 years. ,  to a man. Kayla, lives in [place], about to get MA from [place] in [employer], looking to become an Ambassador. Live alone currently, will be getting roommates. Cat, named Monika Frank, shari cat, starting. One cup of coffee per day. No ETOH.      Compliance: yes     Side effects: possible vertigo     Risk Parameters:  Patient reports no suicidal ideation  Patient reports no homicidal ideation  Patient reports no self-injurious behavior  Patient reports no violent behavior     Exam (detailed: at least 9 elements; comprehensive: all 15 elements)   Constitutional  Vitals:  Most recent vital signs, dated less than 90 days prior to this appointment, were reviewed.   There were no vitals filed for this visit. Patient reports stable vital signs      General:  unremarkable, age appropriate, could not assess appearance      Musculoskeletal  Muscle Strength/Tone:  patient reports some loss of muscle tone and strength   Gait & Station:  non-ataxic      Psychiatric  Speech:  no latency; no press, spontaneous   Mood & Affect:  steady  congruent and appropriate   Thought Process:   normal and logical   Associations:  intact   Thought Content:  normal, no suicidality, no homicidality, delusions, or paranoia   Insight:  has awareness of illness   Judgement: behavior is adequate to circumstances   Orientation:  grossly intact   Memory: intact for content of interview   Language: grossly intact   Attention Span & Concentration:  able to focus   Fund of Knowledge:  not tested      Assessment and Diagnosis   Status/Progress: Based on the examination today, the patient's problem(s) is/are adequately but not ideally controlled.  New problems have not been presented today.   no new obstacles are not complicating management of the primary condition.  The working differential for this patient includes mood disorder and alcohol use.      General Impression: Patient has a stable mood at this time and is in good self control. Patient is not an active danger to self or others at this time, reports sobriety since July of 2022. Patient is motivated to maintain her mood stability, and continue her counseling with Dr Bazzi as well.         ICD-10-CM ICD-9-CM   1. Bipolar affective disorder, current episode depressed, current episode severity unspecified  F31.30 296.50   2. Alcohol use disorder, moderate, in sustained remission  F10.21 305.03    R/o PTSD     Intervention/Counseling/Treatment Plan   Medication Management: The risks and benefits of medication were discussed with the patient. Patient agrees to continue Lexapro 20 mg q am, trazadone 150 mg q hs, and hydroxyzine 25-50 mg q hs prn only difficulty sleeping.         Return to Clinic: 2 to 3 months

## 2023-01-30 ENCOUNTER — TELEPHONE (OUTPATIENT)
Dept: NEUROLOGY | Facility: CLINIC | Age: 68
End: 2023-01-30
Payer: COMMERCIAL

## 2023-02-01 ENCOUNTER — PATIENT MESSAGE (OUTPATIENT)
Dept: OTOLARYNGOLOGY | Facility: CLINIC | Age: 68
End: 2023-02-01
Payer: COMMERCIAL

## 2023-03-20 ENCOUNTER — ANESTHESIA EVENT (OUTPATIENT)
Dept: SURGERY | Facility: HOSPITAL | Age: 68
DRG: 494 | End: 2023-03-20
Payer: COMMERCIAL

## 2023-03-20 ENCOUNTER — HOSPITAL ENCOUNTER (INPATIENT)
Facility: HOSPITAL | Age: 68
LOS: 3 days | Discharge: HOME | DRG: 494 | End: 2023-03-25
Attending: EMERGENCY MEDICINE | Admitting: HOSPITALIST
Payer: COMMERCIAL

## 2023-03-20 DIAGNOSIS — S42.322B: Primary | ICD-10-CM

## 2023-03-20 DIAGNOSIS — S42.302B: ICD-10-CM

## 2023-03-20 DIAGNOSIS — R07.9 CHEST PAIN: ICD-10-CM

## 2023-03-20 DIAGNOSIS — Z01.811 PRE-OP CHEST EXAM: ICD-10-CM
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
## 2023-03-20 LAB
25(OH)D3+25(OH)D2 SERPL-MCNC: 14 NG/ML (ref 30–96)
ABO + RH BLD: NORMAL
ALBUMIN SERPL BCP-MCNC: 4.1 G/DL (ref 3.5–5.2)
ALP SERPL-CCNC: 73 U/L (ref 55–135)
ALT SERPL W/O P-5'-P-CCNC: 64 U/L (ref 10–44)
ANION GAP SERPL CALC-SCNC: 13 MMOL/L (ref 8–16)
AST SERPL-CCNC: 32 U/L (ref 10–40)
BASOPHILS # BLD AUTO: 0.03 K/UL (ref 0–0.2)
BASOPHILS NFR BLD: 0.3 % (ref 0–1.9)
BILIRUB SERPL-MCNC: 0.6 MG/DL (ref 0.1–1)
BLD GP AB SCN CELLS X3 SERPL QL: NORMAL
BUN SERPL-MCNC: 7 MG/DL (ref 8–23)
CALCIUM SERPL-MCNC: 9.5 MG/DL (ref 8.7–10.5)
CHLORIDE SERPL-SCNC: 99 MMOL/L (ref 95–110)
CO2 SERPL-SCNC: 25 MMOL/L (ref 23–29)
CREAT SERPL-MCNC: 0.7 MG/DL (ref 0.5–1.4)
DIFFERENTIAL METHOD: NORMAL
EOSINOPHIL # BLD AUTO: 0 K/UL (ref 0–0.5)
EOSINOPHIL NFR BLD: 0.1 % (ref 0–8)
ERYTHROCYTE [DISTWIDTH] IN BLOOD BY AUTOMATED COUNT: 11.8 % (ref 11.5–14.5)
EST. GFR  (NO RACE VARIABLE): >60 ML/MIN/1.73 M^2
ESTIMATED AVG GLUCOSE: 169 MG/DL (ref 68–131)
GLUCOSE SERPL-MCNC: 187 MG/DL (ref 70–110)
HBA1C MFR BLD: 7.5 % (ref 4–5.6)
HCT VFR BLD AUTO: 40.2 % (ref 37–48.5)
HGB BLD-MCNC: 13.3 G/DL (ref 12–16)
IMM GRANULOCYTES # BLD AUTO: 0.02 K/UL (ref 0–0.04)
IMM GRANULOCYTES NFR BLD AUTO: 0.2 % (ref 0–0.5)
INR PPP: 1.1 (ref 0.8–1.2)
LYMPHOCYTES # BLD AUTO: 2.5 K/UL (ref 1–4.8)
LYMPHOCYTES NFR BLD: 25.1 % (ref 18–48)
MAGNESIUM SERPL-MCNC: 1.8 MG/DL (ref 1.6–2.6)
MCH RBC QN AUTO: 30.3 PG (ref 27–31)
MCHC RBC AUTO-ENTMCNC: 33.1 G/DL (ref 32–36)
MCV RBC AUTO: 92 FL (ref 82–98)
MONOCYTES # BLD AUTO: 0.9 K/UL (ref 0.3–1)
MONOCYTES NFR BLD: 8.5 % (ref 4–15)
NEUTROPHILS # BLD AUTO: 6.7 K/UL (ref 1.8–7.7)
NEUTROPHILS NFR BLD: 65.8 % (ref 38–73)
NRBC BLD-RTO: 0 /100 WBC
PHOSPHATE SERPL-MCNC: 3.9 MG/DL (ref 2.7–4.5)
PLATELET # BLD AUTO: 205 K/UL (ref 150–450)
PMV BLD AUTO: 10.6 FL (ref 9.2–12.9)
POCT GLUCOSE: 173 MG/DL (ref 70–110)
POTASSIUM SERPL-SCNC: 3.8 MMOL/L (ref 3.5–5.1)
PROT SERPL-MCNC: 7.3 G/DL (ref 6–8.4)
PROTHROMBIN TIME: 11.4 SEC (ref 9–12.5)
RBC # BLD AUTO: 4.39 M/UL (ref 4–5.4)
SODIUM SERPL-SCNC: 137 MMOL/L (ref 136–145)
WBC # BLD AUTO: 10.13 K/UL (ref 3.9–12.7)

## 2023-03-20 PROCEDURE — 25000003 PHARM REV CODE 250

## 2023-03-20 PROCEDURE — 85610 PROTHROMBIN TIME: CPT

## 2023-03-20 PROCEDURE — G0378 HOSPITAL OBSERVATION PER HR: HCPCS

## 2023-03-20 PROCEDURE — 85025 COMPLETE CBC W/AUTO DIFF WBC: CPT

## 2023-03-20 PROCEDURE — 96365 THER/PROPH/DIAG IV INF INIT: CPT

## 2023-03-20 PROCEDURE — 96376 TX/PRO/DX INJ SAME DRUG ADON: CPT

## 2023-03-20 PROCEDURE — 93010 ELECTROCARDIOGRAM REPORT: CPT | Mod: ,,, | Performed by: INTERNAL MEDICINE

## 2023-03-20 PROCEDURE — 99285 EMERGENCY DEPT VISIT HI MDM: CPT | Mod: 25

## 2023-03-20 PROCEDURE — 82306 VITAMIN D 25 HYDROXY: CPT

## 2023-03-20 PROCEDURE — 96375 TX/PRO/DX INJ NEW DRUG ADDON: CPT

## 2023-03-20 PROCEDURE — 99223 1ST HOSP IP/OBS HIGH 75: CPT | Mod: ,,,

## 2023-03-20 PROCEDURE — 83036 HEMOGLOBIN GLYCOSYLATED A1C: CPT

## 2023-03-20 PROCEDURE — 24505 CLTX HUMRL SHFT FX W/MNPJ: CPT | Mod: LT

## 2023-03-20 PROCEDURE — 63600175 PHARM REV CODE 636 W HCPCS: Performed by: STUDENT IN AN ORGANIZED HEALTH CARE EDUCATION/TRAINING PROGRAM

## 2023-03-20 PROCEDURE — 83735 ASSAY OF MAGNESIUM: CPT

## 2023-03-20 PROCEDURE — 63600175 PHARM REV CODE 636 W HCPCS

## 2023-03-20 PROCEDURE — 9999 ED PRO SERVICE: Mod: ,,,

## 2023-03-20 PROCEDURE — 93010 EKG 12-LEAD: ICD-10-PCS | Mod: ,,, | Performed by: INTERNAL MEDICINE

## 2023-03-20 PROCEDURE — 99223 PR INITIAL HOSPITAL CARE,LEVL III: ICD-10-PCS | Mod: ,,,

## 2023-03-20 PROCEDURE — 9999 ED PRO SERVICE: ICD-10-PCS | Mod: ,,,

## 2023-03-20 PROCEDURE — 96366 THER/PROPH/DIAG IV INF ADDON: CPT

## 2023-03-20 PROCEDURE — 80053 COMPREHEN METABOLIC PANEL: CPT

## 2023-03-20 PROCEDURE — 84100 ASSAY OF PHOSPHORUS: CPT

## 2023-03-20 PROCEDURE — 93005 ELECTROCARDIOGRAM TRACING: CPT

## 2023-03-20 PROCEDURE — 86900 BLOOD TYPING SEROLOGIC ABO: CPT

## 2023-03-20 RX ORDER — ESCITALOPRAM OXALATE 20 MG/1
20 TABLET ORAL DAILY
Status: DISCONTINUED | OUTPATIENT
Start: 2023-03-21 | End: 2023-03-25 | Stop reason: HOSPADM

## 2023-03-20 RX ORDER — HYDROMORPHONE HYDROCHLORIDE 1 MG/ML
0.5 INJECTION, SOLUTION INTRAMUSCULAR; INTRAVENOUS; SUBCUTANEOUS
Status: COMPLETED | OUTPATIENT
Start: 2023-03-20 | End: 2023-03-20

## 2023-03-20 RX ORDER — TALC
6 POWDER (GRAM) TOPICAL NIGHTLY PRN
Status: DISCONTINUED | OUTPATIENT
Start: 2023-03-20 | End: 2023-03-25 | Stop reason: HOSPADM

## 2023-03-20 RX ORDER — TRAMADOL HYDROCHLORIDE 50 MG/1
50 TABLET ORAL EVERY 6 HOURS PRN
Status: DISCONTINUED | OUTPATIENT
Start: 2023-03-20 | End: 2023-03-21

## 2023-03-20 RX ORDER — MORPHINE SULFATE 4 MG/ML
4 INJECTION, SOLUTION INTRAMUSCULAR; INTRAVENOUS
Status: COMPLETED | OUTPATIENT
Start: 2023-03-20 | End: 2023-03-20

## 2023-03-20 RX ORDER — PROCHLORPERAZINE EDISYLATE 5 MG/ML
5 INJECTION INTRAMUSCULAR; INTRAVENOUS EVERY 6 HOURS PRN
Status: DISCONTINUED | OUTPATIENT
Start: 2023-03-20 | End: 2023-03-25 | Stop reason: HOSPADM

## 2023-03-20 RX ORDER — GLUCAGON 1 MG
1 KIT INJECTION
Status: DISCONTINUED | OUTPATIENT
Start: 2023-03-20 | End: 2023-03-25 | Stop reason: HOSPADM

## 2023-03-20 RX ORDER — SODIUM CHLORIDE 0.9 % (FLUSH) 0.9 %
10 SYRINGE (ML) INJECTION EVERY 12 HOURS PRN
Status: DISCONTINUED | OUTPATIENT
Start: 2023-03-20 | End: 2023-03-25 | Stop reason: HOSPADM

## 2023-03-20 RX ORDER — CHOLECALCIFEROL (VITAMIN D3) 25 MCG
1000 TABLET ORAL DAILY
Status: DISCONTINUED | OUTPATIENT
Start: 2023-03-21 | End: 2023-03-20

## 2023-03-20 RX ORDER — KETOROLAC TROMETHAMINE 15 MG/ML
15 INJECTION, SOLUTION INTRAMUSCULAR; INTRAVENOUS EVERY 6 HOURS PRN
Status: DISCONTINUED | OUTPATIENT
Start: 2023-03-20 | End: 2023-03-21

## 2023-03-20 RX ORDER — PROPOFOL 10 MG/ML
200 VIAL (ML) INTRAVENOUS ONCE
Status: COMPLETED | OUTPATIENT
Start: 2023-03-20 | End: 2023-03-20

## 2023-03-20 RX ORDER — INSULIN ASPART 100 [IU]/ML
0-5 INJECTION, SOLUTION INTRAVENOUS; SUBCUTANEOUS
Status: DISCONTINUED | OUTPATIENT
Start: 2023-03-20 | End: 2023-03-25 | Stop reason: HOSPADM

## 2023-03-20 RX ORDER — ERGOCALCIFEROL 1.25 MG/1
50000 CAPSULE ORAL
Status: DISCONTINUED | OUTPATIENT
Start: 2023-03-21 | End: 2023-03-25 | Stop reason: HOSPADM

## 2023-03-20 RX ORDER — ONDANSETRON 8 MG/1
8 TABLET, ORALLY DISINTEGRATING ORAL EVERY 8 HOURS PRN
Status: DISCONTINUED | OUTPATIENT
Start: 2023-03-20 | End: 2023-03-25 | Stop reason: HOSPADM

## 2023-03-20 RX ORDER — FENTANYL CITRATE 50 UG/ML
50 INJECTION, SOLUTION INTRAMUSCULAR; INTRAVENOUS
Status: DISPENSED | OUTPATIENT
Start: 2023-03-20 | End: 2023-03-21

## 2023-03-20 RX ORDER — TRAMADOL HYDROCHLORIDE 50 MG/1
50 TABLET ORAL EVERY 6 HOURS PRN
Status: DISCONTINUED | OUTPATIENT
Start: 2023-03-20 | End: 2023-03-20

## 2023-03-20 RX ORDER — NALOXONE HCL 0.4 MG/ML
0.02 VIAL (ML) INJECTION
Status: DISCONTINUED | OUTPATIENT
Start: 2023-03-20 | End: 2023-03-25 | Stop reason: HOSPADM

## 2023-03-20 RX ORDER — SODIUM CHLORIDE 0.9 % (FLUSH) 0.9 %
10 SYRINGE (ML) INJECTION
Status: DISCONTINUED | OUTPATIENT
Start: 2023-03-20 | End: 2023-03-25 | Stop reason: HOSPADM

## 2023-03-20 RX ORDER — DEXTROSE 40 %
30 GEL (GRAM) ORAL
Status: DISCONTINUED | OUTPATIENT
Start: 2023-03-20 | End: 2023-03-25 | Stop reason: HOSPADM

## 2023-03-20 RX ORDER — ACETAMINOPHEN 325 MG/1
650 TABLET ORAL EVERY 8 HOURS PRN
Status: DISCONTINUED | OUTPATIENT
Start: 2023-03-20 | End: 2023-03-21

## 2023-03-20 RX ORDER — ATORVASTATIN CALCIUM 40 MG/1
40 TABLET, FILM COATED ORAL DAILY
Status: DISCONTINUED | OUTPATIENT
Start: 2023-03-21 | End: 2023-03-25 | Stop reason: HOSPADM

## 2023-03-20 RX ORDER — POLYETHYLENE GLYCOL 3350 17 G/17G
17 POWDER, FOR SOLUTION ORAL DAILY PRN
Status: DISCONTINUED | OUTPATIENT
Start: 2023-03-20 | End: 2023-03-22

## 2023-03-20 RX ORDER — LEVOTHYROXINE SODIUM 50 UG/1
100 TABLET ORAL DAILY
Status: DISCONTINUED | OUTPATIENT
Start: 2023-03-21 | End: 2023-03-25 | Stop reason: HOSPADM

## 2023-03-20 RX ORDER — DEXTROSE 40 %
15 GEL (GRAM) ORAL
Status: DISCONTINUED | OUTPATIENT
Start: 2023-03-20 | End: 2023-03-25 | Stop reason: HOSPADM

## 2023-03-20 RX ORDER — BISACODYL 10 MG
10 SUPPOSITORY, RECTAL RECTAL DAILY PRN
Status: DISCONTINUED | OUTPATIENT
Start: 2023-03-20 | End: 2023-03-25 | Stop reason: HOSPADM

## 2023-03-20 RX ADMIN — CEFAZOLIN 2 G: 2 INJECTION, POWDER, FOR SOLUTION INTRAMUSCULAR; INTRAVENOUS at 02:03

## 2023-03-20 RX ADMIN — MORPHINE SULFATE 4 MG: 4 INJECTION INTRAVENOUS at 09:03

## 2023-03-20 RX ADMIN — TRAMADOL HYDROCHLORIDE 50 MG: 50 TABLET, COATED ORAL at 11:03

## 2023-03-20 RX ADMIN — HYDROMORPHONE HYDROCHLORIDE 0.5 MG: 1 INJECTION, SOLUTION INTRAMUSCULAR; INTRAVENOUS; SUBCUTANEOUS at 12:03

## 2023-03-20 RX ADMIN — PROPOFOL 20 MG: 10 INJECTION, EMULSION INTRAVENOUS at 02:03

## 2023-03-20 RX ADMIN — TRAZODONE HYDROCHLORIDE 150 MG: 50 TABLET ORAL at 09:03

## 2023-03-20 RX ADMIN — TRAMADOL HYDROCHLORIDE 50 MG: 50 TABLET, COATED ORAL at 05:03

## 2023-03-20 RX ADMIN — PROPOFOL 10 MG: 10 INJECTION, EMULSION INTRAVENOUS at 02:03

## 2023-03-20 RX ADMIN — MORPHINE SULFATE 4 MG: 4 INJECTION INTRAVENOUS at 10:03

## 2023-03-20 RX ADMIN — PROPOFOL 40 MG: 10 INJECTION, EMULSION INTRAVENOUS at 02:03

## 2023-03-20 RX ADMIN — Medication 6 MG: at 09:03

## 2023-03-20 RX ADMIN — PROPOFOL 30 MG: 10 INJECTION, EMULSION INTRAVENOUS at 02:03

## 2023-03-20 RX ADMIN — CEFAZOLIN 2 G: 2 INJECTION, POWDER, FOR SOLUTION INTRAMUSCULAR; INTRAVENOUS at 10:03

## 2023-03-20 NOTE — SUBJECTIVE & OBJECTIVE
Past Medical History:   Diagnosis Date    Anxiety     Bipolar 1 disorder     COPD (chronic obstructive pulmonary disease)     Depression     Diabetes mellitus     GERD (gastroesophageal reflux disease)     Grade I open displaced transverse fracture of shaft of left humerus 3/20/2023    HEARING LOSS     pt states she has loss slighty in rt ear.    Hypertension     Insomnia     Rash        Past Surgical History:   Procedure Laterality Date    APPENDECTOMY      BREAST BIOPSY      COLONOSCOPY      COLONOSCOPY N/A 10/31/2019    Procedure: COLONOSCOPY;  Surgeon: Philippe Monteiro MD;  [facility]  Service: Endoscopy;  Laterality: N/A;  PM prep-    CYST REMOVAL      ESOPHAGOGASTRODUODENOSCOPY N/A 10/31/2019    Procedure: EGD (ESOPHAGOGASTRODUODENOSCOPY);  Surgeon: Philippe Monteiro MD;  [facility]  Service: Endoscopy;  Laterality: N/A;  Pm prep-    HYSTERECTOMY         Review of patient's allergies indicates:   Allergen Reactions    Lamictal [lamotrigine] Rash     Per psychiatry notes       No current facility-administered medications on file prior to encounter.     Current Outpatient Medications on File Prior to Encounter   Medication Sig    ammonium lactate 12 % Crea SAMSON EXT AA  OF FEET ONCE DAILY    aspirin (ECOTRIN) 81 MG EC tablet Take 1 tablet by mouth once daily.    blood sugar diagnostic (TRUE METRIX GLUCOSE TEST STRIP MISC) True Metrix Glucose Test Strip   TEST TWICE DAILY AS DIRECTED    blood-glucose meter (TRUE METRIX AIR GLUCOSE METER MISC) True Metrix Air Glucose Meter   USE UNDER THE SKIN TWICE DAILY AS DIRECTED    butalbital-acetaminophen-caffeine -40 mg (FIORICET, ESGIC) -40 mg per tablet TK 1 T PO  Q 6  H PRN    CRESTOR 20 mg tablet Take 20 mg by mouth once daily.     cyclobenzaprine (FLEXERIL) 10 MG tablet Take 1 tablet by mouth 3 (three) times daily as needed.    diclofenac (VOLTAREN) 50 MG EC tablet Take 1 tablet by mouth 2 (two) times daily.     dulaglutide (TRULICITY) 0.75 mg/0.5 mL pen injector as directed    EScitalopram oxalate (LEXAPRO) 20 MG tablet Take 1 tablet (20 mg total) by mouth once daily.    estradioL (ESTRACE) 0.01 % (0.1 mg/gram) vaginal cream USE 1 GRAM VAGINALLY 3 TIMES A WEEK    flu vacc [id number] 6mos up,PF, (FLUARIX QUAD [id number], PF,) 60 mcg (15 mcg x 4)/0.5 mL Syrg ADM 0.5ML IM UTD    hydrOXYzine pamoate (VISTARIL) 25 MG Cap Take 1 to 2 caps q hs prn difficulty sleeping    ibuprofen (ADVIL,MOTRIN) 800 MG tablet TAKE 1 TABLET BY MOUTH EVERY 6 TO 8 HOURS FOR PAIN    levothyroxine (SYNTHROID) 100 MCG tablet Take 1 tablet (100 mcg total) by mouth once daily.    meloxicam (MOBIC) 15 MG tablet Take 1 tablet by mouth once daily.    metFORMIN (GLUCOPHAGE) 500 MG tablet Take 2 tablets (1,000 mg total) by mouth 2 (two) times daily with meals.    methocarbamoL (ROBAXIN) 500 MG Tab     promethazine (PHENERGAN) 25 MG tablet TK 1 T PO  Q 12 H PRN    traMADoL (ULTRAM) 50 mg tablet Take 1 tablet by mouth every 6 to 8 hours as needed.    traZODone (DESYREL) 150 MG tablet TAKE 1 TABLET(150 MG) BY MOUTH EVERY EVENING    urea (CARMOL) 40 % Crea Apply topically once daily. To dry skin on the feet.    varicella-zoster [id number], PF, (SHINGRIX, PF,) 50 mcg/0.5 mL injection ADM 0.5ML IM UTD     Family History       Problem Relation (Age of Onset)    Alzheimer's disease Mother, Maternal Cousin, Maternal Cousin    Anxiety disorder Daughter    Colon cancer Maternal Aunt    Depression Daughter    Heart attack Father    Heart failure Father    Hyperlipidemia Father    No Known Problems Sister    Sarcoidosis Sister    Sinus disease Mother          Tobacco Use    Smoking status: Former     Packs/day: 0.50     Years: 34.00     Pack years: 17.00     Types: Cigarettes     Start date:      Quit date:      Years since quittin.2     Passive exposure: Never    Smokeless tobacco: Never   Substance and Sexual Activity    Alcohol use: Yes     Comment: Rare     Drug use: Not Currently     Types: Marijuana, Cocaine    Sexual activity: Not Currently     Partners: Male     Birth control/protection: None     Review of Systems   Constitutional:  Negative for activity change, chills and fever.   HENT:  Negative for trouble swallowing.    Eyes:  Negative for photophobia and visual disturbance.   Respiratory:  Negative for cough, chest tightness and shortness of breath.    Cardiovascular:  Negative for chest pain, palpitations and leg swelling.   Gastrointestinal:  Negative for abdominal pain, constipation, diarrhea, nausea and vomiting.   Genitourinary:  Negative for dysuria, frequency and hematuria.   Musculoskeletal:  Positive for myalgias. Negative for back pain, gait problem and neck pain.   Skin:  Negative for rash and wound.   Neurological:  Positive for dizziness (resolved). Negative for syncope, speech difficulty and light-headedness.   Psychiatric/Behavioral:  Negative for agitation and confusion. The patient is not nervous/anxious.    Objective:     Vital Signs (Most Recent):  Temp: 97.9 °F (36.6 °C) (03/20/23 1734)  Pulse: 91 (03/20/23 1734)  Resp: 18 (03/20/23 1734)  BP: 124/86 (03/20/23 1734)  SpO2: 95 % (03/20/23 1734) Vital Signs (24h Range):  Temp:  [97.9 °F (36.6 °C)-98.8 °F (37.1 °C)] 97.9 °F (36.6 °C)  Pulse:  [85-94] 91  Resp:  [14-30] 18  SpO2:  [93 %-100 %] 95 %  BP: (105-138)/(68-86) 124/86     Weight: 68 kg (149 lb 14.6 oz)  Body mass index is 21.51 kg/m².    Physical Exam  Vitals and nursing note reviewed.   Constitutional:       General: She is not in acute distress.     Appearance: She is well-developed.   HENT:      Head: Normocephalic and atraumatic.      Mouth/Throat:      Pharynx: No oropharyngeal exudate.   Eyes:      Conjunctiva/sclera: Conjunctivae normal.      Pupils: Pupils are equal, round, and reactive to light.   Cardiovascular:      Rate and Rhythm: Normal rate and regular rhythm.      Heart sounds: Normal heart sounds.   Pulmonary:       Effort: Pulmonary effort is normal. No respiratory distress.      Breath sounds: Normal breath sounds. No wheezing.      Comments: On RA  Abdominal:      General: Bowel sounds are normal. There is no distension.      Palpations: Abdomen is soft.      Tenderness: There is no abdominal tenderness.   Musculoskeletal:         General: Swelling, tenderness and signs of injury (L upper arm) present. Normal range of motion.      Cervical back: Normal range of motion and neck supple.      Comments: Arm wrapped with ACE bandage and placed in sling   Lymphadenopathy:      Cervical: No cervical adenopathy.   Skin:     General: Skin is warm and dry.      Capillary Refill: Capillary refill takes less than 2 seconds.      Findings: No rash.   Neurological:      Mental Status: She is alert and oriented to person, place, and time.      Cranial Nerves: No cranial nerve deficit.      Sensory: No sensory deficit.      Coordination: Coordination normal.   Psychiatric:         Behavior: Behavior normal.         Thought Content: Thought content normal.         Judgment: Judgment normal.         CRANIAL NERVES     CN III, IV, VI   Pupils are equal, round, and reactive to light.     Significant Labs: All pertinent labs within the past 24 hours have been reviewed.  A1C:   Recent Labs   Lab 03/20/23  1408   HGBA1C 7.5*     CBC:   Recent Labs   Lab 03/20/23  1408   WBC 10.13   HGB 13.3   HCT 40.2        CMP:   Recent Labs   Lab 03/20/23  1408      K 3.8   CL 99   CO2 25   *   BUN 7*   CREATININE 0.7   CALCIUM 9.5   PROT 7.3   ALBUMIN 4.1   BILITOT 0.6   ALKPHOS 73   AST 32   ALT 64*   ANIONGAP 13       Significant Imaging: I have reviewed all pertinent imaging results/findings within the past 24 hours.    Imaging Results              CT Arm (Humerus) Without Contrast Left (Final result)  Result time 03/20/23 17:08:46      Final result by Sylvester Caceres MD (03/20/23 17:08:46)                   Impression:      1.  Fractures of the proximal and mid aspects of the humerus as described.      Electronically signed by: Sylvester Caceres MD  Date:    03/20/2023  Time:    17:08               Narrative:    EXAMINATION:  CT ARM (HUMERUS) WITHOUT CONTRAST LEFT; CT 3D WITHOUT INDEPENDENT WS    CLINICAL HISTORY:  Fracture, humerus;; fx;    TECHNIQUE:  Axial images of the left humerus were obtained at 1.25 mm intervals without administration of IV contrast.  Coronal and sagittal reformatted images were reviewed.  3D reconstructed images of the left humerus were created on a separate workstation and reviewed.    COMPARISON:  Radiograph 03/20/2023    FINDINGS:  There is obliquely oriented fracture involving the left humeral metaphysis, extending along the superolateral aspect to the level of the lateral humeral head.  There is an additional comminuted fracture involving the midshaft humerus noting prominent fracture fragment within the adjacent soft tissues.  There is edema/blood products about the fracture sites.  The remaining aspects of the humerus are intact.  The elbow is intact.  No glenohumeral dislocation.    There is left basilar dependent atelectasis.  The visualized portions of the stomach, spleen, pancreatic tail, and left kidney are grossly unremarkable.                                       CT 3D RECON WITHOUT INDEPENDENT WS (Final result)  Result time 03/20/23 17:08:46      Final result by Sylvester Caceres MD (03/20/23 17:08:46)                   Impression:      1. Fractures of the proximal and mid aspects of the humerus as described.      Electronically signed by: Sylvester Caceres MD  Date:    03/20/2023  Time:    17:08               Narrative:    EXAMINATION:  CT ARM (HUMERUS) WITHOUT CONTRAST LEFT; CT 3D WITHOUT INDEPENDENT WS    CLINICAL HISTORY:  Fracture, humerus;; fx;    TECHNIQUE:  Axial images of the left humerus were obtained at 1.25 mm intervals without administration of IV contrast.  Coronal and sagittal  reformatted images were reviewed.  3D reconstructed images of the left humerus were created on a separate workstation and reviewed.    COMPARISON:  Radiograph 03/20/2023    FINDINGS:  There is obliquely oriented fracture involving the left humeral metaphysis, extending along the superolateral aspect to the level of the lateral humeral head.  There is an additional comminuted fracture involving the midshaft humerus noting prominent fracture fragment within the adjacent soft tissues.  There is edema/blood products about the fracture sites.  The remaining aspects of the humerus are intact.  The elbow is intact.  No glenohumeral dislocation.    There is left basilar dependent atelectasis.  The visualized portions of the stomach, spleen, pancreatic tail, and left kidney are grossly unremarkable.                                       X-Ray Humerus 2 View Left (Final result)  Result time 03/20/23 16:00:19      Final result by Dorcas Rodriguez MD (03/20/23 16:00:19)                   Impression:      As above      Electronically signed by: Dorcas Rodriguez  Date:    03/20/2023  Time:    16:00               Narrative:    EXAMINATION:  XR HUMERUS 2 VIEW LEFT    CLINICAL HISTORY:  post-splint;    TECHNIQUE:  Two views of the left humerus    COMPARISON:  Humerus radiographs performed earlier today    FINDINGS:  Again seen are fractures of the proximal and mid humerus.  Alignment of the proximal humerus fracture is unchanged from 03/20/2023 at 10:34.  Alignment of the mid humerus fracture is improved.  Persistent displaced fracture fragment in the lateral soft tissues.                                       X-Ray Chest AP Portable (Final result)  Result time 03/20/23 15:55:21      Final result by Diego Marinelli MD (03/20/23 15:55:21)                   Impression:      No acute intrathoracic process.      Electronically signed by: Diego Marinelli MD  Date:    03/20/2023  Time:    15:55               Narrative:    EXAMINATION:  XR  CHEST AP PORTABLE    CLINICAL HISTORY:  pre-op chest exam;    TECHNIQUE:  Single frontal view of the chest was performed.    COMPARISON:  None    FINDINGS:  Monitoring EKG leads are present.  The trachea is unremarkable.  The cardiomediastinal silhouette is within normal limits.  The hemidiaphragms are unremarkable.  There are no pleural effusions.  There is no evidence of a pneumothorax.  There is no evidence of pneumomediastinum.  No airspace opacity is present.  There is a chronic fracture of the right 6th rib.  There are degenerative changes in the osseous structures.    There are calcifications in the left aspect of the neck.                                       X-Ray Shoulder Trauma 3 view Left (Final result)  Result time 03/20/23 10:52:21      Final result by Billy Cedeño MD (03/20/23 10:52:21)                   Impression:      Displaced and comminuted acute fracture of the left mid humeral shaft with nondisplaced fracture of the left proximal humerus.      Electronically signed by: Billy Cedeño MD  Date:    03/20/2023  Time:    10:52               Narrative:    EXAMINATION:  XR SHOULDER TRAUMA 3 VIEW LEFT    CLINICAL HISTORY:  fall;    TECHNIQUE:  Three views of the left shoulder were performed.    COMPARISON  None.    FINDINGS:  Nondisplaced acute fracture involving the left proximal humeral neck.  Displaced and comminuted acute fracture involving the left mid humeral shaft with approximately one shaft width medial displacement of the distal fracture fragment.  There is also approximately 3 cm superior displacement of the distal fracture fragment.  No gross dislocation.  Probable metallic staple overlying the left supraclavicular region.  Partially visualized heart appears enlarged and there is probable mild pulmonary edema.                                       X-Ray Humerus 2 View Left (Final result)  Result time 03/20/23 10:54:52      Final result by Billy Cedeño MD (03/20/23 10:54:52)                    Impression:      Displaced and comminuted acute fracture of the left mid humeral shaft with prominent fracture fragment or foreign body overlying the lateral arm subcutaneous fat.    Nondisplaced fracture of the left proximal humerus as seen on same day shoulder radiographs.      Electronically signed by: Billy Cedeño MD  Date:    03/20/2023  Time:    10:54               Narrative:    EXAMINATION:  XR HUMERUS 2 VIEW LEFT    CLINICAL HISTORY:  Unspecified fall, initial encounter.    TECHNIQUE:  Two views of the left humerus.    COMPARISON:  Left shoulder radiographs, same day.    FINDINGS:  Nondisplaced acute fracture involving the left proximal humerus at the humeral neck as seen on same day shoulder radiographs.  Displaced acute fracture of the left mid humeral shaft with approximately one shaft width medial displacement of the distal fracture fragment.  Approximately 3 cm superior displacement of the distal fracture fragment.  There is a prominent fracture fragment or foreign body near the lateral skin surface measuring 1.3 cm in size.  No gross dislocation.                                       CT Head Without Contrast (Final result)  Result time 03/20/23 09:58:20      Final result by Lenny Kitchen MD (03/20/23 09:58:20)                   Impression:      Left frontal extracranial soft tissue swelling without evidence of underlying fracture or acute intracranial hemorrhage.    Ventriculomegaly out of proportion to the degree of sulcal enlargement. While this could relate to cerebral volume loss, the configuration does at least raise the possibility of normal pressure hydrocephalus. Clinical correlation required.      Electronically signed by: Lenny Kitchen MD  Date:    03/20/2023  Time:    09:58               Narrative:    EXAMINATION:  CT HEAD WITHOUT CONTRAST    CLINICAL HISTORY:  Head trauma, minor (Age >= 65y);    TECHNIQUE:  Low dose axial CT images obtained throughout the head  without the use of intravenous contrast.  Axial, sagittal and coronal reconstructions were performed.    COMPARISON:  CTA 11/05/2022    FINDINGS:  Intracranial compartment:    Prominence of the ventricles.  Third ventricle diameter measuring up to 0.9 cm.  Ventricular enlargement out of proportion to the degree of sulcal enlargement, with some crowding of the sulci near the vertex.    The brain parenchyma appears stable.  No new parenchymal hemorrhage, edema, mass effect or major vascular distribution infarct.    No extra-axial blood or fluid collections.    Skull/extracranial contents (limited evaluation):    Left frontal extracranial soft tissue swelling/hematoma.  No displaced calvarial fracture.    The mastoid air cells and visualized paranasal sinuses are essentially clear.

## 2023-03-20 NOTE — NURSING
Pt admitted to the floor via stretcher. Transferred to bed with assistance. VSS. Sling in place to LUE. Resting comfortably at this time. Will continue to monitor.

## 2023-03-20 NOTE — ASSESSMENT & PLAN NOTE
Patient's FSGs are controlled on current medication regimen.  Last A1c reviewed-   Lab Results   Component Value Date    HGBA1C 7.5 (H) 03/20/2023   Most recent fingerstick glucose reviewed-   Recent Labs   Lab 03/20/23  1746   POCTGLUCOSE 173*     Current correctional scale  Low  Maintain anti-hyperglycemic dose as follows-   Antihyperglycemics (From admission, onward)    Start     Stop Route Frequency Ordered    03/20/23 1641  insulin aspart U-100 pen 0-5 Units         -- SubQ Before meals & nightly PRN 03/20/23 1541      - Hold Oral hypoglycemics while patient is in the hospital  - Diabetic/Cardiac diet  - NPO at midnight

## 2023-03-20 NOTE — ASSESSMENT & PLAN NOTE
- Continue Synthroid  - Will check TSH  Lab Results   Component Value Date    TSH 3.159 09/09/2022

## 2023-03-20 NOTE — ED NOTES
..Patient identifiers for Alix Patel 67 y.o. female checked and correct.  Chief Complaint   Patient presents with    Fall     L shoulder pain states fell last night        Past Medical History:   Diagnosis Date    Anxiety     Bipolar 1 disorder     COPD (chronic obstructive pulmonary disease)     Depression     Diabetes mellitus     GERD (gastroesophageal reflux disease)     HEARING LOSS     pt states she has loss slighty in rt ear.    Hypertension     Insomnia     Rash      Allergies reported:   Review of patient's allergies indicates:   Allergen Reactions    Lamictal [lamotrigine] Rash     Per psychiatry notes         LOC: Patient is awake, alert, and aware of environment with an appropriate affect. Patient is oriented x 3 and speaking appropriately.  APPEARANCE: Patient resting comfortably and in no acute distress. Patient is clean and well groomed, patient's clothing is properly fastened.  HEENT: **Fell at 4am while walking to the kitchen. C/o left shoulder pain .No Loc   SKIN: The skin is warm and dry. Patient has normal skin turgor and moist mucus membranes. Skin is intact; no bruising or breakdown noted.  MUSKULOSKELETAL: Patient is moving all extremities well, Asymmetrical shoulders notedPulses intact.   RESPIRATORY: Airway is open and patent. Respirations are spontaneous and non-labored with normal effort and rate, BBS=clear  CARDIAC: Patient has a normal rate and rhythm. ,No peripheral edema noted.   ABDOMEN: No distention noted. Bowel sounds active in all 4 quadrants. Soft and non-tender upon palpation.  NEUROLOGICAL: pupils **mm, PERRL. Facial expression is symmetrical. Hand grasps are equal bilaterally. Normal sensation in all extremities when touched with finger.          
Assumed care of pt. Pt resting in semifowlers position. C/o pain 9/10 to left shoulder.     Patient identifiers verified and correct for jeffrey workman  LOC: The patient is awake, alert and oriented x 3. Appropriate affect; answers questions appropriately  APPEARANCE: Patient appears comfortable and in no acute distress, patient is clean and well groomed.  SKIN: The skin is warm and dry, color consistent with ethnicity, patient has normal skin turgor and moist mucus membranes, skin intact,  MUSCULOSKELETAL: Patient moving all extremities spontaneously, no edema noted. Moves independently in bed.  Limited movement to LUE due to pain  RESPIRATORY: Airway is open and patent, respirations are spontaneous, patient has a normal effort and rate, no accessory muscle use noted, pt placed on continuous pulse ox with O2 sats noted at 95% on room air  CARDIAC: denies chest pain. No edema noted  GASTRO: soft and non-tender to palpation. Denies n/v/d/abd pain  : Pt denies any pain or frequency with urination.  NEURO: Pt opens eyes spontaneously, behavior appropriate to situation, follows commands, facial expression symmetrical, bilateral hand grasp equal and even, purposeful motor response noted,clear speech noted.    
normal

## 2023-03-20 NOTE — CONSULTS
[facility] - [facility]  Orthopedics  Consult Note    Patient Name: Alix Patel  MRN: [id number]  Admission Date: 3/20/2023  Hospital Length of Stay: 0 days  Attending Provider: Estephanie Kuhn MD  Primary Care Provider: Angélica Barakat MD    Patient information was obtained from patient, past medical records and ER records.     Inpatient consult to Orthopedic Surgery  Consult performed by: Felix Ndiaye MD  Consult ordered by: Ivan Marinelli MD        Subjective:     Principal Problem:Open displaced transverse fracture of shaft of left humerus    Chief Complaint:   Chief Complaint   Patient presents with    Fall     L shoulder pain states fell last night           HPI: Alix Patel is a 67 y.o. female with past medical history of hypertension, type II diabetes, and COPD presenting to the ED for evaluation of left arm pain. The patient reports walking in her kitchen this morning around 4:00 PM when she got dizzy and fell to the ground. She landed on her left arm and also bumped her chin. She had immediate arm pain and difficulty moving the arm, but decided to go back to sleep. Upon waking up, she still had arm pain and difficulty moving her arm so she called EMS and presented to the ED. Upon arrival, she had X-rays obtained which showed a left proximal humerus fracture and a left midshaft humerus fracture. She denies numbness and tingling in the left upper extremity. She denies pain else where. She does not take anticoagulation. She does not ambulate with assistive devices.    Orthopedics was consulted for evaluation of the humerus fracture.      Past Medical History:   Diagnosis Date    Anxiety     Bipolar 1 disorder     COPD (chronic obstructive pulmonary disease)     Depression     Diabetes mellitus     GERD (gastroesophageal reflux disease)     HEARING LOSS     pt states she has loss slighty in rt ear.    Hypertension     Insomnia     Rash        Past Surgical History:   Procedure  Laterality Date    APPENDECTOMY      BREAST BIOPSY      COLONOSCOPY      COLONOSCOPY N/A 10/31/2019    Procedure: COLONOSCOPY;  Surgeon: Philippe Monteiro MD;  Location: [facility] ENDO (4TH FLR);  Service: Endoscopy;  Laterality: N/A;  PM prep-    CYST REMOVAL      ESOPHAGOGASTRODUODENOSCOPY N/A 10/31/2019    Procedure: EGD (ESOPHAGOGASTRODUODENOSCOPY);  Surgeon: Philippe Monteiro MD;  Location: [facility]MEL[facility] (4TH FLR);  Service: Endoscopy;  Laterality: N/A;  Pm prep-    HYSTERECTOMY         Review of patient's allergies indicates:   Allergen Reactions    Lamictal [lamotrigine] Rash     Per psychiatry notes       Current Facility-Administered Medications   Medication    ceFAZolin 2 g in dextrose 5 % in water (D5W) 5 % 50 mL IVPB (MB+)    fentaNYL 50 mcg/mL injection 50 mcg     Current Outpatient Medications   Medication Sig    ammonium lactate 12 % Crea SAMSON EXT AA  OF FEET ONCE DAILY    aspirin (ECOTRIN) 81 MG EC tablet Take 1 tablet by mouth once daily.    blood sugar diagnostic (TRUE METRIX GLUCOSE TEST STRIP MISC) True Metrix Glucose Test Strip   TEST TWICE DAILY AS DIRECTED    blood-glucose meter (TRUE METRIX AIR GLUCOSE METER MISC) True Metrix Air Glucose Meter   USE UNDER THE SKIN TWICE DAILY AS DIRECTED    butalbital-acetaminophen-caffeine -40 mg (FIORICET, ESGIC) -40 mg per tablet TK 1 T PO  Q 6  H PRN    CRESTOR 20 mg tablet Take 20 mg by mouth once daily.     cyclobenzaprine (FLEXERIL) 10 MG tablet Take 1 tablet by mouth 3 (three) times daily as needed.    diclofenac (VOLTAREN) 50 MG EC tablet Take 1 tablet by mouth 2 (two) times daily.    dulaglutide (TRULICITY) 0.75 mg/0.5 mL pen injector as directed    EScitalopram oxalate (LEXAPRO) 20 MG tablet Take 1 tablet (20 mg total) by mouth once daily.    estradioL (ESTRACE) 0.01 % (0.1 mg/gram) vaginal cream USE 1 GRAM VAGINALLY 3 TIMES A WEEK    flu vacc [id number] 6mos up,PF, (FLUARIX QUAD [id number], PF,) 60 mcg (15 mcg x 4)/0.5 mL  Syrg ADM 0.5ML IM UTD    hydrOXYzine pamoate (VISTARIL) 25 MG Cap Take 1 to 2 caps q hs prn difficulty sleeping    ibuprofen (ADVIL,MOTRIN) 800 MG tablet TAKE 1 TABLET BY MOUTH EVERY 6 TO 8 HOURS FOR PAIN    levothyroxine (SYNTHROID) 100 MCG tablet Take 1 tablet (100 mcg total) by mouth once daily.    meloxicam (MOBIC) 15 MG tablet Take 1 tablet by mouth once daily.    metFORMIN (GLUCOPHAGE) 500 MG tablet Take 2 tablets (1,000 mg total) by mouth 2 (two) times daily with meals.    methocarbamoL (ROBAXIN) 500 MG Tab     promethazine (PHENERGAN) 25 MG tablet TK 1 T PO  Q 12 H PRN    traMADoL (ULTRAM) 50 mg tablet Take 1 tablet by mouth every 6 to 8 hours as needed.    traZODone (DESYREL) 150 MG tablet TAKE 1 TABLET(150 MG) BY MOUTH EVERY EVENING    urea (CARMOL) 40 % Crea Apply topically once daily. To dry skin on the feet.    varicella-zoster [id number], PF, (SHINGRIX, PF,) 50 mcg/0.5 mL injection ADM 0.5ML IM UTD     Family History       Problem Relation (Age of Onset)    Alzheimer's disease Mother, Maternal Cousin, Maternal Cousin    Anxiety disorder Daughter    Colon cancer Maternal Aunt    Depression Daughter    Heart attack Father    Heart failure Father    Hyperlipidemia Father    No Known Problems Sister    Sarcoidosis Sister    Sinus disease Mother          Tobacco Use    Smoking status: Former     Packs/day: 0.50     Years: 34.00     Pack years: 17.00     Types: Cigarettes     Start date:      Quit date:      Years since quittin.2     Passive exposure: Never    Smokeless tobacco: Never   Substance and Sexual Activity    Alcohol use: Yes     Comment: Rare    Drug use: Not Currently     Types: Marijuana, Cocaine    Sexual activity: Not Currently     Partners: Male     Birth control/protection: None     ROS  Constitutional: Denies fever/chills   Neurological: Denies numbness/tingling (any exceptions noted in orthopaedic exam)    Psychiatric/Behavioral: Denies change in normal mood  "  Eyes: Denies change in vision   Cardiovascular: Denies chest pain   Respiratory: Denies shortness of breath   Hematologic/Lymphatic: Denies easy bleeding/bruising    Skin: Denies new rash or skin lesions    Gastrointestinal: Denies nausea/vomitting/diarrhea, change in bowel habits, abdominal pain    Allergic/Immunologic: Denies adverse reactions to current medications   Musculoskeletal: see HPI     Objective:     Vital Signs (Most Recent):  Temp: 98.3 °F (36.8 °C) (03/20/23 1447)  Pulse: 85 (03/20/23 1447)  Resp: 20 (03/20/23 1447)  BP: 126/73 (03/20/23 1447)  SpO2: 96 % (03/20/23 1447) Vital Signs (24h Range):  Temp:  [98.3 °F (36.8 °C)-98.8 °F (37.1 °C)] 98.3 °F (36.8 °C)  Pulse:  [85-92] 85  Resp:  [14-30] 20  SpO2:  [93 %-100 %] 96 %  BP: (105-135)/(70-82) 126/73     Weight: 68 kg (149 lb 14.6 oz)  Height: 5' 10" (177.8 cm)  Body mass index is 21.51 kg/m².      Intake/Output Summary (Last 24 hours) at 3/20/2023 1459  Last data filed at 3/20/2023 1439  Gross per 24 hour   Intake 50 ml   Output --   Net 50 ml       Ortho/SPM Exam  General: no acute distress, appears stated age     Neuro: alert and oriented x3   Psych: normal mood   Head: normocephalic, atraumatic.    Eyes: no scleral icterus   Mouth: moist mucous membranes   Cardiovascular: extremities warm and well perfused   Lungs: breathing comfortably, equal chest rise bilat   Skin: clean, dry, intact (any exceptions noted in below musculoskeletal exam)    Musculoskeletal:  LUE:  - Poke hole wound over the mid-lateral arm with some scant active bleeding, smaller nonbleeding subcentimeter superficial wound just anterior to the bleeding wound. Skin otherwise intact throughout.  - No ecchymosis, erythema, or signs of cellulitis  - TTP over the proximal humerus and midshaft humerus, otherwise nonTTP throughout  - ROM shoulder deferred due to known fracture  - AROM and PROM of the elbow, wrist, and hand intact without pain  - Axillary/AIN/PIN/Radial/Median/Ulnar " nerves assessed in isolation and are without deficit  - SILT throughout  - Compartments soft  - 2+ radial artery pulse  - Capillary Refill < 2 sec    RUE:  - Skin intact throughout, no open wounds  - No swelling  - No ecchymosis, erythema, or signs of cellulitis  - NonTTP throughout  - AROM and PROM of the shoulder, elbow, wrist, and hand intact without pain  - Axillary/AIN/PIN/Radial/Median/Ulnar nerves assessed in isolation and are without deficit  - SILT throughout  - Compartments soft  - 2+ radial artery pulse  - Capillary Refill < 2 sec    LLE:  - Skin intact throughout, no open wounds  - No swelling  - No ecchymosis, erythema, or signs of cellulitis  - NonTTP throughout  - AROM and PROM of the hip, knee, ankle, and foot intact without pain  - TA/EHL/Gastroc/FHL assessed in isolation and are without deficit  - SILT throughout  - Compartments soft  - 2+ DP and PT pulses  - Capillary Refill < 2 sec  - Negative Log roll    RLE:  - Skin intact throughout, no open wounds  - No swelling  - No ecchymosis, erythema, or signs of cellulitis  - NonTTP throughout  - AROM and PROM of the hip, knee, ankle, and foot intact without pain  - TA/EHL/Gastroc/FHL assessed in isolation and are without deficit  - SILT throughout  - Compartments soft  - 2+ DP and PT pulses  - Capillary Refill < 2 sec  - Negative Log roll    Spine/pelvis/axial body:  No tenderness to palpation of cervical, thoracic, or lumbar spine  No pain with compression of pelvis    Significant Labs: All pertinent labs within the past 24 hours have been reviewed.    Significant Imaging: I have reviewed and interpreted all pertinent imaging results/findings.  X-ray left shoulder and left humerus with a nondisplaced spiral fracture of the proximal humerus and transverse fracture of the midshaft humerus that is significantly shortened    Assessment/Plan:     * Grade I open displaced transverse fracture of shaft of left humerus  Alix Patel is a 67 y.o. female  with a grade I open left midshaft humerus fracture. She also has a nondisplaced fracture of the proximal humerus. She is neurovascularly intact. She does not have any additional injuries. She sustained this injury around 4:00 AM and received her first dose of Ancef at 2:00 PM. Time to antibiotics 10 hours. She was placed into a coaptation splint in the ED under procedural sedation.  Operative versus nonoperative interventions were discussed with the patient at length.  After discussions, the patient elects to proceed with operative intervention for this problem.  Consents obtained at bedside.  Patient marked.    Plan:  Diet: NPO at midnight  Pain control: multimodal  PT/OT: ASHLEY TRAN. Stay in splint and abduction pillow.  DVT PPx: hold chemical prophylaxis prior to OR, SCDs at all times when not ambulating  Abx: Ancef x 24 hours for grade I open fracture.  Preoperative Ancef ordered.  Labs:  Hemoglobin 13.3, HGB A1c 7.5, vitamin-D 14    Dispo: 24 hours of antibiotics. OR 3/21/23.    Procedure note: left humerus reduction  After time out was performed and patient ID, side, and site were verified, the patient underwent conscious sedation by the ED staff. After adequate analgesia, the wound was cleaned and betadine saturated gauze was placed. The fracture was closed reduced and a coaptation splint was applied and then post-reduction films were obtained which verified maintenance of the reduction. The patient tolerated the procedure well with no complications.        Felix Ndiaye MD  Orthopedics  [facility] - Surgery

## 2023-03-20 NOTE — ASSESSMENT & PLAN NOTE
Alix Patel is a 67 y.o. female with a grade I open left midshaft humerus fracture. She also has a nondisplaced fracture of the proximal humerus. She is neurovascularly intact. She does not have any additional injuries. She sustained this injury around 4:00 AM and received her first dose of Ancef at 2:00 PM. Time to antibiotics 10 hours. She was placed into a coaptation splint in the ED under procedural sedation.  Operative versus nonoperative interventions were discussed with the patient at length.  After discussions, the patient elects to proceed with operative intervention for this problem.  Consents obtained at bedside.  Patient marked.    Plan:  Diet: NPO at midnight  Pain control: multimodal  PT/OT: ASHLEY TRAN. Stay in splint and abduction pillow.  DVT PPx: hold chemical prophylaxis prior to OR, SCDs at all times when not ambulating  Abx: Ancef x 24 hours for grade I open fracture.  Preoperative Ancef ordered.  Labs:  Hemoglobin 13.3, HGB A1c 7.5, vitamin-D 14    Dispo: 24 hours of antibiotics. OR 3/21/23.    Procedure note: left humerus reduction  After time out was performed and patient ID, side, and site were verified, the patient underwent conscious sedation by the ED staff. After adequate analgesia, the wound was cleaned and betadine saturated gauze was placed. The fracture was closed reduced and a coaptation splint was applied and then post-reduction films were obtained which verified maintenance of the reduction. The patient tolerated the procedure well with no complications.

## 2023-03-20 NOTE — H&P
[facility] Medicine  History & Physical    Patient Name: Alix Patel  MRN: [id number]  Patient Class: OP- Observation  Admission Date: 3/20/2023  Attending Physician: Estephanie Kuhn MD   Primary Care Provider: Angélica Barakat MD         Patient information was obtained from patient, past medical records and ER records.     Subjective:     Principal Problem:Open displaced transverse fracture of shaft of left humerus    Chief Complaint:   Chief Complaint   Patient presents with    Fall     L shoulder pain states fell last night           HPI: Alix Patel is a 67 y.o. female with a PMHx of T2DM, HLD, hypothyroidism, LILLY, and MDD who presented to the ED by EMS for L arm pain following a fall at home. Patient reported taking two doses of her home Trazodone last night because she wanted to be able to sleep through the night. After taking the medication and getting in bed, patient felt hungry and decided to cook pasta. Patient got back in bed to wait for the pasta to finish cooking. Once it was finished around 4 am, she got out of bed to eat. She felt dizzy when standing in the kitchen and fell to the ground on her left side. She denies any LOC, preceding palpitations, confusion, or weakness. She denies any head injuries. Patient was able to stand back up to continue fixing her meal. She went to sleep only endorsing limited ROM in L arm 2/2 pain. When she woke up, she was still unable to move arm without severe pain prompting her to call EMS. She further denies any HA, F/C, N/V, chest pain, palpitations, SOB, recent infections, similar prior episodes, numbness, or tingling. She reports dizziness has resolved. She is not on AC at this time.    ED: Hypoxic to 93%, placed on 3.5 L NC, but eventually weaned to RA. Intermittently tachypneic, RR of 30 max. Otherwise AFVSS. CBC without leukocytosis or anemia. Hyperglycemic to 187, ALT 64. Otherwise CMP wnl. Vitamin D deficiency. A1c 7.5. Type  and screen completed. CTH left frontal extracranial soft tissue swelling without evidence of underlying fracture or acute intracranial hemorrhage. Ventriculomegaly out of proportion to the degree of sulcal enlargement. While this could relate to cerebral volume loss, the configuration does at least raise the possibility of normal pressure hydrocephalus. Clinical correlation required. CXR with no acute intrathoracic process. L shoulder and humerus XR with displaced and comminuted acute fracture of the left mid humeral shaft with nondisplaced fracture of the left proximal humerus. CT L arm with fractures of the proximal and mid aspects of the humerus as described. EKG NSR RBBB. Placed into splint under sedation. Given Fentanyl (in EMS), Cefazolin, Dilaudid, Ortho consulted. Admitted to Hospital Medicine for further management.      Past Medical History:   Diagnosis Date    Anxiety     Bipolar 1 disorder     COPD (chronic obstructive pulmonary disease)     Depression     Diabetes mellitus     GERD (gastroesophageal reflux disease)     Grade I open displaced transverse fracture of shaft of left humerus 3/20/2023    HEARING LOSS     pt states she has loss slighty in rt ear.    Hypertension     Insomnia     Rash        Past Surgical History:   Procedure Laterality Date    APPENDECTOMY      BREAST BIOPSY      COLONOSCOPY      COLONOSCOPY N/A 10/31/2019    Procedure: COLONOSCOPY;  Surgeon: Philippe Monteiro MD;  Location: [address]);  Service: Endoscopy;  Laterality: N/A;  PM prep-MH    CYST REMOVAL      ESOPHAGOGASTRODUODENOSCOPY N/A 10/31/2019    Procedure: EGD (ESOPHAGOGASTRODUODENOSCOPY);  Surgeon: Philippe Monteiro MD;  Location: [address]);  Service: Endoscopy;  Laterality: N/A;  Pm prep-MH    HYSTERECTOMY         Review of patient's allergies indicates:   Allergen Reactions    Lamictal [lamotrigine] Rash     Per psychiatry notes       No current facility-administered medications  on file prior to encounter.     Current Outpatient Medications on File Prior to Encounter   Medication Sig    ammonium lactate 12 % Crea SAMSON EXT AA  OF FEET ONCE DAILY    aspirin (ECOTRIN) 81 MG EC tablet Take 1 tablet by mouth once daily.    blood sugar diagnostic (TRUE METRIX GLUCOSE TEST STRIP MISC) True Metrix Glucose Test Strip   TEST TWICE DAILY AS DIRECTED    blood-glucose meter (TRUE METRIX AIR GLUCOSE METER MISC) True Metrix Air Glucose Meter   USE UNDER THE SKIN TWICE DAILY AS DIRECTED    butalbital-acetaminophen-caffeine -40 mg (FIORICET, ESGIC) -40 mg per tablet TK 1 T PO  Q 6  H PRN    CRESTOR 20 mg tablet Take 20 mg by mouth once daily.     cyclobenzaprine (FLEXERIL) 10 MG tablet Take 1 tablet by mouth 3 (three) times daily as needed.    diclofenac (VOLTAREN) 50 MG EC tablet Take 1 tablet by mouth 2 (two) times daily.    dulaglutide (TRULICITY) 0.75 mg/0.5 mL pen injector as directed    EScitalopram oxalate (LEXAPRO) 20 MG tablet Take 1 tablet (20 mg total) by mouth once daily.    estradioL (ESTRACE) 0.01 % (0.1 mg/gram) vaginal cream USE 1 GRAM VAGINALLY 3 TIMES A WEEK    flu vacc [id number] 6mos up,PF, (FLUARIX QUAD [id number], PF,) 60 mcg (15 mcg x 4)/0.5 mL Syrg ADM 0.5ML IM UTD    hydrOXYzine pamoate (VISTARIL) 25 MG Cap Take 1 to 2 caps q hs prn difficulty sleeping    ibuprofen (ADVIL,MOTRIN) 800 MG tablet TAKE 1 TABLET BY MOUTH EVERY 6 TO 8 HOURS FOR PAIN    levothyroxine (SYNTHROID) 100 MCG tablet Take 1 tablet (100 mcg total) by mouth once daily.    meloxicam (MOBIC) 15 MG tablet Take 1 tablet by mouth once daily.    metFORMIN (GLUCOPHAGE) 500 MG tablet Take 2 tablets (1,000 mg total) by mouth 2 (two) times daily with meals.    methocarbamoL (ROBAXIN) 500 MG Tab     promethazine (PHENERGAN) 25 MG tablet TK 1 T PO  Q 12 H PRN    traMADoL (ULTRAM) 50 mg tablet Take 1 tablet by mouth every 6 to 8 hours as needed.    traZODone (DESYREL) 150 MG tablet TAKE 1  TABLET(150 MG) BY MOUTH EVERY EVENING    urea (CARMOL) 40 % Crea Apply topically once daily. To dry skin on the feet.    varicella-zoster [id number], PF, (SHINGRIX, PF,) 50 mcg/0.5 mL injection ADM 0.5ML IM UTD     Family History       Problem Relation (Age of Onset)    Alzheimer's disease Mother, Maternal Cousin, Maternal Cousin    Anxiety disorder Daughter    Colon cancer Maternal Aunt    Depression Daughter    Heart attack Father    Heart failure Father    Hyperlipidemia Father    No Known Problems Sister    Sarcoidosis Sister    Sinus disease Mother          Tobacco Use    Smoking status: Former     Packs/day: 0.50     Years: 34.00     Pack years: 17.00     Types: Cigarettes     Start date:      Quit date:      Years since quittin.2     Passive exposure: Never    Smokeless tobacco: Never   Substance and Sexual Activity    Alcohol use: Yes     Comment: Rare    Drug use: Not Currently     Types: Marijuana, Cocaine    Sexual activity: Not Currently     Partners: Male     Birth control/protection: None     Review of Systems   Constitutional:  Negative for activity change, chills and fever.   HENT:  Negative for trouble swallowing.    Eyes:  Negative for photophobia and visual disturbance.   Respiratory:  Negative for cough, chest tightness and shortness of breath.    Cardiovascular:  Negative for chest pain, palpitations and leg swelling.   Gastrointestinal:  Negative for abdominal pain, constipation, diarrhea, nausea and vomiting.   Genitourinary:  Negative for dysuria, frequency and hematuria.   Musculoskeletal:  Positive for myalgias. Negative for back pain, gait problem and neck pain.   Skin:  Negative for rash and wound.   Neurological:  Positive for dizziness (resolved). Negative for syncope, speech difficulty and light-headedness.   Psychiatric/Behavioral:  Negative for agitation and confusion. The patient is not nervous/anxious.    Objective:     Vital Signs (Most Recent):  Temp: 97.9 °F  (36.6 °C) (03/20/23 1734)  Pulse: 91 (03/20/23 1734)  Resp: 18 (03/20/23 1734)  BP: 124/86 (03/20/23 1734)  SpO2: 95 % (03/20/23 1734) Vital Signs (24h Range):  Temp:  [97.9 °F (36.6 °C)-98.8 °F (37.1 °C)] 97.9 °F (36.6 °C)  Pulse:  [85-94] 91  Resp:  [14-30] 18  SpO2:  [93 %-100 %] 95 %  BP: (105-138)/(68-86) 124/86     Weight: 68 kg (149 lb 14.6 oz)  Body mass index is 21.51 kg/m².    Physical Exam  Vitals and nursing note reviewed.   Constitutional:       General: She is not in acute distress.     Appearance: She is well-developed.   HENT:      Head: Normocephalic and atraumatic.      Mouth/Throat:      Pharynx: No oropharyngeal exudate.   Eyes:      Conjunctiva/sclera: Conjunctivae normal.      Pupils: Pupils are equal, round, and reactive to light.   Cardiovascular:      Rate and Rhythm: Normal rate and regular rhythm.      Heart sounds: Normal heart sounds.   Pulmonary:      Effort: Pulmonary effort is normal. No respiratory distress.      Breath sounds: Normal breath sounds. No wheezing.      Comments: On RA  Abdominal:      General: Bowel sounds are normal. There is no distension.      Palpations: Abdomen is soft.      Tenderness: There is no abdominal tenderness.   Musculoskeletal:         General: Swelling, tenderness and signs of injury (L upper arm) present. Normal range of motion.      Cervical back: Normal range of motion and neck supple.      Comments: Arm wrapped with ACE bandage and placed in sling   Lymphadenopathy:      Cervical: No cervical adenopathy.   Skin:     General: Skin is warm and dry.      Capillary Refill: Capillary refill takes less than 2 seconds.      Findings: No rash.   Neurological:      Mental Status: She is alert and oriented to person, place, and time.      Cranial Nerves: No cranial nerve deficit.      Sensory: No sensory deficit.      Coordination: Coordination normal.   Psychiatric:         Behavior: Behavior normal.         Thought Content: Thought content normal.          Judgment: Judgment normal.         CRANIAL NERVES     CN III, IV, VI   Pupils are equal, round, and reactive to light.     Significant Labs: All pertinent labs within the past 24 hours have been reviewed.  A1C:   Recent Labs   Lab 03/20/23  1408   HGBA1C 7.5*     CBC:   Recent Labs   Lab 03/20/23  1408   WBC 10.13   HGB 13.3   HCT 40.2        CMP:   Recent Labs   Lab 03/20/23  1408      K 3.8   CL 99   CO2 25   *   BUN 7*   CREATININE 0.7   CALCIUM 9.5   PROT 7.3   ALBUMIN 4.1   BILITOT 0.6   ALKPHOS 73   AST 32   ALT 64*   ANIONGAP 13       Significant Imaging: I have reviewed all pertinent imaging results/findings within the past 24 hours.    Imaging Results              CT Arm (Humerus) Without Contrast Left (Final result)  Result time 03/20/23 17:08:46      Final result by Sylvester Caceres MD (03/20/23 17:08:46)                   Impression:      1. Fractures of the proximal and mid aspects of the humerus as described.      Electronically signed by: Sylvester Caceres MD  Date:    03/20/2023  Time:    17:08               Narrative:    EXAMINATION:  CT ARM (HUMERUS) WITHOUT CONTRAST LEFT; CT 3D WITHOUT INDEPENDENT WS    CLINICAL HISTORY:  Fracture, humerus;; fx;    TECHNIQUE:  Axial images of the left humerus were obtained at 1.25 mm intervals without administration of IV contrast.  Coronal and sagittal reformatted images were reviewed.  3D reconstructed images of the left humerus were created on a separate workstation and reviewed.    COMPARISON:  Radiograph 03/20/2023    FINDINGS:  There is obliquely oriented fracture involving the left humeral metaphysis, extending along the superolateral aspect to the level of the lateral humeral head.  There is an additional comminuted fracture involving the midshaft humerus noting prominent fracture fragment within the adjacent soft tissues.  There is edema/blood products about the fracture sites.  The remaining aspects of the humerus are intact.  The  elbow is intact.  No glenohumeral dislocation.    There is left basilar dependent atelectasis.  The visualized portions of the stomach, spleen, pancreatic tail, and left kidney are grossly unremarkable.                                       CT 3D RECON WITHOUT INDEPENDENT WS (Final result)  Result time 03/20/23 17:08:46      Final result by Sylvester Caceres MD (03/20/23 17:08:46)                   Impression:      1. Fractures of the proximal and mid aspects of the humerus as described.      Electronically signed by: Sylvester Caceres MD  Date:    03/20/2023  Time:    17:08               Narrative:    EXAMINATION:  CT ARM (HUMERUS) WITHOUT CONTRAST LEFT; CT 3D WITHOUT INDEPENDENT WS    CLINICAL HISTORY:  Fracture, humerus;; fx;    TECHNIQUE:  Axial images of the left humerus were obtained at 1.25 mm intervals without administration of IV contrast.  Coronal and sagittal reformatted images were reviewed.  3D reconstructed images of the left humerus were created on a separate workstation and reviewed.    COMPARISON:  Radiograph 03/20/2023    FINDINGS:  There is obliquely oriented fracture involving the left humeral metaphysis, extending along the superolateral aspect to the level of the lateral humeral head.  There is an additional comminuted fracture involving the midshaft humerus noting prominent fracture fragment within the adjacent soft tissues.  There is edema/blood products about the fracture sites.  The remaining aspects of the humerus are intact.  The elbow is intact.  No glenohumeral dislocation.    There is left basilar dependent atelectasis.  The visualized portions of the stomach, spleen, pancreatic tail, and left kidney are grossly unremarkable.                                       X-Ray Humerus 2 View Left (Final result)  Result time 03/20/23 16:00:19      Final result by Dorcas Rodriguez MD (03/20/23 16:00:19)                   Impression:      As above      Electronically signed by: Dorcas  Jennifer  Date:    03/20/2023  Time:    16:00               Narrative:    EXAMINATION:  XR HUMERUS 2 VIEW LEFT    CLINICAL HISTORY:  post-splint;    TECHNIQUE:  Two views of the left humerus    COMPARISON:  Humerus radiographs performed earlier today    FINDINGS:  Again seen are fractures of the proximal and mid humerus.  Alignment of the proximal humerus fracture is unchanged from 03/20/2023 at 10:34.  Alignment of the mid humerus fracture is improved.  Persistent displaced fracture fragment in the lateral soft tissues.                                       X-Ray Chest AP Portable (Final result)  Result time 03/20/23 15:55:21      Final result by Diego Marinelli MD (03/20/23 15:55:21)                   Impression:      No acute intrathoracic process.      Electronically signed by: Diego Marinelli MD  Date:    03/20/2023  Time:    15:55               Narrative:    EXAMINATION:  XR CHEST AP PORTABLE    CLINICAL HISTORY:  pre-op chest exam;    TECHNIQUE:  Single frontal view of the chest was performed.    COMPARISON:  None    FINDINGS:  Monitoring EKG leads are present.  The trachea is unremarkable.  The cardiomediastinal silhouette is within normal limits.  The hemidiaphragms are unremarkable.  There are no pleural effusions.  There is no evidence of a pneumothorax.  There is no evidence of pneumomediastinum.  No airspace opacity is present.  There is a chronic fracture of the right 6th rib.  There are degenerative changes in the osseous structures.    There are calcifications in the left aspect of the neck.                                       X-Ray Shoulder Trauma 3 view Left (Final result)  Result time 03/20/23 10:52:21      Final result by Billy Cedeño MD (03/20/23 10:52:21)                   Impression:      Displaced and comminuted acute fracture of the left mid humeral shaft with nondisplaced fracture of the left proximal humerus.      Electronically signed by: Billy Cedeño  MD  Date:    03/20/2023  Time:    10:52               Narrative:    EXAMINATION:  XR SHOULDER TRAUMA 3 VIEW LEFT    CLINICAL HISTORY:  fall;    TECHNIQUE:  Three views of the left shoulder were performed.    COMPARISON  None.    FINDINGS:  Nondisplaced acute fracture involving the left proximal humeral neck.  Displaced and comminuted acute fracture involving the left mid humeral shaft with approximately one shaft width medial displacement of the distal fracture fragment.  There is also approximately 3 cm superior displacement of the distal fracture fragment.  No gross dislocation.  Probable metallic staple overlying the left supraclavicular region.  Partially visualized heart appears enlarged and there is probable mild pulmonary edema.                                       X-Ray Humerus 2 View Left (Final result)  Result time 03/20/23 10:54:52      Final result by Billy Cedeño MD (03/20/23 10:54:52)                   Impression:      Displaced and comminuted acute fracture of the left mid humeral shaft with prominent fracture fragment or foreign body overlying the lateral arm subcutaneous fat.    Nondisplaced fracture of the left proximal humerus as seen on same day shoulder radiographs.      Electronically signed by: Billy Cedeño MD  Date:    03/20/2023  Time:    10:54               Narrative:    EXAMINATION:  XR HUMERUS 2 VIEW LEFT    CLINICAL HISTORY:  Unspecified fall, initial encounter.    TECHNIQUE:  Two views of the left humerus.    COMPARISON:  Left shoulder radiographs, same day.    FINDINGS:  Nondisplaced acute fracture involving the left proximal humerus at the humeral neck as seen on same day shoulder radiographs.  Displaced acute fracture of the left mid humeral shaft with approximately one shaft width medial displacement of the distal fracture fragment.  Approximately 3 cm superior displacement of the distal fracture fragment.  There is a prominent fracture fragment or foreign body near the  lateral skin surface measuring 1.3 cm in size.  No gross dislocation.                                       CT Head Without Contrast (Final result)  Result time 03/20/23 09:58:20      Final result by Lenny Kitchen MD (03/20/23 09:58:20)                   Impression:      Left frontal extracranial soft tissue swelling without evidence of underlying fracture or acute intracranial hemorrhage.    Ventriculomegaly out of proportion to the degree of sulcal enlargement. While this could relate to cerebral volume loss, the configuration does at least raise the possibility of normal pressure hydrocephalus. Clinical correlation required.      Electronically signed by: Lenny Kitchen MD  Date:    03/20/2023  Time:    09:58               Narrative:    EXAMINATION:  CT HEAD WITHOUT CONTRAST    CLINICAL HISTORY:  Head trauma, minor (Age >= 65y);    TECHNIQUE:  Low dose axial CT images obtained throughout the head without the use of intravenous contrast.  Axial, sagittal and coronal reconstructions were performed.    COMPARISON:  CTA 11/05/2022    FINDINGS:  Intracranial compartment:    Prominence of the ventricles.  Third ventricle diameter measuring up to 0.9 cm.  Ventricular enlargement out of proportion to the degree of sulcal enlargement, with some crowding of the sulci near the vertex.    The brain parenchyma appears stable.  No new parenchymal hemorrhage, edema, mass effect or major vascular distribution infarct.    No extra-axial blood or fluid collections.    Skull/extracranial contents (limited evaluation):    Left frontal extracranial soft tissue swelling/hematoma.  No displaced calvarial fracture.    The mastoid air cells and visualized paranasal sinuses are essentially clear.                                    Assessment/Plan:     * Grade I open displaced transverse fracture of shaft of left humerus  Fall  Presented to the ED by EMS for L arm pain following a fall at home. Patient took two Trazodones to help her  sleep. Typically takes one. She felt dizzy when standing in the kitchen and fell to the ground on her left side. She denies any LOC, preceding palpitations, confusion, or weakness. She denies any head injuries. Patient was able to stand back up to continue fixing her meal. Woke up unable to move arm without severe pain prompting her to call EMS. Dizziness resolved.    - CTH left frontal extracranial soft tissue swelling without evidence of underlying fracture or acute intracranial hemorrhage. Ventriculomegaly out of proportion to the degree of sulcal enlargement. While this could relate to cerebral volume loss, the configuration does at least raise the possibility of normal pressure hydrocephalus. Clinical correlation required.  - CXR with no acute intrathoracic process  - L shoulder and humerus XR with displaced and comminuted acute fracture of the left mid humeral shaft with nondisplaced fracture of the left proximal humerus  - CT L arm with fractures of the proximal and mid aspects of the humerus as described.  - EKG NSR RBBB (consistent with priors)  - Placed in splint under sedation in ED  - NVI on exam  - Orthostatic vitals ordered as precaution  - Ortho consulted, appreciate assistance   - To OR on 3/21   - Continue Ancef   - NWB LUE. Stay in splint and abduction pillow   - NPO at midnight  - MM pain control    Vitamin D deficiency  - Noted on labs  - Start supplementation    Type 2 diabetes mellitus with hyperglycemia, without long-term current use of insulin  Patient's FSGs are controlled on current medication regimen.  Last A1c reviewed-   Lab Results   Component Value Date    HGBA1C 7.5 (H) 03/20/2023   Most recent fingerstick glucose reviewed-   Recent Labs   Lab 03/20/23  1746   POCTGLUCOSE 173*     Current correctional scale  Low  Maintain anti-hyperglycemic dose as follows-   Antihyperglycemics (From admission, onward)    Start     Stop Route Frequency Ordered    03/20/23 1641  insulin aspart U-100  pen 0-5 Units         -- SubQ Before meals & nightly PRN 03/20/23 1541      - Hold Oral hypoglycemics while patient is in the hospital  - Diabetic/Cardiac diet  - NPO at midnight    Hypothyroidism  - Continue Synthroid  - Will check TSH  Lab Results   Component Value Date    TSH 3.159 09/09/2022       Thyroid nodule  - Per last Endocrinology note: Previous benign FNA of right thyroid nodule  - Last US (9/23/22) reviewed    Hyperlipidemia with target low density lipoprotein (LDL) cholesterol less than 100 mg/dL  - Patient not taking statin, request to resume  - Will check AM lipid panel    Depression  LILLY (generalized anxiety disorder)  Patient has persistent depression which is mild and is currently controlled. Will Continue anti-depressant medications. We will not consult psychiatry at this time. Patient does not display psychosis at this time. Continue to monitor closely and adjust plan of care as needed.  - Continue Lexapro, Trazadone      VTE Risk Mitigation (From admission, onward)         Ordered     IP VTE LOW RISK PATIENT  Once         03/20/23 1541     Place sequential compression device  Until discontinued         03/20/23 1541                     On 03/20/2023, patient should be placed in hospital observation services under my care in collaboration with Estephanie Kuhn MD.      Armand Orozco PA-C  Department of Hospital Medicine  [facility] - Surgery

## 2023-03-20 NOTE — Clinical Note
Diagnosis: Open left humeral fracture [[id number]]   Future Attending Provider: UMU ROGERS [[id number]]   Admitting Provider:: UMU ROGERS [[id number]]

## 2023-03-20 NOTE — ED PROVIDER NOTES
Encounter Date: 3/20/2023       History     Chief Complaint   Patient presents with    Fall     L shoulder pain states fell last night        67-year-old female not on anticoagulation with history of COPD, diabetes, hypertension presenting to the ED after fall.  Patient woke up and went to the kitchen about 4:00 a.m., felt dizzy and fell to the ground.  No LOC, no vomiting.  Was able to get up and get back into bed.  However was unable to move her left arm.  This morning when she woke up, she realized that she still can not move her left arm.  Patient called EMS for transport.  EMS gave patient 40 mcg fentanyl.    The history is provided by the patient.   Review of patient's allergies indicates:   Allergen Reactions    Lamictal [lamotrigine] Rash     Per psychiatry notes     Past Medical History:   Diagnosis Date    Anxiety     Bipolar 1 disorder     COPD (chronic obstructive pulmonary disease)     Depression     Diabetes mellitus     GERD (gastroesophageal reflux disease)     Grade I open displaced transverse fracture of shaft of left humerus 3/20/2023    HEARING LOSS     pt states she has loss slighty in rt ear.    Hypertension     Insomnia     Rash      Past Surgical History:   Procedure Laterality Date    APPENDECTOMY      BREAST BIOPSY      COLONOSCOPY      COLONOSCOPY N/A 10/31/2019    Procedure: COLONOSCOPY;  Surgeon: Philippe Monteiro MD;  Location: [address]);  Service: Endoscopy;  Laterality: N/A;  PM prep-MH    CYST REMOVAL      ESOPHAGOGASTRODUODENOSCOPY N/A 10/31/2019    Procedure: EGD (ESOPHAGOGASTRODUODENOSCOPY);  Surgeon: Philippe Monteiro MD;  Location: [address]);  Service: Endoscopy;  Laterality: N/A;  Pm prep-MH    HYSTERECTOMY       Family History   Problem Relation Age of Onset    Alzheimer's disease Mother     Sinus disease Mother     Heart failure Father     Hyperlipidemia Father     Heart attack Father     No Known Problems Sister     Sarcoidosis Sister     Colon cancer  Maternal Aunt     Alzheimer's disease Maternal Cousin     Alzheimer's disease Maternal Cousin     Anxiety disorder Daughter     Depression Daughter     Esophageal cancer Neg Hx      Social History     Tobacco Use    Smoking status: Former     Packs/day: 0.50     Years: 34.00     Pack years: 17.00     Types: Cigarettes     Start date:      Quit date:      Years since quittin.2     Passive exposure: Never    Smokeless tobacco: Never   Substance Use Topics    Alcohol use: Yes     Comment: Rare    Drug use: Not Currently     Types: Marijuana, Cocaine     Review of Systems   Constitutional:  Negative for fever.   Respiratory:  Negative for shortness of breath.    Cardiovascular:  Negative for chest pain.   Gastrointestinal:  Negative for abdominal pain and vomiting.   Neurological:  Positive for light-headedness. Negative for weakness, numbness and headaches.     Physical Exam     Initial Vitals [23 0858]   BP Pulse Resp Temp SpO2   120/70 85 19 98.8 °F (37.1 °C) 100 %      MAP       --         Physical Exam    Nursing note and vitals reviewed.  Constitutional: She appears well-developed and well-nourished. She is not diaphoretic. No distress.   HENT:   Head: Normocephalic and atraumatic.   Eyes: Conjunctivae and EOM are normal. Right eye exhibits no discharge. Left eye exhibits no discharge. No scleral icterus.   Neck:   Normal range of motion.  Cardiovascular:  Normal rate and regular rhythm.     Exam reveals no gallop and no friction rub.       No murmur heard.  Pulmonary/Chest: No respiratory distress. She has no wheezes. She has no rhonchi. She has no rales. She exhibits no tenderness.   Abdominal: Abdomen is soft. She exhibits no distension and no mass. There is no abdominal tenderness. There is no rebound and no guarding.   Musculoskeletal:      Cervical back: Normal range of motion.      Comments: Significant tenderness noted of left humerus.  Swelling, skin tenting noted  Mild tenderness to  palpation left shoulder     Neurological: She is alert and oriented to person, place, and time. She has normal strength. No cranial nerve deficit or sensory deficit.   Skin: Skin is warm and dry. No erythema. No pallor.       ED Course   Procedural Sedation        Date/Time: 3/20/2023 3:46 PM  Performed by: Ivan Marinelli MD  Authorized by: Wai Lau MD   ASA Class: Class 2 - Mild Illness without functional impairment.  Mallampati Score: Class 3 - Visualization of the soft palate and base of the uvula.   NPO STATUS:  Date/Time of last solid: 3/20/2023 4:00 AM  Date/Time of last fluid: 3/20/2023 4:00 AM    Equipment: on cardiac monitor., on BP monitor., on CO2 monitor., on supplemental oxygen., suction available. and airway equipment available.     Sedation type: moderate (conscious) sedation    Sedatives: propofol  Sedation start date/time: 3/20/2023 2:21 PM  Sedation end date/time: 3/20/2023 2:33 PM  Total Sedation Time (min): 12  Vitals: Vital signs were monitored during sedation.  Complications: No complications.   Patient/Family history of anesthesia or sedation complications: No    Labs Reviewed   COMPREHENSIVE METABOLIC PANEL - Abnormal; Notable for the following components:       Result Value    Glucose 187 (*)     BUN 7 (*)     ALT 64 (*)     All other components within normal limits   HEMOGLOBIN A1C - Abnormal; Notable for the following components:    Hemoglobin A1C 7.5 (*)     Estimated Avg Glucose 169 (*)     All other components within normal limits   VITAMIN D - Abnormal; Notable for the following components:    Vit D, 25-Hydroxy 14 (*)     All other components within normal limits   CBC W/ AUTO DIFFERENTIAL   MAGNESIUM   PHOSPHORUS   PROTIME-INR   TYPE & SCREEN     EKG Readings: (Independently Interpreted)   Normal sinus rhythm, rate of 88.  RBBB. No ST elevations or depressions concerning for ischemia.  No STEMI per my independent interpretation     ECG Results              EKG 12-lead  (Final result)  Result time 03/20/23 14:23:40      Final result by Interface, Lab In [facility] (03/20/23 14:23:40)                   Narrative:    Test Reason : Z01.811,    Vent. Rate : 088 BPM     Atrial Rate : 088 BPM     P-R Int : 168 ms          QRS Dur : 130 ms      QT Int : 398 ms       P-R-T Axes : 066 094 048 degrees     QTc Int : 481 ms    Normal sinus rhythm  Right bundle branch block  Abnormal ECG  When compared with ECG of 30-JUL-2021 12:30,  No significant change was found  Confirmed by Lenny Lucas MD ([id number]) on 3/20/2023 2:23:31 PM    Referred By: [email or web address]   SELF           Confirmed By:Lenny Lucas MD                                  Imaging Results              X-Ray Humerus 2 View Left (In process)                      X-Ray Chest AP Portable (In process)                      X-Ray Shoulder Trauma 3 view Left (Final result)  Result time 03/20/23 10:52:21      Final result by Billy Cedeño MD (03/20/23 10:52:21)                   Impression:      Displaced and comminuted acute fracture of the left mid humeral shaft with nondisplaced fracture of the left proximal humerus.      Electronically signed by: Billy Cedeño MD  Date:    03/20/2023  Time:    10:52               Narrative:    EXAMINATION:  XR SHOULDER TRAUMA 3 VIEW LEFT    CLINICAL HISTORY:  fall;    TECHNIQUE:  Three views of the left shoulder were performed.    COMPARISON  None.    FINDINGS:  Nondisplaced acute fracture involving the left proximal humeral neck.  Displaced and comminuted acute fracture involving the left mid humeral shaft with approximately one shaft width medial displacement of the distal fracture fragment.  There is also approximately 3 cm superior displacement of the distal fracture fragment.  No gross dislocation.  Probable metallic staple overlying the left supraclavicular region.  Partially visualized heart appears enlarged and there is probable mild pulmonary edema.                                       X-Ray  Humerus 2 View Left (Final result)  Result time 03/20/23 10:54:52      Final result by Billy Cedeño MD (03/20/23 10:54:52)                   Impression:      Displaced and comminuted acute fracture of the left mid humeral shaft with prominent fracture fragment or foreign body overlying the lateral arm subcutaneous fat.    Nondisplaced fracture of the left proximal humerus as seen on same day shoulder radiographs.      Electronically signed by: Billy Cedeño MD  Date:    03/20/2023  Time:    10:54               Narrative:    EXAMINATION:  XR HUMERUS 2 VIEW LEFT    CLINICAL HISTORY:  Unspecified fall, initial encounter.    TECHNIQUE:  Two views of the left humerus.    COMPARISON:  Left shoulder radiographs, same day.    FINDINGS:  Nondisplaced acute fracture involving the left proximal humerus at the humeral neck as seen on same day shoulder radiographs.  Displaced acute fracture of the left mid humeral shaft with approximately one shaft width medial displacement of the distal fracture fragment.  Approximately 3 cm superior displacement of the distal fracture fragment.  There is a prominent fracture fragment or foreign body near the lateral skin surface measuring 1.3 cm in size.  No gross dislocation.                                       CT Head Without Contrast (Final result)  Result time 03/20/23 09:58:20      Final result by Lenny Kitchen MD (03/20/23 09:58:20)                   Impression:      Left frontal extracranial soft tissue swelling without evidence of underlying fracture or acute intracranial hemorrhage.    Ventriculomegaly out of proportion to the degree of sulcal enlargement. While this could relate to cerebral volume loss, the configuration does at least raise the possibility of normal pressure hydrocephalus. Clinical correlation required.      Electronically signed by: Lenny Kitchen MD  Date:    03/20/2023  Time:    09:58               Narrative:    EXAMINATION:  CT HEAD WITHOUT  CONTRAST    CLINICAL HISTORY:  Head trauma, minor (Age >= 65y);    TECHNIQUE:  Low dose axial CT images obtained throughout the head without the use of intravenous contrast.  Axial, sagittal and coronal reconstructions were performed.    COMPARISON:  CTA 11/05/2022    FINDINGS:  Intracranial compartment:    Prominence of the ventricles.  Third ventricle diameter measuring up to 0.9 cm.  Ventricular enlargement out of proportion to the degree of sulcal enlargement, with some crowding of the sulci near the vertex.    The brain parenchyma appears stable.  No new parenchymal hemorrhage, edema, mass effect or major vascular distribution infarct.    No extra-axial blood or fluid collections.    Skull/extracranial contents (limited evaluation):    Left frontal extracranial soft tissue swelling/hematoma.  No displaced calvarial fracture.    The mastoid air cells and visualized paranasal sinuses are essentially clear.                                    X-Rays:   Independently Interpreted Readings:   Other Readings:  Acute displaced left humeral shaft fracture, noted also proximal humerus fracture that is nondisplaced.  Medications   fentaNYL 50 mcg/mL injection 50 mcg (50 mcg Intravenous Not Given 3/20/23 1330)   ceFAZolin 2 g in dextrose 5 % in water (D5W) 5 % 50 mL IVPB (MB+) (0 g Intravenous Stopped 3/20/23 1439)   sodium chloride 0.9% flush 10 mL (has no administration in time range)   melatonin tablet 6 mg (has no administration in time range)   ondansetron disintegrating tablet 8 mg (has no administration in time range)   prochlorperazine injection Soln 5 mg (has no administration in time range)   polyethylene glycol packet 17 g (has no administration in time range)   bisacodyL suppository 10 mg (has no administration in time range)   acetaminophen tablet 650 mg (has no administration in time range)   naloxone 0.4 mg/mL injection 0.02 mg (has no administration in time range)   glucagon (human recombinant) injection 1  mg (has no administration in time range)   dextrose 10% bolus 125 mL 125 mL (has no administration in time range)   dextrose 10% bolus 250 mL 250 mL (has no administration in time range)   dextrose 40 % gel 15,000 mg (has no administration in time range)   dextrose 40 % gel 30,000 mg (has no administration in time range)   insulin aspart U-100 pen 0-5 Units (has no administration in time range)   morphine injection 4 mg (4 mg Intravenous Given 3/20/23 0922)   morphine injection 4 mg (4 mg Intravenous Given 3/20/23 1004)   HYDROmorphone injection 0.5 mg (0.5 mg Intravenous Given 3/20/23 1203)   propofol (DIPRIVAN) 10 mg/mL IVP (20 mg Intravenous Given by Other 3/20/23 1433)     Medical Decision Making:   History:   Old Medical Records: I decided to obtain old medical records.  Initial Assessment:   Emergent evaluation 67-year-old female presenting to the ED after fall with left-sided humeral tenderness to palpation and deformity noted    Differential includes was not limited to humeral fracture, shoulder dislocation, muscular injury, intracranial hemorrhage, C-spine fracture.    X-ray showed concern for a humeral shaft fracture.  Due to the displacement of the fracture, discussed with orthopedics, and likely necessitate reduction due to skin tenting.  On orthopedics evaluation, noted to be open fracture on the posterior side.  Given Ancef.  Reduced under sedation with propofol.  Orthopedics plan for OR tomorrow.  Discussed with Hospital Medicine for admission.  Independently Interpreted Test(s):   I have ordered and independently interpreted X-rays - see prior notes.  I have ordered and independently interpreted EKG Reading(s) - see prior notes  Clinical Tests:   Lab Tests: Ordered and Reviewed  Radiological Study: Ordered and Reviewed  Medical Tests: Ordered and Reviewed  Other:   I have discussed this case with another health care provider.       <> Summary of the Discussion: Ortho, HM          Attending  Attestation:   Physician Attestation Statement for Resident:  As the supervising MD   Physician Attestation Statement: I have personally seen and examined this patient.   I agree with the above history.  -:   As the supervising MD I agree with the above PE.     As the supervising MD I agree with the above treatment, course, plan, and disposition.   -: I supervised the reduction done by orthopedic resident.  I supervised the procedural sedation  I was personally present during the entire procedure.                             Clinical Impression:   Final diagnoses:  [W19.XXXA] Fall  [S42.302B] Open left humeral fracture (Primary)        ED Disposition Condition    Observation Stable                Ivan Marinelli MD  Resident  03/20/23 1547       Wai Lau MD  03/21/23 1051

## 2023-03-20 NOTE — HPI
Alix Patel is a 67 y.o. female with past medical history of hypertension, type II diabetes, and COPD presenting to the ED for evaluation of left arm pain. The patient reports walking in her kitchen this morning around 4:00 PM when she got dizzy and fell to the ground. She landed on her left arm and also bumped her chin. She had immediate arm pain and difficulty moving the arm, but decided to go back to sleep. Upon waking up, she still had arm pain and difficulty moving her arm so she called EMS and presented to the ED. Upon arrival, she had X-rays obtained which showed a left proximal humerus fracture and a left midshaft humerus fracture. She denies numbness and tingling in the left upper extremity. She denies pain else where. She does not take anticoagulation. She does not ambulate with assistive devices.    Orthopedics was consulted for evaluation of the humerus fracture.

## 2023-03-20 NOTE — ASSESSMENT & PLAN NOTE
Fall  Presented to the ED by EMS for L arm pain following a fall at home. Patient took two Trazodones to help her sleep. Typically takes one. She felt dizzy when standing in the kitchen and fell to the ground on her left side. She denies any LOC, preceding palpitations, confusion, or weakness. She denies any head injuries. Patient was able to stand back up to continue fixing her meal. Woke up unable to move arm without severe pain prompting her to call EMS. Dizziness resolved.    - CTH left frontal extracranial soft tissue swelling without evidence of underlying fracture or acute intracranial hemorrhage. Ventriculomegaly out of proportion to the degree of sulcal enlargement. While this could relate to cerebral volume loss, the configuration does at least raise the possibility of normal pressure hydrocephalus. Clinical correlation required.  - CXR with no acute intrathoracic process  - L shoulder and humerus XR with displaced and comminuted acute fracture of the left mid humeral shaft with nondisplaced fracture of the left proximal humerus  - CT L arm with fractures of the proximal and mid aspects of the humerus as described.  - EKG NSR RBBB (consistent with priors)  - Placed in splint under sedation in ED  - NVI on exam  - Orthostatic vitals ordered as precaution  - Ortho consulted, appreciate assistance   - To OR on 3/21   - Continue Ancef   - NWB LUE. Stay in splint and abduction pillow   - NPO at midnight  - MM pain control

## 2023-03-20 NOTE — HPI
Alix Patel is a 67 y.o. female with a PMHx of T2DM, HLD, hypothyroidism, LILLY, and MDD who presented to the ED by EMS for L arm pain following a fall at home. Patient reported taking two doses of her home Trazodone last night because she wanted to be able to sleep through the night. After taking the medication and getting in bed, patient felt hungry and decided to cook pasta. Patient got back in bed to wait for the pasta to finish cooking. Once it was finished around 4 am, she got out of bed to eat. She felt dizzy when standing in the kitchen and fell to the ground on her left side. She denies any LOC, preceding palpitations, confusion, or weakness. She denies any head injuries. Patient was able to stand back up to continue fixing her meal. She went to sleep only endorsing limited ROM in L arm 2/2 pain. When she woke up, she was still unable to move arm without severe pain prompting her to call EMS. She further denies any HA, F/C, N/V, chest pain, palpitations, SOB, recent infections, similar prior episodes, numbness, or tingling. She reports dizziness has resolved. She is not on AC at this time.    ED: Hypoxic to 93%, placed on 3.5 L NC, but eventually weaned to RA. Intermittently tachypneic, RR of 30 max. Otherwise AFVSS. CBC without leukocytosis or anemia. Hyperglycemic to 187, ALT 64. Otherwise CMP wnl. Vitamin D deficiency. A1c 7.5. Type and screen completed. CTH left frontal extracranial soft tissue swelling without evidence of underlying fracture or acute intracranial hemorrhage. Ventriculomegaly out of proportion to the degree of sulcal enlargement. While this could relate to cerebral volume loss, the configuration does at least raise the possibility of normal pressure hydrocephalus. Clinical correlation required. CXR with no acute intrathoracic process. L shoulder and humerus XR with displaced and comminuted acute fracture of the left mid humeral shaft with nondisplaced fracture of the left  proximal humerus. CT L arm with fractures of the proximal and mid aspects of the humerus as described. EKG NSR RBBB. Placed into splint under sedation. Given Fentanyl (in EMS), Cefazolin, Dilaudid, Ortho consulted. Admitted to Hospital Medicine for further management.

## 2023-03-20 NOTE — ANESTHESIA PREPROCEDURE EVALUATION
[facility]  Anesthesia Pre-Operative Evaluation        Patient Name: Alix Patel  YOB: 1955  MRN: [id number]    SUBJECTIVE:     Pre-operative Evaluation for Procedure(s) (LRB):  ORIF, FRACTURE, HUMERUS, Radioluscent hand table, C-arm, [place], Ancef (Left)     03/20/2023    Alix Patel is a 67 y.o. female with a PMHx significant for T2DM, HLD, hypothyroidism, LILLY, and MDD who presented to the ED by EMS for L arm pain following a fall at home. L shoulder and humerus XR with displaced and comminuted acute fracture of the left mid humeral shaft with nondisplaced fracture of the left proximal humerus.     She now presents for the above procedure(s).    Previous Airway: None documented.    TTE   Results for orders placed during the hospital encounter of 07/20/21  Interpretation Summary  · The left ventricle is normal in size with concentric remodeling and normal systolic function.  · Grade I left ventricular diastolic dysfunction.  · Normal right ventricular size with normal right ventricular systolic function.      LDA:        Peripheral IV - Single Lumen 03/20/23 1100 22 G Anterior;Right Forearm (Active)   Site Assessment Clean;Dry;Intact;No redness;No swelling 03/20/23 1745   Extremity Assessment Distal to IV No abnormal discoloration;No redness;No swelling;No warmth 03/20/23 1745   Line Status Saline locked 03/20/23 1745   Dressing Status Clean;Dry;Intact 03/20/23 1745   Dressing Intervention Integrity maintained 03/20/23 1745   Number of days: 0            Peripheral IV - Single Lumen 03/20/23 1353 20 G Anterior;Right Forearm (Active)   Site Assessment Clean;Dry;Intact;No swelling;No redness 03/20/23 1745   Extremity Assessment Distal to IV No abnormal discoloration;No swelling;No warmth;No redness 03/20/23 1745   Line Status Saline locked 03/20/23 1745   Dressing Status Clean;Dry;Intact 03/20/23 1745   Dressing Intervention Integrity maintained 03/20/23 1745    Number of days: 0       Patient Active Problem List   Diagnosis    Muscle weakness of lower extremity    Depression    Bipolar disorder    Hyperlipidemia with target low density lipoprotein (LDL) cholesterol less than 100 mg/dL    Hoarseness    Thyroid nodule    Episodic tension-type headache, not intractable    Weakness    Hypothyroidism    Muscle spasm    Voice disturbance    Vocal fold edema    Hyperfunctional dysphonia    GERD (gastroesophageal reflux disease)    Alcohol use disorder, moderate, in sustained remission    Episodic lightheadedness    Memory disorder    Ataxia    LILLY (generalized anxiety disorder)    Acute bilateral low back pain with bilateral sciatica    Poor posture    Closed nondisplaced fracture of base of fifth metacarpal bone of left hand with routine healing    Left hand pain    Type 2 diabetes mellitus with hyperglycemia, without long-term current use of insulin    Accidental drug overdose    Disorder of peripheral nerve of upper extremity    Incontinence of feces    Primary insomnia    Pulmonary emphysema    TMJ pain dysfunction syndrome    Vitamin D deficiency    Grade I open displaced transverse fracture of shaft of left humerus    Fall       Review of patient's allergies indicates:   Allergen Reactions    Lamictal [lamotrigine] Rash     Per psychiatry notes       Current Outpatient Medications   Medication Instructions    ammonium lactate 12 % Crea SAMSON EXT AA  OF FEET ONCE DAILY    aspirin (ECOTRIN) 81 MG EC tablet 1 tablet, Oral, Daily    blood sugar diagnostic (TRUE METRIX GLUCOSE TEST STRIP MISC) True Metrix Glucose Test Strip   TEST TWICE DAILY AS DIRECTED    blood-glucose meter (TRUE METRIX AIR GLUCOSE METER MISC) True Metrix Air Glucose Meter   USE UNDER THE SKIN TWICE DAILY AS DIRECTED    butalbital-acetaminophen-caffeine -40 mg (FIORICET, ESGIC) -40 mg per tablet TK 1 T PO  Q 6  H PRN    CRESTOR 20 mg, Oral, Daily     cyclobenzaprine (FLEXERIL) 10 MG tablet 1 tablet, Oral, 3 times daily PRN    diclofenac (VOLTAREN) 50 MG EC tablet 1 tablet, Oral, 2 times daily    dulaglutide (TRULICITY) 0.75 mg/0.5 mL pen injector as directed    EScitalopram oxalate (LEXAPRO) 20 mg, Oral, Daily    estradioL (ESTRACE) 0.01 % (0.1 mg/gram) vaginal cream USE 1 GRAM VAGINALLY 3 TIMES A WEEK    flu vacc [id number] 6mos up,PF, (FLUARIX QUAD [id number], PF,) 60 mcg (15 mcg x 4)/0.5 mL Syrg ADM 0.5ML IM UTD    hydrOXYzine pamoate (VISTARIL) 25 MG Cap Take 1 to 2 caps q hs prn difficulty sleeping    ibuprofen (ADVIL,MOTRIN) 800 MG tablet TAKE 1 TABLET BY MOUTH EVERY 6 TO 8 HOURS FOR PAIN    levothyroxine (SYNTHROID) 100 mcg, Oral, Daily    meloxicam (MOBIC) 15 MG tablet 1 tablet, Oral, Daily    metFORMIN (GLUCOPHAGE) 1,000 mg, Oral, 2 times daily with meals    methocarbamoL (ROBAXIN) 500 MG Tab No dose, route, or frequency recorded.    promethazine (PHENERGAN) 25 MG tablet TK 1 T PO  Q 12 H PRN    traMADoL (ULTRAM) 50 mg tablet 1 tablet, Oral, Every 6-8 hours PRN    traZODone (DESYREL) 150 MG tablet TAKE 1 TABLET(150 MG) BY MOUTH EVERY EVENING    urea (CARMOL) 40 % Crea Topical (Top), Daily, To dry skin on the feet.    varicella-zoster gE-AS01B, PF, (SHINGRIX, PF,) 50 mcg/0.5 mL injection ADM 0.5ML IM UTD       Past Surgical History:   Procedure Laterality Date    APPENDECTOMY      BREAST BIOPSY      COLONOSCOPY      COLONOSCOPY N/A 10/31/2019    Procedure: COLONOSCOPY;  Surgeon: Philippe Monteiro MD;  Location: [facility] ([address] [place] [address]);  Service: Endoscopy;  Laterality: N/A;  PM prep-MH    CYST REMOVAL      ESOPHAGOGASTRODUODENOSCOPY N/A 10/31/2019    Procedure: EGD (ESOPHAGOGASTRODUODENOSCOPY);  Surgeon: Philippe Monteiro MD;  [facility]  Service: Endoscopy;  Laterality: N/A;  Pm prep-MH    HYSTERECTOMY         Social History     Substance and Sexual Activity   Drug Use Not Currently    Types: Marijuana, Cocaine      Alcohol Use: Not on file     Tobacco Use: Medium Risk    Smoking Tobacco Use: Former    Smokeless Tobacco Use: Never    Passive Exposure: Never       OBJECTIVE:     Vital Signs Range (Last 24H):  Temp:  [36.6 °C (97.9 °F)-37.1 °C (98.8 °F)]   Pulse:  [83-94]   Resp:  [14-30]   BP: (105-143)/(68-86)   SpO2:  [93 %-100 %]       Significant Labs    Heme Profile  Lab Results   Component Value Date    WBC 10.13 03/20/2023    HGB 13.3 03/20/2023    HCT 40.2 03/20/2023     03/20/2023       Coagulation Studies  Lab Results   Component Value Date    LABPROT 11.4 03/20/2023    INR 1.1 03/20/2023       BMP  Lab Results   Component Value Date     03/20/2023    K 3.8 03/20/2023    CL 99 03/20/2023    CO2 25 03/20/2023    BUN 7 (L) 03/20/2023    CREATININE 0.7 03/20/2023    MG 1.8 03/20/2023    PHOS 3.9 03/20/2023       Liver Function Tests  Lab Results   Component Value Date    AST 32 03/20/2023    ALT 64 (H) 03/20/2023    ALKPHOS 73 03/20/2023    BILITOT 0.6 03/20/2023    PROT 7.3 03/20/2023    ALBUMIN 4.1 03/20/2023       Lipid Profile  Lab Results   Component Value Date    CHOL 189 09/09/2022    HDL 37 (L) 09/09/2022    TRIG 88 09/09/2022       Endocrine Profile  Lab Results   Component Value Date    HGBA1C 7.5 (H) 03/20/2023    TSH 3.159 09/09/2022       Cardiac Studies    EKG:   Results for orders placed or performed during the hospital encounter of 03/20/23   EKG 12-lead    Collection Time: 03/20/23  2:03 PM    Narrative    Test Reason : Z01.811,    Vent. Rate : 088 BPM     Atrial Rate : 088 BPM     P-R Int : 168 ms          QRS Dur : 130 ms      QT Int : 398 ms       P-R-T Axes : 066 094 048 degrees     QTc Int : 481 ms    Normal sinus rhythm  Right bundle branch block  Abnormal ECG  When compared with ECG of 30-JUL-2021 12:30,  No significant change was found  Confirmed by Lenny Lucas MD ([id number]) on 3/20/2023 2:23:31 PM    Referred By: CARLOS   SELF           Confirmed By:Lenny Lucas MD        TTE   Results for orders placed during the hospital encounter of 07/20/21  Interpretation Summary  · The left ventricle is normal in size with concentric remodeling and normal systolic function.  · Grade I left ventricular diastolic dysfunction.  · Normal right ventricular size with normal right ventricular systolic function.      Nuclear Stress Echo  Results for orders placed in visit on 12/31/19  Nuclear Stress - Cardiology Interpreted  Interpretation Summary    The perfusion scan is free of evidence from myocardial ischemia or injury.    Gated perfusion images showed an ejection fraction of 90 % at rest and 90 % post stress. Normal is >51 %.    There is normal wall motion at rest and post stress.    LV cavity size is normal at rest and normal at stress.    The EKG portion of this study is negative for ischemia.    The patient reported chest pain during the stress test.    There were no arrhythmias during stress.    There are no prior studies for comparison.      ASSESSMENT/PLAN:      03/20/2023  Alix Patel is a 67 y.o., female.      Pre-op Assessment    I have reviewed the Patient Summary Reports.     I have reviewed the Nursing Notes. I have reviewed the NPO Status.   I have reviewed the Medications.     Review of Systems  Anesthesia Hx:  No problems with previous Anesthesia   Denies Personal Hx of Anesthesia complications.   Cardiovascular:   Hypertension hyperlipidemia    Pulmonary:   COPD    Hepatic/GI:   GERD    Neurological:   Neuromuscular Disease, Headaches    Endocrine:   Diabetes Hypothyroidism    Psych:   Psychiatric History          Physical Exam  General: Alert    Airway:  Mallampati: III / II  Mouth Opening: Normal  TM Distance: Normal  Tongue: Normal  Neck ROM: Normal ROM    Dental:  Periodontal disease        Anesthesia Plan  Type of Anesthesia, risks & benefits discussed:    Anesthesia Type: Gen ETT, Regional  Intra-op Monitoring Plan: Standard ASA Monitors  Post Op Pain  Control Plan: multimodal analgesia and IV/PO Opioids PRN  Induction:  IV  Airway Plan: Direct, Post-Induction  Informed Consent: Informed consent signed with the Patient and all parties understand the risks and agree with anesthesia plan.  All questions answered.   ASA Score: 3  Day of Surgery Review of History & Physical: H&P Update referred to the surgeon/provider.I have interviewed and examined the patient. I have reviewed the patient's H&P dated: There are no significant changes.     Ready For Surgery From Anesthesia Perspective.     .

## 2023-03-20 NOTE — ASSESSMENT & PLAN NOTE
- Per last Endocrinology note: Previous benign FNA of right thyroid nodule  - Last US (9/23/22) reviewed

## 2023-03-20 NOTE — SUBJECTIVE & OBJECTIVE
Past Medical History:   Diagnosis Date    Anxiety     Bipolar 1 disorder     COPD (chronic obstructive pulmonary disease)     Depression     Diabetes mellitus     GERD (gastroesophageal reflux disease)     HEARING LOSS     pt states she has loss slighty in rt ear.    Hypertension     Insomnia     Rash        Past Surgical History:   Procedure Laterality Date    APPENDECTOMY      BREAST BIOPSY      COLONOSCOPY      COLONOSCOPY N/A 10/31/2019    Procedure: COLONOSCOPY;  Surgeon: Philippe Monteiro MD;  [facility]  Service: Endoscopy;  Laterality: N/A;  PM prep-    CYST REMOVAL      ESOPHAGOGASTRODUODENOSCOPY N/A 10/31/2019    Procedure: EGD (ESOPHAGOGASTRODUODENOSCOPY);  Surgeon: Philippe Monteiro MD;  Location: [facility] ([address] [place] [address]);  Service: Endoscopy;  Laterality: N/A;  Pm prep-    HYSTERECTOMY         Review of patient's allergies indicates:   Allergen Reactions    Lamictal [lamotrigine] Rash     Per psychiatry notes       Current Facility-Administered Medications   Medication    ceFAZolin 2 g in dextrose 5 % in water (D5W) 5 % 50 mL IVPB (MB+)    fentaNYL 50 mcg/mL injection 50 mcg     Current Outpatient Medications   Medication Sig    ammonium lactate 12 % Crea SAMSON EXT AA  OF FEET ONCE DAILY    aspirin (ECOTRIN) 81 MG EC tablet Take 1 tablet by mouth once daily.    blood sugar diagnostic (TRUE METRIX GLUCOSE TEST STRIP MISC) True Metrix Glucose Test Strip   TEST TWICE DAILY AS DIRECTED    blood-glucose meter (TRUE METRIX AIR GLUCOSE METER MISC) True Metrix Air Glucose Meter   USE UNDER THE SKIN TWICE DAILY AS DIRECTED    butalbital-acetaminophen-caffeine -40 mg (FIORICET, ESGIC) -40 mg per tablet TK 1 T PO  Q 6  H PRN    CRESTOR 20 mg tablet Take 20 mg by mouth once daily.     cyclobenzaprine (FLEXERIL) 10 MG tablet Take 1 tablet by mouth 3 (three) times daily as needed.    diclofenac (VOLTAREN) 50 MG EC tablet Take 1 tablet by mouth 2 (two) times daily.    dulaglutide  (TRULICITY) 0.75 mg/0.5 mL pen injector as directed    EScitalopram oxalate (LEXAPRO) 20 MG tablet Take 1 tablet (20 mg total) by mouth once daily.    estradioL (ESTRACE) 0.01 % (0.1 mg/gram) vaginal cream USE 1 GRAM VAGINALLY 3 TIMES A WEEK    flu vacc [id number] 6mos up,PF, (FLUARIX QUAD [id number], PF,) 60 mcg (15 mcg x 4)/0.5 mL Syrg ADM 0.5ML IM UTD    hydrOXYzine pamoate (VISTARIL) 25 MG Cap Take 1 to 2 caps q hs prn difficulty sleeping    ibuprofen (ADVIL,MOTRIN) 800 MG tablet TAKE 1 TABLET BY MOUTH EVERY 6 TO 8 HOURS FOR PAIN    levothyroxine (SYNTHROID) 100 MCG tablet Take 1 tablet (100 mcg total) by mouth once daily.    meloxicam (MOBIC) 15 MG tablet Take 1 tablet by mouth once daily.    metFORMIN (GLUCOPHAGE) 500 MG tablet Take 2 tablets (1,000 mg total) by mouth 2 (two) times daily with meals.    methocarbamoL (ROBAXIN) 500 MG Tab     promethazine (PHENERGAN) 25 MG tablet TK 1 T PO  Q 12 H PRN    traMADoL (ULTRAM) 50 mg tablet Take 1 tablet by mouth every 6 to 8 hours as needed.    traZODone (DESYREL) 150 MG tablet TAKE 1 TABLET(150 MG) BY MOUTH EVERY EVENING    urea (CARMOL) 40 % Crea Apply topically once daily. To dry skin on the feet.    varicella-zoster gE-AS01B, PF, (SHINGRIX, PF,) 50 mcg/0.5 mL injection ADM 0.5ML IM UTD     Family History       Problem Relation (Age of Onset)    Alzheimer's disease Mother, Maternal Cousin, Maternal Cousin    Anxiety disorder Daughter    Colon cancer Maternal Aunt    Depression Daughter    Heart attack Father    Heart failure Father    Hyperlipidemia Father    No Known Problems Sister    Sarcoidosis Sister    Sinus disease Mother          Tobacco Use    Smoking status: Former     Packs/day: 0.50     Years: 34.00     Pack years: 17.00     Types: Cigarettes     Start date:      Quit date:      Years since quittin.2     Passive exposure: Never    Smokeless tobacco: Never   Substance and Sexual Activity    Alcohol use: Yes     Comment: Rare    Drug use: Not  "Currently     Types: Marijuana, Cocaine    Sexual activity: Not Currently     Partners: Male     Birth control/protection: None     ROS  Constitutional: Denies fever/chills   Neurological: Denies numbness/tingling (any exceptions noted in orthopaedic exam)    Psychiatric/Behavioral: Denies change in normal mood   Eyes: Denies change in vision   Cardiovascular: Denies chest pain   Respiratory: Denies shortness of breath   Hematologic/Lymphatic: Denies easy bleeding/bruising    Skin: Denies new rash or skin lesions    Gastrointestinal: Denies nausea/vomitting/diarrhea, change in bowel habits, abdominal pain    Allergic/Immunologic: Denies adverse reactions to current medications   Musculoskeletal: see HPI     Objective:     Vital Signs (Most Recent):  Temp: 98.3 °F (36.8 °C) (03/20/23 1447)  Pulse: 85 (03/20/23 1447)  Resp: 20 (03/20/23 1447)  BP: 126/73 (03/20/23 1447)  SpO2: 96 % (03/20/23 1447) Vital Signs (24h Range):  Temp:  [98.3 °F (36.8 °C)-98.8 °F (37.1 °C)] 98.3 °F (36.8 °C)  Pulse:  [85-92] 85  Resp:  [14-30] 20  SpO2:  [93 %-100 %] 96 %  BP: (105-135)/(70-82) 126/73     Weight: 68 kg (149 lb 14.6 oz)  Height: 5' 10" (177.8 cm)  Body mass index is 21.51 kg/m².      Intake/Output Summary (Last 24 hours) at 3/20/2023 1459  Last data filed at 3/20/2023 1439  Gross per 24 hour   Intake 50 ml   Output --   Net 50 ml       Ortho/SPM Exam  General: no acute distress, appears stated age     Neuro: alert and oriented x3   Psych: normal mood   Head: normocephalic, atraumatic.    Eyes: no scleral icterus   Mouth: moist mucous membranes   Cardiovascular: extremities warm and well perfused   Lungs: breathing comfortably, equal chest rise bilat   Skin: clean, dry, intact (any exceptions noted in below musculoskeletal exam)    Musculoskeletal:  LUE:  - Poke hole wound over the mid-lateral arm with some scant active bleeding, smaller nonbleeding subcentimeter superficial wound just anterior to the bleeding wound. Skin " otherwise intact throughout.  - No ecchymosis, erythema, or signs of cellulitis  - TTP over the proximal humerus and midshaft humerus, otherwise nonTTP throughout  - ROM shoulder deferred due to known fracture  - AROM and PROM of the elbow, wrist, and hand intact without pain  - Axillary/AIN/PIN/Radial/Median/Ulnar nerves assessed in isolation and are without deficit  - SILT throughout  - Compartments soft  - 2+ radial artery pulse  - Capillary Refill < 2 sec    RUE:  - Skin intact throughout, no open wounds  - No swelling  - No ecchymosis, erythema, or signs of cellulitis  - NonTTP throughout  - AROM and PROM of the shoulder, elbow, wrist, and hand intact without pain  - Axillary/AIN/PIN/Radial/Median/Ulnar nerves assessed in isolation and are without deficit  - SILT throughout  - Compartments soft  - 2+ radial artery pulse  - Capillary Refill < 2 sec    LLE:  - Skin intact throughout, no open wounds  - No swelling  - No ecchymosis, erythema, or signs of cellulitis  - NonTTP throughout  - AROM and PROM of the hip, knee, ankle, and foot intact without pain  - TA/EHL/Gastroc/FHL assessed in isolation and are without deficit  - SILT throughout  - Compartments soft  - 2+ DP and PT pulses  - Capillary Refill < 2 sec  - Negative Log roll    RLE:  - Skin intact throughout, no open wounds  - No swelling  - No ecchymosis, erythema, or signs of cellulitis  - NonTTP throughout  - AROM and PROM of the hip, knee, ankle, and foot intact without pain  - TA/EHL/Gastroc/FHL assessed in isolation and are without deficit  - SILT throughout  - Compartments soft  - 2+ DP and PT pulses  - Capillary Refill < 2 sec  - Negative Log roll    Spine/pelvis/axial body:  No tenderness to palpation of cervical, thoracic, or lumbar spine  No pain with compression of pelvis    Significant Labs: All pertinent labs within the past 24 hours have been reviewed.    Significant Imaging: I have reviewed and interpreted all pertinent imaging  results/findings.  X-ray left shoulder and left humerus with a nondisplaced spiral fracture of the proximal humerus and transverse fracture of the midshaft humerus that is significantly shortened

## 2023-03-20 NOTE — ASSESSMENT & PLAN NOTE
LILLY (generalized anxiety disorder)  Patient has persistent depression which is mild and is currently controlled. Will Continue anti-depressant medications. We will not consult psychiatry at this time. Patient does not display psychosis at this time. Continue to monitor closely and adjust plan of care as needed.  - Continue Lexapro, Trazadone

## 2023-03-21 ENCOUNTER — ANESTHESIA (OUTPATIENT)
Dept: SURGERY | Facility: HOSPITAL | Age: 68
DRG: 494 | End: 2023-03-21
Payer: COMMERCIAL

## 2023-03-21 ENCOUNTER — TELEPHONE (OUTPATIENT)
Dept: NEUROLOGY | Facility: CLINIC | Age: 68
End: 2023-03-21
Payer: COMMERCIAL

## 2023-03-21 LAB
ANION GAP SERPL CALC-SCNC: 11 MMOL/L (ref 8–16)
BASOPHILS # BLD AUTO: 0.03 K/UL (ref 0–0.2)
BASOPHILS NFR BLD: 0.4 % (ref 0–1.9)
BUN SERPL-MCNC: 7 MG/DL (ref 8–23)
CALCIUM SERPL-MCNC: 9.4 MG/DL (ref 8.7–10.5)
CHLORIDE SERPL-SCNC: 100 MMOL/L (ref 95–110)
CHOLEST SERPL-MCNC: 245 MG/DL (ref 120–199)
CHOLEST/HDLC SERPL: 6.8 {RATIO} (ref 2–5)
CO2 SERPL-SCNC: 27 MMOL/L (ref 23–29)
CREAT SERPL-MCNC: 0.6 MG/DL (ref 0.5–1.4)
DIFFERENTIAL METHOD: NORMAL
EOSINOPHIL # BLD AUTO: 0 K/UL (ref 0–0.5)
EOSINOPHIL NFR BLD: 0.5 % (ref 0–8)
ERYTHROCYTE [DISTWIDTH] IN BLOOD BY AUTOMATED COUNT: 12 % (ref 11.5–14.5)
EST. GFR  (NO RACE VARIABLE): >60 ML/MIN/1.73 M^2
GLUCOSE SERPL-MCNC: 151 MG/DL (ref 70–110)
HCT VFR BLD AUTO: 39.3 % (ref 37–48.5)
HDLC SERPL-MCNC: 36 MG/DL (ref 40–75)
HDLC SERPL: 14.7 % (ref 20–50)
HGB BLD-MCNC: 12.7 G/DL (ref 12–16)
IMM GRANULOCYTES # BLD AUTO: 0.02 K/UL (ref 0–0.04)
IMM GRANULOCYTES NFR BLD AUTO: 0.3 % (ref 0–0.5)
LDLC SERPL CALC-MCNC: 186.6 MG/DL (ref 63–159)
LYMPHOCYTES # BLD AUTO: 2.5 K/UL (ref 1–4.8)
LYMPHOCYTES NFR BLD: 33.4 % (ref 18–48)
MAGNESIUM SERPL-MCNC: 1.8 MG/DL (ref 1.6–2.6)
MCH RBC QN AUTO: 30.1 PG (ref 27–31)
MCHC RBC AUTO-ENTMCNC: 32.3 G/DL (ref 32–36)
MCV RBC AUTO: 93 FL (ref 82–98)
MONOCYTES # BLD AUTO: 0.7 K/UL (ref 0.3–1)
MONOCYTES NFR BLD: 8.8 % (ref 4–15)
NEUTROPHILS # BLD AUTO: 4.2 K/UL (ref 1.8–7.7)
NEUTROPHILS NFR BLD: 56.6 % (ref 38–73)
NONHDLC SERPL-MCNC: 209 MG/DL
NRBC BLD-RTO: 0 /100 WBC
PHOSPHATE SERPL-MCNC: 3.1 MG/DL (ref 2.7–4.5)
PLATELET # BLD AUTO: 210 K/UL (ref 150–450)
PMV BLD AUTO: 11.2 FL (ref 9.2–12.9)
POCT GLUCOSE: 192 MG/DL (ref 70–110)
POCT GLUCOSE: 238 MG/DL (ref 70–110)
POCT GLUCOSE: 274 MG/DL (ref 70–110)
POTASSIUM SERPL-SCNC: 3.8 MMOL/L (ref 3.5–5.1)
RBC # BLD AUTO: 4.22 M/UL (ref 4–5.4)
SODIUM SERPL-SCNC: 138 MMOL/L (ref 136–145)
TRIGL SERPL-MCNC: 112 MG/DL (ref 30–150)
TSH SERPL DL<=0.005 MIU/L-ACNC: 3.07 UIU/ML (ref 0.4–4)
WBC # BLD AUTO: 7.49 K/UL (ref 3.9–12.7)

## 2023-03-21 PROCEDURE — 11012 DEB SKIN BONE AT FX SITE: CPT | Mod: 51,,, | Performed by: ORTHOPAEDIC SURGERY

## 2023-03-21 PROCEDURE — 99223 PR INITIAL HOSPITAL CARE,LEVL III: ICD-10-PCS | Mod: 57,,, | Performed by: ORTHOPAEDIC SURGERY

## 2023-03-21 PROCEDURE — 64416 NJX AA&/STRD BRCH PL NFS IMG: CPT

## 2023-03-21 PROCEDURE — 99233 PR SUBSEQUENT HOSPITAL CARE,LEVL III: ICD-10-PCS | Mod: ,,,

## 2023-03-21 PROCEDURE — 63600175 PHARM REV CODE 636 W HCPCS: Performed by: NURSE ANESTHETIST, CERTIFIED REGISTERED

## 2023-03-21 PROCEDURE — 37000008 HC ANESTHESIA 1ST 15 MINUTES: Performed by: ORTHOPAEDIC SURGERY

## 2023-03-21 PROCEDURE — 25000003 PHARM REV CODE 250: Performed by: ORTHOPAEDIC SURGERY

## 2023-03-21 PROCEDURE — 96376 TX/PRO/DX INJ SAME DRUG ADON: CPT

## 2023-03-21 PROCEDURE — 83735 ASSAY OF MAGNESIUM: CPT

## 2023-03-21 PROCEDURE — 25000003 PHARM REV CODE 250: Performed by: STUDENT IN AN ORGANIZED HEALTH CARE EDUCATION/TRAINING PROGRAM

## 2023-03-21 PROCEDURE — 36000711: Performed by: ORTHOPAEDIC SURGERY

## 2023-03-21 PROCEDURE — 80061 LIPID PANEL: CPT

## 2023-03-21 PROCEDURE — 24516 PR OPEN ROD FIXATN HUMERAL SHAFT FX: ICD-10-PCS | Mod: 51,RT,, | Performed by: ORTHOPAEDIC SURGERY

## 2023-03-21 PROCEDURE — C1769 GUIDE WIRE: HCPCS | Performed by: ORTHOPAEDIC SURGERY

## 2023-03-21 PROCEDURE — 84443 ASSAY THYROID STIM HORMONE: CPT

## 2023-03-21 PROCEDURE — 96375 TX/PRO/DX INJ NEW DRUG ADDON: CPT

## 2023-03-21 PROCEDURE — 11012 PR DEBRIDE ASSOC OPEN FX/DISLO SKIN/MUS/BONE: ICD-10-PCS | Mod: 51,,, | Performed by: ORTHOPAEDIC SURGERY

## 2023-03-21 PROCEDURE — 25000003 PHARM REV CODE 250

## 2023-03-21 PROCEDURE — 63600175 PHARM REV CODE 636 W HCPCS: Performed by: STUDENT IN AN ORGANIZED HEALTH CARE EDUCATION/TRAINING PROGRAM

## 2023-03-21 PROCEDURE — 24516 TX HUMRL SHAFT FX IMED IMPLT: CPT | Mod: 51,RT,, | Performed by: ORTHOPAEDIC SURGERY

## 2023-03-21 PROCEDURE — 64416 L INTERSCALENE CATHETER: ICD-10-PCS | Mod: 59,LT,, | Performed by: ANESTHESIOLOGY

## 2023-03-21 PROCEDURE — C1713 ANCHOR/SCREW BN/BN,TIS/BN: HCPCS | Performed by: ORTHOPAEDIC SURGERY

## 2023-03-21 PROCEDURE — 63600175 PHARM REV CODE 636 W HCPCS

## 2023-03-21 PROCEDURE — 96366 THER/PROPH/DIAG IV INF ADDON: CPT

## 2023-03-21 PROCEDURE — D9220A PRA ANESTHESIA: ICD-10-PCS | Mod: CRNA,,, | Performed by: STUDENT IN AN ORGANIZED HEALTH CARE EDUCATION/TRAINING PROGRAM

## 2023-03-21 PROCEDURE — 63600175 PHARM REV CODE 636 W HCPCS: Performed by: ORTHOPAEDIC SURGERY

## 2023-03-21 PROCEDURE — D9220A PRA ANESTHESIA: Mod: CRNA,,, | Performed by: STUDENT IN AN ORGANIZED HEALTH CARE EDUCATION/TRAINING PROGRAM

## 2023-03-21 PROCEDURE — 71000015 HC POSTOP RECOV 1ST HR: Performed by: ORTHOPAEDIC SURGERY

## 2023-03-21 PROCEDURE — 99233 SBSQ HOSP IP/OBS HIGH 50: CPT | Mod: ,,,

## 2023-03-21 PROCEDURE — 87075 CULTR BACTERIA EXCEPT BLOOD: CPT | Performed by: ORTHOPAEDIC SURGERY

## 2023-03-21 PROCEDURE — 63600175 PHARM REV CODE 636 W HCPCS: Performed by: ANESTHESIOLOGY

## 2023-03-21 PROCEDURE — 27000221 HC OXYGEN, UP TO 24 HOURS

## 2023-03-21 PROCEDURE — 27201423 OPTIME MED/SURG SUP & DEVICES STERILE SUPPLY: Performed by: ORTHOPAEDIC SURGERY

## 2023-03-21 PROCEDURE — 71000016 HC POSTOP RECOV ADDL HR: Performed by: ORTHOPAEDIC SURGERY

## 2023-03-21 PROCEDURE — 71000039 HC RECOVERY, EACH ADD'L HOUR: Performed by: ORTHOPAEDIC SURGERY

## 2023-03-21 PROCEDURE — 85025 COMPLETE CBC W/AUTO DIFF WBC: CPT

## 2023-03-21 PROCEDURE — 84100 ASSAY OF PHOSPHORUS: CPT

## 2023-03-21 PROCEDURE — 80048 BASIC METABOLIC PNL TOTAL CA: CPT

## 2023-03-21 PROCEDURE — 64416 NJX AA&/STRD BRCH PL NFS IMG: CPT | Mod: 59,LT,, | Performed by: ANESTHESIOLOGY

## 2023-03-21 PROCEDURE — D9220A PRA ANESTHESIA: Mod: ANES,,, | Performed by: ANESTHESIOLOGY

## 2023-03-21 PROCEDURE — 99223 1ST HOSP IP/OBS HIGH 75: CPT | Mod: 57,,, | Performed by: ORTHOPAEDIC SURGERY

## 2023-03-21 PROCEDURE — 96372 THER/PROPH/DIAG INJ SC/IM: CPT

## 2023-03-21 PROCEDURE — G0378 HOSPITAL OBSERVATION PER HR: HCPCS

## 2023-03-21 PROCEDURE — 71000033 HC RECOVERY, INTIAL HOUR: Performed by: ORTHOPAEDIC SURGERY

## 2023-03-21 PROCEDURE — 23615 PR OPEN TREATMENT PROX HUMERAL FRACTURE: ICD-10-PCS | Mod: RT,,, | Performed by: ORTHOPAEDIC SURGERY

## 2023-03-21 PROCEDURE — 36000710: Performed by: ORTHOPAEDIC SURGERY

## 2023-03-21 PROCEDURE — 23615 OPTX PROX HUMRL FX W/INT FIX: CPT | Mod: RT,,, | Performed by: ORTHOPAEDIC SURGERY

## 2023-03-21 PROCEDURE — 87070 CULTURE OTHR SPECIMN AEROBIC: CPT | Performed by: ORTHOPAEDIC SURGERY

## 2023-03-21 PROCEDURE — 87176 TISSUE HOMOGENIZATION CULTR: CPT | Performed by: ORTHOPAEDIC SURGERY

## 2023-03-21 PROCEDURE — 36415 COLL VENOUS BLD VENIPUNCTURE: CPT

## 2023-03-21 PROCEDURE — D9220A PRA ANESTHESIA: ICD-10-PCS | Mod: ANES,,, | Performed by: ANESTHESIOLOGY

## 2023-03-21 PROCEDURE — 37000009 HC ANESTHESIA EA ADD 15 MINS: Performed by: ORTHOPAEDIC SURGERY

## 2023-03-21 DEVICE — IMPLANTABLE DEVICE: Type: IMPLANTABLE DEVICE | Site: HUMERUS | Status: FUNCTIONAL

## 2023-03-21 DEVICE — SCREW LOCKING BONE T2 5X40MM: Type: IMPLANTABLE DEVICE | Site: HUMERUS | Status: FUNCTIONAL

## 2023-03-21 DEVICE — SCREW LOCKING BONE T2 5X35MM: Type: IMPLANTABLE DEVICE | Site: HUMERUS | Status: FUNCTIONAL

## 2023-03-21 RX ORDER — ACETAMINOPHEN 500 MG
1000 TABLET ORAL EVERY 6 HOURS
Status: DISCONTINUED | OUTPATIENT
Start: 2023-03-21 | End: 2023-03-21

## 2023-03-21 RX ORDER — DEXAMETHASONE SODIUM PHOSPHATE 4 MG/ML
INJECTION, SOLUTION INTRA-ARTICULAR; INTRALESIONAL; INTRAMUSCULAR; INTRAVENOUS; SOFT TISSUE
Status: DISCONTINUED | OUTPATIENT
Start: 2023-03-21 | End: 2023-03-21

## 2023-03-21 RX ORDER — FENTANYL CITRATE 50 UG/ML
INJECTION, SOLUTION INTRAMUSCULAR; INTRAVENOUS
Status: DISCONTINUED | OUTPATIENT
Start: 2023-03-21 | End: 2023-03-21

## 2023-03-21 RX ORDER — CEFAZOLIN SODIUM 1 G/3ML
INJECTION, POWDER, FOR SOLUTION INTRAMUSCULAR; INTRAVENOUS
Status: DISCONTINUED | OUTPATIENT
Start: 2023-03-21 | End: 2023-03-21

## 2023-03-21 RX ORDER — PHENYLEPHRINE HCL IN 0.9% NACL 1 MG/10 ML
SYRINGE (ML) INTRAVENOUS
Status: DISCONTINUED | OUTPATIENT
Start: 2023-03-21 | End: 2023-03-21

## 2023-03-21 RX ORDER — ACETAMINOPHEN 325 MG/1
650 TABLET ORAL EVERY 6 HOURS
Status: DISCONTINUED | OUTPATIENT
Start: 2023-03-22 | End: 2023-03-22

## 2023-03-21 RX ORDER — METHOCARBAMOL 500 MG/1
500 TABLET, FILM COATED ORAL 4 TIMES DAILY
Status: DISCONTINUED | OUTPATIENT
Start: 2023-03-21 | End: 2023-03-22

## 2023-03-21 RX ORDER — PROPOFOL 10 MG/ML
VIAL (ML) INTRAVENOUS
Status: DISCONTINUED | OUTPATIENT
Start: 2023-03-21 | End: 2023-03-21

## 2023-03-21 RX ORDER — OXYCODONE HYDROCHLORIDE 10 MG/1
10 TABLET ORAL EVERY 4 HOURS PRN
Status: DISCONTINUED | OUTPATIENT
Start: 2023-03-21 | End: 2023-03-23

## 2023-03-21 RX ORDER — ASPIRIN 81 MG/1
81 TABLET ORAL 2 TIMES DAILY
Status: DISCONTINUED | OUTPATIENT
Start: 2023-03-21 | End: 2023-03-25 | Stop reason: HOSPADM

## 2023-03-21 RX ORDER — HYDROMORPHONE HYDROCHLORIDE 1 MG/ML
0.5 INJECTION, SOLUTION INTRAMUSCULAR; INTRAVENOUS; SUBCUTANEOUS ONCE AS NEEDED
Status: COMPLETED | OUTPATIENT
Start: 2023-03-21 | End: 2023-03-21

## 2023-03-21 RX ORDER — OXYCODONE HYDROCHLORIDE 5 MG/1
5 TABLET ORAL EVERY 4 HOURS PRN
Status: DISCONTINUED | OUTPATIENT
Start: 2023-03-21 | End: 2023-03-21

## 2023-03-21 RX ORDER — MUPIROCIN 20 MG/G
OINTMENT TOPICAL
Status: DISCONTINUED | OUTPATIENT
Start: 2023-03-21 | End: 2023-03-21 | Stop reason: HOSPADM

## 2023-03-21 RX ORDER — DEXMEDETOMIDINE HYDROCHLORIDE 100 UG/ML
INJECTION, SOLUTION INTRAVENOUS
Status: DISCONTINUED | OUTPATIENT
Start: 2023-03-21 | End: 2023-03-21

## 2023-03-21 RX ORDER — HYDROMORPHONE HYDROCHLORIDE 1 MG/ML
0.5 INJECTION, SOLUTION INTRAMUSCULAR; INTRAVENOUS; SUBCUTANEOUS EVERY 6 HOURS PRN
Status: DISCONTINUED | OUTPATIENT
Start: 2023-03-21 | End: 2023-03-21

## 2023-03-21 RX ORDER — HYDROMORPHONE HYDROCHLORIDE 1 MG/ML
1 INJECTION, SOLUTION INTRAMUSCULAR; INTRAVENOUS; SUBCUTANEOUS EVERY 4 HOURS PRN
Status: DISCONTINUED | OUTPATIENT
Start: 2023-03-21 | End: 2023-03-22

## 2023-03-21 RX ORDER — TOBRAMYCIN 1.2 G/30ML
INJECTION, POWDER, LYOPHILIZED, FOR SOLUTION INTRAVENOUS
Status: DISCONTINUED | OUTPATIENT
Start: 2023-03-21 | End: 2023-03-21 | Stop reason: HOSPADM

## 2023-03-21 RX ORDER — ONDANSETRON 2 MG/ML
INJECTION INTRAMUSCULAR; INTRAVENOUS
Status: DISCONTINUED | OUTPATIENT
Start: 2023-03-21 | End: 2023-03-21

## 2023-03-21 RX ORDER — KETOROLAC TROMETHAMINE 15 MG/ML
15 INJECTION, SOLUTION INTRAMUSCULAR; INTRAVENOUS 3 TIMES DAILY
Status: DISCONTINUED | OUTPATIENT
Start: 2023-03-21 | End: 2023-03-22

## 2023-03-21 RX ORDER — TRANEXAMIC ACID 100 MG/ML
INJECTION, SOLUTION INTRAVENOUS
Status: DISCONTINUED | OUTPATIENT
Start: 2023-03-21 | End: 2023-03-21

## 2023-03-21 RX ORDER — ROPIVACAINE HYDROCHLORIDE 2 MG/ML
6 INJECTION, SOLUTION EPIDURAL; INFILTRATION; PERINEURAL CONTINUOUS
Status: DISCONTINUED | OUTPATIENT
Start: 2023-03-21 | End: 2023-03-25

## 2023-03-21 RX ORDER — MIDAZOLAM HYDROCHLORIDE 1 MG/ML
.5-4 INJECTION INTRAMUSCULAR; INTRAVENOUS
Status: DISCONTINUED | OUTPATIENT
Start: 2023-03-21 | End: 2023-03-21 | Stop reason: HOSPADM

## 2023-03-21 RX ORDER — METHOCARBAMOL 500 MG/1
500 TABLET, FILM COATED ORAL 4 TIMES DAILY
Status: DISCONTINUED | OUTPATIENT
Start: 2023-03-21 | End: 2023-03-21

## 2023-03-21 RX ORDER — LIDOCAINE HYDROCHLORIDE 20 MG/ML
INJECTION, SOLUTION EPIDURAL; INFILTRATION; INTRACAUDAL; PERINEURAL
Status: DISCONTINUED | OUTPATIENT
Start: 2023-03-21 | End: 2023-03-21

## 2023-03-21 RX ORDER — FENTANYL CITRATE 50 UG/ML
25-200 INJECTION, SOLUTION INTRAMUSCULAR; INTRAVENOUS EVERY 5 MIN PRN
Status: DISCONTINUED | OUTPATIENT
Start: 2023-03-21 | End: 2023-03-21 | Stop reason: HOSPADM

## 2023-03-21 RX ORDER — VANCOMYCIN HYDROCHLORIDE 1 G/20ML
INJECTION, POWDER, LYOPHILIZED, FOR SOLUTION INTRAVENOUS
Status: DISCONTINUED | OUTPATIENT
Start: 2023-03-21 | End: 2023-03-21 | Stop reason: HOSPADM

## 2023-03-21 RX ORDER — ROCURONIUM BROMIDE 10 MG/ML
INJECTION, SOLUTION INTRAVENOUS
Status: DISCONTINUED | OUTPATIENT
Start: 2023-03-21 | End: 2023-03-21

## 2023-03-21 RX ORDER — SODIUM CHLORIDE 9 MG/ML
INJECTION, SOLUTION INTRAVENOUS CONTINUOUS
Status: DISCONTINUED | OUTPATIENT
Start: 2023-03-21 | End: 2023-03-25 | Stop reason: HOSPADM

## 2023-03-21 RX ORDER — GABAPENTIN 300 MG/1
300 CAPSULE ORAL 3 TIMES DAILY
Status: DISCONTINUED | OUTPATIENT
Start: 2023-03-21 | End: 2023-03-25 | Stop reason: HOSPADM

## 2023-03-21 RX ORDER — OXYCODONE HYDROCHLORIDE 5 MG/1
5 TABLET ORAL EVERY 4 HOURS PRN
Status: DISCONTINUED | OUTPATIENT
Start: 2023-03-21 | End: 2023-03-23

## 2023-03-21 RX ORDER — SODIUM CHLORIDE 0.9 % (FLUSH) 0.9 %
3 SYRINGE (ML) INJECTION
Status: DISCONTINUED | OUTPATIENT
Start: 2023-03-21 | End: 2023-03-21 | Stop reason: HOSPADM

## 2023-03-21 RX ORDER — FENTANYL CITRATE 50 UG/ML
25 INJECTION, SOLUTION INTRAMUSCULAR; INTRAVENOUS EVERY 5 MIN PRN
Status: COMPLETED | OUTPATIENT
Start: 2023-03-21 | End: 2023-03-21

## 2023-03-21 RX ADMIN — FENTANYL CITRATE 25 MCG: 50 INJECTION INTRAMUSCULAR; INTRAVENOUS at 03:03

## 2023-03-21 RX ADMIN — FENTANYL CITRATE 100 MCG: 50 INJECTION INTRAMUSCULAR; INTRAVENOUS at 09:03

## 2023-03-21 RX ADMIN — HYDROMORPHONE HYDROCHLORIDE 0.5 MG: 1 INJECTION, SOLUTION INTRAMUSCULAR; INTRAVENOUS; SUBCUTANEOUS at 06:03

## 2023-03-21 RX ADMIN — SODIUM CHLORIDE: 0.9 INJECTION, SOLUTION INTRAVENOUS at 09:03

## 2023-03-21 RX ADMIN — KETOROLAC TROMETHAMINE 15 MG: 15 INJECTION, SOLUTION INTRAMUSCULAR; INTRAVENOUS at 09:03

## 2023-03-21 RX ADMIN — FENTANYL CITRATE 25 MCG: 50 INJECTION INTRAMUSCULAR; INTRAVENOUS at 01:03

## 2023-03-21 RX ADMIN — TRAMADOL HYDROCHLORIDE 50 MG: 50 TABLET, COATED ORAL at 03:03

## 2023-03-21 RX ADMIN — ONDANSETRON 4 MG: 2 INJECTION INTRAMUSCULAR; INTRAVENOUS at 02:03

## 2023-03-21 RX ADMIN — Medication 100 MCG: at 10:03

## 2023-03-21 RX ADMIN — MIDAZOLAM 1 MG: 1 INJECTION INTRAMUSCULAR; INTRAVENOUS at 09:03

## 2023-03-21 RX ADMIN — ACETAMINOPHEN 1000 MG: 500 TABLET ORAL at 06:03

## 2023-03-21 RX ADMIN — CEFAZOLIN 2 G: 1 INJECTION, POWDER, FOR SOLUTION INTRAMUSCULAR; INTRAVENOUS at 10:03

## 2023-03-21 RX ADMIN — CEFAZOLIN 2 G: 2 INJECTION, POWDER, FOR SOLUTION INTRAMUSCULAR; INTRAVENOUS at 06:03

## 2023-03-21 RX ADMIN — TRAZODONE HYDROCHLORIDE 150 MG: 50 TABLET ORAL at 09:03

## 2023-03-21 RX ADMIN — TRANEXAMIC ACID 1000 MG: 100 INJECTION, SOLUTION INTRAVENOUS at 11:03

## 2023-03-21 RX ADMIN — FENTANYL CITRATE 50 MCG: 50 INJECTION INTRAMUSCULAR; INTRAVENOUS at 10:03

## 2023-03-21 RX ADMIN — DEXAMETHASONE SODIUM PHOSPHATE 4 MG: 4 INJECTION INTRA-ARTICULAR; INTRALESIONAL; INTRAMUSCULAR; INTRAVENOUS; SOFT TISSUE at 10:03

## 2023-03-21 RX ADMIN — METHOCARBAMOL 500 MG: 500 TABLET ORAL at 06:03

## 2023-03-21 RX ADMIN — GABAPENTIN 300 MG: 300 CAPSULE ORAL at 09:03

## 2023-03-21 RX ADMIN — INSULIN ASPART 2 UNITS: 100 INJECTION, SOLUTION INTRAVENOUS; SUBCUTANEOUS at 04:03

## 2023-03-21 RX ADMIN — LIDOCAINE HYDROCHLORIDE 60 MG: 20 INJECTION, SOLUTION EPIDURAL; INFILTRATION; INTRACAUDAL at 10:03

## 2023-03-21 RX ADMIN — ROCURONIUM BROMIDE 40 MG: 10 INJECTION, SOLUTION INTRAVENOUS at 10:03

## 2023-03-21 RX ADMIN — TRAMADOL HYDROCHLORIDE 50 MG: 50 TABLET, COATED ORAL at 08:03

## 2023-03-21 RX ADMIN — DEXMEDETOMIDINE HYDROCHLORIDE 8 MCG: 100 INJECTION, SOLUTION INTRAVENOUS at 12:03

## 2023-03-21 RX ADMIN — FENTANYL CITRATE 25 MCG: 50 INJECTION INTRAMUSCULAR; INTRAVENOUS at 04:03

## 2023-03-21 RX ADMIN — METHOCARBAMOL 500 MG: 500 TABLET ORAL at 09:03

## 2023-03-21 RX ADMIN — ASPIRIN 81 MG: 81 TABLET, COATED ORAL at 09:03

## 2023-03-21 RX ADMIN — MUPIROCIN: 20 OINTMENT TOPICAL at 08:03

## 2023-03-21 RX ADMIN — PROPOFOL 160 MG: 10 INJECTION, EMULSION INTRAVENOUS at 10:03

## 2023-03-21 RX ADMIN — FENTANYL CITRATE 25 MCG: 50 INJECTION INTRAMUSCULAR; INTRAVENOUS at 12:03

## 2023-03-21 RX ADMIN — ESCITALOPRAM OXALATE 20 MG: 20 TABLET ORAL at 08:03

## 2023-03-21 RX ADMIN — ERGOCALCIFEROL 50000 UNITS: 1.25 CAPSULE ORAL at 08:03

## 2023-03-21 NOTE — PROGRESS NOTES
Report given to Michael Alonso on 5th floor. Stated that ropivacaine will travel with patient already primed in saphire pump but will not be hooked up to patient. Also Stated that the ortho team will need to assess the patient motor skills first and then Anesthesia will come and set up the ropivacaine infusion.

## 2023-03-21 NOTE — ASSESSMENT & PLAN NOTE
Alix Patel is a 67 y.o. female with a grade I open left midshaft humerus fracture. She also has a nondisplaced fracture of the proximal humerus. She is neurovascularly intact. She does not have any additional injuries. She sustained this injury around 4:00 AM and received her first dose of Ancef at 2:00 PM. Time to antibiotics 10 hours. She was placed into a coaptation splint in the ED under procedural sedation.  Operative versus nonoperative interventions were discussed with the patient at length.  After discussions, the patient elects to proceed with operative intervention for this problem.  Consents obtained at bedside.  Patient marked.  Now s/p L humerus IMN and I&D on 3/21/23 and with left radial nerve palsy.      Continue to monitor for radial nerve function, do not remove PNC, but do not bolus at this time  Antibiotic for 24 hours  Sling left upper extremity for comfort  Nonweightbearing left upper extremity x8 weeks postop  Range of motion as tolerated left upper extremity  ASA 81 mg b.i.d. x6 weeks for DVT prophylaxis  Nutrition support: Calcium, vitamin-D.

## 2023-03-21 NOTE — PROGRESS NOTES
Report rec'd from SEAN Rodriguez; pt to be transported to [facility] 23 in advance of pre-op/Block/Surgery. Transport request placed for 2 transporters and confirmed with call to [id number].

## 2023-03-21 NOTE — PROGRESS NOTES
Spoke with Dr Giraldo with anesthesia and states that he can set up the ropivacaine in the the patient room on the 5th floor if its ok with the surgery team. Spoke with Dr Crawford team they states that it is ok to send the pt back to the room. Once the ortho resident come assess motor skills anesthesia will start ropivacaine.

## 2023-03-21 NOTE — ASSESSMENT & PLAN NOTE
Fall  Presented to the ED by EMS for L arm pain following a fall at home. Patient took two Trazodones to help her sleep. Typically takes one. She felt dizzy when standing in the kitchen and fell to the ground on her left side. She denies any LOC, preceding palpitations, confusion, or weakness. She denies any head injuries. Patient was able to stand back up to continue fixing her meal. Woke up unable to move arm without severe pain prompting her to call EMS. Dizziness resolved.    - CTH left frontal extracranial soft tissue swelling without evidence of underlying fracture or acute intracranial hemorrhage. Ventriculomegaly out of proportion to the degree of sulcal enlargement. While this could relate to cerebral volume loss, the configuration does at least raise the possibility of normal pressure hydrocephalus. Clinical correlation required.  - CXR with no acute intrathoracic process  - L shoulder and humerus XR with displaced and comminuted acute fracture of the left mid humeral shaft with nondisplaced fracture of the left proximal humerus  - CT L arm with fractures of the proximal and mid aspects of the humerus as described.  - EKG NSR RBBB (consistent with priors)  - Placed in splint under sedation in ED  - NVI on exam  - Orthostatic vitals ordered as precaution  - Ortho consulted, appreciate assistance   - To OR on 3/21   - Continue Ancef   - NWB LUE. Stay in splint and abduction pillow  - MM pain control  - Will place PT/OT recs  - Ortho referral placed for clinic follow up

## 2023-03-21 NOTE — ANESTHESIA POSTPROCEDURE EVALUATION
Anesthesia Post Evaluation    Patient: Alix Patel    Procedure(s) Performed: Procedure(s) (LRB):  ORIF, FRACTURE, HUMERUS (Left)  IRRIGATION AND DEBRIDEMENT, UPPER EXTREMITY (Left)    Final Anesthesia Type: general      Patient location during evaluation: PACU  Patient participation: Yes- Able to Participate  Level of consciousness: awake and alert and oriented  Post-procedure vital signs: reviewed and stable  Pain management: adequate  Airway patency: patent    PONV status at discharge: No PONV  Anesthetic complications: no      Cardiovascular status: hemodynamically stable  Respiratory status: unassisted and spontaneous ventilation  Hydration status: euvolemic  Follow-up not needed.          Vitals Value Taken Time   /82 03/21/23 1532   Temp 36.2 °C (97.2 °F) 03/21/23 1445   Pulse 108 03/21/23 1535   Resp 16 03/21/23 1535   SpO2 93 % 03/21/23 1535   Vitals shown include unvalidated device data.      No case tracking events are documented in the log.      Pain/Andrea Score: Pain Rating Prior to Med Admin: 8 (3/21/2023  3:30 PM)  Pain Rating Post Med Admin: 4 (3/21/2023  9:20 AM)  Andrea Score: 10 (3/21/2023  3:00 PM)

## 2023-03-21 NOTE — PROGRESS NOTES
Notified ortho Surgery team  pt has been in pacu over an hour and that we are still waiting for team to assess the motor skills so that anesthesia can bolus the dry catheter and ropivacaine can be started. Stated he will let team know.

## 2023-03-21 NOTE — ADDENDUM NOTE
Addendum  created 03/21/23 1835 by Klever Cullen MD    Order list changed, Pharmacy for encounter modified

## 2023-03-21 NOTE — NURSING
Nurses Note -- 4 Eyes      3/20/2023   7:16 PM      Skin assessed during: Admit      [x] No Pressure Injuries Present    []Prevention Measures Documented      [] Yes- Altered Skin Integrity Present or Discovered   [] LDA Added if Not in Epic (Describe Wound)   [] New Altered Skin Integrity was Present on Admit and Documented in LDA   [] Wound Image Taken    Wound Care Consulted? No    Attending Nurse:  Sadaf Ortega RN     Second RN/Staff Member:  Bethel Hays RN

## 2023-03-21 NOTE — NURSING TRANSFER
Nursing Transfer Note      3/21/2023     Reason patient is being transferred: postop    Transfer To: 527    Transfer via bed    Transfer with cardiac monitoring    Transported by escort    Medicines sent: n/a    Any special needs or follow-up needed:     Chart send with patient: Yes    Notified: family via text    Patient reassessed at: 3/21/23    Upon arrival to floor: bed in lowest position  Report given to Michael Alonso

## 2023-03-21 NOTE — ASSESSMENT & PLAN NOTE
- Continue Synthroid  - Will check TSH  Lab Results   Component Value Date    TSH 3.070 03/21/2023

## 2023-03-21 NOTE — ASSESSMENT & PLAN NOTE
- Patient not taking statin, request to resume  - Lipid panel reviewed  - Will need to follow up with Cardiologist

## 2023-03-21 NOTE — PROGRESS NOTES
[facility] Medicine  Progress Note    Patient Name: Alix Patel  MRN: [id number]  Patient Class: OP- Observation   Admission Date: 3/20/2023  Length of Stay: 0 days  Attending Physician: Estephanie Kuhn MD  Primary Care Provider: Angélica Barakat MD        Subjective:     Principal Problem:Open displaced transverse fracture of shaft of left humerus        HPI:  Alix Patel is a 67 y.o. female with a PMHx of T2DM, HLD, hypothyroidism, LILLY, and MDD who presented to the ED by EMS for L arm pain following a fall at home. Patient reported taking two doses of her home Trazodone last night because she wanted to be able to sleep through the night. After taking the medication and getting in bed, patient felt hungry and decided to cook pasta. Patient got back in bed to wait for the pasta to finish cooking. Once it was finished around 4 am, she got out of bed to eat. She felt dizzy when standing in the kitchen and fell to the ground on her left side. She denies any LOC, preceding palpitations, confusion, or weakness. She denies any head injuries. Patient was able to stand back up to continue fixing her meal. She went to sleep only endorsing limited ROM in L arm 2/2 pain. When she woke up, she was still unable to move arm without severe pain prompting her to call EMS. She further denies any HA, F/C, N/V, chest pain, palpitations, SOB, recent infections, similar prior episodes, numbness, or tingling. She reports dizziness has resolved. She is not on AC at this time.    ED: Hypoxic to 93%, placed on 3.5 L NC, but eventually weaned to RA. Intermittently tachypneic, RR of 30 max. Otherwise AFVSS. CBC without leukocytosis or anemia. Hyperglycemic to 187, ALT 64. Otherwise CMP wnl. Vitamin D deficiency. A1c 7.5. Type and screen completed. CTH left frontal extracranial soft tissue swelling without evidence of underlying fracture or acute intracranial hemorrhage. Ventriculomegaly out of proportion to  the degree of sulcal enlargement. While this could relate to cerebral volume loss, the configuration does at least raise the possibility of normal pressure hydrocephalus. Clinical correlation required. CXR with no acute intrathoracic process. L shoulder and humerus XR with displaced and comminuted acute fracture of the left mid humeral shaft with nondisplaced fracture of the left proximal humerus. CT L arm with fractures of the proximal and mid aspects of the humerus as described. EKG NSR RBBB. Placed into splint under sedation. Given Fentanyl (in EMS), Cefazolin, Dilaudid, Ortho consulted. Admitted to Hospital Medicine for further management.      Overview/Hospital Course:  Alix Patel was admitted to Hospital Medicine for L humerus fracture. Ortho consulted, plan for ORIF on 3/21. Will monitor patient following surgery. Plan for outpatient PT/OT unless otherwise specified by Ortho. Will place Ortho referral for clinic follow up.      Interval History: CELESTINA TOPETE. Evaluated patient in [facility] prior to surgery. Patient doing well, endorses some L arm pain, but had just received pain medication. Discussed surgical plan per Ortho. Reports understanding. Will observe following surgery.    Review of Systems   Constitutional:  Negative for activity change, chills and fever.   HENT:  Negative for trouble swallowing.    Eyes:  Negative for photophobia and visual disturbance.   Respiratory:  Negative for cough, chest tightness and shortness of breath.    Cardiovascular:  Negative for chest pain, palpitations and leg swelling.   Gastrointestinal:  Negative for abdominal pain, constipation, diarrhea, nausea and vomiting.   Genitourinary:  Negative for dysuria, frequency and hematuria.   Musculoskeletal:  Positive for myalgias. Negative for back pain, gait problem and neck pain.   Skin:  Negative for rash and wound.   Neurological:  Positive for dizziness (resolved). Negative for syncope, speech difficulty and  light-headedness.   Psychiatric/Behavioral:  Negative for agitation and confusion. The patient is not nervous/anxious.    Objective:     Vital Signs (Most Recent):  Temp: 97.3 °F (36.3 °C) (03/21/23 1545)  Pulse: 100 (03/21/23 1615)  Resp: 20 (03/21/23 1615)  BP: (!) 143/87 (03/21/23 1615)  SpO2: 96 % (03/21/23 1615)   Vital Signs (24h Range):  Temp:  [97.2 °F (36.2 °C)-98.9 °F (37.2 °C)] 97.3 °F (36.3 °C)  Pulse:  [] 100  Resp:  [14-23] 20  SpO2:  [90 %-100 %] 96 %  BP: (117-143)/(70-88) 143/87     Weight: 72.6 kg (160 lb)  Body mass index is 22.32 kg/m².    Intake/Output Summary (Last 24 hours) at 3/21/2023 1740  Last data filed at 3/21/2023 1733  Gross per 24 hour   Intake 1600 ml   Output 300 ml   Net 1300 ml      Physical Exam  Vitals and nursing note reviewed.   Constitutional:       General: She is not in acute distress.     Appearance: She is well-developed.   HENT:      Head: Normocephalic and atraumatic.      Mouth/Throat:      Pharynx: No oropharyngeal exudate.   Eyes:      Conjunctiva/sclera: Conjunctivae normal.      Pupils: Pupils are equal, round, and reactive to light.   Cardiovascular:      Rate and Rhythm: Normal rate and regular rhythm.      Heart sounds: Normal heart sounds.   Pulmonary:      Effort: Pulmonary effort is normal. No respiratory distress.      Breath sounds: Normal breath sounds. No wheezing.      Comments: On RA  Abdominal:      General: Bowel sounds are normal. There is no distension.      Palpations: Abdomen is soft.      Tenderness: There is no abdominal tenderness.   Musculoskeletal:         General: Swelling, tenderness and signs of injury (L upper arm) present. Normal range of motion.      Cervical back: Normal range of motion and neck supple.      Comments: Arm wrapped with ACE bandage and placed in sling   Lymphadenopathy:      Cervical: No cervical adenopathy.   Skin:     General: Skin is warm and dry.      Capillary Refill: Capillary refill takes less than 2  seconds.      Findings: No rash.   Neurological:      Mental Status: She is alert and oriented to person, place, and time.      Cranial Nerves: No cranial nerve deficit.      Sensory: No sensory deficit.      Coordination: Coordination normal.   Psychiatric:         Behavior: Behavior normal.         Thought Content: Thought content normal.         Judgment: Judgment normal.       Significant Labs: All pertinent labs within the past 24 hours have been reviewed.    Significant Imaging: I have reviewed all pertinent imaging results/findings within the past 24 hours.      Assessment/Plan:      * Grade I open displaced transverse fracture of shaft of left humerus  Fall  Presented to the ED by EMS for L arm pain following a fall at home. Patient took two Trazodones to help her sleep. Typically takes one. She felt dizzy when standing in the kitchen and fell to the ground on her left side. She denies any LOC, preceding palpitations, confusion, or weakness. She denies any head injuries. Patient was able to stand back up to continue fixing her meal. Woke up unable to move arm without severe pain prompting her to call EMS. Dizziness resolved.    - CTH left frontal extracranial soft tissue swelling without evidence of underlying fracture or acute intracranial hemorrhage. Ventriculomegaly out of proportion to the degree of sulcal enlargement. While this could relate to cerebral volume loss, the configuration does at least raise the possibility of normal pressure hydrocephalus. Clinical correlation required.  - CXR with no acute intrathoracic process  - L shoulder and humerus XR with displaced and comminuted acute fracture of the left mid humeral shaft with nondisplaced fracture of the left proximal humerus  - CT L arm with fractures of the proximal and mid aspects of the humerus as described.  - EKG NSR RBBB (consistent with priors)  - Placed in splint under sedation in ED  - NVI on exam  - Orthostatic vitals ordered as  precaution  - Ortho consulted, appreciate assistance   - To OR on 3/21   - Continue Ancef   - NWB LUE. Stay in splint and abduction pillow  - MM pain control  - Will place PT/OT recs  - Ortho referral placed for clinic follow up    Vitamin D deficiency  - Noted on labs  - Start supplementation    Type 2 diabetes mellitus with hyperglycemia, without long-term current use of insulin  Patient's FSGs are controlled on current medication regimen.  Last A1c reviewed-   Lab Results   Component Value Date    HGBA1C 7.5 (H) 03/20/2023   Most recent fingerstick glucose reviewed-   Recent Labs   Lab 03/21/23  0814 03/21/23  1603   POCTGLUCOSE 192* 238*     Current correctional scale  Low  Maintain anti-hyperglycemic dose as follows-   Antihyperglycemics (From admission, onward)    Start     Stop Route Frequency Ordered    03/20/23 1641  insulin aspart U-100 pen 0-5 Units         -- SubQ Before meals & nightly PRN 03/20/23 1541      - Hold Oral hypoglycemics while patient is in the hospital  - Diabetic/Cardiac diet  - NPO at midnight    Hypothyroidism  - Continue Synthroid  - Will check TSH  Lab Results   Component Value Date    TSH 3.070 03/21/2023       Thyroid nodule  - Per last Endocrinology note: Previous benign FNA of right thyroid nodule  - Last US (9/23/22) reviewed    Hyperlipidemia with target low density lipoprotein (LDL) cholesterol less than 100 mg/dL  - Patient not taking statin, request to resume  - Lipid panel reviewed  - Will need to follow up with Cardiologist    Depression  LILLY (generalized anxiety disorder)  Patient has persistent depression which is mild and is currently controlled. Will Continue anti-depressant medications. We will not consult psychiatry at this time. Patient does not display psychosis at this time. Continue to monitor closely and adjust plan of care as needed.  - Continue Lexapro, Trazadone      VTE Risk Mitigation (From admission, onward)         Ordered     IP VTE LOW RISK PATIENT  Once          03/20/23 1541     Place sequential compression device  Until discontinued         03/20/23 1541                Discharge Planning   GILLES: 3/21/2023     Code Status: Full Code   Is the patient medically ready for discharge?: No    Reason for patient still in hospital (select all that apply): Patient trending condition and Consult recommendations                     Armand Orozco PA-C  Department of Hospital Medicine   [facility] - Surgery

## 2023-03-21 NOTE — HOSPITAL COURSE
Alix Patel was admitted to [facility] Medicine for L humerus fracture. Ortho consulted, plan for ORIF on 3/21. S/p L humerus IMN and I&D of open fracture wound 3/21/2. Patient presenting with radial nerve palsy on exam. Worked well with PT/OT. Anesthesia adjusting interscalene PNC. Patient to wear sling to LUE for comfort. Nonweightbearing LUE x 8 weeks postop. Completing 24 hours of abx on 3/22. Will continue ASA 81 BID x 6 weeks. D/c PNC pump and transitioned to oral medication with good pain control. Patient is medically cleared from ortho. Patient is medically ready for discharge home. Plan for Home Health PT/OT. Ortho referral placed for clinic follow up. Patient to also follow up with Psychiatrist as she wishes to d/c Trazodone. All questions answered at bedside with verbal understanding. Return precautions given. Meds brought to bedside.

## 2023-03-21 NOTE — PROGRESS NOTES
Notified the surgery team again who are currently in another case States that the surgery resident that is suppose to check to motor skills is currently not at the facility. They will notify him the When he come came back to the facility he will need to assess the motor skills on the patient.

## 2023-03-21 NOTE — ADDENDUM NOTE
Addendum  created 03/21/23 1700 by Newton Giraldo MD    Order list changed, Pharmacy for encounter modified

## 2023-03-21 NOTE — ANESTHESIA PROCEDURE NOTES
L Interscalene Catheter    Patient location during procedure: pre-op   Block not for primary anesthetic.  Reason for block: at surgeon's request and post-op pain management   Post-op Pain Location: L Arm Pain   Start time: 3/21/2023 9:16 AM  Timeout: 3/21/2023 9:15 AM   End time: 3/21/2023 9:35 AM    Staffing  Authorizing Provider: Ashley Sheffield MD  Performing Provider: Lu Amato MD    Preanesthetic Checklist  Completed: patient identified, IV checked, site marked, risks and benefits discussed, surgical consent, monitors and equipment checked, pre-op evaluation and timeout performed  Peripheral Block  Patient position: sitting  Prep: ChloraPrep and site prepped and draped  Patient monitoring: heart rate, cardiac monitor, continuous pulse ox, continuous capnometry and frequent blood pressure checks  Block type: interscalene  Laterality: left  Injection technique: continuous  Needle  Needle type: Tuohy   Needle gauge: 18 G  Needle length: 2 in  Needle localization: anatomical landmarks and ultrasound guidance  Catheter type: non-stimulating  Catheter size: 20 G  Test dose: lidocaine 1.5% with Epi 1-to-200,000 and negative   -ultrasound image captured on disc.  Assessment  Injection assessment: negative aspiration, negative parasthesia and local visualized surrounding nerve  Paresthesia pain: none  Heart rate change: no  Slow fractionated injection: yes  Pain Tolerance: comfortable throughout block and no complaints      Additional Notes  VSS.  [facility] RN monitoring vitals throughout procedure.  Patient tolerated procedure well.

## 2023-03-21 NOTE — ASSESSMENT & PLAN NOTE
Patient's FSGs are controlled on current medication regimen.  Last A1c reviewed-   Lab Results   Component Value Date    HGBA1C 7.5 (H) 03/20/2023   Most recent fingerstick glucose reviewed-   Recent Labs   Lab 03/21/23  0814 03/21/23  1603   POCTGLUCOSE 192* 238*     Current correctional scale  Low  Maintain anti-hyperglycemic dose as follows-   Antihyperglycemics (From admission, onward)    Start     Stop Route Frequency Ordered    03/20/23 1641  insulin aspart U-100 pen 0-5 Units         -- SubQ Before meals & nightly PRN 03/20/23 1541      - Hold Oral hypoglycemics while patient is in the hospital  - Diabetic/Cardiac diet  - NPO at midnight

## 2023-03-21 NOTE — PLAN OF CARE
Problem: Adult Inpatient Plan of Care  Goal: Plan of Care Review  Outcome: Ongoing, Progressing  Goal: Absence of Hospital-Acquired Illness or Injury  Outcome: Ongoing, Progressing  Goal: Optimal Comfort and Wellbeing  Outcome: Ongoing, Progressing  Goal: Readiness for Transition of Care  Outcome: Ongoing, Progressing     Problem: Infection  Goal: Absence of Infection Signs and Symptoms  Outcome: Ongoing, Progressing     Problem: Diabetes Comorbidity  Goal: Blood Glucose Level Within Targeted Range  Outcome: Ongoing, Progressing     Problem: Skin Injury Risk Increased  Goal: Skin Health and Integrity  Outcome: Ongoing, Progressing

## 2023-03-21 NOTE — ANESTHESIA PROCEDURE NOTES
Intubation    Date/Time: 3/21/2023 10:03 AM  Performed by: Bibiana Copeland CRNA  Authorized by: Celena Matthews MD     Intubation:     Induction:  Intravenous    Intubated:  Postinduction    Mask Ventilation:  Easy mask    Attempts:  1    Attempted By:  CRNA    Method of Intubation:  Video laryngoscopy    Blade:  Carlson 3    Laryngeal View Grade: Grade I - full view of cords      Difficult Airway Encountered?: No      Complications:  None    Airway Device:  Oral endotracheal tube    Airway Device Size:  7.0    Style/Cuff Inflation:  Cuffed (inflated to minimal occlusive pressure)    Tube secured:  21    Secured at:  The lips    Placement Verified By:  Capnometry    Complicating Factors:  Large prominent central incisors    Findings Post-Intubation:  BS equal bilateral and atraumatic/condition of teeth unchanged

## 2023-03-21 NOTE — TRANSFER OF CARE
"Anesthesia Transfer of Care Note    Patient: Alix Patel    Procedure(s) Performed: Procedure(s) (LRB):  ORIF, FRACTURE, HUMERUS (Left)  IRRIGATION AND DEBRIDEMENT, UPPER EXTREMITY (Left)    Patient location: PACU    Anesthesia Type: general    Transport from OR: Transported from OR on 6-10 L/min O2 by face mask with adequate spontaneous ventilation    Post pain: adequate analgesia    Post assessment: no apparent anesthetic complications and tolerated procedure well    Post vital signs: stable    Level of consciousness: sedated    Nausea/Vomiting: no nausea/vomiting    Complications: none    Transfer of care protocol was followed      Last vitals:   Visit Vitals  /77 (BP Location: Right arm, Patient Position: Lying)   Pulse (!) 114   Temp 36.7 °C (98.1 °F) (Oral)   Resp 14   Ht 5' 11" (1.803 m)   Wt 72.6 kg (160 lb)   SpO2 98%   Breastfeeding No   BMI 22.32 kg/m²     "

## 2023-03-21 NOTE — OP NOTE
OPERATIVE NOTE    DATE OF PROCEDURE:  03/21/2023    PREOPERATIVE DIAGNOSIS:   Left proximal humerus fracture, closed, minimally displaced   Left humeral shaft fracture, transverse, open grade 1, displaced  Fall    POSTOPERATIVE DIAGNOSIS:   Left proximal humerus fracture, closed, minimally displaced   Left humeral shaft fracture, transverse, open grade 1, displaced  Fall    PROCEDURE:   Open reduction internal fixation left humeral shaft fracture with intramedullary nail - 26638  Open reduction internal fixation left proximal humerus shaft fracture with screws - 21573  Excisional debridement and irrigation open fracture left humerus including skin, subcutaneous tissue, fascia, muscle, bone.  8 x 2 cm - 32884    SURGEON:   Ousmane Crawford MD    ASSISTANT:    Jn Garza MD    ANESTHESIA:   General    EBL:    200 mL    COMPLICATIONS:  None    IMPLANTS:   Keerthi  T2 proximal humeral nail, [id number]  5.0 mm proximal interlocking screws, x3  4.0 mm distal interlocking screws, x2    Vancomycin 1g  Tobramycin 1.2g    SPECIMENS:   Bone sent for culture    INDICATIONS FOR PROCEDURE:  67-year-old female, left-hand dominant, diabetes, COPD, fall from standing 03/20/2023     Left transverse humeral shaft fracture, grade 1 open, with spiral extension to the proximal humerus.      Seen in the emergency department by the orthopedic resident team.  Started on antibiotics.  Coaptation splint applied.      At the time my evaluation patient complained isolated pain in left shoulder and arm, 7/10, sharp, stabbing, improved the splint.  Had intact motor function left hand and wrist, but some diffuse paresthesias.    Counseled patient on injury.  Discussed non operative versus operative management options.  Given open nature of injury feel that non operative management would carry increased risk of infection, poor outcome.  Given the segmental nature of the fracture, operative intervention the form open reduction internal  fixation after an I and D, hopefully provide improved stability, early mobility, improved alignment, improved overall long-term function.      The risks, benefits, and alternatives to surgery were discussed with the patient and/or family.    Specific risks discussed included, but were not limited to:  Damage to radial nerves, and other nearby neurovascular structures, malrotation, limb length inequality, pain, shoulder pain, rotator cuff tear damage to nearby structures, including neurovascular structures leading to loss of function or loss of limb, bleeding, need for blood transfusion, pain, stiffness, scarring, numbness, tingling, weakness, compartment syndrome, malunion/nonunion, hardware failure, hardware prominence, infection, need for multiple staged procedures, prolonged antibiotics, iatrogenic fracture, heterotopic ossification, arthritis, a variety of medical complications including but not limited to heart attack, stroke, deep venous thrombosis, pulmonary embolism, prolonged hospitalization, prolonged intubation, and death.   Patient and/or family expressed an understanding and desires to proceed with surgery.   All questions were answered.  No guarantees were implied or stated.  Informed consent was obtained.    OPERATIVE PROCEDURE:  Patient met in the preoperative hold area and the correct site and side of surgery being the left upper extremity were marked and verified.  Patient brought back to the operative suite.  General anesthesia smoothly induced.  Patient transferred over to operative table.  Placed in supine position. All bony prominences were appropriately padded.  Patient received 2 g Ancef for preoperative antibiotics.  The left upper extremity was then prepped and draped in normal sterile fashion.    Time-out was performed verifying the correct patient, site/side of surgery, surgical consent, radiographs as applicable, preop antibiotics, necessary equipment, anticipated blood loss, length of  procedure, postoperative disposition.      We started with excisional debridement for the open fracture left humerus.  There was a poke hole open proximally 1 cm wound midshaft posterior humerus.  This was where the proximal fracture segment of telescope through the skin and poked out.  There was small fragment of bone just superficial to the skin.  We incised the skin with a scalpel and ellipsed out the traumatic wound.  Total area opened up 10 x 2 cm.  We then excisionally debrided the subcutaneous tissue, fascia, muscle and bone with a knife, curette as needed.  The superficial bony fragment was sent for culture.  We then able to manipulate the arm and present the proximal fragment into view to further debride these bony ends.  We thoroughly irrigated with saline.  We also irrigated the entire cavity with saline removing all devitalized tissue.  Of note the radial nerve was directly at the fracture site draped over the fracture crossing the posterior border and spiral groove.  Throughout our debridement we protected the radial nerve and retracted as needed.  Limb was then re-prepped with Betadine.  Surgical team changed gloves.  New down towels were placed.  New instruments were utilized for the rest of the procedure.    We then turned our attention open reduction internal fixation of left humeral shaft fracture.  Plan for an intramedullary nail, antegrade.  We used fluoroscopic imaging and anatomic landmarks to make a incision on the proximal lateral shoulder.  This is a deltoid split type approach.  Skin incised with scalpel.  Deltoid was split bluntly.  Hemostasis achieved as needed electrocautery.  We used starting wire and fluoroscopic imaging, AP and lateral views in order to make an incision in the rotator cuff.  We then utilized a guidewire, and an awl in order to get the appropriate starting point.  Guidewire was advanced.  We then used stay sutures in the rotator cuff retracted it anteriorly and  posteriorly, and a soft tissue guide.  Opening Reamer was then utilized care to protect the rotator cuff.  Ball-tip guidewire was then advanced to the level fracture.  We used traction and manual reduction maneuvers to improve alignment of the fracture.  We then worked with our finger through the incision for the I and D the open fracture in order to further manipulate the fracture into appropriate alignment.  Ball-tip guidewire was then advanced to the distal aspect of the intramedullary canal.  Fluoroscopic imaging indicated appropriate guidewire placement.  Guidewire was then measured.      We then sequentially reamed up to 9 mm Reamer were adequate cortical chatter was heard, felt, confirmed on fluoroscopic imaging.  While reaming we used fluoroscopy to visualize the fracture, and also used our finger to protect the radial nerve through the posterior incision, and to improve fracture alignment.  Of note she had a small canal and required sequential reaming to obtain fit with a 9 mm Reamer.    We then exchanged out the ball-tipped guidewire for a standard guidewire to allow passage of the humeral nail.  Humeral nail was passed through the proximal incision over the guidewire, across the humeral shaft fracture site in order to obtain appropriate fracture alignment.  This was seated to the appropriate position on multiplanar fluoroscopic imaging.    We then turned our attention open reduction internal fixation of the proximal humerus fracture.  This was a surgical neck type fracture, spiral ring up from the shaft.  We used the outrigger guide on the humeral nail, made percutaneous stab incisions through separate surgical approaches.  We then placed interlocking screws x3 through the proximal humerus to control this aspect of the fracture.  Fluoroscopic imaging indicated appropriate placement of screws in bone and through the nail.    Next we turned our attention to the humeral shaft fracture.  We assessed limb  length, alignment, rotation and compressed the fracture after obtaining appropriate alignment.  We then utilized perfect [place] technique distally to place 2 distal interlocking screws from anterior to posterior.  This was an open approach the humeral shaft.  We incised the skin over the anterolateral aspect of the distal arm after localizing with fluoroscopy.  We bluntly dissected through the muscle.  Radial nerve was noted lateral.  We protected the radial nerve throughout our dissection.  We then used a soft tissue guide for drilling bicortically and successfully placed interlocking screws x2.    We then assessed our fracture reduction, hardware placement and were satisfied.    Wounds irrigated with saline.  Hemostasis achieved as needed with electrocautery.  1 g vancomycin and 1.2 g tobramycin placed deep.  Rotator cuff closed with 0 Vicryl.  Fascia closed with 0 Vicryl.  Deep tissue closed with 2-0 Vicryl.  Subcutaneous tissue closed with 2-0 Vicryl.  Skin closed with 3-0 Monocryl.  Dermabond, Aquacel, gauze, Tegaderm, cast padding, Ace wrap dressing applied.    At the conclusion of procedure the patient had soft and compressible compartments, brisk cap refill, palpable radial pulse in the operative extremity.    Prior to final closure all counts were confirmed to be correct.  Patient tolerated the procedure well without any complications, was awoken from anesthesia, transferred PACU for further recovery.    POSTOPERATIVE PLAN:  67-year-old female, left-hand dominant, diabetes, fall 03/20/2023   Left open grade 1 transverse humeral shaft fracture   Left proximal humerus fracture     03/21/2023 - ORIF/IM nail left humerus    Antibiotics times 24 hours    Sling left upper extremity for comfort  Nonweightbearing left upper extremity x8 weeks postop  Range of motion as tolerated left upper extremity    ASA 81 mg b.i.d. x6 weeks for DVT prophylaxis    Calcium, vitamin-D, fragility fracture Clinic referral    X-rays  at subsequent followups:  Left humerus    Follow-up postop 2 weeks, 6 weeks, 3 months, 6 months, 1 year    =====================  Ousmane Crawford MD  Orthopaedic Surgery

## 2023-03-21 NOTE — PROGRESS NOTES
Patient placed on DOSC monitor 23 during procedure, Remote tele monitor transported with patient to surgery.

## 2023-03-21 NOTE — SUBJECTIVE & OBJECTIVE
Interval History: CELESTINA TOPETE. Evaluated patient in [facility] prior to surgery. Patient doing well, endorses some L arm pain, but had just received pain medication. Discussed surgical plan per Ortho. Reports understanding. Will observe following surgery.    Review of Systems   Constitutional:  Negative for activity change, chills and fever.   HENT:  Negative for trouble swallowing.    Eyes:  Negative for photophobia and visual disturbance.   Respiratory:  Negative for cough, chest tightness and shortness of breath.    Cardiovascular:  Negative for chest pain, palpitations and leg swelling.   Gastrointestinal:  Negative for abdominal pain, constipation, diarrhea, nausea and vomiting.   Genitourinary:  Negative for dysuria, frequency and hematuria.   Musculoskeletal:  Positive for myalgias. Negative for back pain, gait problem and neck pain.   Skin:  Negative for rash and wound.   Neurological:  Positive for dizziness (resolved). Negative for syncope, speech difficulty and light-headedness.   Psychiatric/Behavioral:  Negative for agitation and confusion. The patient is not nervous/anxious.    Objective:     Vital Signs (Most Recent):  Temp: 97.3 °F (36.3 °C) (03/21/23 1545)  Pulse: 100 (03/21/23 1615)  Resp: 20 (03/21/23 1615)  BP: (!) 143/87 (03/21/23 1615)  SpO2: 96 % (03/21/23 1615)   Vital Signs (24h Range):  Temp:  [97.2 °F (36.2 °C)-98.9 °F (37.2 °C)] 97.3 °F (36.3 °C)  Pulse:  [] 100  Resp:  [14-23] 20  SpO2:  [90 %-100 %] 96 %  BP: (117-143)/(70-88) 143/87     Weight: 72.6 kg (160 lb)  Body mass index is 22.32 kg/m².    Intake/Output Summary (Last 24 hours) at 3/21/2023 1740  Last data filed at 3/21/2023 1733  Gross per 24 hour   Intake 1600 ml   Output 300 ml   Net 1300 ml      Physical Exam  Vitals and nursing note reviewed.   Constitutional:       General: She is not in acute distress.     Appearance: She is well-developed.   HENT:      Head: Normocephalic and atraumatic.      Mouth/Throat:      Pharynx:  No oropharyngeal exudate.   Eyes:      Conjunctiva/sclera: Conjunctivae normal.      Pupils: Pupils are equal, round, and reactive to light.   Cardiovascular:      Rate and Rhythm: Normal rate and regular rhythm.      Heart sounds: Normal heart sounds.   Pulmonary:      Effort: Pulmonary effort is normal. No respiratory distress.      Breath sounds: Normal breath sounds. No wheezing.      Comments: On RA  Abdominal:      General: Bowel sounds are normal. There is no distension.      Palpations: Abdomen is soft.      Tenderness: There is no abdominal tenderness.   Musculoskeletal:         General: Swelling, tenderness and signs of injury (L upper arm) present. Normal range of motion.      Cervical back: Normal range of motion and neck supple.      Comments: Arm wrapped with ACE bandage and placed in sling   Lymphadenopathy:      Cervical: No cervical adenopathy.   Skin:     General: Skin is warm and dry.      Capillary Refill: Capillary refill takes less than 2 seconds.      Findings: No rash.   Neurological:      Mental Status: She is alert and oriented to person, place, and time.      Cranial Nerves: No cranial nerve deficit.      Sensory: No sensory deficit.      Coordination: Coordination normal.   Psychiatric:         Behavior: Behavior normal.         Thought Content: Thought content normal.         Judgment: Judgment normal.       Significant Labs: All pertinent labs within the past 24 hours have been reviewed.    Significant Imaging: I have reviewed all pertinent imaging results/findings within the past 24 hours.

## 2023-03-21 NOTE — SUBJECTIVE & OBJECTIVE
Principal Problem:Open displaced transverse fracture of shaft of left humerus    Principal Orthopedic Problem: same. S/p L humerus IMN and I&D of open fracture wound 3/21/23    Interval History: Pt seen and examined at bedside. Seen after surgery. Patient upset with current pain control. Unable to extend wrist and thumb.      Review of patient's allergies indicates:   Allergen Reactions    Lamictal [lamotrigine] Rash     Per psychiatry notes       Current Facility-Administered Medications   Medication    0.9%  NaCl infusion    acetaminophen tablet 1,000 mg    atorvastatin tablet 40 mg    bisacodyL suppository 10 mg    ceFAZolin 2 g in dextrose 5 % in water (D5W) 5 % 50 mL IVPB (MB+)    dextrose 10% bolus 125 mL 125 mL    dextrose 10% bolus 250 mL 250 mL    dextrose 40 % gel 15,000 mg    dextrose 40 % gel 30,000 mg    ergocalciferol capsule 50,000 Units    EScitalopram oxalate tablet 20 mg    glucagon (human recombinant) injection 1 mg    HYDROmorphone injection 0.5 mg    insulin aspart U-100 pen 0-5 Units    ketorolac injection 15 mg    levothyroxine tablet 100 mcg    melatonin tablet 6 mg    methocarbamoL tablet 500 mg    naloxone 0.4 mg/mL injection 0.02 mg    ondansetron disintegrating tablet 8 mg    oxyCODONE immediate release tablet 5 mg    oxyCODONE immediate release tablet 5 mg    polyethylene glycol packet 17 g    prochlorperazine injection Soln 5 mg    ROPIvacaine (PF) 2 mg/ml (0.2%) solution    sodium chloride 0.9% flush 10 mL    sodium chloride 0.9% flush 10 mL    traZODone tablet 150 mg     Objective:     Vital Signs (Most Recent):  Temp: 97.8 °F (36.6 °C) (03/21/23 1748)  Pulse: 97 (03/21/23 1748)  Resp: 16 (03/21/23 1806)  BP: (!) 164/87 (03/21/23 1748)  SpO2: 96 % (03/21/23 1748)   Vital Signs (24h Range):  Temp:  [97.2 °F (36.2 °C)-98.9 °F (37.2 °C)] 97.8 °F (36.6 °C)  Pulse:  [] 97  Resp:  [14-23] 16  SpO2:  [90 %-100 %] 96 %  BP: (117-164)/(70-88) 164/87     Weight: 72.6 kg (160 lb)  Height:  "5' 11" (180.3 cm)  Body mass index is 22.32 kg/m².      Intake/Output Summary (Last 24 hours) at 3/21/2023 1854  Last data filed at 3/21/2023 1733  Gross per 24 hour   Intake 1600 ml   Output 300 ml   Net 1300 ml       Ortho/SPM Exam  Gen: NAD, WDWN  CV: peripherally well perfused  Resp: unlabored respirations, symmetric chest rise  Neck: TM  Neuro: CN 2-12 grossly intact. No FND.      MSK:  LUE:  ACE wrap in place, no saturation ntoed  SILT to hand and figners  WPP  Able to flex all digits and wrist, unable to extend thumb and wrist    Significant Labs: CBC:   Recent Labs   Lab 03/20/23  1408 03/21/23  0336   WBC 10.13 7.49   HGB 13.3 12.7   HCT 40.2 39.3    210     CMP:   Recent Labs   Lab 03/20/23  1408 03/21/23  0336    138   K 3.8 3.8   CL 99 100   CO2 25 27   * 151*   BUN 7* 7*   CREATININE 0.7 0.6   CALCIUM 9.5 9.4   PROT 7.3  --    ALBUMIN 4.1  --    BILITOT 0.6  --    ALKPHOS 73  --    AST 32  --    ALT 64*  --    ANIONGAP 13 11     All pertinent labs within the past 24 hours have been reviewed.    Significant Imaging: I have reviewed all pertinent imaging results/findings.  "

## 2023-03-21 NOTE — ADDENDUM NOTE
Addendum  created 03/21/23 2353 by Newton Giraldo MD    Order list changed, Pharmacy for encounter modified

## 2023-03-22 LAB
ANION GAP SERPL CALC-SCNC: 8 MMOL/L (ref 8–16)
BASOPHILS # BLD AUTO: 0.02 K/UL (ref 0–0.2)
BASOPHILS NFR BLD: 0.3 % (ref 0–1.9)
BUN SERPL-MCNC: 9 MG/DL (ref 8–23)
CALCIUM SERPL-MCNC: 9.3 MG/DL (ref 8.7–10.5)
CHLORIDE SERPL-SCNC: 101 MMOL/L (ref 95–110)
CO2 SERPL-SCNC: 26 MMOL/L (ref 23–29)
CREAT SERPL-MCNC: 0.7 MG/DL (ref 0.5–1.4)
DIFFERENTIAL METHOD: ABNORMAL
EOSINOPHIL # BLD AUTO: 0 K/UL (ref 0–0.5)
EOSINOPHIL NFR BLD: 0.3 % (ref 0–8)
ERYTHROCYTE [DISTWIDTH] IN BLOOD BY AUTOMATED COUNT: 11.8 % (ref 11.5–14.5)
EST. GFR  (NO RACE VARIABLE): >60 ML/MIN/1.73 M^2
GLUCOSE SERPL-MCNC: 166 MG/DL (ref 70–110)
HCT VFR BLD AUTO: 35.5 % (ref 37–48.5)
HGB BLD-MCNC: 11.6 G/DL (ref 12–16)
IMM GRANULOCYTES # BLD AUTO: 0.02 K/UL (ref 0–0.04)
IMM GRANULOCYTES NFR BLD AUTO: 0.3 % (ref 0–0.5)
LYMPHOCYTES # BLD AUTO: 2.3 K/UL (ref 1–4.8)
LYMPHOCYTES NFR BLD: 29.1 % (ref 18–48)
MAGNESIUM SERPL-MCNC: 1.8 MG/DL (ref 1.6–2.6)
MCH RBC QN AUTO: 30.4 PG (ref 27–31)
MCHC RBC AUTO-ENTMCNC: 32.7 G/DL (ref 32–36)
MCV RBC AUTO: 93 FL (ref 82–98)
MONOCYTES # BLD AUTO: 0.6 K/UL (ref 0.3–1)
MONOCYTES NFR BLD: 8.2 % (ref 4–15)
NEUTROPHILS # BLD AUTO: 4.8 K/UL (ref 1.8–7.7)
NEUTROPHILS NFR BLD: 61.8 % (ref 38–73)
NRBC BLD-RTO: 0 /100 WBC
PHOSPHATE SERPL-MCNC: 3.3 MG/DL (ref 2.7–4.5)
PLATELET # BLD AUTO: 178 K/UL (ref 150–450)
PMV BLD AUTO: 10.6 FL (ref 9.2–12.9)
POCT GLUCOSE: 155 MG/DL (ref 70–110)
POCT GLUCOSE: 220 MG/DL (ref 70–110)
POCT GLUCOSE: 249 MG/DL (ref 70–110)
POCT GLUCOSE: 259 MG/DL (ref 70–110)
POTASSIUM SERPL-SCNC: 4.3 MMOL/L (ref 3.5–5.1)
RBC # BLD AUTO: 3.81 M/UL (ref 4–5.4)
SODIUM SERPL-SCNC: 135 MMOL/L (ref 136–145)
WBC # BLD AUTO: 7.8 K/UL (ref 3.9–12.7)

## 2023-03-22 PROCEDURE — 94761 N-INVAS EAR/PLS OXIMETRY MLT: CPT

## 2023-03-22 PROCEDURE — 99231 PR SUBSEQUENT HOSPITAL CARE,LEVL I: ICD-10-PCS | Mod: ,,, | Performed by: ANESTHESIOLOGY

## 2023-03-22 PROCEDURE — 25000003 PHARM REV CODE 250

## 2023-03-22 PROCEDURE — 99231 SBSQ HOSP IP/OBS SF/LOW 25: CPT | Mod: ,,, | Performed by: ANESTHESIOLOGY

## 2023-03-22 PROCEDURE — 97530 THERAPEUTIC ACTIVITIES: CPT

## 2023-03-22 PROCEDURE — 85025 COMPLETE CBC W/AUTO DIFF WBC: CPT

## 2023-03-22 PROCEDURE — 97116 GAIT TRAINING THERAPY: CPT

## 2023-03-22 PROCEDURE — 36415 COLL VENOUS BLD VENIPUNCTURE: CPT

## 2023-03-22 PROCEDURE — 63600175 PHARM REV CODE 636 W HCPCS

## 2023-03-22 PROCEDURE — 11000001 HC ACUTE MED/SURG PRIVATE ROOM

## 2023-03-22 PROCEDURE — 80048 BASIC METABOLIC PNL TOTAL CA: CPT

## 2023-03-22 PROCEDURE — 97161 PT EVAL LOW COMPLEX 20 MIN: CPT

## 2023-03-22 PROCEDURE — 96376 TX/PRO/DX INJ SAME DRUG ADON: CPT

## 2023-03-22 PROCEDURE — 99233 PR SUBSEQUENT HOSPITAL CARE,LEVL III: ICD-10-PCS | Mod: ,,,

## 2023-03-22 PROCEDURE — 25000003 PHARM REV CODE 250: Performed by: ORTHOPAEDIC SURGERY

## 2023-03-22 PROCEDURE — 63600175 PHARM REV CODE 636 W HCPCS: Performed by: ORTHOPAEDIC SURGERY

## 2023-03-22 PROCEDURE — 83735 ASSAY OF MAGNESIUM: CPT

## 2023-03-22 PROCEDURE — 84100 ASSAY OF PHOSPHORUS: CPT

## 2023-03-22 PROCEDURE — 99233 SBSQ HOSP IP/OBS HIGH 50: CPT | Mod: ,,,

## 2023-03-22 RX ORDER — ACETAMINOPHEN 500 MG
1000 TABLET ORAL EVERY 8 HOURS
Status: DISCONTINUED | OUTPATIENT
Start: 2023-03-22 | End: 2023-03-25 | Stop reason: HOSPADM

## 2023-03-22 RX ORDER — POLYETHYLENE GLYCOL 3350 17 G/17G
17 POWDER, FOR SOLUTION ORAL DAILY
Status: DISCONTINUED | OUTPATIENT
Start: 2023-03-22 | End: 2023-03-23

## 2023-03-22 RX ORDER — METHOCARBAMOL 500 MG/1
500 TABLET, FILM COATED ORAL EVERY 8 HOURS
Status: DISCONTINUED | OUTPATIENT
Start: 2023-03-22 | End: 2023-03-25 | Stop reason: HOSPADM

## 2023-03-22 RX ORDER — AMOXICILLIN 250 MG
1 CAPSULE ORAL 2 TIMES DAILY
Status: DISCONTINUED | OUTPATIENT
Start: 2023-03-22 | End: 2023-03-25 | Stop reason: HOSPADM

## 2023-03-22 RX ORDER — CELECOXIB 200 MG/1
200 CAPSULE ORAL DAILY
Status: DISCONTINUED | OUTPATIENT
Start: 2023-03-22 | End: 2023-03-25 | Stop reason: HOSPADM

## 2023-03-22 RX ADMIN — OXYCODONE HYDROCHLORIDE 10 MG: 10 TABLET ORAL at 04:03

## 2023-03-22 RX ADMIN — ACETAMINOPHEN 650 MG: 325 TABLET ORAL at 12:03

## 2023-03-22 RX ADMIN — CELECOXIB 200 MG: 200 CAPSULE ORAL at 02:03

## 2023-03-22 RX ADMIN — OXYCODONE HYDROCHLORIDE 10 MG: 10 TABLET ORAL at 08:03

## 2023-03-22 RX ADMIN — ACETAMINOPHEN 1000 MG: 500 TABLET ORAL at 09:03

## 2023-03-22 RX ADMIN — POLYETHYLENE GLYCOL 3350 17 G: 17 POWDER, FOR SOLUTION ORAL at 02:03

## 2023-03-22 RX ADMIN — GABAPENTIN 300 MG: 300 CAPSULE ORAL at 09:03

## 2023-03-22 RX ADMIN — ESCITALOPRAM OXALATE 20 MG: 20 TABLET ORAL at 08:03

## 2023-03-22 RX ADMIN — OXYCODONE HYDROCHLORIDE 5 MG: 5 TABLET ORAL at 03:03

## 2023-03-22 RX ADMIN — ATORVASTATIN CALCIUM 40 MG: 40 TABLET, FILM COATED ORAL at 08:03

## 2023-03-22 RX ADMIN — ASPIRIN 81 MG: 81 TABLET, COATED ORAL at 08:03

## 2023-03-22 RX ADMIN — CEFAZOLIN 2 G: 2 INJECTION, POWDER, FOR SOLUTION INTRAMUSCULAR; INTRAVENOUS at 02:03

## 2023-03-22 RX ADMIN — SENNOSIDES AND DOCUSATE SODIUM 1 TABLET: 50; 8.6 TABLET ORAL at 09:03

## 2023-03-22 RX ADMIN — OXYCODONE HYDROCHLORIDE 5 MG: 5 TABLET ORAL at 07:03

## 2023-03-22 RX ADMIN — GABAPENTIN 300 MG: 300 CAPSULE ORAL at 08:03

## 2023-03-22 RX ADMIN — LEVOTHYROXINE SODIUM 100 MCG: 50 TABLET ORAL at 07:03

## 2023-03-22 RX ADMIN — KETOROLAC TROMETHAMINE 15 MG: 15 INJECTION, SOLUTION INTRAMUSCULAR; INTRAVENOUS at 08:03

## 2023-03-22 RX ADMIN — METHOCARBAMOL 500 MG: 500 TABLET ORAL at 12:03

## 2023-03-22 RX ADMIN — HYDROMORPHONE HYDROCHLORIDE 1 MG: 1 INJECTION, SOLUTION INTRAMUSCULAR; INTRAVENOUS; SUBCUTANEOUS at 01:03

## 2023-03-22 RX ADMIN — INSULIN ASPART 2 UNITS: 100 INJECTION, SOLUTION INTRAVENOUS; SUBCUTANEOUS at 04:03

## 2023-03-22 RX ADMIN — ACETAMINOPHEN 650 MG: 325 TABLET ORAL at 05:03

## 2023-03-22 RX ADMIN — Medication 6 MG: at 09:03

## 2023-03-22 RX ADMIN — INSULIN ASPART 3 UNITS: 100 INJECTION, SOLUTION INTRAVENOUS; SUBCUTANEOUS at 12:03

## 2023-03-22 RX ADMIN — GABAPENTIN 300 MG: 300 CAPSULE ORAL at 04:03

## 2023-03-22 RX ADMIN — METHOCARBAMOL 500 MG: 500 TABLET ORAL at 08:03

## 2023-03-22 RX ADMIN — CEFAZOLIN 2 G: 2 INJECTION, POWDER, FOR SOLUTION INTRAMUSCULAR; INTRAVENOUS at 12:03

## 2023-03-22 RX ADMIN — OXYCODONE HYDROCHLORIDE 10 MG: 10 TABLET ORAL at 10:03

## 2023-03-22 RX ADMIN — METHOCARBAMOL 500 MG: 500 TABLET ORAL at 09:03

## 2023-03-22 RX ADMIN — TRAZODONE HYDROCHLORIDE 150 MG: 50 TABLET ORAL at 09:03

## 2023-03-22 RX ADMIN — ASPIRIN 81 MG: 81 TABLET, COATED ORAL at 09:03

## 2023-03-22 NOTE — PT/OT/SLP EVAL
Physical Therapy  Evaluation and Treatment    Alix Patel   [id number]    Time Tracking:     PT Received On: 03/22/23   PT Start Time: 0949   PT Stop Time: 1023 (returned from [id number])   PT Total Time (min): 44 min    Billable Minutes: Evaluation 15, Gait Training 15, and Therapeutic Activity 14 minutes       Recommendations:     Discharge recommendations: Other (would benefit from post-acute OT for L radial nerve palsy)     Equipment recommendations: None    Barriers to Discharge: Inaccessible home environment (3 flights of stairs to her apartment with no elevator access)    Patient Information:     Recent Surgery: Procedure(s) (LRB):  ORIF, FRACTURE, HUMERUS (Left)  IRRIGATION AND DEBRIDEMENT, UPPER EXTREMITY (Left) 1 Day Post-Op    Diagnosis: Open displaced transverse fracture of shaft of left humerus    Length of Stay: 2 days    General Precautions: Standard, fall  Orthopedic Precautions: LUE NWB  Brace: LUE sling for comfort, L wrist drop splint    Assessment:     Alix Patel is a 67 y.o. female [place] [facility] [place] for Open displaced transverse fracture of shaft of left humerus, underwent L proximal humerus ORIF. Alix Patel tolerated evaluation well today. She was resting in bed with no family present upon PT entry to room, agreeable to evaluation. Reviewed orthopedic precautions with patient; non-weight bearing to LUE, LUE sling for comfort, L wrist drop brace ordered. I assisted patient with LUE sling management at edge of bed for improved alignment. Presents with L radial nerve palsy post-fall and humerus fracture. Absent L wrist ext, able to flex/ext digits; educated on using her R hand to stretch L wrist into passive extension throughout day. She participated in standing ADL's at sink with supervision, performed teeth brushing and face washing with her RUE while standing. Ambulates 300 ft in hallways on room air with supervision, no AD utilized, LUE in sling; gait is  "steady with no losses of balance, able to hold conversation. Reports 8/10 L shoulder discomfort throughout session but overall happy and in good spirits. Set-up in bedside chair, reclined and comfortable at end of session. RN messaged therapist to return in PM once wrist drop splint delivered, confirmed good placement to L wrist and LUE within sling. Discussed PT role, POC, and goals with patient; verbalized understanding. Alix Patel would benefit from acute PT services to promote mobility during this admission and improve return to PLOF.    Problem List: weakness, impaired self-care skills, impaired mobility, decreased sitting or standing balance, orthopedic precautions, and pain    Rehab Prognosis: Good; patient would benefit from acute skilled PT services to address these deficits and reach maximum level of function.    Plan:     Patient to be seen 3 x/week to address the above listed problems via gait training, therapeutic activities, therapeutic exercises, neuromuscular re-education    Plan of Care Expires: 04/21/23  Plan of Care reviewed with: patient    Subjective:     Communicated with SEAN Rodriguez prior to evaluation, appropriate to see for evaluation.    Pt found supine in bed (HOB elevated) upon PT entry to room, agreeable to evaluation.    Patient commenting: "Did you hear that I broke my arm?"    Does this patient have any cultural, spiritual, Islam conflicts given the current situation? Patient has no barriers to learning. Patient verbalizes understanding of his/her program and goals and demonstrates them correctly. No cultural, spiritual, or educational needs identified.    Past Medical History:   Diagnosis Date    Anxiety     Bipolar 1 disorder     COPD (chronic obstructive pulmonary disease)     Depression     Diabetes mellitus     GERD (gastroesophageal reflux disease)     Grade I open displaced transverse fracture of shaft of left humerus 3/20/2023    HEARING LOSS     pt states she has " loss slighty in rt ear.    Hypertension     Insomnia     Rash       Past Surgical History:   Procedure Laterality Date    APPENDECTOMY      BREAST BIOPSY      COLONOSCOPY      COLONOSCOPY N/A 10/31/2019    Procedure: COLONOSCOPY;  Surgeon: Philippe Monteiro MD;  [facility]  Service: Endoscopy;  Laterality: N/A;  PM prep-MH    CYST REMOVAL      ESOPHAGOGASTRODUODENOSCOPY N/A 10/31/2019    Procedure: EGD (ESOPHAGOGASTRODUODENOSCOPY);  Surgeon: Philippe Monteiro MD;  Location: [facility] ([address] [place] [address]);  Service: Endoscopy;  Laterality: N/A;  Pm prep-MH    HYSTERECTOMY         Living Environment:  Pt lives alone in a 4th story apartment with 3 flights of stairs with L HR to get up to apartment, no elevator access.    PLOF:  Prior to admission, patient was independent for mobility and self-care. Hasn't been driving due to car being dead, not working. Will get rides from friends to get to/from grocery.    DME:  Patient owns or has access to the following DME: None    Upon discharge, patient will have assistance from friends.    Objective:     Patient found with: perineural catheter, telemetry, LUE sling, L wrist drop splint    Pain:  Pain Rating 1: 8/10 at generalized L shoulder  Pain Rating Post-Intervention 1: 8/10 (same, see above)    Cognitive Exam:  Patient is oriented to Person, Place, Time, and Situation.  Patient follows 100% of single-step commands.    Sensation:   Intact at L hand to light touch    Upper Extremity ROM/Strength:  Left Upper Extremity: Absent L wrist extension, intact digit flexion/ext    Lower Extremity Range of Motion:  Right Lower Extremity: WFL actively  Left Lower Extremity: WFL actively    Lower Extremity Strength:  Right Lower Extremity: WFL  Left Lower Extremity: WFL    Functional Mobility:    Bed Mobility:  Supine to Sitting: stand-by assistance    Transfers:  Sit to Stand: supervision from EOB with no AD x 1 trial(s)    Gait:  300 feet in hallways on room air with  supervision, no AD utilized, LUE in sling; gait is steady with no losses of balance, able to hold conversation    Assist level: Supervision  Device: no AD    Balance:  Static Sit: Independent at EOB    Static Stand: Supervision with no AD    Additional Therapeutic Activity/Exercises:     1. She was resting in bed with no family present upon PT entry to room, agreeable to evaluation. Reviewed orthopedic precautions with patient; non-weight bearing to LUE, LUE sling for comfort, L wrist drop brace ordered.    2. I assisted patient with LUE sling management at edge of bed for improved alignment.    3. She participated in standing ADL's at sink with supervision, performed teeth brushing and face washing with her RUE while standing.    4. Ambulates 300 ft in hallways on room air with supervision, no AD utilized, LUE in sling; gait is steady with no losses of balance, able to hold conversation. Reports 8/10 L shoulder discomfort throughout session but overall happy and in good spirits.    5. Set-up in bedside chair, reclined and comfortable at end of session. RN messaged therapist to return in PM once wrist drop splint delivered, confirmed good placement to L wrist and LUE within sling.    6. Discussed PT role, POC, and goals with patient; verbalized understanding.    AM-PAC 6 CLICK MOBILITY  Turning over in bed (including adjusting bedclothes, sheets and blankets)?: 4  Sitting down on and standing up from a chair with arms (e.g., wheelchair, bedside commode, etc.): 4  Moving from lying on back to sitting on the side of the bed?: 4  Moving to and from a bed to a chair (including a wheelchair)?: 4  Need to walk in hospital room?: 4  Climbing 3-5 steps with a railing?: 3  Basic Mobility Total Score: 23    Patient was left reclined in bedside chair with all lines intact, call button in reach, and RN notified.    Clinical Decision Making for Evaluation Complexity:  1. Body System(s) Examination: 1-2  2. Clinical Presentation:  Evolving  3. Evaluation Complexity: Low    GOALS:   Multidisciplinary Problems       Physical Therapy Goals          Problem: Physical Therapy    Goal Priority Disciplines Outcome Goal Variances Interventions   Physical Therapy Goal     PT, PT/OT      Description: Goals to be met by: 23     Patient will increase functional independence with mobility by performin. Supine to sit with Holden with HOB flat - Not met  2. Sit to stand transfer with Modified Holden - Not met  3. Gait  x 500 feet with Holden using No Assistive Device - Not met  4. Ascend/descend 1 flight of stairs with right Handrail with Stand-by Assistance using No Assistive Device - Not met                     Akil Murillo, PT  3/22/2023

## 2023-03-22 NOTE — SUBJECTIVE & OBJECTIVE
Interval History: CELESTINA TOPETE. Evaluated patient at bedside this am. Patient presenting with L radial nerve palsy. Endorses numbness to dorsal aspect of L forearm. Reports moderate pain in LUE. Continuing MM pain regimen. Anesthesia adjusting PNC. Will continue to monitor patient overnight as catheter may have to be replaced per Anesthesia.    Review of Systems   Constitutional:  Negative for activity change, chills and fever.   HENT:  Negative for trouble swallowing.    Eyes:  Negative for photophobia and visual disturbance.   Respiratory:  Negative for cough, chest tightness and shortness of breath.    Cardiovascular:  Negative for chest pain, palpitations and leg swelling.   Gastrointestinal:  Negative for abdominal pain, constipation, diarrhea, nausea and vomiting.   Genitourinary:  Negative for dysuria, frequency and hematuria.   Musculoskeletal:  Positive for myalgias. Negative for back pain, gait problem and neck pain.   Skin:  Negative for rash and wound.   Neurological:  Positive for dizziness (resolved) and numbness. Negative for syncope, speech difficulty and light-headedness.   Psychiatric/Behavioral:  Negative for agitation and confusion. The patient is not nervous/anxious.    Objective:     Vital Signs (Most Recent):  Temp: 98.4 °F (36.9 °C) (03/22/23 1201)  Pulse: 83 (03/22/23 1201)  Resp: 18 (03/22/23 1333)  BP: (!) 106/58 (03/22/23 1201)  SpO2: (!) 94 % (03/22/23 1201)   Vital Signs (24h Range):  Temp:  [97.2 °F (36.2 °C)-98.6 °F (37 °C)] 98.4 °F (36.9 °C)  Pulse:  [] 83  Resp:  [14-20] 18  SpO2:  [90 %-98 %] 94 %  BP: (103-164)/(58-88) 106/58     Weight: 72.6 kg (160 lb)  Body mass index is 22.32 kg/m².    Intake/Output Summary (Last 24 hours) at 3/22/2023 1340  Last data filed at 3/21/2023 1733  Gross per 24 hour   Intake 600 ml   Output 300 ml   Net 300 ml      Physical Exam  Vitals and nursing note reviewed.   Constitutional:       General: She is not in acute distress.     Appearance:  She is well-developed.   HENT:      Head: Normocephalic and atraumatic.      Mouth/Throat:      Pharynx: No oropharyngeal exudate.   Eyes:      Conjunctiva/sclera: Conjunctivae normal.      Pupils: Pupils are equal, round, and reactive to light.   Cardiovascular:      Rate and Rhythm: Normal rate and regular rhythm.      Heart sounds: Normal heart sounds.   Pulmonary:      Effort: Pulmonary effort is normal. No respiratory distress.      Breath sounds: Normal breath sounds. No wheezing.      Comments: On RA  Abdominal:      General: Bowel sounds are normal. There is no distension.      Palpations: Abdomen is soft.      Tenderness: There is no abdominal tenderness.   Musculoskeletal:         General: Swelling, tenderness and signs of injury (L upper arm) present. Normal range of motion.      Cervical back: Normal range of motion and neck supple.      Comments: L arm in splint placed in sling  Interscalene PNC   Lymphadenopathy:      Cervical: No cervical adenopathy.   Skin:     General: Skin is warm and dry.      Capillary Refill: Capillary refill takes less than 2 seconds.      Findings: No rash.   Neurological:      Mental Status: She is alert and oriented to person, place, and time.      Cranial Nerves: No cranial nerve deficit.      Sensory: No sensory deficit.      Coordination: Coordination normal.   Psychiatric:         Behavior: Behavior normal.         Thought Content: Thought content normal.         Judgment: Judgment normal.       Significant Labs: All pertinent labs within the past 24 hours have been reviewed.    Significant Imaging: I have reviewed all pertinent imaging results/findings within the past 24 hours.

## 2023-03-22 NOTE — PROGRESS NOTES
[facility] Medicine  Progress Note    Patient Name: Alix Patel  MRN: [id number]  Patient Class: OP- Observation   Admission Date: 3/20/2023  Length of Stay: 0 days  Attending Physician: Estephanie Kuhn MD  Primary Care Provider: Angélica Barakat MD        Subjective:     Principal Problem:Open displaced transverse fracture of shaft of left humerus        HPI:  Alix Patel is a 67 y.o. female with a PMHx of T2DM, HLD, hypothyroidism, LILLY, and MDD who presented to the ED by EMS for L arm pain following a fall at home. Patient reported taking two doses of her home Trazodone last night because she wanted to be able to sleep through the night. After taking the medication and getting in bed, patient felt hungry and decided to cook pasta. Patient got back in bed to wait for the pasta to finish cooking. Once it was finished around 4 am, she got out of bed to eat. She felt dizzy when standing in the kitchen and fell to the ground on her left side. She denies any LOC, preceding palpitations, confusion, or weakness. She denies any head injuries. Patient was able to stand back up to continue fixing her meal. She went to sleep only endorsing limited ROM in L arm 2/2 pain. When she woke up, she was still unable to move arm without severe pain prompting her to call EMS. She further denies any HA, F/C, N/V, chest pain, palpitations, SOB, recent infections, similar prior episodes, numbness, or tingling. She reports dizziness has resolved. She is not on AC at this time.    ED: Hypoxic to 93%, placed on 3.5 L NC, but eventually weaned to RA. Intermittently tachypneic, RR of 30 max. Otherwise AFVSS. CBC without leukocytosis or anemia. Hyperglycemic to 187, ALT 64. Otherwise CMP wnl. Vitamin D deficiency. A1c 7.5. Type and screen completed. CTH left frontal extracranial soft tissue swelling without evidence of underlying fracture or acute intracranial hemorrhage. Ventriculomegaly out of proportion to  the degree of sulcal enlargement. While this could relate to cerebral volume loss, the configuration does at least raise the possibility of normal pressure hydrocephalus. Clinical correlation required. CXR with no acute intrathoracic process. L shoulder and humerus XR with displaced and comminuted acute fracture of the left mid humeral shaft with nondisplaced fracture of the left proximal humerus. CT L arm with fractures of the proximal and mid aspects of the humerus as described. EKG NSR RBBB. Placed into splint under sedation. Given Fentanyl (in EMS), Cefazolin, Dilaudid, Ortho consulted. Admitted to Hospital Medicine for further management.      Overview/Hospital Course:  Alix Patel was admitted to Hospital Medicine for L humerus fracture. Ortho consulted, plan for ORIF on 3/21. S/p L humerus IMN and I&D of open fracture wound 3/21/2. Patient presenting with radial nerve palsy on exam. Worked well with PT/OT. Anesthesia adjusting interscalene PNC. Patient to wear sling to LUE for comfort. Nonweightbearing LUE x 8 weeks postop. Completing 24 hours of abx on 3/22. Will continue ASA 81 BID x 6 weeks. Plan for Home Health PT/OT. Ortho referral placed for clinic follow up. Patient to also follow up with Psychiatrist as she wishes to d/c Trazodone.      Interval History: CELESTINA TOPETE. Evaluated patient at bedside this am. Patient presenting with L radial nerve palsy. Endorses numbness to dorsal aspect of L forearm. Reports moderate pain in LUE. Continuing MM pain regimen. Anesthesia adjusting PNC. Will continue to monitor patient overnight as catheter may have to be replaced per Anesthesia.    Review of Systems   Constitutional:  Negative for activity change, chills and fever.   HENT:  Negative for trouble swallowing.    Eyes:  Negative for photophobia and visual disturbance.   Respiratory:  Negative for cough, chest tightness and shortness of breath.    Cardiovascular:  Negative for chest pain, palpitations  and leg swelling.   Gastrointestinal:  Negative for abdominal pain, constipation, diarrhea, nausea and vomiting.   Genitourinary:  Negative for dysuria, frequency and hematuria.   Musculoskeletal:  Positive for myalgias. Negative for back pain, gait problem and neck pain.   Skin:  Negative for rash and wound.   Neurological:  Positive for dizziness (resolved) and numbness. Negative for syncope, speech difficulty and light-headedness.   Psychiatric/Behavioral:  Negative for agitation and confusion. The patient is not nervous/anxious.    Objective:     Vital Signs (Most Recent):  Temp: 98.4 °F (36.9 °C) (03/22/23 1201)  Pulse: 83 (03/22/23 1201)  Resp: 18 (03/22/23 1333)  BP: (!) 106/58 (03/22/23 1201)  SpO2: (!) 94 % (03/22/23 1201)   Vital Signs (24h Range):  Temp:  [97.2 °F (36.2 °C)-98.6 °F (37 °C)] 98.4 °F (36.9 °C)  Pulse:  [] 83  Resp:  [14-20] 18  SpO2:  [90 %-98 %] 94 %  BP: (103-164)/(58-88) 106/58     Weight: 72.6 kg (160 lb)  Body mass index is 22.32 kg/m².    Intake/Output Summary (Last 24 hours) at 3/22/2023 1340  Last data filed at 3/21/2023 1733  Gross per 24 hour   Intake 600 ml   Output 300 ml   Net 300 ml      Physical Exam  Vitals and nursing note reviewed.   Constitutional:       General: She is not in acute distress.     Appearance: She is well-developed.   HENT:      Head: Normocephalic and atraumatic.      Mouth/Throat:      Pharynx: No oropharyngeal exudate.   Eyes:      Conjunctiva/sclera: Conjunctivae normal.      Pupils: Pupils are equal, round, and reactive to light.   Cardiovascular:      Rate and Rhythm: Normal rate and regular rhythm.      Heart sounds: Normal heart sounds.   Pulmonary:      Effort: Pulmonary effort is normal. No respiratory distress.      Breath sounds: Normal breath sounds. No wheezing.      Comments: On RA  Abdominal:      General: Bowel sounds are normal. There is no distension.      Palpations: Abdomen is soft.      Tenderness: There is no abdominal  tenderness.   Musculoskeletal:         General: Swelling, tenderness and signs of injury (L upper arm) present. Normal range of motion.      Cervical back: Normal range of motion and neck supple.      Comments: L arm in splint placed in sling  Interscalene PNC   Lymphadenopathy:      Cervical: No cervical adenopathy.   Skin:     General: Skin is warm and dry.      Capillary Refill: Capillary refill takes less than 2 seconds.      Findings: No rash.   Neurological:      Mental Status: She is alert and oriented to person, place, and time.      Cranial Nerves: No cranial nerve deficit.      Sensory: No sensory deficit.      Coordination: Coordination normal.   Psychiatric:         Behavior: Behavior normal.         Thought Content: Thought content normal.         Judgment: Judgment normal.       Significant Labs: All pertinent labs within the past 24 hours have been reviewed.    Significant Imaging: I have reviewed all pertinent imaging results/findings within the past 24 hours.      Assessment/Plan:      * Grade I open displaced transverse fracture of shaft of left humerus  Fall  Presented to the ED by EMS for L arm pain following a fall at home. Patient took two Trazodones to help her sleep. Typically takes one. She felt dizzy when standing in the kitchen and fell to the ground on her left side. She denies any LOC, preceding palpitations, confusion, or weakness. She denies any head injuries. Patient was able to stand back up to continue fixing her meal. Woke up unable to move arm without severe pain prompting her to call EMS. Dizziness resolved.    - CTH left frontal extracranial soft tissue swelling without evidence of underlying fracture or acute intracranial hemorrhage. Ventriculomegaly out of proportion to the degree of sulcal enlargement. While this could relate to cerebral volume loss, the configuration does at least raise the possibility of normal pressure hydrocephalus. Clinical correlation required.  - CXR  with no acute intrathoracic process  - L shoulder and humerus XR with displaced and comminuted acute fracture of the left mid humeral shaft with nondisplaced fracture of the left proximal humerus  - CT L arm with fractures of the proximal and mid aspects of the humerus as described.  - EKG NSR RBBB (consistent with priors)  - Placed in splint under sedation in ED  - Orthostatic vitals negative  - Ortho consulted, appreciate assistance   - Continue to monitor for radial nerve function, ok for PNC for pain control   - Antibiotics for 24 hours, to completed today   - Sling left upper extremity for comfort   - NWB LUE x8 weeks postop   - ROM as tolerated left upper extremity   - ASA 81 mg BID x 6 weeks for DVT prophylaxis   - Nutrition support: Calcium, vitamin-D.  - Anesthesia managing interscalene PNC  - Continue MM pain control  - PT/OT consulted, plan for HH PT/OT  - Ortho referral placed for clinic follow up    Vitamin D deficiency  - Noted on labs  - Start supplementation    Type 2 diabetes mellitus with hyperglycemia, without long-term current use of insulin  Patient's FSGs are controlled on current medication regimen.  Last A1c reviewed-   Lab Results   Component Value Date    HGBA1C 7.5 (H) 03/20/2023   Most recent fingerstick glucose reviewed-   Recent Labs   Lab 03/21/23  1603 03/21/23  2050 03/22/23  0608 03/22/23  1154   POCTGLUCOSE 238* 274* 155* 259*     Current correctional scale  Low  Maintain anti-hyperglycemic dose as follows-   Antihyperglycemics (From admission, onward)      Start     Stop Route Frequency Ordered    03/20/23 1641  insulin aspart U-100 pen 0-5 Units         -- SubQ Before meals & nightly PRN 03/20/23 1541        - Hold Oral hypoglycemics while patient is in the hospital  - Diabetic/Cardiac diet    Hypothyroidism  - Continue Synthroid  - Will check TSH  Lab Results   Component Value Date    TSH 3.070 03/21/2023       Thyroid nodule  - Per last Endocrinology note: Previous benign FNA  of right thyroid nodule  - Last US (9/23/22) reviewed    Hyperlipidemia with target low density lipoprotein (LDL) cholesterol less than 100 mg/dL  - Patient not taking statin, request to resume  - Lipid panel reviewed  - Will need to follow up with Cardiologist    Depression  LILLY (generalized anxiety disorder)  Patient has persistent depression which is mild and is currently controlled. Will Continue anti-depressant medications. We will not consult psychiatry at this time. Patient does not display psychosis at this time. Continue to monitor closely and adjust plan of care as needed.  - Continue Lexapro, Trazadone    VTE Risk Mitigation (From admission, onward)           Ordered     IP VTE LOW RISK PATIENT  Once         03/20/23 1541     Place sequential compression device  Until discontinued         03/20/23 1541                    Discharge Planning   GILLES: 3/23/2023     Code Status: Full Code   Is the patient medically ready for discharge?: No    Reason for patient still in hospital (select all that apply): Patient trending condition and Treatment  Discharge Plan A: Home                  Armand Orozco PA-C  Department of Hospital Medicine   [facility]

## 2023-03-22 NOTE — PLAN OF CARE
[facility]      HOME HEALTH ORDERS  FACE TO FACE ENCOUNTER    Patient Name: Alix Patel  YOB: 1955    PCP: Angélica Barakat MD   PCP Address: [address] [place] [address]  PCP Phone Number: [phone]  PCP Fax: [phone]    Encounter Date: 3/20/23    Admit to Home Health    Diagnoses:  Active Hospital Problems    Diagnosis  POA    *Grade I open displaced transverse fracture of shaft of left humerus [S42.322B]  Yes    Fall [W19.XXXA]  Yes    Vitamin D deficiency [E55.9]  Yes    Type 2 diabetes mellitus with hyperglycemia, without long-term current use of insulin [E11.65]  Yes    LILLY (generalized anxiety disorder) [F41.1]  Yes    Hypothyroidism [E03.9]  Yes    Thyroid nodule [E04.1]  Yes    Hyperlipidemia with target low density lipoprotein (LDL) cholesterol less than 100 mg/dL [E78.5]  Yes    Depression [F32.A]  Yes      Resolved Hospital Problems   No resolved problems to display.       Follow Up Appointments:  Future Appointments   Date Time Provider Department Center   4/4/2023 10:00 AM Sebastian Kaplan NP [facility] [address]   4/14/2023  8:20 AM Maximino Curry MD [facility] [address]   5/2/2023 10:30 AM Sebastian Kaplan NP [facility] [address]       Allergies:  Review of patient's allergies indicates:   Allergen Reactions    Lamictal [lamotrigine] Rash     Per psychiatry notes       Medications: Review discharge medications with patient and family and provide education.    Current Facility-Administered Medications   Medication Dose Route Frequency Provider Last Rate Last Admin    0.9%  NaCl infusion   Intravenous Continuous Celena Matthews MD   Stopped at 03/21/23 1822    acetaminophen tablet 1,000 mg  1,000 mg Oral Q8H Sadaf Friend MD        aspirin EC tablet 81 mg  81 mg Oral BID Jn Garza MD   81 mg at 03/22/23 0844    atorvastatin tablet 40 mg  40 mg Oral Daily Armand Orozco PA-C   40 mg at 03/22/23 0844    bisacodyL suppository 10  mg  10 mg Rectal Daily PRN Armand Orozco PA-C        celecoxib capsule 200 mg  200 mg Oral Daily Sadaf Friend MD        dextrose 10% bolus 125 mL 125 mL  12.5 g Intravenous PRN Armand Orozco PA-C        dextrose 10% bolus 250 mL 250 mL  25 g Intravenous PRN Armand Orozco PA-C        dextrose 40 % gel 15,000 mg  15 g Oral PRN Armand Orozco PA-C        dextrose 40 % gel 30,000 mg  30 g Oral PRN Armand Orozco PA-C        ergocalciferol capsule 50,000 Units  50,000 Units Oral Q7 Days Felix Ndiaye MD   50,000 Units at 03/21/23 0811    EScitalopram oxalate tablet 20 mg  20 mg Oral Daily Armand Orozco PA-C   20 mg at 03/22/23 0844    gabapentin capsule 300 mg  300 mg Oral TID Ousmane Crawford MD   300 mg at 03/22/23 0843    glucagon (human recombinant) injection 1 mg  1 mg Intramuscular PRN Armand Orozco PA-C        insulin aspart U-100 pen 0-5 Units  0-5 Units Subcutaneous QID (AC + HS) PRN Armand Orozco PA-C   3 Units at 03/22/23 1225    levothyroxine tablet 100 mcg  100 mcg Oral Daily Armand Orozco PA-C   100 mcg at 03/22/23 0710    melatonin tablet 6 mg  6 mg Oral Nightly PRN Armand Orozco PA-C   6 mg at 03/20/23 2154    methocarbamoL tablet 500 mg  500 mg Oral Q8H Sadaf Friend MD        naloxone 0.4 mg/mL injection 0.02 mg  0.02 mg Intravenous PRN Armand Orozco PA-C        ondansetron disintegrating tablet 8 mg  8 mg Oral Q8H PRN Armand Orozco PA-C        oxyCODONE immediate release tablet 5 mg  5 mg Oral Q4H PRN Klever Cullen MD   5 mg at 03/22/23 0713    oxyCODONE immediate release tablet Tab 10 mg  10 mg Oral Q4H PRN Ousmane Crawford MD   10 mg at 03/22/23 1058    polyethylene glycol packet 17 g  17 g Oral Daily Sadaf Friend MD        prochlorperazine injection Soln 5 mg  5 mg Intravenous Q6H PRN Armand Orozco PA-C        ROPIvacaine (PF) 2 mg/ml (0.2%) solution  0.1 mL/hr Perineural Continuous Newton Giraldo MD        senna-docusate 8.6-50 mg  per tablet 1 tablet  1 tablet Oral BID Sadaf Friend MD        sodium chloride 0.9% flush 10 mL  10 mL Intravenous Q12H PRN Armand Orozco PA-C        sodium chloride 0.9% flush 10 mL  10 mL Intravenous PRN Felix Ndiaye MD        traZODone tablet 150 mg  150 mg Oral Nightly Armand Orozco PA-C   150 mg at 03/21/23 2115     Current Discharge Medication List        CONTINUE these medications which have NOT CHANGED    Details   aspirin (ECOTRIN) 81 MG EC tablet Take 1 tablet by mouth once daily.      butalbital-acetaminophen-caffeine -40 mg (FIORICET, ESGIC) -40 mg per tablet TK 1 T PO  Q 6  H PRN      CRESTOR 20 mg tablet Take 20 mg by mouth once daily.   Refills: 3      diclofenac (VOLTAREN) 50 MG EC tablet Take 1 tablet by mouth 2 (two) times daily.      dulaglutide (TRULICITY) 0.75 mg/0.5 mL pen injector as directed      EScitalopram oxalate (LEXAPRO) 20 MG tablet Take 1 tablet (20 mg total) by mouth once daily.  Qty: 90 tablet, Refills: 3    Comments: **Patient requests 90 days supply**      estradioL (ESTRACE) 0.01 % (0.1 mg/gram) vaginal cream USE 1 GRAM VAGINALLY 3 TIMES A WEEK      hydrOXYzine pamoate (VISTARIL) 25 MG Cap Take 1 to 2 caps q hs prn difficulty sleeping  Qty: 30 capsule, Refills: 3      ibuprofen (ADVIL,MOTRIN) 800 MG tablet TAKE 1 TABLET BY MOUTH EVERY 6 TO 8 HOURS FOR PAIN      levothyroxine (SYNTHROID) 100 MCG tablet Take 1 tablet (100 mcg total) by mouth once daily.  Qty: 30 tablet, Refills: 1      meloxicam (MOBIC) 15 MG tablet Take 1 tablet by mouth once daily.      metFORMIN (GLUCOPHAGE) 500 MG tablet Take 2 tablets (1,000 mg total) by mouth 2 (two) times daily with meals.  Qty: 120 tablet, Refills: 1      promethazine (PHENERGAN) 25 MG tablet TK 1 T PO  Q 12 H PRN      traMADoL (ULTRAM) 50 mg tablet Take 1 tablet by mouth every 6 to 8 hours as needed.      traZODone (DESYREL) 150 MG tablet TAKE 1 TABLET(150 MG) BY MOUTH EVERY EVENING  Qty: 90 tablet, Refills: 2       urea (CARMOL) 40 % Crea Apply topically once daily. To dry skin on the feet.  Qty: 85 g, Refills: 10    Associated Diagnoses: Dry skin      ammonium lactate 12 % Crea SAMSON EXT AA  OF FEET ONCE DAILY      blood sugar diagnostic (TRUE METRIX GLUCOSE TEST STRIP MISC) True Metrix Glucose Test Strip   TEST TWICE DAILY AS DIRECTED      blood-glucose meter (TRUE METRIX AIR GLUCOSE METER MISC) True Metrix Air Glucose Meter   USE UNDER THE SKIN TWICE DAILY AS DIRECTED      cyclobenzaprine (FLEXERIL) 10 MG tablet Take 1 tablet by mouth 3 (three) times daily as needed.      flu vacc [id number] 6mos up,PF, (FLUARIX QUAD [id number], PF,) 60 mcg (15 mcg x 4)/0.5 mL Syrg ADM 0.5ML IM UTD      methocarbamoL (ROBAXIN) 500 MG Tab       varicella-zoster [id number], PF, (SHINGRIX, PF,) 50 mcg/0.5 mL injection ADM 0.5ML IM UTD               I have seen and examined this patient within the last 30 days. My clinical findings that support the need for the home health skilled services and home bound status are the following:no   Weakness/numbness causing balance and gait disturbance due to Fracture and Surgery making it taxing to leave home.     Diet:   cardiac diet and diabetic diet 2000 calorie    Referrals/ Consults  Physical Therapy to evaluate and treat. Evaluate for home safety and equipment needs; Establish/upgrade home exercise program. Perform / instruct on therapeutic exercises, gait training, transfer training, and Range of Motion.  Occupational Therapy to evaluate and treat. Evaluate home environment for safety and equipment needs. Perform/Instruct on transfers, ADL training, ROM, and therapeutic exercises.  Aide to provide assistance with personal care, ADLs, and vital signs.    Activities:   activity as tolerated; sling to left upper extremity for comfort, nonweightbearing left upper extremity x 8 weeks postop    Nursing:   Agency to admit patient within 24 hours of hospital discharge unless specified on physician order or at  patient request    SN to complete comprehensive assessment including routine vital signs. Instruct on disease process and s/s of complications to report to MD. Review/verify medication list sent home with the patient at time of discharge  and instruct patient/caregiver as needed. Frequency may be adjusted depending on start of care date.     Skilled nurse to perform up to 3 visits PRN for symptoms related to diagnosis    Notify MD if SBP > 160 or < 90; DBP > 90 or < 50; HR > 120 or < 50; Temp > 101; O2 < 88%;    Ok to schedule additional visits based on staff availability and patient request on consecutive days within the home health episode.    When multiple disciplines ordered:    Start of Care occurs on Sunday - Wednesday schedule remaining discipline evaluations as ordered on separate consecutive days following the start of care.    Thursday SOC -schedule subsequent evaluations Friday and Monday the following week.     Friday - Saturday SOC - schedule subsequent discipline evaluations on consecutive days starting Monday of the following week.    For all post-discharge communication and subsequent orders please contact patient's primary care physician.     Home Health Aide:  Nursing Three times weekly, Physical Therapy Three times weekly, Occupational Therapy Three times weekly, and Home Health Aide Three times weekly      I certify that this patient is confined to her home and needs intermittent skilled nursing care, physical therapy, and occupational therapy.

## 2023-03-22 NOTE — ADDENDUM NOTE
Addendum  created 03/22/23 1330 by Audelia Jonas MD    Charge Capture section accepted, Cosign clinical note with attestation

## 2023-03-22 NOTE — ASSESSMENT & PLAN NOTE
Fall  Presented to the ED by EMS for L arm pain following a fall at home. Patient took two Trazodones to help her sleep. Typically takes one. She felt dizzy when standing in the kitchen and fell to the ground on her left side. She denies any LOC, preceding palpitations, confusion, or weakness. She denies any head injuries. Patient was able to stand back up to continue fixing her meal. Woke up unable to move arm without severe pain prompting her to call EMS. Dizziness resolved.    - CTH left frontal extracranial soft tissue swelling without evidence of underlying fracture or acute intracranial hemorrhage. Ventriculomegaly out of proportion to the degree of sulcal enlargement. While this could relate to cerebral volume loss, the configuration does at least raise the possibility of normal pressure hydrocephalus. Clinical correlation required.  - CXR with no acute intrathoracic process  - L shoulder and humerus XR with displaced and comminuted acute fracture of the left mid humeral shaft with nondisplaced fracture of the left proximal humerus  - CT L arm with fractures of the proximal and mid aspects of the humerus as described.  - EKG NSR RBBB (consistent with priors)  - Placed in splint under sedation in ED  - Orthostatic vitals negative  - Ortho consulted, appreciate assistance   - Continue to monitor for radial nerve function, ok for PNC for pain control   - Antibiotics for 24 hours, to completed today   - Sling left upper extremity for comfort   - NWB LUE x8 weeks postop   - ROM as tolerated left upper extremity   - ASA 81 mg BID x 6 weeks for DVT prophylaxis   - Nutrition support: Calcium, vitamin-D.  - Anesthesia managing interscalene PNC  - Continue MM pain control  - PT/OT consulted, plan for  PT/OT  - Ortho referral placed for clinic follow up

## 2023-03-22 NOTE — PLAN OF CARE
03/22/23 1140   Post-Acute Status   Post-Acute Authorization Home Health   Home Health Status Referrals Sent     SW faxed referral to [facility] via Care\[facility] [place] [facility]\"" for review. SW will follow up as needed.      Yanet Mann LMSW  Case Management   [facility]   Ext. [phone]

## 2023-03-22 NOTE — PLAN OF CARE
Alix Patel is a 67 y.o. female admitted to [facility] on 3/20/2023 for Open displaced transverse fracture of shaft of left humerus, underwent L proximal humerus ORIF. Alix Patel tolerated evaluation well today. She was resting in bed with no family present upon PT entry to room, agreeable to evaluation. Reviewed orthopedic precautions with patient; non-weight bearing to LUE, LUE sling for comfort, L wrist drop brace ordered. I assisted patient with LUE sling management at edge of bed for improved alignment. Presents with L radial nerve palsy post-fall and humerus fracture. Absent L wrist ext, able to flex/ext digits; educated on using her R hand to stretch L wrist into passive extension throughout day. She participated in standing ADL's at sink with supervision, performed teeth brushing and face washing with her RUE while standing. Ambulates 300 ft in hallways on room air with supervision, no AD utilized, LUE in sling; gait is steady with no losses of balance, able to hold conversation. Reports 8/10 L shoulder discomfort throughout session but overall happy and in good spirits. Set-up in bedside chair, reclined and comfortable at end of session. RN messaged therapist to return in PM once wrist drop splint delivered, confirmed good placement to L wrist drop splint and LUE within sling. Discussed PT role, POC, and goals with patient; verbalized understanding. Alix Patel would benefit from acute PT services to promote mobility during this admission and improve return to PLOF.    Problem: Physical Therapy  Goal: Physical Therapy Goal  Description: Goals to be met by: 23     Patient will increase functional independence with mobility by performin. Supine to sit with [place] with HOB flat - Not met  2. Sit to stand transfer with Modified [place] - Not met  3. Gait  x 500 feet with [place] using No Assistive Device - Not met  4. Ascend/descend 1 flight of stairs with right  Handrail with Stand-by Assistance using No Assistive Device - Not met  Outcome: Ongoing, Progressing    Akil Murillo, PT  3/22/2023

## 2023-03-22 NOTE — PROGRESS NOTES
[facility]  Orthopedics  Progress Note    Patient Name: Alix Patel  MRN: [id number]  Admission Date: 3/20/2023  Hospital Length of Stay: 0 days  Attending Provider: Estephanie Kuhn MD  Primary Care Provider: Angélica Barakat MD  Follow-up For: Procedure(s) (LRB):  ORIF, FRACTURE, HUMERUS (Left)  IRRIGATION AND DEBRIDEMENT, UPPER EXTREMITY (Left)    Post-Operative Day: Day of Surgery  Subjective:     Principal Problem:Open displaced transverse fracture of shaft of left humerus    Principal Orthopedic Problem: same. S/p L humerus IMN and I&D of open fracture wound 3/21/23    Interval History: Pt seen and examined at bedside. Seen after surgery. Patient upset with current pain control. Unable to extend wrist and thumb.      Review of patient's allergies indicates:   Allergen Reactions    Lamictal [lamotrigine] Rash     Per psychiatry notes       Current Facility-Administered Medications   Medication    0.9%  NaCl infusion    acetaminophen tablet 1,000 mg    atorvastatin tablet 40 mg    bisacodyL suppository 10 mg    ceFAZolin 2 g in dextrose 5 % in water (D5W) 5 % 50 mL IVPB (MB+)    dextrose 10% bolus 125 mL 125 mL    dextrose 10% bolus 250 mL 250 mL    dextrose 40 % gel 15,000 mg    dextrose 40 % gel 30,000 mg    ergocalciferol capsule 50,000 Units    EScitalopram oxalate tablet 20 mg    glucagon (human recombinant) injection 1 mg    HYDROmorphone injection 0.5 mg    insulin aspart U-100 pen 0-5 Units    ketorolac injection 15 mg    levothyroxine tablet 100 mcg    melatonin tablet 6 mg    methocarbamoL tablet 500 mg    naloxone 0.4 mg/mL injection 0.02 mg    ondansetron disintegrating tablet 8 mg    oxyCODONE immediate release tablet 5 mg    oxyCODONE immediate release tablet 5 mg    polyethylene glycol packet 17 g    prochlorperazine injection Soln 5 mg    ROPIvacaine (PF) 2 mg/ml (0.2%) solution    sodium chloride 0.9% flush 10 mL    sodium chloride 0.9% flush 10 mL     "traZODone tablet 150 mg     Objective:     Vital Signs (Most Recent):  Temp: 97.8 °F (36.6 °C) (03/21/23 1748)  Pulse: 97 (03/21/23 1748)  Resp: 16 (03/21/23 1806)  BP: (!) 164/87 (03/21/23 1748)  SpO2: 96 % (03/21/23 1748)   Vital Signs (24h Range):  Temp:  [97.2 °F (36.2 °C)-98.9 °F (37.2 °C)] 97.8 °F (36.6 °C)  Pulse:  [] 97  Resp:  [14-23] 16  SpO2:  [90 %-100 %] 96 %  BP: (117-164)/(70-88) 164/87     Weight: 72.6 kg (160 lb)  Height: 5' 11" (180.3 cm)  Body mass index is 22.32 kg/m².      Intake/Output Summary (Last 24 hours) at 3/21/2023 1854  Last data filed at 3/21/2023 1733  Gross per 24 hour   Intake 1600 ml   Output 300 ml   Net 1300 ml       Ortho/SPM Exam  Gen: NAD, WDWN  CV: peripherally well perfused  Resp: unlabored respirations, symmetric chest rise  Neck: TM  Neuro: CN 2-12 grossly intact. No FND.      MSK:  LUE:  ACE wrap in place, no saturation ntoed  SILT to hand and figners  WPP  Able to flex all digits and wrist, unable to extend thumb and wrist    Significant Labs: CBC:   Recent Labs   Lab 03/20/23  1408 03/21/23  0336   WBC 10.13 7.49   HGB 13.3 12.7   HCT 40.2 39.3    210     CMP:   Recent Labs   Lab 03/20/23  1408 03/21/23  0336    138   K 3.8 3.8   CL 99 100   CO2 25 27   * 151*   BUN 7* 7*   CREATININE 0.7 0.6   CALCIUM 9.5 9.4   PROT 7.3  --    ALBUMIN 4.1  --    BILITOT 0.6  --    ALKPHOS 73  --    AST 32  --    ALT 64*  --    ANIONGAP 13 11     All pertinent labs within the past 24 hours have been reviewed.    Significant Imaging: I have reviewed all pertinent imaging results/findings.    Assessment/Plan:     * Grade I open displaced transverse fracture of shaft of left humerus  Alix Patel is a 67 y.o. female with a grade I open left midshaft humerus fracture. She also has a nondisplaced fracture of the proximal humerus. She is neurovascularly intact. She does not have any additional injuries. She sustained this injury around 4:00 AM and received " her first dose of Ancef at 2:00 PM. Time to antibiotics 10 hours. She was placed into a coaptation splint in the ED under procedural sedation.  Operative versus nonoperative interventions were discussed with the patient at length.  After discussions, the patient elects to proceed with operative intervention for this problem.  Consents obtained at bedside.  Patient marked.  Now s/p L humerus IMN and I&D on 3/21/23 and with left radial nerve palsy.      Continue to monitor for radial nerve function, do not remove PNC, but do not bolus at this time  Antibiotic for 24 hours  Sling left upper extremity for comfort  Nonweightbearing left upper extremity x8 weeks postop  Range of motion as tolerated left upper extremity  ASA 81 mg b.i.d. x6 weeks for DVT prophylaxis  Nutrition support: Calcium, vitamin-D.            Jn Garza MD  Orthopedics  [facility] - Surgery

## 2023-03-22 NOTE — PLAN OF CARE
[facility]  Initial Discharge Assessment       Primary Care Provider: Angélica Barakat MD    Admission Diagnosis: Fall [W19.XXXA]  Chest pain [R07.9]  Pre-op chest exam [Z01.811]  Open left humeral fracture [S42.302B]    Admission Date: 3/20/2023  Expected Discharge Date: 3/23/2023    Discharge Barriers Identified: None    Payor: [employer] / Plan: [employer] CHOICE / Product Type: Commercial /     Extended Emergency Contact Information  Primary Emergency Contact: Sania Garza  Address: [address] [place] [address]           [address]           [place] [address] [facility]  Home Phone: [phone]  Mobile Phone: [phone]  Relation: Daughter  Preferred language: English   needed? No  Secondary Emergency Contact: Kenzie Vences   [place]  Mobile Phone: [phone]  Relation: Sister    Discharge Plan A: Home  Discharge Plan B: Home Health      [id number] [place], [place] - [address] AT [facility] [address]  [address] [facility] [address]  Phone: [phone] Fax: [phone]    [id number] [place], [place] - [address] AT [facility] [address]  [address]  [facility] [address]  Phone: [phone] Fax: [phone]      Initial Assessment (most recent)       Adult Discharge Assessment - 03/22/23 0810          Discharge Assessment    Assessment Type Discharge Planning Assessment     Confirmed/corrected address, phone number and insurance Yes     Confirmed Demographics Correct on Facesheet     Source of Information patient     Communicated GILLES with patient/caregiver Yes     People in Home alone     Do you expect to return to your current living situation? Yes     Prior to hospitilization cognitive status: Alert/Oriented     Current cognitive status: Alert/Oriented     Equipment Currently Used at Home none     Do you currently have service(s) that help you manage your care at home? No     Do you take  prescription medications? Yes     Do you have prescription coverage? Yes     Do you have any problems affording any of your prescribed medications? No     Is the patient taking medications as prescribed? yes     How do you get to doctors appointments? car, drives self;family or friend will provide     Are you on dialysis? No     Do you take coumadin? No     Discharge Plan A Home     Discharge Plan B Home Health     DME Needed Upon Discharge  none     Discharge Plan discussed with: Patient     Discharge Barriers Identified None                   Patient lives alone in a third floor apartment requiring the manipulation of three flights of stairs. Patient independent in ADL's and does not have/require any DME. She expects to be dc'd to home without post acute services. Her friend Tim will provide transportation home upon hospital discharge.

## 2023-03-22 NOTE — ADDENDUM NOTE
Addendum  created 03/22/23 1333 by Michell Grove, RN    Order list changed, Pharmacy for encounter modified

## 2023-03-22 NOTE — NURSING
Anesthesia on-call Dr. Cullen, made aware of orthopedic resident at bedside and unable to have pt connected to the PNC yet. See note per Dr. Garza. PRN and scheduled medications added per Dr. Cullen, see orders. Dressing CDI to LUE. Sling in place. LUE elevated on pillow. Pt sat up for dinner. PIV intact, infusing per order. Call light in reach, safety maintained. Will provide handoff report to oncoming nurse.

## 2023-03-22 NOTE — PROGRESS NOTES
[facility] - [facility]  Orthopedics  Progress Note    Patient Name: Alix Patel  MRN: [id number]  Admission Date: 3/20/2023  Hospital Length of Stay: 0 days  Attending Provider: Estephanie Kuhn MD  Primary Care Provider: Angélica Barakat MD  Follow-up For: Procedure(s) (LRB):  ORIF, FRACTURE, HUMERUS (Left)  IRRIGATION AND DEBRIDEMENT, UPPER EXTREMITY (Left)    Post-Operative Day: 1 Day Post-Op  Subjective:     Principal Problem:Open displaced transverse fracture of shaft of left humerus    Principal Orthopedic Problem: same. S/p L humerus IMN and I&D of open fracture wound 3/21/23    Interval History: Pt seen and examined at bedside. NIDHIEON, VS, AF. Pain controlled and improved from yesterday afternoon. Still with radial nerve palsy on exam today. Will order wrist drop brace and begin therapy today to establish current level of function.      Review of patient's allergies indicates:   Allergen Reactions    Lamictal [lamotrigine] Rash     Per psychiatry notes       Current Facility-Administered Medications   Medication    0.9%  NaCl infusion    acetaminophen tablet 650 mg    aspirin EC tablet 81 mg    atorvastatin tablet 40 mg    bisacodyL suppository 10 mg    ceFAZolin 2 g in dextrose 5 % in water (D5W) 5 % 50 mL IVPB (MB+)    dextrose 10% bolus 125 mL 125 mL    dextrose 10% bolus 250 mL 250 mL    dextrose 40 % gel 15,000 mg    dextrose 40 % gel 30,000 mg    ergocalciferol capsule 50,000 Units    EScitalopram oxalate tablet 20 mg    gabapentin capsule 300 mg    glucagon (human recombinant) injection 1 mg    HYDROmorphone injection 1 mg    insulin aspart U-100 pen 0-5 Units    ketorolac injection 15 mg    levothyroxine tablet 100 mcg    melatonin tablet 6 mg    methocarbamoL tablet 500 mg    naloxone 0.4 mg/mL injection 0.02 mg    ondansetron disintegrating tablet 8 mg    oxyCODONE immediate release tablet 5 mg    oxyCODONE immediate release tablet Tab 10 mg    polyethylene glycol packet  "17 g    prochlorperazine injection Soln 5 mg    ROPIvacaine (PF) 2 mg/ml (0.2%) solution    sodium chloride 0.9% flush 10 mL    sodium chloride 0.9% flush 10 mL    traZODone tablet 150 mg     Objective:     Vital Signs (Most Recent):  Temp: 98 °F (36.7 °C) (03/22/23 0725)  Pulse: 85 (03/22/23 0725)  Resp: 14 (03/22/23 0725)  BP: 119/67 (03/22/23 0725)  SpO2: (!) 92 % (03/22/23 0725)   Vital Signs (24h Range):  Temp:  [97.2 °F (36.2 °C)-98.6 °F (37 °C)] 98 °F (36.7 °C)  Pulse:  [] 85  Resp:  [14-23] 14  SpO2:  [90 %-100 %] 92 %  BP: (103-164)/(67-88) 119/67     Weight: 72.6 kg (160 lb)  Height: 5' 11" (180.3 cm)  Body mass index is 22.32 kg/m².      Intake/Output Summary (Last 24 hours) at 3/22/2023 0842  Last data filed at 3/21/2023 1733  Gross per 24 hour   Intake 1600 ml   Output 300 ml   Net 1300 ml         Ortho/SPM Exam  Gen: NAD, WDWN  CV: peripherally well perfused  Resp: unlabored respirations, symmetric chest rise  Neck: TM  Neuro: CN 2-12 grossly intact. No FND.      MSK:  LUE:  ACE wrap in place, no saturation ntoed  SILT to hand and figners  WWP, BCR  Able to flex all digits and wrist, unable to extend thumb and wrist    Significant Labs: CBC:   Recent Labs   Lab 03/20/23  1408 03/21/23  0336 03/22/23  0256   WBC 10.13 7.49 7.80   HGB 13.3 12.7 11.6*   HCT 40.2 39.3 35.5*    210 178       CMP:   Recent Labs   Lab 03/20/23  1408 03/21/23  0336 03/22/23  0256    138 135*   K 3.8 3.8 4.3   CL 99 100 101   CO2 25 27 26   * 151* 166*   BUN 7* 7* 9   CREATININE 0.7 0.6 0.7   CALCIUM 9.5 9.4 9.3   PROT 7.3  --   --    ALBUMIN 4.1  --   --    BILITOT 0.6  --   --    ALKPHOS 73  --   --    AST 32  --   --    ALT 64*  --   --    ANIONGAP 13 11 8       All pertinent labs within the past 24 hours have been reviewed.    Significant Imaging: I have reviewed all pertinent imaging results/findings.    Assessment/Plan:     * Grade I open displaced transverse fracture of shaft of left " humerus Estella Denson Heath is a 67 y.o. female with a grade I open left midshaft humerus fracture. She also has a nondisplaced fracture of the proximal humerus. She is neurovascularly intact. She does not have any additional injuries. She sustained this injury around 4:00 AM and received her first dose of Ancef at 2:00 PM. Time to antibiotics 10 hours. She was placed into a coaptation splint in the ED under procedural sedation.  Operative versus nonoperative interventions were discussed with the patient at length.  After discussions, the patient elects to proceed with operative intervention for this problem.  Consents obtained at bedside.  Patient marked.  Now s/p L humerus IMN and I&D on 3/21/23 and with left radial nerve palsy.      Continue to monitor for radial nerve function, ok for PNC for pain control  Antibiotics for 24 hours, to completed today  Sling left upper extremity for comfort  Nonweightbearing left upper extremity x8 weeks postop  Range of motion as tolerated left upper extremity  ASA 81 mg b.i.d. x6 weeks for DVT prophylaxis  Nutrition support: Calcium, vitamin-D.            Jn Garza MD  Orthopedics  [facility] - Surgery

## 2023-03-22 NOTE — PROGRESS NOTES
Postop check ORIF/IMN L humerus     Pt has absent L radial nerve motor function, + paresthesias.    Other motor/sensory intact  Compartments soft, compressible. BCR, palp rad pulse    Complains of pain in L shoulder, arm, w/ radial nerve radiating pain.    Plan:  Hold off on block for now  Oxy 10  Dilaudid 1  Add gabapentin  Scheduled ketorolac    Resassess in AM  Apply wrist drop brace 3.22

## 2023-03-22 NOTE — ANESTHESIA POST-OP PAIN MANAGEMENT
Acute Pain Service Progress Note    Alix Patel is a 67 y.o., female, [id number].    Surgery:  ORIF, FRACTURE, HUMERUS (Left: Arm)     Post Op Day #: 1    Catheter type: perineural  interscalene    Infusion type: Ropi 0.2% at 7cc q3h IB w/ 5cc q30min DB    Problem List:    Active Hospital Problems    Diagnosis  POA    *Grade I open displaced transverse fracture of shaft of left humerus [S42.322B]  Yes    Fall [W19.XXXA]  Yes    Vitamin D deficiency [E55.9]  Yes    Type 2 diabetes mellitus with hyperglycemia, without long-term current use of insulin [E11.65]  Yes    LILLY (generalized anxiety disorder) [F41.1]  Yes    Hypothyroidism [E03.9]  Yes    Thyroid nodule [E04.1]  Yes    Hyperlipidemia with target low density lipoprotein (LDL) cholesterol less than 100 mg/dL [E78.5]  Yes    Depression [F32.A]  Yes      Resolved Hospital Problems   No resolved problems to display.       Subjective:     General appearance of alert, oriented, no complaints   Pain with rest: 5    Numbers   Pain with movement: 7    Numbers   Side Effects    1. Pruritis No    2. Nausea No    3. Motor Blockade unable to extend wrist    4. Sedation No, 1=awake and alert    Objective:       Catheter site clean, dry, intact        Vitals   Vitals:    03/22/23 0725   BP: 119/67   Pulse: 85   Resp: 14   Temp: 36.7 °C (98 °F)        Labs    Admission on 03/20/2023   Component Date Value Ref Range Status    Sodium 03/20/2023 137  136 - 145 mmol/L Final    Potassium 03/20/2023 3.8  3.5 - 5.1 mmol/L Final    Chloride 03/20/2023 99  95 - 110 mmol/L Final    CO2 03/20/2023 25  23 - 29 mmol/L Final    Glucose 03/20/2023 187 (H)  70 - 110 mg/dL Final    BUN 03/20/2023 7 (L)  8 - 23 mg/dL Final    Creatinine 03/20/2023 0.7  0.5 - 1.4 mg/dL Final    Calcium 03/20/2023 9.5  8.7 - 10.5 mg/dL Final    Total Protein 03/20/2023 7.3  6.0 - 8.4 g/dL Final    Albumin 03/20/2023 4.1  3.5 - 5.2 g/dL Final    Total Bilirubin 03/20/2023 0.6  0.1 - 1.0  mg/dL Final    Alkaline Phosphatase 03/20/2023 73  55 - 135 U/L Final    AST 03/20/2023 32  10 - 40 U/L Final    ALT 03/20/2023 64 (H)  10 - 44 U/L Final    Anion Gap 03/20/2023 13  8 - 16 mmol/L Final    eGFR 03/20/2023 >60.0  >60 mL/min/1.73 m^2 Final    WBC 03/20/2023 10.13  3.90 - 12.70 K/uL Final    RBC 03/20/2023 4.39  4.00 - 5.40 M/uL Final    Hemoglobin 03/20/2023 13.3  12.0 - 16.0 g/dL Final    Hematocrit 03/20/2023 40.2  37.0 - 48.5 % Final    MCV 03/20/2023 92  82 - 98 fL Final    MCH 03/20/2023 30.3  27.0 - 31.0 pg Final    MCHC 03/20/2023 33.1  32.0 - 36.0 g/dL Final    RDW 03/20/2023 11.8  11.5 - 14.5 % Final    Platelets 03/20/2023 205  150 - 450 K/uL Final    MPV 03/20/2023 10.6  9.2 - 12.9 fL Final    Immature Granulocytes 03/20/2023 0.2  0.0 - 0.5 % Final    Gran # (ANC) 03/20/2023 6.7  1.8 - 7.7 K/uL Final    Immature Grans (Abs) 03/20/2023 0.02  0.00 - 0.04 K/uL Final    Lymph # 03/20/2023 2.5  1.0 - 4.8 K/uL Final    Mono # 03/20/2023 0.9  0.3 - 1.0 K/uL Final    Eos # 03/20/2023 0.0  0.0 - 0.5 K/uL Final    Baso # 03/20/2023 0.03  0.00 - 0.20 K/uL Final    nRBC 03/20/2023 0  0 /100 WBC Final    Gran % 03/20/2023 65.8  38.0 - 73.0 % Final    Lymph % 03/20/2023 25.1  18.0 - 48.0 % Final    Mono % 03/20/2023 8.5  4.0 - 15.0 % Final    Eosinophil % 03/20/2023 0.1  0.0 - 8.0 % Final    Basophil % 03/20/2023 0.3  0.0 - 1.9 % Final    Differential Method 03/20/2023 Automated   Final    Hemoglobin A1C 03/20/2023 7.5 (H)  4.0 - 5.6 % Final    Estimated Avg Glucose 03/20/2023 169 (H)  68 - 131 mg/dL Final    Magnesium 03/20/2023 1.8  1.6 - 2.6 mg/dL Final    Phosphorus 03/20/2023 3.9  2.7 - 4.5 mg/dL Final    Prothrombin Time 03/20/2023 11.4  9.0 - 12.5 sec Final    INR 03/20/2023 1.1  0.8 - 1.2 Final    Group & Rh 03/20/2023 O POS   Final    Indirect Agustina 03/20/2023 NEG   Final    Vit D, 25-Hydroxy 03/20/2023 14 (L)  30 - 96 ng/mL Final    POCT Glucose 03/20/2023  173 (H)  70 - 110 mg/dL Final    Sodium 03/21/2023 138  136 - 145 mmol/L Final    Potassium 03/21/2023 3.8  3.5 - 5.1 mmol/L Final    Chloride 03/21/2023 100  95 - 110 mmol/L Final    CO2 03/21/2023 27  23 - 29 mmol/L Final    Glucose 03/21/2023 151 (H)  70 - 110 mg/dL Final    BUN 03/21/2023 7 (L)  8 - 23 mg/dL Final    Creatinine 03/21/2023 0.6  0.5 - 1.4 mg/dL Final    Calcium 03/21/2023 9.4  8.7 - 10.5 mg/dL Final    Anion Gap 03/21/2023 11  8 - 16 mmol/L Final    eGFR 03/21/2023 >60.0  >60 mL/min/1.73 m^2 Final    Magnesium 03/21/2023 1.8  1.6 - 2.6 mg/dL Final    Phosphorus 03/21/2023 3.1  2.7 - 4.5 mg/dL Final    WBC 03/21/2023 7.49  3.90 - 12.70 K/uL Final    RBC 03/21/2023 4.22  4.00 - 5.40 M/uL Final    Hemoglobin 03/21/2023 12.7  12.0 - 16.0 g/dL Final    Hematocrit 03/21/2023 39.3  37.0 - 48.5 % Final    MCV 03/21/2023 93  82 - 98 fL Final    MCH 03/21/2023 30.1  27.0 - 31.0 pg Final    MCHC 03/21/2023 32.3  32.0 - 36.0 g/dL Final    RDW 03/21/2023 12.0  11.5 - 14.5 % Final    Platelets 03/21/2023 210  150 - 450 K/uL Final    MPV 03/21/2023 11.2  9.2 - 12.9 fL Final    Immature Granulocytes 03/21/2023 0.3  0.0 - 0.5 % Final    Gran # (ANC) 03/21/2023 4.2  1.8 - 7.7 K/uL Final    Immature Grans (Abs) 03/21/2023 0.02  0.00 - 0.04 K/uL Final    Lymph # 03/21/2023 2.5  1.0 - 4.8 K/uL Final    Mono # 03/21/2023 0.7  0.3 - 1.0 K/uL Final    Eos # 03/21/2023 0.0  0.0 - 0.5 K/uL Final    Baso # 03/21/2023 0.03  0.00 - 0.20 K/uL Final    nRBC 03/21/2023 0  0 /100 WBC Final    Gran % 03/21/2023 56.6  38.0 - 73.0 % Final    Lymph % 03/21/2023 33.4  18.0 - 48.0 % Final    Mono % 03/21/2023 8.8  4.0 - 15.0 % Final    Eosinophil % 03/21/2023 0.5  0.0 - 8.0 % Final    Basophil % 03/21/2023 0.4  0.0 - 1.9 % Final    Differential Method 03/21/2023 Automated   Final    Cholesterol 03/21/2023 245 (H)  120 - 199 mg/dL Final    Triglycerides 03/21/2023 112  30 - 150 mg/dL Final    HDL  03/21/2023 36 (L)  40 - 75 mg/dL Final    LDL Cholesterol 03/21/2023 186.6 (H)  63.0 - 159.0 mg/dL Final    HDL/Cholesterol Ratio 03/21/2023 14.7 (L)  20.0 - 50.0 % Final    Total Cholesterol/HDL Ratio 03/21/2023 6.8 (H)  2.0 - 5.0 Final    Non-HDL Cholesterol 03/21/2023 209  mg/dL Final    TSH 03/21/2023 3.070  0.400 - 4.000 uIU/mL Final    POCT Glucose 03/21/2023 192 (H)  70 - 110 mg/dL Final    Anaerobic Culture 03/21/2023 Culture in progress   Preliminary    Aerobic Bacterial Culture 03/21/2023 No growth   Preliminary    POCT Glucose 03/21/2023 238 (H)  70 - 110 mg/dL Final    POCT Glucose 03/21/2023 274 (H)  70 - 110 mg/dL Final    Sodium 03/22/2023 135 (L)  136 - 145 mmol/L Final    Potassium 03/22/2023 4.3  3.5 - 5.1 mmol/L Final    Chloride 03/22/2023 101  95 - 110 mmol/L Final    CO2 03/22/2023 26  23 - 29 mmol/L Final    Glucose 03/22/2023 166 (H)  70 - 110 mg/dL Final    BUN 03/22/2023 9  8 - 23 mg/dL Final    Creatinine 03/22/2023 0.7  0.5 - 1.4 mg/dL Final    Calcium 03/22/2023 9.3  8.7 - 10.5 mg/dL Final    Anion Gap 03/22/2023 8  8 - 16 mmol/L Final    eGFR 03/22/2023 >60.0  >60 mL/min/1.73 m^2 Final    Magnesium 03/22/2023 1.8  1.6 - 2.6 mg/dL Final    Phosphorus 03/22/2023 3.3  2.7 - 4.5 mg/dL Final    WBC 03/22/2023 7.80  3.90 - 12.70 K/uL Final    RBC 03/22/2023 3.81 (L)  4.00 - 5.40 M/uL Final    Hemoglobin 03/22/2023 11.6 (L)  12.0 - 16.0 g/dL Final    Hematocrit 03/22/2023 35.5 (L)  37.0 - 48.5 % Final    MCV 03/22/2023 93  82 - 98 fL Final    MCH 03/22/2023 30.4  27.0 - 31.0 pg Final    MCHC 03/22/2023 32.7  32.0 - 36.0 g/dL Final    RDW 03/22/2023 11.8  11.5 - 14.5 % Final    Platelets 03/22/2023 178  150 - 450 K/uL Final    MPV 03/22/2023 10.6  9.2 - 12.9 fL Final    Immature Granulocytes 03/22/2023 0.3  0.0 - 0.5 % Final    Gran # (ANC) 03/22/2023 4.8  1.8 - 7.7 K/uL Final    Immature Grans (Abs) 03/22/2023 0.02  0.00 - 0.04 K/uL Final    Lymph #  03/22/2023 2.3  1.0 - 4.8 K/uL Final    Mono # 03/22/2023 0.6  0.3 - 1.0 K/uL Final    Eos # 03/22/2023 0.0  0.0 - 0.5 K/uL Final    Baso # 03/22/2023 0.02  0.00 - 0.20 K/uL Final    nRBC 03/22/2023 0  0 /100 WBC Final    Gran % 03/22/2023 61.8  38.0 - 73.0 % Final    Lymph % 03/22/2023 29.1  18.0 - 48.0 % Final    Mono % 03/22/2023 8.2  4.0 - 15.0 % Final    Eosinophil % 03/22/2023 0.3  0.0 - 8.0 % Final    Basophil % 03/22/2023 0.3  0.0 - 1.9 % Final    Differential Method 03/22/2023 Automated   Final    POCT Glucose 03/22/2023 155 (H)  70 - 110 mg/dL Final        Meds   Current Facility-Administered Medications   Medication Dose Route Frequency Provider Last Rate Last Admin    0.9%  NaCl infusion   Intravenous Continuous Celena Matthews MD   Stopped at 03/21/23 1822    acetaminophen tablet 650 mg  650 mg Oral Q6H Ousmane Crawford MD   650 mg at 03/22/23 0531    aspirin EC tablet 81 mg  81 mg Oral BID Jn Garza MD   81 mg at 03/22/23 0844    atorvastatin tablet 40 mg  40 mg Oral Daily Armand Orozco PA-C   40 mg at 03/22/23 0844    bisacodyL suppository 10 mg  10 mg Rectal Daily PRN Armand Orozco PA-C        ceFAZolin 2 g in dextrose 5 % in water (D5W) 5 % 50 mL IVPB (MB+)  2 g Intravenous Q8H Jn Garza MD   Stopped at 03/22/23 0315    dextrose 10% bolus 125 mL 125 mL  12.5 g Intravenous PRN Armand Orozco PA-C        dextrose 10% bolus 250 mL 250 mL  25 g Intravenous PRN Armand Orozco PA-C        dextrose 40 % gel 15,000 mg  15 g Oral PRN Armand Orozco PA-C        dextrose 40 % gel 30,000 mg  30 g Oral PRN Armand Orozco PA-C        ergocalciferol capsule 50,000 Units  50,000 Units Oral Q7 Days Felix Ndiaye MD   50,000 Units at 03/21/23 0811    EScitalopram oxalate tablet 20 mg  20 mg Oral Daily Armand Orozco PA-C   20 mg at 03/22/23 0844    gabapentin capsule 300 mg  300 mg Oral TID Ousmane Crawford MD   300 mg at 03/22/23 0843     glucagon (human recombinant) injection 1 mg  1 mg Intramuscular PRN Armand Orozco PA-C        HYDROmorphone injection 1 mg  1 mg Intravenous Q4H PRN Ousmane Crawford MD        insulin aspart U-100 pen 0-5 Units  0-5 Units Subcutaneous QID (AC + HS) PRN Armand Orozco PA-C   2 Units at 03/21/23 1609    ketorolac injection 15 mg  15 mg Intravenous TID Ousmane Crawford MD   15 mg at 03/22/23 0843    levothyroxine tablet 100 mcg  100 mcg Oral Daily Armand Orozco PA-C   100 mcg at 03/22/23 0710    melatonin tablet 6 mg  6 mg Oral Nightly PRN Armand Orozco PA-C   6 mg at 03/20/23 2154    methocarbamoL tablet 500 mg  500 mg Oral QID Klever Cullen MD   500 mg at 03/22/23 0843    naloxone 0.4 mg/mL injection 0.02 mg  0.02 mg Intravenous PRN Armand Orozco PA-C        ondansetron disintegrating tablet 8 mg  8 mg Oral Q8H PRN Armand Orozco PA-C        oxyCODONE immediate release tablet 5 mg  5 mg Oral Q4H PRN Klever Cullen MD   5 mg at 03/22/23 0713    oxyCODONE immediate release tablet Tab 10 mg  10 mg Oral Q4H PRN Ousmane Crawford MD        polyethylene glycol packet 17 g  17 g Oral Daily PRN Armand Orozco PA-C        prochlorperazine injection Soln 5 mg  5 mg Intravenous Q6H PRN Armand Orozco PA-C        ROPIvacaine (PF) 2 mg/ml (0.2%) solution  0.1 mL/hr Perineural Continuous Newton Giraldo MD        sodium chloride 0.9% flush 10 mL  10 mL Intravenous Q12H PRN Armand Orozco PA-C        sodium chloride 0.9% flush 10 mL  10 mL Intravenous PRN Felix Ndiaye MD        traZODone tablet 150 mg  150 mg Oral Nightly Armand Orozco PA-C   150 mg at 03/21/23 2115       Assessment:     Pain control adequate. Spoke with ortho team and per note and conversation, ortho okay with PNC to help with post-op pain control.     Plan:     Patient doing well, continue present treatment.    1) Start interscalene PNC at current infusion rate: 7cc q3h IB w/ 5cc q30min  DB  2) Continue multimodals including Tylenol, Toradol, Robaxin, Gabapentin  3) No PRN requirements currently

## 2023-03-22 NOTE — NURSING
Pt resting in bed. No s/sx of distress. PIV intact, s/l. Pain more controlled this shift with scheduled and PRN medications. Dressing CDI to LUE with sling and brace still in use. Rounds completed. Pt able to reposition self. Call light in reach, safety maintained. Will provide handoff report to oncoming nurse.

## 2023-03-22 NOTE — ASSESSMENT & PLAN NOTE
Alix Patel is a 67 y.o. female with a grade I open left midshaft humerus fracture. She also has a nondisplaced fracture of the proximal humerus. She is neurovascularly intact. She does not have any additional injuries. She sustained this injury around 4:00 AM and received her first dose of Ancef at 2:00 PM. Time to antibiotics 10 hours. She was placed into a coaptation splint in the ED under procedural sedation.  Operative versus nonoperative interventions were discussed with the patient at length.  After discussions, the patient elects to proceed with operative intervention for this problem.  Consents obtained at bedside.  Patient marked.  Now s/p L humerus IMN and I&D on 3/21/23 and with left radial nerve palsy.      Continue to monitor for radial nerve function, ok for PNC for pain control  Antibiotics for 24 hours, to completed today  Sling left upper extremity for comfort  Nonweightbearing left upper extremity x8 weeks postop  Range of motion as tolerated left upper extremity  ASA 81 mg b.i.d. x6 weeks for DVT prophylaxis  Nutrition support: Calcium, vitamin-D.

## 2023-03-22 NOTE — ASSESSMENT & PLAN NOTE
Patient's FSGs are controlled on current medication regimen.  Last A1c reviewed-   Lab Results   Component Value Date    HGBA1C 7.5 (H) 03/20/2023   Most recent fingerstick glucose reviewed-   Recent Labs   Lab 03/21/23  1603 03/21/23  2050 03/22/23  0608 03/22/23  1154   POCTGLUCOSE 238* 274* 155* 259*     Current correctional scale  Low  Maintain anti-hyperglycemic dose as follows-   Antihyperglycemics (From admission, onward)    Start     Stop Route Frequency Ordered    03/20/23 1641  insulin aspart U-100 pen 0-5 Units         -- SubQ Before meals & nightly PRN 03/20/23 1541      - Hold Oral hypoglycemics while patient is in the hospital  - Diabetic/Cardiac diet

## 2023-03-22 NOTE — SUBJECTIVE & OBJECTIVE
Principal Problem:Open displaced transverse fracture of shaft of left humerus    Principal Orthopedic Problem: same. S/p L humerus IMN and I&D of open fracture wound 3/21/23    Interval History: Pt seen and examined at bedside. CELINA TOPETE, AF. Pain controlled and improved from yesterday afternoon. Still with radial nerve palsy on exam today. Will order wrist drop brace and begin therapy today to establish current level of function.      Review of patient's allergies indicates:   Allergen Reactions    Lamictal [lamotrigine] Rash     Per psychiatry notes       Current Facility-Administered Medications   Medication    0.9%  NaCl infusion    acetaminophen tablet 650 mg    aspirin EC tablet 81 mg    atorvastatin tablet 40 mg    bisacodyL suppository 10 mg    ceFAZolin 2 g in dextrose 5 % in water (D5W) 5 % 50 mL IVPB (MB+)    dextrose 10% bolus 125 mL 125 mL    dextrose 10% bolus 250 mL 250 mL    dextrose 40 % gel 15,000 mg    dextrose 40 % gel 30,000 mg    ergocalciferol capsule 50,000 Units    EScitalopram oxalate tablet 20 mg    gabapentin capsule 300 mg    glucagon (human recombinant) injection 1 mg    HYDROmorphone injection 1 mg    insulin aspart U-100 pen 0-5 Units    ketorolac injection 15 mg    levothyroxine tablet 100 mcg    melatonin tablet 6 mg    methocarbamoL tablet 500 mg    naloxone 0.4 mg/mL injection 0.02 mg    ondansetron disintegrating tablet 8 mg    oxyCODONE immediate release tablet 5 mg    oxyCODONE immediate release tablet Tab 10 mg    polyethylene glycol packet 17 g    prochlorperazine injection Soln 5 mg    ROPIvacaine (PF) 2 mg/ml (0.2%) solution    sodium chloride 0.9% flush 10 mL    sodium chloride 0.9% flush 10 mL    traZODone tablet 150 mg     Objective:     Vital Signs (Most Recent):  Temp: 98 °F (36.7 °C) (03/22/23 0725)  Pulse: 85 (03/22/23 0725)  Resp: 14 (03/22/23 0725)  BP: 119/67 (03/22/23 0725)  SpO2: (!) 92 % (03/22/23 0725)   Vital Signs (24h Range):  Temp:  [97.2 °F (36.2 °C)-98.6  "°F (37 °C)] 98 °F (36.7 °C)  Pulse:  [] 85  Resp:  [14-23] 14  SpO2:  [90 %-100 %] 92 %  BP: (103-164)/(67-88) 119/67     Weight: 72.6 kg (160 lb)  Height: 5' 11" (180.3 cm)  Body mass index is 22.32 kg/m².      Intake/Output Summary (Last 24 hours) at 3/22/2023 0842  Last data filed at 3/21/2023 1733  Gross per 24 hour   Intake 1600 ml   Output 300 ml   Net 1300 ml         Ortho/SPM Exam  Gen: NAD, WDWN  CV: peripherally well perfused  Resp: unlabored respirations, symmetric chest rise  Neck: TM  Neuro: CN 2-12 grossly intact. No FND.      MSK:  LUE:  ACE wrap in place, no saturation ntoed  SILT to hand and figners  WWP, BCR  Able to flex all digits and wrist, unable to extend thumb and wrist    Significant Labs: CBC:   Recent Labs   Lab 03/20/23  1408 03/21/23  0336 03/22/23  0256   WBC 10.13 7.49 7.80   HGB 13.3 12.7 11.6*   HCT 40.2 39.3 35.5*    210 178       CMP:   Recent Labs   Lab 03/20/23  1408 03/21/23  0336 03/22/23  0256    138 135*   K 3.8 3.8 4.3   CL 99 100 101   CO2 25 27 26   * 151* 166*   BUN 7* 7* 9   CREATININE 0.7 0.6 0.7   CALCIUM 9.5 9.4 9.3   PROT 7.3  --   --    ALBUMIN 4.1  --   --    BILITOT 0.6  --   --    ALKPHOS 73  --   --    AST 32  --   --    ALT 64*  --   --    ANIONGAP 13 11 8       All pertinent labs within the past 24 hours have been reviewed.    Significant Imaging: I have reviewed all pertinent imaging results/findings.  "

## 2023-03-23 LAB
ANION GAP SERPL CALC-SCNC: 8 MMOL/L (ref 8–16)
BASOPHILS # BLD AUTO: 0.03 K/UL (ref 0–0.2)
BASOPHILS NFR BLD: 0.5 % (ref 0–1.9)
BUN SERPL-MCNC: 7 MG/DL (ref 8–23)
CALCIUM SERPL-MCNC: 8.8 MG/DL (ref 8.7–10.5)
CHLORIDE SERPL-SCNC: 102 MMOL/L (ref 95–110)
CO2 SERPL-SCNC: 30 MMOL/L (ref 23–29)
CREAT SERPL-MCNC: 0.6 MG/DL (ref 0.5–1.4)
DIFFERENTIAL METHOD: ABNORMAL
EOSINOPHIL # BLD AUTO: 0.1 K/UL (ref 0–0.5)
EOSINOPHIL NFR BLD: 1.2 % (ref 0–8)
ERYTHROCYTE [DISTWIDTH] IN BLOOD BY AUTOMATED COUNT: 12 % (ref 11.5–14.5)
EST. GFR  (NO RACE VARIABLE): >60 ML/MIN/1.73 M^2
GLUCOSE SERPL-MCNC: 188 MG/DL (ref 70–110)
HCT VFR BLD AUTO: 32.4 % (ref 37–48.5)
HGB BLD-MCNC: 10.4 G/DL (ref 12–16)
IMM GRANULOCYTES # BLD AUTO: 0.02 K/UL (ref 0–0.04)
IMM GRANULOCYTES NFR BLD AUTO: 0.3 % (ref 0–0.5)
LYMPHOCYTES # BLD AUTO: 2.6 K/UL (ref 1–4.8)
LYMPHOCYTES NFR BLD: 38.8 % (ref 18–48)
MAGNESIUM SERPL-MCNC: 1.7 MG/DL (ref 1.6–2.6)
MCH RBC QN AUTO: 30.2 PG (ref 27–31)
MCHC RBC AUTO-ENTMCNC: 32.1 G/DL (ref 32–36)
MCV RBC AUTO: 94 FL (ref 82–98)
MONOCYTES # BLD AUTO: 0.5 K/UL (ref 0.3–1)
MONOCYTES NFR BLD: 7.3 % (ref 4–15)
NEUTROPHILS # BLD AUTO: 3.4 K/UL (ref 1.8–7.7)
NEUTROPHILS NFR BLD: 51.9 % (ref 38–73)
NRBC BLD-RTO: 0 /100 WBC
PHOSPHATE SERPL-MCNC: 3.8 MG/DL (ref 2.7–4.5)
PLATELET # BLD AUTO: 173 K/UL (ref 150–450)
PMV BLD AUTO: 11 FL (ref 9.2–12.9)
POCT GLUCOSE: 177 MG/DL (ref 70–110)
POCT GLUCOSE: 189 MG/DL (ref 70–110)
POCT GLUCOSE: 201 MG/DL (ref 70–110)
POCT GLUCOSE: 228 MG/DL (ref 70–110)
POTASSIUM SERPL-SCNC: 4.1 MMOL/L (ref 3.5–5.1)
RBC # BLD AUTO: 3.44 M/UL (ref 4–5.4)
SODIUM SERPL-SCNC: 140 MMOL/L (ref 136–145)
WBC # BLD AUTO: 6.57 K/UL (ref 3.9–12.7)

## 2023-03-23 PROCEDURE — 63600175 PHARM REV CODE 636 W HCPCS

## 2023-03-23 PROCEDURE — 36415 COLL VENOUS BLD VENIPUNCTURE: CPT

## 2023-03-23 PROCEDURE — 83735 ASSAY OF MAGNESIUM: CPT

## 2023-03-23 PROCEDURE — 25000003 PHARM REV CODE 250

## 2023-03-23 PROCEDURE — 85025 COMPLETE CBC W/AUTO DIFF WBC: CPT

## 2023-03-23 PROCEDURE — 80048 BASIC METABOLIC PNL TOTAL CA: CPT

## 2023-03-23 PROCEDURE — 99231 PR SUBSEQUENT HOSPITAL CARE,LEVL I: ICD-10-PCS | Mod: ,,, | Performed by: ANESTHESIOLOGY

## 2023-03-23 PROCEDURE — 84100 ASSAY OF PHOSPHORUS: CPT

## 2023-03-23 PROCEDURE — 99233 SBSQ HOSP IP/OBS HIGH 50: CPT | Mod: ,,,

## 2023-03-23 PROCEDURE — 11000001 HC ACUTE MED/SURG PRIVATE ROOM

## 2023-03-23 PROCEDURE — 97165 OT EVAL LOW COMPLEX 30 MIN: CPT

## 2023-03-23 PROCEDURE — 97535 SELF CARE MNGMENT TRAINING: CPT

## 2023-03-23 PROCEDURE — 99233 PR SUBSEQUENT HOSPITAL CARE,LEVL III: ICD-10-PCS | Mod: ,,,

## 2023-03-23 PROCEDURE — 25000003 PHARM REV CODE 250: Performed by: ORTHOPAEDIC SURGERY

## 2023-03-23 PROCEDURE — 99231 SBSQ HOSP IP/OBS SF/LOW 25: CPT | Mod: ,,, | Performed by: ANESTHESIOLOGY

## 2023-03-23 RX ORDER — OXYCODONE HYDROCHLORIDE 5 MG/1
5 TABLET ORAL EVERY 4 HOURS PRN
Status: DISCONTINUED | OUTPATIENT
Start: 2023-03-23 | End: 2023-03-25 | Stop reason: HOSPADM

## 2023-03-23 RX ORDER — POLYETHYLENE GLYCOL 3350 17 G/17G
17 POWDER, FOR SOLUTION ORAL 2 TIMES DAILY
Status: DISCONTINUED | OUTPATIENT
Start: 2023-03-23 | End: 2023-03-25 | Stop reason: HOSPADM

## 2023-03-23 RX ADMIN — GABAPENTIN 300 MG: 300 CAPSULE ORAL at 02:03

## 2023-03-23 RX ADMIN — LEVOTHYROXINE SODIUM 100 MCG: 50 TABLET ORAL at 05:03

## 2023-03-23 RX ADMIN — Medication 6 MG: at 08:03

## 2023-03-23 RX ADMIN — GABAPENTIN 300 MG: 300 CAPSULE ORAL at 08:03

## 2023-03-23 RX ADMIN — POLYETHYLENE GLYCOL 3350 17 G: 17 POWDER, FOR SOLUTION ORAL at 08:03

## 2023-03-23 RX ADMIN — TRAZODONE HYDROCHLORIDE 150 MG: 50 TABLET ORAL at 08:03

## 2023-03-23 RX ADMIN — OXYCODONE HYDROCHLORIDE 5 MG: 5 TABLET ORAL at 01:03

## 2023-03-23 RX ADMIN — ACETAMINOPHEN 1000 MG: 500 TABLET ORAL at 01:03

## 2023-03-23 RX ADMIN — ASPIRIN 81 MG: 81 TABLET, COATED ORAL at 08:03

## 2023-03-23 RX ADMIN — METHOCARBAMOL 500 MG: 500 TABLET ORAL at 05:03

## 2023-03-23 RX ADMIN — OXYCODONE HYDROCHLORIDE 10 MG: 10 TABLET ORAL at 12:03

## 2023-03-23 RX ADMIN — ESCITALOPRAM OXALATE 20 MG: 20 TABLET ORAL at 08:03

## 2023-03-23 RX ADMIN — ROPIVACAINE HYDROCHLORIDE 6 ML/HR: 2 INJECTION, SOLUTION EPIDURAL; INFILTRATION at 06:03

## 2023-03-23 RX ADMIN — ATORVASTATIN CALCIUM 40 MG: 40 TABLET, FILM COATED ORAL at 08:03

## 2023-03-23 RX ADMIN — ACETAMINOPHEN 1000 MG: 500 TABLET ORAL at 10:03

## 2023-03-23 RX ADMIN — OXYCODONE HYDROCHLORIDE 10 MG: 10 TABLET ORAL at 05:03

## 2023-03-23 RX ADMIN — OXYCODONE HYDROCHLORIDE 5 MG: 5 TABLET ORAL at 05:03

## 2023-03-23 RX ADMIN — OXYCODONE HYDROCHLORIDE 5 MG: 5 TABLET ORAL at 10:03

## 2023-03-23 RX ADMIN — OXYCODONE HYDROCHLORIDE 10 MG: 10 TABLET ORAL at 09:03

## 2023-03-23 RX ADMIN — CELECOXIB 200 MG: 200 CAPSULE ORAL at 08:03

## 2023-03-23 RX ADMIN — SENNOSIDES AND DOCUSATE SODIUM 1 TABLET: 50; 8.6 TABLET ORAL at 08:03

## 2023-03-23 RX ADMIN — METHOCARBAMOL 500 MG: 500 TABLET ORAL at 01:03

## 2023-03-23 RX ADMIN — METHOCARBAMOL 500 MG: 500 TABLET ORAL at 10:03

## 2023-03-23 RX ADMIN — ACETAMINOPHEN 1000 MG: 500 TABLET ORAL at 05:03

## 2023-03-23 NOTE — PROGRESS NOTES
[facility] - [facility]  Orthopedics  Progress Note    Patient Name: Alix Patel  MRN: [id number]  Admission Date: 3/20/2023  Hospital Length of Stay: 1 days  Attending Provider: Blair Avelar MD  Primary Care Provider: Angélica Barakat MD  Follow-up For: Procedure(s) (LRB):  ORIF, FRACTURE, HUMERUS (Left)  IRRIGATION AND DEBRIDEMENT, UPPER EXTREMITY (Left)    Post-Operative Day: 2 Days Post-Op  Subjective:     Principal Problem:Open displaced transverse fracture of shaft of left humerus    Principal Orthopedic Problem: same. S/p L humerus IMN and I&D of open fracture wound 3/21/23    Interval History: Pt seen and examined at bedside. EFREM, VS, AF. Pain controlled and improved from yesterday afternoon. Block ongoing. Reports she does not feel her shoulder is stable, will order new sling from DME. Has begun work with PT.      Review of patient's allergies indicates:   Allergen Reactions    Lamictal [lamotrigine] Rash     Per psychiatry notes       Current Facility-Administered Medications   Medication    0.9%  NaCl infusion    acetaminophen tablet 1,000 mg    aspirin EC tablet 81 mg    atorvastatin tablet 40 mg    bisacodyL suppository 10 mg    celecoxib capsule 200 mg    dextrose 10% bolus 125 mL 125 mL    dextrose 10% bolus 250 mL 250 mL    dextrose 40 % gel 15,000 mg    dextrose 40 % gel 30,000 mg    ergocalciferol capsule 50,000 Units    EScitalopram oxalate tablet 20 mg    gabapentin capsule 300 mg    glucagon (human recombinant) injection 1 mg    insulin aspart U-100 pen 0-5 Units    levothyroxine tablet 100 mcg    melatonin tablet 6 mg    methocarbamoL tablet 500 mg    naloxone 0.4 mg/mL injection 0.02 mg    ondansetron disintegrating tablet 8 mg    oxyCODONE immediate release tablet 5 mg    polyethylene glycol packet 17 g    prochlorperazine injection Soln 5 mg    ROPIvacaine (PF) 2 mg/ml (0.2%) solution    senna-docusate 8.6-50 mg per tablet 1 tablet    sodium chloride 0.9%  "flush 10 mL    sodium chloride 0.9% flush 10 mL    traZODone tablet 150 mg     Objective:     Vital Signs (Most Recent):  Temp: 98.1 °F (36.7 °C) (03/23/23 1145)  Pulse: 77 (03/23/23 1145)  Resp: 16 (03/23/23 1145)  BP: 117/77 (03/23/23 1145)  SpO2: (!) 90 % (03/23/23 1145)   Vital Signs (24h Range):  Temp:  [97.1 °F (36.2 °C)-99.4 °F (37.4 °C)] 98.1 °F (36.7 °C)  Pulse:  [73-91] 77  Resp:  [15-18] 16  SpO2:  [90 %-96 %] 90 %  BP: (109-119)/(56-77) 117/77     Weight: 72.6 kg (160 lb)  Height: 5' 11" (180.3 cm)  Body mass index is 22.32 kg/m².      Intake/Output Summary (Last 24 hours) at 3/23/2023 1326  Last data filed at 3/23/2023 0841  Gross per 24 hour   Intake 380 ml   Output --   Net 380 ml         Ortho/SPM Exam  Gen: NAD, WDWN  CV: peripherally well perfused  Resp: unlabored respirations, symmetric chest rise  Neck: TM  Neuro: CN 2-12 grossly intact. No FND.      MSK:  LUE:  ACE wrap in place, no saturation ntoed  SILT to hand and figners  WWP, BCR  Able to flex all digits and wrist, unable to extend thumb and wrist    Significant Labs: CBC:   Recent Labs   Lab 03/22/23  0256 03/23/23  0559   WBC 7.80 6.57   HGB 11.6* 10.4*   HCT 35.5* 32.4*    173       CMP:   Recent Labs   Lab 03/22/23  0256 03/23/23  0559   * 140   K 4.3 4.1    102   CO2 26 30*   * 188*   BUN 9 7*   CREATININE 0.7 0.6   CALCIUM 9.3 8.8   ANIONGAP 8 8       All pertinent labs within the past 24 hours have been reviewed.    Significant Imaging: I have reviewed all pertinent imaging results/findings.    Assessment/Plan:     * Grade I open displaced transverse fracture of shaft of left humerus  Ailx Patel is a 67 y.o. female with a grade I open left midshaft humerus fracture. She also has a nondisplaced fracture of the proximal humerus. She is neurovascularly intact. She does not have any additional injuries. She sustained this injury around 4:00 AM and received her first dose of Ancef at 2:00 PM. Time to " antibiotics 10 hours. She was placed into a coaptation splint in the ED under procedural sedation.  Operative versus nonoperative interventions were discussed with the patient at length.  After discussions, the patient elects to proceed with operative intervention for this problem.  Consents obtained at bedside.  Patient marked.  Now s/p L humerus IMN and I&D on 3/21/23 and with left radial nerve palsy.      Continue to monitor for radial nerve function, ok for PNC for pain control  Sling left upper extremity for comfort  Nonweightbearing left upper extremity x8 weeks postop  Range of motion as tolerated left upper extremity  ASA 81 mg b.i.d. x6 weeks for DVT prophylaxis  Nutrition support: Calcium, vitamin-D.            Jn Garza MD  Orthopedics  [facility] - Surgery

## 2023-03-23 NOTE — PT/OT/SLP EVAL
Occupational Therapy   Evaluation    Name: Alix Patel  MRN: [id number]  Admitting Diagnosis: Open displaced transverse fracture of shaft of left humerus  Recent Surgery: Procedure(s) (LRB):  ORIF, FRACTURE, HUMERUS (Left)  IRRIGATION AND DEBRIDEMENT, UPPER EXTREMITY (Left) 2 Days Post-Op    Recommendations:     Discharge Recommendations: home health OT  Discharge Equipment Recommendations:  none  Barriers to discharge:  None    Assessment:     Alix Patel is a 67 y.o. female with a medical diagnosis of Open displaced transverse fracture of shaft of left humerus.  She presents with the following performance deficits affecting function: weakness, impaired endurance, impaired self care skills, impaired functional mobility, decreased upper extremity function, decreased ROM, impaired coordination, impaired fine motor, pain, decreased coordination, orthopedic precautions, impaired joint extensibility.      Patient participated well with therapy, educated on modifications of ADL's and home environment to safely complete while maintaining precautions, sensation/ROM WFL's; will continue to benefit from therapy and be seen tomorrow am for practice with sling/dressing and exercises/HEP; OT POC initiated.     Rehab Prognosis: Good; patient would benefit from acute skilled OT services to address these deficits and reach maximum level of function.       Plan:     Patient to be seen 3 x/week to address the above listed problems via self-care/home management, therapeutic activities, therapeutic exercises, neuromuscular re-education  Plan of Care Expires: 03/30/23  Plan of Care Reviewed with: patient    Subjective     Chief Complaint: No complaints, feeling better after receiving pain medication  Patient/Family Comments/goals: Return home    Occupational Profile:  Living Environment: Patient lives alone, 4th floor apartment, 3 flights of steps with L HR, no elevator, t/s combo with shower chair available  Previous  level of function: Independent  Roles and Routines: None stated  Equipment Used at Home: none, other (see comments) (Owns a shower chair she can use)  Assistance upon Discharge: Friends, available for transportation and intermittently for assist with IADL's that will be difficulty post-op    Pain/Comfort:  Pain Rating 1: 3/10  Location - Side 1: Left  Location - Orientation 1: generalized  Location 1: shoulder  Pain Addressed 1: Reposition, Distraction  Pain Rating Post-Intervention 1: 3/10    Patients cultural, spiritual, Sabianist conflicts given the current situation: no    Objective:     Communicated with: Nursing prior to session.  Patient found HOB elevated with perineural catheter, telemetry, Other (comments) (L UE sling/resting hand splint) upon OT entry to room.    General Precautions: Standard, fall  Orthopedic Precautions: LUE non weight bearing  Braces: UE Sling (L resting hand splint)  Respiratory Status: Room air    Occupational Performance:    Bed Mobility:    Patient completed Scooting/Bridging with supervision  Patient completed Supine to Sit with supervision  Patient completed Sit to Supine with supervision    Functional Mobility/Transfers:  Patient completed Sit <> Stand Transfer with supervision  with  no assistive device   Patient completed Toilet Transfer Step Transfer technique with supervision with  no AD  Functional Mobility: Supervision with no AD within room and into bathroom    Activities of Daily Living:  Grooming: supervision in standing at sink in bathroom  Toileting: supervision from toilet in bathroom    Cognitive/Visual Perceptual:  Cognitive/Psychosocial Skills:     -       Oriented to: Person, Place, Time, and Situation   -       Follows Commands/attention:Follows multistep  commands  -       Safety awareness/insight to disability: intact   -       Mood/Affect/Coping skills/emotional control: Appropriate to situation    Physical Exam:  Postural examination/scapula alignment:    -        No postural abnormalities identified  Upper Extremity Range of Motion:     -       Right Upper Extremity: WFL  -       Left Upper Extremity: WFL  Upper Extremity Strength:    -       Right Upper Extremity: WNL  -       Left Upper Extremity: WFL   Strength:    -       Right Upper Extremity: WFL  -       Left Upper Extremity: WFL    AMPAC 6 Click ADL:  AMPAC Total Score: 22    Treatment & Education:  -Evaluation completed, plan of care established.  -Patient and family educated on roles/goals of OT and POC.  -White board updated.  -Therapist provided time for questions and answered within scope of practice.  -Patient educated on importance of EOB/OOB activity to maximize recovery.     Patient left HOB elevated with all lines intact and call button in reach    GOALS:   Multidisciplinary Problems       Occupational Therapy Goals       Not on file                    History:     Past Medical History:   Diagnosis Date    Anxiety     Bipolar 1 disorder     COPD (chronic obstructive pulmonary disease)     Depression     Diabetes mellitus     GERD (gastroesophageal reflux disease)     Grade I open displaced transverse fracture of shaft of left humerus 3/20/2023    HEARING LOSS     pt states she has loss slighty in rt ear.    Hypertension     Insomnia     Rash          Past Surgical History:   Procedure Laterality Date    APPENDECTOMY      BREAST BIOPSY      COLONOSCOPY      COLONOSCOPY N/A 10/31/2019    Procedure: COLONOSCOPY;  Surgeon: Philippe Monteiro MD;  Location: [address]);  Service: Endoscopy;  Laterality: N/A;  PM prep-MH    CYST REMOVAL      ESOPHAGOGASTRODUODENOSCOPY N/A 10/31/2019    Procedure: EGD (ESOPHAGOGASTRODUODENOSCOPY);  Surgeon: Philippe Monteiro MD;  Location: [address]);  Service: Endoscopy;  Laterality: N/A;  Pm prep-MH    HYSTERECTOMY         Time Tracking:     OT Date of Treatment: 03/23/23  OT Start Time: 1445  OT Stop Time: 1513  OT Total Time (min): 28 min    Billable  Minutes:Evaluation 10  Self Care/Home Management 18    3/23/2023

## 2023-03-23 NOTE — NURSING
Report received from dayshift RN. During bedside handoff, PNC noted to be disconnected from the patient. Anesthesia team contacted due to connection port not being attached. Pending further instruction/intervention from anesthesia team.     2140: Anesthesia team reconnected PNC; running accordingly.    0250 Patient AOX4 showing no s/s respiratory or circulatory distress. Safety precautions in place as charted and appropriate. Pain being well managed with PNC & PRN medications. Patient states no unmet needs at this time, will continue with plan of care. 1:5

## 2023-03-23 NOTE — PLAN OF CARE
Evaluation completed, plan of care established.    Problem: Occupational Therapy  Goal: Occupational Therapy Goal  Description: Goals to be met by: 3/25/23     Patient will increase functional independence with ADLs by performing:    UE Dressing with Modified Faulk.  LE Dressing with Modified Faulk.  Grooming while standing at sink with Modified Faulk.  Toileting from toilet with Modified Faulk for hygiene and clothing management.   Supine to sit with Modified Faulk.  Toilet transfer to toilet with Modified Faulk.  Upper extremity exercise program x10 reps per handout, with independence.    Outcome: Ongoing, Progressing

## 2023-03-23 NOTE — NURSING
Notified Sadaf with [facility] that patient complaining pain regimen not working, she is very upset. Team is about to round

## 2023-03-23 NOTE — PROGRESS NOTES
[facility] Medicine  Progress Note    Patient Name: Alix Patel  MRN: [id number]  Patient Class: IP- Inpatient   Admission Date: 3/20/2023  Length of Stay: 1 days  Attending Physician: Blair Avelar MD  Primary Care Provider: Angélica Barakat MD        Subjective:     Principal Problem:Open displaced transverse fracture of shaft of left humerus        HPI:  Alix Patel is a 67 y.o. female with a PMHx of T2DM, HLD, hypothyroidism, LILLY, and MDD who presented to the ED by EMS for L arm pain following a fall at home. Patient reported taking two doses of her home Trazodone last night because she wanted to be able to sleep through the night. After taking the medication and getting in bed, patient felt hungry and decided to cook pasta. Patient got back in bed to wait for the pasta to finish cooking. Once it was finished around 4 am, she got out of bed to eat. She felt dizzy when standing in the kitchen and fell to the ground on her left side. She denies any LOC, preceding palpitations, confusion, or weakness. She denies any head injuries. Patient was able to stand back up to continue fixing her meal. She went to sleep only endorsing limited ROM in L arm 2/2 pain. When she woke up, she was still unable to move arm without severe pain prompting her to call EMS. She further denies any HA, F/C, N/V, chest pain, palpitations, SOB, recent infections, similar prior episodes, numbness, or tingling. She reports dizziness has resolved. She is not on AC at this time.    ED: Hypoxic to 93%, placed on 3.5 L NC, but eventually weaned to RA. Intermittently tachypneic, RR of 30 max. Otherwise AFVSS. CBC without leukocytosis or anemia. Hyperglycemic to 187, ALT 64. Otherwise CMP wnl. Vitamin D deficiency. A1c 7.5. Type and screen completed. CTH left frontal extracranial soft tissue swelling without evidence of underlying fracture or acute intracranial hemorrhage. Ventriculomegaly out of proportion to  the degree of sulcal enlargement. While this could relate to cerebral volume loss, the configuration does at least raise the possibility of normal pressure hydrocephalus. Clinical correlation required. CXR with no acute intrathoracic process. L shoulder and humerus XR with displaced and comminuted acute fracture of the left mid humeral shaft with nondisplaced fracture of the left proximal humerus. CT L arm with fractures of the proximal and mid aspects of the humerus as described. EKG NSR RBBB. Placed into splint under sedation. Given Fentanyl (in EMS), Cefazolin, Dilaudid, Ortho consulted. Admitted to Hospital Medicine for further management.      Overview/Hospital Course:  Alix Patel was admitted to Hospital Medicine for L humerus fracture. Ortho consulted, plan for ORIF on 3/21. S/p L humerus IMN and I&D of open fracture wound 3/21/2. Patient presenting with radial nerve palsy on exam. Worked well with PT/OT. Anesthesia adjusting interscalene PNC. Patient to wear sling to LUE for comfort. Nonweightbearing LUE x 8 weeks postop. Completing 24 hours of abx on 3/22. Will continue ASA 81 BID x 6 weeks. Plan for Home Health PT/OT. Ortho referral placed for clinic follow up. Patient to also follow up with Psychiatrist as she wishes to d/c Trazodone.      Interval History: EFREM. AF, VSS. Patient seen and evaluated by me. Patient states her pain is not controlled with her current regimen. Also feels as tho her arm is not stable in the current sling. Consulting services notified. Patient has not had a bowel movement in 3 days. Will advance bowel regimen while on opioids for pain. Otherwise endorses no new complaints at this time. Will continue to monitor.     Review of Systems   Constitutional:  Negative for activity change, chills and fever.   HENT:  Negative for trouble swallowing.    Eyes:  Negative for photophobia and visual disturbance.   Respiratory:  Negative for cough, chest tightness and shortness of  breath.    Cardiovascular:  Negative for chest pain, palpitations and leg swelling.   Gastrointestinal:  Negative for abdominal pain, constipation, diarrhea, nausea and vomiting.   Genitourinary:  Negative for dysuria, frequency and hematuria.   Musculoskeletal:  Positive for myalgias. Negative for back pain, gait problem and neck pain.   Skin:  Negative for rash and wound.   Neurological:  Positive for dizziness (resolved) and numbness (improving). Negative for syncope, speech difficulty and light-headedness.   Psychiatric/Behavioral:  Negative for agitation and confusion. The patient is not nervous/anxious.    Objective:     Vital Signs (Most Recent):  Temp: 98.1 °F (36.7 °C) (03/23/23 1145)  Pulse: 77 (03/23/23 1145)  Resp: 16 (03/23/23 1145)  BP: 117/77 (03/23/23 1145)  SpO2: (!) 90 % (03/23/23 1145)   Vital Signs (24h Range):  Temp:  [97.1 °F (36.2 °C)-99.4 °F (37.4 °C)] 98.1 °F (36.7 °C)  Pulse:  [73-91] 77  Resp:  [15-18] 16  SpO2:  [90 %-96 %] 90 %  BP: (109-119)/(56-77) 117/77     Weight: 72.6 kg (160 lb)  Body mass index is 22.32 kg/m².    Intake/Output Summary (Last 24 hours) at 3/23/2023 1335  Last data filed at 3/23/2023 0841  Gross per 24 hour   Intake 380 ml   Output --   Net 380 ml      Physical Exam  Vitals and nursing note reviewed.   Constitutional:       General: She is not in acute distress.     Appearance: She is well-developed.   HENT:      Head: Normocephalic and atraumatic.      Mouth/Throat:      Pharynx: No oropharyngeal exudate.   Eyes:      Conjunctiva/sclera: Conjunctivae normal.      Pupils: Pupils are equal, round, and reactive to light.   Cardiovascular:      Rate and Rhythm: Normal rate and regular rhythm.      Heart sounds: Normal heart sounds.   Pulmonary:      Effort: Pulmonary effort is normal. No respiratory distress.      Breath sounds: Normal breath sounds. No wheezing.      Comments: On RA  Abdominal:      General: Bowel sounds are normal. There is no distension.       Palpations: Abdomen is soft.      Tenderness: There is no abdominal tenderness.   Musculoskeletal:         General: Swelling, tenderness and signs of injury (L upper arm) present. Normal range of motion.      Cervical back: Normal range of motion and neck supple.      Comments: L arm in splint placed in sling  Interscalene PNC   Sensation intact to fingers  Able to move fingers  Lymphadenopathy:      Cervical: No cervical adenopathy.   Skin:     General: Skin is warm and dry.      Capillary Refill: Capillary refill takes less than 2 seconds.      Findings: No rash.   Neurological:      Mental Status: She is alert and oriented to person, place, and time.      Cranial Nerves: No cranial nerve deficit.      Sensory: No sensory deficit.      Coordination: Coordination normal.   Psychiatric:         Behavior: Behavior normal.         Thought Content: Thought content normal.         Judgment: Judgment normal.       Significant Labs: All pertinent labs within the past 24 hours have been reviewed.  BMP:   Recent Labs   Lab 03/23/23  0559   *      K 4.1      CO2 30*   BUN 7*   CREATININE 0.6   CALCIUM 8.8   MG 1.7     CBC:   Recent Labs   Lab 03/22/23  0256 03/23/23  0559   WBC 7.80 6.57   HGB 11.6* 10.4*   HCT 35.5* 32.4*    173       Significant Imaging: I have reviewed all pertinent imaging results/findings within the past 24 hours.      Assessment/Plan:      * Grade I open displaced transverse fracture of shaft of left humerus  Fall  Presented to the ED by EMS for L arm pain following a fall at home. Patient took two Trazodones to help her sleep. Typically takes one. She felt dizzy when standing in the kitchen and fell to the ground on her left side. She denies any LOC, preceding palpitations, confusion, or weakness. She denies any head injuries. Patient was able to stand back up to continue fixing her meal. Woke up unable to move arm without severe pain prompting her to call EMS. Dizziness  resolved.    - CTH left frontal extracranial soft tissue swelling without evidence of underlying fracture or acute intracranial hemorrhage. Ventriculomegaly out of proportion to the degree of sulcal enlargement. While this could relate to cerebral volume loss, the configuration does at least raise the possibility of normal pressure hydrocephalus. Clinical correlation required.  - CXR with no acute intrathoracic process  - L shoulder and humerus XR with displaced and comminuted acute fracture of the left mid humeral shaft with nondisplaced fracture of the left proximal humerus  - CT L arm with fractures of the proximal and mid aspects of the humerus as described.  - EKG NSR RBBB (consistent with priors)  - Placed in splint under sedation in ED  - Orthostatic vitals negative  - Ortho consulted, appreciate assistance  - Continue to monitor for radial nerve function, ok for PNC for pain control  - Sling left upper extremity for comfort  - Nonweightbearing left upper extremity x8 weeks postop  - Range of motion as tolerated left upper extremity  - ASA 81 mg b.i.d. x6 weeks for DVT prophylaxis  - Nutrition support: Calcium, vitamin-D.  - Anesthesia managing interscalene PNC- changed to continuous for better control 3/23  - Continue MM pain control  - Bowel regimen for constipation  - PT/OT consulted, plan for  PT/OT  - Ortho referral placed for clinic follow up    Vitamin D deficiency  - Noted on labs  - Start supplementation    Type 2 diabetes mellitus with hyperglycemia, without long-term current use of insulin  Patient's FSGs are controlled on current medication regimen.  Last A1c reviewed-   Lab Results   Component Value Date    HGBA1C 7.5 (H) 03/20/2023   Most recent fingerstick glucose reviewed-   Recent Labs   Lab 03/22/23  1633 03/22/23  2118 03/23/23  0614 03/23/23  1146   POCTGLUCOSE 220* 249* 177* 201*     Current correctional scale  Low  Maintain anti-hyperglycemic dose as follows-   Antihyperglycemics (From  admission, onward)    Start     Stop Route Frequency Ordered    03/20/23 1641  insulin aspart U-100 pen 0-5 Units         -- SubQ Before meals & nightly PRN 03/20/23 1541      - Hold Oral hypoglycemics while patient is in the hospital  - Diabetic/Cardiac diet    Hypothyroidism  - Continue Synthroid  - Will check TSH  Lab Results   Component Value Date    TSH 3.070 03/21/2023       Thyroid nodule  - Per last Endocrinology note: Previous benign FNA of right thyroid nodule  - Last US (9/23/22) reviewed    Hyperlipidemia with target low density lipoprotein (LDL) cholesterol less than 100 mg/dL  - Patient not taking statin, request to resume  - Lipid panel reviewed  - Will need to follow up with Cardiologist    Depression  LILLY (generalized anxiety disorder)  Patient has persistent depression which is mild and is currently controlled. Will Continue anti-depressant medications. We will not consult psychiatry at this time. Patient does not display psychosis at this time. Continue to monitor closely and adjust plan of care as needed.  - Continue Lexapro, Trazadone      VTE Risk Mitigation (From admission, onward)         Ordered     IP VTE LOW RISK PATIENT  Once         03/20/23 1541     Place sequential compression device  Until discontinued         03/20/23 1541                Discharge Planning   GILLES: 3/24/2023     Code Status: Full Code   Is the patient medically ready for discharge?: No    Reason for patient still in hospital (select all that apply): Patient trending condition, Treatment and Consult recommendations  Discharge Plan A: Home            Marium Velazquez PA-C  Department of Hospital Medicine   [facility] - Surgery

## 2023-03-23 NOTE — ASSESSMENT & PLAN NOTE
Fall  Presented to the ED by EMS for L arm pain following a fall at home. Patient took two Trazodones to help her sleep. Typically takes one. She felt dizzy when standing in the kitchen and fell to the ground on her left side. She denies any LOC, preceding palpitations, confusion, or weakness. She denies any head injuries. Patient was able to stand back up to continue fixing her meal. Woke up unable to move arm without severe pain prompting her to call EMS. Dizziness resolved.    - CTH left frontal extracranial soft tissue swelling without evidence of underlying fracture or acute intracranial hemorrhage. Ventriculomegaly out of proportion to the degree of sulcal enlargement. While this could relate to cerebral volume loss, the configuration does at least raise the possibility of normal pressure hydrocephalus. Clinical correlation required.  - CXR with no acute intrathoracic process  - L shoulder and humerus XR with displaced and comminuted acute fracture of the left mid humeral shaft with nondisplaced fracture of the left proximal humerus  - CT L arm with fractures of the proximal and mid aspects of the humerus as described.  - EKG NSR RBBB (consistent with priors)  - Placed in splint under sedation in ED  - Orthostatic vitals negative  - Ortho consulted, appreciate assistance  - Continue to monitor for radial nerve function, ok for PNC for pain control  - Sling left upper extremity for comfort  - Nonweightbearing left upper extremity x8 weeks postop  - Range of motion as tolerated left upper extremity  - ASA 81 mg b.i.d. x6 weeks for DVT prophylaxis  - Nutrition support: Calcium, vitamin-D.  - Anesthesia managing interscalene PNC- changed to continuous for better control 3/23  - Continue MM pain control  - Bowel regimen for constipation  - PT/OT consulted, plan for  PT/OT  - Ortho referral placed for clinic follow up

## 2023-03-23 NOTE — ANESTHESIA POST-OP PAIN MANAGEMENT
Acute Pain Service Progress Note    Alix Patel is a 67 y.o., female, [id number].    Surgery:   ORIF, FRACTURE, HUMERUS (Left: Arm)    Post Op Day #: 2    Catheter type: perineural  interscalene    Infusion type: Ropi 0.2% at 7cc q3h IB w/ 5cc q30min DB    Problem List:    Active Hospital Problems    Diagnosis  POA    *Grade I open displaced transverse fracture of shaft of left humerus [S42.322B]  Yes    Fall [W19.XXXA]  Yes    Vitamin D deficiency [E55.9]  Yes    Type 2 diabetes mellitus with hyperglycemia, without long-term current use of insulin [E11.65]  Yes    LILLY (generalized anxiety disorder) [F41.1]  Yes    Hypothyroidism [E03.9]  Yes    Thyroid nodule [E04.1]  Yes    Hyperlipidemia with target low density lipoprotein (LDL) cholesterol less than 100 mg/dL [E78.5]  Yes    Depression [F32.A]  Yes      Resolved Hospital Problems   No resolved problems to display.       Subjective:     General appearance of alert, oriented, no complaints   Pain with rest: 4    Numbers   Pain with movement: 7    Numbers   Side Effects    1. Pruritis No    2. Nausea No    3. Motor Blockade Yes, radial nerve palsy    4. Sedation No, 1=awake and alert    Objective:       Catheter site clean, dry, intact       Vitals   Vitals:    03/23/23 0507   BP:    Pulse:    Resp: 16   Temp:         Labs    Admission on 03/20/2023   Component Date Value Ref Range Status    Sodium 03/20/2023 137  136 - 145 mmol/L Final    Potassium 03/20/2023 3.8  3.5 - 5.1 mmol/L Final    Chloride 03/20/2023 99  95 - 110 mmol/L Final    CO2 03/20/2023 25  23 - 29 mmol/L Final    Glucose 03/20/2023 187 (H)  70 - 110 mg/dL Final    BUN 03/20/2023 7 (L)  8 - 23 mg/dL Final    Creatinine 03/20/2023 0.7  0.5 - 1.4 mg/dL Final    Calcium 03/20/2023 9.5  8.7 - 10.5 mg/dL Final    Total Protein 03/20/2023 7.3  6.0 - 8.4 g/dL Final    Albumin 03/20/2023 4.1  3.5 - 5.2 g/dL Final    Total Bilirubin 03/20/2023 0.6  0.1 - 1.0 mg/dL Final     Alkaline Phosphatase 03/20/2023 73  55 - 135 U/L Final    AST 03/20/2023 32  10 - 40 U/L Final    ALT 03/20/2023 64 (H)  10 - 44 U/L Final    Anion Gap 03/20/2023 13  8 - 16 mmol/L Final    eGFR 03/20/2023 >60.0  >60 mL/min/1.73 m^2 Final    WBC 03/20/2023 10.13  3.90 - 12.70 K/uL Final    RBC 03/20/2023 4.39  4.00 - 5.40 M/uL Final    Hemoglobin 03/20/2023 13.3  12.0 - 16.0 g/dL Final    Hematocrit 03/20/2023 40.2  37.0 - 48.5 % Final    MCV 03/20/2023 92  82 - 98 fL Final    MCH 03/20/2023 30.3  27.0 - 31.0 pg Final    MCHC 03/20/2023 33.1  32.0 - 36.0 g/dL Final    RDW 03/20/2023 11.8  11.5 - 14.5 % Final    Platelets 03/20/2023 205  150 - 450 K/uL Final    MPV 03/20/2023 10.6  9.2 - 12.9 fL Final    Immature Granulocytes 03/20/2023 0.2  0.0 - 0.5 % Final    Gran # (ANC) 03/20/2023 6.7  1.8 - 7.7 K/uL Final    Immature Grans (Abs) 03/20/2023 0.02  0.00 - 0.04 K/uL Final    Lymph # 03/20/2023 2.5  1.0 - 4.8 K/uL Final    Mono # 03/20/2023 0.9  0.3 - 1.0 K/uL Final    Eos # 03/20/2023 0.0  0.0 - 0.5 K/uL Final    Baso # 03/20/2023 0.03  0.00 - 0.20 K/uL Final    nRBC 03/20/2023 0  0 /100 WBC Final    Gran % 03/20/2023 65.8  38.0 - 73.0 % Final    Lymph % 03/20/2023 25.1  18.0 - 48.0 % Final    Mono % 03/20/2023 8.5  4.0 - 15.0 % Final    Eosinophil % 03/20/2023 0.1  0.0 - 8.0 % Final    Basophil % 03/20/2023 0.3  0.0 - 1.9 % Final    Differential Method 03/20/2023 Automated   Final    Hemoglobin A1C 03/20/2023 7.5 (H)  4.0 - 5.6 % Final    Estimated Avg Glucose 03/20/2023 169 (H)  68 - 131 mg/dL Final    Magnesium 03/20/2023 1.8  1.6 - 2.6 mg/dL Final    Phosphorus 03/20/2023 3.9  2.7 - 4.5 mg/dL Final    Prothrombin Time 03/20/2023 11.4  9.0 - 12.5 sec Final    INR 03/20/2023 1.1  0.8 - 1.2 Final    Group & Rh 03/20/2023 O POS   Final    Indirect Agustina 03/20/2023 NEG   Final    Vit D, 25-Hydroxy 03/20/2023 14 (L)  30 - 96 ng/mL Final    POCT Glucose 03/20/2023 173 (H)  70 -  110 mg/dL Final    Sodium 03/21/2023 138  136 - 145 mmol/L Final    Potassium 03/21/2023 3.8  3.5 - 5.1 mmol/L Final    Chloride 03/21/2023 100  95 - 110 mmol/L Final    CO2 03/21/2023 27  23 - 29 mmol/L Final    Glucose 03/21/2023 151 (H)  70 - 110 mg/dL Final    BUN 03/21/2023 7 (L)  8 - 23 mg/dL Final    Creatinine 03/21/2023 0.6  0.5 - 1.4 mg/dL Final    Calcium 03/21/2023 9.4  8.7 - 10.5 mg/dL Final    Anion Gap 03/21/2023 11  8 - 16 mmol/L Final    eGFR 03/21/2023 >60.0  >60 mL/min/1.73 m^2 Final    Magnesium 03/21/2023 1.8  1.6 - 2.6 mg/dL Final    Phosphorus 03/21/2023 3.1  2.7 - 4.5 mg/dL Final    WBC 03/21/2023 7.49  3.90 - 12.70 K/uL Final    RBC 03/21/2023 4.22  4.00 - 5.40 M/uL Final    Hemoglobin 03/21/2023 12.7  12.0 - 16.0 g/dL Final    Hematocrit 03/21/2023 39.3  37.0 - 48.5 % Final    MCV 03/21/2023 93  82 - 98 fL Final    MCH 03/21/2023 30.1  27.0 - 31.0 pg Final    MCHC 03/21/2023 32.3  32.0 - 36.0 g/dL Final    RDW 03/21/2023 12.0  11.5 - 14.5 % Final    Platelets 03/21/2023 210  150 - 450 K/uL Final    MPV 03/21/2023 11.2  9.2 - 12.9 fL Final    Immature Granulocytes 03/21/2023 0.3  0.0 - 0.5 % Final    Gran # (ANC) 03/21/2023 4.2  1.8 - 7.7 K/uL Final    Immature Grans (Abs) 03/21/2023 0.02  0.00 - 0.04 K/uL Final    Lymph # 03/21/2023 2.5  1.0 - 4.8 K/uL Final    Mono # 03/21/2023 0.7  0.3 - 1.0 K/uL Final    Eos # 03/21/2023 0.0  0.0 - 0.5 K/uL Final    Baso # 03/21/2023 0.03  0.00 - 0.20 K/uL Final    nRBC 03/21/2023 0  0 /100 WBC Final    Gran % 03/21/2023 56.6  38.0 - 73.0 % Final    Lymph % 03/21/2023 33.4  18.0 - 48.0 % Final    Mono % 03/21/2023 8.8  4.0 - 15.0 % Final    Eosinophil % 03/21/2023 0.5  0.0 - 8.0 % Final    Basophil % 03/21/2023 0.4  0.0 - 1.9 % Final    Differential Method 03/21/2023 Automated   Final    Cholesterol 03/21/2023 245 (H)  120 - 199 mg/dL Final    Triglycerides 03/21/2023 112  30 - 150 mg/dL Final    HDL 03/21/2023 36  (L)  40 - 75 mg/dL Final    LDL Cholesterol 03/21/2023 186.6 (H)  63.0 - 159.0 mg/dL Final    HDL/Cholesterol Ratio 03/21/2023 14.7 (L)  20.0 - 50.0 % Final    Total Cholesterol/HDL Ratio 03/21/2023 6.8 (H)  2.0 - 5.0 Final    Non-HDL Cholesterol 03/21/2023 209  mg/dL Final    TSH 03/21/2023 3.070  0.400 - 4.000 uIU/mL Final    POCT Glucose 03/21/2023 192 (H)  70 - 110 mg/dL Final    Anaerobic Culture 03/21/2023 Culture in progress   Preliminary    Aerobic Bacterial Culture 03/21/2023 No growth   Preliminary    POCT Glucose 03/21/2023 238 (H)  70 - 110 mg/dL Final    POCT Glucose 03/21/2023 274 (H)  70 - 110 mg/dL Final    Sodium 03/22/2023 135 (L)  136 - 145 mmol/L Final    Potassium 03/22/2023 4.3  3.5 - 5.1 mmol/L Final    Chloride 03/22/2023 101  95 - 110 mmol/L Final    CO2 03/22/2023 26  23 - 29 mmol/L Final    Glucose 03/22/2023 166 (H)  70 - 110 mg/dL Final    BUN 03/22/2023 9  8 - 23 mg/dL Final    Creatinine 03/22/2023 0.7  0.5 - 1.4 mg/dL Final    Calcium 03/22/2023 9.3  8.7 - 10.5 mg/dL Final    Anion Gap 03/22/2023 8  8 - 16 mmol/L Final    eGFR 03/22/2023 >60.0  >60 mL/min/1.73 m^2 Final    Magnesium 03/22/2023 1.8  1.6 - 2.6 mg/dL Final    Phosphorus 03/22/2023 3.3  2.7 - 4.5 mg/dL Final    WBC 03/22/2023 7.80  3.90 - 12.70 K/uL Final    RBC 03/22/2023 3.81 (L)  4.00 - 5.40 M/uL Final    Hemoglobin 03/22/2023 11.6 (L)  12.0 - 16.0 g/dL Final    Hematocrit 03/22/2023 35.5 (L)  37.0 - 48.5 % Final    MCV 03/22/2023 93  82 - 98 fL Final    MCH 03/22/2023 30.4  27.0 - 31.0 pg Final    MCHC 03/22/2023 32.7  32.0 - 36.0 g/dL Final    RDW 03/22/2023 11.8  11.5 - 14.5 % Final    Platelets 03/22/2023 178  150 - 450 K/uL Final    MPV 03/22/2023 10.6  9.2 - 12.9 fL Final    Immature Granulocytes 03/22/2023 0.3  0.0 - 0.5 % Final    Gran # (ANC) 03/22/2023 4.8  1.8 - 7.7 K/uL Final    Immature Grans (Abs) 03/22/2023 0.02  0.00 - 0.04 K/uL Final    Lymph # 03/22/2023 2.3  1.0 -  4.8 K/uL Final    Mono # 03/22/2023 0.6  0.3 - 1.0 K/uL Final    Eos # 03/22/2023 0.0  0.0 - 0.5 K/uL Final    Baso # 03/22/2023 0.02  0.00 - 0.20 K/uL Final    nRBC 03/22/2023 0  0 /100 WBC Final    Gran % 03/22/2023 61.8  38.0 - 73.0 % Final    Lymph % 03/22/2023 29.1  18.0 - 48.0 % Final    Mono % 03/22/2023 8.2  4.0 - 15.0 % Final    Eosinophil % 03/22/2023 0.3  0.0 - 8.0 % Final    Basophil % 03/22/2023 0.3  0.0 - 1.9 % Final    Differential Method 03/22/2023 Automated   Final    POCT Glucose 03/22/2023 155 (H)  70 - 110 mg/dL Final    POCT Glucose 03/22/2023 259 (H)  70 - 110 mg/dL Final    POCT Glucose 03/22/2023 249 (H)  70 - 110 mg/dL Final    POCT Glucose 03/22/2023 220 (H)  70 - 110 mg/dL Final    WBC 03/23/2023 6.57  3.90 - 12.70 K/uL Final    RBC 03/23/2023 3.44 (L)  4.00 - 5.40 M/uL Final    Hemoglobin 03/23/2023 10.4 (L)  12.0 - 16.0 g/dL Final    Hematocrit 03/23/2023 32.4 (L)  37.0 - 48.5 % Final    MCV 03/23/2023 94  82 - 98 fL Final    MCH 03/23/2023 30.2  27.0 - 31.0 pg Final    MCHC 03/23/2023 32.1  32.0 - 36.0 g/dL Final    RDW 03/23/2023 12.0  11.5 - 14.5 % Final    Platelets 03/23/2023 173  150 - 450 K/uL Final    MPV 03/23/2023 11.0  9.2 - 12.9 fL Final    Immature Granulocytes 03/23/2023 0.3  0.0 - 0.5 % Final    Gran # (ANC) 03/23/2023 3.4  1.8 - 7.7 K/uL Final    Immature Grans (Abs) 03/23/2023 0.02  0.00 - 0.04 K/uL Final    Lymph # 03/23/2023 2.6  1.0 - 4.8 K/uL Final    Mono # 03/23/2023 0.5  0.3 - 1.0 K/uL Final    Eos # 03/23/2023 0.1  0.0 - 0.5 K/uL Final    Baso # 03/23/2023 0.03  0.00 - 0.20 K/uL Final    nRBC 03/23/2023 0  0 /100 WBC Final    Gran % 03/23/2023 51.9  38.0 - 73.0 % Final    Lymph % 03/23/2023 38.8  18.0 - 48.0 % Final    Mono % 03/23/2023 7.3  4.0 - 15.0 % Final    Eosinophil % 03/23/2023 1.2  0.0 - 8.0 % Final    Basophil % 03/23/2023 0.5  0.0 - 1.9 % Final    Differential Method 03/23/2023 Automated   Final    POCT Glucose  03/23/2023 177 (H)  70 - 110 mg/dL Final        Meds   Current Facility-Administered Medications   Medication Dose Route Frequency Provider Last Rate Last Admin    0.9%  NaCl infusion   Intravenous Continuous Celena Matthews MD   Stopped at 03/21/23 1822    acetaminophen tablet 1,000 mg  1,000 mg Oral Q8H Sadaf Friend MD   1,000 mg at 03/23/23 0507    aspirin EC tablet 81 mg  81 mg Oral BID Jn Garza MD   81 mg at 03/22/23 2140    atorvastatin tablet 40 mg  40 mg Oral Daily Armand Orozco PA-C   40 mg at 03/22/23 0844    bisacodyL suppository 10 mg  10 mg Rectal Daily PRN Armand Orozco PA-C        celecoxib capsule 200 mg  200 mg Oral Daily Sadaf Friend MD   200 mg at 03/22/23 1421    dextrose 10% bolus 125 mL 125 mL  12.5 g Intravenous PRN Armand Orozco PA-C        dextrose 10% bolus 250 mL 250 mL  25 g Intravenous PRN Armand Orozco PA-C        dextrose 40 % gel 15,000 mg  15 g Oral PRN Armand Orozco PA-C        dextrose 40 % gel 30,000 mg  30 g Oral PRN Armand Orozco PA-C        ergocalciferol capsule 50,000 Units  50,000 Units Oral Q7 Days Felix Ndiaye MD   50,000 Units at 03/21/23 0811    EScitalopram oxalate tablet 20 mg  20 mg Oral Daily Armand Orozco PA-C   20 mg at 03/22/23 0844    gabapentin capsule 300 mg  300 mg Oral TID Ousmane Crawford MD   300 mg at 03/22/23 2140    glucagon (human recombinant) injection 1 mg  1 mg Intramuscular PRN Armand Orozco PA-C        insulin aspart U-100 pen 0-5 Units  0-5 Units Subcutaneous QID (AC + HS) PRN Armand Orozco PA-C   2 Units at 03/22/23 1634    levothyroxine tablet 100 mcg  100 mcg Oral Daily Armand Orozco PA-C   100 mcg at 03/23/23 0508    melatonin tablet 6 mg  6 mg Oral Nightly PRN Armand Orozco PA-C   6 mg at 03/22/23 2141    methocarbamoL tablet 500 mg  500 mg Oral Q8H Sadaf Friend MD   500 mg at 03/23/23 0508    naloxone 0.4 mg/mL injection 0.02 mg  0.02 mg Intravenous PRN Armand COULTER  MONIQUE Orozco        ondansetron disintegrating tablet 8 mg  8 mg Oral Q8H PRN Armand Orozco PA-C        oxyCODONE immediate release tablet 5 mg  5 mg Oral Q4H PRN Klever Cullen MD   5 mg at 03/22/23 0713    oxyCODONE immediate release tablet Tab 10 mg  10 mg Oral Q4H PRN Ousmane Crawford MD   10 mg at 03/23/23 0508    polyethylene glycol packet 17 g  17 g Oral Daily Sadaf Friend MD   17 g at 03/22/23 1422    prochlorperazine injection Soln 5 mg  5 mg Intravenous Q6H PRN Armand Orozco PA-C        ROPIvacaine (PF) 2 mg/ml (0.2%) solution  0.1 mL/hr Perineural Continuous Newtonchente Giraldo MD        senna-docusate 8.6-50 mg per tablet 1 tablet  1 tablet Oral BID Sadaf Friend MD   1 tablet at 03/22/23 2140    sodium chloride 0.9% flush 10 mL  10 mL Intravenous Q12H PRN Armand Orozco PA-C        sodium chloride 0.9% flush 10 mL  10 mL Intravenous PRN Felix Ndiaye MD        traZODone tablet 150 mg  150 mg Oral Nightly Armand Orozco PA-C   150 mg at 03/22/23 2140         Assessment:     Pain control adequate    Plan:     Modify present therapy to improve control of pain    1) Modify interscalene PNC at current infusion rate:6cc/hr cont infusion with 5cc q60m DB  2) Continue multimodals including Tylenol, celecoxib, Robaxin, Gabapentin  3) PRN oxy 5mg and 10mg q4h

## 2023-03-23 NOTE — ADDENDUM NOTE
Addendum  created 03/23/23 1351 by Michell Grove, RN    Order list changed, Pharmacy for encounter modified

## 2023-03-23 NOTE — ASSESSMENT & PLAN NOTE
Patient's FSGs are controlled on current medication regimen.  Last A1c reviewed-   Lab Results   Component Value Date    HGBA1C 7.5 (H) 03/20/2023   Most recent fingerstick glucose reviewed-   Recent Labs   Lab 03/22/23  1633 03/22/23  2118 03/23/23  0614 03/23/23  1146   POCTGLUCOSE 220* 249* 177* 201*     Current correctional scale  Low  Maintain anti-hyperglycemic dose as follows-   Antihyperglycemics (From admission, onward)    Start     Stop Route Frequency Ordered    03/20/23 1641  insulin aspart U-100 pen 0-5 Units         -- SubQ Before meals & nightly PRN 03/20/23 1541      - Hold Oral hypoglycemics while patient is in the hospital  - Diabetic/Cardiac diet

## 2023-03-23 NOTE — PLAN OF CARE
Problem: Adult Inpatient Plan of Care  Goal: Plan of Care Review  Outcome: Ongoing, Progressing  Goal: Patient-Specific Goal (Individualized)  Outcome: Ongoing, Progressing  Goal: Absence of Hospital-Acquired Illness or Injury  Outcome: Ongoing, Progressing  Goal: Optimal Comfort and Wellbeing  Outcome: Ongoing, Progressing     Problem: Infection  Goal: Absence of Infection Signs and Symptoms  Outcome: Ongoing, Progressing     Problem: Skin Injury Risk Increased  Goal: Skin Health and Integrity  Outcome: Ongoing, Progressing     Problem: Fall Injury Risk  Goal: Absence of Fall and Fall-Related Injury  Outcome: Ongoing, Progressing

## 2023-03-23 NOTE — SUBJECTIVE & OBJECTIVE
Interval History: NAEON. AF, VSS. Patient seen and evaluated by me. Patient states her pain is not controlled with her current regimen. Also feels as tho her arm is not stable in the current sling. Consulting services notified. Patient has not had a bowel movement in 3 days. Will advance bowel regimen while on opioids for pain. Otherwise endorses no new complaints at this time. Will continue to monitor.     Review of Systems   Constitutional:  Negative for activity change, chills and fever.   HENT:  Negative for trouble swallowing.    Eyes:  Negative for photophobia and visual disturbance.   Respiratory:  Negative for cough, chest tightness and shortness of breath.    Cardiovascular:  Negative for chest pain, palpitations and leg swelling.   Gastrointestinal:  Negative for abdominal pain, constipation, diarrhea, nausea and vomiting.   Genitourinary:  Negative for dysuria, frequency and hematuria.   Musculoskeletal:  Positive for myalgias. Negative for back pain, gait problem and neck pain.   Skin:  Negative for rash and wound.   Neurological:  Positive for dizziness (resolved) and numbness (improving). Negative for syncope, speech difficulty and light-headedness.   Psychiatric/Behavioral:  Negative for agitation and confusion. The patient is not nervous/anxious.    Objective:     Vital Signs (Most Recent):  Temp: 98.1 °F (36.7 °C) (03/23/23 1145)  Pulse: 77 (03/23/23 1145)  Resp: 16 (03/23/23 1145)  BP: 117/77 (03/23/23 1145)  SpO2: (!) 90 % (03/23/23 1145)   Vital Signs (24h Range):  Temp:  [97.1 °F (36.2 °C)-99.4 °F (37.4 °C)] 98.1 °F (36.7 °C)  Pulse:  [73-91] 77  Resp:  [15-18] 16  SpO2:  [90 %-96 %] 90 %  BP: (109-119)/(56-77) 117/77     Weight: 72.6 kg (160 lb)  Body mass index is 22.32 kg/m².    Intake/Output Summary (Last 24 hours) at 3/23/2023 1335  Last data filed at 3/23/2023 0841  Gross per 24 hour   Intake 380 ml   Output --   Net 380 ml      Physical Exam  Vitals and nursing note reviewed.    Constitutional:       General: She is not in acute distress.     Appearance: She is well-developed.   HENT:      Head: Normocephalic and atraumatic.      Mouth/Throat:      Pharynx: No oropharyngeal exudate.   Eyes:      Conjunctiva/sclera: Conjunctivae normal.      Pupils: Pupils are equal, round, and reactive to light.   Cardiovascular:      Rate and Rhythm: Normal rate and regular rhythm.      Heart sounds: Normal heart sounds.   Pulmonary:      Effort: Pulmonary effort is normal. No respiratory distress.      Breath sounds: Normal breath sounds. No wheezing.      Comments: On RA  Abdominal:      General: Bowel sounds are normal. There is no distension.      Palpations: Abdomen is soft.      Tenderness: There is no abdominal tenderness.   Musculoskeletal:         General: Swelling, tenderness and signs of injury (L upper arm) present. Normal range of motion.      Cervical back: Normal range of motion and neck supple.      Comments: L arm in splint placed in sling  Interscalene PNC   Lymphadenopathy:      Cervical: No cervical adenopathy.   Skin:     General: Skin is warm and dry.      Capillary Refill: Capillary refill takes less than 2 seconds.      Findings: No rash.   Neurological:      Mental Status: She is alert and oriented to person, place, and time.      Cranial Nerves: No cranial nerve deficit.      Sensory: No sensory deficit.      Coordination: Coordination normal.   Psychiatric:         Behavior: Behavior normal.         Thought Content: Thought content normal.         Judgment: Judgment normal.       Significant Labs: All pertinent labs within the past 24 hours have been reviewed.  BMP:   Recent Labs   Lab 03/23/23  0559   *      K 4.1      CO2 30*   BUN 7*   CREATININE 0.6   CALCIUM 8.8   MG 1.7     CBC:   Recent Labs   Lab 03/22/23  0256 03/23/23  0559   WBC 7.80 6.57   HGB 11.6* 10.4*   HCT 35.5* 32.4*    173       Significant Imaging: I have reviewed all pertinent  imaging results/findings within the past 24 hours.

## 2023-03-23 NOTE — ASSESSMENT & PLAN NOTE
LILLY (generalized anxiety disorder)  Patient has persistent depression which is mild and is currently controlled. Will Continue anti-depressant medications. We will not consult psychiatry at this time. Patient does not display psychosis at this time. Continue to monitor closely and adjust plan of care as needed.  - Continue Lexapro, Trazadone   Warm

## 2023-03-23 NOTE — ASSESSMENT & PLAN NOTE
Alix Patel is a 67 y.o. female with a grade I open left midshaft humerus fracture. She also has a nondisplaced fracture of the proximal humerus. She is neurovascularly intact. She does not have any additional injuries. She sustained this injury around 4:00 AM and received her first dose of Ancef at 2:00 PM. Time to antibiotics 10 hours. She was placed into a coaptation splint in the ED under procedural sedation.  Operative versus nonoperative interventions were discussed with the patient at length.  After discussions, the patient elects to proceed with operative intervention for this problem.  Consents obtained at bedside.  Patient marked.  Now s/p L humerus IMN and I&D on 3/21/23 and with left radial nerve palsy.      Continue to monitor for radial nerve function, ok for PNC for pain control  Sling left upper extremity for comfort  Nonweightbearing left upper extremity x8 weeks postop  Range of motion as tolerated left upper extremity  ASA 81 mg b.i.d. x6 weeks for DVT prophylaxis  Nutrition support: Calcium, vitamin-D.

## 2023-03-23 NOTE — SUBJECTIVE & OBJECTIVE
Principal Problem:Open displaced transverse fracture of shaft of left humerus    Principal Orthopedic Problem: same. S/p L humerus IMN and I&D of open fracture wound 3/21/23    Interval History: Pt seen and examined at bedside. CELINA TOPETE, MARIO. Pain controlled and improved from yesterday afternoon. Block ongoing. Reports she does not feel her shoulder is stable, will order new sling from DME. Has begun work with PT.      Review of patient's allergies indicates:   Allergen Reactions    Lamictal [lamotrigine] Rash     Per psychiatry notes       Current Facility-Administered Medications   Medication    0.9%  NaCl infusion    acetaminophen tablet 1,000 mg    aspirin EC tablet 81 mg    atorvastatin tablet 40 mg    bisacodyL suppository 10 mg    celecoxib capsule 200 mg    dextrose 10% bolus 125 mL 125 mL    dextrose 10% bolus 250 mL 250 mL    dextrose 40 % gel 15,000 mg    dextrose 40 % gel 30,000 mg    ergocalciferol capsule 50,000 Units    EScitalopram oxalate tablet 20 mg    gabapentin capsule 300 mg    glucagon (human recombinant) injection 1 mg    insulin aspart U-100 pen 0-5 Units    levothyroxine tablet 100 mcg    melatonin tablet 6 mg    methocarbamoL tablet 500 mg    naloxone 0.4 mg/mL injection 0.02 mg    ondansetron disintegrating tablet 8 mg    oxyCODONE immediate release tablet 5 mg    polyethylene glycol packet 17 g    prochlorperazine injection Soln 5 mg    ROPIvacaine (PF) 2 mg/ml (0.2%) solution    senna-docusate 8.6-50 mg per tablet 1 tablet    sodium chloride 0.9% flush 10 mL    sodium chloride 0.9% flush 10 mL    traZODone tablet 150 mg     Objective:     Vital Signs (Most Recent):  Temp: 98.1 °F (36.7 °C) (03/23/23 1145)  Pulse: 77 (03/23/23 1145)  Resp: 16 (03/23/23 1145)  BP: 117/77 (03/23/23 1145)  SpO2: (!) 90 % (03/23/23 1145)   Vital Signs (24h Range):  Temp:  [97.1 °F (36.2 °C)-99.4 °F (37.4 °C)] 98.1 °F (36.7 °C)  Pulse:  [73-91] 77  Resp:  [15-18] 16  SpO2:  [90 %-96 %] 90 %  BP:  "(109-119)/(56-77) 117/77     Weight: 72.6 kg (160 lb)  Height: 5' 11" (180.3 cm)  Body mass index is 22.32 kg/m².      Intake/Output Summary (Last 24 hours) at 3/23/2023 1326  Last data filed at 3/23/2023 0841  Gross per 24 hour   Intake 380 ml   Output --   Net 380 ml         Ortho/SPM Exam  Gen: NAD, WDWN  CV: peripherally well perfused  Resp: unlabored respirations, symmetric chest rise  Neck: TM  Neuro: CN 2-12 grossly intact. No FND.      MSK:  LUE:  ACE wrap in place, no saturation ntoed  SILT to hand and figners  WWP, BCR  Able to flex all digits and wrist, unable to extend thumb and wrist    Significant Labs: CBC:   Recent Labs   Lab 03/22/23  0256 03/23/23  0559   WBC 7.80 6.57   HGB 11.6* 10.4*   HCT 35.5* 32.4*    173       CMP:   Recent Labs   Lab 03/22/23  0256 03/23/23  0559   * 140   K 4.3 4.1    102   CO2 26 30*   * 188*   BUN 9 7*   CREATININE 0.7 0.6   CALCIUM 9.3 8.8   ANIONGAP 8 8       All pertinent labs within the past 24 hours have been reviewed.    Significant Imaging: I have reviewed all pertinent imaging results/findings.  "

## 2023-03-23 NOTE — ADDENDUM NOTE
Addendum  created 03/23/23 1128 by Sadaf Friend MD    Clinical Note Signed, Order list changed

## 2023-03-23 NOTE — ADDENDUM NOTE
Addendum  created 03/23/23 1314 by Audelia Jonas MD    Charge Capture section accepted, Cosign clinical note with attestation

## 2023-03-24 LAB
ALBUMIN SERPL BCP-MCNC: 3.4 G/DL (ref 3.5–5.2)
ALP SERPL-CCNC: 90 U/L (ref 55–135)
ALT SERPL W/O P-5'-P-CCNC: 37 U/L (ref 10–44)
ANION GAP SERPL CALC-SCNC: 9 MMOL/L (ref 8–16)
AST SERPL-CCNC: 38 U/L (ref 10–40)
BASOPHILS # BLD AUTO: 0.03 K/UL (ref 0–0.2)
BASOPHILS NFR BLD: 0.4 % (ref 0–1.9)
BILIRUB SERPL-MCNC: 0.3 MG/DL (ref 0.1–1)
BUN SERPL-MCNC: 7 MG/DL (ref 8–23)
CALCIUM SERPL-MCNC: 9.3 MG/DL (ref 8.7–10.5)
CHLORIDE SERPL-SCNC: 101 MMOL/L (ref 95–110)
CO2 SERPL-SCNC: 26 MMOL/L (ref 23–29)
CREAT SERPL-MCNC: 0.7 MG/DL (ref 0.5–1.4)
DIFFERENTIAL METHOD: ABNORMAL
EOSINOPHIL # BLD AUTO: 0.1 K/UL (ref 0–0.5)
EOSINOPHIL NFR BLD: 1.3 % (ref 0–8)
ERYTHROCYTE [DISTWIDTH] IN BLOOD BY AUTOMATED COUNT: 11.9 % (ref 11.5–14.5)
EST. GFR  (NO RACE VARIABLE): >60 ML/MIN/1.73 M^2
GLUCOSE SERPL-MCNC: 230 MG/DL (ref 70–110)
HCT VFR BLD AUTO: 35 % (ref 37–48.5)
HGB BLD-MCNC: 11.4 G/DL (ref 12–16)
IMM GRANULOCYTES # BLD AUTO: 0.03 K/UL (ref 0–0.04)
IMM GRANULOCYTES NFR BLD AUTO: 0.4 % (ref 0–0.5)
LYMPHOCYTES # BLD AUTO: 2.4 K/UL (ref 1–4.8)
LYMPHOCYTES NFR BLD: 31.4 % (ref 18–48)
MCH RBC QN AUTO: 30.4 PG (ref 27–31)
MCHC RBC AUTO-ENTMCNC: 32.6 G/DL (ref 32–36)
MCV RBC AUTO: 93 FL (ref 82–98)
MONOCYTES # BLD AUTO: 0.5 K/UL (ref 0.3–1)
MONOCYTES NFR BLD: 7 % (ref 4–15)
NEUTROPHILS # BLD AUTO: 4.6 K/UL (ref 1.8–7.7)
NEUTROPHILS NFR BLD: 59.5 % (ref 38–73)
NRBC BLD-RTO: 0 /100 WBC
PLATELET # BLD AUTO: 196 K/UL (ref 150–450)
PMV BLD AUTO: 10.7 FL (ref 9.2–12.9)
POCT GLUCOSE: 203 MG/DL (ref 70–110)
POCT GLUCOSE: 217 MG/DL (ref 70–110)
POCT GLUCOSE: 226 MG/DL (ref 70–110)
POCT GLUCOSE: 235 MG/DL (ref 70–110)
POTASSIUM SERPL-SCNC: 4.1 MMOL/L (ref 3.5–5.1)
PROT SERPL-MCNC: 6.3 G/DL (ref 6–8.4)
RBC # BLD AUTO: 3.75 M/UL (ref 4–5.4)
SODIUM SERPL-SCNC: 136 MMOL/L (ref 136–145)
WBC # BLD AUTO: 7.71 K/UL (ref 3.9–12.7)

## 2023-03-24 PROCEDURE — 97116 GAIT TRAINING THERAPY: CPT | Mod: CQ

## 2023-03-24 PROCEDURE — 97112 NEUROMUSCULAR REEDUCATION: CPT

## 2023-03-24 PROCEDURE — 25000003 PHARM REV CODE 250

## 2023-03-24 PROCEDURE — 36415 COLL VENOUS BLD VENIPUNCTURE: CPT

## 2023-03-24 PROCEDURE — 97535 SELF CARE MNGMENT TRAINING: CPT

## 2023-03-24 PROCEDURE — 85025 COMPLETE CBC W/AUTO DIFF WBC: CPT

## 2023-03-24 PROCEDURE — 63600175 PHARM REV CODE 636 W HCPCS

## 2023-03-24 PROCEDURE — 97530 THERAPEUTIC ACTIVITIES: CPT

## 2023-03-24 PROCEDURE — 99231 SBSQ HOSP IP/OBS SF/LOW 25: CPT | Mod: ,,, | Performed by: ANESTHESIOLOGY

## 2023-03-24 PROCEDURE — 94761 N-INVAS EAR/PLS OXIMETRY MLT: CPT

## 2023-03-24 PROCEDURE — 11000001 HC ACUTE MED/SURG PRIVATE ROOM

## 2023-03-24 PROCEDURE — 80053 COMPREHEN METABOLIC PANEL: CPT

## 2023-03-24 PROCEDURE — 99233 PR SUBSEQUENT HOSPITAL CARE,LEVL III: ICD-10-PCS | Mod: ,,,

## 2023-03-24 PROCEDURE — 99233 SBSQ HOSP IP/OBS HIGH 50: CPT | Mod: ,,,

## 2023-03-24 PROCEDURE — 25000003 PHARM REV CODE 250: Performed by: ORTHOPAEDIC SURGERY

## 2023-03-24 PROCEDURE — 99231 PR SUBSEQUENT HOSPITAL CARE,LEVL I: ICD-10-PCS | Mod: ,,, | Performed by: ANESTHESIOLOGY

## 2023-03-24 RX ORDER — ROPIVACAINE HYDROCHLORIDE 2 MG/ML
INJECTION, SOLUTION EPIDURAL; INFILTRATION; PERINEURAL CONTINUOUS
Status: DISCONTINUED | OUTPATIENT
Start: 2023-03-24 | End: 2023-03-25

## 2023-03-24 RX ADMIN — CELECOXIB 200 MG: 200 CAPSULE ORAL at 09:03

## 2023-03-24 RX ADMIN — LEVOTHYROXINE SODIUM 100 MCG: 50 TABLET ORAL at 07:03

## 2023-03-24 RX ADMIN — ACETAMINOPHEN 1000 MG: 500 TABLET ORAL at 05:03

## 2023-03-24 RX ADMIN — ESCITALOPRAM OXALATE 20 MG: 20 TABLET ORAL at 09:03

## 2023-03-24 RX ADMIN — BISACODYL 10 MG: 10 SUPPOSITORY RECTAL at 02:03

## 2023-03-24 RX ADMIN — TRAZODONE HYDROCHLORIDE 150 MG: 50 TABLET ORAL at 08:03

## 2023-03-24 RX ADMIN — ROPIVACAINE HYDROCHLORIDE 6 ML/HR: 2 INJECTION, SOLUTION EPIDURAL; INFILTRATION at 12:03

## 2023-03-24 RX ADMIN — GABAPENTIN 300 MG: 300 CAPSULE ORAL at 08:03

## 2023-03-24 RX ADMIN — INSULIN ASPART 2 UNITS: 100 INJECTION, SOLUTION INTRAVENOUS; SUBCUTANEOUS at 09:03

## 2023-03-24 RX ADMIN — OXYCODONE HYDROCHLORIDE 5 MG: 5 TABLET ORAL at 08:03

## 2023-03-24 RX ADMIN — POLYETHYLENE GLYCOL 3350 17 G: 17 POWDER, FOR SOLUTION ORAL at 08:03

## 2023-03-24 RX ADMIN — SENNOSIDES AND DOCUSATE SODIUM 1 TABLET: 50; 8.6 TABLET ORAL at 09:03

## 2023-03-24 RX ADMIN — Medication 6 MG: at 10:03

## 2023-03-24 RX ADMIN — GABAPENTIN 300 MG: 300 CAPSULE ORAL at 03:03

## 2023-03-24 RX ADMIN — METHOCARBAMOL 500 MG: 500 TABLET ORAL at 10:03

## 2023-03-24 RX ADMIN — ATORVASTATIN CALCIUM 40 MG: 40 TABLET, FILM COATED ORAL at 09:03

## 2023-03-24 RX ADMIN — GABAPENTIN 300 MG: 300 CAPSULE ORAL at 09:03

## 2023-03-24 RX ADMIN — SENNOSIDES AND DOCUSATE SODIUM 1 TABLET: 50; 8.6 TABLET ORAL at 08:03

## 2023-03-24 RX ADMIN — ASPIRIN 81 MG: 81 TABLET, COATED ORAL at 09:03

## 2023-03-24 RX ADMIN — ACETAMINOPHEN 1000 MG: 500 TABLET ORAL at 01:03

## 2023-03-24 RX ADMIN — INSULIN ASPART 2 UNITS: 100 INJECTION, SOLUTION INTRAVENOUS; SUBCUTANEOUS at 12:03

## 2023-03-24 RX ADMIN — INSULIN ASPART 2 UNITS: 100 INJECTION, SOLUTION INTRAVENOUS; SUBCUTANEOUS at 05:03

## 2023-03-24 RX ADMIN — METHOCARBAMOL 500 MG: 500 TABLET ORAL at 05:03

## 2023-03-24 RX ADMIN — INSULIN ASPART 1 UNITS: 100 INJECTION, SOLUTION INTRAVENOUS; SUBCUTANEOUS at 10:03

## 2023-03-24 RX ADMIN — ASPIRIN 81 MG: 81 TABLET, COATED ORAL at 08:03

## 2023-03-24 RX ADMIN — ACETAMINOPHEN 1000 MG: 500 TABLET ORAL at 10:03

## 2023-03-24 RX ADMIN — OXYCODONE HYDROCHLORIDE 5 MG: 5 TABLET ORAL at 12:03

## 2023-03-24 RX ADMIN — OXYCODONE HYDROCHLORIDE 5 MG: 5 TABLET ORAL at 05:03

## 2023-03-24 RX ADMIN — METHOCARBAMOL 500 MG: 500 TABLET ORAL at 01:03

## 2023-03-24 RX ADMIN — POLYETHYLENE GLYCOL 3350 17 G: 17 POWDER, FOR SOLUTION ORAL at 09:03

## 2023-03-24 NOTE — PT/OT/SLP PROGRESS
"Occupational Therapy   Treatment    Name: Alix Patel  MRN: [id number]  Admitting Diagnosis:  Open displaced transverse fracture of shaft of left humerus  3 Days Post-Op    Recommendations:     Discharge Recommendations: home health OT  Discharge Equipment Recommendations:  none  Barriers to discharge:  None    Assessment:     Alix Patel is a 67 y.o. female with a medical diagnosis of Open displaced transverse fracture of shaft of left humerus.  She presents with upbeat attitude and willing to participate. Pt demonstrated A/PROM exercises of LUE with minimal wrist extension with moderate wrist drop. Pt educated on doffing / donning UE sling / wrist brace. Pt performed NMR through self-care / ADLs. Pt required [place] / active assist to engage LUE. As stated by pt she is L hand dominant and requires extremity for work / leisure activity. 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 Performance deficits affecting function are weakness, impaired endurance, impaired self care skills, impaired functional mobility, gait instability, impaired balance, decreased upper extremity function, decreased ROM, impaired coordination, impaired fine motor.     Rehab Prognosis:  Good; patient would benefit from acute skilled OT services to address these deficits and reach maximum level of function.       Plan:     Patient to be seen 3 x/week to address the above listed problems via self-care/home management, neuromuscular re-education, therapeutic activities, therapeutic exercises  Plan of Care Expires: 03/30/23  Plan of Care Reviewed with: patient    Subjective     Chief Complaint: "Will I be back at work next week?"  Patient/Family Comments/goals: None  Pain/Comfort:  Pain Rating 1: 0/10    Objective:     Communicated with: nurse prior to session.  Patient found HOB elevated with telemetry, PCA (L UE sling/resting hand splint) upon OT entry to " room.    General Precautions: Standard, fall    Orthopedic Precautions:LUE non weight bearing  Braces: UE Sling  Respiratory Status: Room air     Occupational Performance:     Bed Mobility:    Patient completed Rolling/Turning to Left with  contact guard assistance  Patient completed Rolling/Turning to Right with contact guard assistance  Patient completed Supine to Sit with contact guard assistance     Functional Mobility/Transfers:  Patient completed Sit <> Stand Transfer with contact guard assistance  with  no assistive device   Patient completed Toilet Transfer Stand Pivot technique with contact guard assistance with  no AD  Functional Mobility: Pt ambulated to / from bathroom navigating IV pole    Activities of Daily Living:  Feeding:  minimum assistance Pt required (A) bringing coffee cup to mouth using LUE  Grooming: moderate assistance Pt required AA of LUE to brush teeth / rinse mouth  Upper Body Dressing: moderate assistance Pt required (A) doffing / donning LUE sling/ brace      AMPAC 6 Click ADL: 19    Treatment & Education:  Pt educated on role of OT/POC  Pt. Educated on safety precautions   Pt provided self-care/ADL re-education  Pt reviewed bed mobility/functional mobility    Patient left up in chair with all lines intact, call button in reach, and nurse notified    GOALS:   Multidisciplinary Problems       Occupational Therapy Goals          Problem: Occupational Therapy    Goal Priority Disciplines Outcome Interventions   Occupational Therapy Goal     OT, PT/OT Ongoing, Progressing    Description: Goals to be met by: 3/25/23     Patient will increase functional independence with ADLs by performing:    UE Dressing with Modified [place].  LE Dressing with Modified [place].  Grooming while standing at sink with Modified [place].  Toileting from toilet with Modified [place] for hygiene and clothing management.   Supine to sit with Modified [place].  Toilet transfer to toilet  with Modified [place].  Upper extremity exercise program x10 reps per handout, with independence.                         Time Tracking:     OT Date of Treatment: 03/24/23  OT Start Time: 0833  OT Stop Time: 0856  OT Total Time (min): 23 min    Billable Minutes:Therapeutic Activity 10  Neuromuscular Re-education 13    OT/GAGAN: OT          3/24/2023

## 2023-03-24 NOTE — ASSESSMENT & PLAN NOTE
Patient's FSGs are controlled on current medication regimen.  Last A1c reviewed-   Lab Results   Component Value Date    HGBA1C 7.5 (H) 03/20/2023   Most recent fingerstick glucose reviewed-   Recent Labs   Lab 03/23/23  2116 03/24/23  0557   POCTGLUCOSE 189* 203*     Current correctional scale  Low  Maintain anti-hyperglycemic dose as follows-   Antihyperglycemics (From admission, onward)    Start     Stop Route Frequency Ordered    03/20/23 1641  insulin aspart U-100 pen 0-5 Units         -- SubQ Before meals & nightly PRN 03/20/23 1541      - Hold Oral hypoglycemics while patient is in the hospital  - Diabetic/Cardiac diet

## 2023-03-24 NOTE — ANESTHESIA POST-OP PAIN MANAGEMENT
Acute Pain Service Progress Note    Alix Patel is a 67 y.o., female, [id number].    Surgery:  ORIF, FRACTURE, HUMERUS (Left: Arm)    Post Op Day #: 3    Catheter type: perineural  interscalene    Infusion type: [place] 0.2% at 6cc/hr cont infusion with 5cc q60m DB    Problem List:    Active Hospital Problems    Diagnosis  POA    *Grade I open displaced transverse fracture of shaft of left humerus [S42.322B]  Yes    Fall [W19.XXXA]  Yes    Vitamin D deficiency [E55.9]  Yes    Type 2 diabetes mellitus with hyperglycemia, without long-term current use of insulin [E11.65]  Yes    LILLY (generalized anxiety disorder) [F41.1]  Yes    Hypothyroidism [E03.9]  Yes    Thyroid nodule [E04.1]  Yes    Hyperlipidemia with target low density lipoprotein (LDL) cholesterol less than 100 mg/dL [E78.5]  Yes    Depression [F32.A]  Yes      Resolved Hospital Problems   No resolved problems to display.       Subjective:     General appearance of alert, oriented, no complaints   Pain with rest: 3    Numbers   Pain with movement: 6    Numbers   Side Effects    1. Pruritis No    2. Nausea No    3. Motor Blockade radial nerve palsy    4. Sedation No, 1=awake and alert    Objective:     Catheter site clean, dry, intact         Vitals   Vitals:    03/24/23 0540   BP:    Pulse:    Resp: 16   Temp:         Labs    Admission on 03/20/2023   Component Date Value Ref Range Status    Sodium 03/20/2023 137  136 - 145 mmol/L Final    Potassium 03/20/2023 3.8  3.5 - 5.1 mmol/L Final    Chloride 03/20/2023 99  95 - 110 mmol/L Final    CO2 03/20/2023 25  23 - 29 mmol/L Final    Glucose 03/20/2023 187 (H)  70 - 110 mg/dL Final    BUN 03/20/2023 7 (L)  8 - 23 mg/dL Final    Creatinine 03/20/2023 0.7  0.5 - 1.4 mg/dL Final    Calcium 03/20/2023 9.5  8.7 - 10.5 mg/dL Final    Total Protein 03/20/2023 7.3  6.0 - 8.4 g/dL Final    Albumin 03/20/2023 4.1  3.5 - 5.2 g/dL Final    Total Bilirubin 03/20/2023 0.6  0.1 - 1.0 mg/dL Final     Alkaline Phosphatase 03/20/2023 73  55 - 135 U/L Final    AST 03/20/2023 32  10 - 40 U/L Final    ALT 03/20/2023 64 (H)  10 - 44 U/L Final    Anion Gap 03/20/2023 13  8 - 16 mmol/L Final    eGFR 03/20/2023 >60.0  >60 mL/min/1.73 m^2 Final    WBC 03/20/2023 10.13  3.90 - 12.70 K/uL Final    RBC 03/20/2023 4.39  4.00 - 5.40 M/uL Final    Hemoglobin 03/20/2023 13.3  12.0 - 16.0 g/dL Final    Hematocrit 03/20/2023 40.2  37.0 - 48.5 % Final    MCV 03/20/2023 92  82 - 98 fL Final    MCH 03/20/2023 30.3  27.0 - 31.0 pg Final    MCHC 03/20/2023 33.1  32.0 - 36.0 g/dL Final    RDW 03/20/2023 11.8  11.5 - 14.5 % Final    Platelets 03/20/2023 205  150 - 450 K/uL Final    MPV 03/20/2023 10.6  9.2 - 12.9 fL Final    Immature Granulocytes 03/20/2023 0.2  0.0 - 0.5 % Final    Gran # (ANC) 03/20/2023 6.7  1.8 - 7.7 K/uL Final    Immature Grans (Abs) 03/20/2023 0.02  0.00 - 0.04 K/uL Final    Lymph # 03/20/2023 2.5  1.0 - 4.8 K/uL Final    Mono # 03/20/2023 0.9  0.3 - 1.0 K/uL Final    Eos # 03/20/2023 0.0  0.0 - 0.5 K/uL Final    Baso # 03/20/2023 0.03  0.00 - 0.20 K/uL Final    nRBC 03/20/2023 0  0 /100 WBC Final    Gran % 03/20/2023 65.8  38.0 - 73.0 % Final    Lymph % 03/20/2023 25.1  18.0 - 48.0 % Final    Mono % 03/20/2023 8.5  4.0 - 15.0 % Final    Eosinophil % 03/20/2023 0.1  0.0 - 8.0 % Final    Basophil % 03/20/2023 0.3  0.0 - 1.9 % Final    Differential Method 03/20/2023 Automated   Final    Hemoglobin A1C 03/20/2023 7.5 (H)  4.0 - 5.6 % Final    Estimated Avg Glucose 03/20/2023 169 (H)  68 - 131 mg/dL Final    Magnesium 03/20/2023 1.8  1.6 - 2.6 mg/dL Final    Phosphorus 03/20/2023 3.9  2.7 - 4.5 mg/dL Final    Prothrombin Time 03/20/2023 11.4  9.0 - 12.5 sec Final    INR 03/20/2023 1.1  0.8 - 1.2 Final    Group & Rh 03/20/2023 O POS   Final    Indirect [place] 03/20/2023 NEG   Final    Vit D, 25-Hydroxy 03/20/2023 14 (L)  30 - 96 ng/mL Final    POCT Glucose 03/20/2023 173 (H)  70 -  110 mg/dL Final    Sodium 03/21/2023 138  136 - 145 mmol/L Final    Potassium 03/21/2023 3.8  3.5 - 5.1 mmol/L Final    Chloride 03/21/2023 100  95 - 110 mmol/L Final    CO2 03/21/2023 27  23 - 29 mmol/L Final    Glucose 03/21/2023 151 (H)  70 - 110 mg/dL Final    BUN 03/21/2023 7 (L)  8 - 23 mg/dL Final    Creatinine 03/21/2023 0.6  0.5 - 1.4 mg/dL Final    Calcium 03/21/2023 9.4  8.7 - 10.5 mg/dL Final    Anion Gap 03/21/2023 11  8 - 16 mmol/L Final    eGFR 03/21/2023 >60.0  >60 mL/min/1.73 m^2 Final    Magnesium 03/21/2023 1.8  1.6 - 2.6 mg/dL Final    Phosphorus 03/21/2023 3.1  2.7 - 4.5 mg/dL Final    WBC 03/21/2023 7.49  3.90 - 12.70 K/uL Final    RBC 03/21/2023 4.22  4.00 - 5.40 M/uL Final    Hemoglobin 03/21/2023 12.7  12.0 - 16.0 g/dL Final    Hematocrit 03/21/2023 39.3  37.0 - 48.5 % Final    MCV 03/21/2023 93  82 - 98 fL Final    MCH 03/21/2023 30.1  27.0 - 31.0 pg Final    MCHC 03/21/2023 32.3  32.0 - 36.0 g/dL Final    RDW 03/21/2023 12.0  11.5 - 14.5 % Final    Platelets 03/21/2023 210  150 - 450 K/uL Final    MPV 03/21/2023 11.2  9.2 - 12.9 fL Final    Immature Granulocytes 03/21/2023 0.3  0.0 - 0.5 % Final    Gran # (ANC) 03/21/2023 4.2  1.8 - 7.7 K/uL Final    Immature Grans (Abs) 03/21/2023 0.02  0.00 - 0.04 K/uL Final    Lymph # 03/21/2023 2.5  1.0 - 4.8 K/uL Final    Mono # 03/21/2023 0.7  0.3 - 1.0 K/uL Final    Eos # 03/21/2023 0.0  0.0 - 0.5 K/uL Final    Baso # 03/21/2023 0.03  0.00 - 0.20 K/uL Final    nRBC 03/21/2023 0  0 /100 WBC Final    Gran % 03/21/2023 56.6  38.0 - 73.0 % Final    Lymph % 03/21/2023 33.4  18.0 - 48.0 % Final    Mono % 03/21/2023 8.8  4.0 - 15.0 % Final    Eosinophil % 03/21/2023 0.5  0.0 - 8.0 % Final    Basophil % 03/21/2023 0.4  0.0 - 1.9 % Final    Differential Method 03/21/2023 Automated   Final    Cholesterol 03/21/2023 245 (H)  120 - 199 mg/dL Final    Triglycerides 03/21/2023 112  30 - 150 mg/dL Final    HDL 03/21/2023 36  (L)  40 - 75 mg/dL Final    LDL Cholesterol 03/21/2023 186.6 (H)  63.0 - 159.0 mg/dL Final    HDL/Cholesterol Ratio 03/21/2023 14.7 (L)  20.0 - 50.0 % Final    Total Cholesterol/HDL Ratio 03/21/2023 6.8 (H)  2.0 - 5.0 Final    Non-HDL Cholesterol 03/21/2023 209  mg/dL Final    TSH 03/21/2023 3.070  0.400 - 4.000 uIU/mL Final    POCT Glucose 03/21/2023 192 (H)  70 - 110 mg/dL Final    Anaerobic Culture 03/21/2023 Culture in progress   Preliminary    Aerobic Bacterial Culture 03/21/2023 No growth   Preliminary    POCT Glucose 03/21/2023 238 (H)  70 - 110 mg/dL Final    POCT Glucose 03/21/2023 274 (H)  70 - 110 mg/dL Final    Sodium 03/22/2023 135 (L)  136 - 145 mmol/L Final    Potassium 03/22/2023 4.3  3.5 - 5.1 mmol/L Final    Chloride 03/22/2023 101  95 - 110 mmol/L Final    CO2 03/22/2023 26  23 - 29 mmol/L Final    Glucose 03/22/2023 166 (H)  70 - 110 mg/dL Final    BUN 03/22/2023 9  8 - 23 mg/dL Final    Creatinine 03/22/2023 0.7  0.5 - 1.4 mg/dL Final    Calcium 03/22/2023 9.3  8.7 - 10.5 mg/dL Final    Anion Gap 03/22/2023 8  8 - 16 mmol/L Final    eGFR 03/22/2023 >60.0  >60 mL/min/1.73 m^2 Final    Magnesium 03/22/2023 1.8  1.6 - 2.6 mg/dL Final    Phosphorus 03/22/2023 3.3  2.7 - 4.5 mg/dL Final    WBC 03/22/2023 7.80  3.90 - 12.70 K/uL Final    RBC 03/22/2023 3.81 (L)  4.00 - 5.40 M/uL Final    Hemoglobin 03/22/2023 11.6 (L)  12.0 - 16.0 g/dL Final    Hematocrit 03/22/2023 35.5 (L)  37.0 - 48.5 % Final    MCV 03/22/2023 93  82 - 98 fL Final    MCH 03/22/2023 30.4  27.0 - 31.0 pg Final    MCHC 03/22/2023 32.7  32.0 - 36.0 g/dL Final    RDW 03/22/2023 11.8  11.5 - 14.5 % Final    Platelets 03/22/2023 178  150 - 450 K/uL Final    MPV 03/22/2023 10.6  9.2 - 12.9 fL Final    Immature Granulocytes 03/22/2023 0.3  0.0 - 0.5 % Final    Gran # (ANC) 03/22/2023 4.8  1.8 - 7.7 K/uL Final    Immature Grans (Abs) 03/22/2023 0.02  0.00 - 0.04 K/uL Final    Lymph # 03/22/2023 2.3  1.0 -  4.8 K/uL Final    Mono # 03/22/2023 0.6  0.3 - 1.0 K/uL Final    Eos # 03/22/2023 0.0  0.0 - 0.5 K/uL Final    Baso # 03/22/2023 0.02  0.00 - 0.20 K/uL Final    nRBC 03/22/2023 0  0 /100 WBC Final    Gran % 03/22/2023 61.8  38.0 - 73.0 % Final    Lymph % 03/22/2023 29.1  18.0 - 48.0 % Final    Mono % 03/22/2023 8.2  4.0 - 15.0 % Final    Eosinophil % 03/22/2023 0.3  0.0 - 8.0 % Final    Basophil % 03/22/2023 0.3  0.0 - 1.9 % Final    Differential Method 03/22/2023 Automated   Final    POCT Glucose 03/22/2023 155 (H)  70 - 110 mg/dL Final    POCT Glucose 03/22/2023 259 (H)  70 - 110 mg/dL Final    POCT Glucose 03/22/2023 249 (H)  70 - 110 mg/dL Final    POCT Glucose 03/22/2023 220 (H)  70 - 110 mg/dL Final    Sodium 03/23/2023 140  136 - 145 mmol/L Final    Potassium 03/23/2023 4.1  3.5 - 5.1 mmol/L Final    Chloride 03/23/2023 102  95 - 110 mmol/L Final    CO2 03/23/2023 30 (H)  23 - 29 mmol/L Final    Glucose 03/23/2023 188 (H)  70 - 110 mg/dL Final    BUN 03/23/2023 7 (L)  8 - 23 mg/dL Final    Creatinine 03/23/2023 0.6  0.5 - 1.4 mg/dL Final    Calcium 03/23/2023 8.8  8.7 - 10.5 mg/dL Final    Anion Gap 03/23/2023 8  8 - 16 mmol/L Final    eGFR 03/23/2023 >60.0  >60 mL/min/1.73 m^2 Final    Magnesium 03/23/2023 1.7  1.6 - 2.6 mg/dL Final    Phosphorus 03/23/2023 3.8  2.7 - 4.5 mg/dL Final    WBC 03/23/2023 6.57  3.90 - 12.70 K/uL Final    RBC 03/23/2023 3.44 (L)  4.00 - 5.40 M/uL Final    Hemoglobin 03/23/2023 10.4 (L)  12.0 - 16.0 g/dL Final    Hematocrit 03/23/2023 32.4 (L)  37.0 - 48.5 % Final    MCV 03/23/2023 94  82 - 98 fL Final    MCH 03/23/2023 30.2  27.0 - 31.0 pg Final    MCHC 03/23/2023 32.1  32.0 - 36.0 g/dL Final    RDW 03/23/2023 12.0  11.5 - 14.5 % Final    Platelets 03/23/2023 173  150 - 450 K/uL Final    MPV 03/23/2023 11.0  9.2 - 12.9 fL Final    Immature Granulocytes 03/23/2023 0.3  0.0 - 0.5 % Final    Gran # (ANC) 03/23/2023 3.4  1.8 - 7.7 K/uL Final     Immature Grans (Abs) 03/23/2023 0.02  0.00 - 0.04 K/uL Final    Lymph # 03/23/2023 2.6  1.0 - 4.8 K/uL Final    Mono # 03/23/2023 0.5  0.3 - 1.0 K/uL Final    Eos # 03/23/2023 0.1  0.0 - 0.5 K/uL Final    Baso # 03/23/2023 0.03  0.00 - 0.20 K/uL Final    nRBC 03/23/2023 0  0 /100 WBC Final    Gran % 03/23/2023 51.9  38.0 - 73.0 % Final    Lymph % 03/23/2023 38.8  18.0 - 48.0 % Final    Mono % 03/23/2023 7.3  4.0 - 15.0 % Final    Eosinophil % 03/23/2023 1.2  0.0 - 8.0 % Final    Basophil % 03/23/2023 0.5  0.0 - 1.9 % Final    Differential Method 03/23/2023 Automated   Final    POCT Glucose 03/23/2023 177 (H)  70 - 110 mg/dL Final    POCT Glucose 03/23/2023 201 (H)  70 - 110 mg/dL Final    POCT Glucose 03/23/2023 228 (H)  70 - 110 mg/dL Final    POCT Glucose 03/23/2023 189 (H)  70 - 110 mg/dL Final    POCT Glucose 03/24/2023 203 (H)  70 - 110 mg/dL Final        Meds   Current Facility-Administered Medications   Medication Dose Route Frequency Provider Last Rate Last Admin    0.9%  NaCl infusion   Intravenous Continuous Celena Matthews MD   Stopped at 03/21/23 1822    acetaminophen tablet 1,000 mg  1,000 mg Oral Q8H Sadaf Friend MD   1,000 mg at 03/24/23 0540    aspirin EC tablet 81 mg  81 mg Oral BID Jn Garza MD   81 mg at 03/23/23 2049    atorvastatin tablet 40 mg  40 mg Oral Daily Armand Orozco PA-C   40 mg at 03/23/23 0833    bisacodyL suppository 10 mg  10 mg Rectal Daily PRN Armand Orozco PA-C        celecoxib capsule 200 mg  200 mg Oral Daily Sadaf Friend MD   200 mg at 03/23/23 0832    dextrose 10% bolus 125 mL 125 mL  12.5 g Intravenous PRN Armand Orozco PA-C        dextrose 10% bolus 250 mL 250 mL  25 g Intravenous PRN Armand Orozco PA-C        dextrose 40 % gel 15,000 mg  15 g Oral PRN Armand Orozco PA-C        dextrose 40 % gel 30,000 mg  30 g Oral PRN Armand Orozco PA-C        ergocalciferol capsule 50,000 Units  50,000 Units Oral Q7 Days Felix  MD Senthil   50,000 Units at 03/21/23 0811    EScitalopram oxalate tablet 20 mg  20 mg Oral Daily Armand Orozco PA-C   20 mg at 03/23/23 0832    gabapentin capsule 300 mg  300 mg Oral TID Ousmane Crawford MD   300 mg at 03/23/23 2049    glucagon (human recombinant) injection 1 mg  1 mg Intramuscular PRN Armand Orozco PA-C        insulin aspart U-100 pen 0-5 Units  0-5 Units Subcutaneous QID (AC + HS) PRN Armand Orozco PA-C   2 Units at 03/22/23 1634    levothyroxine tablet 100 mcg  100 mcg Oral Daily Armand Orozco PA-C   100 mcg at 03/23/23 0508    melatonin tablet 6 mg  6 mg Oral Nightly PRN Armand Orozco PA-C   6 mg at 03/23/23 2050    methocarbamoL tablet 500 mg  500 mg Oral Q8H Sadaf Friend MD   500 mg at 03/24/23 0540    naloxone 0.4 mg/mL injection 0.02 mg  0.02 mg Intravenous PRN Armand Orozco PA-C        ondansetron disintegrating tablet 8 mg  8 mg Oral Q8H PRN Armand Orozco PA-C        oxyCODONE immediate release tablet 5 mg  5 mg Oral Q4H PRN Sadaf Friend MD   5 mg at 03/24/23 0540    polyethylene glycol packet 17 g  17 g Oral BID Marium Velazquez PA-C   17 g at 03/23/23 2049    prochlorperazine injection Soln 5 mg  5 mg Intravenous Q6H PRN Armand Orozco PA-C        ROPIvacaine (PF) 2 mg/ml (0.2%) solution  6 mL/hr Perineural Continuous Sadaf Friend MD 6 mL/hr at 03/23/23 1808 6 mL/hr at 03/23/23 1808    senna-docusate 8.6-50 mg per tablet 1 tablet  1 tablet Oral BID Sadaf Friend MD   1 tablet at 03/23/23 2049    sodium chloride 0.9% flush 10 mL  10 mL Intravenous Q12H PRN Armand Orozco PA-C        sodium chloride 0.9% flush 10 mL  10 mL Intravenous PRN Felix Ndiaye MD        traZODone tablet 150 mg  150 mg Oral Nightly Armand Orozco PA-C   150 mg at 03/23/23 2049           Assessment:     Pain control adequate    Plan:     Patient doing well, continue present treatment.    1) Continue interscalene PNC at current infusion rate:6cc/hr  cont infusion with 5cc q60m DB  2) Continue multimodals including Tylenol, celecoxib, Robaxin, Gabapentin  3) PRN oxy 5mg q4h

## 2023-03-24 NOTE — ADDENDUM NOTE
Addendum  created 03/24/23 0830 by Michell Grove, RN    Order list changed, Pharmacy for encounter modified

## 2023-03-24 NOTE — ASSESSMENT & PLAN NOTE
Fall  Presented to the ED by EMS for L arm pain following a fall at home. Patient took two Trazodones to help her sleep. Typically takes one. She felt dizzy when standing in the kitchen and fell to the ground on her left side. She denies any LOC, preceding palpitations, confusion, or weakness. She denies any head injuries. Patient was able to stand back up to continue fixing her meal. Woke up unable to move arm without severe pain prompting her to call EMS. Dizziness resolved.    - CTH left frontal extracranial soft tissue swelling without evidence of underlying fracture or acute intracranial hemorrhage. Ventriculomegaly out of proportion to the degree of sulcal enlargement. While this could relate to cerebral volume loss, the configuration does at least raise the possibility of normal pressure hydrocephalus. Clinical correlation required.  - CXR with no acute intrathoracic process  - L shoulder and humerus XR with displaced and comminuted acute fracture of the left mid humeral shaft with nondisplaced fracture of the left proximal humerus  - CT L arm with fractures of the proximal and mid aspects of the humerus as described.  - EKG NSR RBBB (consistent with priors)  - Placed in splint under sedation in ED  - Orthostatic vitals negative  - Ortho consulted, appreciate assistance  - Continue to monitor for radial nerve function, ok for PNC for pain control  - Sling left upper extremity for comfort  - Nonweightbearing left upper extremity x8 weeks postop  - Range of motion as tolerated left upper extremity  - ASA 81 mg b.i.d. x6 weeks for DVT prophylaxis  - Nutrition support: Calcium, vitamin-D.  - Anesthesia managing interscalene PNC    Continue interscalene PNC at current infusion rate:6cc/hr cont infusion with 5cc q60m DB    Continue multimodals including Tylenol, celecoxib, Robaxin, Gabapentin   PRN oxy 5mg q4h  - Continue MM pain control  - Bowel regimen for constipation  - PT/OT consulted, plan for  PT/OT  -  Ortho referral placed for clinic follow up

## 2023-03-24 NOTE — PLAN OF CARE
Problem: Adult Inpatient Plan of Care  Goal: Plan of Care Review  Outcome: Ongoing, Progressing  Goal: Readiness for Transition of Care  Outcome: Ongoing, Progressing     Problem: Infection  Goal: Absence of Infection Signs and Symptoms  Outcome: Ongoing, Progressing     Problem: Skin Injury Risk Increased  Goal: Skin Health and Integrity  Outcome: Ongoing, Progressing     Problem: Fall Injury Risk  Goal: Absence of Fall and Fall-Related Injury  Outcome: Ongoing, Progressing     Patient AOX4 showing no s/s respiratory or circulatory distress. Safety precautions in place as charted and appropriate. Pain being well managed with PNC & PRN medications. RN reinforced education regarding using PNC bolus button,  as well as available prn medications. Patient states no unmet needs at this time, will continue with plan of care. 1:6

## 2023-03-24 NOTE — ADDENDUM NOTE
Addendum  created 03/24/23 1312 by Audelia Jonas MD    Charge Capture section accepted, Cosign clinical note with attestation

## 2023-03-24 NOTE — PLAN OF CARE
Problem: Occupational Therapy  Goal: Occupational Therapy Goal  Description: Goals to be met by: 3/25/23     Patient will increase functional independence with ADLs by performing:    UE Dressing with Modified [place].  LE Dressing with Modified [place].  Grooming while standing at sink with Modified [place].  Toileting from toilet with Modified [place] for hygiene and clothing management.   Supine to sit with Modified [place].  Toilet transfer to toilet with Modified [place].  Upper extremity exercise program x10 reps per handout, with independence.    Outcome: Ongoing, Progressing

## 2023-03-24 NOTE — PLAN OF CARE
IMM:    CHW delivered IMM to patient / family at bedside with questions answered.         Shalonda Merchant  Community Health Worker(CHW)  Case Management  Ext. [phone]

## 2023-03-24 NOTE — PT/OT/SLP PROGRESS
Physical Therapy Treatment    Patient Name:  Alix Patel   MRN:  [id number]    Recommendations:     Discharge Recommendations: other (see comments) would benefit from post-acute OT for L radial nerve palsy  Discharge Equipment Recommendations: none  Barriers to discharge: Inaccessible home environment (3 flights of stairs to her apartment with no elevator access)    Assessment:     Alix Patel is a 67 y.o. female admitted with a medical diagnosis of Open displaced transverse fracture of shaft of left humerus.  She presents with the following impairments/functional limitations: weakness, impaired endurance, impaired self care skills, impaired functional mobility, decreased upper extremity function, decreased ROM, orthopedic precautions, impaired fine motor, impaired coordination, decreased coordination . Pt motivated and cooperative with treatment session. Pt Progressing with PT Intervention.  Pt would continue to benefit from skilled PT to address overall functional mobility, goals , and to return to functional baseline.  Goals remain appropriate.      Rehab Prognosis: Good; patient would benefit from acute skilled PT services to address these deficits and reach maximum level of function.    Recent Surgery: Procedure(s) (LRB):  ORIF, FRACTURE, HUMERUS (Left)  IRRIGATION AND DEBRIDEMENT, UPPER EXTREMITY (Left) 3 Days Post-Op    Plan:     During this hospitalization, patient to be seen 3 x/week to address the identified rehab impairments via gait training, therapeutic activities, therapeutic exercises, neuromuscular re-education and progress toward the following goals:    Plan of Care Expires:  04/21/23    Subjective     Patient/Family Comments/goals: I am leaving today  Pain/Comfort:  Pain Rating 1: 0/10  Pain Rating Post-Intervention 1: 0/10      Objective:     Communicated with RN prior to session.  Patient found HOB elevated with  (telemetry; PCA   L UE sling/resting hand splint) upon PT entry to  room.     General Precautions: Standard, fall  Orthopedic Precautions: LUE non weight bearing  Braces: UE Sling  Respiratory Status: Room air     Functional Mobility:  Bed Mobility:     Supine to Sit: stand by assistance  Sit to Supine: supervision  Transfers:     Sit to Stand:  supervision with no AD  Bed to Chair: supervision with  no AD  using  Stand Pivot  Gait: pt amb with no AD x 200 ft with (S), gait is steady with no losses of balance  Stairs:  Pt ascended/descended 1 flight(s) with No Assistive Device with right handrail with Stand-by Assistance.       AM-PAC 6 CLICK MOBILITY  Turning over in bed (including adjusting bedclothes, sheets and blankets)?: 4  Sitting down on and standing up from a chair with arms (e.g., wheelchair, bedside commode, etc.): 4  Moving from lying on back to sitting on the side of the bed?: 4  Moving to and from a bed to a chair (including a wheelchair)?: 4  Need to walk in hospital room?: 4  Climbing 3-5 steps with a railing?: 3  Basic Mobility Total Score: 23       Treatment & Education:  Therapist provided instruction and educated of  patient on progress, safety,d/c,PT POC,   proper body mechanics, energy conservation, and fall prevention strategies during tasks listed above, on the effects of prolonged immobility and the importance of performing OOB activity and exercises to promote healing and reduce recovery time      Patient  facilitated therex  seated in bedside chair B LE AROM AP, LAQ, Hip Flexion, Hip Abd/Add. Patient required skilled PTA for instruction of exercises and appropriate cues to perform exercises safely, sequencing and appropriately.   Exercises performed to develop and maintain pt's strength, endurance and flexibility.  Updated white board with appropriate PT mobility information for medical team notification     Donned an extra gown   Call nursing/pct to transfer to chair/use bathroom. Pt stated understanding      Bedside table in front of patient and area set  up for function, convenience, and safety. RN aware of patient's mobility needs and status. Questions/concerns addressed within PTA scope of practice; patient  with no further questions. Time was provided for active listening, discussion of health disposition, and discussion of safe discharge. Pt?verbalized?agreement .    Patient left HOB elevated with all lines intact, call button in reach, and nsg notified..    GOALS:   Multidisciplinary Problems       Physical Therapy Goals          Problem: Physical Therapy    Goal Priority Disciplines Outcome Goal Variances Interventions   Physical Therapy Goal     PT, PT/OT Ongoing, Progressing     Description: Goals to be met by: 23     Patient will increase functional independence with mobility by performin. Supine to sit with [place] with HOB flat - Not met  2. Sit to stand transfer with Modified [place] - Not met  3. Gait  x 500 feet with [place] using No Assistive Device - Not met  4. Ascend/descend 1 flight of stairs with right Handrail with Stand-by Assistance using No Assistive Device - Not met                       Time Tracking:     PT Received On: 23  PT Start Time: 801     PT Stop Time: 816  PT Total Time (min): 15 min     Billable Minutes: Gait Training 15    Treatment Type: Treatment  PT/PTA: PTA     Number of PTA visits since last PT visit: 2023

## 2023-03-24 NOTE — PROGRESS NOTES
[facility]  Progress Note    Patient Name: Alix Patel  MRN: [id number]  Patient Class: IP- Inpatient   Admission Date: 3/20/2023  Length of Stay: 2 days  Attending Physician: Blair Avelar MD  Primary Care Provider: Angélica Barakat MD        Subjective:     Principal Problem:Open displaced transverse fracture of shaft of left humerus        HPI:  Alix Patel is a 67 y.o. female with a PMHx of T2DM, HLD, hypothyroidism, LILLY, and MDD who presented to the ED by EMS for L arm pain following a fall at home. Patient reported taking two doses of her home Trazodone last night because she wanted to be able to sleep through the night. After taking the medication and getting in bed, patient felt hungry and decided to cook pasta. Patient got back in bed to wait for the pasta to finish cooking. Once it was finished around 4 am, she got out of bed to eat. She felt dizzy when standing in the kitchen and fell to the ground on her left side. She denies any LOC, preceding palpitations, confusion, or weakness. She denies any head injuries. Patient was able to stand back up to continue fixing her meal. She went to sleep only endorsing limited ROM in L arm 2/2 pain. When she woke up, she was still unable to move arm without severe pain prompting her to call EMS. She further denies any HA, F/C, N/V, chest pain, palpitations, SOB, recent infections, similar prior episodes, numbness, or tingling. She reports dizziness has resolved. She is not on AC at this time.    ED: Hypoxic to 93%, placed on 3.5 L NC, but eventually weaned to RA. Intermittently tachypneic, RR of 30 max. Otherwise AFVSS. CBC without leukocytosis or anemia. Hyperglycemic to 187, ALT 64. Otherwise CMP wnl. Vitamin D deficiency. A1c 7.5. Type and screen completed. CTH left frontal extracranial soft tissue swelling without evidence of underlying fracture or acute intracranial hemorrhage. Ventriculomegaly out of proportion to  the degree of sulcal enlargement. While this could relate to cerebral volume loss, the configuration does at least raise the possibility of normal pressure hydrocephalus. Clinical correlation required. CXR with no acute intrathoracic process. L shoulder and humerus XR with displaced and comminuted acute fracture of the left mid humeral shaft with nondisplaced fracture of the left proximal humerus. CT L arm with fractures of the proximal and mid aspects of the humerus as described. EKG NSR RBBB. Placed into splint under sedation. Given Fentanyl (in EMS), Cefazolin, Dilaudid, Ortho consulted. Admitted to Hospital Medicine for further management.      Overview/Hospital Course:  Alix Patel was admitted to Hospital Medicine for L humerus fracture. Ortho consulted, plan for ORIF on 3/21. S/p L humerus IMN and I&D of open fracture wound 3/21/2. Patient presenting with radial nerve palsy on exam. Worked well with PT/OT. Anesthesia adjusting interscalene PNC. Patient to wear sling to LUE for comfort. Nonweightbearing LUE x 8 weeks postop. Completing 24 hours of abx on 3/22. Will continue ASA 81 BID x 6 weeks. Plan for Home Health PT/OT. Ortho referral placed for clinic follow up. Patient to also follow up with Psychiatrist as she wishes to d/c Trazodone.      Interval History: J[place]ON[place]. AF, VSS. Patient seen and evaluated by me. Patient notes pain is better controlled with medication adjustment by anesthesia and sling adjustment with Ortho. Continuing to monitor wrist drop by Ortho. [facility] Will continue to monitor.    Review of Systems   Constitutional:  Negative for activity change, chills and fever.   HENT:  Negative for trouble swallowing.    Eyes:  Negative for photophobia and visual disturbance.   Respiratory:  Negative for cough, chest tightness and shortness of breath.    Cardiovascular:  Negative for chest pain, palpitations and leg swelling.    Gastrointestinal:  Negative for abdominal pain, constipation, diarrhea, nausea and vomiting.   Genitourinary:  Negative for dysuria, frequency and hematuria.   Musculoskeletal:  Positive for myalgias. Negative for back pain, gait problem and neck pain.   Skin:  Negative for rash and wound.   Neurological:  Positive for dizziness (resolved) and numbness (improving). Negative for syncope, speech difficulty and light-headedness.   Psychiatric/Behavioral:  Negative for agitation and confusion. The patient is not nervous/anxious.    Objective:     Vital Signs (Most Recent):  Temp: 97.8 °F (36.6 °C) (03/24/23 1209)  Pulse: 80 (03/24/23 1209)  Resp: 18 (03/24/23 1226)  BP: (!) 158/78 (03/24/23 1209)  SpO2: (!) 92 % (03/24/23 1209)   Vital Signs (24h Range):  Temp:  [97.3 °F (36.3 °C)-98.2 °F (36.8 °C)] 97.8 °F (36.6 °C)  Pulse:  [68-81] 80  Resp:  [16-18] 18  SpO2:  [92 %-96 %] 92 %  BP: (111-158)/(66-86) 158/78     Weight: 72.6 kg (160 lb)  Body mass index is 22.32 kg/m².    Intake/Output Summary (Last 24 hours) at 3/24/2023 1511  Last data filed at 3/24/2023 1222  Gross per 24 hour   Intake 1000 ml   Output --   Net 1000 ml      Physical Exam  Vitals and nursing note reviewed.   Constitutional:       General: She is not in acute distress.     Appearance: She is well-developed.   HENT:      Head: Normocephalic and atraumatic.      Mouth/Throat:      Pharynx: No oropharyngeal exudate.   Eyes:      Conjunctiva/sclera: Conjunctivae normal.      Pupils: Pupils are equal, round, and reactive to light.   Cardiovascular:      Rate and Rhythm: Normal rate and regular rhythm.      Heart sounds: Normal heart sounds.   Pulmonary:      Effort: Pulmonary effort is normal. No respiratory distress.      Breath sounds: Normal breath sounds. No wheezing.      Comments: On RA  Abdominal:      General: Bowel sounds are normal. There is no distension.      Palpations: Abdomen is soft.      Tenderness: There is no abdominal tenderness.    Musculoskeletal:         General: Swelling, tenderness and signs of injury (L upper arm) present. Normal range of motion.      Cervical back: Normal range of motion and neck supple.      Comments: L arm in splint placed in sling  Interscalene PNC   Lymphadenopathy:      Cervical: No cervical adenopathy.   Skin:     General: Skin is warm and dry.      Capillary Refill: Capillary refill takes less than 2 seconds.      Findings: No rash.   Neurological:      Mental Status: She is alert and oriented to person, place, and time.      Cranial Nerves: No cranial nerve deficit.      Sensory: No sensory deficit.      Coordination: Coordination normal.   Psychiatric:         Behavior: Behavior normal.         Thought Content: Thought content normal.         Judgment: Judgment normal.       Significant Labs: All pertinent labs within the past 24 hours have been reviewed.  BMP:   Recent Labs   Lab 03/23/23  0559 03/24/23  1226   * 230*    136   K 4.1 4.1    101   CO2 30* 26   BUN 7* 7*   CREATININE 0.6 0.7   CALCIUM 8.8 9.3   MG 1.7  --      CBC:   Recent Labs   Lab 03/23/23  0559 03/24/23  1226   WBC 6.57 7.71   HGB 10.4* 11.4*   HCT 32.4* 35.0*    196       Significant Imaging: I have reviewed all pertinent imaging results/findings within the past 24 hours.      Assessment/Plan:      * Grade I open displaced transverse fracture of shaft of left humerus  Fall  Presented to the ED by EMS for L arm pain following a fall at home. Patient took two Trazodones to help her sleep. Typically takes one. She felt dizzy when standing in the kitchen and fell to the ground on her left side. She denies any LOC, preceding palpitations, confusion, or weakness. She denies any head injuries. Patient was able to stand back up to continue fixing her meal. Woke up unable to move arm without severe pain prompting her to call EMS. Dizziness resolved.    - CTH left frontal extracranial soft tissue swelling without evidence  of underlying fracture or acute intracranial hemorrhage. Ventriculomegaly out of proportion to the degree of sulcal enlargement. While this could relate to cerebral volume loss, the configuration does at least raise the possibility of normal pressure hydrocephalus. Clinical correlation required.  - CXR with no acute intrathoracic process  - L shoulder and humerus XR with displaced and comminuted acute fracture of the left mid humeral shaft with nondisplaced fracture of the left proximal humerus  - CT L arm with fractures of the proximal and mid aspects of the humerus as described.  - EKG NSR RBBB (consistent with priors)  - Placed in splint under sedation in ED  - Orthostatic vitals negative  - Ortho consulted, appreciate assistance  - Continue to monitor for radial nerve function, ok for PNC for pain control  - Sling left upper extremity for comfort  - Nonweightbearing left upper extremity x8 weeks postop  - Range of motion as tolerated left upper extremity  - ASA 81 mg b.i.d. x6 weeks for DVT prophylaxis  - Nutrition support: Calcium, vitamin-D.  - Anesthesia managing interscalene PNC    Continue interscalene PNC at current infusion rate:6cc/hr cont infusion with 5cc q60m DB    Continue multimodals including Tylenol, celecoxib, Robaxin, Gabapentin   PRN oxy 5mg q4h  - Continue MM pain control  - Bowel regimen for constipation  - PT/OT consulted, plan for  PT/OT  - Ortho referral placed for clinic follow up    Vitamin D deficiency  - Noted on labs  - Start supplementation    Type 2 diabetes mellitus with hyperglycemia, without long-term current use of insulin  Patient's FSGs are controlled on current medication regimen.  Last A1c reviewed-   Lab Results   Component Value Date    HGBA1C 7.5 (H) 03/20/2023   Most recent fingerstick glucose reviewed-   Recent Labs   Lab 03/23/23 2116 03/24/23  0557   POCTGLUCOSE 189* 203*     Current correctional scale  Low  Maintain anti-hyperglycemic dose as follows-    Antihyperglycemics (From admission, onward)    Start     Stop Route Frequency Ordered    03/20/23 1641  insulin aspart U-100 pen 0-5 Units         -- SubQ Before meals & nightly PRN 03/20/23 1541      - Hold Oral hypoglycemics while patient is in the hospital  - Diabetic/Cardiac diet    Hypothyroidism  - Continue Synthroid  - Will check TSH  Lab Results   Component Value Date    TSH 3.070 03/21/2023       Thyroid nodule  - Per last Endocrinology note: Previous benign FNA of right thyroid nodule  - Last US (9/23/22) reviewed    Hyperlipidemia with target low density lipoprotein (LDL) cholesterol less than 100 mg/dL  - Patient not taking statin, request to resume  - Lipid panel reviewed  - Will need to follow up with Cardiologist    Depression  LILLY (generalized anxiety disorder)  Patient has persistent depression which is mild and is currently controlled. Will Continue anti-depressant medications. We will not consult psychiatry at this time. Patient does not display psychosis at this time. Continue to monitor closely and adjust plan of care as needed.  - Continue Lexapro, Trazadone      VTE Risk Mitigation (From admission, onward)         Ordered     IP VTE LOW RISK PATIENT  Once         03/20/23 1541     Place sequential compression device  Until discontinued         03/20/23 1541                Discharge Planning   GILLES: 3/25/2023     Code Status: Full Code   Is the patient medically ready for discharge?: No    Reason for patient still in hospital (select all that apply): Patient trending condition, Treatment and Consult recommendations  Discharge Plan A: Home            Marium Velazquez PA-C  Department of Hospital Medicine   [facility] - Surgery

## 2023-03-24 NOTE — PROGRESS NOTES
[facility]  Orthopedics  Progress Note    Patient Name: Alix Patel  MRN: [id number]  Admission Date: 3/20/2023  Hospital Length of Stay: 2 days  Attending Provider: Blair Avelar MD  Primary Care Provider: Angélica Barakat MD  Follow-up For: Procedure(s) (LRB):  ORIF, FRACTURE, HUMERUS (Left)  IRRIGATION AND DEBRIDEMENT, UPPER EXTREMITY (Left)    Post-Operative Day: 3 Days Post-Op  Subjective:     Principal Problem:Open displaced transverse fracture of shaft of left humerus    Principal Orthopedic Problem: same. S/p L humerus IMN and I&D of open fracture wound 3/21/23    Interval History: Pt seen and examined at bedside. CELINA TOPETE, AF. Pain controlled. Spoke with daughter on phone to provide update.     Review of patient's allergies indicates:   Allergen Reactions    Lamictal [lamotrigine] Rash     Per psychiatry notes       Current Facility-Administered Medications   Medication    0.9%  NaCl infusion    acetaminophen tablet 1,000 mg    aspirin EC tablet 81 mg    atorvastatin tablet 40 mg    bisacodyL suppository 10 mg    celecoxib capsule 200 mg    dextrose 10% bolus 125 mL 125 mL    dextrose 10% bolus 250 mL 250 mL    dextrose 40 % gel 15,000 mg    dextrose 40 % gel 30,000 mg    ergocalciferol capsule 50,000 Units    EScitalopram oxalate tablet 20 mg    gabapentin capsule 300 mg    glucagon (human recombinant) injection 1 mg    insulin aspart U-100 pen 0-5 Units    levothyroxine tablet 100 mcg    melatonin tablet 6 mg    methocarbamoL tablet 500 mg    naloxone 0.4 mg/mL injection 0.02 mg    ondansetron disintegrating tablet 8 mg    oxyCODONE immediate release tablet 5 mg    polyethylene glycol packet 17 g    prochlorperazine injection Soln 5 mg    ROPIvacaine (PF) 2 mg/ml (0.2%) solution    ropivacaine 0.2% Nimbus PainPRO Pump infusion 500 ML    senna-docusate 8.6-50 mg per tablet 1 tablet    sodium chloride 0.9% flush 10 mL    sodium chloride 0.9% flush 10 mL     "traZODone tablet 150 mg     Objective:     Vital Signs (Most Recent):  Temp: 98 °F (36.7 °C) (03/24/23 1700)  Pulse: 81 (03/24/23 1700)  Resp: 18 (03/24/23 1700)  BP: (!) 140/80 (03/24/23 1700)  SpO2: (!) 93 % (03/24/23 1700)   Vital Signs (24h Range):  Temp:  [97.3 °F (36.3 °C)-98.2 °F (36.8 °C)] 98 °F (36.7 °C)  Pulse:  [68-81] 81  Resp:  [16-18] 18  SpO2:  [92 %-95 %] 93 %  BP: (111-158)/(66-83) 140/80     Weight: 72.6 kg (160 lb)  Height: 5' 11" (180.3 cm)  Body mass index is 22.32 kg/m².      Intake/Output Summary (Last 24 hours) at 3/24/2023 1800  Last data filed at 3/24/2023 1222  Gross per 24 hour   Intake 600 ml   Output --   Net 600 ml         Ortho/SPM Exam  Gen: NAD, WDWN  CV: peripherally well perfused  Resp: unlabored respirations, symmetric chest rise  Neck: TM  Neuro: CN 2-12 grossly intact. No FND.      MSK:  LUE:  ACE wrap in place, no saturation ntoed  SILT to hand and figners  WWP, BCR  Able to flex all digits and wrist, unable to extend thumb and wrist    Significant Labs: CBC:   Recent Labs   Lab 03/23/23  0559 03/24/23  1226   WBC 6.57 7.71   HGB 10.4* 11.4*   HCT 32.4* 35.0*    196       CMP:   Recent Labs   Lab 03/23/23  0559 03/24/23  1226    136   K 4.1 4.1    101   CO2 30* 26   * 230*   BUN 7* 7*   CREATININE 0.6 0.7   CALCIUM 8.8 9.3   PROT  --  6.3   ALBUMIN  --  3.4*   BILITOT  --  0.3   ALKPHOS  --  90   AST  --  38   ALT  --  37   ANIONGAP 8 9       All pertinent labs within the past 24 hours have been reviewed.    Significant Imaging: I have reviewed all pertinent imaging results/findings.    Assessment/Plan:     * Grade I open displaced transverse fracture of shaft of left humerus  Alix Patel is a 67 y.o. female with a grade I open left midshaft humerus fracture. She also has a nondisplaced fracture of the proximal humerus. She is neurovascularly intact. She does not have any additional injuries. She sustained this injury around 4:00 AM and " received her first dose of Ancef at 2:00 PM. Time to antibiotics 10 hours. She was placed into a coaptation splint in the ED under procedural sedation.  Operative versus nonoperative interventions were discussed with the patient at length.  After discussions, the patient elects to proceed with operative intervention for this problem.  Consents obtained at bedside.  Patient marked.  Now s/p L humerus IMN and I&D on 3/21/23 and with left radial nerve palsy.      Continue to monitor for radial nerve function, ok for PNC for pain control  Sling left upper extremity for comfort  Nonweightbearing left upper extremity x8 weeks postop  Range of motion as tolerated left upper extremity  ASA 81 mg b.i.d. x6 weeks for DVT prophylaxis  Nutrition support: Calcium, vitamin-D.            Jn Garza MD  Orthopedics  [facility] - Surgery

## 2023-03-24 NOTE — SUBJECTIVE & OBJECTIVE
Principal Problem:Open displaced transverse fracture of shaft of left humerus    Principal Orthopedic Problem: same. S/p L humerus IMN and I&D of open fracture wound 3/21/23    Interval History: Pt seen and examined at bedside. CELINA TOPETE, MARIO. Pain controlled. Spoke with daughter on phone to provide update.     Review of patient's allergies indicates:   Allergen Reactions    Lamictal [lamotrigine] Rash     Per psychiatry notes       Current Facility-Administered Medications   Medication    0.9%  NaCl infusion    acetaminophen tablet 1,000 mg    aspirin EC tablet 81 mg    atorvastatin tablet 40 mg    bisacodyL suppository 10 mg    celecoxib capsule 200 mg    dextrose 10% bolus 125 mL 125 mL    dextrose 10% bolus 250 mL 250 mL    dextrose 40 % gel 15,000 mg    dextrose 40 % gel 30,000 mg    ergocalciferol capsule 50,000 Units    EScitalopram oxalate tablet 20 mg    gabapentin capsule 300 mg    glucagon (human recombinant) injection 1 mg    insulin aspart U-100 pen 0-5 Units    levothyroxine tablet 100 mcg    melatonin tablet 6 mg    methocarbamoL tablet 500 mg    naloxone 0.4 mg/mL injection 0.02 mg    ondansetron disintegrating tablet 8 mg    oxyCODONE immediate release tablet 5 mg    polyethylene glycol packet 17 g    prochlorperazine injection Soln 5 mg    ROPIvacaine (PF) 2 mg/ml (0.2%) solution    ropivacaine 0.2% Nimbus PainPRO Pump infusion 500 ML    senna-docusate 8.6-50 mg per tablet 1 tablet    sodium chloride 0.9% flush 10 mL    sodium chloride 0.9% flush 10 mL    traZODone tablet 150 mg     Objective:     Vital Signs (Most Recent):  Temp: 98 °F (36.7 °C) (03/24/23 1700)  Pulse: 81 (03/24/23 1700)  Resp: 18 (03/24/23 1700)  BP: (!) 140/80 (03/24/23 1700)  SpO2: (!) 93 % (03/24/23 1700)   Vital Signs (24h Range):  Temp:  [97.3 °F (36.3 °C)-98.2 °F (36.8 °C)] 98 °F (36.7 °C)  Pulse:  [68-81] 81  Resp:  [16-18] 18  SpO2:  [92 %-95 %] 93 %  BP: (111-158)/(66-83) 140/80     Weight: 72.6 kg (160 lb)  Height: 5'  "11" (180.3 cm)  Body mass index is 22.32 kg/m².      Intake/Output Summary (Last 24 hours) at 3/24/2023 1800  Last data filed at 3/24/2023 1222  Gross per 24 hour   Intake 600 ml   Output --   Net 600 ml         Ortho/SPM Exam  Gen: NAD, WDWN  CV: peripherally well perfused  Resp: unlabored respirations, symmetric chest rise  Neck: TM  Neuro: CN 2-12 grossly intact. No FND.      MSK:  LUE:  ACE wrap in place, no saturation ntoed  SILT to hand and figners  WWP, BCR  Able to flex all digits and wrist, unable to extend thumb and wrist    Significant Labs: CBC:   Recent Labs   Lab 03/23/23  0559 03/24/23  1226   WBC 6.57 7.71   HGB 10.4* 11.4*   HCT 32.4* 35.0*    196       CMP:   Recent Labs   Lab 03/23/23  0559 03/24/23  1226    136   K 4.1 4.1    101   CO2 30* 26   * 230*   BUN 7* 7*   CREATININE 0.6 0.7   CALCIUM 8.8 9.3   PROT  --  6.3   ALBUMIN  --  3.4*   BILITOT  --  0.3   ALKPHOS  --  90   AST  --  38   ALT  --  37   ANIONGAP 8 9       All pertinent labs within the past 24 hours have been reviewed.    Significant Imaging: I have reviewed all pertinent imaging results/findings.  "

## 2023-03-24 NOTE — SUBJECTIVE & OBJECTIVE
Interval History: NAEON. AF, VSS. Patient seen and evaluated by me. Patient notes pain is better controlled with medication adjustment by anesthesia and sling adjustment with Ortho. Continuing to monitor wrist drop by Ortho. Anesthesia with plans to possibly transition to NIMBUS tomorrow. Will continue to monitor.    Review of Systems   Constitutional:  Negative for activity change, chills and fever.   HENT:  Negative for trouble swallowing.    Eyes:  Negative for photophobia and visual disturbance.   Respiratory:  Negative for cough, chest tightness and shortness of breath.    Cardiovascular:  Negative for chest pain, palpitations and leg swelling.   Gastrointestinal:  Negative for abdominal pain, constipation, diarrhea, nausea and vomiting.   Genitourinary:  Negative for dysuria, frequency and hematuria.   Musculoskeletal:  Positive for myalgias. Negative for back pain, gait problem and neck pain.   Skin:  Negative for rash and wound.   Neurological:  Positive for dizziness (resolved) and numbness (improving). Negative for syncope, speech difficulty and light-headedness.   Psychiatric/Behavioral:  Negative for agitation and confusion. The patient is not nervous/anxious.    Objective:     Vital Signs (Most Recent):  Temp: 97.8 °F (36.6 °C) (03/24/23 1209)  Pulse: 80 (03/24/23 1209)  Resp: 18 (03/24/23 1226)  BP: (!) 158/78 (03/24/23 1209)  SpO2: (!) 92 % (03/24/23 1209)   Vital Signs (24h Range):  Temp:  [97.3 °F (36.3 °C)-98.2 °F (36.8 °C)] 97.8 °F (36.6 °C)  Pulse:  [68-81] 80  Resp:  [16-18] 18  SpO2:  [92 %-96 %] 92 %  BP: (111-158)/(66-86) 158/78     Weight: 72.6 kg (160 lb)  Body mass index is 22.32 kg/m².    Intake/Output Summary (Last 24 hours) at 3/24/2023 1511  Last data filed at 3/24/2023 1222  Gross per 24 hour   Intake 1000 ml   Output --   Net 1000 ml      Physical Exam  Vitals and nursing note reviewed.   Constitutional:       General: She is not in acute distress.     Appearance: She is  well-developed.   HENT:      Head: Normocephalic and atraumatic.      Mouth/Throat:      Pharynx: No oropharyngeal exudate.   Eyes:      Conjunctiva/sclera: Conjunctivae normal.      Pupils: Pupils are equal, round, and reactive to light.   Cardiovascular:      Rate and Rhythm: Normal rate and regular rhythm.      Heart sounds: Normal heart sounds.   Pulmonary:      Effort: Pulmonary effort is normal. No respiratory distress.      Breath sounds: Normal breath sounds. No wheezing.      Comments: On RA  Abdominal:      General: Bowel sounds are normal. There is no distension.      Palpations: Abdomen is soft.      Tenderness: There is no abdominal tenderness.   Musculoskeletal:         General: Swelling, tenderness and signs of injury (L upper arm) present. Normal range of motion.      Cervical back: Normal range of motion and neck supple.      Comments: L arm in splint placed in sling  Interscalene PNC   Lymphadenopathy:      Cervical: No cervical adenopathy.   Skin:     General: Skin is warm and dry.      Capillary Refill: Capillary refill takes less than 2 seconds.      Findings: No rash.   Neurological:      Mental Status: She is alert and oriented to person, place, and time.      Cranial Nerves: No cranial nerve deficit.      Sensory: No sensory deficit.      Coordination: Coordination normal.   Psychiatric:         Behavior: Behavior normal.         Thought Content: Thought content normal.         Judgment: Judgment normal.       Significant Labs: All pertinent labs within the past 24 hours have been reviewed.  BMP:   Recent Labs   Lab 03/23/23  0559 03/24/23  1226   * 230*    136   K 4.1 4.1    101   CO2 30* 26   BUN 7* 7*   CREATININE 0.6 0.7   CALCIUM 8.8 9.3   MG 1.7  --      CBC:   Recent Labs   Lab 03/23/23  0559 03/24/23  1226   WBC 6.57 7.71   HGB 10.4* 11.4*   HCT 32.4* 35.0*    196       Significant Imaging: I have reviewed all pertinent imaging results/findings within the  past 24 hours.

## 2023-03-25 VITALS
WEIGHT: 160 LBS | OXYGEN SATURATION: 91 % | RESPIRATION RATE: 16 BRPM | DIASTOLIC BLOOD PRESSURE: 83 MMHG | HEIGHT: 71 IN | BODY MASS INDEX: 22.4 KG/M2 | HEART RATE: 76 BPM | TEMPERATURE: 98 F | SYSTOLIC BLOOD PRESSURE: 154 MMHG

## 2023-03-25 LAB
BACTERIA SPEC AEROBE CULT: NO GROWTH
POCT GLUCOSE: 213 MG/DL (ref 70–110)

## 2023-03-25 PROCEDURE — 94761 N-INVAS EAR/PLS OXIMETRY MLT: CPT

## 2023-03-25 PROCEDURE — 25000003 PHARM REV CODE 250

## 2023-03-25 PROCEDURE — 99239 PR HOSPITAL DISCHARGE DAY,>30 MIN: ICD-10-PCS | Mod: ,,,

## 2023-03-25 PROCEDURE — 99239 HOSP IP/OBS DSCHRG MGMT >30: CPT | Mod: ,,,

## 2023-03-25 PROCEDURE — 25000003 PHARM REV CODE 250: Performed by: ORTHOPAEDIC SURGERY

## 2023-03-25 RX ORDER — METHOCARBAMOL 500 MG/1
500 TABLET, FILM COATED ORAL EVERY 8 HOURS
Qty: 30 TABLET | Refills: 0 | Status: SHIPPED | OUTPATIENT
Start: 2023-03-25 | End: 2023-03-28 | Stop reason: ALTCHOICE

## 2023-03-25 RX ORDER — GABAPENTIN 300 MG/1
300 CAPSULE ORAL 3 TIMES DAILY
Qty: 30 CAPSULE | Refills: 0 | Status: ON HOLD | OUTPATIENT
Start: 2023-03-25 | End: 2023-05-05

## 2023-03-25 RX ORDER — OXYCODONE HYDROCHLORIDE 5 MG/1
10 TABLET ORAL EVERY 6 HOURS PRN
Qty: 28 TABLET | Refills: 0 | Status: SHIPPED | OUTPATIENT
Start: 2023-03-25 | End: 2023-03-28

## 2023-03-25 RX ADMIN — OXYCODONE HYDROCHLORIDE 5 MG: 5 TABLET ORAL at 05:03

## 2023-03-25 RX ADMIN — GABAPENTIN 300 MG: 300 CAPSULE ORAL at 08:03

## 2023-03-25 RX ADMIN — POLYETHYLENE GLYCOL 3350 17 G: 17 POWDER, FOR SOLUTION ORAL at 08:03

## 2023-03-25 RX ADMIN — ASPIRIN 81 MG: 81 TABLET, COATED ORAL at 08:03

## 2023-03-25 RX ADMIN — INSULIN ASPART 2 UNITS: 100 INJECTION, SOLUTION INTRAVENOUS; SUBCUTANEOUS at 08:03

## 2023-03-25 RX ADMIN — CELECOXIB 200 MG: 200 CAPSULE ORAL at 08:03

## 2023-03-25 RX ADMIN — LEVOTHYROXINE SODIUM 100 MCG: 50 TABLET ORAL at 05:03

## 2023-03-25 RX ADMIN — ESCITALOPRAM OXALATE 20 MG: 20 TABLET ORAL at 08:03

## 2023-03-25 RX ADMIN — ACETAMINOPHEN 1000 MG: 500 TABLET ORAL at 05:03

## 2023-03-25 RX ADMIN — SENNOSIDES AND DOCUSATE SODIUM 1 TABLET: 50; 8.6 TABLET ORAL at 08:03

## 2023-03-25 RX ADMIN — ATORVASTATIN CALCIUM 40 MG: 40 TABLET, FILM COATED ORAL at 08:03

## 2023-03-25 RX ADMIN — METHOCARBAMOL 500 MG: 500 TABLET ORAL at 05:03

## 2023-03-25 NOTE — PLAN OF CARE
VSS. Pt a/ox4 and turns independently in bed. Cast padding and sling on at all times. Pt ambulatory. PRN pain meds given. New IV inserted. No significant events at this time. Bed in lowest position with call light within reach. Pt currently resting comfortably in bed.   Problem: Adult Inpatient Plan of Care  Goal: Plan of Care Review  Outcome: Ongoing, Progressing  Goal: Patient-Specific Goal (Individualized)  Outcome: Ongoing, Progressing  Goal: Absence of Hospital-Acquired Illness or Injury  Outcome: Ongoing, Progressing  Goal: Optimal Comfort and Wellbeing  Outcome: Ongoing, Progressing  Goal: Readiness for Transition of Care  Outcome: Ongoing, Progressing     Problem: Infection  Goal: Absence of Infection Signs and Symptoms  Outcome: Ongoing, Progressing     Problem: Diabetes Comorbidity  Goal: Blood Glucose Level Within Targeted Range  Outcome: Ongoing, Progressing     Problem: Skin Injury Risk Increased  Goal: Skin Health and Integrity  Outcome: Ongoing, Progressing     Problem: Fall Injury Risk  Goal: Absence of Fall and Fall-Related Injury  Outcome: Ongoing, Progressing

## 2023-03-25 NOTE — PLAN OF CARE
[facility]      HOME HEALTH ORDERS  FACE TO FACE ENCOUNTER    Patient Name: Alix Patel  YOB: 1955    PCP: Angélica Barakat MD   PCP Address: [address] [place] [address]  PCP Phone Number: [phone]  PCP Fax: [phone]    Encounter Date: 3/20/23    Admit to Home Health    Diagnoses:  Active Hospital Problems    Diagnosis  POA    *Grade I open displaced transverse fracture of shaft of left humerus [S42.322B]  Yes    Fall [W19.XXXA]  Yes    Vitamin D deficiency [E55.9]  Yes    Type 2 diabetes mellitus with hyperglycemia, without long-term current use of insulin [E11.65]  Yes    LILLY (generalized anxiety disorder) [F41.1]  Yes    Hypothyroidism [E03.9]  Yes    Thyroid nodule [E04.1]  Yes    Hyperlipidemia with target low density lipoprotein (LDL) cholesterol less than 100 mg/dL [E78.5]  Yes    Depression [F32.A]  Yes      Resolved Hospital Problems   No resolved problems to display.       Follow Up Appointments:  Future Appointments   Date Time Provider Department Center   4/4/2023 10:00 AM Sebastian Kaplan NP [facility] [address]   4/14/2023  8:20 AM Maximino Curry MD [facility] [address] [facility]   5/2/2023 10:30 AM Sebastian Kaplan NP [facility] [address]       Allergies:  Review of patient's allergies indicates:   Allergen Reactions    Lamictal [lamotrigine] Rash     Per psychiatry notes       Medications: Review discharge medications with patient and family and provide education.    Current Facility-Administered Medications   Medication Dose Route Frequency Provider Last Rate Last Admin    0.9%  NaCl infusion   Intravenous Continuous Celena Matthews MD   Stopped at 03/21/23 1822    acetaminophen tablet 1,000 mg  1,000 mg Oral Q8H Sadaf Friend MD   1,000 mg at 03/25/23 0518    aspirin EC tablet 81 mg  81 mg Oral BID Jn Garza MD   81 mg at 03/25/23 0843    atorvastatin tablet 40 mg  40 mg Oral Daily Armand Orozco PA-C   40 mg at 03/25/23 0843     bisacodyL suppository 10 mg  10 mg Rectal Daily PRN Armand Orozco PA-C   10 mg at 03/24/23 1458    celecoxib capsule 200 mg  200 mg Oral Daily Sadaf Friend MD   200 mg at 03/25/23 0843    dextrose 10% bolus 125 mL 125 mL  12.5 g Intravenous PRN Armand Orozco PA-C        dextrose 10% bolus 250 mL 250 mL  25 g Intravenous PRN Armand Orozco PA-C        dextrose 40 % gel 15,000 mg  15 g Oral PRN Armand Orozco PA-C        dextrose 40 % gel 30,000 mg  30 g Oral PRN Armand Orozco PA-C        ergocalciferol capsule 50,000 Units  50,000 Units Oral Q7 Days Felix Ndiaye MD   50,000 Units at 03/21/23 0811    EScitalopram oxalate tablet 20 mg  20 mg Oral Daily Armand Orozco PA-C   20 mg at 03/25/23 0843    gabapentin capsule 300 mg  300 mg Oral TID Ousmane Crawford MD   300 mg at 03/25/23 0843    glucagon (human recombinant) injection 1 mg  1 mg Intramuscular PRN Armand Orozco PA-C        insulin aspart U-100 pen 0-5 Units  0-5 Units Subcutaneous QID (AC + HS) PRN Armand Orozco PA-C   2 Units at 03/25/23 0843    levothyroxine tablet 100 mcg  100 mcg Oral Daily Armand Orozco PA-C   100 mcg at 03/25/23 0517    melatonin tablet 6 mg  6 mg Oral Nightly PRN Armand Orozco PA-C   6 mg at 03/24/23 2231    methocarbamoL tablet 500 mg  500 mg Oral Q8H Sadaf Friend MD   500 mg at 03/25/23 0518    naloxone 0.4 mg/mL injection 0.02 mg  0.02 mg Intravenous PRN Armand Orozco PA-C        ondansetron disintegrating tablet 8 mg  8 mg Oral Q8H PRN Armand Orozco PA-C        oxyCODONE immediate release tablet 5 mg  5 mg Oral Q4H PRN Sadaf Friend MD   5 mg at 03/25/23 0518    polyethylene glycol packet 17 g  17 g Oral BID Marium Velazquez PA-C   17 g at 03/25/23 0843    prochlorperazine injection Soln 5 mg  5 mg Intravenous Q6H PRN Armand Orozco PA-C        senna-docusate 8.6-50 mg per tablet 1 tablet  1 tablet Oral BID Sadaf Friend MD   1 tablet at 03/25/23 0843    sodium chloride 0.9%  flush 10 mL  10 mL Intravenous Q12H PRN Armand Orozco PA-C        sodium chloride 0.9% flush 10 mL  10 mL Intravenous PRN Felix Ndiaye MD        traZODone tablet 150 mg  150 mg Oral Nightly Armand Orozco PA-C   150 mg at 03/24/23 2024     Current Discharge Medication List        START taking these medications    Details   gabapentin (NEURONTIN) 300 MG capsule Take 1 capsule (300 mg total) by mouth 3 (three) times daily. for 10 days  Qty: 30 capsule, Refills: 0      oxyCODONE (ROXICODONE) 5 MG immediate release tablet Take 2 tablets (10 mg total) by mouth every 6 (six) hours as needed for Pain.  Qty: 28 tablet, Refills: 0    Comments: Quantity prescribed more than 7 day supply? No           CONTINUE these medications which have CHANGED    Details   methocarbamoL (ROBAXIN) 500 MG Tab Take 1 tablet (500 mg total) by mouth every 8 (eight) hours. for 10 days  Qty: 30 tablet, Refills: 0           CONTINUE these medications which have NOT CHANGED    Details   aspirin (ECOTRIN) 81 MG EC tablet Take 1 tablet by mouth once daily.      butalbital-acetaminophen-caffeine -40 mg (FIORICET, ESGIC) -40 mg per tablet TK 1 T PO  Q 6  H PRN      CRESTOR 20 mg tablet Take 20 mg by mouth once daily.   Refills: 3      diclofenac (VOLTAREN) 50 MG EC tablet Take 1 tablet by mouth 2 (two) times daily.      dulaglutide (TRULICITY) 0.75 mg/0.5 mL pen injector as directed      EScitalopram oxalate (LEXAPRO) 20 MG tablet Take 1 tablet (20 mg total) by mouth once daily.  Qty: 90 tablet, Refills: 3    Comments: **Patient requests 90 days supply**      estradioL (ESTRACE) 0.01 % (0.1 mg/gram) vaginal cream USE 1 GRAM VAGINALLY 3 TIMES A WEEK      hydrOXYzine pamoate (VISTARIL) 25 MG Cap Take 1 to 2 caps q hs prn difficulty sleeping  Qty: 30 capsule, Refills: 3      ibuprofen (ADVIL,MOTRIN) 800 MG tablet TAKE 1 TABLET BY MOUTH EVERY 6 TO 8 HOURS FOR PAIN      levothyroxine (SYNTHROID) 100 MCG tablet Take 1 tablet (100 mcg  total) by mouth once daily.  Qty: 30 tablet, Refills: 1      meloxicam (MOBIC) 15 MG tablet Take 1 tablet by mouth once daily.      metFORMIN (GLUCOPHAGE) 500 MG tablet Take 2 tablets (1,000 mg total) by mouth 2 (two) times daily with meals.  Qty: 120 tablet, Refills: 1      promethazine (PHENERGAN) 25 MG tablet TK 1 T PO  Q 12 H PRN      traZODone (DESYREL) 150 MG tablet TAKE 1 TABLET(150 MG) BY MOUTH EVERY EVENING  Qty: 90 tablet, Refills: 2      urea (CARMOL) 40 % Crea Apply topically once daily. To dry skin on the feet.  Qty: 85 g, Refills: 10    Associated Diagnoses: Dry skin      ammonium lactate 12 % Crea SAMSON EXT AA  OF FEET ONCE DAILY      blood sugar diagnostic (TRUE METRIX GLUCOSE TEST STRIP MISC) True Metrix Glucose Test Strip   TEST TWICE DAILY AS DIRECTED      blood-glucose meter (TRUE METRIX AIR GLUCOSE METER MISC) True Metrix Air Glucose Meter   USE UNDER THE SKIN TWICE DAILY AS DIRECTED      cyclobenzaprine (FLEXERIL) 10 MG tablet Take 1 tablet by mouth 3 (three) times daily as needed.      flu vacc [id number] 6mos up,PF, (FLUARIX QUAD [id number], PF,) 60 mcg (15 mcg x 4)/0.5 mL Syrg ADM 0.5ML IM UTD      varicella-zoster [id number], PF, (SHINGRIX, PF,) 50 mcg/0.5 mL injection ADM 0.5ML IM UTD           STOP taking these medications       traMADoL (ULTRAM) 50 mg tablet Comments:   Reason for Stopping:                 I have seen and examined this patient within the last 30 days. My clinical findings that support the need for the home health skilled services and home bound status are the following:no   Weakness/numbness causing balance and gait disturbance due to Surgery making it taxing to leave home.     Diet:   regular diet    Labs:  N/A    Referrals/ Consults  Physical Therapy to evaluate and treat. Evaluate for home safety and equipment needs; Establish/upgrade home exercise program. Perform / instruct on therapeutic exercises, gait training, transfer training, and Range of Motion.  Occupational  Therapy to evaluate and treat. Evaluate home environment for safety and equipment needs. Perform/Instruct on transfers, ADL training, ROM, and therapeutic exercises.    Activities:   activity as tolerated, Nonweightbearing left upper extremity x8 weeks postop  Range of motion as tolerated left upper extremity    Nursing:   Agency to admit patient within 24 hours of hospital discharge unless specified on physician order or at patient request    SN to complete comprehensive assessment including routine vital signs. Instruct on disease process and s/s of complications to report to MD. Review/verify medication list sent home with the patient at time of discharge  and instruct patient/caregiver as needed. Frequency may be adjusted depending on start of care date.     Skilled nurse to perform up to 3 visits PRN for symptoms related to diagnosis    Notify MD if SBP > 160 or < 90; DBP > 90 or < 50; HR > 120 or < 50; Temp > 101; O2 < 88%    Ok to schedule additional visits based on staff availability and patient request on consecutive days within the home health episode.    When multiple disciplines ordered:    Start of Care occurs on Sunday - Wednesday schedule remaining discipline evaluations as ordered on separate consecutive days following the start of care.    Thursday SOC -schedule subsequent evaluations Friday and Monday the following week.     Friday - Saturday SOC - schedule subsequent discipline evaluations on consecutive days starting Monday of the following week.    For all post-discharge communication and subsequent orders please contact patient's primary care physician. If unable to reach primary care physician or do not receive response within 30 minutes, please contact PCP for clinical staff order clarification    Home Health Aide:  Physical Therapy Three times weekly and Occupational Therapy Three times weekly    Wound Care Orders  no    I certify that this patient is confined to her home and needs physical  therapy and occupational therapy.

## 2023-03-25 NOTE — ADDENDUM NOTE
Addendum  created 03/25/23 1210 by Sadaf Friend MD    Clinical Note Signed, Order list changed

## 2023-03-25 NOTE — ANESTHESIA POST-OP PAIN MANAGEMENT
Acute Pain Service Progress Note    Alix Patel is a 67 y.o., female, [id number].    Surgery:   ORIF, FRACTURE, HUMERUS (Left: Arm)     Post Op Day #: 4     Catheter type: perineural  interscalene     Infusion type: Ropi 0.2% at 6cc/hr cont infusion with 5cc q60m DB  Problem List:    Active Hospital Problems    Diagnosis  POA    *Grade I open displaced transverse fracture of shaft of left humerus [S42.322B]  Yes    Fall [W19.XXXA]  Yes    Vitamin D deficiency [E55.9]  Yes    Type 2 diabetes mellitus with hyperglycemia, without long-term current use of insulin [E11.65]  Yes    LILLY (generalized anxiety disorder) [F41.1]  Yes    Hypothyroidism [E03.9]  Yes    Thyroid nodule [E04.1]  Yes    Hyperlipidemia with target low density lipoprotein (LDL) cholesterol less than 100 mg/dL [E78.5]  Yes    Depression [F32.A]  Yes      Resolved Hospital Problems   No resolved problems to display.       Subjective:     General appearance of alert, oriented, no complaints              Pain with rest: 3    Numbers              Pain with movement: 6    Numbers              Side Effects                          1. Pruritis No                          2. Nausea No                          3. Motor Blockade radial nerve palsy                          4. Sedation No, 1=awake and alert     Objective:                 Catheter site clean, dry, intact      Vitals   Vitals:    03/25/23 0518   BP:    Pulse:    Resp: 18   Temp:         Labs    No results displayed because visit has over 200 results.           Meds   Current Facility-Administered Medications   Medication Dose Route Frequency Provider Last Rate Last Admin    0.9%  NaCl infusion   Intravenous Continuous Celena Matthews MD   Stopped at 03/21/23 1822    acetaminophen tablet 1,000 mg  1,000 mg Oral Q8H Sadaf Friend MD   1,000 mg at 03/25/23 0518    aspirin EC tablet 81 mg  81 mg Oral BID Jn Garza MD   81 mg at 03/24/23 2024    atorvastatin tablet 40 mg  40  mg Oral Daily Armand Orozco PA-C   40 mg at 03/24/23 0904    bisacodyL suppository 10 mg  10 mg Rectal Daily PRN Armand Orozco PA-C   10 mg at 03/24/23 1458    celecoxib capsule 200 mg  200 mg Oral Daily Sadaf Friend MD   200 mg at 03/24/23 0903    dextrose 10% bolus 125 mL 125 mL  12.5 g Intravenous PRN Armand Orozco PA-C        dextrose 10% bolus 250 mL 250 mL  25 g Intravenous PRN Armand Orozco PA-C        dextrose 40 % gel 15,000 mg  15 g Oral PRN Armand Orozco PA-C        dextrose 40 % gel 30,000 mg  30 g Oral PRN Armand Orozco PA-C        ergocalciferol capsule 50,000 Units  50,000 Units Oral Q7 Days Felix Ndiaye MD   50,000 Units at 03/21/23 0811    EScitalopram oxalate tablet 20 mg  20 mg Oral Daily Armand Orozco PA-C   20 mg at 03/24/23 0904    gabapentin capsule 300 mg  300 mg Oral TID Ousmane Crawford MD   300 mg at 03/24/23 2024    glucagon (human recombinant) injection 1 mg  1 mg Intramuscular PRN Armand Orozco PA-C        insulin aspart U-100 pen 0-5 Units  0-5 Units Subcutaneous QID (AC + HS) PRN Armand Orozco PA-C   1 Units at 03/24/23 2232    levothyroxine tablet 100 mcg  100 mcg Oral Daily Armand Orozco PA-C   100 mcg at 03/25/23 0517    melatonin tablet 6 mg  6 mg Oral Nightly PRN Armand Orozco PA-C   6 mg at 03/24/23 2231    methocarbamoL tablet 500 mg  500 mg Oral Q8H Sadaf Friend MD   500 mg at 03/25/23 0518    naloxone 0.4 mg/mL injection 0.02 mg  0.02 mg Intravenous PRN Armand Orozco PA-C        ondansetron disintegrating tablet 8 mg  8 mg Oral Q8H PRN Armand Orozco PA-C        oxyCODONE immediate release tablet 5 mg  5 mg Oral Q4H PRN Sadaf Friend MD   5 mg at 03/25/23 0518    polyethylene glycol packet 17 g  17 g Oral BID Marium Velazquez PA-C   17 g at 03/24/23 2024    prochlorperazine injection Soln 5 mg  5 mg Intravenous Q6H PRN Armand Orozco PA-C        ROPIvacaine (PF) 2 mg/ml (0.2%) solution  6 mL/hr  Perineural Continuous Sadaf Friend MD 6 mL/hr at 03/24/23 1222 6 mL/hr at 03/24/23 1222    ropivacaine 0.2% Nimbus PainPRO Pump infusion 500 ML   Perineural Continuous Sadaf Friend MD        senna-docusate 8.6-50 mg per tablet 1 tablet  1 tablet Oral BID Sadaf Friend MD   1 tablet at 03/24/23 2024    sodium chloride 0.9% flush 10 mL  10 mL Intravenous Q12H PRN Armand Orozco PA-C        sodium chloride 0.9% flush 10 mL  10 mL Intravenous PRN Felix Ndiaye MD        traZODone tablet 150 mg  150 mg Oral Nightly Armand Orozco PA-C   150 mg at 03/24/23 2024         Assessment:      Pain control adequate     Plan:                 Patient doing well, continue present treatment.     1) Pause interscalene PNC   2) Continue multimodals including Tylenol, celecoxib, Robaxin, Gabapentin  3) PRN oxy 5mg q4h

## 2023-03-26 NOTE — ADDENDUM NOTE
Addendum  created 03/26/23 1003 by Audelia Jonas MD    Charge Capture section accepted, Cosign clinical note with attestation

## 2023-03-26 NOTE — DISCHARGE SUMMARY
[facility] Medicine  Discharge Summary      Patient Name: Alix Patel  MRN: [id number]  JENNIFER: [id number]  Patient Class: IP- Inpatient  Admission Date: 3/20/2023  Hospital Length of Stay: 3 days  Discharge Date and Time:  03/26/2023 4:04 PM  Attending Physician: Teressa att. providers found   Discharging Provider: Marium Velazquez PA-C  Primary Care Provider: Angélica Barakat MD  [facility] Medicine Team: [facility] [place] Marium Velazquez PA-C  Primary Care Team: [facility]     HPI:   Alix Patel is a 67 y.o. female with a PMHx of T2DM, HLD, hypothyroidism, LILLY, and MDD who presented to the ED by EMS for L arm pain following a fall at home. Patient reported taking two doses of her home Trazodone last night because she wanted to be able to sleep through the night. After taking the medication and getting in bed, patient felt hungry and decided to cook pasta. Patient got back in bed to wait for the pasta to finish cooking. Once it was finished around 4 am, she got out of bed to eat. She felt dizzy when standing in the kitchen and fell to the ground on her left side. She denies any LOC, preceding palpitations, confusion, or weakness. She denies any head injuries. Patient was able to stand back up to continue fixing her meal. She went to sleep only endorsing limited ROM in L arm 2/2 pain. When she woke up, she was still unable to move arm without severe pain prompting her to call EMS. She further denies any HA, F/C, N/V, chest pain, palpitations, SOB, recent infections, similar prior episodes, numbness, or tingling. She reports dizziness has resolved. She is not on AC at this time.    ED: Hypoxic to 93%, placed on 3.5 L NC, but eventually weaned to RA. Intermittently tachypneic, RR of 30 max. Otherwise AFVSS. CBC without leukocytosis or anemia. Hyperglycemic to 187, ALT 64. Otherwise CMP wnl. Vitamin D deficiency. A1c 7.5. Type and screen completed. CTH left frontal extracranial soft  tissue swelling without evidence of underlying fracture or acute intracranial hemorrhage. Ventriculomegaly out of proportion to the degree of sulcal enlargement. While this could relate to cerebral volume loss, the configuration does at least raise the possibility of normal pressure hydrocephalus. Clinical correlation required. CXR with no acute intrathoracic process. L shoulder and humerus XR with displaced and comminuted acute fracture of the left mid humeral shaft with nondisplaced fracture of the left proximal humerus. CT L arm with fractures of the proximal and mid aspects of the humerus as described. EKG NSR RBBB. Placed into splint under sedation. Given Fentanyl (in EMS), Cefazolin, Dilaudid, Ortho consulted. Admitted to Hospital Medicine for further management.      Procedure(s) (LRB):  ORIF, FRACTURE, HUMERUS (Left)  IRRIGATION AND DEBRIDEMENT, UPPER EXTREMITY (Left)      Hospital Course:   Alix Patel was admitted to Hospital Medicine for L humerus fracture. Ortho consulted, plan for ORIF on 3/21. S/p L humerus IMN and I&D of open fracture wound 3/21/2. Patient presenting with radial nerve palsy on exam. Worked well with PT/OT. Anesthesia adjusting interscalene PNC. Patient to wear sling to LUE for comfort. Nonweightbearing LUE x 8 weeks postop. Completing 24 hours of abx on 3/22. Will continue ASA 81 BID x 6 weeks. D/c PNC pump and transitioned to oral medication with good pain control. Patient is medically cleared from ortho. Patient is medically ready for discharge home. Plan for Home Health PT/OT. Ortho referral placed for clinic follow up. Patient to also follow up with Psychiatrist as she wishes to d/c Trazodone. All questions answered at bedside with verbal understanding. Return precautions given. Meds brought to bedside.       Goals of Care Treatment Preferences:  Code Status: Full Code    Living Will: Yes              Consults:   Consults (From admission, onward)        Status Ordering  Provider     Inpatient consult to Orthopedic Surgery  Once        Provider:  (Not yet assigned)    Completed JOCELYNE LE          No new Assessment & Plan notes have been filed under this hospital service since the last note was generated.  Service: Hospital Medicine    Final Active Diagnoses:    Diagnosis Date Noted POA    PRINCIPAL PROBLEM:  Grade I open displaced transverse fracture of shaft of left humerus [S42.322B] 03/20/2023 Yes    Fall [W19.XXXA] 03/20/2023 Yes    Vitamin D deficiency [E55.9] 07/20/2022 Yes    Type 2 diabetes mellitus with hyperglycemia, without long-term current use of insulin [E11.65] 07/21/2021 Yes    LLILY (generalized anxiety disorder) [F41.1] 03/15/2021 Yes    Hypothyroidism [E03.9] 04/26/2017 Yes    Thyroid nodule [E04.1] 05/04/2015 Yes    Hyperlipidemia with target low density lipoprotein (LDL) cholesterol less than 100 mg/dL [E78.5] 09/13/2013 Yes    Depression [F32.A] 04/28/2013 Yes      Problems Resolved During this Admission:       Discharged Condition: stable    Disposition: Home or Self Care    Follow Up:    Patient Instructions:      Ambulatory referral/consult to Orthopedics   Standing Status: Future   Referral Priority: Routine Referral Type: Consultation   Requested Specialty: Orthopedic Surgery   Number of Visits Requested: 1     Activity as tolerated         Pending Diagnostic Studies:     None         Medications:  Reconciled Home Medications:      Medication List      START taking these medications    gabapentin 300 MG capsule  Commonly known as: NEURONTIN  Take 1 capsule (300 mg total) by mouth 3 (three) times daily. for 10 days     oxyCODONE 5 MG immediate release tablet  Commonly known as: ROXICODONE  Take 2 tablets (10 mg total) by mouth every 6 (six) hours as needed for Pain.        CHANGE how you take these medications    methocarbamoL 500 MG Tab  Commonly known as: ROBAXIN  Take 1 tablet (500 mg total) by mouth every 8 (eight) hours. for 10  days  What changed:   · how much to take  · how to take this  · when to take this        CONTINUE taking these medications    ammonium lactate 12 % Crea  SAMSON EXT AA  OF FEET ONCE DAILY     aspirin 81 MG EC tablet  Commonly known as: ECOTRIN  Take 1 tablet by mouth once daily.     butalbital-acetaminophen-caffeine -40 mg -40 mg per tablet  Commonly known as: FIORICET, ESGIC  TK 1 T PO  Q 6  H PRN     CRESTOR 20 MG tablet  Generic drug: rosuvastatin  Take 20 mg by mouth once daily.     cyclobenzaprine 10 MG tablet  Commonly known as: FLEXERIL  Take 1 tablet by mouth 3 (three) times daily as needed.     diclofenac 50 MG EC tablet  Commonly known as: VOLTAREN  Take 1 tablet by mouth 2 (two) times daily.     EScitalopram oxalate 20 MG tablet  Commonly known as: LEXAPRO  Take 1 tablet (20 mg total) by mouth once daily.     estradioL 0.01 % (0.1 mg/gram) vaginal cream  Commonly known as: ESTRACE  USE 1 GRAM VAGINALLY 3 TIMES A WEEK     FLUARIX QUAD 8306-3450 (PF) 60 mcg (15 mcg x 4)/0.5 mL Syrg  Generic drug: flu vacc [id number] 6mos up(PF)  ADM 0.5ML IM UTD     hydrOXYzine pamoate 25 MG Cap  Commonly known as: VISTARIL  Take 1 to 2 caps q hs prn difficulty sleeping     ibuprofen 800 MG tablet  Commonly known as: ADVIL,MOTRIN  TAKE 1 TABLET BY MOUTH EVERY 6 TO 8 HOURS FOR PAIN     levothyroxine 100 MCG tablet  Commonly known as: SYNTHROID  Take 1 tablet (100 mcg total) by mouth once daily.     meloxicam 15 MG tablet  Commonly known as: MOBIC  Take 1 tablet by mouth once daily.     metFORMIN 500 MG tablet  Commonly known as: GLUCOPHAGE  Take 2 tablets (1,000 mg total) by mouth 2 (two) times daily with meals.     promethazine 25 MG tablet  Commonly known as: PHENERGAN  TK 1 T PO  Q 12 H PRN     SHINGRIX (PF) 50 mcg/0.5 mL injection  Generic drug: varicella-zoster [id number] (PF)  ADM 0.5ML IM UTD     traZODone 150 MG tablet  Commonly known as: DESYREL  TAKE 1 TABLET(150 MG) BY MOUTH EVERY EVENING     TRUE METRIX AIR  GLUCOSE METER MISC  True Metrix Air Glucose Meter   USE UNDER THE SKIN TWICE DAILY AS DIRECTED     TRUE METRIX GLUCOSE TEST STRIP MISC  True Metrix Glucose Test Strip   TEST TWICE DAILY AS DIRECTED     TRULICITY 0.75 mg/0.5 mL pen injector  Generic drug: dulaglutide  as directed     urea 40 % Crea  Commonly known as: CARMOL  Apply topically once daily. To dry skin on the feet.        STOP taking these medications    traMADoL 50 mg tablet  Commonly known as: ULTRAM            Indwelling Lines/Drains at time of discharge:   Lines/Drains/Airways     Epidural Line  Duration                Perineural Analgesia/Anesthesia Assessment (using dermatomes) 03/21/23 0916 5 days                Time spent on the discharge of patient: 36 minutes         Marium Velazquez PA-C  Department of Hospital Medicine  [facility] - Surgery

## 2023-03-27 ENCOUNTER — PATIENT OUTREACH (OUTPATIENT)
Dept: ADMINISTRATIVE | Facility: CLINIC | Age: 68
End: 2023-03-27
Payer: COMMERCIAL

## 2023-03-27 NOTE — PROGRESS NOTES
C3 nurse attempted to contact Alix Patel for a TCC post hospital discharge follow up call. The patient is unable to conduct the call @ this time. The patient requested a callback.    The patient does not have a scheduled HOSFU appointment within 5-7 days post hospital discharge date 3/25. Message sent to Physician staff to assist with HOSFU appointment scheduling.

## 2023-03-28 ENCOUNTER — TELEPHONE (OUTPATIENT)
Dept: ORTHOPEDICS | Facility: CLINIC | Age: 68
End: 2023-03-28
Payer: COMMERCIAL

## 2023-03-28 DIAGNOSIS — S42.322D: Primary | ICD-10-CM

## 2023-03-28 LAB — BACTERIA SPEC ANAEROBE CULT: NORMAL

## 2023-03-28 RX ORDER — OXYCODONE HYDROCHLORIDE 5 MG/1
5 TABLET ORAL EVERY 6 HOURS PRN
Qty: 28 TABLET | Refills: 0 | Status: SHIPPED | OUTPATIENT
Start: 2023-03-30 | End: 2023-05-02

## 2023-03-28 RX ORDER — ACETAMINOPHEN 500 MG
500 TABLET ORAL EVERY 8 HOURS PRN
Qty: 30 TABLET | Refills: 0 | Status: ON HOLD | OUTPATIENT
Start: 2023-03-28 | End: 2023-05-05

## 2023-03-28 RX ORDER — IBUPROFEN 800 MG/1
800 TABLET ORAL 3 TIMES DAILY PRN
Qty: 30 TABLET | Refills: 0 | Status: SHIPPED | OUTPATIENT
Start: 2023-03-28 | End: 2023-05-02 | Stop reason: SDUPTHER

## 2023-03-28 RX ORDER — TIZANIDINE 2 MG/1
2 TABLET ORAL EVERY 8 HOURS PRN
Qty: 21 TABLET | Refills: 0 | Status: SHIPPED | OUTPATIENT
Start: 2023-03-28 | End: 2023-04-04 | Stop reason: SDUPTHER

## 2023-03-28 NOTE — PROGRESS NOTES
I spoke with patient, she is having difficulty managing her pain.  She said so many doctors were giving her medication, she was unsure who to follow and therefore was taking medications previously given to her when ever she had pain.  I explained that she has to follow the recommended dosing.      Her  was checked.  She was given Neurontin 300 mg 3 days ago and is already out.    I told her I would refill her OxyIR but will drop her dosing to 5 mg PRN.  She was requesting 20 mg.  Additionally, I will give her Tylenol 500 mg tid, Motrin 800 mg tid PRN, and Zanaflex 2 mg tid PRN.  She reports the Robaxin and Flexeril were not helping.    Patient verb understanding.

## 2023-03-28 NOTE — PROGRESS NOTES
C3 nurse spoke with Alix Patel for a TCC post hospital discharge follow up call. The patient does not have a scheduled HOSFU appointment with Angélica Barakat MD within 5-7 days post hospital discharge date 03/25/23. C3 nurse was unable to schedule HOSFU appointment in [facility].    Message sent to PCP staff requesting they contact patient and schedule follow up appointment.

## 2023-03-28 NOTE — TELEPHONE ENCOUNTER
----- Message from Palmira Lay sent at 3/28/2023  4:48 PM CDT -----  Regarding: pt advice/returning call  Contact: CORRECTED PHONE NUMBER: [phone]  CORRECTED PHONE NUMBER: [phone]     Nella/[facility]  requesting a callback on exercise for left affected arm. Pt's pain is a scale of 8 and requesting stronger muscle relaxant and additional pain medication. Pls call to further discuss.

## 2023-03-28 NOTE — TELEPHONE ENCOUNTER
----- Message from Palmira Lay sent at 3/28/2023  4:25 PM CDT -----  Regarding: pt advice  Contact: Nella [phone]  Nella[facility]   requesting a callback on exercise for left affected arm. Pt's pain is a scale of 8 and requesting stronger muscle relaxant and additional pain medication. Pls call to further discuss.

## 2023-03-31 ENCOUNTER — TELEPHONE (OUTPATIENT)
Dept: ORTHOPEDICS | Facility: CLINIC | Age: 68
End: 2023-03-31
Payer: COMMERCIAL

## 2023-03-31 NOTE — TELEPHONE ENCOUNTER
Received a call from Kerri with [facility].  I attempted to get provider on the phone while Kerri was on hold but provider was with a pt.       Called Kerri back at [phone] multiple times.  Voice mail every time.  Left a voice mail stating active and passive ROMAT and NWB to left upper extremity per Brenda Yang PA-C

## 2023-04-04 ENCOUNTER — HOSPITAL ENCOUNTER (OUTPATIENT)
Dept: RADIOLOGY | Facility: HOSPITAL | Age: 68
Discharge: HOME | End: 2023-04-04
Attending: NURSE PRACTITIONER
Payer: COMMERCIAL

## 2023-04-04 ENCOUNTER — OFFICE VISIT (OUTPATIENT)
Dept: ORTHOPEDICS | Facility: CLINIC | Age: 68
End: 2023-04-04
Payer: COMMERCIAL

## 2023-04-04 VITALS — BODY MASS INDEX: 22.41 KG/M2 | HEIGHT: 71 IN | WEIGHT: 160.06 LBS

## 2023-04-04 DIAGNOSIS — M79.602 LEFT ARM PAIN: ICD-10-CM

## 2023-04-04 DIAGNOSIS — Z98.890 POST-OPERATIVE STATE: ICD-10-CM

## 2023-04-04 DIAGNOSIS — S42.322D: Primary | ICD-10-CM

## 2023-04-04 DIAGNOSIS — M79.602 LEFT ARM PAIN: Primary | ICD-10-CM

## 2023-04-04 PROCEDURE — 3008F BODY MASS INDEX DOCD: CPT | Mod: CPTII,S$GLB,, | Performed by: NURSE PRACTITIONER

## 2023-04-04 PROCEDURE — 3051F PR MOST RECENT HEMOGLOBIN A1C LEVEL 7.0 - < 8.0%: ICD-10-PCS | Mod: CPTII,S$GLB,, | Performed by: NURSE PRACTITIONER

## 2023-04-04 PROCEDURE — 3051F HG A1C>EQUAL 7.0%<8.0%: CPT | Mod: CPTII,S$GLB,, | Performed by: NURSE PRACTITIONER

## 2023-04-04 PROCEDURE — 73060 XR HUMERUS 2 VIEW LEFT: ICD-10-PCS | Mod: 26,LT,, | Performed by: RADIOLOGY

## 2023-04-04 PROCEDURE — 1159F MED LIST DOCD IN RCRD: CPT | Mod: CPTII,S$GLB,, | Performed by: NURSE PRACTITIONER

## 2023-04-04 PROCEDURE — 1159F PR MEDICATION LIST DOCUMENTED IN MEDICAL RECORD: ICD-10-PCS | Mod: CPTII,S$GLB,, | Performed by: NURSE PRACTITIONER

## 2023-04-04 PROCEDURE — 1101F PT FALLS ASSESS-DOCD LE1/YR: CPT | Mod: CPTII,S$GLB,, | Performed by: NURSE PRACTITIONER

## 2023-04-04 PROCEDURE — 3288F FALL RISK ASSESSMENT DOCD: CPT | Mod: CPTII,S$GLB,, | Performed by: NURSE PRACTITIONER

## 2023-04-04 PROCEDURE — 3008F PR BODY MASS INDEX (BMI) DOCUMENTED: ICD-10-PCS | Mod: CPTII,S$GLB,, | Performed by: NURSE PRACTITIONER

## 2023-04-04 PROCEDURE — 99999 PR PBB SHADOW E&M-EST. PATIENT-LVL IV: ICD-10-PCS | Mod: PBBFAC,,, | Performed by: NURSE PRACTITIONER

## 2023-04-04 PROCEDURE — 1125F PR PAIN SEVERITY QUANTIFIED, PAIN PRESENT: ICD-10-PCS | Mod: CPTII,S$GLB,, | Performed by: NURSE PRACTITIONER

## 2023-04-04 PROCEDURE — 3288F PR FALLS RISK ASSESSMENT DOCUMENTED: ICD-10-PCS | Mod: CPTII,S$GLB,, | Performed by: NURSE PRACTITIONER

## 2023-04-04 PROCEDURE — 73060 X-RAY EXAM OF HUMERUS: CPT | Mod: TC,LT

## 2023-04-04 PROCEDURE — 99024 PR POST-OP FOLLOW-UP VISIT: ICD-10-PCS | Mod: S$GLB,,, | Performed by: NURSE PRACTITIONER

## 2023-04-04 PROCEDURE — 99024 POSTOP FOLLOW-UP VISIT: CPT | Mod: S$GLB,,, | Performed by: NURSE PRACTITIONER

## 2023-04-04 PROCEDURE — 1157F PR ADVANCE CARE PLAN OR EQUIV PRESENT IN MEDICAL RECORD: ICD-10-PCS | Mod: CPTII,S$GLB,, | Performed by: NURSE PRACTITIONER

## 2023-04-04 PROCEDURE — 73060 X-RAY EXAM OF HUMERUS: CPT | Mod: 26,LT,, | Performed by: RADIOLOGY

## 2023-04-04 PROCEDURE — 1157F ADVNC CARE PLAN IN RCRD: CPT | Mod: CPTII,S$GLB,, | Performed by: NURSE PRACTITIONER

## 2023-04-04 PROCEDURE — 1160F RVW MEDS BY RX/DR IN RCRD: CPT | Mod: CPTII,S$GLB,, | Performed by: NURSE PRACTITIONER

## 2023-04-04 PROCEDURE — 1160F PR REVIEW ALL MEDS BY PRESCRIBER/CLIN PHARMACIST DOCUMENTED: ICD-10-PCS | Mod: CPTII,S$GLB,, | Performed by: NURSE PRACTITIONER

## 2023-04-04 PROCEDURE — 1125F AMNT PAIN NOTED PAIN PRSNT: CPT | Mod: CPTII,S$GLB,, | Performed by: NURSE PRACTITIONER

## 2023-04-04 PROCEDURE — 99999 PR PBB SHADOW E&M-EST. PATIENT-LVL IV: CPT | Mod: PBBFAC,,, | Performed by: NURSE PRACTITIONER

## 2023-04-04 PROCEDURE — 1101F PR PT FALLS ASSESS DOC 0-1 FALLS W/OUT INJ PAST YR: ICD-10-PCS | Mod: CPTII,S$GLB,, | Performed by: NURSE PRACTITIONER

## 2023-04-04 RX ORDER — TIZANIDINE 2 MG/1
2 TABLET ORAL EVERY 8 HOURS PRN
Qty: 21 TABLET | Refills: 0 | Status: SHIPPED | OUTPATIENT
Start: 2023-04-04 | End: 2023-04-10 | Stop reason: SDUPTHER

## 2023-04-04 NOTE — PROGRESS NOTES
Ms. Patel is here today for a post-operative visit after undergoing the following:    Open reduction internal fixation left humeral shaft fracture with intramedullary nail - 90148  Open reduction internal fixation left proximal humerus shaft fracture with screws - 65228  Excisional debridement and irrigation open fracture left humerus including skin, subcutaneous tissue, fascia, muscle, bone.  8 x 2 cm - 47768     by Dr. Crawford on 3/21/2023.    Interval History:  She reports that she is doing ok.  Pain is tolerable.  She is taking pain medication.  She reports she had another dizzy spell and fell onto her bed.  She reports she broke her fall with most of her weight on her left arm but her right arm did take some of the impact.  She has seen a Neurologist and ENT about this matter previously.  She denies fever, chills, and sweats since the time of the surgery.     Physical exam:  Dressing taken down.  Incision is clean, dry and intact.  No signs of infection noted.Skin was closed with Dermabond and Stratifix ends were clipped.  She has tactile stimulation to their lower FA.  She can flex and extend her thumb.  Can perform the ok sign.  ROM of wrist is normal.  Flexion of elbow is 160 and extension is 10 degrees respectively.  Radial pulse is 2+ and cap refill is < 3 secs.      RADS: Left humerus xray was obtained and personally reviewed by me, findings show humeral nail appears to be in good position.  Mid humerus fracture still seen.  No callus formation is present.  No new fractures seen.    Assessment:  Post-op visit (2 weeks)    Plan:    ICD-10-CM ICD-9-CM   1. Open displaced transverse fracture of shaft of left humerus with routine healing, subsequent encounter  S42.322D V54.11   2. Post-operative state  Z98.890 V45.89     Current care, treatment plan, precautions, activity level/ modifications, limitations, rehabilitation exercises and proposed future treatment were discussed with the patient. We discussed  the need to monitor for changes in symptoms and condition and report them to the physician.  Discussed importance of compliance with all appointments and follow up examinations.     WOUND CARE ORDERS  -Alix was advised to keep the incision clean and dry for the next 24 hours after which she may wash the area with antibacterial soap in the shower.   -She not submerge until the incision is completely healed  -Patient was advised to monitor wound closely and multiple times daily for any problems. Call clinic immediately or report to ED for immediate medical attention for any complications including reopening of wound, drainage, purulence, redness, streaking, odor, pain out of proportion, fever, chills, etc.   - If there are signs of infection, please call Ortho Clinic [phone] for further instructions and to make appt to be seen.       PHYSICAL THERAPY:   - Continue therapy as ordered.  Getting home health with [facility].  - Weight bearing - NWB to LUE x 8 weeks today.  - Range of motion as tolerated.      PAIN MEDICATION:   - Pain medication: refill was not needed  - Pain medication refill policy provided to patient for review, yes.    - Patient was informed a multi-modal approach is used to treat their pain.     DVT PROPHYLAXIS:   - ASA 81 mg bid x 6 weeks     FOLLOW UP:   - Patient will follow up in the clinic in 4 weeks.  - X-ray of her left humerus is needed.  - Will coordinate next visit with fracture clinic.  - Will consider allowing her to return to work at next visit.  - Future Appointments:   Future Appointments   Date Time Provider Department Center   4/6/2023  1:30 PM AUDIOGRAM, AUDIO [facility] AUDIO [facility]   4/6/2023  2:00 PM Kari Villeda NP [facility] ENT [facility]   4/14/2023  8:20 AM Maximino Curry MD [facility] VOI HI [facility]   5/2/2023 10:30 AM Sebastian Kaplan NP [facility] ORTHO [facility]           If there are any questions prior to scheduled follow up, the patient was instructed to contact the  office

## 2023-04-05 ENCOUNTER — TELEPHONE (OUTPATIENT)
Dept: ORTHOPEDICS | Facility: CLINIC | Age: 68
End: 2023-04-05
Payer: COMMERCIAL

## 2023-04-05 NOTE — TELEPHONE ENCOUNTER
Called and spoke with the  nurse w/Lesa Abdi in regards to pt's range of motion. Informed the nurse I will fax over pt's clinic noted form yesterday to [phone]. The nurse verbally understood and was satisfied.

## 2023-04-05 NOTE — TELEPHONE ENCOUNTER
----- Message from Mana Abel sent at 4/5/2023 10:48 AM CDT -----  [facility] [employer] calling for pt restrictions on left arm       [phone]  call back number Polina Dewittow

## 2023-04-06 ENCOUNTER — TELEPHONE (OUTPATIENT)
Dept: OTOLARYNGOLOGY | Facility: CLINIC | Age: 68
End: 2023-04-06
Payer: COMMERCIAL

## 2023-04-10 ENCOUNTER — TELEPHONE (OUTPATIENT)
Dept: ORTHOPEDICS | Facility: CLINIC | Age: 68
End: 2023-04-10
Payer: COMMERCIAL

## 2023-04-10 DIAGNOSIS — S42.322D: ICD-10-CM

## 2023-04-10 RX ORDER — TIZANIDINE 2 MG/1
2 TABLET ORAL EVERY 8 HOURS PRN
Qty: 21 TABLET | Refills: 0 | Status: SHIPPED | OUTPATIENT
Start: 2023-04-10 | End: 2023-04-17 | Stop reason: SDUPTHER

## 2023-04-10 NOTE — TELEPHONE ENCOUNTER
----- Message from Dari Hamlin sent at 4/10/2023  3:40 PM CDT -----  Regarding: went to wrong pharmacy  Contact: [phone]  Alix Patel calling regarding Refills  (message) for #tiZANidine (ZANAFLEX) 2 MG tablet. Rx went to wrong pharmacy      [id number] [address] [facility] [address] [address]  [address] [place] [address]  Phone: [phone] Fax: [phone]

## 2023-04-12 ENCOUNTER — TELEPHONE (OUTPATIENT)
Dept: ORTHOPEDICS | Facility: CLINIC | Age: 68
End: 2023-04-12
Payer: COMMERCIAL

## 2023-04-12 NOTE — TELEPHONE ENCOUNTER
Called and spoke with pt in regrads to her increase pain she is continue to have. Pt states she would like for me to notify Sebastian she is having increase muscle pain while doing her exercise. Pain has travel down to her bones and asking for something to relive her pain. She also ask for a refill on her tizanidine. Informed pt I will notify Sebastian when he gets back on Monday. Pt verbally understood and was satisfied.

## 2023-04-13 ENCOUNTER — TELEPHONE (OUTPATIENT)
Dept: ORTHOPEDICS | Facility: CLINIC | Age: 68
End: 2023-04-13
Payer: COMMERCIAL

## 2023-04-13 ENCOUNTER — TELEPHONE (OUTPATIENT)
Dept: NEUROLOGY | Facility: CLINIC | Age: 68
End: 2023-04-13
Payer: COMMERCIAL

## 2023-04-13 NOTE — TELEPHONE ENCOUNTER
Called and spoke with pt in regards to pt wanting future refills of her medication due to complications. Informed pt I spoke with Ms Yang and she states pt is not allowed to get future refills before medication is finish and pt will have to speak with Sebastian in regards to this matter at upcoming appt.  Pt verbally understood and says she will speak to Sebastian at her appt.

## 2023-04-13 NOTE — TELEPHONE ENCOUNTER
----- Message from Mary Lay sent at 4/13/2023  2:26 PM CDT -----  Contact: [phone]  PT, would like to Scheduled a  Appointment, please contact @ number provided     Pt, says its urgent can they please call    Pt, had a fall and believes it contributed by diabetes    Pt, broke her left arm and surgery which can been seen in the system     [phone]

## 2023-04-17 ENCOUNTER — OFFICE VISIT (OUTPATIENT)
Dept: PSYCHIATRY | Facility: CLINIC | Age: 68
End: 2023-04-17
Payer: COMMERCIAL

## 2023-04-17 DIAGNOSIS — F41.1 GAD (GENERALIZED ANXIETY DISORDER): ICD-10-CM

## 2023-04-17 DIAGNOSIS — F10.21 ALCOHOL USE DISORDER, MODERATE, IN SUSTAINED REMISSION: ICD-10-CM

## 2023-04-17 DIAGNOSIS — S42.322D: ICD-10-CM

## 2023-04-17 DIAGNOSIS — F32.A DEPRESSION, UNSPECIFIED DEPRESSION TYPE: ICD-10-CM

## 2023-04-17 DIAGNOSIS — F31.30 BIPOLAR AFFECTIVE DISORDER, CURRENT EPISODE DEPRESSED, CURRENT EPISODE SEVERITY UNSPECIFIED: Primary | ICD-10-CM

## 2023-04-17 PROCEDURE — 3051F HG A1C>EQUAL 7.0%<8.0%: CPT | Mod: CPTII,95,, | Performed by: NURSE PRACTITIONER

## 2023-04-17 PROCEDURE — 99214 OFFICE O/P EST MOD 30 MIN: CPT | Mod: 95,,, | Performed by: NURSE PRACTITIONER

## 2023-04-17 PROCEDURE — 99214 PR OFFICE/OUTPT VISIT, EST, LEVL IV, 30-39 MIN: ICD-10-PCS | Mod: 95,,, | Performed by: NURSE PRACTITIONER

## 2023-04-17 PROCEDURE — 1111F PR DISCHARGE MEDS RECONCILED W/ CURRENT OUTPATIENT MED LIST: ICD-10-PCS | Mod: CPTII,95,, | Performed by: NURSE PRACTITIONER

## 2023-04-17 PROCEDURE — 1157F PR ADVANCE CARE PLAN OR EQUIV PRESENT IN MEDICAL RECORD: ICD-10-PCS | Mod: CPTII,95,, | Performed by: NURSE PRACTITIONER

## 2023-04-17 PROCEDURE — 1157F ADVNC CARE PLAN IN RCRD: CPT | Mod: CPTII,95,, | Performed by: NURSE PRACTITIONER

## 2023-04-17 PROCEDURE — 1111F DSCHRG MED/CURRENT MED MERGE: CPT | Mod: CPTII,95,, | Performed by: NURSE PRACTITIONER

## 2023-04-17 PROCEDURE — 3051F PR MOST RECENT HEMOGLOBIN A1C LEVEL 7.0 - < 8.0%: ICD-10-PCS | Mod: CPTII,95,, | Performed by: NURSE PRACTITIONER

## 2023-04-17 RX ORDER — ZOLPIDEM TARTRATE 10 MG/1
10 TABLET ORAL NIGHTLY PRN
Qty: 30 TABLET | Refills: 2 | Status: SHIPPED | OUTPATIENT
Start: 2023-04-17 | End: 2023-04-24 | Stop reason: SDUPTHER

## 2023-04-17 RX ORDER — TIZANIDINE 2 MG/1
2 TABLET ORAL EVERY 8 HOURS PRN
Qty: 21 TABLET | Refills: 0 | Status: SHIPPED | OUTPATIENT
Start: 2023-04-17 | End: 2023-05-02 | Stop reason: SDUPTHER

## 2023-04-17 NOTE — PROGRESS NOTES
"Outpatient Psychiatry Follow-Up Visit (MD/NP)     4/17/2023 Phone visit. Patient tried and could not get into virtual system.      Clinical Status of Patient:  Outpatient (Ambulatory)     Chief Complaint:  Alix Patel is a 67 y.o. female who presents today for follow-up of mood disorder.  Met with patient and no relatives present for interview.       Interval History and Content of Current Session:  Interim Events/Subjective Report/Content of Current Session: Patient presents as a transfer from Dr. Sims, last seen in 1/2023. She has a medical history of hypothyroidism, cataracts, DM type 2, HLD      Patient reports "two weeks ago, got up at 4 am" and "I almost passed out, fell, broke arm."     Reports "I'm in bed, resting and recooperating and "will be like until May 2nd." Reports will be doing exercises, occupational health is helping, "reading and that's just about it."     Discussed trial of Ambien for sleep.     Informed patient I will be leaving [facility] and the need to schedule with another provider. Encouraged to schedule at conclusion of this visit to insure a timely appointment and avoid a gap in treatment including medication management. Patient verbalized understanding.       Psychotherapy:  Target symptoms: alcohol abuse, mood disorder  Why chosen therapy is appropriate versus another modality: relevant to diagnosis, patient responds to this modality, evidence based practice  Outcome monitoring methods: self-report  Therapeutic intervention type: supportive psychotherapy  Topics discussed/themes: mood and drinking issues, stress related to medical comorbidities  The patient's response to the intervention is accepting. The patient's progress toward treatment goals is fair.   Duration of intervention: 19 minutes.     Review of Systems   PSYCHIATRIC: Pertinant items are noted in the narrative.     Past Medical, Family and Social History: The patient's past medical, family and social history have " been reviewed and updated as appropriate within the electronic medical record - see encounter notes.     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. Work full-time, been with [employer] for 10 years. ,  to a man. Kayla, lives in [place], about to get MA from [place] in [employer], looking to become an Ambassador. Live alone currently, will be getting roommates. Cat, named Monika Frank, shari cat, starting. One cup of coffee per day. No ETOH.      Compliance: yes     Side effects: possible vertigo     Risk Parameters:  Patient reports no suicidal ideation  Patient reports no homicidal ideation  Patient reports no self-injurious behavior  Patient reports no violent behavior     Exam (detailed: at least 9 elements; comprehensive: all 15 elements)   Constitutional  Vitals:  Most recent vital signs, dated less than 90 days prior to this appointment, were reviewed.   There were no vitals filed for this visit. Patient reports stable vital signs      General:  unremarkable, age appropriate, could not assess appearance      Musculoskeletal  Muscle Strength/Tone:  patient reports some loss of muscle tone and strength   Gait & Station:  non-ataxic      Psychiatric  Speech:  no latency; no press, spontaneous   Mood & Affect:  steady  congruent and appropriate   Thought Process:  normal and logical   Associations:  intact   Thought Content:  normal, no suicidality, no homicidality, delusions, or paranoia   Insight:  has awareness of illness   Judgement: behavior is adequate to circumstances   Orientation:  grossly intact   Memory: intact for content of interview   Language: grossly intact   Attention Span & Concentration:  able to focus   Fund of Knowledge:  not tested      Assessment and Diagnosis   Status/Progress: Based on the examination today, the patient's problem(s) is/are adequately but not ideally controlled.  New problems have not been  presented today.   no new obstacles are not complicating management of the primary condition.  The working differential for this patient includes mood disorder and alcohol use.      General Impression: Patient has a stable mood at this time and is in good self control. Patient is not an active danger to self or others at this time, reports sobriety since July of 2022. Patient is motivated to maintain her mood stability, and continue her counseling with Dr Bazzi as well.         ICD-10-CM ICD-9-CM   1. Bipolar affective disorder, current episode depressed, current episode severity unspecified  F31.30 296.50   2. Alcohol use disorder, moderate, in sustained remission  F10.21 305.03    R/o PTSD     Intervention/Counseling/Treatment Plan   Medication Management: The risks and benefits of medication were discussed with the patient. Patient agrees to continue Lexapro 20 mg q am, trazadone 150 mg q hs, and hydroxyzine 25-50 mg q hs prn only difficulty sleeping.         Return to Clinic: 2 to 3 months

## 2023-04-18 ENCOUNTER — EXTERNAL HOME HEALTH (OUTPATIENT)
Dept: HOME HEALTH SERVICES | Facility: HOSPITAL | Age: 68
End: 2023-04-18
Payer: COMMERCIAL

## 2023-04-20 ENCOUNTER — TELEPHONE (OUTPATIENT)
Dept: ORTHOPEDICS | Facility: CLINIC | Age: 68
End: 2023-04-20
Payer: COMMERCIAL

## 2023-04-20 NOTE — TELEPHONE ENCOUNTER
----- Message from Tim Ojeda sent at 4/20/2023  2:20 PM CDT -----  Regarding: THERAPY  Contact: Gina Lay,PT w/ [facility]  Gina Lay,PT w/ [facility] stated she received a message from staff - Brenda, believe it was regarding the request for a continuous for at home occupational therapy. Gina stated she have been calling everyday for orders however no one has respond, for a call back as soon as possible today      Contact info  [phone] - Gina Lay       Solaraze Counseling:  I discussed with the patient the risks of Solaraze including but not limited to erythema, scaling, itching, weeping, crusting, and pain.

## 2023-04-20 NOTE — TELEPHONE ENCOUNTER
Called and spoke with Ms Gina Alireza w/ [facility] asking if Sebastian can extend pt's PT an additional 3 weeks. Informed  Ms Nella Sebastian is out of the office today and  I will forward her message to him to contact her on tomorrow. Ms Carmen verbally understood and was satisfied.

## 2023-04-21 ENCOUNTER — TELEPHONE (OUTPATIENT)
Dept: ORTHOPEDICS | Facility: CLINIC | Age: 68
End: 2023-04-21
Payer: COMMERCIAL

## 2023-04-21 NOTE — TELEPHONE ENCOUNTER
Attempted to call Mr. Manning with [facility] but there was no answer.  Was returning her phone call that she left yesterday and the day before.  It appears the stepper wants to continue the patient's home health therapy for an additional 3 weeks.  I was able to leave a message on her voicemail this time authorize in 3 additional weeks for home health PT. I left the clinic number to call back should she have any questions.

## 2023-04-24 RX ORDER — ZOLPIDEM TARTRATE 10 MG/1
10 TABLET ORAL NIGHTLY PRN
Qty: 30 TABLET | Refills: 2 | Status: SHIPPED | OUTPATIENT
Start: 2023-04-24 | End: 2023-06-15

## 2023-04-25 RX ORDER — ZOLPIDEM TARTRATE 10 MG/1
10 TABLET ORAL NIGHTLY PRN
Qty: 30 TABLET | Refills: 2 | OUTPATIENT
Start: 2023-04-25 | End: 2023-10-24

## 2023-05-01 ENCOUNTER — TELEPHONE (OUTPATIENT)
Dept: ORTHOPEDICS | Facility: CLINIC | Age: 68
End: 2023-05-01
Payer: COMMERCIAL

## 2023-05-01 NOTE — TELEPHONE ENCOUNTER
Called and informed pt the earliest she can come in is 8:30 am. Pt says she will arrive tomorrow at 8:30 am.

## 2023-05-01 NOTE — TELEPHONE ENCOUNTER
----- Message from Leny Carlos sent at 5/1/2023  3:13 PM CDT -----  Regarding: Appt time  Contact: [phone]  Alix Patel calling regarding Appointment Access  (message) for #Pt would like to know if she can come in earlier then 10:30 or after 2:00 due to having another appt.

## 2023-05-02 ENCOUNTER — HOSPITAL ENCOUNTER (OUTPATIENT)
Dept: RADIOLOGY | Facility: HOSPITAL | Age: 68
Discharge: HOME | End: 2023-05-02
Attending: NURSE PRACTITIONER
Payer: COMMERCIAL

## 2023-05-02 ENCOUNTER — OFFICE VISIT (OUTPATIENT)
Dept: ORTHOPEDICS | Facility: CLINIC | Age: 68
End: 2023-05-02
Payer: COMMERCIAL

## 2023-05-02 VITALS — BODY MASS INDEX: 22.41 KG/M2 | WEIGHT: 160.06 LBS | HEIGHT: 71 IN

## 2023-05-02 DIAGNOSIS — Z98.890 POST-OPERATIVE STATE: ICD-10-CM

## 2023-05-02 DIAGNOSIS — S42.322D: Primary | ICD-10-CM

## 2023-05-02 DIAGNOSIS — M79.602 LEFT ARM PAIN: ICD-10-CM

## 2023-05-02 DIAGNOSIS — M79.602 LEFT ARM PAIN: Primary | ICD-10-CM

## 2023-05-02 PROCEDURE — 1159F PR MEDICATION LIST DOCUMENTED IN MEDICAL RECORD: ICD-10-PCS | Mod: CPTII,S$GLB,, | Performed by: NURSE PRACTITIONER

## 2023-05-02 PROCEDURE — 1159F MED LIST DOCD IN RCRD: CPT | Mod: CPTII,S$GLB,, | Performed by: NURSE PRACTITIONER

## 2023-05-02 PROCEDURE — 99999 PR PBB SHADOW E&M-EST. PATIENT-LVL IV: ICD-10-PCS | Mod: PBBFAC,,, | Performed by: NURSE PRACTITIONER

## 2023-05-02 PROCEDURE — 3008F BODY MASS INDEX DOCD: CPT | Mod: CPTII,S$GLB,, | Performed by: NURSE PRACTITIONER

## 2023-05-02 PROCEDURE — 1101F PR PT FALLS ASSESS DOC 0-1 FALLS W/OUT INJ PAST YR: ICD-10-PCS | Mod: CPTII,S$GLB,, | Performed by: NURSE PRACTITIONER

## 2023-05-02 PROCEDURE — 73060 X-RAY EXAM OF HUMERUS: CPT | Mod: 26,LT,, | Performed by: RADIOLOGY

## 2023-05-02 PROCEDURE — 3051F HG A1C>EQUAL 7.0%<8.0%: CPT | Mod: CPTII,S$GLB,, | Performed by: NURSE PRACTITIONER

## 2023-05-02 PROCEDURE — 1160F RVW MEDS BY RX/DR IN RCRD: CPT | Mod: CPTII,S$GLB,, | Performed by: NURSE PRACTITIONER

## 2023-05-02 PROCEDURE — 99024 PR POST-OP FOLLOW-UP VISIT: ICD-10-PCS | Mod: S$GLB,,, | Performed by: NURSE PRACTITIONER

## 2023-05-02 PROCEDURE — 99024 POSTOP FOLLOW-UP VISIT: CPT | Mod: S$GLB,,, | Performed by: NURSE PRACTITIONER

## 2023-05-02 PROCEDURE — 3051F PR MOST RECENT HEMOGLOBIN A1C LEVEL 7.0 - < 8.0%: ICD-10-PCS | Mod: CPTII,S$GLB,, | Performed by: NURSE PRACTITIONER

## 2023-05-02 PROCEDURE — 99999 PR PBB SHADOW E&M-EST. PATIENT-LVL IV: CPT | Mod: PBBFAC,,, | Performed by: NURSE PRACTITIONER

## 2023-05-02 PROCEDURE — 3288F FALL RISK ASSESSMENT DOCD: CPT | Mod: CPTII,S$GLB,, | Performed by: NURSE PRACTITIONER

## 2023-05-02 PROCEDURE — 73060 X-RAY EXAM OF HUMERUS: CPT | Mod: TC,LT

## 2023-05-02 PROCEDURE — 73060 XR HUMERUS 2 VIEW LEFT: ICD-10-PCS | Mod: 26,LT,, | Performed by: RADIOLOGY

## 2023-05-02 PROCEDURE — 3288F PR FALLS RISK ASSESSMENT DOCUMENTED: ICD-10-PCS | Mod: CPTII,S$GLB,, | Performed by: NURSE PRACTITIONER

## 2023-05-02 PROCEDURE — 1125F AMNT PAIN NOTED PAIN PRSNT: CPT | Mod: CPTII,S$GLB,, | Performed by: NURSE PRACTITIONER

## 2023-05-02 PROCEDURE — 1125F PR PAIN SEVERITY QUANTIFIED, PAIN PRESENT: ICD-10-PCS | Mod: CPTII,S$GLB,, | Performed by: NURSE PRACTITIONER

## 2023-05-02 PROCEDURE — 1160F PR REVIEW ALL MEDS BY PRESCRIBER/CLIN PHARMACIST DOCUMENTED: ICD-10-PCS | Mod: CPTII,S$GLB,, | Performed by: NURSE PRACTITIONER

## 2023-05-02 PROCEDURE — 1157F PR ADVANCE CARE PLAN OR EQUIV PRESENT IN MEDICAL RECORD: ICD-10-PCS | Mod: CPTII,S$GLB,, | Performed by: NURSE PRACTITIONER

## 2023-05-02 PROCEDURE — 1157F ADVNC CARE PLAN IN RCRD: CPT | Mod: CPTII,S$GLB,, | Performed by: NURSE PRACTITIONER

## 2023-05-02 PROCEDURE — 1101F PT FALLS ASSESS-DOCD LE1/YR: CPT | Mod: CPTII,S$GLB,, | Performed by: NURSE PRACTITIONER

## 2023-05-02 PROCEDURE — 3008F PR BODY MASS INDEX (BMI) DOCUMENTED: ICD-10-PCS | Mod: CPTII,S$GLB,, | Performed by: NURSE PRACTITIONER

## 2023-05-02 RX ORDER — TIZANIDINE 2 MG/1
2 TABLET ORAL EVERY 8 HOURS PRN
Qty: 21 TABLET | Refills: 0 | Status: ON HOLD | OUTPATIENT
Start: 2023-05-02 | End: 2023-05-10 | Stop reason: HOSPADM

## 2023-05-02 RX ORDER — IBUPROFEN 800 MG/1
800 TABLET ORAL 3 TIMES DAILY PRN
Qty: 30 TABLET | Refills: 0 | Status: SHIPPED | OUTPATIENT
Start: 2023-05-02 | End: 2023-06-09 | Stop reason: SDUPTHER

## 2023-05-02 RX ORDER — OXYCODONE HYDROCHLORIDE 5 MG/1
5 TABLET ORAL EVERY 8 HOURS PRN
Qty: 21 TABLET | Refills: 0 | Status: ON HOLD | OUTPATIENT
Start: 2023-05-02 | End: 2023-05-10 | Stop reason: HOSPADM

## 2023-05-02 NOTE — PROGRESS NOTES
Ms. Patel is here today for a post-operative visit after undergoing the following:    Open reduction internal fixation left humeral shaft fracture with intramedullary nail - 95034  Open reduction internal fixation left proximal humerus shaft fracture with screws - 28668  Excisional debridement and irrigation open fracture left humerus including skin, subcutaneous tissue, fascia, muscle, bone.  8 x 2 cm - 90652     by Dr. Crawford on 3/21/2023.    Interval History:  She reports that she is doing ok.  Pain is tolerable.  She is taking pain medication.  She would like to continue OT and is ready to return to work.  Advised her that she cannot have home health while going to work.  She is also complaining of pain at her left thumb.  Denies injuries since her last visit.  She reports she had another fall onto her bed.  She has seen a Neurologist and ENT about this matter previously.  She is requesting more zanaflex and her pain medications.  She states she has not drank any ETOH since last summer.  She denies fever, chills, and sweats since the time of the surgery.     Physical exam:  Incision is open to air and healing with signs of infection.   She has tactile stimulation to their lower FA.  She can flex and extend her thumb.  Can perform the ok sign.  ROM of wrist is normal.  Flexion of elbow is 160 and extension is 10 degrees respectively.  ROM of her shoulder is 0-170 degrees.  Radial pulse is 2+ and cap refill is < 3 secs.      RADS: Left humerus xray was obtained and personally reviewed by me, findings show humeral nail appears to be in good position.  Mid humerus fracture still seen.  There is minimal callus formation now present.  No new fractures seen.    Assessment:  Post-op visit (6 weeks)    Plan:    ICD-10-CM ICD-9-CM   1. Open displaced transverse fracture of shaft of left humerus with routine healing, subsequent encounter  S42.322D V54.11   2. Post-operative state  Z98.890 V45.89       Current care,  treatment plan, precautions, activity level/ modifications, limitations, rehabilitation exercises and proposed future treatment were discussed with the patient. We discussed the need to monitor for changes in symptoms and condition and report them to the physician.  Discussed importance of compliance with all appointments and follow up examinations.       PHYSICAL THERAPY:   - Continue therapy as ordered.  Getting home health with [facility].  She will transition to outpatient PT will call me with location.  - Weight bearing - NWB to LUE x 8 weeks today.  Advised NWB for 2 more weeks then can start at 2 lbs x 4 weeks and increase by 2 lbs/month thereafter.  - Range of motion as tolerated.      PAIN MEDICATION:   - Pain medication: refill was needed, I will refill her Motrin, OxyIR, and Zanaflex.  She was advised will start to wean her off after this refill.  Avoid ETOH while taking these medications.  - Pain medication refill policy provided to patient for review, yes.    - Patient was informed a multi-modal approach is used to treat their pain.     DVT PROPHYLAXIS:   - ASA 81 mg bid x 6 weeks ok to stop.     FOLLOW UP:   - Patient will follow up in the clinic in 6 weeks.  - X-ray of her left humerus is needed.  - Refer to hand clinic   - Future Appointments:   Future Appointments   Date Time Provider Department Center   5/2/2023 10:30 AM Sebastian Kaplan NP [facility] ORTHO [facility]   5/16/2023  9:30 AM Trey Kan MD [facility] ORTHO [facility]   5/16/2023  2:45 PM Varsha Nieves DPM [facility] POD [facility]   6/13/2023  8:30 AM Sebastian Kaplan NP [facility]TH[facility]   6/15/2023  9:15 AM Johnathan Rae MD [facility] NEURO8 [facility]           If there are any questions prior to scheduled follow up, the patient was instructed to contact the office

## 2023-05-05 ENCOUNTER — ANESTHESIA (OUTPATIENT)
Dept: SURGERY | Facility: HOSPITAL | Age: 68
DRG: 494 | End: 2023-05-05
Payer: COMMERCIAL

## 2023-05-05 ENCOUNTER — HOSPITAL ENCOUNTER (INPATIENT)
Facility: HOSPITAL | Age: 68
LOS: 3 days | Discharge: NURSING, REHAB OR LONG-TERM CARE | DRG: 494 | End: 2023-05-10
Attending: EMERGENCY MEDICINE | Admitting: INTERNAL MEDICINE
Payer: COMMERCIAL

## 2023-05-05 ENCOUNTER — ANESTHESIA EVENT (OUTPATIENT)
Dept: SURGERY | Facility: HOSPITAL | Age: 68
DRG: 494 | End: 2023-05-05
Payer: COMMERCIAL

## 2023-05-05 DIAGNOSIS — Z74.09 IMPAIRED MOBILITY: ICD-10-CM

## 2023-05-05 DIAGNOSIS — J43.2 CENTRILOBULAR EMPHYSEMA: ICD-10-CM

## 2023-05-05 DIAGNOSIS — D63.8 ANEMIA OF CHRONIC DISEASE: ICD-10-CM

## 2023-05-05 DIAGNOSIS — F33.42 RECURRENT MAJOR DEPRESSIVE DISORDER, IN FULL REMISSION: ICD-10-CM

## 2023-05-05 DIAGNOSIS — E87.6 HYPOKALEMIA: ICD-10-CM

## 2023-05-05 DIAGNOSIS — S82.851D CLOSED TRIMALLEOLAR FRACTURE OF RIGHT ANKLE WITH ROUTINE HEALING: ICD-10-CM

## 2023-05-05 DIAGNOSIS — S82.851D CLOSED TRIMALLEOLAR FRACTURE OF RIGHT ANKLE WITH ROUTINE HEALING: Primary | ICD-10-CM

## 2023-05-05 DIAGNOSIS — E78.5 HYPERLIPIDEMIA WITH TARGET LOW DENSITY LIPOPROTEIN (LDL) CHOLESTEROL LESS THAN 100 MG/DL: ICD-10-CM

## 2023-05-05 DIAGNOSIS — E03.9 ACQUIRED HYPOTHYROIDISM: ICD-10-CM

## 2023-05-05 DIAGNOSIS — S82.891A CLOSED FRACTURE OF RIGHT ANKLE, INITIAL ENCOUNTER: ICD-10-CM

## 2023-05-05 DIAGNOSIS — F41.1 GAD (GENERALIZED ANXIETY DISORDER): ICD-10-CM

## 2023-05-05 DIAGNOSIS — R07.9 CHEST PAIN: ICD-10-CM

## 2023-05-05 DIAGNOSIS — S82.851A CLOSED TRIMALLEOLAR FRACTURE OF ANKLE, RIGHT, INITIAL ENCOUNTER: ICD-10-CM

## 2023-05-05 DIAGNOSIS — S93.04XA CLOSED DISLOCATION OF ANKLE, RIGHT, INITIAL ENCOUNTER: ICD-10-CM

## 2023-05-05 DIAGNOSIS — E11.9 TYPE 2 DIABETES MELLITUS WITHOUT COMPLICATION, WITHOUT LONG-TERM CURRENT USE OF INSULIN: ICD-10-CM

## 2023-05-05 DIAGNOSIS — Z91.81 HISTORY OF RECENT FALL: ICD-10-CM

## 2023-05-05 PROBLEM — M54.42 ACUTE BILATERAL LOW BACK PAIN WITH BILATERAL SCIATICA: Status: RESOLVED | Noted: 2021-05-06 | Resolved: 2023-05-05

## 2023-05-05 PROBLEM — M54.41 ACUTE BILATERAL LOW BACK PAIN WITH BILATERAL SCIATICA: Status: RESOLVED | Noted: 2021-05-06 | Resolved: 2023-05-05

## 2023-05-05 PROBLEM — F10.21 ALCOHOL USE DISORDER, MODERATE, IN SUSTAINED REMISSION: Status: RESOLVED | Noted: 2019-12-06 | Resolved: 2023-05-05

## 2023-05-05 PROBLEM — T50.901A ACCIDENTAL DRUG OVERDOSE: Status: RESOLVED | Noted: 2021-07-23 | Resolved: 2023-05-05

## 2023-05-05 PROBLEM — R27.0 ATAXIA: Status: RESOLVED | Noted: 2020-01-20 | Resolved: 2023-05-05

## 2023-05-05 LAB
ALBUMIN SERPL BCP-MCNC: 3.4 G/DL (ref 3.5–5.2)
ALP SERPL-CCNC: 67 U/L (ref 55–135)
ALT SERPL W/O P-5'-P-CCNC: 19 U/L (ref 10–44)
ANION GAP SERPL CALC-SCNC: 10 MMOL/L (ref 8–16)
AST SERPL-CCNC: 20 U/L (ref 10–40)
BASOPHILS # BLD AUTO: 0.01 K/UL (ref 0–0.2)
BASOPHILS NFR BLD: 0.1 % (ref 0–1.9)
BILIRUB SERPL-MCNC: 0.5 MG/DL (ref 0.1–1)
BUN SERPL-MCNC: 3 MG/DL (ref 8–23)
CALCIUM SERPL-MCNC: 8.8 MG/DL (ref 8.7–10.5)
CHLORIDE SERPL-SCNC: 107 MMOL/L (ref 95–110)
CO2 SERPL-SCNC: 24 MMOL/L (ref 23–29)
CREAT SERPL-MCNC: 0.6 MG/DL (ref 0.5–1.4)
DIFFERENTIAL METHOD: ABNORMAL
EOSINOPHIL # BLD AUTO: 0 K/UL (ref 0–0.5)
EOSINOPHIL NFR BLD: 0.3 % (ref 0–8)
ERYTHROCYTE [DISTWIDTH] IN BLOOD BY AUTOMATED COUNT: 12.5 % (ref 11.5–14.5)
EST. GFR  (NO RACE VARIABLE): >60 ML/MIN/1.73 M^2
GLUCOSE SERPL-MCNC: 170 MG/DL (ref 70–110)
HCT VFR BLD AUTO: 31.9 % (ref 37–48.5)
HGB BLD-MCNC: 10.6 G/DL (ref 12–16)
IMM GRANULOCYTES # BLD AUTO: 0.02 K/UL (ref 0–0.04)
IMM GRANULOCYTES NFR BLD AUTO: 0.3 % (ref 0–0.5)
LYMPHOCYTES # BLD AUTO: 1.7 K/UL (ref 1–4.8)
LYMPHOCYTES NFR BLD: 23 % (ref 18–48)
MCH RBC QN AUTO: 30.5 PG (ref 27–31)
MCHC RBC AUTO-ENTMCNC: 33.2 G/DL (ref 32–36)
MCV RBC AUTO: 92 FL (ref 82–98)
MONOCYTES # BLD AUTO: 0.6 K/UL (ref 0.3–1)
MONOCYTES NFR BLD: 7.7 % (ref 4–15)
NEUTROPHILS # BLD AUTO: 5.1 K/UL (ref 1.8–7.7)
NEUTROPHILS NFR BLD: 68.6 % (ref 38–73)
NRBC BLD-RTO: 0 /100 WBC
PLATELET # BLD AUTO: 204 K/UL (ref 150–450)
PMV BLD AUTO: 10.6 FL (ref 9.2–12.9)
POCT GLUCOSE: 130 MG/DL (ref 70–110)
POCT GLUCOSE: 178 MG/DL (ref 70–110)
POTASSIUM SERPL-SCNC: 3 MMOL/L (ref 3.5–5.1)
PROT SERPL-MCNC: 6.1 G/DL (ref 6–8.4)
RBC # BLD AUTO: 3.47 M/UL (ref 4–5.4)
SODIUM SERPL-SCNC: 141 MMOL/L (ref 136–145)
WBC # BLD AUTO: 7.38 K/UL (ref 3.9–12.7)

## 2023-05-05 PROCEDURE — 37000008 HC ANESTHESIA 1ST 15 MINUTES: Performed by: ORTHOPAEDIC SURGERY

## 2023-05-05 PROCEDURE — D9220A PRA ANESTHESIA: Mod: CRNA,,, | Performed by: NURSE ANESTHETIST, CERTIFIED REGISTERED

## 2023-05-05 PROCEDURE — 36000709 HC OR TIME LEV III EA ADD 15 MIN: Performed by: ORTHOPAEDIC SURGERY

## 2023-05-05 PROCEDURE — 25000003 PHARM REV CODE 250: Performed by: STUDENT IN AN ORGANIZED HEALTH CARE EDUCATION/TRAINING PROGRAM

## 2023-05-05 PROCEDURE — 37000009 HC ANESTHESIA EA ADD 15 MINS: Performed by: ORTHOPAEDIC SURGERY

## 2023-05-05 PROCEDURE — 96365 THER/PROPH/DIAG IV INF INIT: CPT

## 2023-05-05 PROCEDURE — G0378 HOSPITAL OBSERVATION PER HR: HCPCS

## 2023-05-05 PROCEDURE — 99285 EMERGENCY DEPT VISIT HI MDM: CPT | Mod: 25

## 2023-05-05 PROCEDURE — 99223 1ST HOSP IP/OBS HIGH 75: CPT | Mod: AI,,, | Performed by: INTERNAL MEDICINE

## 2023-05-05 PROCEDURE — 85025 COMPLETE CBC W/AUTO DIFF WBC: CPT

## 2023-05-05 PROCEDURE — 99223 PR INITIAL HOSPITAL CARE,LEVL III: ICD-10-PCS | Mod: 57,,, | Performed by: ORTHOPAEDIC SURGERY

## 2023-05-05 PROCEDURE — 63600175 PHARM REV CODE 636 W HCPCS: Performed by: STUDENT IN AN ORGANIZED HEALTH CARE EDUCATION/TRAINING PROGRAM

## 2023-05-05 PROCEDURE — 82962 GLUCOSE BLOOD TEST: CPT | Performed by: ORTHOPAEDIC SURGERY

## 2023-05-05 PROCEDURE — 63600175 PHARM REV CODE 636 W HCPCS: Performed by: NURSE ANESTHETIST, CERTIFIED REGISTERED

## 2023-05-05 PROCEDURE — 63600175 PHARM REV CODE 636 W HCPCS: Performed by: INTERNAL MEDICINE

## 2023-05-05 PROCEDURE — 25000003 PHARM REV CODE 250: Performed by: NURSE ANESTHETIST, CERTIFIED REGISTERED

## 2023-05-05 PROCEDURE — 25000003 PHARM REV CODE 250: Performed by: INTERNAL MEDICINE

## 2023-05-05 PROCEDURE — 36000708 HC OR TIME LEV III 1ST 15 MIN: Performed by: ORTHOPAEDIC SURGERY

## 2023-05-05 PROCEDURE — 71000015 HC POSTOP RECOV 1ST HR: Performed by: ORTHOPAEDIC SURGERY

## 2023-05-05 PROCEDURE — 63600175 PHARM REV CODE 636 W HCPCS

## 2023-05-05 PROCEDURE — 96361 HYDRATE IV INFUSION ADD-ON: CPT

## 2023-05-05 PROCEDURE — 27818 TREATMENT OF ANKLE FRACTURE: CPT | Mod: 79,51,RT, | Performed by: ORTHOPAEDIC SURGERY

## 2023-05-05 PROCEDURE — 20690 APPL UNIPLN UNI EXT FIXJ SYS: CPT | Mod: 79,,, | Performed by: ORTHOPAEDIC SURGERY

## 2023-05-05 PROCEDURE — 27818 PR CLOSED RX TRIMALLEOLAR FX,MANIP: ICD-10-PCS | Mod: 79,51,RT, | Performed by: ORTHOPAEDIC SURGERY

## 2023-05-05 PROCEDURE — 99223 PR INITIAL HOSPITAL CARE,LEVL III: ICD-10-PCS | Mod: AI,,, | Performed by: INTERNAL MEDICINE

## 2023-05-05 PROCEDURE — 71000033 HC RECOVERY, INTIAL HOUR: Performed by: ORTHOPAEDIC SURGERY

## 2023-05-05 PROCEDURE — 80053 COMPREHEN METABOLIC PANEL: CPT

## 2023-05-05 PROCEDURE — 96375 TX/PRO/DX INJ NEW DRUG ADDON: CPT

## 2023-05-05 PROCEDURE — 20690 PR APPLY BONE UNIPLANE,EXT FIX DEV: ICD-10-PCS | Mod: 79,,, | Performed by: ORTHOPAEDIC SURGERY

## 2023-05-05 PROCEDURE — D9220A PRA ANESTHESIA: Mod: ANES,,, | Performed by: ANESTHESIOLOGY

## 2023-05-05 PROCEDURE — C1713 ANCHOR/SCREW BN/BN,TIS/BN: HCPCS | Performed by: ORTHOPAEDIC SURGERY

## 2023-05-05 PROCEDURE — 99223 1ST HOSP IP/OBS HIGH 75: CPT | Mod: 57,,, | Performed by: ORTHOPAEDIC SURGERY

## 2023-05-05 PROCEDURE — 99285 PR EMERGENCY DEPT VISIT,LEVEL V: ICD-10-PCS | Mod: ,,, | Performed by: EMERGENCY MEDICINE

## 2023-05-05 PROCEDURE — 96376 TX/PRO/DX INJ SAME DRUG ADON: CPT

## 2023-05-05 PROCEDURE — D9220A PRA ANESTHESIA: ICD-10-PCS | Mod: ANES,,, | Performed by: ANESTHESIOLOGY

## 2023-05-05 PROCEDURE — 99285 EMERGENCY DEPT VISIT HI MDM: CPT | Mod: ,,, | Performed by: EMERGENCY MEDICINE

## 2023-05-05 PROCEDURE — D9220A PRA ANESTHESIA: ICD-10-PCS | Mod: CRNA,,, | Performed by: NURSE ANESTHETIST, CERTIFIED REGISTERED

## 2023-05-05 PROCEDURE — 63600175 PHARM REV CODE 636 W HCPCS: Performed by: EMERGENCY MEDICINE

## 2023-05-05 DEVICE — PIN TRANSFIX 5MM THRD 6X50MM: Type: IMPLANTABLE DEVICE | Site: TIBIA | Status: FUNCTIONAL

## 2023-05-05 DEVICE — CLAMP PIN 5H ANG 2 POST 11MM: Type: IMPLANTABLE DEVICE | Site: TIBIA | Status: FUNCTIONAL

## 2023-05-05 DEVICE — ROD CONNECT EX FIX TIB 11X350M: Type: IMPLANTABLE DEVICE | Site: TIBIA | Status: FUNCTIONAL

## 2023-05-05 RX ORDER — ACETAMINOPHEN 500 MG
1000 TABLET ORAL EVERY 8 HOURS
Status: DISCONTINUED | OUTPATIENT
Start: 2023-05-05 | End: 2023-05-10 | Stop reason: HOSPADM

## 2023-05-05 RX ORDER — HYDROMORPHONE HYDROCHLORIDE 1 MG/ML
0.5 INJECTION, SOLUTION INTRAMUSCULAR; INTRAVENOUS; SUBCUTANEOUS
Status: COMPLETED | OUTPATIENT
Start: 2023-05-05 | End: 2023-05-05

## 2023-05-05 RX ORDER — ONDANSETRON 2 MG/ML
INJECTION INTRAMUSCULAR; INTRAVENOUS
Status: DISCONTINUED | OUTPATIENT
Start: 2023-05-05 | End: 2023-05-05

## 2023-05-05 RX ORDER — ROCURONIUM BROMIDE 10 MG/ML
INJECTION, SOLUTION INTRAVENOUS
Status: DISCONTINUED | OUTPATIENT
Start: 2023-05-05 | End: 2023-05-05

## 2023-05-05 RX ORDER — TIZANIDINE 2 MG/1
2 TABLET ORAL EVERY 8 HOURS
Status: DISCONTINUED | OUTPATIENT
Start: 2023-05-05 | End: 2023-05-10 | Stop reason: HOSPADM

## 2023-05-05 RX ORDER — INSULIN ASPART 100 [IU]/ML
0-5 INJECTION, SOLUTION INTRAVENOUS; SUBCUTANEOUS
Status: DISCONTINUED | OUTPATIENT
Start: 2023-05-05 | End: 2023-05-10 | Stop reason: HOSPADM

## 2023-05-05 RX ORDER — KETOROLAC TROMETHAMINE 30 MG/ML
INJECTION, SOLUTION INTRAMUSCULAR; INTRAVENOUS
Status: DISCONTINUED | OUTPATIENT
Start: 2023-05-05 | End: 2023-05-05

## 2023-05-05 RX ORDER — MORPHINE SULFATE 4 MG/ML
4 INJECTION, SOLUTION INTRAMUSCULAR; INTRAVENOUS
Status: COMPLETED | OUTPATIENT
Start: 2023-05-05 | End: 2023-05-05

## 2023-05-05 RX ORDER — DEXAMETHASONE SODIUM PHOSPHATE 4 MG/ML
INJECTION, SOLUTION INTRA-ARTICULAR; INTRALESIONAL; INTRAMUSCULAR; INTRAVENOUS; SOFT TISSUE
Status: DISCONTINUED | OUTPATIENT
Start: 2023-05-05 | End: 2023-05-05

## 2023-05-05 RX ORDER — OXYCODONE HYDROCHLORIDE 5 MG/1
5 TABLET ORAL
Status: DISCONTINUED | OUTPATIENT
Start: 2023-05-05 | End: 2023-05-05 | Stop reason: HOSPADM

## 2023-05-05 RX ORDER — ATORVASTATIN CALCIUM 40 MG/1
80 TABLET, FILM COATED ORAL DAILY
Status: DISCONTINUED | OUTPATIENT
Start: 2023-05-06 | End: 2023-05-10 | Stop reason: HOSPADM

## 2023-05-05 RX ORDER — OXYCODONE HYDROCHLORIDE 5 MG/1
5 TABLET ORAL EVERY 4 HOURS PRN
Status: DISCONTINUED | OUTPATIENT
Start: 2023-05-05 | End: 2023-05-05

## 2023-05-05 RX ORDER — POLYETHYLENE GLYCOL 3350 17 G/17G
17 POWDER, FOR SOLUTION ORAL DAILY
Status: DISCONTINUED | OUTPATIENT
Start: 2023-05-06 | End: 2023-05-10 | Stop reason: HOSPADM

## 2023-05-05 RX ORDER — HYDROMORPHONE HYDROCHLORIDE 1 MG/ML
0.2 INJECTION, SOLUTION INTRAMUSCULAR; INTRAVENOUS; SUBCUTANEOUS EVERY 5 MIN PRN
Status: COMPLETED | OUTPATIENT
Start: 2023-05-05 | End: 2023-05-05

## 2023-05-05 RX ORDER — HYDROXYZINE PAMOATE 25 MG/1
25 CAPSULE ORAL NIGHTLY
Status: DISCONTINUED | OUTPATIENT
Start: 2023-05-05 | End: 2023-05-10 | Stop reason: HOSPADM

## 2023-05-05 RX ORDER — NAPROXEN SODIUM 220 MG/1
81 TABLET, FILM COATED ORAL 2 TIMES DAILY
Status: DISCONTINUED | OUTPATIENT
Start: 2023-05-05 | End: 2023-05-10 | Stop reason: HOSPADM

## 2023-05-05 RX ORDER — FENTANYL CITRATE 50 UG/ML
INJECTION, SOLUTION INTRAMUSCULAR; INTRAVENOUS
Status: DISCONTINUED | OUTPATIENT
Start: 2023-05-05 | End: 2023-05-05

## 2023-05-05 RX ORDER — ACETAMINOPHEN 10 MG/ML
INJECTION, SOLUTION INTRAVENOUS
Status: DISCONTINUED | OUTPATIENT
Start: 2023-05-05 | End: 2023-05-05

## 2023-05-05 RX ORDER — LEVOTHYROXINE SODIUM 50 UG/1
100 TABLET ORAL
Status: DISCONTINUED | OUTPATIENT
Start: 2023-05-06 | End: 2023-05-10 | Stop reason: HOSPADM

## 2023-05-05 RX ORDER — NALOXONE HCL 0.4 MG/ML
0.02 VIAL (ML) INJECTION
Status: DISCONTINUED | OUTPATIENT
Start: 2023-05-05 | End: 2023-05-10 | Stop reason: HOSPADM

## 2023-05-05 RX ORDER — DEXTROSE 40 %
30 GEL (GRAM) ORAL
Status: DISCONTINUED | OUTPATIENT
Start: 2023-05-05 | End: 2023-05-10 | Stop reason: HOSPADM

## 2023-05-05 RX ORDER — HYDROCODONE BITARTRATE AND ACETAMINOPHEN 10; 325 MG/1; MG/1
1 TABLET ORAL
Status: DISCONTINUED | OUTPATIENT
Start: 2023-05-05 | End: 2023-05-05

## 2023-05-05 RX ORDER — LIDOCAINE HYDROCHLORIDE 20 MG/ML
INJECTION, SOLUTION EPIDURAL; INFILTRATION; INTRACAUDAL; PERINEURAL
Status: DISCONTINUED | OUTPATIENT
Start: 2023-05-05 | End: 2023-05-05

## 2023-05-05 RX ORDER — POTASSIUM CHLORIDE 750 MG/1
40 CAPSULE, EXTENDED RELEASE ORAL DAILY
Status: COMPLETED | OUTPATIENT
Start: 2023-05-06 | End: 2023-05-07

## 2023-05-05 RX ORDER — GLUCAGON 1 MG
1 KIT INJECTION
Status: DISCONTINUED | OUTPATIENT
Start: 2023-05-05 | End: 2023-05-10 | Stop reason: HOSPADM

## 2023-05-05 RX ORDER — ESCITALOPRAM OXALATE 20 MG/1
20 TABLET ORAL DAILY
Status: DISCONTINUED | OUTPATIENT
Start: 2023-05-06 | End: 2023-05-10 | Stop reason: HOSPADM

## 2023-05-05 RX ORDER — DEXTROSE 40 %
15 GEL (GRAM) ORAL
Status: DISCONTINUED | OUTPATIENT
Start: 2023-05-05 | End: 2023-05-10 | Stop reason: HOSPADM

## 2023-05-05 RX ORDER — METHOCARBAMOL 750 MG/1
750 TABLET, FILM COATED ORAL 3 TIMES DAILY
Status: DISCONTINUED | OUTPATIENT
Start: 2023-05-05 | End: 2023-05-06

## 2023-05-05 RX ORDER — OXYCODONE HYDROCHLORIDE 10 MG/1
10 TABLET ORAL EVERY 4 HOURS PRN
Status: DISCONTINUED | OUTPATIENT
Start: 2023-05-05 | End: 2023-05-05

## 2023-05-05 RX ORDER — CEFAZOLIN SODIUM 1 G/3ML
INJECTION, POWDER, FOR SOLUTION INTRAMUSCULAR; INTRAVENOUS
Status: DISCONTINUED | OUTPATIENT
Start: 2023-05-05 | End: 2023-05-05

## 2023-05-05 RX ORDER — SODIUM CHLORIDE 0.9 % (FLUSH) 0.9 %
10 SYRINGE (ML) INJECTION
Status: DISCONTINUED | OUTPATIENT
Start: 2023-05-05 | End: 2023-05-05 | Stop reason: HOSPADM

## 2023-05-05 RX ORDER — TIZANIDINE 2 MG/1
2 TABLET ORAL EVERY 8 HOURS PRN
Status: DISCONTINUED | OUTPATIENT
Start: 2023-05-05 | End: 2023-05-05

## 2023-05-05 RX ORDER — ONDANSETRON 8 MG/1
8 TABLET, ORALLY DISINTEGRATING ORAL EVERY 8 HOURS PRN
Status: DISCONTINUED | OUTPATIENT
Start: 2023-05-05 | End: 2023-05-10 | Stop reason: HOSPADM

## 2023-05-05 RX ORDER — PROPOFOL 10 MG/ML
VIAL (ML) INTRAVENOUS
Status: DISCONTINUED | OUTPATIENT
Start: 2023-05-05 | End: 2023-05-05

## 2023-05-05 RX ORDER — HALOPERIDOL 5 MG/ML
0.5 INJECTION INTRAMUSCULAR EVERY 10 MIN PRN
Status: DISCONTINUED | OUTPATIENT
Start: 2023-05-05 | End: 2023-05-05 | Stop reason: HOSPADM

## 2023-05-05 RX ORDER — OXYCODONE HYDROCHLORIDE 10 MG/1
10 TABLET ORAL EVERY 4 HOURS PRN
Status: DISCONTINUED | OUTPATIENT
Start: 2023-05-05 | End: 2023-05-05 | Stop reason: HOSPADM

## 2023-05-05 RX ORDER — AMOXICILLIN 250 MG
1 CAPSULE ORAL 2 TIMES DAILY
Status: DISCONTINUED | OUTPATIENT
Start: 2023-05-05 | End: 2023-05-10 | Stop reason: HOSPADM

## 2023-05-05 RX ORDER — MIDAZOLAM HYDROCHLORIDE 1 MG/ML
INJECTION, SOLUTION INTRAMUSCULAR; INTRAVENOUS
Status: DISCONTINUED | OUTPATIENT
Start: 2023-05-05 | End: 2023-05-05

## 2023-05-05 RX ADMIN — CEFAZOLIN 2 G: 2 INJECTION, POWDER, FOR SOLUTION INTRAMUSCULAR; INTRAVENOUS at 09:05

## 2023-05-05 RX ADMIN — TRAZODONE HYDROCHLORIDE 150 MG: 50 TABLET ORAL at 09:05

## 2023-05-05 RX ADMIN — HYDROMORPHONE HYDROCHLORIDE 0.2 MG: 1 INJECTION, SOLUTION INTRAMUSCULAR; INTRAVENOUS; SUBCUTANEOUS at 08:05

## 2023-05-05 RX ADMIN — FENTANYL CITRATE 100 MCG: 50 INJECTION, SOLUTION INTRAMUSCULAR; INTRAVENOUS at 06:05

## 2023-05-05 RX ADMIN — HYDROMORPHONE HYDROCHLORIDE 0.2 MG: 1 INJECTION, SOLUTION INTRAMUSCULAR; INTRAVENOUS; SUBCUTANEOUS at 09:05

## 2023-05-05 RX ADMIN — ONDANSETRON 4 MG: 2 INJECTION INTRAMUSCULAR; INTRAVENOUS at 06:05

## 2023-05-05 RX ADMIN — HYDROMORPHONE HYDROCHLORIDE 0.5 MG: 1 INJECTION, SOLUTION INTRAMUSCULAR; INTRAVENOUS; SUBCUTANEOUS at 12:05

## 2023-05-05 RX ADMIN — OXYCODONE HYDROCHLORIDE 10 MG: 10 TABLET ORAL at 08:05

## 2023-05-05 RX ADMIN — ASPIRIN 81 MG CHEWABLE TABLET 81 MG: 81 TABLET CHEWABLE at 09:05

## 2023-05-05 RX ADMIN — LIDOCAINE HYDROCHLORIDE 100 MG: 20 INJECTION, SOLUTION EPIDURAL; INFILTRATION; INTRACAUDAL; PERINEURAL at 06:05

## 2023-05-05 RX ADMIN — PROPOFOL 150 MG: 10 INJECTION, EMULSION INTRAVENOUS at 06:05

## 2023-05-05 RX ADMIN — HYDROXYZINE PAMOATE 25 MG: 25 CAPSULE ORAL at 09:05

## 2023-05-05 RX ADMIN — HYDROMORPHONE HYDROCHLORIDE 0.2 MG: 1 INJECTION, SOLUTION INTRAMUSCULAR; INTRAVENOUS; SUBCUTANEOUS at 07:05

## 2023-05-05 RX ADMIN — DEXAMETHASONE SODIUM PHOSPHATE 8 MG: 4 INJECTION, SOLUTION INTRAMUSCULAR; INTRAVENOUS at 06:05

## 2023-05-05 RX ADMIN — ROCURONIUM BROMIDE 50 MG: 10 INJECTION INTRAVENOUS at 06:05

## 2023-05-05 RX ADMIN — ACETAMINOPHEN 1000 MG: 10 INJECTION INTRAVENOUS at 07:05

## 2023-05-05 RX ADMIN — MIDAZOLAM HYDROCHLORIDE 2 MG: 1 INJECTION, SOLUTION INTRAMUSCULAR; INTRAVENOUS at 06:05

## 2023-05-05 RX ADMIN — SENNOSIDES AND DOCUSATE SODIUM 1 TABLET: 50; 8.6 TABLET ORAL at 09:05

## 2023-05-05 RX ADMIN — CEFAZOLIN 2 G: 330 INJECTION, POWDER, FOR SOLUTION INTRAMUSCULAR; INTRAVENOUS at 06:05

## 2023-05-05 RX ADMIN — MORPHINE SULFATE 4 MG: 4 INJECTION INTRAVENOUS at 11:05

## 2023-05-05 RX ADMIN — METHOCARBAMOL 750 MG: 750 TABLET ORAL at 09:05

## 2023-05-05 RX ADMIN — SODIUM CHLORIDE: 0.9 INJECTION, SOLUTION INTRAVENOUS at 05:05

## 2023-05-05 RX ADMIN — KETOROLAC TROMETHAMINE 30 MG: 30 INJECTION, SOLUTION INTRAMUSCULAR at 06:05

## 2023-05-05 RX ADMIN — ACETAMINOPHEN 1000 MG: 500 TABLET ORAL at 10:05

## 2023-05-05 RX ADMIN — TIZANIDINE 2 MG: 2 TABLET ORAL at 10:05

## 2023-05-05 RX ADMIN — SUGAMMADEX 200 MG: 100 INJECTION, SOLUTION INTRAVENOUS at 06:05

## 2023-05-05 NOTE — ED NOTES
..Patient identifiers for Alix Patel 67 y.o. female checked and correct.  Chief Complaint   Patient presents with    Fall     Pt from home via EMS. Pt discharged from [facility] on Wednesday w/ right leg fracture d/t slip + fall; sent home w/ boot. After patient returned home, pt had another slip + fall (on Wednesday) while going up stairs. Significant edema noted to right leg + foot. Received 150 mcg Fentanyl w/ EMS.      Past Medical History:   Diagnosis Date    Anxiety     Bipolar 1 disorder     COPD (chronic obstructive pulmonary disease)     Depression     Diabetes mellitus     GERD (gastroesophageal reflux disease)     Grade I open displaced transverse fracture of shaft of left humerus 3/20/2023    HEARING LOSS     pt states she has loss slighty in rt ear.    Hypertension     Insomnia     Rash      Allergies reported:   Review of patient's allergies indicates:   Allergen Reactions    Lamictal [lamotrigine] Rash     Per psychiatry notes         LOC: Patient is awake, alert, and aware of environment with an appropriate affect. Patient is oriented x 3 and speaking appropriately.  APPEARANCE: Patient resting comfortably and in no acute distress. Patient is clean and well groomed, patient's clothing is properly fastened.  HEENT: **AAO--Fell on 5/3/23 Slipped and fell when going to the bathroom --thought she was grabbing a grib bar but intead grabbed a shower door.   SKIN: The skin is warm and dry. Patient has normal skin turgor and moist mucus membranes. Skin is intact; no bruising or breakdown noted.  MUSKULOSKELETAL: PatientPulses intact. Left arm in sling (had surgery to arm 1 month ago)--right ankle externally rotated -Pedel pulse palpable   RESPIRATORY: Airway is open and patent. Respirations are spontaneous and non-labored with normal effort and rate, BBS=clear  CARDIAC: Patient has a normal rate and rhythm. NSR  on cardiac monitor,No peripheral edema noted.   ABDOMEN: No distention noted. Bowel sounds  active in all 4 quadrants. Soft and non-tender upon palpation.  NEUROLOGICAL: . Facial expression is symmetrical. Hand grasps are equal bilaterally. Normal sensation in all extremities when touched with finger.

## 2023-05-05 NOTE — ANESTHESIA PREPROCEDURE EVALUATION
[facility]  Anesthesia Pre-Operative Evaluation         Patient Name: Alix Patel  YOB: 1955  MRN: [id number]    SUBJECTIVE:     Pre-operative Evaluation for Procedure(s) (LRB):  APPLICATION, EXTERNAL FIXATION DEVICE, FOR ANKLE FRACTURE - right, mariusz, ancef, bone foam, slider, C arm door side (Right)     05/05/2023    Alix Patel is a 67 y.o. female with a PMHx significant for T2DM, HLD, COPD, GERD, LILLY, and MDD who presents to the ED with increased right ankle swelling and pain. NPO since 5/3.     The patient now presents for the above procedure(s).    Previous Airway:  Date/Time: 3/21/2023 10:03 AM  Performed by: Bibiana Copeland CRNA  Authorized by: Celena Matthews MD      Intubation:     Induction:  Intravenous    Intubated:  Postinduction    Mask Ventilation:  Easy mask    Attempts:  1    Attempted By:  CRNA    Method of Intubation:  Video laryngoscopy    Blade:  Carlson 3    Laryngeal View Grade: Grade I - full view of cords      Difficult Airway Encountered?: No      Complications:  None    Airway Device:  Oral endotracheal tube    Airway Device Size:  7.0    Style/Cuff Inflation:  Cuffed (inflated to minimal occlusive pressure)    Tube secured:  21    Secured at:  The lips    Placement Verified By:  Capnometry    Complicating Factors:  Large prominent central incisors    Findings Post-Intubation:  BS equal bilateral and atraumatic/condition of teeth unchanged    LDA:        Peripheral IV - Single Lumen 05/05/23 0956 20 G Right Forearm (Active)   Site Assessment Clean;Dry;Intact;No redness;No swelling;No warmth;No drainage 05/05/23 0956   Extremity Assessment Distal to IV No warmth;No swelling;No redness;No abnormal discoloration 05/05/23 0956   Line Status Blood return noted 05/05/23 0956   Dressing Status Clean;Intact;Dry 05/05/23 0956   Dressing Intervention First dressing 05/05/23 0956   Number of days: 0            Peripheral IV - Single Lumen  05/05/23 1053 20 G Anterior;Distal;Right Forearm (Active)   Site Assessment Clean;Dry;Intact;No redness;No swelling 05/05/23 1053   Number of days: 0       Drips: None documented.      Patient Active Problem List   Diagnosis    Muscle weakness of lower extremity    Depression    Bipolar disorder    Hyperlipidemia with target low density lipoprotein (LDL) cholesterol less than 100 mg/dL    Hoarseness    Thyroid nodule    Episodic tension-type headache, not intractable    Weakness    Hypothyroidism    Muscle spasm    Voice disturbance    Vocal fold edema    Hyperfunctional dysphonia    GERD (gastroesophageal reflux disease)    Alcohol use disorder, moderate, in sustained remission    Episodic lightheadedness    Memory disorder    Ataxia    LILLY (generalized anxiety disorder)    Acute bilateral low back pain with bilateral sciatica    Poor posture    Closed nondisplaced fracture of base of fifth metacarpal bone of left hand with routine healing    Left hand pain    Type 2 diabetes mellitus with hyperglycemia, without long-term current use of insulin    Accidental drug overdose    Disorder of peripheral nerve of upper extremity    Incontinence of feces    Primary insomnia    Pulmonary emphysema    TMJ pain dysfunction syndrome    Vitamin D deficiency    Grade I open displaced transverse fracture of shaft of left humerus    Fall       Past Medical History:   Diagnosis Date    Anxiety     Bipolar 1 disorder     COPD (chronic obstructive pulmonary disease)     Depression     Diabetes mellitus     GERD (gastroesophageal reflux disease)     Grade I open displaced transverse fracture of shaft of left humerus 3/20/2023    HEARING LOSS     pt states she has loss slighty in rt ear.    Hypertension     Insomnia     Rash        Review of patient's allergies indicates:   Allergen Reactions    Lamictal [lamotrigine] Rash     Per psychiatry notes       Current Inpatient  Medications:      Current Outpatient Medications   Medication Instructions    acetaminophen (TYLENOL) 500 mg, Oral, Every 8 hours PRN    ammonium lactate 12 % Crea SAMSON EXT AA  OF FEET ONCE DAILY    aspirin (ECOTRIN) 81 MG EC tablet 1 tablet, Oral, Daily    blood sugar diagnostic (TRUE METRIX GLUCOSE TEST STRIP MISC) True Metrix Glucose Test Strip   TEST TWICE DAILY AS DIRECTED    blood-glucose meter (TRUE METRIX AIR GLUCOSE METER MISC) True Metrix Air Glucose Meter   USE UNDER THE SKIN TWICE DAILY AS DIRECTED    CRESTOR 20 mg, Oral, Daily    dulaglutide (TRULICITY) 0.75 mg/0.5 mL pen injector as directed    EScitalopram oxalate (LEXAPRO) 20 mg, Oral, Daily    estradioL (ESTRACE) 0.01 % (0.1 mg/gram) vaginal cream USE 1 GRAM VAGINALLY 3 TIMES A WEEK    flu vacc [id number] 6mos up,PF, (FLUARIX QUAD [id number], PF,) 60 mcg (15 mcg x 4)/0.5 mL Syrg ADM 0.5ML IM UTD    gabapentin (NEURONTIN) 300 mg, Oral, 3 times daily    hydrOXYzine pamoate (VISTARIL) 25 MG Cap Take 1 to 2 caps q hs prn difficulty sleeping    ibuprofen (ADVIL,MOTRIN) 800 mg, Oral, 3 times daily PRN    levothyroxine (SYNTHROID) 100 mcg, Oral, Daily    metFORMIN (GLUCOPHAGE) 1,000 mg, Oral, 2 times daily with meals    oxyCODONE (ROXICODONE) 5 mg, Oral, Every 8 hours PRN    promethazine (PHENERGAN) 25 MG tablet TK 1 T PO  Q 12 H PRN    tiZANidine (ZANAFLEX) 2 mg, Oral, Every 8 hours PRN    traZODone (DESYREL) 150 MG tablet TAKE 1 TABLET(150 MG) BY MOUTH EVERY EVENING    urea (CARMOL) 40 % Crea Topical (Top), Daily, To dry skin on the feet.    varicella-zoster [id number], PF, (SHINGRIX, PF,) 50 mcg/0.5 mL injection ADM 0.5ML IM UTD    zolpidem (AMBIEN) 10 mg, Oral, Nightly PRN       Past Surgical History:   Procedure Laterality Date    APPENDECTOMY      BREAST BIOPSY      COLONOSCOPY      COLONOSCOPY N/A 10/31/2019    Procedure: COLONOSCOPY;  Surgeon: Philippe Monteiro MD;  Location: [facility] ([address] [place] [address]);  Service: Endoscopy;   Laterality: N/A;  PM prep-MH    CYST REMOVAL      ESOPHAGOGASTRODUODENOSCOPY N/A 10/31/2019    Procedure: EGD (ESOPHAGOGASTRODUODENOSCOPY);  Surgeon: Philippe Monteiro MD;  Location: [facility] (4TH FLR);  Service: Endoscopy;  Laterality: N/A;  Pm prep-    HYSTERECTOMY      IRRIGATION AND DEBRIDEMENT OF UPPER EXTREMITY Left 3/21/2023    Procedure: IRRIGATION AND DEBRIDEMENT, UPPER EXTREMITY;  Surgeon: Ousmane Crawford MD;  Location: [facility] OR 2ND FLR;  Service: Orthopedics;  Laterality: Left;    ORIF HUMERUS FRACTURE Left 3/21/2023    Procedure: ORIF, FRACTURE, HUMERUS;  Surgeon: Ousmane Crawford MD;  [facility];  Service: Orthopedics;  Laterality: Left;       Social History     Substance and Sexual Activity   Drug Use Not Currently    Types: Marijuana, Cocaine     Tobacco Use: Medium Risk    Smoking Tobacco Use: Former    Smokeless Tobacco Use: Never    Passive Exposure: Never     Alcohol Use: Not on file         OBJECTIVE:     Vital Signs Range (Last 24H):  Temp:  [37.2 °C (99 °F)]   Pulse:  [72-79]   Resp:  [16-20]   BP: (128-147)/(78-83)   SpO2:  [98 %-100 %]       Significant Labs    Heme Profile  Lab Results   Component Value Date    WBC 7.38 05/05/2023    HGB 10.6 (L) 05/05/2023    HCT 31.9 (L) 05/05/2023     05/05/2023       Coagulation Studies  Lab Results   Component Value Date    LABPROT 11.4 03/20/2023    INR 1.1 03/20/2023       BMP  Lab Results   Component Value Date     05/05/2023    K 3.0 (L) 05/05/2023     05/05/2023    CO2 24 05/05/2023    BUN 3 (L) 05/05/2023    CREATININE 0.6 05/05/2023    MG 1.7 03/23/2023    PHOS 3.8 03/23/2023       Liver Function Tests  Lab Results   Component Value Date    AST 20 05/05/2023    ALT 19 05/05/2023    ALKPHOS 67 05/05/2023    BILITOT 0.5 05/05/2023    PROT 6.1 05/05/2023    ALBUMIN 3.4 (L) 05/05/2023       Lipid Profile  Lab Results   Component Value Date    CHOL 245 (H) 03/21/2023    HDL 36 (L)  03/21/2023    TRIG 112 03/21/2023       Endocrine Profile  Lab Results   Component Value Date    HGBA1C 7.5 (H) 03/20/2023    TSH 3.070 03/21/2023       Cardiac Studies    EKG:   Results for orders placed or performed during the hospital encounter of 03/20/23   EKG 12-lead    Collection Time: 03/20/23  2:03 PM    Narrative    Test Reason : Z01.811,    Vent. Rate : 088 BPM     Atrial Rate : 088 BPM     P-R Int : 168 ms          QRS Dur : 130 ms      QT Int : 398 ms       P-R-T Axes : 066 094 048 degrees     QTc Int : 481 ms    Normal sinus rhythm  Right bundle branch block  Abnormal ECG  When compared with ECG of 30-JUL-2021 12:30,  No significant change was found  Confirmed by Lenny Lucas MD ([id number]) on 3/20/2023 2:23:31 PM    Referred By: [email or web address]   SELF           Confirmed By:Lenny Lucas MD       MARNIE  No results found for this or any previous visit.      TTE  Results for orders placed during the hospital encounter of 07/20/21    Echo    Interpretation Summary  · The left ventricle is normal in size with concentric remodeling and normal systolic function.  · Grade I left ventricular diastolic dysfunction.  · Normal right ventricular size with normal right ventricular systolic function.      Nuclear Stress Echo  Results for orders placed in visit on 12/31/19    Nuclear Stress - Cardiology Interpreted    Interpretation Summary    The perfusion scan is free of evidence from myocardial ischemia or injury.    Gated perfusion images showed an ejection fraction of 90 % at rest and 90 % post stress. Normal is >51 %.    There is normal wall motion at rest and post stress.    LV cavity size is normal at rest and normal at stress.    The EKG portion of this study is negative for ischemia.    The patient reported chest pain during the stress test.    There were no arrhythmias during stress.    There are no prior studies for comparison.      ASSESSMENT/PLAN:         Pre-op Assessment    I have reviewed the Patient  Summary Reports.     I have reviewed the Nursing Notes. I have reviewed the NPO Status.   I have reviewed the Medications.     Review of Systems  Anesthesia Hx:  No problems with previous Anesthesia   Denies Personal Hx of Anesthesia complications.   Cardiovascular:   Hypertension hyperlipidemia    Pulmonary:   COPD    Hepatic/GI:   GERD    Neurological:   Neuromuscular Disease, Headaches    Endocrine:   Diabetes Hypothyroidism    Psych:   Psychiatric History          Physical Exam  General: Alert    Airway:  Mallampati: III / II  Mouth Opening: Normal  TM Distance: Normal  Tongue: Normal  Neck ROM: Normal ROM    Dental:  Periodontal disease        Anesthesia Plan  Type of Anesthesia, risks & benefits discussed:    Anesthesia Type: Gen ETT  Intra-op Monitoring Plan: Standard ASA Monitors  Post Op Pain Control Plan: multimodal analgesia and IV/PO Opioids PRN  Induction:  IV  Airway Plan: Direct, Post-Induction  Informed Consent: Informed consent signed with the Patient and all parties understand the risks and agree with anesthesia plan.  All questions answered.   ASA Score: 3  Day of Surgery Review of History & Physical: H&P Update referred to the surgeon/provider.    Ready For Surgery From Anesthesia Perspective.     .

## 2023-05-05 NOTE — CONSULTS
[facility] - Surgery ([facility])  Orthopedics  Consult Note    Patient Name: Alix Patel  MRN: [id number]  Admission Date: 5/5/2023  Hospital Length of Stay: 0 days  Attending Provider: Camilla Hernandes MD  Primary Care Provider: Angélica Barakat MD      Inpatient consult to Orthopedic Surgery  Consult performed by: Jn Garza MD  Consult ordered by: Camilla Hernandes MD        Subjective:     Principal Problem:Closed right ankle fracture    Chief Complaint:   Chief Complaint   Patient presents with    Fall     Pt from home via EMS. Pt discharged from [facility] on Wednesday w/ right leg fracture d/t slip + fall; sent home w/ boot. After patient returned home, pt had another slip + fall (on Wednesday) while going up stairs. Significant edema noted to right leg + foot. Received 150 mcg Fentanyl w/ EMS.         HPI: Alix Patel is a 67 y.o. female with PMH significant for diabetes, generalized anxiety disorder, major depressive disorder, and recent left grade 1 open humerus fracture treated with intramedullary nail with Dr. Crawford on 03/21/2023 complicated by radial nerve paresthesias presenting with right ankle pain.  She reports that on Wednesday afternoon she twisted her right ankle.  She then presented to an outside hospital emergency department where she was diagnosed with a right ankle fracture and was placed in a boot.  She then reports that she was on her way home she went to walk up the stairs and felt a break break in her right ankle.  She is been bed-bound since was the evening.  She is now been able to present to the emergency department today.  Patient denies any head trauma or LOC. The patient denies prior hx of falls.  She reports numbness and tingling to the dorsal and plantar foot.  Denies any other musculoskeletal pain or injuries. No known history of prior right ankle injury or surgery.  Walks w/out assisted devices at baseline. Doesn't take any anticoagulation at baseline.   NPO since midnight  They deny IV drug use.  They deny current tobacco use, chart review shows previous tobacco use of half pack per day for 34 years and quit date of 2012.   They report occasional alcohol use.   They deny immunosuppressant medications.  They deny chemotherapy.  They deny radiation therapy.         Past Medical History:   Diagnosis Date    Anxiety     Bipolar 1 disorder     COPD (chronic obstructive pulmonary disease)     Depression     Diabetes mellitus     GERD (gastroesophageal reflux disease)     Grade I open displaced transverse fracture of shaft of left humerus 3/20/2023    HEARING LOSS     pt states she has loss slighty in rt ear.    Hypertension     Insomnia     Rash        Past Surgical History:   Procedure Laterality Date    APPENDECTOMY      BREAST BIOPSY      COLONOSCOPY      COLONOSCOPY N/A 10/31/2019    Procedure: COLONOSCOPY;  Surgeon: Philippe Monteiro MD;  Location: [facility] ([address] [place] [address]);  Service: Endoscopy;  Laterality: N/A;  PM prep-    CYST REMOVAL      ESOPHAGOGASTRODUODENOSCOPY N/A 10/31/2019    Procedure: EGD (ESOPHAGOGASTRODUODENOSCOPY);  Surgeon: Philippe Monteiro MD;  Location: [facility] ([address] [place] [address]);  Service: Endoscopy;  Laterality: N/A;  Pm prep-    HYSTERECTOMY      IRRIGATION AND DEBRIDEMENT OF UPPER EXTREMITY Left 3/21/2023    Procedure: IRRIGATION AND DEBRIDEMENT, UPPER EXTREMITY;  Surgeon: Ousmane Crawford MD;  Location: [facility] OR [address] [place] [address];  Service: Orthopedics;  Laterality: Left;    ORIF HUMERUS FRACTURE Left 3/21/2023    Procedure: ORIF, FRACTURE, HUMERUS;  Surgeon: Ousmane Crawford MD;  Location: [facility] OR [address] [place] [address];  Service: Orthopedics;  Laterality: Left;       Review of patient's allergies indicates:   Allergen Reactions    Lamictal [lamotrigine] Rash     Per psychiatry notes       Current Facility-Administered Medications   Medication    acetaminophen tablet 1,000 mg    [START ON 5/6/2023] atorvastatin tablet 80 mg     dextrose 10% bolus 125 mL 125 mL    dextrose 10% bolus 250 mL 250 mL    dextrose 40 % gel 15,000 mg    dextrose 40 % gel 30,000 mg    [START ON 2023] EScitalopram oxalate tablet 20 mg    glucagon (human recombinant) injection 1 mg    hydrOXYzine pamoate capsule 25 mg    insulin aspart U-100 pen 0-5 Units    [START ON 2023] levothyroxine tablet 100 mcg    naloxone 0.4 mg/mL injection 0.02 mg    ondansetron disintegrating tablet 8 mg    oxyCODONE immediate release tablet 5 mg    oxyCODONE immediate release tablet Tab 10 mg    [START ON 2023] polyethylene glycol packet 17 g    [START ON 2023] potassium chloride CR capsule 40 mEq    senna-docusate 8.6-50 mg per tablet 1 tablet    tiZANidine tablet 2 mg    traZODone tablet 150 mg     Family History       Problem Relation (Age of Onset)    Alzheimer's disease Mother, Maternal Cousin, Maternal Cousin    Anxiety disorder Daughter    Colon cancer Maternal Aunt    Depression Daughter    Heart attack Father    Heart failure Father    Hyperlipidemia Father    No Known Problems Sister    Sarcoidosis Sister    Sinus disease Mother          Tobacco Use    Smoking status: Former     Packs/day: 0.50     Years: 34.00     Pack years: 17.00     Types: Cigarettes     Start date:      Quit date:      Years since quittin.3     Passive exposure: Never    Smokeless tobacco: Never   Substance and Sexual Activity    Alcohol use: Yes     Comment: Rare    Drug use: Not Currently     Types: Marijuana, Cocaine    Sexual activity: Not Currently     Partners: Male     Birth control/protection: None     ROS  Constitutional: negative for fevers or chills  Eyes: negative visual changes or eye discharge  ENT: negative for ear pain or sore throat  Respiratory: negative for shortness of breath or cough  Cardiovascular: negative for chest pain or palpitations  Gastrointestinal: negative for abdominal pain, nausea, or vomiting  Genitourinary: negative for  "dysuria and flank pain  Neurological: negative for headaches or dizziness  Musculoskeletal: positive for right ankle pain    Objective:     Vital Signs (Most Recent):  Temp: 98.1 °F (36.7 °C) (05/05/23 1714)  Pulse: 75 (05/05/23 1714)  Resp: 18 (05/05/23 1714)  BP: 131/80 (05/05/23 1714)  SpO2: 100 % (05/05/23 1714) Vital Signs (24h Range):  Temp:  [98.1 °F (36.7 °C)-99 °F (37.2 °C)] 98.1 °F (36.7 °C)  Pulse:  [72-79] 75  Resp:  [16-20] 18  SpO2:  [98 %-100 %] 100 %  BP: (128-147)/(78-93) 131/80     Weight: 72.6 kg (160 lb)  Height: 5' 11" (180.3 cm)  Body mass index is 22.32 kg/m².      Intake/Output Summary (Last 24 hours) at 5/5/2023 1723  Last data filed at 5/5/2023 1624  Gross per 24 hour   Intake --   Output 400 ml   Net -400 ml        Ortho/SPM Exam   Gen:  No acute distress, well-developed, well nourished.  CV:  Peripherally well-perfused. 2+ radial pulses, symmetric.  Respiratory:  Normal respiratory effort. No accessory muscle use.   Head/Neck:  Normocephalic.  Atraumatic. Sclera anicteric. TM. Neck supple.  Neuro: CN 2-12 grossly intact. No FND. Awake. Alert. Oriented to person, place, time, and situation.   Abdomen: Soft, NTND.      MSK:  Right Upper extremity  Inspection  - Skin intact throughout, no open wounds  - No swelling  - No ecchymosis, erythema, or signs of cellulitis  Palpation  - NonTTP throughout, no palpable abnormality   Range of motion  - AROM and PROM of the shoulder, elbow, wrist, and hand intact  Stability  - No evidence of joint dislocation or abnormal laxity   Neurovascular  - AIN/PIN/Radial/Median/Ulnar Nerves assessed in isolation without deficit  - Able to give thumbs up, make "OK" sign, cross IF/LF, abduct/adduct fingers, make fist  - SILT throughout  - Compartments soft  - Radial artery palpated  - Muscle tone normal    Left Upper extremity  Inspection  - Well-healed surgical incisions to the left humerus  - No swelling  - No ecchymosis, erythema, or signs of " "cellulitis  Palpation  - NonTTP throughout, no palpable abnormality   Range of motion  - AROM and PROM of the shoulder, elbow, wrist, and hand intact, mild decrease in active range of motion with wrist extension  Stability  - No evidence of joint dislocation or abnormal laxity   Neurovascular  - AIN/PIN/Radial/Median/Ulnar Nerves assessed in isolation without deficit  - Able to give thumbs up, make "OK" sign, cross IF/LF, abduct/adduct fingers, make fist  - SILT throughout  - Compartments soft  - Radial artery palpated  - Muscle tone normal      Right Lower Extremity  Inspection  - Gross evidence of lateral ankle dislocation with diffuse soft tissue edema about the ankle, no skin wrinkling  Palpation  - diffusely tender to palpation to the right ankle  Range of motion  - AROM and PROM of the hip, knee, ankle, and foot intact, decreased range of motion of right ankle secondary no dislocation  Stability  - Obvious evidence of right ankle dislocation and abnormal laxity  Neurovascular  - TA/EHL/Gastroc/FHL assessed in isolation without deficit  - SILT throughout  - Compartments soft  - DP palpated, doppler DP and PT pulses   - Negative Log roll  - Negative Stinchfield  - Muscle tone normal    Left Lower Extremity  Inspection  - Skin intact throughout, no open wounds  - No swelling  - No ecchymosis, erythema, or signs of cellulitis  Palpation  - NonTTP throughout, no palpable abnormality   Range of motion  - AROM and PROM of the hip, knee, ankle, and foot intact  Stability  - No evidence of joint dislocation or abnormal laxity  Neurovascular  - TA/EHL/Gastroc/FHL assessed in isolation without deficit  - SILT throughout  - Compartments soft  - DP palpated   - Negative Log roll  - Negative Stinchfield  - Muscle tone normal        Significant Labs: CBC:   Recent Labs   Lab 05/05/23  1154   WBC 7.38   HGB 10.6*   HCT 31.9*        CMP:   Recent Labs   Lab 05/05/23  1154      K 3.0*      CO2 24   * "   BUN 3*   CREATININE 0.6   CALCIUM 8.8   PROT 6.1   ALBUMIN 3.4*   BILITOT 0.5   ALKPHOS 67   AST 20   ALT 19   ANIONGAP 10     All pertinent labs within the past 24 hours have been reviewed.    Significant Imaging: X-Ray: I have reviewed all pertinent results/findings and my personal findings are:  Right ankle fracture with posterolateral dislocation, Pitts B fibula fracture and posterior malleolus fracture appreciated    Assessment/Plan:     * Closed right ankle fracture  Alix Patel is a 67 y.o. female with previous orthopedic surgical history of left grade 1 open humerus fracture status post intramedullary nail complicated by radial nerve palsy now with right ankle fracture dislocation after a fall sustained on 05/03/2023.  Due to the extensive soft tissue swelling and concern for the integrity of the soft tissues she will be taken as a class B to the operating room today for external fixation of the right ankle.    - Consented and marked for right ankle ex fix in ED  - NWB RLE  - Pain control: MM  - DVTppx: ASA 81 mg BID              Jn Garza MD  Orthopedics  [facility] - Surgery ([facility])

## 2023-05-05 NOTE — ASSESSMENT & PLAN NOTE
· Controlled with no signs or symptoms to suggest active COPD exacerbation.   · Patient is not on any inhalers at home such as LAMA, LABA/Inhaled corticosteroid combination or short acting beta agonist at home.   · Patient is not on oxygen at home.   · Plan OOB to chair and encourage IS use post-op to decrease risk of postoperative pulmonary complications.

## 2023-05-05 NOTE — SUBJECTIVE & OBJECTIVE
Past Medical History:   Diagnosis Date    Anxiety     Bipolar 1 disorder     COPD (chronic obstructive pulmonary disease)     Depression     Diabetes mellitus     GERD (gastroesophageal reflux disease)     Grade I open displaced transverse fracture of shaft of left humerus 3/20/2023    HEARING LOSS     pt states she has loss slighty in rt ear.    Hypertension     Insomnia     Rash        Past Surgical History:   Procedure Laterality Date    APPENDECTOMY      BREAST BIOPSY      COLONOSCOPY      COLONOSCOPY N/A 10/31/2019    Procedure: COLONOSCOPY;  Surgeon: Philippe Monteiro MD;  [facility]  Service: Endoscopy;  Laterality: N/A;  PM prep-    CYST REMOVAL      ESOPHAGOGASTRODUODENOSCOPY N/A 10/31/2019    Procedure: EGD (ESOPHAGOGASTRODUODENOSCOPY);  Surgeon: Philippe Monteiro MD;  [facility]  Service: Endoscopy;  Laterality: N/A;  Pm prep-    HYSTERECTOMY      IRRIGATION AND DEBRIDEMENT OF UPPER EXTREMITY Left 3/21/2023    Procedure: IRRIGATION AND DEBRIDEMENT, UPPER EXTREMITY;  Surgeon: Ousmane Crawford MD;  Location: [facility] OR [address] [place] [address];  Service: Orthopedics;  Laterality: Left;    ORIF HUMERUS FRACTURE Left 3/21/2023    Procedure: ORIF, FRACTURE, HUMERUS;  Surgeon: Ousmane Crawford MD;  Location: [facility] OR [address] [place] [address];  Service: Orthopedics;  Laterality: Left;       Review of patient's allergies indicates:   Allergen Reactions    Lamictal [lamotrigine] Rash     Per psychiatry notes       No current facility-administered medications on file prior to encounter.     Current Outpatient Medications on File Prior to Encounter   Medication Sig    aspirin (ECOTRIN) 81 MG EC tablet Take 1 tablet by mouth 3 (three) times a week.    diphenhydrAMINE-acetaminophen (TYLENOL PM)  mg Tab Take 2 tablets by mouth nightly as needed (SLEEP).    dulaglutide (TRULICITY) 0.75 mg/0.5 mL pen injector Inject 0.75 mg into the skin every Sunday.    EScitalopram oxalate (LEXAPRO) 20 MG  tablet Take 1 tablet (20 mg total) by mouth once daily.    hydrOXYzine pamoate (VISTARIL) 25 MG Cap Take 1 to 2 caps q hs prn difficulty sleeping    ibuprofen (ADVIL,MOTRIN) 800 MG tablet Take 1 tablet (800 mg total) by mouth 3 (three) times daily as needed for Pain.    levothyroxine (SYNTHROID) 100 MCG tablet Take 1 tablet (100 mcg total) by mouth once daily.    metFORMIN (GLUCOPHAGE) 500 MG tablet Take 2 tablets (1,000 mg total) by mouth 2 (two) times daily with meals.    oxyCODONE (ROXICODONE) 5 MG immediate release tablet Take 1 tablet (5 mg total) by mouth every 8 (eight) hours as needed for Pain.    tiZANidine (ZANAFLEX) 2 MG tablet Take 1 tablet (2 mg total) by mouth every 8 (eight) hours as needed (muscle spams).    traZODone (DESYREL) 150 MG tablet TAKE 1 TABLET(150 MG) BY MOUTH EVERY EVENING    zolpidem (AMBIEN) 10 mg Tab Take 1 tablet (10 mg total) by mouth nightly as needed.    [DISCONTINUED] acetaminophen (TYLENOL) 500 MG tablet Take 1 tablet (500 mg total) by mouth every 8 (eight) hours as needed for Pain.    [DISCONTINUED] CRESTOR 20 mg tablet Take 20 mg by mouth once daily.     blood sugar diagnostic (TRUE METRIX GLUCOSE TEST STRIP MISC) True Metrix Glucose Test Strip   TEST TWICE DAILY AS DIRECTED    blood-glucose meter (TRUE METRIX AIR GLUCOSE METER MISC) True Metrix Air Glucose Meter   USE UNDER THE SKIN TWICE DAILY AS DIRECTED    [DISCONTINUED] ammonium lactate 12 % Crea SAMSON EXT AA  OF FEET ONCE DAILY    [DISCONTINUED] estradioL (ESTRACE) 0.01 % (0.1 mg/gram) vaginal cream USE 1 GRAM VAGINALLY 3 TIMES A WEEK    [DISCONTINUED] flu vacc [id number] 6mos up,PF, (FLUARIX QUAD [id number], PF,) 60 mcg (15 mcg x 4)/0.5 mL Syrg ADM 0.5ML IM UTD    [DISCONTINUED] gabapentin (NEURONTIN) 300 MG capsule Take 1 capsule (300 mg total) by mouth 3 (three) times daily. for 10 days    [DISCONTINUED] promethazine (PHENERGAN) 25 MG tablet TK 1 T PO  Q 12 H PRN    [DISCONTINUED] urea (CARMOL) 40 % Crea Apply topically  once daily. To dry skin on the feet.    [DISCONTINUED] varicella-zoster [id number], PF, (SHINGRIX, PF,) 50 mcg/0.5 mL injection ADM 0.5ML IM UTD     Family History       Problem Relation (Age of Onset)    Alzheimer's disease Mother, Maternal Cousin, Maternal Cousin    Anxiety disorder Daughter    Colon cancer Maternal Aunt    Depression Daughter    Heart attack Father    Heart failure Father    Hyperlipidemia Father    No Known Problems Sister    Sarcoidosis Sister    Sinus disease Mother          Tobacco Use    Smoking status: Former     Packs/day: 0.50     Years: 34.00     Pack years: 17.00     Types: Cigarettes     Start date:      Quit date:      Years since quittin.3     Passive exposure: Never    Smokeless tobacco: Never   Substance and Sexual Activity    Alcohol use: Yes     Comment: Rare    Drug use: Not Currently     Types: Marijuana, Cocaine    Sexual activity: Not Currently     Partners: Male     Birth control/protection: None     Review of Systems   Constitutional:  Negative for chills and fever.   HENT:  Negative for congestion and sore throat.    Eyes:  Negative for pain and visual disturbance.   Respiratory:  Negative for cough and shortness of breath.    Cardiovascular:  Negative for chest pain, palpitations and leg swelling.   Gastrointestinal:  Negative for abdominal pain, nausea and vomiting.   Endocrine: Negative for polydipsia and polyuria.   Genitourinary:  Negative for dysuria and hematuria.   Musculoskeletal:  Positive for arthralgias (Right ankle), joint swelling (Right ankle) and myalgias (Right ankle and left shoulder).   Skin:  Negative for rash.   Allergic/Immunologic: Negative for immunocompromised state.   Neurological:  Negative for dizziness, light-headedness and headaches.   Hematological:  Negative for adenopathy. Does not bruise/bleed easily.   Psychiatric/Behavioral:  Negative for agitation, confusion and hallucinations.    Objective:     Vital Signs (Most  Recent):  Temp: 98.1 °F (36.7 °C) (05/05/23 1714)  Pulse: 75 (05/05/23 1714)  Resp: 18 (05/05/23 1714)  BP: 131/80 (05/05/23 1714)  SpO2: 100 % (05/05/23 1714) Vital Signs (24h Range):  Temp:  [98.1 °F (36.7 °C)-99 °F (37.2 °C)] 98.1 °F (36.7 °C)  Pulse:  [72-79] 75  Resp:  [16-20] 18  SpO2:  [98 %-100 %] 100 %  BP: (128-147)/(78-93) 131/80     Weight: 72.6 kg (160 lb)  Body mass index is 22.32 kg/m².     Physical Exam  Vitals reviewed.   Constitutional:       General: She is awake. She is not in acute distress.     Appearance: Normal appearance. She is well-developed. She is not ill-appearing.   HENT:      Head: Normocephalic and atraumatic.   Eyes:      Conjunctiva/sclera: Conjunctivae normal.   Neck:      Vascular: No JVD.   Cardiovascular:      Rate and Rhythm: Normal rate and regular rhythm.      Pulses:           Dorsalis pedis pulses are 2+ on the right side and 2+ on the left side.        Posterior tibial pulses are 2+ on the right side and 2+ on the left side.      Heart sounds: Normal heart sounds. No murmur heard.    No friction rub. No gallop.   Pulmonary:      Effort: Pulmonary effort is normal. No accessory muscle usage or respiratory distress.      Breath sounds: Normal breath sounds. No wheezing or rales.   Abdominal:      General: Abdomen is flat. Bowel sounds are normal. There is no distension.      Palpations: Abdomen is soft.      Tenderness: There is no abdominal tenderness.   Musculoskeletal:         General: Swelling (Right ankle) and tenderness (Right ankle) present.      Cervical back: Neck supple.      Right lower leg: Edema present.      Left lower leg: No edema.      Comments: Left arm in sling for comfort. Well healed surgical scars to left upper arm.    Skin:     General: Skin is warm.      Capillary Refill: Capillary refill takes less than 2 seconds.      Coloration: Skin is not pale.      Findings: No erythema.   Neurological:      Mental Status: She is alert and oriented to person,  place, and time.   Psychiatric:         Mood and Affect: Mood normal.         Behavior: Behavior normal. Behavior is cooperative.         Thought Content: Thought content normal.         Judgment: Judgment normal.              Significant Labs: A1C:   Recent Labs   Lab 03/20/23  1408   HGBA1C 7.5*     CBC:   Recent Labs   Lab 05/05/23  1154   WBC 7.38   HGB 10.6*   HCT 31.9*        CMP:   Recent Labs   Lab 05/05/23  1154      K 3.0*      CO2 24   *   BUN 3*   CREATININE 0.6   CALCIUM 8.8   PROT 6.1   ALBUMIN 3.4*   BILITOT 0.5   ALKPHOS 67   AST 20   ALT 19   ANIONGAP 10       Significant Imaging: I have reviewed all pertinent imaging results/findings within the past 24 hours.

## 2023-05-05 NOTE — ASSESSMENT & PLAN NOTE
Alix Patel is a 67 y.o. female with previous orthopedic surgical history of left grade 1 open humerus fracture status post intramedullary nail complicated by radial nerve palsy now with right ankle fracture dislocation after a fall sustained on 05/03/2023.  Due to the extensive soft tissue swelling and concern for the integrity of the soft tissues she will be taken as a class B to the operating room today for external fixation of the right ankle.    - Consented and marked for right ankle ex fix in ED  - NWB RLE  - Pain control: MM  - DVTppx: ASA 81 mg BID

## 2023-05-05 NOTE — ED PROVIDER NOTES
Encounter Date: 5/5/2023     History     Chief Complaint   Patient presents with    Fall     Pt from home via EMS. Pt discharged from [facility] on Wednesday w/ right leg fracture d/t slip + fall; sent home w/ boot. After patient returned home, pt had another slip + fall (on Wednesday) while going up stairs. Significant edema noted to right leg + foot. Received 150 mcg Fentanyl w/ EMS.      Ms Alix Patel is a 67 year old woman with T2DM, HLD, hypothyroidism, LILLY, and MDD who presents to the ED with increased right ankle swelling and pain. Initially slipped and fell on ankle on Wednesday. Denies any presyncopal symptoms prior to fall and insists she just slipped. Worked up at [facility] and discharged with walking boot with diagnosis of right ankle sprain. At home, she slipped again in her walking boot and fell. After that she reports increasing ankle pain/swelling so was too scared to walk again and has been laying in bed for the last two days. Denies any numbness, neurological deficits.      Review of patient's allergies indicates:   Allergen Reactions    Lamictal [lamotrigine] Rash     Per psychiatry notes     Past Medical History:   Diagnosis Date    Anxiety     Bipolar 1 disorder     COPD (chronic obstructive pulmonary disease)     Depression     Diabetes mellitus     GERD (gastroesophageal reflux disease)     Grade I open displaced transverse fracture of shaft of left humerus 3/20/2023    HEARING LOSS     pt states she has loss slighty in rt ear.    Hypertension     Insomnia     Rash      Past Surgical History:   Procedure Laterality Date    APPENDECTOMY      BREAST BIOPSY      COLONOSCOPY      COLONOSCOPY N/A 10/31/2019    Procedure: COLONOSCOPY;  Surgeon: Philippe Monteiro MD;  Location: [address]);  Service: Endoscopy;  Laterality: N/A;  PM prep-    CYST REMOVAL      ESOPHAGOGASTRODUODENOSCOPY N/A 10/31/2019    Procedure: EGD (ESOPHAGOGASTRODUODENOSCOPY);  Surgeon: Philippe Monteiro MD;  Location: [facility]  ENDO (4TH FLR);  Service: Endoscopy;  Laterality: N/A;  Pm prep-MH    HYSTERECTOMY      IRRIGATION AND DEBRIDEMENT OF UPPER EXTREMITY Left 3/21/2023    Procedure: IRRIGATION AND DEBRIDEMENT, UPPER EXTREMITY;  Surgeon: Ousmane Crawford MD;  Location: [facility] OR [facility] FLR;  Service: Orthopedics;  Laterality: Left;    ORIF HUMERUS FRACTURE Left 3/21/2023    Procedure: ORIF, FRACTURE, HUMERUS;  Surgeon: Ousmane Crawford MD;  [facility];  Service: Orthopedics;  Laterality: Left;     Family History   Problem Relation Age of Onset    Alzheimer's disease Mother     Sinus disease Mother     Heart failure Father     Hyperlipidemia Father     Heart attack Father     No Known Problems Sister     Sarcoidosis Sister     Colon cancer Maternal Aunt     Alzheimer's disease Maternal Cousin     Alzheimer's disease Maternal Cousin     Anxiety disorder Daughter     Depression Daughter     Esophageal cancer Neg Hx      Social History     Tobacco Use    Smoking status: Former     Packs/day: 0.50     Years: 34.00     Pack years: 17.00     Types: Cigarettes     Start date:      Quit date:      Years since quittin.3     Passive exposure: Never    Smokeless tobacco: Never   Substance Use Topics    Alcohol use: Yes     Comment: Rare    Drug use: Not Currently     Types: Marijuana, Cocaine     Review of Systems   Constitutional:  Negative for chills and fever.   HENT:  Negative for rhinorrhea and sore throat.    Respiratory:  Negative for cough and shortness of breath.    Cardiovascular:  Negative for chest pain and palpitations.   Gastrointestinal:  Negative for nausea and vomiting.   Genitourinary:  Negative for dysuria and urgency.   Musculoskeletal:  Positive for gait problem and joint swelling.   Skin:  Negative for color change.   Neurological:  Negative for dizziness, light-headedness and headaches.   Psychiatric/Behavioral:  Negative for confusion. The patient is nervous/anxious.      Physical Exam      Initial Vitals [05/05/23 0954]   BP Pulse Resp Temp SpO2   128/78 72 16 99 °F (37.2 °C) 100 %      MAP       --         Physical Exam    Constitutional: She appears well-developed and well-nourished.   HENT:   Head: Normocephalic and atraumatic.   Eyes: Conjunctivae and EOM are normal. Pupils are equal, round, and reactive to light.   Neck: Neck supple.   Normal range of motion.  Cardiovascular:  Normal rate and regular rhythm.           Pulmonary/Chest: Breath sounds normal.   Abdominal: Abdomen is soft.   Musculoskeletal:         General: Tenderness and edema present.      Cervical back: Normal range of motion and neck supple.     Neurological: She is alert and oriented to person, place, and time.   Skin: Skin is warm and dry. Capillary refill takes less than 2 seconds.   Psychiatric: Her speech is normal and behavior is normal. Her mood appears anxious.       ED Course   Procedures  Labs Reviewed   CBC W/ AUTO DIFFERENTIAL - Abnormal; Notable for the following components:       Result Value    RBC 3.47 (*)     Hemoglobin 10.6 (*)     Hematocrit 31.9 (*)     All other components within normal limits   COMPREHENSIVE METABOLIC PANEL - Abnormal; Notable for the following components:    Potassium 3.0 (*)     Glucose 170 (*)     BUN 3 (*)     Albumin 3.4 (*)     All other components within normal limits          Imaging Results               X-Ray Ankle Complete Right (Final result)  Result time 05/05/23 13:46:35      Final result by Baltazar Thomas Jr., MD (05/05/23 13:46:35)                   Impression:      Fracture dislocation about the ankle as above.    This report was flagged in Epic as abnormal.      Electronically signed by: Baltazar Thomas MD  Date:    05/05/2023  Time:    13:46               Narrative:    EXAMINATION:  XR ANKLE COMPLETE 3 VIEW RIGHT    CLINICAL HISTORY:  History of falling    TECHNIQUE:  AP, lateral, and oblique images of the right ankle were  performed.    COMPARISON:  None    FINDINGS:  Fracture dislocation with the talus posteriorly and laterally displaced with respect to the tibia.  Fracture of the distal fibula and likely posterior malleolus.  Few calcific densities seen medial to the talus likely a chip fracture from the medial malleolus.  Soft tissue swelling noted.                                       Medications   lactated ringers bolus 1,000 mL (0 mLs Intravenous Stopped 5/5/23 1221)   morphine injection 4 mg (4 mg Intravenous Given 5/5/23 1123)   HYDROmorphone injection 0.5 mg (0.5 mg Intravenous Given 5/5/23 1250)     Medical Decision Making:   Initial Assessment:   67 year old with T2DM, hypothyroidism who presents with severe right ankle pain, swelling. Is HDS. Audible pulses with doppler. Significant tenderness, swelling across medial malleolus. Likely ankle fracture, deltoid ligament involvement.  Differential Diagnosis:   Ankle fracture, dislocation, deltoid ligament tear  ED Management:  Xray significant for unstable ankle fracture with dislocation. Ortho consulted and will take patient to OR.  Other:   I have discussed this case with another health care provider.                        Clinical Impression:   Final diagnoses:  [Z91.81] History of recent fall  [S82.520B] Closed fracture of right ankle, initial encounter (Primary)        ED Disposition Condition    Admit Stable                Shree Nelson MD  Resident  05/05/23 4175

## 2023-05-05 NOTE — ANESTHESIA PROCEDURE NOTES
Intubation    Date/Time: 5/5/2023 6:16 PM  Performed by: Debra Mccray CRNA  Authorized by: Mana Smith MD     Intubation:     Induction:  Intravenous    Intubated:  Postinduction    Mask Ventilation:  Easy with oral airway    Attempts:  1    Attempted By:  CRNA    Method of Intubation:  Direct    Blade:  Goel 2    Laryngeal View Grade: Grade I - full view of cords      Difficult Airway Encountered?: No      Complications:  None    Airway Device:  Oral endotracheal tube    Airway Device Size:  7.0    Style/Cuff Inflation:  Cuffed (inflated to minimal occlusive pressure)    Tube secured:  21    Secured at:  The lips    Placement Verified By:  Capnometry    Complicating Factors:  Anterior larynx    Findings Post-Intubation:  BS equal bilateral

## 2023-05-05 NOTE — HPI
67 y.o.female with major depression, generalized anxiety disorder, COPD not on home oxygen or inhalers, Type 2 diabetes not on home insulin and just takes oral Metformin to treat, hyperlipidemia and previous open left humerus fracture s/p ORIF on 3/21/2023 by Dr. Ousmane Crawford presented to the [facility] ER with complaint of right ankle pain. Patient states pain is sharp/stabbing in the right ankle and is aggravated by any weight bearing. She reports onset of symptoms was 2 day(s) after a fall  while ambulating  at home. Patient reports that she was tripped and fell on a tile on her bathroom on 5/3 and twisted her right ankle. Patient denied head trauma. Patient denied to loss of consciousness, denied syncope, denied chest pain, denied dizziness prior to or after fall. Patient noted immediate pain to right ankle when she tripped and twisted ankle. Patient went to [facility] ED and according to records there was found to have a minimally displaced right distal fibula fracture even though patient told me that she was told she had just sprained her ankle. The ED records from [facility] stated actually they noted a right distal fibula fracture. Patient was placed in a tall walking boot after consultation with Dr. Townsend from Ortho at [facility] with plans for patient to follow-up with his office on 5/5. Patient reports when she got home from the ED the walking boot was not on right and she slipped again and this time she noted severe pain in her right ankle and unable to bear any weight and scooted herself up the stairs and has pretty much been in bed for past 2 days unable to bear weight to her right ankle ad noted significant swelling to her right ankle due. Due to continued pain and swelling patient decided to come to ED today to get right ankle re-evaluated here at [facility]. X-ray obtained in ER revealed right distal fibula fracture and possible posterior malleolus with dislocation. Orthopedics consulted in ED and plan  to take to OR tonight to reduce dislocation and place ex fix to right ankle.   Prior to admission patient's functional mobility was independent with no assistive device for community and home distances. Patient does not have history of gait or balance problems. Patient does have history of previous falls or fractures. Patient does not have previous cardiac history such as MI or CAD. Patient does not have previous history of TIA or stroke. Patient does not have previous history of compensated heart failure. Patient does have history of diabetes but is not insulin requiring. Patient does not have history of advanced kidney disease with creatinine > 2.   Patient reports due to her lack of mobility he has not taken even of her home meds since 5/3.

## 2023-05-05 NOTE — ASSESSMENT & PLAN NOTE
Patient's FSGs are controlled on admission. Patient on Metformin 1000 mg po BID as outpatient to treat.   Last A1c reviewed-   Lab Results   Component Value Date    HGBA1C 7.5 (H) 03/20/2023       Start correctional scale  Low  Maintain anti-hyperglycemic dose as follows-   Antihyperglycemics (From admission, onward)    Start     Stop Route Frequency Ordered    05/05/23 1758  insulin aspart U-100 pen 0-5 Units         -- SubQ Before meals & nightly PRN 05/05/23 1659        Hold Oral hypoglycemics while patient is in the hospital.  Diabetic diet.  Target blood sugars 140-180 in hospital.  Monitor blood sugars 4 times daily with meals and at bedtime.

## 2023-05-05 NOTE — ASSESSMENT & PLAN NOTE
· Present on admit. Hgb 10.6 on admit. Baseline Hgb 10-11.   · Patient has no signs of active bleeding and hemodynamically stable. Patient is asymptomatic related to anemia.   · Goal is keep Hgb >7 and consider blood transfusion if Hgb < 7 if asymptomatic or < 8 if patient becomes symptomatic.  · Plan is to monitor daily CBC.

## 2023-05-05 NOTE — ASSESSMENT & PLAN NOTE
Recurrent major depressive disorder, in full remission   Chronic and controlled. Conitnue home Lexapro 20 mg po daily + Trazodone 150 mg po nightly to treat chronic depression and anxiety.

## 2023-05-05 NOTE — HPI
Alix Patel is a 67 y.o. female with PMH significant for diabetes, generalized anxiety disorder, major depressive disorder, and recent left grade 1 open humerus fracture treated with intramedullary nail with Dr. Crawford on 03/21/2023 complicated by radial nerve paresthesias presenting with right ankle pain.  She reports that on Wednesday afternoon she twisted her right ankle.  She then presented to an outside hospital emergency department where she was diagnosed with a right ankle fracture and was placed in a boot.  She then reports that she was on her way home she went to walk up the stairs and felt a break break in her right ankle.  She is been bed-bound since was the evening.  She is now been able to present to the emergency department today.  Patient denies any head trauma or LOC. The patient denies prior hx of falls.  She reports numbness and tingling to the dorsal and plantar foot.  Denies any other musculoskeletal pain or injuries. No known history of prior right ankle injury or surgery.  Walks w/out assisted devices at baseline. Doesn't take any anticoagulation at baseline.  NPO since midnight  They deny IV drug use.  They deny current tobacco use, chart review shows previous tobacco use of half pack per day for 34 years and quit date of 2012.   They report occasional alcohol use.   They deny immunosuppressant medications.  They deny chemotherapy.  They deny radiation therapy.

## 2023-05-05 NOTE — ASSESSMENT & PLAN NOTE
· Patient with closed fracture dislocation of distal right fibula and likely posterior malleolus with posterior dislocation.   · Orthopedics consulted and to take patient to OR this evening for closed reduction of dislocation and ex fix to right ankle fracture.   · Routine labs eviewed. Patient is at low risk for perioperative cardiopulmonary complications based on the 2014 ACC/AHA Guideline on Perioperative Cardiovascular Evaluation and Management of patients undergoing noncardiac surgery. Patient with no active cardiac issues. Patient with good functional mets > 4 and with no significant cardiac clinical risk factors for intermediate risk surgery. Recommend to proceed to surgery as planned with no additional non-invasive cardiac testing required.  · Will place on multimodal pain management with Tylenol 1000 mg po every 8 hours + Tizanidine 2 mg po every 8 hours with Oxy IR prn for breakthrough pain.  · JUAN/SCD for DVT prophylaxis pre-op.  · Bedrest for now.

## 2023-05-05 NOTE — SUBJECTIVE & OBJECTIVE
Past Medical History:   Diagnosis Date    Anxiety     Bipolar 1 disorder     COPD (chronic obstructive pulmonary disease)     Depression     Diabetes mellitus     GERD (gastroesophageal reflux disease)     Grade I open displaced transverse fracture of shaft of left humerus 3/20/2023    HEARING LOSS     pt states she has loss slighty in rt ear.    Hypertension     Insomnia     Rash        Past Surgical History:   Procedure Laterality Date    APPENDECTOMY      BREAST BIOPSY      COLONOSCOPY      COLONOSCOPY N/A 10/31/2019    Procedure: COLONOSCOPY;  Surgeon: Philippe Monteiro MD;  [facility]  Service: Endoscopy;  Laterality: N/A;  PM prep-    CYST REMOVAL      ESOPHAGOGASTRODUODENOSCOPY N/A 10/31/2019    Procedure: EGD (ESOPHAGOGASTRODUODENOSCOPY);  Surgeon: Philippe Monteiro MD;  Location[facility]  Service: Endoscopy;  Laterality: N/A;  Pm prep-    HYSTERECTOMY      IRRIGATION AND DEBRIDEMENT OF UPPER EXTREMITY Left 3/21/2023    Procedure: IRRIGATION AND DEBRIDEMENT, UPPER EXTREMITY;  Surgeon: Ousmane Crawford MD;  Location: [facility] OR [address] [place] [address];  Service: Orthopedics;  Laterality: Left;    ORIF HUMERUS FRACTURE Left 3/21/2023    Procedure: ORIF, FRACTURE, HUMERUS;  Surgeon: Ousmane Crawford MD;  Location: [facility] OR [address] [place] [address];  Service: Orthopedics;  Laterality: Left;       Review of patient's allergies indicates:   Allergen Reactions    Lamictal [lamotrigine] Rash     Per psychiatry notes       Current Facility-Administered Medications   Medication    acetaminophen tablet 1,000 mg    [START ON 5/6/2023] atorvastatin tablet 80 mg    dextrose 10% bolus 125 mL 125 mL    dextrose 10% bolus 250 mL 250 mL    dextrose 40 % gel 15,000 mg    dextrose 40 % gel 30,000 mg    [START ON 5/6/2023] EScitalopram oxalate tablet 20 mg    glucagon (human recombinant) injection 1 mg    hydrOXYzine pamoate capsule 25 mg    insulin aspart U-100 pen 0-5 Units    [START ON 5/6/2023] levothyroxine  tablet 100 mcg    naloxone 0.4 mg/mL injection 0.02 mg    ondansetron disintegrating tablet 8 mg    oxyCODONE immediate release tablet 5 mg    oxyCODONE immediate release tablet Tab 10 mg    [START ON 2023] polyethylene glycol packet 17 g    [START ON 2023] potassium chloride CR capsule 40 mEq    senna-docusate 8.6-50 mg per tablet 1 tablet    tiZANidine tablet 2 mg    traZODone tablet 150 mg     Family History       Problem Relation (Age of Onset)    Alzheimer's disease Mother, Maternal Cousin, Maternal Cousin    Anxiety disorder Daughter    Colon cancer Maternal Aunt    Depression Daughter    Heart attack Father    Heart failure Father    Hyperlipidemia Father    No Known Problems Sister    Sarcoidosis Sister    Sinus disease Mother          Tobacco Use    Smoking status: Former     Packs/day: 0.50     Years: 34.00     Pack years: 17.00     Types: Cigarettes     Start date:      Quit date:      Years since quittin.3     Passive exposure: Never    Smokeless tobacco: Never   Substance and Sexual Activity    Alcohol use: Yes     Comment: Rare    Drug use: Not Currently     Types: Marijuana, Cocaine    Sexual activity: Not Currently     Partners: Male     Birth control/protection: None     ROS  Constitutional: negative for fevers or chills  Eyes: negative visual changes or eye discharge  ENT: negative for ear pain or sore throat  Respiratory: negative for shortness of breath or cough  Cardiovascular: negative for chest pain or palpitations  Gastrointestinal: negative for abdominal pain, nausea, or vomiting  Genitourinary: negative for dysuria and flank pain  Neurological: negative for headaches or dizziness  Musculoskeletal: positive for right ankle pain    Objective:     Vital Signs (Most Recent):  Temp: 98.1 °F (36.7 °C) (23)  Pulse: 75 (23)  Resp: 18 (23)  BP: 131/80 (23)  SpO2: 100 % (23) Vital Signs (24h Range):  Temp:  [98.1 °F (36.7  "°C)-99 °F (37.2 °C)] 98.1 °F (36.7 °C)  Pulse:  [72-79] 75  Resp:  [16-20] 18  SpO2:  [98 %-100 %] 100 %  BP: (128-147)/(78-93) 131/80     Weight: 72.6 kg (160 lb)  Height: 5' 11" (180.3 cm)  Body mass index is 22.32 kg/m².      Intake/Output Summary (Last 24 hours) at 5/5/2023 1723  Last data filed at 5/5/2023 1624  Gross per 24 hour   Intake --   Output 400 ml   Net -400 ml        Ortho/SPM Exam   Gen:  No acute distress, well-developed, well nourished.  CV:  Peripherally well-perfused. 2+ radial pulses, symmetric.  Respiratory:  Normal respiratory effort. No accessory muscle use.   Head/Neck:  Normocephalic.  Atraumatic. Sclera anicteric. TM. Neck supple.  Neuro: CN 2-12 grossly intact. No FND. Awake. Alert. Oriented to person, place, time, and situation.   Abdomen: Soft, NTND.      MSK:  Right Upper extremity  Inspection  - Skin intact throughout, no open wounds  - No swelling  - No ecchymosis, erythema, or signs of cellulitis  Palpation  - NonTTP throughout, no palpable abnormality   Range of motion  - AROM and PROM of the shoulder, elbow, wrist, and hand intact  Stability  - No evidence of joint dislocation or abnormal laxity   Neurovascular  - AIN/PIN/Radial/Median/Ulnar Nerves assessed in isolation without deficit  - Able to give thumbs up, make "OK" sign, cross IF/LF, abduct/adduct fingers, make fist  - SILT throughout  - Compartments soft  - Radial artery palpated  - Muscle tone normal    Left Upper extremity  Inspection  - Well-healed surgical incisions to the left humerus  - No swelling  - No ecchymosis, erythema, or signs of cellulitis  Palpation  - NonTTP throughout, no palpable abnormality   Range of motion  - AROM and PROM of the shoulder, elbow, wrist, and hand intact, mild decrease in active range of motion with wrist extension  Stability  - No evidence of joint dislocation or abnormal laxity   Neurovascular  - AIN/PIN/Radial/Median/Ulnar Nerves assessed in isolation without deficit  - Able to give " "thumbs up, make "OK" sign, cross IF/LF, abduct/adduct fingers, make fist  - SILT throughout  - Compartments soft  - Radial artery palpated  - Muscle tone normal      Right Lower Extremity  Inspection  - Gross evidence of lateral ankle dislocation with diffuse soft tissue edema about the ankle, no skin wrinkling  Palpation  - diffusely tender to palpation to the right ankle  Range of motion  - AROM and PROM of the hip, knee, ankle, and foot intact, decreased range of motion of right ankle secondary no dislocation  Stability  - Obvious evidence of right ankle dislocation and abnormal laxity  Neurovascular  - TA/EHL/Gastroc/FHL assessed in isolation without deficit  - SILT throughout  - Compartments soft  - DP palpated, doppler DP and PT pulses   - Negative Log roll  - Negative Stinchfield  - Muscle tone normal    Left Lower Extremity  Inspection  - Skin intact throughout, no open wounds  - No swelling  - No ecchymosis, erythema, or signs of cellulitis  Palpation  - NonTTP throughout, no palpable abnormality   Range of motion  - AROM and PROM of the hip, knee, ankle, and foot intact  Stability  - No evidence of joint dislocation or abnormal laxity  Neurovascular  - TA/EHL/Gastroc/FHL assessed in isolation without deficit  - SILT throughout  - Compartments soft  - DP palpated   - Negative Log roll  - Negative Stinchfield  - Muscle tone normal        Significant Labs: CBC:   Recent Labs   Lab 05/05/23  1154   WBC 7.38   HGB 10.6*   HCT 31.9*        CMP:   Recent Labs   Lab 05/05/23  1154      K 3.0*      CO2 24   *   BUN 3*   CREATININE 0.6   CALCIUM 8.8   PROT 6.1   ALBUMIN 3.4*   BILITOT 0.5   ALKPHOS 67   AST 20   ALT 19   ANIONGAP 10     All pertinent labs within the past 24 hours have been reviewed.    Significant Imaging: X-Ray: I have reviewed all pertinent results/findings and my personal findings are:  Right ankle fracture with posterolateral dislocation, Pitts B fibula fracture and " posterior malleolus fracture appreciated

## 2023-05-05 NOTE — H&P
[facility] ([address] [place] [address] Medicine  History & Physical    Patient Name: Alix Patel  MRN: [id number]  Patient Class: OP- Observation  Admission Date: 5/5/2023  Attending Physician: Camilla Hernandes MD  Primary Care Provider: Angélica Barakat MD         Patient information was obtained from patient, past medical records and ER records.     Subjective:     Principal Problem:Closed fracture of right ankle    Chief Complaint:   Chief Complaint   Patient presents with    Fall     Pt from home via EMS. Pt discharged from [facility] on Wednesday w/ right leg fracture d/t slip + fall; sent home w/ boot. After patient returned home, pt had another slip + fall (on Wednesday) while going up stairs. Significant edema noted to right leg + foot. Received 150 mcg Fentanyl w/ EMS.         HPI: 67 y.o.female with major depression, generalized anxiety disorder, COPD not on home oxygen or inhalers, Type 2 diabetes not on home insulin and just takes oral Metformin to treat, hyperlipidemia and previous open left humerus fracture s/p ORIF on 3/21/2023 by Dr. Ousmane Crawford presented to the [facility] ER with complaint of right ankle pain. Patient states pain is sharp/stabbing in the right ankle and is aggravated by any weight bearing. She reports onset of symptoms was 2 day(s) after a fall  while ambulating  at home. Patient reports that she was tripped and fell on a tile on her bathroom on 5/3 and twisted her right ankle. Patient denied head trauma. Patient denied to loss of consciousness, denied syncope, denied chest pain, denied dizziness prior to or after fall. Patient noted immediate pain to right ankle when she tripped and twisted ankle. Patient went to [facility] ED and according to records there was found to have a minimally displaced right distal fibula fracture even though patient told me that she was told she had just sprained her ankle. The ED records from [facility] stated actually they noted a right  distal fibula fracture. Patient was placed in a tall walking boot after consultation with Dr. Townsend from Ortho at [facility] with plans for patient to follow-up with his office on 5/5. Patient reports when she got home from the ED the walking boot was not on right and she slipped again and this time she noted severe pain in her right ankle and unable to bear any weight and scooted herself up the stairs and has pretty much been in bed for past 2 days unable to bear weight to her right ankle ad noted significant swelling to her right ankle due. Due to continued pain and swelling patient decided to come to ED today to get right ankle re-evaluated here at [facility]. X-ray obtained in ER revealed right distal fibula fracture and possible posterior malleolus with dislocation. Orthopedics consulted in ED and plan to take to OR tonight to reduce dislocation and place ex fix to right ankle.   Prior to admission patient's functional mobility was independent with no assistive device for community and home distances. Patient does not have history of gait or balance problems. Patient does have history of previous falls or fractures. Patient does not have previous cardiac history such as MI or CAD. Patient does not have previous history of TIA or stroke. Patient does not have previous history of compensated heart failure. Patient does have history of diabetes but is not insulin requiring. Patient does not have history of advanced kidney disease with creatinine > 2.   Patient reports due to her lack of mobility he has not taken even of her home meds since 5/3.         Past Medical History:   Diagnosis Date    Anxiety     Bipolar 1 disorder     COPD (chronic obstructive pulmonary disease)     Depression     Diabetes mellitus     GERD (gastroesophageal reflux disease)     Grade I open displaced transverse fracture of shaft of left humerus 3/20/2023    HEARING LOSS     pt states she has loss slighty in rt ear.    Hypertension      Insomnia     Rash        Past Surgical History:   Procedure Laterality Date    APPENDECTOMY      BREAST BIOPSY      COLONOSCOPY      COLONOSCOPY N/A 10/31/2019    Procedure: COLONOSCOPY;  Surgeon: Philippe Monteiro MD;  Location: [facility] ENDO (4TH FLR);  Service: Endoscopy;  Laterality: N/A;  PM prep-    CYST REMOVAL      ESOPHAGOGASTRODUODENOSCOPY N/A 10/31/2019    Procedure: EGD (ESOPHAGOGASTRODUODENOSCOPY);  Surgeon: Philippe Monteiro MD;  Location: [facility] ENDO (4TH FLR);  Service: Endoscopy;  Laterality: N/A;  Pm prep-    HYSTERECTOMY      IRRIGATION AND DEBRIDEMENT OF UPPER EXTREMITY Left 3/21/2023    Procedure: IRRIGATION AND DEBRIDEMENT, UPPER EXTREMITY;  Surgeon: Ousmane Crawford MD;  [facility];  Service: Orthopedics;  Laterality: Left;    ORIF HUMERUS FRACTURE Left 3/21/2023    Procedure: ORIF, FRACTURE, HUMERUS;  Surgeon: Ousmane Crawford MD;  Location: [facility] OR [address] [place] [address];  Service: Orthopedics;  Laterality: Left;       Review of patient's allergies indicates:   Allergen Reactions    Lamictal [lamotrigine] Rash     Per psychiatry notes       No current facility-administered medications on file prior to encounter.     Current Outpatient Medications on File Prior to Encounter   Medication Sig    aspirin (ECOTRIN) 81 MG EC tablet Take 1 tablet by mouth 3 (three) times a week.    diphenhydrAMINE-acetaminophen (TYLENOL PM)  mg Tab Take 2 tablets by mouth nightly as needed (SLEEP).    dulaglutide (TRULICITY) 0.75 mg/0.5 mL pen injector Inject 0.75 mg into the skin every Sunday.    EScitalopram oxalate (LEXAPRO) 20 MG tablet Take 1 tablet (20 mg total) by mouth once daily.    hydrOXYzine pamoate (VISTARIL) 25 MG Cap Take 1 to 2 caps q hs prn difficulty sleeping    ibuprofen (ADVIL,MOTRIN) 800 MG tablet Take 1 tablet (800 mg total) by mouth 3 (three) times daily as needed for Pain.    levothyroxine (SYNTHROID) 100 MCG tablet Take 1 tablet (100 mcg total) by mouth  once daily.    metFORMIN (GLUCOPHAGE) 500 MG tablet Take 2 tablets (1,000 mg total) by mouth 2 (two) times daily with meals.    oxyCODONE (ROXICODONE) 5 MG immediate release tablet Take 1 tablet (5 mg total) by mouth every 8 (eight) hours as needed for Pain.    tiZANidine (ZANAFLEX) 2 MG tablet Take 1 tablet (2 mg total) by mouth every 8 (eight) hours as needed (muscle spams).    traZODone (DESYREL) 150 MG tablet TAKE 1 TABLET(150 MG) BY MOUTH EVERY EVENING    zolpidem (AMBIEN) 10 mg Tab Take 1 tablet (10 mg total) by mouth nightly as needed.    [DISCONTINUED] acetaminophen (TYLENOL) 500 MG tablet Take 1 tablet (500 mg total) by mouth every 8 (eight) hours as needed for Pain.    [DISCONTINUED] CRESTOR 20 mg tablet Take 20 mg by mouth once daily.     blood sugar diagnostic (TRUE METRIX GLUCOSE TEST STRIP MISC) True Metrix Glucose Test Strip   TEST TWICE DAILY AS DIRECTED    blood-glucose meter (TRUE METRIX AIR GLUCOSE METER MISC) True Metrix Air Glucose Meter   USE UNDER THE SKIN TWICE DAILY AS DIRECTED    [DISCONTINUED] ammonium lactate 12 % Crea SAMSON EXT AA  OF FEET ONCE DAILY    [DISCONTINUED] estradioL (ESTRACE) 0.01 % (0.1 mg/gram) vaginal cream USE 1 GRAM VAGINALLY 3 TIMES A WEEK    [DISCONTINUED] flu vacc [id number] 6mos up,PF, (FLUARIX QUAD [id number], PF,) 60 mcg (15 mcg x 4)/0.5 mL Syrg ADM 0.5ML IM UTD    [DISCONTINUED] gabapentin (NEURONTIN) 300 MG capsule Take 1 capsule (300 mg total) by mouth 3 (three) times daily. for 10 days    [DISCONTINUED] promethazine (PHENERGAN) 25 MG tablet TK 1 T PO  Q 12 H PRN    [DISCONTINUED] urea (CARMOL) 40 % Crea Apply topically once daily. To dry skin on the feet.    [DISCONTINUED] varicella-zoster [id number], PF, (SHINGRIX, PF,) 50 mcg/0.5 mL injection ADM 0.5ML IM UTD     Family History       Problem Relation (Age of Onset)    Alzheimer's disease Mother, Maternal Cousin, Maternal Cousin    Anxiety disorder Daughter    Colon cancer Maternal Aunt    Depression  Daughter    Heart attack Father    Heart failure Father    Hyperlipidemia Father    No Known Problems Sister    Sarcoidosis Sister    Sinus disease Mother          Tobacco Use    Smoking status: Former     Packs/day: 0.50     Years: 34.00     Pack years: 17.00     Types: Cigarettes     Start date:      Quit date:      Years since quittin.3     Passive exposure: Never    Smokeless tobacco: Never   Substance and Sexual Activity    Alcohol use: Yes     Comment: Rare    Drug use: Not Currently     Types: Marijuana, Cocaine    Sexual activity: Not Currently     Partners: Male     Birth control/protection: None     Review of Systems   Constitutional:  Negative for chills and fever.   HENT:  Negative for congestion and sore throat.    Eyes:  Negative for pain and visual disturbance.   Respiratory:  Negative for cough and shortness of breath.    Cardiovascular:  Negative for chest pain, palpitations and leg swelling.   Gastrointestinal:  Negative for abdominal pain, nausea and vomiting.   Endocrine: Negative for polydipsia and polyuria.   Genitourinary:  Negative for dysuria and hematuria.   Musculoskeletal:  Positive for arthralgias (Right ankle), joint swelling (Right ankle) and myalgias (Right ankle and left shoulder).   Skin:  Negative for rash.   Allergic/Immunologic: Negative for immunocompromised state.   Neurological:  Negative for dizziness, light-headedness and headaches.   Hematological:  Negative for adenopathy. Does not bruise/bleed easily.   Psychiatric/Behavioral:  Negative for agitation, confusion and hallucinations.    Objective:     Vital Signs (Most Recent):  Temp: 98.1 °F (36.7 °C) (23)  Pulse: 75 (23)  Resp: 18 (23)  BP: 131/80 (23)  SpO2: 100 % (23) Vital Signs (24h Range):  Temp:  [98.1 °F (36.7 °C)-99 °F (37.2 °C)] 98.1 °F (36.7 °C)  Pulse:  [72-79] 75  Resp:  [16-20] 18  SpO2:  [98 %-100 %] 100 %  BP: (128-147)/(78-93) 131/80      Weight: 72.6 kg (160 lb)  Body mass index is 22.32 kg/m².     Physical Exam  Vitals reviewed.   Constitutional:       General: She is awake. She is not in acute distress.     Appearance: Normal appearance. She is well-developed. She is not ill-appearing.   HENT:      Head: Normocephalic and atraumatic.   Eyes:      Conjunctiva/sclera: Conjunctivae normal.   Neck:      Vascular: No JVD.   Cardiovascular:      Rate and Rhythm: Normal rate and regular rhythm.      Pulses:           Dorsalis pedis pulses are 2+ on the right side and 2+ on the left side.        Posterior tibial pulses are 2+ on the right side and 2+ on the left side.      Heart sounds: Normal heart sounds. No murmur heard.    No friction rub. No gallop.   Pulmonary:      Effort: Pulmonary effort is normal. No accessory muscle usage or respiratory distress.      Breath sounds: Normal breath sounds. No wheezing or rales.   Abdominal:      General: Abdomen is flat. Bowel sounds are normal. There is no distension.      Palpations: Abdomen is soft.      Tenderness: There is no abdominal tenderness.   Musculoskeletal:         General: Swelling (Right ankle) and tenderness (Right ankle) present.      Cervical back: Neck supple.      Right lower leg: Edema present.      Left lower leg: No edema.      Comments: Left arm in sling for comfort. Well healed surgical scars to left upper arm.    Skin:     General: Skin is warm.      Capillary Refill: Capillary refill takes less than 2 seconds.      Coloration: Skin is not pale.      Findings: No erythema.   Neurological:      Mental Status: She is alert and oriented to person, place, and time.   Psychiatric:         Mood and Affect: Mood normal.         Behavior: Behavior normal. Behavior is cooperative.         Thought Content: Thought content normal.         Judgment: Judgment normal.              Significant Labs: A1C:   Recent Labs   Lab 03/20/23  1408   HGBA1C 7.5*     CBC:   Recent Labs   Lab 05/05/23  1154    WBC 7.38   HGB 10.6*   HCT 31.9*        CMP:   Recent Labs   Lab 05/05/23  1154      K 3.0*      CO2 24   *   BUN 3*   CREATININE 0.6   CALCIUM 8.8   PROT 6.1   ALBUMIN 3.4*   BILITOT 0.5   ALKPHOS 67   AST 20   ALT 19   ANIONGAP 10       Significant Imaging: I have reviewed all pertinent imaging results/findings within the past 24 hours.    Assessment/Plan:     * Closed fracture of right ankle  · Patient with closed fracture dislocation of distal right fibula and likely posterior malleolus with posterior dislocation.   · Orthopedics consulted and to take patient to OR this evening for closed reduction of dislocation and ex fix to right ankle fracture.   · Routine labs eviewed. Patient is at low risk for perioperative cardiopulmonary complications based on the 2014 ACC/AHA Guideline on Perioperative Cardiovascular Evaluation and Management of patients undergoing noncardiac surgery. Patient with no active cardiac issues. Patient with good functional mets > 4 and with no significant cardiac clinical risk factors for intermediate risk surgery. Recommend to proceed to surgery as planned with no additional non-invasive cardiac testing required.  · Will place on multimodal pain management with Tylenol 1000 mg po every 8 hours + Tizanidine 2 mg po every 8 hours with Oxy IR prn for breakthrough pain.  · JUAN/SCD for DVT prophylaxis pre-op.  · Bedrest for now.     Type 2 diabetes mellitus without complication, without long-term current use of insulin  Patient's FSGs are controlled on admission. Patient on Metformin 1000 mg po BID as outpatient to treat.   Last A1c reviewed-   Lab Results   Component Value Date    HGBA1C 7.5 (H) 03/20/2023       Start correctional scale  Low  Maintain anti-hyperglycemic dose as follows-   Antihyperglycemics (From admission, onward)    Start     Stop Route Frequency Ordered    05/05/23 9409  insulin aspart U-100 pen 0-5 Units         -- SubQ Before meals & nightly  PRN 05/05/23 1659        Hold Oral hypoglycemics while patient is in the hospital.  Diabetic diet.  Target blood sugars 140-180 in hospital.  Monitor blood sugars 4 times daily with meals and at bedtime.     Hypokalemia  Present on admit. Potassium 3.0 on admit. Will replace with Potassium chloride 40 mEq po daily x 2 days to replace. Check Magnesium level. Monitor daily BMP.       Anemia of chronic disease  · Present on admit. Hgb 10.6 on admit. Baseline Hgb 10-11.   · Patient has no signs of active bleeding and hemodynamically stable. Patient is asymptomatic related to anemia.   · Goal is keep Hgb >7 and consider blood transfusion if Hgb < 7 if asymptomatic or < 8 if patient becomes symptomatic.  · Plan is to monitor daily CBC.    Pulmonary emphysema  · Controlled with no signs or symptoms to suggest active COPD exacerbation.   · Patient is not on any inhalers at home such as LAMA, LABA/Inhaled corticosteroid combination or short acting beta agonist at home.   · Patient is not on oxygen at home.   · Plan OOB to chair and encourage IS use post-op to decrease risk of postoperative pulmonary complications.    Acquired hypothyroidism  Chronic and controlled. Continue Levothyroxine 100 mcg po daily to treat.       Hyperlipidemia with target low density lipoprotein (LDL) cholesterol less than 100 mg/dL  Present on admission. Patient states she is supposed to be on Crestor 20 mg po daily at home but has not been taking recently. Will place on Lipitor 80 mg po daily a Crestor not on formulary.       LILLY (generalized anxiety disorder)  Recurrent major depressive disorder, in full remission   Chronic and controlled. Conitnue home Lexapro 20 mg po daily + Trazodone 150 mg po nightly to treat chronic depression and anxiety.         VTE Risk Mitigation (From admission, onward)         Ordered     Place JUAN hose  Until discontinued         05/05/23 1659     Reason for No Pharmacological VTE Prophylaxis  Once        Question:   Reasons:  Answer:  Risk of Bleeding    05/05/23 1659     IP VTE HIGH RISK PATIENT  Once         05/05/23 1659     Place sequential compression device  Until discontinued         05/05/23 1659                   On 05/05/2023, patient should be placed in hospital observation services under my care.        Camilla Hernandes MD  Department of Hospital Medicine  [facility] - Surgery (2nd Fl)

## 2023-05-05 NOTE — ASSESSMENT & PLAN NOTE
Present on admit. Potassium 3.0 on admit. Will replace with Potassium chloride 40 mEq po daily x 2 days to replace. Check Magnesium level. Monitor daily BMP.

## 2023-05-05 NOTE — ASSESSMENT & PLAN NOTE
Present on admission. Patient states she is supposed to be on Crestor 20 mg po daily at home but has not been taking recently. Will place on Lipitor 80 mg po daily a Crestor not on formulary.      Reinforce diet education PRN. Monitor tolerance to diet prescription, nutritional intake, weight trends, labs and skin integrity.

## 2023-05-06 PROBLEM — S82.851D CLOSED TRIMALLEOLAR FRACTURE OF RIGHT ANKLE WITH ROUTINE HEALING: Status: ACTIVE | Noted: 2023-05-05

## 2023-05-06 PROBLEM — S82.891D CLOSED FRACTURE OF RIGHT ANKLE WITH ROUTINE HEALING: Status: ACTIVE | Noted: 2023-05-05

## 2023-05-06 LAB
ANION GAP SERPL CALC-SCNC: 8 MMOL/L (ref 8–16)
BASOPHILS # BLD AUTO: 0.01 K/UL (ref 0–0.2)
BASOPHILS NFR BLD: 0.1 % (ref 0–1.9)
BUN SERPL-MCNC: 6 MG/DL (ref 8–23)
CALCIUM SERPL-MCNC: 8.8 MG/DL (ref 8.7–10.5)
CHLORIDE SERPL-SCNC: 106 MMOL/L (ref 95–110)
CO2 SERPL-SCNC: 25 MMOL/L (ref 23–29)
CREAT SERPL-MCNC: 0.7 MG/DL (ref 0.5–1.4)
DIFFERENTIAL METHOD: ABNORMAL
EOSINOPHIL # BLD AUTO: 0 K/UL (ref 0–0.5)
EOSINOPHIL NFR BLD: 0 % (ref 0–8)
ERYTHROCYTE [DISTWIDTH] IN BLOOD BY AUTOMATED COUNT: 12.5 % (ref 11.5–14.5)
EST. GFR  (NO RACE VARIABLE): >60 ML/MIN/1.73 M^2
GLUCOSE SERPL-MCNC: 305 MG/DL (ref 70–110)
HCT VFR BLD AUTO: 33.9 % (ref 37–48.5)
HGB BLD-MCNC: 10.8 G/DL (ref 12–16)
IMM GRANULOCYTES # BLD AUTO: 0.02 K/UL (ref 0–0.04)
IMM GRANULOCYTES NFR BLD AUTO: 0.3 % (ref 0–0.5)
LYMPHOCYTES # BLD AUTO: 1 K/UL (ref 1–4.8)
LYMPHOCYTES NFR BLD: 13.8 % (ref 18–48)
MAGNESIUM SERPL-MCNC: 1.7 MG/DL (ref 1.6–2.6)
MCH RBC QN AUTO: 29.8 PG (ref 27–31)
MCHC RBC AUTO-ENTMCNC: 31.9 G/DL (ref 32–36)
MCV RBC AUTO: 94 FL (ref 82–98)
MONOCYTES # BLD AUTO: 0.3 K/UL (ref 0.3–1)
MONOCYTES NFR BLD: 3.6 % (ref 4–15)
NEUTROPHILS # BLD AUTO: 6 K/UL (ref 1.8–7.7)
NEUTROPHILS NFR BLD: 82.2 % (ref 38–73)
NRBC BLD-RTO: 0 /100 WBC
PLATELET # BLD AUTO: 217 K/UL (ref 150–450)
PMV BLD AUTO: 10.6 FL (ref 9.2–12.9)
POCT GLUCOSE: 151 MG/DL (ref 70–110)
POCT GLUCOSE: 226 MG/DL (ref 70–110)
POCT GLUCOSE: 267 MG/DL (ref 70–110)
POCT GLUCOSE: 273 MG/DL (ref 70–110)
POTASSIUM SERPL-SCNC: 4.1 MMOL/L (ref 3.5–5.1)
RBC # BLD AUTO: 3.62 M/UL (ref 4–5.4)
SODIUM SERPL-SCNC: 139 MMOL/L (ref 136–145)
WBC # BLD AUTO: 7.3 K/UL (ref 3.9–12.7)

## 2023-05-06 PROCEDURE — G0378 HOSPITAL OBSERVATION PER HR: HCPCS

## 2023-05-06 PROCEDURE — 25000003 PHARM REV CODE 250: Performed by: STUDENT IN AN ORGANIZED HEALTH CARE EDUCATION/TRAINING PROGRAM

## 2023-05-06 PROCEDURE — 25000003 PHARM REV CODE 250

## 2023-05-06 PROCEDURE — 63600175 PHARM REV CODE 636 W HCPCS: Performed by: STUDENT IN AN ORGANIZED HEALTH CARE EDUCATION/TRAINING PROGRAM

## 2023-05-06 PROCEDURE — 96366 THER/PROPH/DIAG IV INF ADDON: CPT

## 2023-05-06 PROCEDURE — 83735 ASSAY OF MAGNESIUM: CPT | Performed by: STUDENT IN AN ORGANIZED HEALTH CARE EDUCATION/TRAINING PROGRAM

## 2023-05-06 PROCEDURE — 97165 OT EVAL LOW COMPLEX 30 MIN: CPT

## 2023-05-06 PROCEDURE — 96372 THER/PROPH/DIAG INJ SC/IM: CPT | Performed by: STUDENT IN AN ORGANIZED HEALTH CARE EDUCATION/TRAINING PROGRAM

## 2023-05-06 PROCEDURE — 36415 COLL VENOUS BLD VENIPUNCTURE: CPT | Performed by: STUDENT IN AN ORGANIZED HEALTH CARE EDUCATION/TRAINING PROGRAM

## 2023-05-06 PROCEDURE — 99232 SBSQ HOSP IP/OBS MODERATE 35: CPT | Mod: ,,, | Performed by: INTERNAL MEDICINE

## 2023-05-06 PROCEDURE — 97161 PT EVAL LOW COMPLEX 20 MIN: CPT

## 2023-05-06 PROCEDURE — 96372 THER/PROPH/DIAG INJ SC/IM: CPT | Performed by: INTERNAL MEDICINE

## 2023-05-06 PROCEDURE — 99232 PR SUBSEQUENT HOSPITAL CARE,LEVL II: ICD-10-PCS | Mod: ,,, | Performed by: INTERNAL MEDICINE

## 2023-05-06 PROCEDURE — 97535 SELF CARE MNGMENT TRAINING: CPT

## 2023-05-06 PROCEDURE — 85025 COMPLETE CBC W/AUTO DIFF WBC: CPT | Performed by: STUDENT IN AN ORGANIZED HEALTH CARE EDUCATION/TRAINING PROGRAM

## 2023-05-06 PROCEDURE — 80048 BASIC METABOLIC PNL TOTAL CA: CPT | Performed by: STUDENT IN AN ORGANIZED HEALTH CARE EDUCATION/TRAINING PROGRAM

## 2023-05-06 PROCEDURE — 97116 GAIT TRAINING THERAPY: CPT

## 2023-05-06 PROCEDURE — 25000003 PHARM REV CODE 250: Performed by: INTERNAL MEDICINE

## 2023-05-06 RX ORDER — OXYCODONE HYDROCHLORIDE 5 MG/1
5 TABLET ORAL EVERY 4 HOURS PRN
Status: DISCONTINUED | OUTPATIENT
Start: 2023-05-06 | End: 2023-05-06

## 2023-05-06 RX ORDER — OXYCODONE HYDROCHLORIDE 5 MG/1
5 TABLET ORAL ONCE
Status: COMPLETED | OUTPATIENT
Start: 2023-05-06 | End: 2023-05-06

## 2023-05-06 RX ORDER — OXYCODONE HYDROCHLORIDE 10 MG/1
10 TABLET ORAL EVERY 4 HOURS PRN
Status: DISCONTINUED | OUTPATIENT
Start: 2023-05-06 | End: 2023-05-10 | Stop reason: HOSPADM

## 2023-05-06 RX ORDER — OXYCODONE HYDROCHLORIDE 5 MG/1
5 TABLET ORAL EVERY 4 HOURS PRN
Status: DISCONTINUED | OUTPATIENT
Start: 2023-05-06 | End: 2023-05-10 | Stop reason: HOSPADM

## 2023-05-06 RX ADMIN — INSULIN ASPART 2 UNITS: 100 INJECTION, SOLUTION INTRAVENOUS; SUBCUTANEOUS at 08:05

## 2023-05-06 RX ADMIN — ATORVASTATIN CALCIUM 80 MG: 40 TABLET, FILM COATED ORAL at 08:05

## 2023-05-06 RX ADMIN — TIZANIDINE 2 MG: 2 TABLET ORAL at 06:05

## 2023-05-06 RX ADMIN — SENNOSIDES AND DOCUSATE SODIUM 1 TABLET: 50; 8.6 TABLET ORAL at 08:05

## 2023-05-06 RX ADMIN — ASPIRIN 81 MG CHEWABLE TABLET 81 MG: 81 TABLET CHEWABLE at 09:05

## 2023-05-06 RX ADMIN — ACETAMINOPHEN 1000 MG: 500 TABLET ORAL at 06:05

## 2023-05-06 RX ADMIN — CEFAZOLIN 2 G: 2 INJECTION, POWDER, FOR SOLUTION INTRAMUSCULAR; INTRAVENOUS at 06:05

## 2023-05-06 RX ADMIN — INSULIN DETEMIR 5 UNITS: 100 INJECTION, SOLUTION SUBCUTANEOUS at 09:05

## 2023-05-06 RX ADMIN — ACETAMINOPHEN 1000 MG: 500 TABLET ORAL at 09:05

## 2023-05-06 RX ADMIN — POTASSIUM CHLORIDE 40 MEQ: 10 CAPSULE, COATED, EXTENDED RELEASE ORAL at 08:05

## 2023-05-06 RX ADMIN — POLYETHYLENE GLYCOL 3350 17 G: 17 POWDER, FOR SOLUTION ORAL at 08:05

## 2023-05-06 RX ADMIN — OXYCODONE HYDROCHLORIDE 5 MG: 5 TABLET ORAL at 03:05

## 2023-05-06 RX ADMIN — TRAZODONE HYDROCHLORIDE 150 MG: 50 TABLET ORAL at 09:05

## 2023-05-06 RX ADMIN — METHOCARBAMOL 750 MG: 750 TABLET ORAL at 08:05

## 2023-05-06 RX ADMIN — SENNOSIDES AND DOCUSATE SODIUM 1 TABLET: 50; 8.6 TABLET ORAL at 09:05

## 2023-05-06 RX ADMIN — ACETAMINOPHEN 1000 MG: 500 TABLET ORAL at 01:05

## 2023-05-06 RX ADMIN — HYDROXYZINE PAMOATE 25 MG: 25 CAPSULE ORAL at 09:05

## 2023-05-06 RX ADMIN — ESCITALOPRAM OXALATE 20 MG: 20 TABLET ORAL at 08:05

## 2023-05-06 RX ADMIN — TIZANIDINE 2 MG: 2 TABLET ORAL at 02:05

## 2023-05-06 RX ADMIN — TIZANIDINE 2 MG: 2 TABLET ORAL at 09:05

## 2023-05-06 RX ADMIN — INSULIN ASPART 3 UNITS: 100 INJECTION, SOLUTION INTRAVENOUS; SUBCUTANEOUS at 12:05

## 2023-05-06 RX ADMIN — OXYCODONE HYDROCHLORIDE 5 MG: 5 TABLET ORAL at 06:05

## 2023-05-06 RX ADMIN — LEVOTHYROXINE SODIUM 100 MCG: 50 TABLET ORAL at 06:05

## 2023-05-06 RX ADMIN — CEFAZOLIN 2 G: 2 INJECTION, POWDER, FOR SOLUTION INTRAMUSCULAR; INTRAVENOUS at 02:05

## 2023-05-06 RX ADMIN — ASPIRIN 81 MG CHEWABLE TABLET 81 MG: 81 TABLET CHEWABLE at 08:05

## 2023-05-06 RX ADMIN — INSULIN ASPART 1 UNITS: 100 INJECTION, SOLUTION INTRAVENOUS; SUBCUTANEOUS at 09:05

## 2023-05-06 RX ADMIN — OXYCODONE HYDROCHLORIDE 5 MG: 5 TABLET ORAL at 10:05

## 2023-05-06 NOTE — PT/OT/SLP EVAL
Occupational Therapy   Co-Evaluation/Tx  Co-treatment with PT for maximal pt participation, safety, and activity tolerance     Name: Alix Patel  MRN: [id number]  Admitting Diagnosis: Closed trimalleolar fracture of right ankle with routine healing  Recent Surgery: Procedure(s) (LRB):  APPLICATION, EXTERNAL FIXATION DEVICE, FOR ANKLE FRACTURE - right, mariusz, ancef, bone foam, slider, C arm door side (Right) 1 Day Post-Op    Recommendations:     Discharge Recommendations: rehabilitation facility  Discharge Equipment Recommendations:  wheelchair (TBD)  Barriers to discharge:  Inaccessible home environment, Decreased caregiver support (stairs to her apartment with no elevator access)    Assessment:     Alix Patel is a 67 y.o. female with a medical diagnosis of Closed trimalleolar fracture of right ankle with routine healing due to a 2nd fall on 5/5/23. On 03/21/2023 - ORIF/IM nail left humerus NWB for 8 weeks due to a fall.  She presents with c/o pain however completed ADLs at EOB and stood at EOB with a genia walker with WB precautions maintained. Performance deficits affecting function: weakness, impaired endurance, impaired self care skills, impaired functional mobility, impaired balance, gait instability, decreased lower extremity function, decreased upper extremity function, decreased safety awareness, pain, decreased ROM, orthopedic precautions.  Recommending  Rehab once medically appropriate for discharge to increase maximal independence, reduce burden of care, and ensure safety.       Rehab Prognosis: Good; patient would benefit from acute skilled OT services to address these deficits and reach maximum level of function.       Plan:     Patient to be seen 3 x/week to address the above listed problems via neuromuscular re-education, self-care/home management, therapeutic activities, therapeutic exercises  Plan of Care Expires: 06/05/23  Plan of Care Reviewed with: patient    Subjective     Chief  Complaint: none reported   Patient/Family Comments/goals: get home      Occupational Profile:  Living Environment: Lives alone is a 4th floor apartment with 3 floor flights of stairs with no elevator access with a Tub/shower combo   Upon d/c pt. Is schedule to move to a [facility] with 5 ALEXUS  Previous level of function: Ind in adl  Roles and Routines: none stated   Equipment Used at Home: none  Assistance upon Discharge: Limited assistance from friends    Pain/Comfort:  Pain Rating 1: 5/10  Location - Side 1: Right  Location - Orientation 1: generalized  Location 1: ankle  Pain Addressed 1: Reposition, Distraction, Nurse notified  Pain Rating 2:  (unrated)  Location - Side 2: Left  Location - Orientation 2: upper  Location 2: shoulder (around pins)  Pain Addressed 2: Reposition, Distraction    Patients cultural, spiritual, Yazidi conflicts given the current situation: no    Objective:     Communicated with: RN prior to session.  Patient found HOB elevated with cryotherapy, peripheral IV (no active lines) upon OT entry to room.    General Precautions: Standard, fall  Orthopedic Precautions: RLE non weight bearing, LUE non weight bearing  Braces: N/A (ex fix RLE)  Respiratory Status: Room air    Occupational Performance:    Bed Mobility:    Patient completed Scooting/Bridging with stand by assistance  Patient completed Supine to Sit with stand by assistance  Patient completed Sit to Supine with stand by assistance    Functional Mobility/Transfers:  Patient completed Sit <> Stand Transfer with minimum assistance  with  hemiwalker   Functional Mobility: At EOB  pt. Completed a 2 hoops forward and 2 hops backward. Pt. Stood at EOB ~1mins    Activities of Daily Living:  Grooming: stand by assistance seated at EOB for oral care and faical hygiene  Upper Body Dressing: minimum assistance seated at EOB to don gown     Cognitive/Visual Perceptual:  Cognitive/Psychosocial Skills:     -       Oriented to: Person, Place, and  Situation   -       Follows Commands/attention:Follows multistep  commands  -       Communication: clear/fluent  -       Memory: No Deficits noted  -       Safety awareness/insight to disability: impaired   -       Mood/Affect/Coping skills/emotional control: Appropriate to situation  Visual/Perceptual:      -Wears eyeglasses      Physical Exam:  Balance:    Static Sitting:  Sup  Static Standing: Min A - CGA   Dynamic Standing: Min A    Dominant hand:    -       Left hand  Upper Extremity Range of Motion:     -       Right Upper Extremity: WFL  -       Left Upper Extremity: WFL   Upper Extremity Strength:    -       Right Upper Extremity: WFL  -       Left Upper Extremity: MARK 2/2 precautions   Strength:    -       Right Upper Extremity: WFL  -       Left Upper Extremity: MARK 2/2 precautions  Gross motor coordination:   WFL    AMPAC 6 Click ADL:  AMPAC Total Score: 15    Treatment & Education:  Pt.educated and reviewed WB precautions  Pt educated on role of occupational therapy, POC, and safety during ADLs and functional mobility. Pt and OT discussed importance of safe, continued mobility to optimize daily living skills. Pt verbalized understanding. Pt given instruction to call for medical staff/nurse for assistance.       Patient left HOB elevated with all lines intact, call button in reach, and RN notified    GOALS:   Multidisciplinary Problems       Occupational Therapy Goals          Problem: Occupational Therapy    Goal Priority Disciplines Outcome Interventions   Occupational Therapy Goal     OT, PT/OT Ongoing, Progressing    Description: Goals to be met by: 5/14/23     Patient will increase functional independence with ADLs by performing:    UE Dressing with Set-up Assistance.  LE Dressing with Stand-by Assistance.  Grooming while seated at sink with Modified [place].  Toileting from bedside commode with Modified [place] for hygiene and clothing management.   Toilet transfer to bedside commode  with Modified [place].                         History:     Past Medical History:   Diagnosis Date    Anxiety     Bipolar 1 disorder     COPD (chronic obstructive pulmonary disease)     Depression     Diabetes mellitus     GERD (gastroesophageal reflux disease)     Grade I open displaced transverse fracture of shaft of left humerus 3/20/2023    HEARING LOSS     pt states she has loss slighty in rt ear.    Hypertension     Insomnia     Rash          Past Surgical History:   Procedure Laterality Date    APPENDECTOMY      BREAST BIOPSY      COLONOSCOPY      COLONOSCOPY N/A 10/31/2019    Procedure: COLONOSCOPY;  Surgeon: Philippe Monteiro MD;  Location: [facility] ([address] [place] [address]);  Service: Endoscopy;  Laterality: N/A;  PM prep-    CYST REMOVAL      ESOPHAGOGASTRODUODENOSCOPY N/A 10/31/2019    Procedure: EGD (ESOPHAGOGASTRODUODENOSCOPY);  Surgeon: Philippe Monteiro MD;  Location: [facility] ([address] [place] [address]);  Service: Endoscopy;  Laterality: N/A;  Pm prep-    HYSTERECTOMY      IRRIGATION AND DEBRIDEMENT OF UPPER EXTREMITY Left 3/21/2023    Procedure: IRRIGATION AND DEBRIDEMENT, UPPER EXTREMITY;  Surgeon: Ousmane Crawford MD;  Location: [facility] OR [address] [place] [address];  Service: Orthopedics;  Laterality: Left;    ORIF HUMERUS FRACTURE Left 3/21/2023    Procedure: ORIF, FRACTURE, HUMERUS;  Surgeon: Ousmane Crawford MD;  Location: [facility] OR [address] [place] [address];  Service: Orthopedics;  Laterality: Left;       Time Tracking:     OT Date of Treatment: 05/06/23  OT Start Time: 0922  OT Stop Time: 0947  OT Total Time (min): 25 min    Billable Minutes:Evaluation 8  Self Care/Home Management 17    5/6/2023

## 2023-05-06 NOTE — TRANSFER OF CARE
"Anesthesia Transfer of Care Note    Patient: Alix Patel    Procedure(s) Performed: Procedure(s) (LRB):  APPLICATION, EXTERNAL FIXATION DEVICE, FOR ANKLE FRACTURE - right, mariusz, ancef, bone foam, slider, C arm door side (Right)    Patient location: PACU    Anesthesia Type: general    Transport from OR: Transported from OR on 6-10 L/min O2 by face mask with adequate spontaneous ventilation    Post pain: adequate analgesia    Post assessment: no apparent anesthetic complications    Post vital signs: stable    Level of consciousness: awake and alert    Nausea/Vomiting: no nausea/vomiting    Complications: none    Transfer of care protocol was followed      Last vitals:   Visit Vitals  /80 (BP Location: Right arm, Patient Position: Lying)   Pulse 75   Temp 36.7 °C (98.1 °F) (Temporal)   Resp 18   Ht 5' 11" (1.803 m)   Wt 72.6 kg (160 lb)   SpO2 100%   Breastfeeding No   BMI 22.32 kg/m²     "

## 2023-05-06 NOTE — ASSESSMENT & PLAN NOTE
· Improved to 4.1 on 5/6.   · Present on admit. Potassium 3.0 on admit. Will replace with Potassium chloride 40 mEq po daily x 2 days to replace.   · Monitor daily BMP.

## 2023-05-06 NOTE — SUBJECTIVE & OBJECTIVE
"Principal Problem:Closed fracture of right ankle    Principal Orthopedic Problem: same, s/p R ankle ex fix 5/5/23    Interval History: Pt seen and examined at bedside. EFREM, VSS, AF. Pain controlled. Denies fevers, chills, chest pain, SOB, N/V/D. Reports improvement in numbness and tingling from pre-operative state.      Review of patient's allergies indicates:   Allergen Reactions    Lamictal [lamotrigine] Rash     Per psychiatry notes       Current Facility-Administered Medications   Medication    acetaminophen tablet 1,000 mg    aspirin chewable tablet 81 mg    atorvastatin tablet 80 mg    ceFAZolin 2 g in dextrose 5 % in water (D5W) 5 % 50 mL IVPB (MB+)    dextrose 10% bolus 125 mL 125 mL    dextrose 10% bolus 250 mL 250 mL    dextrose 40 % gel 15,000 mg    dextrose 40 % gel 30,000 mg    EScitalopram oxalate tablet 20 mg    glucagon (human recombinant) injection 1 mg    hydrOXYzine pamoate capsule 25 mg    insulin aspart U-100 pen 0-5 Units    levothyroxine tablet 100 mcg    methocarbamoL tablet 750 mg    naloxone 0.4 mg/mL injection 0.02 mg    ondansetron disintegrating tablet 8 mg    polyethylene glycol packet 17 g    potassium chloride CR capsule 40 mEq    senna-docusate 8.6-50 mg per tablet 1 tablet    tiZANidine tablet 2 mg    traZODone tablet 150 mg     Objective:     Vital Signs (Most Recent):  Temp: 97.5 °F (36.4 °C) (05/06/23 0625)  Pulse: 69 (05/06/23 0625)  Resp: 16 (05/06/23 0625)  BP: (!) 162/84 (05/06/23 0625)  SpO2: 98 % (05/06/23 0625)   Vital Signs (24h Range):  Temp:  [97.5 °F (36.4 °C)-99 °F (37.2 °C)] 97.5 °F (36.4 °C)  Pulse:  [69-79] 69  Resp:  [14-26] 16  SpO2:  [95 %-100 %] 98 %  BP: (128-178)/() 162/84     Weight: 72.6 kg (160 lb)  Height: 5' 11" (180.3 cm)  Body mass index is 22.32 kg/m².      Intake/Output Summary (Last 24 hours) at 5/6/2023 0644  Last data filed at 5/5/2023 1911  Gross per 24 hour   Intake 800 ml   Output 405 ml   Net 395 ml        Ortho/SPM Exam     Gen: NAD, " WDWN  CV: peripherally well perfused  Resp: unlabored respirations, symmetric chest rise  Neck: TM  Neuro: CN 2-12 grossly intact. No FND.    MSK:  RLE  SILT  Ex fix in place, pin sites clead/dry/intact  AROM of toes intact    Significant Labs: CBC:   Recent Labs   Lab 05/05/23  1154 05/06/23  0444   WBC 7.38 7.30   HGB 10.6* 10.8*   HCT 31.9* 33.9*    217     CMP:   Recent Labs   Lab 05/05/23  1154 05/06/23  0444    139   K 3.0* 4.1    106   CO2 24 25   * 305*   BUN 3* 6*   CREATININE 0.6 0.7   CALCIUM 8.8 8.8   PROT 6.1  --    ALBUMIN 3.4*  --    BILITOT 0.5  --    ALKPHOS 67  --    AST 20  --    ALT 19  --    ANIONGAP 10 8     All pertinent labs within the past 24 hours have been reviewed.    Significant Imaging: I have reviewed all pertinent imaging results/findings.

## 2023-05-06 NOTE — PLAN OF CARE
Problem: Occupational Therapy  Goal: Occupational Therapy Goal  Description: Goals to be met by: 5/14/23     Patient will increase functional independence with ADLs by performing:    UE Dressing with Set-up Assistance.  LE Dressing with Stand-by Assistance.  Grooming while seated at sink with Modified [place].  Toileting from bedside commode with Modified [place] for hygiene and clothing management.   Toilet transfer to bedside commode with Modified [place].    Outcome: Ongoing, Progressing   Pt. Evaluated and goals established

## 2023-05-06 NOTE — PT/OT/SLP EVAL
Physical Therapy Evaluation    Patient Name:  Alix Patel   MRN:  [id number]    Recommendations:     Discharge Recommendations: rehabilitation facility   Discharge Equipment Recommendations:  (TBD)   Barriers to discharge: None    Assessment:     Alix Patel is a 67 y.o. female admitted with a medical diagnosis of Closed trimalleolar fracture of right ankle with routine healing.  She presents with the following impairments/functional limitations: weakness, impaired endurance, impaired self care skills, impaired functional mobility, gait instability, impaired balance, pain, decreased lower extremity function, orthopedic precautions, decreased upper extremity function Pt. cooperative and tolerated treatment well. Pt. progressing with mobility.    Rehab Prognosis: Good; patient would benefit from acute skilled PT services to address these deficits and reach maximum level of function.    Recent Surgery: Procedure(s) (LRB):  APPLICATION, EXTERNAL FIXATION DEVICE, FOR ANKLE FRACTURE - right, mariusz, ancef, bone foam, slider, C arm door side (Right) 1 Day Post-Op    Plan:     During this hospitalization, patient to be seen 6 x/week to address the identified rehab impairments via gait training, therapeutic activities, therapeutic exercises, wheelchair management/training and progress toward the following goals:    Plan of Care Expires:  06/05/23    Subjective     Chief Complaint: (R) LE discomfort  Patient/Family Comments/goals: pt. Agreeable to PT  Pain/Comfort:  Pain Rating 1: 5/10  Location - Side 1: Right  Location - Orientation 1: generalized  Location 1: ankle  Pain Addressed 1: Pre-medicate for activity, Reposition, Distraction, Cessation of Activity  Pain Rating Post-Intervention 1: 5/10    Patients cultural, spiritual, Advent conflicts given the current situation: no    Living Environment:  Pt. Lives in 3rd floor apartment with 3 flights of stairs with (L) handrail and no elevator access.  However, pt. states that she is supposed to be moving into a first floor apartment with 5 ALEXUS. Prior to admission, patients level of function was indep.  Equipment used at home: none.  Upon discharge, patient will not have assistance.    Objective:     Communicated with nursing prior to session.  Patient found supine with cryotherapy, peripheral IV  upon PT entry to room.    General Precautions: Standard, fall  Orthopedic Precautions:RLE non weight bearing, LUE non weight bearing   Braces:  ((R) LE ex-fix)  Respiratory Status: Room air    Exams:  RLE ROM: limited by ex-fix  RLE Strength: limited by pain and ex-grider  LLE ROM: WFL  LLE Strength: WFL    Functional Mobility:  Bed Mobility:     Rolling Left:  stand by assistance  Scooting: stand by assistance  Supine to Sit: stand by assistance  Sit to Supine: stand by assistance  Transfers:     Sit to Stand:  minimum assistance with hemiwalker  Gait: 4 hop/steps on (L) LE with HW and Min A with decreased step length/floor clearance  Balance: fair-      AM-PAC 6 CLICK MOBILITY  Total Score:16       Treatment & Education:  Discussed therapy needs, goals, and POC.    Patient left supine with all lines intact and call button in reach.    GOALS:   Multidisciplinary Problems       Physical Therapy Goals          Problem: Physical Therapy    Goal Priority Disciplines Outcome Goal Variances Interventions   Physical Therapy Goal     PT, PT/OT Ongoing, Progressing     Description: Goals to be met by: 2023     Patient will increase functional independence with mobility by performin. Supine to sit with Set-up [place]  2. Sit to supine with Set-up [place]  3. Sit to stand transfer with Supervision  4. Bed to chair transfer with Supervision using Keanu-walker  5. Gait  x 10 feet with Contact Guard Assistance using Keanu-walker.   6. Wheelchair propulsion x150 feet with Supervision using  right upper extremity and left lower extremity  7. Lower extremity exercise  program x15 reps per handout, with supervision                         History:     Past Medical History:   Diagnosis Date    Anxiety     Bipolar 1 disorder     COPD (chronic obstructive pulmonary disease)     Depression     Diabetes mellitus     GERD (gastroesophageal reflux disease)     Grade I open displaced transverse fracture of shaft of left humerus 3/20/2023    HEARING LOSS     pt states she has loss slighty in rt ear.    Hypertension     Insomnia     Rash        Past Surgical History:   Procedure Laterality Date    APPENDECTOMY      BREAST BIOPSY      COLONOSCOPY      COLONOSCOPY N/A 10/31/2019    Procedure: COLONOSCOPY;  Surgeon: Philippe Monteiro MD;  Location: [facility] ([address] [place] [address]);  Service: Endoscopy;  Laterality: N/A;  PM prep-    CYST REMOVAL      ESOPHAGOGASTRODUODENOSCOPY N/A 10/31/2019    Procedure: EGD (ESOPHAGOGASTRODUODENOSCOPY);  Surgeon: Philippe Monteiro MD;  Location: [facility] ([address] [place] [address]);  Service: Endoscopy;  Laterality: N/A;  Pm prep-    HYSTERECTOMY      IRRIGATION AND DEBRIDEMENT OF UPPER EXTREMITY Left 3/21/2023    Procedure: IRRIGATION AND DEBRIDEMENT, UPPER EXTREMITY;  Surgeon: Ousmane Crawford MD;  Location: [address];  Service: Orthopedics;  Laterality: Left;    ORIF HUMERUS FRACTURE Left 3/21/2023    Procedure: ORIF, FRACTURE, HUMERUS;  Surgeon: Ousmane Crawford MD;  Location: [facility] OR [address] [place] [address];  Service: Orthopedics;  Laterality: Left;       Time Tracking:     PT Received On: 05/06/23  PT Start Time: 0923     PT Stop Time: 0949  PT Total Time (min): 26 min     Billable Minutes: Evaluation 11 and Gait Training 15      05/06/2023

## 2023-05-06 NOTE — ASSESSMENT & PLAN NOTE
Closed dislocation of ankle, right, initial encounter  · Patient with closed trimalleolar fracture and dislocation of distal right fibula and likely posterior malleolus with posterior dislocation.   · Orthopedics consulted and patient taken to OR on 5/5/2023 by Dr. Yanely Guerra and underwent closed reduction of dislocation and ex fix for right ankle fracture.   · Post-op, patient to be non weight bearing to right lower extremity.  · Ice and elevate right ankle to reduce soft tissue swelling as patient will need a second surgery for definitive fixation of fracture once swelling improves.   · Routine pin site care to ex fix as per Ortho.  · Continue multimodal pain management with Tylenol 1000 mg po every 8 hours + Tizanidine 2 mg po every 8 hours with Oxy IR 5 mg po every 4 hours prn for breakthrough pain.  · Aspirin 81 mg po BID for DVT prophylaxis.

## 2023-05-06 NOTE — PROGRESS NOTES
[facility] - [facility]  Orthopedics  Progress Note    Patient Name: Alix Patel  MRN: [id number]  Admission Date: 5/5/2023  Hospital Length of Stay: 0 days  Attending Provider: Camilla Hernandes MD  Primary Care Provider: Angélica Barakat MD  Follow-up For: Procedure(s) (LRB):  APPLICATION, EXTERNAL FIXATION DEVICE, FOR ANKLE FRACTURE - right, mariusz, ancef, bone foam, slider, C arm door side (Right)    Post-Operative Day: 1 Day Post-Op  Subjective:     Principal Problem:Closed fracture of right ankle    Principal Orthopedic Problem: same, s/p R ankle ex fix 5/5/23    Interval History: Pt seen and examined at bedside. NAEON, VSS, AF. Pain controlled. Denies fevers, chills, chest pain, SOB, N/V/D. Reports improvement in numbness and tingling from pre-operative state.      Review of patient's allergies indicates:   Allergen Reactions    Lamictal [lamotrigine] Rash     Per psychiatry notes       Current Facility-Administered Medications   Medication    acetaminophen tablet 1,000 mg    aspirin chewable tablet 81 mg    atorvastatin tablet 80 mg    ceFAZolin 2 g in dextrose 5 % in water (D5W) 5 % 50 mL IVPB (MB+)    dextrose 10% bolus 125 mL 125 mL    dextrose 10% bolus 250 mL 250 mL    dextrose 40 % gel 15,000 mg    dextrose 40 % gel 30,000 mg    EScitalopram oxalate tablet 20 mg    glucagon (human recombinant) injection 1 mg    hydrOXYzine pamoate capsule 25 mg    insulin aspart U-100 pen 0-5 Units    levothyroxine tablet 100 mcg    methocarbamoL tablet 750 mg    naloxone 0.4 mg/mL injection 0.02 mg    ondansetron disintegrating tablet 8 mg    polyethylene glycol packet 17 g    potassium chloride CR capsule 40 mEq    senna-docusate 8.6-50 mg per tablet 1 tablet    tiZANidine tablet 2 mg    traZODone tablet 150 mg     Objective:     Vital Signs (Most Recent):  Temp: 97.5 °F (36.4 °C) (05/06/23 0625)  Pulse: 69 (05/06/23 0625)  Resp: 16 (05/06/23 0625)  BP: (!) 162/84 (05/06/23 0625)  SpO2:  "98 % (05/06/23 0625)   Vital Signs (24h Range):  Temp:  [97.5 °F (36.4 °C)-99 °F (37.2 °C)] 97.5 °F (36.4 °C)  Pulse:  [69-79] 69  Resp:  [14-26] 16  SpO2:  [95 %-100 %] 98 %  BP: (128-178)/() 162/84     Weight: 72.6 kg (160 lb)  Height: 5' 11" (180.3 cm)  Body mass index is 22.32 kg/m².      Intake/Output Summary (Last 24 hours) at 5/6/2023 0644  Last data filed at 5/5/2023 1911  Gross per 24 hour   Intake 800 ml   Output 405 ml   Net 395 ml        Ortho/SPM Exam     Gen: NAD, WDWN  CV: peripherally well perfused  Resp: unlabored respirations, symmetric chest rise  Neck: TM  Neuro: CN 2-12 grossly intact. No FND.    MSK:  RLE  SILT  Ex fix in place, pin sites clead/dry/intact  AROM of toes intact    Significant Labs: CBC:   Recent Labs   Lab 05/05/23  1154 05/06/23  0444   WBC 7.38 7.30   HGB 10.6* 10.8*   HCT 31.9* 33.9*    217     CMP:   Recent Labs   Lab 05/05/23  1154 05/06/23  0444    139   K 3.0* 4.1    106   CO2 24 25   * 305*   BUN 3* 6*   CREATININE 0.6 0.7   CALCIUM 8.8 8.8   PROT 6.1  --    ALBUMIN 3.4*  --    BILITOT 0.5  --    ALKPHOS 67  --    AST 20  --    ALT 19  --    ANIONGAP 10 8     All pertinent labs within the past 24 hours have been reviewed.    Significant Imaging: I have reviewed all pertinent imaging results/findings.    Assessment/Plan:     * Closed fracture of right ankle  Alix Patel is a 67 y.o. female with previous orthopedic surgical history of left grade 1 open humerus fracture status post intramedullary nail complicated by radial nerve palsy now with right ankle fracture dislocation after a fall sustained on 05/03/2023.  S/p ex fix 5/4/23.    - NWB RLE  - Pain control: MM  - DVTppx: ASA 81 mg BID  - Pin site care q shift                Jn Garza MD  Orthopedics  [facility] - Surgery  "

## 2023-05-06 NOTE — NURSING TRANSFER
Nursing Transfer Note      5/5/2023     Reason patient is being transferred: recovery care complete    Transfer To: [address]    Transfer via stretcher        Transported by pt escort        Medicines sent: n/a    Any special needs or follow-up needed: routine    Chart send with patient: Yes    Notified: daughter    Patient reassessed at: 05/05/23 2200 (date, time)

## 2023-05-06 NOTE — PROGRESS NOTES
[facility] Medicine  Progress Note    Patient Name: Alix Patel  MRN: [id number]  Patient Class: OP- Observation   Admission Date: 5/5/2023  Length of Stay: 0 days  Attending Physician: Camilla Hernandes MD  Primary Care Provider: Angélica Barakat MD        Subjective:     Principal Problem:Closed trimalleolar fracture of right ankle with routine healing        HPI:  67 y.o.female with major depression, generalized anxiety disorder, COPD not on home oxygen or inhalers, Type 2 diabetes not on home insulin and just takes oral Metformin to treat, hyperlipidemia and previous open left humerus fracture s/p ORIF on 3/21/2023 by Dr. Ousmane Crawford presented to the [facility] ER with complaint of right ankle pain. Patient states pain is sharp/stabbing in the right ankle and is aggravated by any weight bearing. She reports onset of symptoms was 2 day(s) after a fall  while ambulating  at home. Patient reports that she was tripped and fell on a tile on her bathroom on 5/3 and twisted her right ankle. Patient denied head trauma. Patient denied to loss of consciousness, denied syncope, denied chest pain, denied dizziness prior to or after fall. Patient noted immediate pain to right ankle when she tripped and twisted ankle. Patient went to [facility] ED and according to records there was found to have a minimally displaced right distal fibula fracture even though patient told me that she was told she had just sprained her ankle. The ED records from [facility] stated actually they noted a right distal fibula fracture. Patient was placed in a tall walking boot after consultation with Dr. Townsend from Ortho at [facility] with plans for patient to follow-up with his office on 5/5. Patient reports when she got home from the ED the walking boot was not on right and she slipped again and this time she noted severe pain in her right ankle and unable to bear any weight and scooted herself up the stairs and  has pretty much been in bed for past 2 days unable to bear weight to her right ankle ad noted significant swelling to her right ankle due. Due to continued pain and swelling patient decided to come to ED today to get right ankle re-evaluated here at [facility]. X-ray obtained in ER revealed right distal fibula fracture and possible posterior malleolus with dislocation. Orthopedics consulted in ED and plan to take to OR tonight to reduce dislocation and place ex fix to right ankle.   Prior to admission patient's functional mobility was independent with no assistive device for community and home distances. Patient does not have history of gait or balance problems. Patient does have history of previous falls or fractures. Patient does not have previous cardiac history such as MI or CAD. Patient does not have previous history of TIA or stroke. Patient does not have previous history of compensated heart failure. Patient does have history of diabetes but is not insulin requiring. Patient does not have history of advanced kidney disease with creatinine > 2.   Patient reports due to her lack of mobility he has not taken even of her home meds since 5/3.         Overview/Hospital Course:  Patient taken to OR and had reduction of dislocation and ex fix placed on right ankle for fracture by Dr. Yanely Guerra on 5/5/2023. Patient post-op, non-weight bearing to right lower extremity. Patient to elavte and ice right ankle to keep swelling down as will need a second definitive fixation surgery to repair fracture once swelling in ankle improves. Patient placed on Aspirin 81 mg po BID for DVT prophylaxis. Patient to continue multimodal pain management for post-op pain control with scheduled Tylenol 1000 mg po every 8 hours and Tizanidine 2 mg po every 8 hours  with Oxycodone IR prn for breakthrough pain. PT/OT consulted post-op.       Interval History: Patient went to OR last night and had closed reduction of right ankle dislocation and  application of ex fix to rigth ankle due to fracture by Orthopedics. Patient this am reports pain is doing better and 4/10 this am but asking for Oxycodone as needed for breakthrough pain and was ordered. Patient NWB to her LUE due to prior left humerus fracture surgery and now to be non weight bearing as per Ortho to right lower extremity. Patient to work with PT/OT today. Patient on Aspirin for DVT prophylaxis. Patient to elevate and ice right ankle to help with soft tissue swelling as once swelling improves will need a second definitve fixation surgery to her right ankle for her fracture by Ortho.     Review of Systems   Constitutional:  Negative for fever.   Respiratory:  Negative for cough and shortness of breath.    Cardiovascular:  Negative for chest pain.   Gastrointestinal:  Negative for abdominal pain, diarrhea and nausea.   Musculoskeletal:  Positive for arthralgias (Right ankle) and joint swelling (Right ankle).   Neurological:  Negative for dizziness.   Psychiatric/Behavioral:  Negative for agitation and confusion.    Objective:     Vital Signs (Most Recent):  Temp: 97.5 °F (36.4 °C) (05/06/23 0625)  Pulse: 69 (05/06/23 0625)  Resp: 17 (05/06/23 1015)  BP: 162/84 (05/06/23 0625)  SpO2: 98 % (05/06/23 0625) on room air Vital Signs (24h Range):  Temp:  [97.5 °F (36.4 °C)-98.2 °F (36.8 °C)] 97.5 °F (36.4 °C)  Pulse:  [69-79] 69  Resp:  [14-26] 17  SpO2:  [95 %-100 %] 98 %  BP: (131-178)/() 162/84     Weight: 80.3 kg (177 lb 0.5 oz)  Body mass index is 24.69 kg/m².    Intake/Output Summary (Last 24 hours) at 5/6/2023 1051  Last data filed at 5/5/2023 1911  Gross per 24 hour   Intake 800 ml   Output 405 ml   Net 395 ml         Physical Exam  Vitals and nursing note reviewed.   Constitutional:       General: She is awake. She is not in acute distress.     Appearance: Normal appearance. She is well-developed. She is not ill-appearing.   Eyes:      Conjunctiva/sclera: Conjunctivae normal.    Cardiovascular:      Rate and Rhythm: Normal rate and regular rhythm.      Heart sounds: Normal heart sounds. No murmur heard.    No friction rub. No gallop.   Pulmonary:      Effort: Pulmonary effort is normal. No respiratory distress.      Breath sounds: Normal breath sounds. No wheezing.   Abdominal:      General: Abdomen is flat. Bowel sounds are normal. There is no distension.      Palpations: Abdomen is soft.      Tenderness: There is no abdominal tenderness.   Musculoskeletal:         General: Swelling (Right ankle) present.      Comments: Ex fix in place to right ankle   Skin:     General: Skin is warm.      Findings: No erythema.   Neurological:      Mental Status: She is alert and oriented to person, place, and time.   Psychiatric:         Mood and Affect: Mood normal.         Behavior: Behavior normal. Behavior is cooperative.         Thought Content: Thought content normal.         Judgment: Judgment normal.           Significant Labs: CBC:   Recent Labs   Lab 05/05/23  1154 05/06/23  0444   WBC 7.38 7.30   HGB 10.6* 10.8*   HCT 31.9* 33.9*    217     CMP:   Recent Labs   Lab 05/05/23  1154 05/06/23  0444    139   K 3.0* 4.1    106   CO2 24 25   * 305*   BUN 3* 6*   CREATININE 0.6 0.7   CALCIUM 8.8 8.8   PROT 6.1  --    ALBUMIN 3.4*  --    BILITOT 0.5  --    ALKPHOS 67  --    AST 20  --    ALT 19  --    ANIONGAP 10 8     Magnesium:   Recent Labs   Lab 05/06/23  0444   MG 1.7     POCT Glucose:   Recent Labs   Lab 05/05/23  1936 05/05/23  2257 05/06/23  0628   POCTGLUCOSE 130* 178* 226*       Significant Imaging: I have reviewed all pertinent imaging results/findings within the past 24 hours.      Assessment/Plan:      * Closed trimalleolar fracture of right ankle with routine healing s/p ex fix on 5/5/2023  Closed dislocation of ankle, right, initial encounter  · Patient with closed trimalleolar fracture and dislocation of distal right fibula and likely posterior malleolus with  posterior dislocation.   · Orthopedics consulted and patient taken to OR on 5/5/2023 by Dr. Yanely Guerra and underwent closed reduction of dislocation and ex fix for right ankle fracture.   · Post-op, patient to be non weight bearing to right lower extremity.  · Ice and elevate right ankle to reduce soft tissue swelling as patient will need a second surgery for definitive fixation of fracture once swelling improves.   · Routine pin site care to ex fix as per Ortho.  · Continue multimodal pain management with Tylenol 1000 mg po every 8 hours + Tizanidine 2 mg po every 8 hours with Oxy IR 5 mg po every 4 hours prn for breakthrough pain.  · Aspirin 81 mg po BID for DVT prophylaxis.     Type 2 diabetes mellitus without complication, without long-term current use of insulin  Patient's FSGs are controlled on admission. Patient on Metformin 1000 mg po BID as outpatient to treat.   Last A1c reviewed-   Lab Results   Component Value Date    HGBA1C 7.5 (H) 03/20/2023       Start correctional scale  Low  Maintain anti-hyperglycemic dose as follows-   Antihyperglycemics (From admission, onward)    Start     Stop Route Frequency Ordered    05/06/23 2100  insulin detemir U-100 (Levemir) pen 5 Units         -- SubQ Nightly 05/06/23 1055    05/05/23 1758  insulin aspart U-100 pen 0-5 Units         -- SubQ Before meals & nightly PRN 05/05/23 1659        Hold Oral hypoglycemics while patient is in the hospital.  Diabetic diet.  Target blood sugars 140-180 in hospital.  Monitor blood sugars 4 times daily with meals and at bedtime.     Hypokalemia  · Improved to 4.1 on 5/6.   · Present on admit. Potassium 3.0 on admit. Will replace with Potassium chloride 40 mEq po daily x 2 days to replace.   · Monitor daily BMP.       Anemia of chronic disease  · Present on admit. Hgb 10.6 on admit. Baseline Hgb 10-11.   · Patient has no signs of active bleeding and hemodynamically stable. Patient is asymptomatic related to anemia.   · Goal is keep Hgb  >7 and consider blood transfusion if Hgb < 7 if asymptomatic or < 8 if patient becomes symptomatic.  · Plan is to monitor daily CBC.    Pulmonary emphysema  · Controlled with no signs or symptoms to suggest active COPD exacerbation.   · Patient is not on any inhalers at home such as LAMA, LABA/Inhaled corticosteroid combination or short acting beta agonist at home.   · Patient is not on oxygen at home.   · Plan OOB to chair and encourage IS use post-op to decrease risk of postoperative pulmonary complications.    Acquired hypothyroidism  Chronic and controlled. Continue Levothyroxine 100 mcg po daily to treat.       Hyperlipidemia with target low density lipoprotein (LDL) cholesterol less than 100 mg/dL  Present on admission. Patient states she is supposed to be on Crestor 20 mg po daily at home but has not been taking recently. Will place on Lipitor 80 mg po daily a Crestor not on formulary.       LILLY (generalized anxiety disorder)  Recurrent major depressive disorder, in full remission   Chronic and controlled. Conitnue home Lexapro 20 mg po daily + Trazodone 150 mg po nightly to treat chronic depression and anxiety.         VTE Risk Mitigation (From admission, onward)         Ordered     Place JUAN hose  Until discontinued         05/05/23 1659     Reason for No Pharmacological VTE Prophylaxis  Once        Question:  Reasons:  Answer:  Risk of Bleeding    05/05/23 1659     IP VTE HIGH RISK PATIENT  Once         05/05/23 1659     Place sequential compression device  Until discontinued         05/05/23 1659                Discharge Planning   GILLES: 5/8/2023     Code Status: Full Code   Is the patient medically ready for discharge?: No    Reason for patient still in hospital (select all that apply): Patient trending condition               Camilla Hernandes MD  Department of Hospital Medicine   [facility] - Surgery

## 2023-05-06 NOTE — ASSESSMENT & PLAN NOTE
Patient's FSGs are controlled on admission. Patient on Metformin 1000 mg po BID as outpatient to treat.   Last A1c reviewed-   Lab Results   Component Value Date    HGBA1C 7.5 (H) 03/20/2023       Start correctional scale  Low  Maintain anti-hyperglycemic dose as follows-   Antihyperglycemics (From admission, onward)    Start     Stop Route Frequency Ordered    05/06/23 2100  insulin detemir U-100 (Levemir) pen 5 Units         -- SubQ Nightly 05/06/23 1055    05/05/23 1758  insulin aspart U-100 pen 0-5 Units         -- SubQ Before meals & nightly PRN 05/05/23 1659        Hold Oral hypoglycemics while patient is in the hospital.  Diabetic diet.  Target blood sugars 140-180 in hospital.  Monitor blood sugars 4 times daily with meals and at bedtime.

## 2023-05-06 NOTE — ANESTHESIA POSTPROCEDURE EVALUATION
Anesthesia Post Evaluation    Patient: Alix Patel    Procedure(s) Performed: Procedure(s) (LRB):  APPLICATION, EXTERNAL FIXATION DEVICE, FOR ANKLE FRACTURE - right, mariusz, ancef, bone foam, slider, C arm door side (Right)    Final Anesthesia Type: general      Patient location during evaluation: PACU  Patient participation: Yes- Able to Participate  Level of consciousness: awake  Post-procedure vital signs: reviewed and stable  Pain management: adequate  Airway patency: patent    PONV status at discharge: No PONV  Anesthetic complications: no      Cardiovascular status: blood pressure returned to baseline  Respiratory status: unassisted  Hydration status: euvolemic  Follow-up not needed.          Vitals Value Taken Time   /83 05/05/23 2200   Temp 36.8 °C (98.2 °F) 05/05/23 2130   Pulse 71 05/05/23 2201   Resp 37 05/05/23 2201   SpO2 99 % 05/05/23 2201   Vitals shown include unvalidated device data.      Event Time   Out of Recovery 19:25:00         Pain/Andrea Score: Pain Rating Prior to Med Admin: 7 (5/5/2023  9:25 PM)  Andrea Score: 9 (5/5/2023  7:24 PM)

## 2023-05-06 NOTE — OP NOTE
OP NOTE     DOS:               05/05/2023     Preop Dx:        Right trimalleolar ankle fracture dislocation     Postop Dx:      Right trimalleolar ankle fracture dislocation     Procedure:      Application uniplanar external fixator                           Closed reduction right trimalleolar ankle fracture dislocation with manipulation under anesthesia      Surgeon:         Yanely Guerra M.D.     Asst:                MEENA Edgar MD    Anesthesia:     General      EBL:                Minimal     IVF:                  See anesthesia note     Implants:         Keerthi large external fixator     Specimens:     None     Findings:         Stable ankle at conclusion of ex fix.     Dispo:              To PACU awake/stable                  Indications for Procedure:       67 y.o. female with trimalleolar ankle fracture dislocation that has been out 2+ days.  Skin is tenting medially.  Recommend urgent closed reduction and ex-fix placement in OR. The risks, benefits and alternatives to surgery were discussed with the patient prior to going to the operating room.  Informed consent was obtained.     Procedure in Detail:     Patient was identified the preoperative holding area and the site was marked.  Patient was wheeled to the operating room and placed on the operating table in supine position.  General anesthesia was induced and preoperative antibiotics were administered. Right lower extremity was prepped and draped in sterile fashion. A time-out was undertaken to confirm patient, side, site, surgery, surgeon and administration of preoperative antibiotics.  All agreed we proceeded.    I began by marking out out sites in line with the 5 pin clamp on the tibia and drilled 2 holes.  I placed 2 Shantz pins confirming the position under fluoroscopy. I then placed a centrally threaded calcaneal pin. Delta frame constructed with medial and lateral bar constructs connected and  provisionally tightened proximally. The patient had a very unstable ankle fracture. Using manual in line traction as well as translation force to the hindfoot, the tibiotalar joint was reduced and found to be well reduced on fluoroscopy. The entire construct was then provisionally tightened and again found to be very well reduced. Therefore the construct was final tightened.     The pin sites were covered with Hibiclens soap scrub sponges and over wrapped with Kerlix.    All instrument and sponge counts were reported correct at the end of the case.  There were no complications.  The patient was awakened and taken to the recovery room in stable condition.      Plan the patient: 24 hours IV abx post op. NWB to RLE. Patient is also NWB x 8 weeks to [facility] s/p left proximal humerus and humeral shaft ORIF on 3/21/23. ASA 81mg BID DVT ppx. Ice and elevation. Daily pin site care. Multimodal pain management limiting narcotics. CT right ankle in ex fix. Will perform right ankle ORIF with ex fix removal when soft tissues allow.     Attending attestation:  I reviewed the operative note as documented above and agree.  Changes have been made where appropriate.  I was present for the entire procedure.

## 2023-05-06 NOTE — SUBJECTIVE & OBJECTIVE
Interval History: Patient went to OR last night and had closed reduction of right ankle dislocation and application of ex fix to rigth ankle due to fracture by Orthopedics. Patient this am reports pain is doing better and 4/10 this am but asking for Oxycodone as needed for breakthrough pain and was ordered. Patient NWB to her LUE due to prior left humerus fracture surgery and now to be non weight bearing as per Ortho to right lower extremity. Patient to work with PT/OT today. Patient on Aspirin for DVT prophylaxis. Patient to elevate and ice right ankle to help with soft tissue swelling as once swelling improves will need a second definitve fixation surgery to her right ankle for her fracture by Ortho.     Review of Systems   Constitutional:  Negative for fever.   Respiratory:  Negative for cough and shortness of breath.    Cardiovascular:  Negative for chest pain.   Gastrointestinal:  Negative for abdominal pain, diarrhea and nausea.   Musculoskeletal:  Positive for arthralgias (Right ankle) and joint swelling (Right ankle).   Neurological:  Negative for dizziness.   Psychiatric/Behavioral:  Negative for agitation and confusion.    Objective:     Vital Signs (Most Recent):  Temp: 97.5 °F (36.4 °C) (05/06/23 0625)  Pulse: 69 (05/06/23 0625)  Resp: 17 (05/06/23 1015)  BP: 162/84 (05/06/23 0625)  SpO2: 98 % (05/06/23 0625) on room air Vital Signs (24h Range):  Temp:  [97.5 °F (36.4 °C)-98.2 °F (36.8 °C)] 97.5 °F (36.4 °C)  Pulse:  [69-79] 69  Resp:  [14-26] 17  SpO2:  [95 %-100 %] 98 %  BP: (131-178)/() 162/84     Weight: 80.3 kg (177 lb 0.5 oz)  Body mass index is 24.69 kg/m².    Intake/Output Summary (Last 24 hours) at 5/6/2023 1051  Last data filed at 5/5/2023 1911  Gross per 24 hour   Intake 800 ml   Output 405 ml   Net 395 ml         Physical Exam  Vitals and nursing note reviewed.   Constitutional:       General: She is awake. She is not in acute distress.     Appearance: Normal appearance. She is  well-developed. She is not ill-appearing.   Eyes:      Conjunctiva/sclera: Conjunctivae normal.   Cardiovascular:      Rate and Rhythm: Normal rate and regular rhythm.      Heart sounds: Normal heart sounds. No murmur heard.    No friction rub. No gallop.   Pulmonary:      Effort: Pulmonary effort is normal. No respiratory distress.      Breath sounds: Normal breath sounds. No wheezing.   Abdominal:      General: Abdomen is flat. Bowel sounds are normal. There is no distension.      Palpations: Abdomen is soft.      Tenderness: There is no abdominal tenderness.   Musculoskeletal:         General: Swelling (Right ankle) present.      Comments: Ex fix in place to right ankle   Skin:     General: Skin is warm.      Findings: No erythema.   Neurological:      Mental Status: She is alert and oriented to person, place, and time.   Psychiatric:         Mood and Affect: Mood normal.         Behavior: Behavior normal. Behavior is cooperative.         Thought Content: Thought content normal.         Judgment: Judgment normal.           Significant Labs: CBC:   Recent Labs   Lab 05/05/23  1154 05/06/23  0444   WBC 7.38 7.30   HGB 10.6* 10.8*   HCT 31.9* 33.9*    217     CMP:   Recent Labs   Lab 05/05/23  1154 05/06/23  0444    139   K 3.0* 4.1    106   CO2 24 25   * 305*   BUN 3* 6*   CREATININE 0.6 0.7   CALCIUM 8.8 8.8   PROT 6.1  --    ALBUMIN 3.4*  --    BILITOT 0.5  --    ALKPHOS 67  --    AST 20  --    ALT 19  --    ANIONGAP 10 8     Magnesium:   Recent Labs   Lab 05/06/23  0444   MG 1.7     POCT Glucose:   Recent Labs   Lab 05/05/23  1936 05/05/23  2257 05/06/23  0628   POCTGLUCOSE 130* 178* 226*       Significant Imaging: I have reviewed all pertinent imaging results/findings within the past 24 hours.

## 2023-05-06 NOTE — HOSPITAL COURSE
Patient taken to OR and had reduction of dislocation and ex fix placed on right ankle for fracture by Dr. Yanely Guerra on 5/5/2023. Patient post-op, non-weight bearing to right lower extremity. Patient to elavte and ice right ankle to keep swelling down as will need a second definitive fixation surgery to repair fracture once swelling in ankle improves. Patient placed on Aspirin 81 mg po BID for DVT prophylaxis. Patient to continue multimodal pain management for post-op pain control with scheduled Tylenol 1000 mg po every 8 hours and Tizanidine 2 mg po every 8 hours  with Oxycodone IR prn for breakthrough pain. PT/OT consulted post-op and recommending IP Rehab when medically ready. Patient went to OR on 5/8 and underwent removal of ex fix and ORIF of right ankle by Dr. Johnathan Contreras. Post-op, patient non weight bearing to right lower extremity for 4-6 weeks pending fracture healing. Per Ortho, sutures out in 2-3 weeks with immediate range of motion of the ankle. Plantar fasciitis boot when not undergoing range of motion to prevent equinus. Patient resumed on Aspirin 81 mg po BID for DVT prophylaxis post-op and will need for 4-6 weeks as per Ortho. PT/OT resumed post-op and recommending IP Rehab and case management working on placement. Pain controlled to right ankle post-op. Patient accepted and discharged to [facility] in good condition. Non-weight bearing to right leg as per Ortho with Aspirin BID for DVT prophylaxis on discharge with soft splint in place and not to be removed until Ortho clinic follow-up. Patient to maintain non-weight bearing to left upper arm as per previous Ortho recs for recent left humerus fracture surgery done in 3/2023.

## 2023-05-06 NOTE — ASSESSMENT & PLAN NOTE
Alix Patel is a 67 y.o. female with previous orthopedic surgical history of left grade 1 open humerus fracture status post intramedullary nail complicated by radial nerve palsy now with right ankle fracture dislocation after a fall sustained on 05/03/2023.  S/p ex fix 5/4/23.    - NWB RLE  - Pain control: MM  - DVTppx: ASA 81 mg BID  - Pin site care q shift

## 2023-05-06 NOTE — ASSESSMENT & PLAN NOTE
Present on admission. Patient states she is supposed to be on Crestor 20 mg po daily at home but has not been taking recently. Will place on Lipitor 80 mg po daily a Crestor not on formulary.

## 2023-05-07 PROBLEM — E87.6 HYPOKALEMIA: Status: RESOLVED | Noted: 2023-05-05 | Resolved: 2023-05-07

## 2023-05-07 LAB
ANION GAP SERPL CALC-SCNC: 5 MMOL/L (ref 8–16)
BASOPHILS # BLD AUTO: 0.02 K/UL (ref 0–0.2)
BASOPHILS NFR BLD: 0.2 % (ref 0–1.9)
BUN SERPL-MCNC: 8 MG/DL (ref 8–23)
CALCIUM SERPL-MCNC: 9.2 MG/DL (ref 8.7–10.5)
CHLORIDE SERPL-SCNC: 110 MMOL/L (ref 95–110)
CO2 SERPL-SCNC: 28 MMOL/L (ref 23–29)
CREAT SERPL-MCNC: 0.7 MG/DL (ref 0.5–1.4)
DIFFERENTIAL METHOD: ABNORMAL
EOSINOPHIL # BLD AUTO: 0.1 K/UL (ref 0–0.5)
EOSINOPHIL NFR BLD: 1 % (ref 0–8)
ERYTHROCYTE [DISTWIDTH] IN BLOOD BY AUTOMATED COUNT: 12.6 % (ref 11.5–14.5)
EST. GFR  (NO RACE VARIABLE): >60 ML/MIN/1.73 M^2
GLUCOSE SERPL-MCNC: 174 MG/DL (ref 70–110)
HCT VFR BLD AUTO: 34.8 % (ref 37–48.5)
HGB BLD-MCNC: 11.1 G/DL (ref 12–16)
IMM GRANULOCYTES # BLD AUTO: 0.02 K/UL (ref 0–0.04)
IMM GRANULOCYTES NFR BLD AUTO: 0.2 % (ref 0–0.5)
LYMPHOCYTES # BLD AUTO: 3 K/UL (ref 1–4.8)
LYMPHOCYTES NFR BLD: 34.6 % (ref 18–48)
MAGNESIUM SERPL-MCNC: 1.8 MG/DL (ref 1.6–2.6)
MCH RBC QN AUTO: 30.2 PG (ref 27–31)
MCHC RBC AUTO-ENTMCNC: 31.9 G/DL (ref 32–36)
MCV RBC AUTO: 95 FL (ref 82–98)
MONOCYTES # BLD AUTO: 0.5 K/UL (ref 0.3–1)
MONOCYTES NFR BLD: 6 % (ref 4–15)
NEUTROPHILS # BLD AUTO: 5 K/UL (ref 1.8–7.7)
NEUTROPHILS NFR BLD: 58 % (ref 38–73)
NRBC BLD-RTO: 0 /100 WBC
PLATELET # BLD AUTO: 242 K/UL (ref 150–450)
PMV BLD AUTO: 10.7 FL (ref 9.2–12.9)
POCT GLUCOSE: 161 MG/DL (ref 70–110)
POCT GLUCOSE: 166 MG/DL (ref 70–110)
POCT GLUCOSE: 199 MG/DL (ref 70–110)
POCT GLUCOSE: 228 MG/DL (ref 70–110)
POTASSIUM SERPL-SCNC: 3.9 MMOL/L (ref 3.5–5.1)
RBC # BLD AUTO: 3.68 M/UL (ref 4–5.4)
SODIUM SERPL-SCNC: 143 MMOL/L (ref 136–145)
WBC # BLD AUTO: 8.68 K/UL (ref 3.9–12.7)

## 2023-05-07 PROCEDURE — 83735 ASSAY OF MAGNESIUM: CPT | Performed by: STUDENT IN AN ORGANIZED HEALTH CARE EDUCATION/TRAINING PROGRAM

## 2023-05-07 PROCEDURE — 99232 PR SUBSEQUENT HOSPITAL CARE,LEVL II: ICD-10-PCS | Mod: ,,, | Performed by: INTERNAL MEDICINE

## 2023-05-07 PROCEDURE — 25000003 PHARM REV CODE 250: Performed by: STUDENT IN AN ORGANIZED HEALTH CARE EDUCATION/TRAINING PROGRAM

## 2023-05-07 PROCEDURE — 99232 SBSQ HOSP IP/OBS MODERATE 35: CPT | Mod: ,,, | Performed by: INTERNAL MEDICINE

## 2023-05-07 PROCEDURE — 80048 BASIC METABOLIC PNL TOTAL CA: CPT | Performed by: STUDENT IN AN ORGANIZED HEALTH CARE EDUCATION/TRAINING PROGRAM

## 2023-05-07 PROCEDURE — 85025 COMPLETE CBC W/AUTO DIFF WBC: CPT | Performed by: STUDENT IN AN ORGANIZED HEALTH CARE EDUCATION/TRAINING PROGRAM

## 2023-05-07 PROCEDURE — 36415 COLL VENOUS BLD VENIPUNCTURE: CPT | Performed by: STUDENT IN AN ORGANIZED HEALTH CARE EDUCATION/TRAINING PROGRAM

## 2023-05-07 PROCEDURE — 25000003 PHARM REV CODE 250

## 2023-05-07 PROCEDURE — 11000001 HC ACUTE MED/SURG PRIVATE ROOM

## 2023-05-07 RX ADMIN — INSULIN DETEMIR 5 UNITS: 100 INJECTION, SOLUTION SUBCUTANEOUS at 08:05

## 2023-05-07 RX ADMIN — SENNOSIDES AND DOCUSATE SODIUM 1 TABLET: 50; 8.6 TABLET ORAL at 08:05

## 2023-05-07 RX ADMIN — OXYCODONE HYDROCHLORIDE 10 MG: 10 TABLET ORAL at 08:05

## 2023-05-07 RX ADMIN — TIZANIDINE 2 MG: 2 TABLET ORAL at 10:05

## 2023-05-07 RX ADMIN — ACETAMINOPHEN 1000 MG: 500 TABLET ORAL at 05:05

## 2023-05-07 RX ADMIN — POTASSIUM CHLORIDE 40 MEQ: 10 CAPSULE, COATED, EXTENDED RELEASE ORAL at 08:05

## 2023-05-07 RX ADMIN — ACETAMINOPHEN 1000 MG: 500 TABLET ORAL at 02:05

## 2023-05-07 RX ADMIN — ASPIRIN 81 MG CHEWABLE TABLET 81 MG: 81 TABLET CHEWABLE at 08:05

## 2023-05-07 RX ADMIN — ATORVASTATIN CALCIUM 80 MG: 40 TABLET, FILM COATED ORAL at 08:05

## 2023-05-07 RX ADMIN — LEVOTHYROXINE SODIUM 100 MCG: 50 TABLET ORAL at 05:05

## 2023-05-07 RX ADMIN — TIZANIDINE 2 MG: 2 TABLET ORAL at 05:05

## 2023-05-07 RX ADMIN — OXYCODONE HYDROCHLORIDE 10 MG: 10 TABLET ORAL at 12:05

## 2023-05-07 RX ADMIN — ACETAMINOPHEN 1000 MG: 500 TABLET ORAL at 10:05

## 2023-05-07 RX ADMIN — TRAZODONE HYDROCHLORIDE 150 MG: 50 TABLET ORAL at 08:05

## 2023-05-07 RX ADMIN — TIZANIDINE 2 MG: 2 TABLET ORAL at 02:05

## 2023-05-07 RX ADMIN — POLYETHYLENE GLYCOL 3350 17 G: 17 POWDER, FOR SOLUTION ORAL at 08:05

## 2023-05-07 RX ADMIN — ESCITALOPRAM OXALATE 20 MG: 20 TABLET ORAL at 08:05

## 2023-05-07 RX ADMIN — INSULIN ASPART 2 UNITS: 100 INJECTION, SOLUTION INTRAVENOUS; SUBCUTANEOUS at 11:05

## 2023-05-07 RX ADMIN — HYDROXYZINE PAMOATE 25 MG: 25 CAPSULE ORAL at 08:05

## 2023-05-07 NOTE — CONSULTS
Inpatient consult to Physical Medicine Rehab  Consult performed by: Sadaf Lopez NP  Consult ordered by: Camilla Hernandes MD  Reason for consult: Assess rehab needs    Reviewed patient history and current admission.  Rehab team following.  Full consult to follow.    EMILIE Thornton, FNP-C  Physical Medicine & Rehabilitation   05/07/2023

## 2023-05-07 NOTE — ASSESSMENT & PLAN NOTE
Patient's FSGs are controlled on current regimen. Patient on Metformin 1000 mg po BID as outpatient to treat.   Last A1c reviewed-   Lab Results   Component Value Date    HGBA1C 7.5 (H) 03/20/2023       Start correctional scale  Low  Maintain anti-hyperglycemic dose as follows-   Antihyperglycemics (From admission, onward)    Start     Stop Route Frequency Ordered    05/06/23 2100  insulin detemir U-100 (Levemir) pen 5 Units         -- SubQ Nightly 05/06/23 1055    05/05/23 1758  insulin aspart U-100 pen 0-5 Units         -- SubQ Before meals & nightly PRN 05/05/23 1659        Hold Oral hypoglycemics while patient is in the hospital.  Diabetic diet.  Target blood sugars 140-180 in hospital.  Monitor blood sugars 4 times daily with meals and at bedtime.

## 2023-05-07 NOTE — PT/OT/SLP PROGRESS
Physical Therapy      Patient Name:  Alix Patel   MRN:  [id number]    Patient not seen today secondary to attempted on two occasions, but patient politely requested to hold Physical Therapy for today, as she had been medicated and wanted to rest today. Will follow-up on next scheduled visit.

## 2023-05-07 NOTE — ASSESSMENT & PLAN NOTE
Closed dislocation of ankle, right, initial encounter  · Patient with closed trimalleolar fracture and dislocation of distal right fibula and likely posterior malleolus with posterior dislocation.   · Orthopedics consulted and patient taken to OR on 5/5/2023 by Dr. Yanely Guerra and underwent closed reduction of dislocation and ex fix for right ankle fracture.   · Post-op, patient to be non weight bearing to right lower extremity.  · Ice and elevate right ankle to reduce soft tissue swelling as patient will need a second surgery for definitive fixation of fracture once swelling improves.   · Routine pin site care to ex fix as per Ortho.  · Pain controlled. Continue multimodal pain management with Tylenol 1000 mg po every 8 hours + Tizanidine 2 mg po every 8 hours with Oxy IR 5 mg po every 4 hours prn for breakthrough pain.  · Continue Aspirin 81 mg po BID for DVT prophylaxis.   · PT/OT consulted and recommending IP rehab on discharge when medically ready but need to discus with ortho about surgical timing for her second surgery.

## 2023-05-07 NOTE — PROGRESS NOTES
[facility]  Orthopedics  Progress Note    Patient Name: Alix Patel  MRN: [id number]  Admission Date: 5/5/2023  Hospital Length of Stay: 0 days  Attending Provider: Camilla Hernandes MD  Primary Care Provider: Angélica Barakat MD  Follow-up For: Procedure(s) (LRB):  APPLICATION, EXTERNAL FIXATION DEVICE, FOR ANKLE FRACTURE - right, mariusz, ancef, bone foam, slider, C arm door side (Right)    Post-Operative Day: 2 Days Post-Op  Subjective:     Principal Problem:Closed trimalleolar fracture of right ankle with routine healing    Principal Orthopedic Problem: same, s/p R ankle ex fix 5/5/23    Interval History: Pt seen and examined at bedside. BENNY TOPETE, AF. Pain controlled. Denies fevers, chills, chest pain, SOB, N/V/D. Reports improvement in numbness and tingling from pre-operative state.      Review of patient's allergies indicates:   Allergen Reactions    Lamictal [lamotrigine] Rash     Per psychiatry notes       Current Facility-Administered Medications   Medication    acetaminophen tablet 1,000 mg    aspirin chewable tablet 81 mg    atorvastatin tablet 80 mg    dextrose 10% bolus 125 mL 125 mL    dextrose 10% bolus 250 mL 250 mL    dextrose 40 % gel 15,000 mg    dextrose 40 % gel 30,000 mg    EScitalopram oxalate tablet 20 mg    glucagon (human recombinant) injection 1 mg    hydrOXYzine pamoate capsule 25 mg    insulin aspart U-100 pen 0-5 Units    insulin detemir U-100 (Levemir) pen 5 Units    levothyroxine tablet 100 mcg    naloxone 0.4 mg/mL injection 0.02 mg    ondansetron disintegrating tablet 8 mg    oxyCODONE immediate release tablet 5 mg    oxyCODONE immediate release tablet Tab 10 mg    polyethylene glycol packet 17 g    potassium chloride CR capsule 40 mEq    senna-docusate 8.6-50 mg per tablet 1 tablet    tiZANidine tablet 2 mg    traZODone tablet 150 mg     Objective:     Vital Signs (Most Recent):  Temp: 97.3 °F (36.3 °C) (05/07/23 0548)  Pulse: 67  "(05/07/23 0548)  Resp: 16 (05/07/23 0548)  BP: (!) 155/81 (05/07/23 0548)  SpO2: 97 % (05/07/23 0548)   Vital Signs (24h Range):  Temp:  [97.1 °F (36.2 °C)-97.9 °F (36.6 °C)] 97.3 °F (36.3 °C)  Pulse:  [59-75] 67  Resp:  [16-18] 16  SpO2:  [96 %-98 %] 97 %  BP: (110-157)/(61-81) 155/81     Weight: 80.3 kg (177 lb 0.5 oz)  Height: 5' 11" (180.3 cm)  Body mass index is 24.69 kg/m².      Intake/Output Summary (Last 24 hours) at 5/7/2023 0717  Last data filed at 5/7/2023 0400  Gross per 24 hour   Intake 1370 ml   Output 1400 ml   Net -30 ml         Ortho/SPM Exam     Gen: NAD, WDWN  CV: peripherally well perfused  Resp: unlabored respirations, symmetric chest rise  Neck: TM  Neuro: CN 2-12 grossly intact. No FND.    MSK:  RLE  SILT  Ex fix in place, pin sites clead/dry/intact  AROM of toes intact  Improved edema from previous    Significant Labs: CBC:   Recent Labs   Lab 05/05/23  1154 05/06/23  0444   WBC 7.38 7.30   HGB 10.6* 10.8*   HCT 31.9* 33.9*    217       CMP:   Recent Labs   Lab 05/05/23  1154 05/06/23  0444    139   K 3.0* 4.1    106   CO2 24 25   * 305*   BUN 3* 6*   CREATININE 0.6 0.7   CALCIUM 8.8 8.8   PROT 6.1  --    ALBUMIN 3.4*  --    BILITOT 0.5  --    ALKPHOS 67  --    AST 20  --    ALT 19  --    ANIONGAP 10 8       All pertinent labs within the past 24 hours have been reviewed.    Significant Imaging: I have reviewed all pertinent imaging results/findings.    Assessment/Plan:     * Closed trimalleolar fracture of right ankle with routine healing s/p ex fix on 5/5/2023  Alix Patel is a 67 y.o. female with previous orthopedic surgical history of left grade 1 open humerus fracture status post intramedullary nail complicated by radial nerve palsy now with right ankle fracture dislocation after a fall sustained on 05/03/2023.  S/p ex fix 5/4/23.    - NWB RLE  - Pain control: MM  - DVTppx: ASA 81 mg BID  - Pin site care q shift                Jn Garza, " MD  Orthopedics  [facility]

## 2023-05-07 NOTE — ASSESSMENT & PLAN NOTE
· Controlled. Present on admit. Hgb 10.6 on admit. Baseline Hgb 10-11.   · Patient has no signs of active bleeding and hemodynamically stable. Patient is asymptomatic related to anemia.   · Goal is keep Hgb >7 and consider blood transfusion if Hgb < 7 if asymptomatic or < 8 if patient becomes symptomatic.  · Plan is to monitor daily CBC.

## 2023-05-07 NOTE — PROGRESS NOTES
[facility] Medicine  Progress Note    Patient Name: Alix Patel  MRN: [id number]  Patient Class: OP- Observation   Admission Date: 5/5/2023  Length of Stay: 0 days  Attending Physician: Camilla Hernandes MD  Primary Care Provider: Angélica Barakat MD        Subjective:     Principal Problem:Closed trimalleolar fracture of right ankle with routine healing        HPI:  67 y.o.female with major depression, generalized anxiety disorder, COPD not on home oxygen or inhalers, Type 2 diabetes not on home insulin and just takes oral Metformin to treat, hyperlipidemia and previous open left humerus fracture s/p ORIF on 3/21/2023 by Dr. Ousmane Crawford presented to the [facility] ER with complaint of right ankle pain. Patient states pain is sharp/stabbing in the right ankle and is aggravated by any weight bearing. She reports onset of symptoms was 2 day(s) after a fall  while ambulating  at home. Patient reports that she was tripped and fell on a tile on her bathroom on 5/3 and twisted her right ankle. Patient denied head trauma. Patient denied to loss of consciousness, denied syncope, denied chest pain, denied dizziness prior to or after fall. Patient noted immediate pain to right ankle when she tripped and twisted ankle. Patient went to [facility] ED and according to records there was found to have a minimally displaced right distal fibula fracture even though patient told me that she was told she had just sprained her ankle. The ED records from [facility] stated actually they noted a right distal fibula fracture. Patient was placed in a tall walking boot after consultation with Dr. Townsend from Ortho at [facility] with plans for patient to follow-up with his office on 5/5. Patient reports when she got home from the ED the walking boot was not on right and she slipped again and this time she noted severe pain in her right ankle and unable to bear any weight and scooted herself up the stairs and  has pretty much been in bed for past 2 days unable to bear weight to her right ankle ad noted significant swelling to her right ankle due. Due to continued pain and swelling patient decided to come to ED today to get right ankle re-evaluated here at [facility]. X-ray obtained in ER revealed right distal fibula fracture and possible posterior malleolus with dislocation. Orthopedics consulted in ED and plan to take to OR tonight to reduce dislocation and place ex fix to right ankle.   Prior to admission patient's functional mobility was independent with no assistive device for community and home distances. Patient does not have history of gait or balance problems. Patient does have history of previous falls or fractures. Patient does not have previous cardiac history such as MI or CAD. Patient does not have previous history of TIA or stroke. Patient does not have previous history of compensated heart failure. Patient does have history of diabetes but is not insulin requiring. Patient does not have history of advanced kidney disease with creatinine > 2.   Patient reports due to her lack of mobility he has not taken even of her home meds since 5/3.         Overview/Hospital Course:  Patient taken to OR and had reduction of dislocation and ex fix placed on right ankle for fracture by Dr. Yanely Guerra on 5/5/2023. Patient post-op, non-weight bearing to right lower extremity. Patient to elavte and ice right ankle to keep swelling down as will need a second definitive fixation surgery to repair fracture once swelling in ankle improves. Patient placed on Aspirin 81 mg po BID for DVT prophylaxis. Patient to continue multimodal pain management for post-op pain control with scheduled Tylenol 1000 mg po every 8 hours and Tizanidine 2 mg po every 8 hours  with Oxycodone IR prn for breakthrough pain. PT/OT consulted post-op.       Interval History: PT/OT worked with patient yesterday and recommending IP rehab when medically ready but need  to coordinate with Ortho to see about timing of her definitive right ankle fixation surgery. Patient's swelling in right ankle is improved. Patient reports 3/10 pain to right ankle. Blood sugars are controlled. Labs reviewed and Hgb stable at 11.1.     Review of Systems   Constitutional:  Negative for fever.   Respiratory:  Negative for cough and shortness of breath.    Cardiovascular:  Negative for chest pain.   Gastrointestinal:  Negative for abdominal pain, diarrhea and nausea.   Musculoskeletal:  Positive for arthralgias (Right ankle) and joint swelling (Right ankle).   Neurological:  Negative for dizziness.   Psychiatric/Behavioral:  Negative for agitation and confusion.    Objective:     Vital Signs (Most Recent):  Temp: 98 °F (36.7 °C) (05/07/23 0723)  Pulse: 69 (05/07/23 1150)  Resp: 18 (05/07/23 1230)  BP: 126/68 (05/07/23 1150)  SpO2: 98 % (05/07/23 1150) on room air Vital Signs (24h Range):  Temp:  [97.3 °F (36.3 °C)-98 °F (36.7 °C)] 98 °F (36.7 °C)  Pulse:  [62-75] 69  Resp:  [16-18] 18  SpO2:  [95 %-98 %] 98 %  BP: (110-155)/(61-81) 126/68     Weight: 80.3 kg (177 lb 0.5 oz)  Body mass index is 24.69 kg/m².    Intake/Output Summary (Last 24 hours) at 5/7/2023 1438  Last data filed at 5/7/2023 0400  Gross per 24 hour   Intake 970 ml   Output 1400 ml   Net -430 ml         Physical Exam  Vitals and nursing note reviewed.   Constitutional:       General: She is awake. She is not in acute distress.     Appearance: Normal appearance. She is well-developed. She is not ill-appearing.   Eyes:      Conjunctiva/sclera: Conjunctivae normal.   Cardiovascular:      Rate and Rhythm: Normal rate and regular rhythm.      Heart sounds: Normal heart sounds. No murmur heard.    No friction rub. No gallop.   Pulmonary:      Effort: Pulmonary effort is normal. No respiratory distress.      Breath sounds: Normal breath sounds. No wheezing.   Abdominal:      General: Abdomen is flat. Bowel sounds are normal. There is no  distension.      Palpations: Abdomen is soft.      Tenderness: There is no abdominal tenderness.   Musculoskeletal:         General: Swelling (Right ankle) present.      Comments: Ex fix in place to right ankle   Skin:     General: Skin is warm.      Findings: No erythema.   Neurological:      Mental Status: She is alert and oriented to person, place, and time.   Psychiatric:         Mood and Affect: Mood normal.         Behavior: Behavior normal. Behavior is cooperative.         Thought Content: Thought content normal.         Judgment: Judgment normal.           Significant Labs: CBC:   Recent Labs   Lab 05/06/23  0444 05/07/23  0711   WBC 7.30 8.68   HGB 10.8* 11.1*   HCT 33.9* 34.8*    242     CMP:   Recent Labs   Lab 05/06/23  0444 05/07/23  0711    143   K 4.1 3.9    110   CO2 25 28   * 174*   BUN 6* 8   CREATININE 0.7 0.7   CALCIUM 8.8 9.2   ANIONGAP 8 5*     Blood Sugars (AccuCheck):    Recent Labs     05/06/23  0628 05/06/23  1140 05/06/23  1546 05/06/23  2104 05/07/23  0632 05/07/23  1149   POCTGLUCOSE 226* 273* 151* 267* 166* 228*       Significant Imaging: I have reviewed all pertinent imaging results/findings within the past 24 hours.      Assessment/Plan:      * Closed trimalleolar fracture of right ankle with routine healing s/p ex fix on 5/5/2023  Closed dislocation of ankle, right, initial encounter  · Patient with closed trimalleolar fracture and dislocation of distal right fibula and likely posterior malleolus with posterior dislocation.   · Orthopedics consulted and patient taken to OR on 5/5/2023 by Dr. Yanely Guerra and underwent closed reduction of dislocation and ex fix for right ankle fracture.   · Post-op, patient to be non weight bearing to right lower extremity.  · Ice and elevate right ankle to reduce soft tissue swelling as patient will need a second surgery for definitive fixation of fracture once swelling improves.   · Routine pin site care to ex fix as per  Ortho.  · Pain controlled. Continue multimodal pain management with Tylenol 1000 mg po every 8 hours + Tizanidine 2 mg po every 8 hours with Oxy IR 5 mg po every 4 hours prn for breakthrough pain.  · Continue Aspirin 81 mg po BID for DVT prophylaxis.   · PT/OT consulted and recommending IP rehab on discharge when medically ready but need to discus with ortho about surgical timing for her second surgery.     Type 2 diabetes mellitus without complication, without long-term current use of insulin  Patient's FSGs are controlled on current regimen. Patient on Metformin 1000 mg po BID as outpatient to treat.   Last A1c reviewed-   Lab Results   Component Value Date    HGBA1C 7.5 (H) 03/20/2023       Start correctional scale  Low  Maintain anti-hyperglycemic dose as follows-   Antihyperglycemics (From admission, onward)    Start     Stop Route Frequency Ordered    05/06/23 2100  insulin detemir U-100 (Levemir) pen 5 Units         -- SubQ Nightly 05/06/23 1055    05/05/23 1758  insulin aspart U-100 pen 0-5 Units         -- SubQ Before meals & nightly PRN 05/05/23 1659        Hold Oral hypoglycemics while patient is in the hospital.  Diabetic diet.  Target blood sugars 140-180 in hospital.  Monitor blood sugars 4 times daily with meals and at bedtime.     Anemia of chronic disease  · Controlled. Present on admit. Hgb 10.6 on admit. Baseline Hgb 10-11.   · Patient has no signs of active bleeding and hemodynamically stable. Patient is asymptomatic related to anemia.   · Goal is keep Hgb >7 and consider blood transfusion if Hgb < 7 if asymptomatic or < 8 if patient becomes symptomatic.  · Plan is to monitor daily CBC.    Pulmonary emphysema  · Controlled with no signs or symptoms to suggest active COPD exacerbation.   · Patient is not on any inhalers at home such as LAMA, LABA/Inhaled corticosteroid combination or short acting beta agonist at home.   · Patient is not on oxygen at home.   · Plan OOB to chair and encourage IS use  post-op to decrease risk of postoperative pulmonary complications.    Acquired hypothyroidism  Chronic and controlled. Continue Levothyroxine 100 mcg po daily to treat.       Hyperlipidemia with target low density lipoprotein (LDL) cholesterol less than 100 mg/dL  Present on admission. Patient states she is supposed to be on Crestor 20 mg po daily at home but has not been taking recently. Will place on Lipitor 80 mg po daily a Crestor not on formulary.       LILLY (generalized anxiety disorder)  Recurrent major depressive disorder, in full remission   Chronic and controlled. Conitnue home Lexapro 20 mg po daily + Trazodone 150 mg po nightly to treat chronic depression and anxiety.         VTE Risk Mitigation (From admission, onward)         Ordered     Place JUAN hose  Until discontinued         05/05/23 1659     Reason for No Pharmacological VTE Prophylaxis  Once        Question:  Reasons:  Answer:  Risk of Bleeding    05/05/23 1659     IP VTE HIGH RISK PATIENT  Once         05/05/23 1659     Place sequential compression device  Until discontinued         05/05/23 1659                Discharge Planning   GILLES: 5/10/2023     Code Status: Full Code   Is the patient medically ready for discharge?: No    Reason for patient still in hospital (select all that apply): Patient trending condition               Camilla Hernandes MD  Department of Hospital Medicine   [facility] - Surgery

## 2023-05-07 NOTE — NURSING
Nurses Note -- 4 Eyes      5/7/2023   7:24 AM      Skin assessed during: Admit      [] No Altered Skin Integrity Present    []Prevention Measures Documented      [x] Yes- Altered Skin Integrity Present or Discovered   [x] LDA Added if Not in Epic (Describe Wound)   [] New Altered Skin Integrity was Present on Admit and Documented in LDA   [] Wound Image Taken    Wound Care Consulted? Yes    Attending Nurse:  Nikki Ahmadi RN     Second RN/Staff Member:  Cecile Martini RN

## 2023-05-07 NOTE — SUBJECTIVE & OBJECTIVE
Interval History: PT/OT worked with patient yesterday and recommending IP rehab when medically ready but need to coordinate with Ortho to see about timing of her definitive right ankle fixation surgery. Patient's swelling in right ankle is improved. Patient reports 3/10 pain to right ankle. Blood sugars are controlled. Labs reviewed and Hgb stable at 11.1.     Review of Systems   Constitutional:  Negative for fever.   Respiratory:  Negative for cough and shortness of breath.    Cardiovascular:  Negative for chest pain.   Gastrointestinal:  Negative for abdominal pain, diarrhea and nausea.   Musculoskeletal:  Positive for arthralgias (Right ankle) and joint swelling (Right ankle).   Neurological:  Negative for dizziness.   Psychiatric/Behavioral:  Negative for agitation and confusion.    Objective:     Vital Signs (Most Recent):  Temp: 98 °F (36.7 °C) (05/07/23 0723)  Pulse: 69 (05/07/23 1150)  Resp: 18 (05/07/23 1230)  BP: 126/68 (05/07/23 1150)  SpO2: 98 % (05/07/23 1150) on room air Vital Signs (24h Range):  Temp:  [97.3 °F (36.3 °C)-98 °F (36.7 °C)] 98 °F (36.7 °C)  Pulse:  [62-75] 69  Resp:  [16-18] 18  SpO2:  [95 %-98 %] 98 %  BP: (110-155)/(61-81) 126/68     Weight: 80.3 kg (177 lb 0.5 oz)  Body mass index is 24.69 kg/m².    Intake/Output Summary (Last 24 hours) at 5/7/2023 1438  Last data filed at 5/7/2023 0400  Gross per 24 hour   Intake 970 ml   Output 1400 ml   Net -430 ml         Physical Exam  Vitals and nursing note reviewed.   Constitutional:       General: She is awake. She is not in acute distress.     Appearance: Normal appearance. She is well-developed. She is not ill-appearing.   Eyes:      Conjunctiva/sclera: Conjunctivae normal.   Cardiovascular:      Rate and Rhythm: Normal rate and regular rhythm.      Heart sounds: Normal heart sounds. No murmur heard.    No friction rub. No gallop.   Pulmonary:      Effort: Pulmonary effort is normal. No respiratory distress.      Breath sounds: Normal  breath sounds. No wheezing.   Abdominal:      General: Abdomen is flat. Bowel sounds are normal. There is no distension.      Palpations: Abdomen is soft.      Tenderness: There is no abdominal tenderness.   Musculoskeletal:         General: Swelling (Right ankle) present.      Comments: Ex fix in place to right ankle   Skin:     General: Skin is warm.      Findings: No erythema.   Neurological:      Mental Status: She is alert and oriented to person, place, and time.   Psychiatric:         Mood and Affect: Mood normal.         Behavior: Behavior normal. Behavior is cooperative.         Thought Content: Thought content normal.         Judgment: Judgment normal.           Significant Labs: CBC:   Recent Labs   Lab 05/06/23  0444 05/07/23  0711   WBC 7.30 8.68   HGB 10.8* 11.1*   HCT 33.9* 34.8*    242     CMP:   Recent Labs   Lab 05/06/23  0444 05/07/23  0711    143   K 4.1 3.9    110   CO2 25 28   * 174*   BUN 6* 8   CREATININE 0.7 0.7   CALCIUM 8.8 9.2   ANIONGAP 8 5*     Blood Sugars (AccuCheck):    Recent Labs     05/06/23  0628 05/06/23  1140 05/06/23  1546 05/06/23  2104 05/07/23  0632 05/07/23  1149   POCTGLUCOSE 226* 273* 151* 267* 166* 228*       Significant Imaging: I have reviewed all pertinent imaging results/findings within the past 24 hours.

## 2023-05-07 NOTE — SUBJECTIVE & OBJECTIVE
"Principal Problem:Closed trimalleolar fracture of right ankle with routine healing    Principal Orthopedic Problem: same, s/p R ankle ex fix 5/5/23    Interval History: Pt seen and examined at bedside. BENNY TOPETE, AF. Pain controlled. Denies fevers, chills, chest pain, SOB, N/V/D. Reports improvement in numbness and tingling from pre-operative state.      Review of patient's allergies indicates:   Allergen Reactions    Lamictal [lamotrigine] Rash     Per psychiatry notes       Current Facility-Administered Medications   Medication    acetaminophen tablet 1,000 mg    aspirin chewable tablet 81 mg    atorvastatin tablet 80 mg    dextrose 10% bolus 125 mL 125 mL    dextrose 10% bolus 250 mL 250 mL    dextrose 40 % gel 15,000 mg    dextrose 40 % gel 30,000 mg    EScitalopram oxalate tablet 20 mg    glucagon (human recombinant) injection 1 mg    hydrOXYzine pamoate capsule 25 mg    insulin aspart U-100 pen 0-5 Units    insulin detemir U-100 (Levemir) pen 5 Units    levothyroxine tablet 100 mcg    naloxone 0.4 mg/mL injection 0.02 mg    ondansetron disintegrating tablet 8 mg    oxyCODONE immediate release tablet 5 mg    oxyCODONE immediate release tablet Tab 10 mg    polyethylene glycol packet 17 g    potassium chloride CR capsule 40 mEq    senna-docusate 8.6-50 mg per tablet 1 tablet    tiZANidine tablet 2 mg    traZODone tablet 150 mg     Objective:     Vital Signs (Most Recent):  Temp: 97.3 °F (36.3 °C) (05/07/23 0548)  Pulse: 67 (05/07/23 0548)  Resp: 16 (05/07/23 0548)  BP: (!) 155/81 (05/07/23 0548)  SpO2: 97 % (05/07/23 0548)   Vital Signs (24h Range):  Temp:  [97.1 °F (36.2 °C)-97.9 °F (36.6 °C)] 97.3 °F (36.3 °C)  Pulse:  [59-75] 67  Resp:  [16-18] 16  SpO2:  [96 %-98 %] 97 %  BP: (110-157)/(61-81) 155/81     Weight: 80.3 kg (177 lb 0.5 oz)  Height: 5' 11" (180.3 cm)  Body mass index is 24.69 kg/m².      Intake/Output Summary (Last 24 hours) at 5/7/2023 0703  Last data filed at 5/7/2023 0400  Gross per 24 hour "   Intake 1370 ml   Output 1400 ml   Net -30 ml          Ortho/SPM Exam     Gen: NAD, WDWN  CV: peripherally well perfused  Resp: unlabored respirations, symmetric chest rise  Neck: TM  Neuro: CN 2-12 grossly intact. No FND.    MSK:  RLE  SILT  Ex fix in place, pin sites clead/dry/intact  AROM of toes intact  Improved edema from previous    Significant Labs: CBC:   Recent Labs   Lab 05/05/23  1154 05/06/23  0444   WBC 7.38 7.30   HGB 10.6* 10.8*   HCT 31.9* 33.9*    217       CMP:   Recent Labs   Lab 05/05/23  1154 05/06/23  0444    139   K 3.0* 4.1    106   CO2 24 25   * 305*   BUN 3* 6*   CREATININE 0.6 0.7   CALCIUM 8.8 8.8   PROT 6.1  --    ALBUMIN 3.4*  --    BILITOT 0.5  --    ALKPHOS 67  --    AST 20  --    ALT 19  --    ANIONGAP 10 8       All pertinent labs within the past 24 hours have been reviewed.    Significant Imaging: I have reviewed all pertinent imaging results/findings.

## 2023-05-07 NOTE — PLAN OF CARE
[facility] - [facility]  Discharge Assessment    Primary Care Provider: Angélica Barakat MD     Discharge Assessment (most recent)       BRIEF DISCHARGE ASSESSMENT - 05/07/23 9507          Discharge Planning    Assessment Type Discharge Planning Brief Assessment     Resource/Environmental Concerns none     Support Systems Children     Assistance Needed mobility     Equipment Currently Used at Home none     Current Living Arrangements apartment     Patient/Family Anticipates Transition to home     Patient/Family Anticipated Services at Transition durable medical equipment;home health care     DME Needed Upon Discharge  other (see comments)   TBD    Discharge Plan A Rehab     Discharge Plan B Home Health        Physical Activity    On average, how many days per week do you engage in moderate to strenuous exercise (like a brisk walk)? 3 days     On average, how many minutes do you engage in exercise at this level? 30 min        Financial Resource Strain    How hard is it for you to pay for the very basics like food, housing, medical care, and heating? Not hard at all        Housing Stability    In the last 12 months, was there a time when you were not able to pay the mortgage or rent on time? No     In the last 12 months, how many places have you lived? 2     In the last 12 months, was there a time when you did not have a steady place to sleep or slept in a shelter (including now)? No        Transportation Needs    In the past 12 months, has lack of transportation kept you from medical appointments or from getting medications? No     In the past 12 months, has lack of transportation kept you from meetings, work, or from getting things needed for daily living? No        Food Insecurity    Within the past 12 months, you worried that your food would run out before you got the money to buy more. Never true     Within the past 12 months, the food you bought just didn't last and you didn't have money to get more. Never true         Stress    Do you feel stress - tense, restless, nervous, or anxious, or unable to sleep at night because your mind is troubled all the time - these days? Only a little        Social Connections    In a typical week, how many times do you talk on the phone with family, friends, or neighbors? Patient refused     How often do you get together with friends or relatives? Patient refused     How often do you attend Nondenominational or Rastafarian services? Patient refused     Do you belong to any clubs or organizations such as Nondenominational groups, unions, fraternal or athletic groups, or school groups? Patient refused     How often do you attend meetings of the clubs or organizations you belong to? Patient refused     Are you , , , , never , or living with a partner? Patient refused        Alcohol Use    Q1: How often do you have a drink containing alcohol? Patient refused     Q2: How many drinks containing alcohol do you have on a typical day when you are drinking? Patient refused     Q3: How often do you have six or more drinks on one occasion? Patient refused                     SW met with pt at bedside. Pt reported that he address is [address] [place] [address] [address] [address]. Pt reported that she lives alone. Pt reported [facility]. No DME.     PT/OT recs rehab placement for pt. Attempted to discuss with pt but she stated that she is really overwhelmed and needs time to process.     SW/CM to follow-up with pt.    Annamaria Stevenson, JENNY, LCSW  [facility]  Christin [phone]

## 2023-05-08 ENCOUNTER — ANESTHESIA (OUTPATIENT)
Dept: SURGERY | Facility: HOSPITAL | Age: 68
DRG: 494 | End: 2023-05-08
Payer: COMMERCIAL

## 2023-05-08 ENCOUNTER — ANESTHESIA EVENT (OUTPATIENT)
Dept: SURGERY | Facility: HOSPITAL | Age: 68
DRG: 494 | End: 2023-05-08
Payer: COMMERCIAL

## 2023-05-08 LAB
ANION GAP SERPL CALC-SCNC: 9 MMOL/L (ref 8–16)
BASOPHILS # BLD AUTO: 0.04 K/UL (ref 0–0.2)
BASOPHILS NFR BLD: 0.4 % (ref 0–1.9)
BUN SERPL-MCNC: 10 MG/DL (ref 8–23)
CALCIUM SERPL-MCNC: 9.2 MG/DL (ref 8.7–10.5)
CHLORIDE SERPL-SCNC: 107 MMOL/L (ref 95–110)
CO2 SERPL-SCNC: 27 MMOL/L (ref 23–29)
CREAT SERPL-MCNC: 0.7 MG/DL (ref 0.5–1.4)
DIFFERENTIAL METHOD: ABNORMAL
EOSINOPHIL # BLD AUTO: 0.2 K/UL (ref 0–0.5)
EOSINOPHIL NFR BLD: 2.1 % (ref 0–8)
ERYTHROCYTE [DISTWIDTH] IN BLOOD BY AUTOMATED COUNT: 12.5 % (ref 11.5–14.5)
EST. GFR  (NO RACE VARIABLE): >60 ML/MIN/1.73 M^2
GLUCOSE SERPL-MCNC: 141 MG/DL (ref 70–110)
HCT VFR BLD AUTO: 34.7 % (ref 37–48.5)
HGB BLD-MCNC: 10.9 G/DL (ref 12–16)
IMM GRANULOCYTES # BLD AUTO: 0.02 K/UL (ref 0–0.04)
IMM GRANULOCYTES NFR BLD AUTO: 0.2 % (ref 0–0.5)
LYMPHOCYTES # BLD AUTO: 4.2 K/UL (ref 1–4.8)
LYMPHOCYTES NFR BLD: 47.4 % (ref 18–48)
MCH RBC QN AUTO: 30 PG (ref 27–31)
MCHC RBC AUTO-ENTMCNC: 31.4 G/DL (ref 32–36)
MCV RBC AUTO: 96 FL (ref 82–98)
MONOCYTES # BLD AUTO: 0.5 K/UL (ref 0.3–1)
MONOCYTES NFR BLD: 5.7 % (ref 4–15)
NEUTROPHILS # BLD AUTO: 3.9 K/UL (ref 1.8–7.7)
NEUTROPHILS NFR BLD: 44.2 % (ref 38–73)
NRBC BLD-RTO: 0 /100 WBC
PLATELET # BLD AUTO: 252 K/UL (ref 150–450)
PMV BLD AUTO: 10.9 FL (ref 9.2–12.9)
POCT GLUCOSE: 127 MG/DL (ref 70–110)
POCT GLUCOSE: 131 MG/DL (ref 70–110)
POCT GLUCOSE: 132 MG/DL (ref 70–110)
POCT GLUCOSE: 202 MG/DL (ref 70–110)
POCT GLUCOSE: 228 MG/DL (ref 70–110)
POTASSIUM SERPL-SCNC: 4.8 MMOL/L (ref 3.5–5.1)
RBC # BLD AUTO: 3.63 M/UL (ref 4–5.4)
SODIUM SERPL-SCNC: 143 MMOL/L (ref 136–145)
WBC # BLD AUTO: 8.93 K/UL (ref 3.9–12.7)

## 2023-05-08 PROCEDURE — C1713 ANCHOR/SCREW BN/BN,TIS/BN: HCPCS | Performed by: ORTHOPAEDIC SURGERY

## 2023-05-08 PROCEDURE — D9220A PRA ANESTHESIA: Mod: ANES,,, | Performed by: ANESTHESIOLOGY

## 2023-05-08 PROCEDURE — 20694 RMVL EXT FIXJ SYS UNDER ANES: CPT | Mod: 58,51,RT, | Performed by: ORTHOPAEDIC SURGERY

## 2023-05-08 PROCEDURE — D9220A PRA ANESTHESIA: ICD-10-PCS | Mod: ANES,,, | Performed by: ANESTHESIOLOGY

## 2023-05-08 PROCEDURE — 25000003 PHARM REV CODE 250: Performed by: STUDENT IN AN ORGANIZED HEALTH CARE EDUCATION/TRAINING PROGRAM

## 2023-05-08 PROCEDURE — 20694 PR REMOVE EXTERN BONE FIX DEV W ANESTH: ICD-10-PCS | Mod: 58,51,RT, | Performed by: ORTHOPAEDIC SURGERY

## 2023-05-08 PROCEDURE — 99222 1ST HOSP IP/OBS MODERATE 55: CPT | Mod: ,,, | Performed by: NURSE PRACTITIONER

## 2023-05-08 PROCEDURE — D9220A PRA ANESTHESIA: ICD-10-PCS | Mod: CRNA,,, | Performed by: NURSE ANESTHETIST, CERTIFIED REGISTERED

## 2023-05-08 PROCEDURE — 64447 RIGHT ADDUCTOR SINGLE SHOT: ICD-10-PCS | Mod: 59,RT,, | Performed by: SURGERY

## 2023-05-08 PROCEDURE — 80048 BASIC METABOLIC PNL TOTAL CA: CPT | Performed by: STUDENT IN AN ORGANIZED HEALTH CARE EDUCATION/TRAINING PROGRAM

## 2023-05-08 PROCEDURE — 27829 PR OPEN TX DISTAL TIBIOFIBULAR JOINT DISRUPTION: ICD-10-PCS | Mod: 58,51,RT, | Performed by: ORTHOPAEDIC SURGERY

## 2023-05-08 PROCEDURE — 27201423 OPTIME MED/SURG SUP & DEVICES STERILE SUPPLY: Performed by: ORTHOPAEDIC SURGERY

## 2023-05-08 PROCEDURE — 11000001 HC ACUTE MED/SURG PRIVATE ROOM

## 2023-05-08 PROCEDURE — 63600175 PHARM REV CODE 636 W HCPCS: Performed by: STUDENT IN AN ORGANIZED HEALTH CARE EDUCATION/TRAINING PROGRAM

## 2023-05-08 PROCEDURE — 63600175 PHARM REV CODE 636 W HCPCS: Performed by: SURGERY

## 2023-05-08 PROCEDURE — 85025 COMPLETE CBC W/AUTO DIFF WBC: CPT | Performed by: STUDENT IN AN ORGANIZED HEALTH CARE EDUCATION/TRAINING PROGRAM

## 2023-05-08 PROCEDURE — 64450 NJX AA&/STRD OTHER PN/BRANCH: CPT | Mod: 59,RT,, | Performed by: SURGERY

## 2023-05-08 PROCEDURE — 99222 PR INITIAL HOSPITAL CARE,LEVL II: ICD-10-PCS | Mod: ,,, | Performed by: NURSE PRACTITIONER

## 2023-05-08 PROCEDURE — 36000708 HC OR TIME LEV III 1ST 15 MIN: Performed by: ORTHOPAEDIC SURGERY

## 2023-05-08 PROCEDURE — C1769 GUIDE WIRE: HCPCS | Performed by: ORTHOPAEDIC SURGERY

## 2023-05-08 PROCEDURE — 71000033 HC RECOVERY, INTIAL HOUR: Performed by: ORTHOPAEDIC SURGERY

## 2023-05-08 PROCEDURE — 27814 TREATMENT OF ANKLE FRACTURE: CPT | Mod: 58,RT,, | Performed by: ORTHOPAEDIC SURGERY

## 2023-05-08 PROCEDURE — 99233 PR SUBSEQUENT HOSPITAL CARE,LEVL III: ICD-10-PCS | Mod: ,,, | Performed by: INTERNAL MEDICINE

## 2023-05-08 PROCEDURE — 64445 NJX AA&/STRD SCIATIC NRV IMG: CPT | Performed by: STUDENT IN AN ORGANIZED HEALTH CARE EDUCATION/TRAINING PROGRAM

## 2023-05-08 PROCEDURE — 25000003 PHARM REV CODE 250: Performed by: NURSE ANESTHETIST, CERTIFIED REGISTERED

## 2023-05-08 PROCEDURE — 36000709 HC OR TIME LEV III EA ADD 15 MIN: Performed by: ORTHOPAEDIC SURGERY

## 2023-05-08 PROCEDURE — 25000003 PHARM REV CODE 250

## 2023-05-08 PROCEDURE — 71000015 HC POSTOP RECOV 1ST HR: Performed by: ORTHOPAEDIC SURGERY

## 2023-05-08 PROCEDURE — D9220A PRA ANESTHESIA: Mod: CRNA,,, | Performed by: NURSE ANESTHETIST, CERTIFIED REGISTERED

## 2023-05-08 PROCEDURE — 64450 RIGHT POPLITEAL SINGLE SHOT: ICD-10-PCS | Mod: 59,RT,, | Performed by: SURGERY

## 2023-05-08 PROCEDURE — 37000008 HC ANESTHESIA 1ST 15 MINUTES: Performed by: ORTHOPAEDIC SURGERY

## 2023-05-08 PROCEDURE — 64447 NJX AA&/STRD FEMORAL NRV IMG: CPT | Performed by: STUDENT IN AN ORGANIZED HEALTH CARE EDUCATION/TRAINING PROGRAM

## 2023-05-08 PROCEDURE — 27829 TREAT LOWER LEG JOINT: CPT | Mod: 58,51,RT, | Performed by: ORTHOPAEDIC SURGERY

## 2023-05-08 PROCEDURE — 37000009 HC ANESTHESIA EA ADD 15 MINS: Performed by: ORTHOPAEDIC SURGERY

## 2023-05-08 PROCEDURE — 64708 REVISE ARM/LEG NERVE: CPT | Mod: 58,51,RT, | Performed by: ORTHOPAEDIC SURGERY

## 2023-05-08 PROCEDURE — 63600175 PHARM REV CODE 636 W HCPCS: Performed by: ORTHOPAEDIC SURGERY

## 2023-05-08 PROCEDURE — 27814 PR OPEN TREATMENT BIMALLEOLAR ANKLE FRACTURE: ICD-10-PCS | Mod: 58,RT,, | Performed by: ORTHOPAEDIC SURGERY

## 2023-05-08 PROCEDURE — 99233 SBSQ HOSP IP/OBS HIGH 50: CPT | Mod: ,,, | Performed by: INTERNAL MEDICINE

## 2023-05-08 PROCEDURE — 64708 PR NEUROPLASTY OTHER ARM/LEG NERVE,OPEN: ICD-10-PCS | Mod: 58,51,RT, | Performed by: ORTHOPAEDIC SURGERY

## 2023-05-08 PROCEDURE — 36415 COLL VENOUS BLD VENIPUNCTURE: CPT | Performed by: STUDENT IN AN ORGANIZED HEALTH CARE EDUCATION/TRAINING PROGRAM

## 2023-05-08 PROCEDURE — 64447 NJX AA&/STRD FEMORAL NRV IMG: CPT | Mod: 59,RT,, | Performed by: SURGERY

## 2023-05-08 PROCEDURE — 63600175 PHARM REV CODE 636 W HCPCS: Performed by: NURSE ANESTHETIST, CERTIFIED REGISTERED

## 2023-05-08 DEVICE — SCREW CORTEX 2.7 X 16.: Type: IMPLANTABLE DEVICE | Site: ANKLE | Status: FUNCTIONAL

## 2023-05-08 DEVICE — SCREW 2.7X14MM: Type: IMPLANTABLE DEVICE | Site: ANKLE | Status: FUNCTIONAL

## 2023-05-08 DEVICE — IMPLANTABLE DEVICE: Type: IMPLANTABLE DEVICE | Site: ANKLE | Status: FUNCTIONAL

## 2023-05-08 DEVICE — SCREW STRDRV REC T8 2.7X12 SS: Type: IMPLANTABLE DEVICE | Site: ANKLE | Status: FUNCTIONAL

## 2023-05-08 DEVICE — K-WIRE TRCR PT1.6MM DIA 150MM: Type: IMPLANTABLE DEVICE | Site: ANKLE | Status: FUNCTIONAL

## 2023-05-08 DEVICE — PLATE FIBULA LCP 6H RT: Type: IMPLANTABLE DEVICE | Site: ANKLE | Status: FUNCTIONAL

## 2023-05-08 DEVICE — SCREW CRTX LOW PROFILE H 48MM: Type: IMPLANTABLE DEVICE | Site: ANKLE | Status: FUNCTIONAL

## 2023-05-08 DEVICE — WIRE-K 20MM X 150MM.: Type: IMPLANTABLE DEVICE | Site: ANKLE | Status: FUNCTIONAL

## 2023-05-08 RX ORDER — FENTANYL CITRATE 50 UG/ML
INJECTION, SOLUTION INTRAMUSCULAR; INTRAVENOUS
Status: DISCONTINUED | OUTPATIENT
Start: 2023-05-08 | End: 2023-05-08

## 2023-05-08 RX ORDER — CEFAZOLIN SODIUM 1 G/3ML
INJECTION, POWDER, FOR SOLUTION INTRAMUSCULAR; INTRAVENOUS
Status: DISCONTINUED | OUTPATIENT
Start: 2023-05-08 | End: 2023-05-08

## 2023-05-08 RX ORDER — DEXAMETHASONE SODIUM PHOSPHATE 4 MG/ML
INJECTION, SOLUTION INTRA-ARTICULAR; INTRALESIONAL; INTRAMUSCULAR; INTRAVENOUS; SOFT TISSUE
Status: DISCONTINUED | OUTPATIENT
Start: 2023-05-08 | End: 2023-05-08

## 2023-05-08 RX ORDER — CLINDAMYCIN PHOSPHATE 900 MG/50ML
INJECTION, SOLUTION INTRAVENOUS
Status: DISCONTINUED | OUTPATIENT
Start: 2023-05-08 | End: 2023-05-08

## 2023-05-08 RX ORDER — PHENYLEPHRINE HYDROCHLORIDE 10 MG/ML
INJECTION INTRAVENOUS
Status: DISCONTINUED | OUTPATIENT
Start: 2023-05-08 | End: 2023-05-08

## 2023-05-08 RX ORDER — DEXMEDETOMIDINE HYDROCHLORIDE 4 UG/ML
INJECTION, SOLUTION INTRAVENOUS CONTINUOUS PRN
Status: DISCONTINUED | OUTPATIENT
Start: 2023-05-08 | End: 2023-05-08

## 2023-05-08 RX ORDER — ROPIVACAINE HYDROCHLORIDE 5 MG/ML
INJECTION, SOLUTION EPIDURAL; INFILTRATION; PERINEURAL
Status: COMPLETED | OUTPATIENT
Start: 2023-05-08 | End: 2023-05-08

## 2023-05-08 RX ORDER — VANCOMYCIN HYDROCHLORIDE 1 G/20ML
INJECTION, POWDER, LYOPHILIZED, FOR SOLUTION INTRAVENOUS
Status: DISCONTINUED | OUTPATIENT
Start: 2023-05-08 | End: 2023-05-08 | Stop reason: HOSPADM

## 2023-05-08 RX ORDER — HALOPERIDOL 5 MG/ML
0.5 INJECTION INTRAMUSCULAR EVERY 10 MIN PRN
Status: DISCONTINUED | OUTPATIENT
Start: 2023-05-08 | End: 2023-05-08 | Stop reason: HOSPADM

## 2023-05-08 RX ORDER — ROCURONIUM BROMIDE 10 MG/ML
INJECTION, SOLUTION INTRAVENOUS
Status: DISCONTINUED | OUTPATIENT
Start: 2023-05-08 | End: 2023-05-08

## 2023-05-08 RX ORDER — PROPOFOL 10 MG/ML
VIAL (ML) INTRAVENOUS
Status: DISCONTINUED | OUTPATIENT
Start: 2023-05-08 | End: 2023-05-08

## 2023-05-08 RX ORDER — FENTANYL CITRATE 50 UG/ML
25 INJECTION, SOLUTION INTRAMUSCULAR; INTRAVENOUS EVERY 5 MIN PRN
Status: DISCONTINUED | OUTPATIENT
Start: 2023-05-08 | End: 2023-05-08 | Stop reason: HOSPADM

## 2023-05-08 RX ORDER — FENTANYL CITRATE 50 UG/ML
25-200 INJECTION, SOLUTION INTRAMUSCULAR; INTRAVENOUS
Status: DISCONTINUED | OUTPATIENT
Start: 2023-05-08 | End: 2023-05-08 | Stop reason: HOSPADM

## 2023-05-08 RX ORDER — SODIUM CHLORIDE 0.9 % (FLUSH) 0.9 %
3 SYRINGE (ML) INJECTION
Status: DISCONTINUED | OUTPATIENT
Start: 2023-05-08 | End: 2023-05-08 | Stop reason: HOSPADM

## 2023-05-08 RX ORDER — SULFAMETHOXAZOLE AND TRIMETHOPRIM 800; 160 MG/1; MG/1
1 TABLET ORAL 2 TIMES DAILY
Status: DISCONTINUED | OUTPATIENT
Start: 2023-05-08 | End: 2023-05-08

## 2023-05-08 RX ORDER — MIDAZOLAM HYDROCHLORIDE 1 MG/ML
.5-4 INJECTION INTRAMUSCULAR; INTRAVENOUS
Status: DISCONTINUED | OUTPATIENT
Start: 2023-05-08 | End: 2023-05-08 | Stop reason: HOSPADM

## 2023-05-08 RX ORDER — CEFEPIME HYDROCHLORIDE 1 G/50ML
INJECTION, SOLUTION INTRAVENOUS
Status: COMPLETED | OUTPATIENT
Start: 2023-05-08 | End: 2023-05-08

## 2023-05-08 RX ORDER — ONDANSETRON 2 MG/ML
INJECTION INTRAMUSCULAR; INTRAVENOUS
Status: DISCONTINUED | OUTPATIENT
Start: 2023-05-08 | End: 2023-05-08

## 2023-05-08 RX ORDER — LIDOCAINE HYDROCHLORIDE 20 MG/ML
INJECTION INTRAVENOUS
Status: DISCONTINUED | OUTPATIENT
Start: 2023-05-08 | End: 2023-05-08

## 2023-05-08 RX ADMIN — FENTANYL CITRATE 50 MCG: 50 INJECTION, SOLUTION INTRAMUSCULAR; INTRAVENOUS at 01:05

## 2023-05-08 RX ADMIN — DEXMEDETOMIDINE HYDROCHLORIDE 4 MCG/KG/HR: 4 INJECTION, SOLUTION INTRAVENOUS at 04:05

## 2023-05-08 RX ADMIN — ACETAMINOPHEN 1000 MG: 500 TABLET ORAL at 10:05

## 2023-05-08 RX ADMIN — SULFAMETHOXAZOLE AND TRIMETHOPRIM 1 TABLET: 800; 160 TABLET ORAL at 08:05

## 2023-05-08 RX ADMIN — PHENYLEPHRINE HYDROCHLORIDE 100 MCG: 10 INJECTION INTRAVENOUS at 03:05

## 2023-05-08 RX ADMIN — ONDANSETRON 4 MG: 2 INJECTION INTRAMUSCULAR; INTRAVENOUS at 03:05

## 2023-05-08 RX ADMIN — INSULIN DETEMIR 5 UNITS: 100 INJECTION, SOLUTION SUBCUTANEOUS at 11:05

## 2023-05-08 RX ADMIN — SENNOSIDES AND DOCUSATE SODIUM 1 TABLET: 50; 8.6 TABLET ORAL at 09:05

## 2023-05-08 RX ADMIN — PHENYLEPHRINE HYDROCHLORIDE 100 MCG: 10 INJECTION INTRAVENOUS at 02:05

## 2023-05-08 RX ADMIN — FENTANYL CITRATE 100 MCG: 50 INJECTION, SOLUTION INTRAMUSCULAR; INTRAVENOUS at 02:05

## 2023-05-08 RX ADMIN — CEFAZOLIN 2 G: 2 INJECTION, POWDER, FOR SOLUTION INTRAMUSCULAR; INTRAVENOUS at 10:05

## 2023-05-08 RX ADMIN — OXYCODONE HYDROCHLORIDE 10 MG: 10 TABLET ORAL at 05:05

## 2023-05-08 RX ADMIN — CLINDAMYCIN IN 5 PERCENT DEXTROSE 900 MG: 18 INJECTION, SOLUTION INTRAVENOUS at 02:05

## 2023-05-08 RX ADMIN — CEFAZOLIN 2 G: 330 INJECTION, POWDER, FOR SOLUTION INTRAMUSCULAR; INTRAVENOUS at 02:05

## 2023-05-08 RX ADMIN — SODIUM CHLORIDE: 0.9 INJECTION, SOLUTION INTRAVENOUS at 01:05

## 2023-05-08 RX ADMIN — LIDOCAINE HYDROCHLORIDE 80 MG: 20 INJECTION INTRAVENOUS at 02:05

## 2023-05-08 RX ADMIN — ROCURONIUM BROMIDE 40 MG: 10 INJECTION, SOLUTION INTRAVENOUS at 02:05

## 2023-05-08 RX ADMIN — INSULIN ASPART 2 UNITS: 100 INJECTION, SOLUTION INTRAVENOUS; SUBCUTANEOUS at 06:05

## 2023-05-08 RX ADMIN — HYDROXYZINE PAMOATE 25 MG: 25 CAPSULE ORAL at 09:05

## 2023-05-08 RX ADMIN — ROPIVACAINE HYDROCHLORIDE 20 ML: 5 INJECTION EPIDURAL; INFILTRATION; PERINEURAL at 01:05

## 2023-05-08 RX ADMIN — TRAZODONE HYDROCHLORIDE 150 MG: 50 TABLET ORAL at 09:05

## 2023-05-08 RX ADMIN — ESCITALOPRAM OXALATE 20 MG: 20 TABLET ORAL at 08:05

## 2023-05-08 RX ADMIN — PROPOFOL 200 MG: 10 INJECTION, EMULSION INTRAVENOUS at 02:05

## 2023-05-08 RX ADMIN — ASPIRIN 81 MG CHEWABLE TABLET 81 MG: 81 TABLET CHEWABLE at 09:05

## 2023-05-08 RX ADMIN — ROPIVACAINE HYDROCHLORIDE 30 ML: 5 INJECTION, SOLUTION EPIDURAL; INFILTRATION; PERINEURAL at 01:05

## 2023-05-08 RX ADMIN — ROCURONIUM BROMIDE 10 MG: 10 INJECTION, SOLUTION INTRAVENOUS at 02:05

## 2023-05-08 RX ADMIN — DEXAMETHASONE SODIUM PHOSPHATE 4 MG: 4 INJECTION, SOLUTION INTRAMUSCULAR; INTRAVENOUS at 02:05

## 2023-05-08 RX ADMIN — TIZANIDINE 2 MG: 2 TABLET ORAL at 10:05

## 2023-05-08 RX ADMIN — MIDAZOLAM 2 MG: 1 INJECTION INTRAMUSCULAR; INTRAVENOUS at 01:05

## 2023-05-08 RX ADMIN — SUGAMMADEX 150 MG: 100 INJECTION, SOLUTION INTRAVENOUS at 04:05

## 2023-05-08 RX ADMIN — PROPOFOL 20 MG: 10 INJECTION, EMULSION INTRAVENOUS at 04:05

## 2023-05-08 RX ADMIN — ATORVASTATIN CALCIUM 80 MG: 40 TABLET, FILM COATED ORAL at 08:05

## 2023-05-08 RX ADMIN — LEVOTHYROXINE SODIUM 100 MCG: 50 TABLET ORAL at 05:05

## 2023-05-08 RX ADMIN — PROPOFOL 30 MG: 10 INJECTION, EMULSION INTRAVENOUS at 04:05

## 2023-05-08 RX ADMIN — ACETAMINOPHEN 1000 MG: 500 TABLET ORAL at 05:05

## 2023-05-08 RX ADMIN — OXYCODONE HYDROCHLORIDE 10 MG: 10 TABLET ORAL at 08:05

## 2023-05-08 RX ADMIN — TIZANIDINE 2 MG: 2 TABLET ORAL at 05:05

## 2023-05-08 NOTE — PROGRESS NOTES
[facility] - [facility] ([facility])  [facility] Medicine  Progress Note    Patient Name: Alix Patel  MRN: [id number]  Patient Class: IP- Inpatient   Admission Date: 5/5/2023  Length of Stay: 1 days  Attending Physician: Camilla Hernandes MD  Primary Care Provider: Angélica Barakat MD        Subjective:     Principal Problem:Closed trimalleolar fracture of right ankle with routine healing        HPI:  67 y.o.female with major depression, generalized anxiety disorder, COPD not on home oxygen or inhalers, Type 2 diabetes not on home insulin and just takes oral Metformin to treat, hyperlipidemia and previous open left humerus fracture s/p ORIF on 3/21/2023 by Dr. Ousmane Crawford presented to the [facility] ER with complaint of right ankle pain. Patient states pain is sharp/stabbing in the right ankle and is aggravated by any weight bearing. She reports onset of symptoms was 2 day(s) after a fall  while ambulating  at home. Patient reports that she was tripped and fell on a tile on her bathroom on 5/3 and twisted her right ankle. Patient denied head trauma. Patient denied to loss of consciousness, denied syncope, denied chest pain, denied dizziness prior to or after fall. Patient noted immediate pain to right ankle when she tripped and twisted ankle. Patient went to [facility] ED and according to records there was found to have a minimally displaced right distal fibula fracture even though patient told me that she was told she had just sprained her ankle. The ED records from [facility] stated actually they noted a right distal fibula fracture. Patient was placed in a tall walking boot after consultation with Dr. Townsend from Ortho at [facility] with plans for patient to follow-up with his office on 5/5. Patient reports when she got home from the ED the walking boot was not on right and she slipped again and this time she noted severe pain in her right ankle and unable to bear any weight and scooted herself up the stairs  and has pretty much been in bed for past 2 days unable to bear weight to her right ankle ad noted significant swelling to her right ankle due. Due to continued pain and swelling patient decided to come to ED today to get right ankle re-evaluated here at [facility]. X-ray obtained in ER revealed right distal fibula fracture and possible posterior malleolus with dislocation. Orthopedics consulted in ED and plan to take to OR tonight to reduce dislocation and place ex fix to right ankle.   Prior to admission patient's functional mobility was independent with no assistive device for community and home distances. Patient does not have history of gait or balance problems. Patient does have history of previous falls or fractures. Patient does not have previous cardiac history such as MI or CAD. Patient does not have previous history of TIA or stroke. Patient does not have previous history of compensated heart failure. Patient does have history of diabetes but is not insulin requiring. Patient does not have history of advanced kidney disease with creatinine > 2.   Patient reports due to her lack of mobility he has not taken even of her home meds since 5/3.         Overview/Hospital Course:  Patient taken to OR and had reduction of dislocation and ex fix placed on right ankle for fracture by Dr. Yanely Guerra on 5/5/2023. Patient post-op, non-weight bearing to right lower extremity. Patient to elavte and ice right ankle to keep swelling down as will need a second definitive fixation surgery to repair fracture once swelling in ankle improves. Patient placed on Aspirin 81 mg po BID for DVT prophylaxis. Patient to continue multimodal pain management for post-op pain control with scheduled Tylenol 1000 mg po every 8 hours and Tizanidine 2 mg po every 8 hours  with Oxycodone IR prn for breakthrough pain. PT/OT consulted post-op and recommending IP Rehab when medically ready. Patient to go to OR on 5/8 and undergo removal of ex fix and ORIF  of right ankle by Ortho.       Interval History: Patient's right ankle swelling much improved and patient going to OR today for removal of ex fix and ORIF of right ankle fracture by Dr. Johnathan Contreras. Patient in good spirits and happy bout getting the hardware removed and her ankle fixed. Patient reports right ankle pain is controlled. Blood sugars controlled. Labs reviewed and stable.     Review of Systems   Constitutional:  Negative for fever.   Respiratory:  Negative for cough and shortness of breath.    Cardiovascular:  Negative for chest pain.   Gastrointestinal:  Negative for abdominal pain, diarrhea and nausea.   Musculoskeletal:  Positive for arthralgias (Right ankle) and joint swelling (Right ankle).   Neurological:  Negative for dizziness.   Psychiatric/Behavioral:  Negative for agitation and confusion.    Objective:     Vital Signs (Most Recent):  Temp: 97.9 °F (36.6 °C) (05/08/23 1134)  Pulse: 63 (05/08/23 1134)  Resp: 18 (05/08/23 1134)  BP: 134/73 (05/08/23 1134)  SpO2: 97 % (05/08/23 1134) on room air Vital Signs (24h Range):  Temp:  [97.2 °F (36.2 °C)-98.9 °F (37.2 °C)] 97.9 °F (36.6 °C)  Pulse:  [62-73] 63  Resp:  [18-19] 18  SpO2:  [92 %-97 %] 97 %  BP: (118-173)/(65-79) 134/73     Weight: 80.3 kg (177 lb 0.5 oz)  Body mass index is 24.69 kg/m².    Intake/Output Summary (Last 24 hours) at 5/8/2023 1214  Last data filed at 5/8/2023 0016  Gross per 24 hour   Intake --   Output 1200 ml   Net -1200 ml         Physical Exam  Vitals and nursing note reviewed.   Constitutional:       General: She is awake. She is not in acute distress.     Appearance: Normal appearance. She is well-developed. She is not ill-appearing.   Eyes:      Conjunctiva/sclera: Conjunctivae normal.   Cardiovascular:      Rate and Rhythm: Normal rate and regular rhythm.      Heart sounds: Normal heart sounds. No murmur heard.    No friction rub. No gallop.   Pulmonary:      Effort: Pulmonary effort is normal. No respiratory distress.       Breath sounds: Normal breath sounds. No wheezing.   Abdominal:      General: Abdomen is flat. Bowel sounds are normal. There is no distension.      Palpations: Abdomen is soft.      Tenderness: There is no abdominal tenderness.   Musculoskeletal:         General: Swelling (Right ankle) present.      Comments: Ex fix in place to right ankle   Skin:     General: Skin is warm.      Findings: No erythema.   Neurological:      Mental Status: She is alert and oriented to person, place, and time.   Psychiatric:         Mood and Affect: Mood normal.         Behavior: Behavior normal. Behavior is cooperative.         Thought Content: Thought content normal.         Judgment: Judgment normal.           Significant Labs: CBC:   Recent Labs   Lab 05/07/23  0711 05/08/23  0359   WBC 8.68 8.93   HGB 11.1* 10.9*   HCT 34.8* 34.7*    252     CMP:   Recent Labs   Lab 05/07/23  0711 05/08/23  0357    143   K 3.9 4.8    107   CO2 28 27   * 141*   BUN 8 10   CREATININE 0.7 0.7   CALCIUM 9.2 9.2   ANIONGAP 5* 9       Significant Imaging: I have reviewed all pertinent imaging results/findings within the past 24 hours.      Assessment/Plan:      * Closed trimalleolar fracture of right ankle with routine healing s/p ex fix on 5/5/2023  Closed dislocation of ankle, right, initial encounter  · Patient to go back to OR today on 5/8 for removal of ex fix and ORIF of right ankle fracture by Ortho. Patient NPO.   · Patient with closed trimalleolar fracture and dislocation of distal right fibula and likely posterior malleolus with posterior dislocation.   · Orthopedics consulted and patient taken to OR on 5/5/2023 by Dr. Yanely Guerra and underwent closed reduction of dislocation and ex fix for right ankle fracture.   · Non weight bearing to right lower extremity as per Ortho.  · Ice and elevate right ankle to reduce soft tissue swelling.   · Routine pin site care to ex fix as per Ortho for now.  · Pain controlled.  Continue multimodal pain management with Tylenol 1000 mg po every 8 hours + Tizanidine 2 mg po every 8 hours with Oxy IR 5 mg po every 4 hours prn for breakthrough pain.  · Continue Aspirin 81 mg po BID for DVT prophylaxis.   · PT/OT consulted and recommending IP Rehab on discharge when medically ready. Hold therapy today, 5/8 for surgery.     Type 2 diabetes mellitus without complication, without long-term current use of insulin  Patient's FSGs are controlled on current regimen. Patient on Metformin 1000 mg po BID as outpatient to treat.   Last A1c reviewed-   Lab Results   Component Value Date    HGBA1C 7.5 (H) 03/20/2023       Blood Sugars (AccuCheck):  Recent Labs     05/07/23  0632 05/07/23  1149 05/07/23  1551 05/07/23  2031 05/08/23  0534 05/08/23  1131   POCTGLUCOSE 166* 228* 161* 199* 127* 132*     Continue correctional scale  Low  Maintain anti-hyperglycemic dose as follows-   Antihyperglycemics (From admission, onward)    Start     Stop Route Frequency Ordered    05/06/23 2100  insulin detemir U-100 (Levemir) pen 5 Units         -- SubQ Nightly 05/06/23 1055    05/05/23 1758  insulin aspart U-100 pen 0-5 Units         -- SubQ Before meals & nightly PRN 05/05/23 1659        Hold Oral hypoglycemics while patient is in the hospital.  Diabetic diet.  Target blood sugars 140-180 in hospital.  Monitor blood sugars 4 times daily with meals and at bedtime.     Anemia of chronic disease  · Controlled. Present on admit. Hgb 10.6 on admit. Baseline Hgb 10-11.   · Patient has no signs of active bleeding and hemodynamically stable. Patient is asymptomatic related to anemia.   · Goal is keep Hgb >7 and consider blood transfusion if Hgb < 7 if asymptomatic or < 8 if patient becomes symptomatic.  · Plan is to monitor daily CBC.    Pulmonary emphysema  · Controlled with no signs or symptoms to suggest active COPD exacerbation.   · Patient is not on any inhalers at home such as LAMA, LABA/Inhaled corticosteroid combination  or short acting beta agonist at home.   · Patient is not on oxygen at home.   · Plan OOB to chair and encourage IS use post-op to decrease risk of postoperative pulmonary complications.    Acquired hypothyroidism  Chronic and controlled. Continue Levothyroxine 100 mcg po daily to treat.       Hyperlipidemia with target low density lipoprotein (LDL) cholesterol less than 100 mg/dL  Present on admission. Patient states she is supposed to be on Crestor 20 mg po daily at home but has not been taking recently. Will place on Lipitor 80 mg po daily a Crestor not on formulary.       LILLY (generalized anxiety disorder)  Recurrent major depressive disorder, in full remission   Chronic and controlled. Conitnue home Lexapro 20 mg po daily + Trazodone 150 mg po nightly to treat chronic depression and anxiety.         VTE Risk Mitigation (From admission, onward)         Ordered     Place JUAN hose  Until discontinued         05/05/23 1659     Reason for No Pharmacological VTE Prophylaxis  Once        Question:  Reasons:  Answer:  Risk of Bleeding    05/05/23 1659     IP VTE HIGH RISK PATIENT  Once         05/05/23 1659     Place sequential compression device  Until discontinued         05/05/23 1659                Discharge Planning   GILLES: 5/10/2023     Code Status: Full Code   Is the patient medically ready for discharge?: No    Reason for patient still in hospital (select all that apply): Patient trending condition  Discharge Plan A: Rehab after she undergoes definitive fixation surgery.        Camilla Hernandes MD  Department of Hospital Medicine   [facility] Surgery ([facility])

## 2023-05-08 NOTE — PT/OT/SLP PROGRESS
Occupational Therapy      Patient Name:  Alix Patel   MRN:  [id number]    Patient not seen today secondary to  (patient anticipation for surgery today). Will follow-up tomorrow if medically appropriate.    5/8/2023

## 2023-05-08 NOTE — ASSESSMENT & PLAN NOTE
Patient's FSGs are controlled on current regimen. Patient on Metformin 1000 mg po BID as outpatient to treat.   Last A1c reviewed-   Lab Results   Component Value Date    HGBA1C 7.5 (H) 03/20/2023       Blood Sugars (AccuCheck):  Recent Labs     05/07/23  0632 05/07/23  1149 05/07/23  1551 05/07/23  2031 05/08/23  0534 05/08/23  1131   POCTGLUCOSE 166* 228* 161* 199* 127* 132*     Continue correctional scale  Low  Maintain anti-hyperglycemic dose as follows-   Antihyperglycemics (From admission, onward)    Start     Stop Route Frequency Ordered    05/06/23 2100  insulin detemir U-100 (Levemir) pen 5 Units         -- SubQ Nightly 05/06/23 1055    05/05/23 1758  insulin aspart U-100 pen 0-5 Units         -- SubQ Before meals & nightly PRN 05/05/23 1659        Hold Oral hypoglycemics while patient is in the hospital.  Diabetic diet.  Target blood sugars 140-180 in hospital.  Monitor blood sugars 4 times daily with meals and at bedtime.

## 2023-05-08 NOTE — SUBJECTIVE & OBJECTIVE
Interval History: Patient's right ankle swelling much improved and patient going to OR today for removal of ex fix and ORIF of right ankle fracture by Dr. Johnathan Contreras. Patient in good spirits and happy bout getting the hardware removed and her ankle fixed. Patient reports right ankle pain is controlled. Blood sugars controlled. Labs reviewed and stable.     Review of Systems   Constitutional:  Negative for fever.   Respiratory:  Negative for cough and shortness of breath.    Cardiovascular:  Negative for chest pain.   Gastrointestinal:  Negative for abdominal pain, diarrhea and nausea.   Musculoskeletal:  Positive for arthralgias (Right ankle) and joint swelling (Right ankle).   Neurological:  Negative for dizziness.   Psychiatric/Behavioral:  Negative for agitation and confusion.    Objective:     Vital Signs (Most Recent):  Temp: 97.9 °F (36.6 °C) (05/08/23 1134)  Pulse: 63 (05/08/23 1134)  Resp: 18 (05/08/23 1134)  BP: 134/73 (05/08/23 1134)  SpO2: 97 % (05/08/23 1134) on room air Vital Signs (24h Range):  Temp:  [97.2 °F (36.2 °C)-98.9 °F (37.2 °C)] 97.9 °F (36.6 °C)  Pulse:  [62-73] 63  Resp:  [18-19] 18  SpO2:  [92 %-97 %] 97 %  BP: (118-173)/(65-79) 134/73     Weight: 80.3 kg (177 lb 0.5 oz)  Body mass index is 24.69 kg/m².    Intake/Output Summary (Last 24 hours) at 5/8/2023 1214  Last data filed at 5/8/2023 0016  Gross per 24 hour   Intake --   Output 1200 ml   Net -1200 ml         Physical Exam  Vitals and nursing note reviewed.   Constitutional:       General: She is awake. She is not in acute distress.     Appearance: Normal appearance. She is well-developed. She is not ill-appearing.   Eyes:      Conjunctiva/sclera: Conjunctivae normal.   Cardiovascular:      Rate and Rhythm: Normal rate and regular rhythm.      Heart sounds: Normal heart sounds. No murmur heard.    No friction rub. No gallop.   Pulmonary:      Effort: Pulmonary effort is normal. No respiratory distress.      Breath sounds: Normal  breath sounds. No wheezing.   Abdominal:      General: Abdomen is flat. Bowel sounds are normal. There is no distension.      Palpations: Abdomen is soft.      Tenderness: There is no abdominal tenderness.   Musculoskeletal:         General: Swelling (Right ankle) present.      Comments: Ex fix in place to right ankle   Skin:     General: Skin is warm.      Findings: No erythema.   Neurological:      Mental Status: She is alert and oriented to person, place, and time.   Psychiatric:         Mood and Affect: Mood normal.         Behavior: Behavior normal. Behavior is cooperative.         Thought Content: Thought content normal.         Judgment: Judgment normal.           Significant Labs: CBC:   Recent Labs   Lab 05/07/23  0711 05/08/23  0359   WBC 8.68 8.93   HGB 11.1* 10.9*   HCT 34.8* 34.7*    252     CMP:   Recent Labs   Lab 05/07/23  0711 05/08/23  0357    143   K 3.9 4.8    107   CO2 28 27   * 141*   BUN 8 10   CREATININE 0.7 0.7   CALCIUM 9.2 9.2   ANIONGAP 5* 9       Significant Imaging: I have reviewed all pertinent imaging results/findings within the past 24 hours.

## 2023-05-08 NOTE — ASSESSMENT & PLAN NOTE
Closed dislocation of ankle, right, initial encounter  · Patient to go back to OR today on 5/8 for removal of ex fix and ORIF of right ankle fracture by Ortho. Patient NPO.   · Patient with closed trimalleolar fracture and dislocation of distal right fibula and likely posterior malleolus with posterior dislocation.   · Orthopedics consulted and patient taken to OR on 5/5/2023 by Dr. Yanely Guerra and underwent closed reduction of dislocation and ex fix for right ankle fracture.   · Non weight bearing to right lower extremity as per Ortho.  · Ice and elevate right ankle to reduce soft tissue swelling.   · Routine pin site care to ex fix as per Ortho for now.  · Pain controlled. Continue multimodal pain management with Tylenol 1000 mg po every 8 hours + Tizanidine 2 mg po every 8 hours with Oxy IR 5 mg po every 4 hours prn for breakthrough pain.  · Continue Aspirin 81 mg po BID for DVT prophylaxis.   · PT/OT consulted and recommending IP Rehab on discharge when medically ready. Hold therapy today, 5/8 for surgery.

## 2023-05-08 NOTE — PLAN OF CARE
05/08/23 0825   Post-Acute Status   Post-Acute Authorization Placement   Post-Acute Placement Status Referrals Sent   Discharge Plan   Discharge Plan A Rehab   Discharge Plan B Rehab     Rehab referrals sent to [facility], Post-Acute medical, [facility] and [facility]. GILLES 5/10/23. Will continue to follow for needs.    Lydia Birmingham RNCM  Case Management  [facility]  [phone]

## 2023-05-08 NOTE — TRANSFER OF CARE
"Anesthesia Transfer of Care Note    Patient: Alix Patel    Procedure(s) Performed: Procedure(s) (LRB):  ORIF, ANKLE - RIGHT. EX FIX REMOVAL. SYNTHES. C ARM DOOR SIDE.  BONE FOAM. (Right)  FIXATION, SYNDESMOSIS, ANKLE (Right)    Patient location: PACU    Anesthesia Type: general    Transport from OR: Transported from OR on 6-10 L/min O2 by face mask with adequate spontaneous ventilation    Post pain: adequate analgesia    Post assessment: no apparent anesthetic complications    Post vital signs: stable    Level of consciousness: awake    Nausea/Vomiting: no nausea/vomiting    Complications: none    Transfer of care protocol was followed      Last vitals:   Visit Vitals  /79   Pulse 81   Temp 36.6 °C (97.9 °F) (Temporal)   Resp 18   Ht 5' 11" (1.803 m)   Wt 80.3 kg (177 lb 0.5 oz)   SpO2 100%   Breastfeeding No   BMI 24.69 kg/m²     "

## 2023-05-08 NOTE — ANESTHESIA PROCEDURE NOTES
Right popliteal single shot    Patient location during procedure: pre-op   Block not for primary anesthetic.  Reason for block: at surgeon's request and post-op pain management   Post-op Pain Location: Right ankle pain   Start time: 5/8/2023 1:39 PM  Timeout: 5/8/2023 1:38 PM   End time: 5/8/2023 1:50 PM    Staffing  Authorizing Provider: Andriy Watson MD  Performing Provider: Baltazar Murrieta MD    Preanesthetic Checklist  Completed: patient identified, IV checked, site marked, risks and benefits discussed, surgical consent, monitors and equipment checked, pre-op evaluation and timeout performed  Peripheral Block  Patient position: supine  Prep: ChloraPrep  Patient monitoring: heart rate, cardiac monitor, continuous pulse ox, continuous capnometry and frequent blood pressure checks  Block type: popliteal  Laterality: right  Injection technique: single shot  Needle  Needle type: Stimuplex   Needle gauge: 21 G  Needle length: 4 in  Needle localization: anatomical landmarks and ultrasound guidance   -ultrasound image captured on disc.  Assessment  Injection assessment: negative aspiration, negative parasthesia and local visualized surrounding nerve  Paresthesia pain: none  Heart rate change: no  Slow fractionated injection: yes  Pain Tolerance: comfortable throughout block and no complaints  Medications:    Medications: ropivacaine (NAROPIN) injection 0.5% - Perineural   30 mL - 5/8/2023 1:50:00 PM    Additional Notes  VSS.  [facility] RN monitoring vitals throughout procedure.  Patient tolerated procedure well.

## 2023-05-08 NOTE — ANESTHESIA POSTPROCEDURE EVALUATION
Anesthesia Post Evaluation    Patient: Alix Patel    Procedure(s) Performed: Procedure(s) (LRB):  ORIF, ANKLE - RIGHT. EX FIX REMOVAL. SYNTHES. C ARM DOOR SIDE.  BONE FOAM. (Right)  FIXATION, SYNDESMOSIS, ANKLE (Right)    Final Anesthesia Type: general      Patient location during evaluation: PACU  Patient participation: Yes- Able to Participate  Level of consciousness: awake and alert and oriented  Post-procedure vital signs: reviewed and stable  Pain management: adequate  Airway patency: patent    PONV status at discharge: No PONV  Anesthetic complications: no      Cardiovascular status: blood pressure returned to baseline  Respiratory status: unassisted, room air and spontaneous ventilation  Hydration status: euvolemic  Follow-up not needed.          Vitals Value Taken Time   BP 99/61 05/08/23 1702   Temp 36.6 °C (97.9 °F) 05/08/23 1653   Pulse 74 05/08/23 1702   Resp 17 05/08/23 1702   SpO2 97 % 05/08/23 1702   Vitals shown include unvalidated device data.      No case tracking events are documented in the log.      Pain/Andrea Score: Pain Rating Prior to Med Admin: 0 (5/8/2023  1:55 PM)  Pain Rating Post Med Admin: 0 (5/8/2023  1:55 PM)  Andrea Score: 9 (5/8/2023  1:55 PM)

## 2023-05-08 NOTE — ANESTHESIA PREPROCEDURE EVALUATION
[facility]  Anesthesia Pre-Operative Evaluation         Patient Name: Alix Patel  YOB: 1955  MRN: [id number]    SUBJECTIVE:     05/08/2023    Pre-operative evaluation for Procedure(s) (LRB):  ORIF, ANKLE - RIGHT. EX FIX REMOVAL. SYNTHES. C ARM DOOR SIDE.  BONE FOAM. (Right)    Alix Patel is a 67 y.o. female with T2DM, HLD, COPD, GERD, LILLY, and MDD who presents for the above procedure.  Patient NPO since midnight.       LDA:        Peripheral IV - Single Lumen 05/05/23 0956 20 G Right Forearm (Active)   Site Assessment Clean;Dry;Intact 05/07/23 2000   Extremity Assessment Distal to IV No abnormal discoloration 05/07/23 2000   Line Status Saline locked 05/07/23 2000   Dressing Status Clean;Dry 05/07/23 2000   Dressing Intervention Integrity maintained 05/07/23 2000   Reason Not Rotated Not due 05/06/23 0745   Number of days: 3            Peripheral IV - Single Lumen 05/05/23 1053 20 G Anterior;Distal;Right Forearm (Active)   Site Assessment Clean;Dry;Intact 05/07/23 2000   Extremity Assessment Distal to IV No abnormal discoloration 05/07/23 2000   Line Status Saline locked 05/07/23 2000   Dressing Status Clean;Dry;Intact 05/07/23 2000   Dressing Intervention Integrity maintained 05/07/23 2000   Reason Not Rotated Not due 05/06/23 2000   Number of days: 3       Previous airway:   Easy mask, DL with Goel 2, Grade 1 view, 7.0 ETT secured at 21 cm at the lip.       Drips:   None documented.      Patient Active Problem List   Diagnosis    Muscle weakness of lower extremity    Recurrent major depressive disorder, in full remission    Bipolar disorder    Hyperlipidemia with target low density lipoprotein (LDL) cholesterol less than 100 mg/dL    Thyroid nodule    Episodic tension-type headache, not intractable    Weakness    Acquired hypothyroidism    Muscle spasm    Voice disturbance    Vocal fold edema    Hyperfunctional dysphonia    GERD (gastroesophageal  reflux disease)    Episodic lightheadedness    Memory disorder    LILLY (generalized anxiety disorder)    Poor posture    Closed nondisplaced fracture of base of fifth metacarpal bone of left hand with routine healing    Left hand pain    Type 2 diabetes mellitus without complication, without long-term current use of insulin    Disorder of peripheral nerve of upper extremity    Incontinence of feces    Primary insomnia    Pulmonary emphysema    TMJ pain dysfunction syndrome    Vitamin D deficiency    Grade I open displaced transverse fracture of shaft of left humerus    Fall    Closed trimalleolar fracture of right ankle with routine healing s/p ex fix on 5/5/2023    Anemia of chronic disease    Closed trimalleolar fracture of ankle, right, initial encounter    Closed dislocation of ankle, right, initial encounter       Review of patient's allergies indicates:   Allergen Reactions    Lamictal [lamotrigine] Rash     Per psychiatry notes       Current Inpatient Medications:   acetaminophen  1,000 mg Oral Q8H    aspirin  81 mg Oral BID    atorvastatin  80 mg Oral Daily    EScitalopram oxalate  20 mg Oral Daily    hydrOXYzine pamoate  25 mg Oral QHS    insulin detemir U-100  5 Units Subcutaneous QHS    levothyroxine  100 mcg Oral Before breakfast    polyethylene glycol  17 g Oral Daily    senna-docusate 8.6-50 mg  1 tablet Oral BID    sulfamethoxazole-trimethoprim 800-160mg  1 tablet Oral BID    tiZANidine  2 mg Oral Q8H    traZODone  150 mg Oral Nightly       No current facility-administered medications on file prior to encounter.     Current Outpatient Medications on File Prior to Encounter   Medication Sig Dispense Refill    aspirin (ECOTRIN) 81 MG EC tablet Take 1 tablet by mouth 3 (three) times a week.      diphenhydrAMINE-acetaminophen (TYLENOL PM)  mg Tab Take 2 tablets by mouth nightly as needed (SLEEP).      dulaglutide (TRULICITY) 0.75 mg/0.5 mL pen injector Inject 0.75  mg into the skin every Sunday.      EScitalopram oxalate (LEXAPRO) 20 MG tablet Take 1 tablet (20 mg total) by mouth once daily. 90 tablet 3    hydrOXYzine pamoate (VISTARIL) 25 MG Cap Take 1 to 2 caps q hs prn difficulty sleeping 30 capsule 3    ibuprofen (ADVIL,MOTRIN) 800 MG tablet Take 1 tablet (800 mg total) by mouth 3 (three) times daily as needed for Pain. 30 tablet 0    levothyroxine (SYNTHROID) 100 MCG tablet Take 1 tablet (100 mcg total) by mouth once daily. 30 tablet 1    metFORMIN (GLUCOPHAGE) 500 MG tablet Take 2 tablets (1,000 mg total) by mouth 2 (two) times daily with meals. 120 tablet 1    oxyCODONE (ROXICODONE) 5 MG immediate release tablet Take 1 tablet (5 mg total) by mouth every 8 (eight) hours as needed for Pain. 21 tablet 0    tiZANidine (ZANAFLEX) 2 MG tablet Take 1 tablet (2 mg total) by mouth every 8 (eight) hours as needed (muscle spams). 21 tablet 0    traZODone (DESYREL) 150 MG tablet TAKE 1 TABLET(150 MG) BY MOUTH EVERY EVENING 90 tablet 2    zolpidem (AMBIEN) 10 mg Tab Take 1 tablet (10 mg total) by mouth nightly as needed. 30 tablet 2    blood sugar diagnostic (TRUE METRIX GLUCOSE TEST STRIP MISC) True Metrix Glucose Test Strip   TEST TWICE DAILY AS DIRECTED      blood-glucose meter (TRUE METRIX AIR GLUCOSE METER MISC) True Metrix Air Glucose Meter   USE UNDER THE SKIN TWICE DAILY AS DIRECTED         Past Surgical History:   Procedure Laterality Date    APPENDECTOMY      BREAST BIOPSY      COLONOSCOPY      COLONOSCOPY N/A 10/31/2019    Procedure: COLONOSCOPY;  Surgeon: Philippe Monteiro MD;  Location: [facility] ([address] [place] [address]);  Service: Endoscopy;  Laterality: N/A;  PM prep-    CYST REMOVAL      ESOPHAGOGASTRODUODENOSCOPY N/A 10/31/2019    Procedure: EGD (ESOPHAGOGASTRODUODENOSCOPY);  Surgeon: Philippe Monteiro MD;  Location: [facility] ([address] [place] [address]);  Service: Endoscopy;  Laterality: N/A;  Pm prep-    HYSTERECTOMY      IRRIGATION AND DEBRIDEMENT OF UPPER EXTREMITY Left  3/21/2023    Procedure: IRRIGATION AND DEBRIDEMENT, UPPER EXTREMITY;  Surgeon: Ousmane Crawford MD;  [facility];  Service: Orthopedics;  Laterality: Left;    ORIF HUMERUS FRACTURE Left 3/21/2023    Procedure: ORIF, FRACTURE, HUMERUS;  Surgeon: Ousmane Crawford MD;  [facility];  Service: Orthopedics;  Laterality: Left;       Social History     Socioeconomic History    Marital status:      Spouse name: N/A    Number of children: 1    Years of education: 13    Highest education level: Some college, no degree   Occupational History    Occupation:      Employer: [employer]   Tobacco Use    Smoking status: Former     Packs/day: 0.50     Years: 34.00     Pack years: 17.00     Types: Cigarettes     Start date:      Quit date:      Years since quittin.3     Passive exposure: Never    Smokeless tobacco: Never   Substance and Sexual Activity    Alcohol use: Yes     Comment: Rare    Drug use: Not Currently     Types: Marijuana, Cocaine    Sexual activity: Not Currently     Partners: Male     Birth control/protection: None   Social History Narrative    Ms. Patel was born in [place] on 1955. She grew up in [place] and was raised by her parents. She went to high school at [employer] and graduated in . After completing high school, Ms. Patel attended [employer] but did not graduate. 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 She would like to return to [employer] in the future to get degree in music.      Social Determinants of Health     Financial Resource Strain: Low Risk     Difficulty of Paying Living Expenses: Not hard at all   Food Insecurity: No Food Insecurity    Worried About Running Out of Food in the Last Year: Never true    Ran Out of Food in the Last Year: Never true   Transportation Needs: No Transportation Needs     Lack of Transportation (Medical): No    Lack of Transportation (Non-Medical): No   Physical Activity: Insufficiently Active    Days of Exercise per Week: 3 days    Minutes of Exercise per Session: 30 min   Stress: No Stress Concern Present    Feeling of Stress : Only a little   Social Connections: Unknown    Frequency of Communication with Friends and Family: Patient refused    Frequency of Social Gatherings with Friends and Family: Patient refused    Attends Anglican Services: Patient refused    Active Member of Clubs or Organizations: Patient refused    Attends Club or Organization Meetings: Patient refused    Marital Status: Patient refused   Housing Stability: Low Risk     Unable to Pay for Housing in the Last Year: No    Number of Places Lived in the Last Year: 2    Unstable Housing in the Last Year: No       OBJECTIVE:     Vital Signs Range (Last 24H):  Temp:  [36.2 °C (97.2 °F)-37.2 °C (98.9 °F)]   Pulse:  [62-73]   Resp:  [18-19]   BP: (118-173)/(65-79)   SpO2:  [92 %-97 %]       Significant Labs:  Lab Results   Component Value Date    WBC 8.93 05/08/2023    HGB 10.9 (L) 05/08/2023    HCT 34.7 (L) 05/08/2023     05/08/2023    CHOL 245 (H) 03/21/2023    TRIG 112 03/21/2023    HDL 36 (L) 03/21/2023    ALT 19 05/05/2023    AST 20 05/05/2023     05/08/2023    K 4.8 05/08/2023     05/08/2023    CREATININE 0.7 05/08/2023    BUN 10 05/08/2023    CO2 27 05/08/2023    TSH 3.070 03/21/2023    INR 1.1 03/20/2023    HGBA1C 7.5 (H) 03/20/2023         Diagnostic Studies:  No relevant studies.      Cardiac Studies:  EKG:   No recent studies available.    2D Echo:  No results found for this or any previous visit.      ASSESSMENT/PLAN:     Pre-op Assessment    I have reviewed the Patient Summary Reports.     I have reviewed the Nursing Notes. I have reviewed the NPO Status.   I have reviewed the Medications.     Review of Systems  Anesthesia Hx:  No problems with previous Anesthesia   History of prior surgery of interest to airway management or planning: Previous anesthesia: General  Denies Personal Hx of Anesthesia complications.   Cardiovascular:   Hypertension hyperlipidemia    Pulmonary:   COPD    Hepatic/GI:   GERD    Neurological:   Neuromuscular Disease, Headaches    Endocrine:   Diabetes Hypothyroidism    Psych:   Psychiatric History          Physical Exam  General: Alert    Airway:  Mallampati: III / II  Mouth Opening: Normal  TM Distance: Normal  Tongue: Normal  Neck ROM: Normal ROM    Dental:  Periodontal disease    Chest/Lungs:  Normal Respiratory Rate    Heart:  Rate: Normal        Anesthesia Plan  Type of Anesthesia, risks & benefits discussed:    Anesthesia Type: Gen ETT, Gen Natural Airway, MAC  Intra-op Monitoring Plan: Standard ASA Monitors  Post Op Pain Control Plan: multimodal analgesia, peripheral nerve block and IV/PO Opioids PRN  Induction:  IV  Airway Plan: Direct and Video, Post-Induction  Informed Consent: Informed consent signed with the Patient and all parties understand the risks and agree with anesthesia plan.  All questions answered.   ASA Score: 3  Day of Surgery Review of History & Physical: H&P Update referred to the surgeon/provider.    Ready For Surgery From Anesthesia Perspective.     .

## 2023-05-08 NOTE — PROGRESS NOTES
[facility]  Orthopedics  Progress Note    Attg Note:  Patient seen and examined.  I agree with the resident's assessment and plan.  Soft tissue status amenable to definitive fixation.  To OR for ORIF.  The risks, benefits and alternatives to surgery were discussed with the patient at great length.  These include bleeding, infection, vessel/nerve damage, pain, numbness, tingling, complex regional pain syndrome, hardware/surgical failure, need for further surgery, malunion, nonunion, DVT, PE, arthritis and death.  Patient states an understanding and wishes to proceed with surgery.   All questions were answered.  No guarantees were implied or stated.  Informed consent was obtained.      Johnathan Contreras MD      Patient Name: Alix Patel  MRN: [id number]  Admission Date: 5/5/2023  Hospital Length of Stay: 1 days  Attending Provider: Camilla Hernandes MD  Primary Care Provider: Angélica Barakat MD  Follow-up For: Procedure(s) (LRB):  APPLICATION, EXTERNAL FIXATION DEVICE, FOR ANKLE FRACTURE - right, mariusz, ancef, bone foam, slider, C arm door side (Right)    Post-Operative Day: 3 Days Post-Op  Subjective:     Principal Problem:Closed trimalleolar fracture of right ankle with routine healing    Principal Orthopedic Problem: same, s/p R ankle ex fix 5/5/23    Interval History: Pt seen and examined at bedside. CELINA TOPETE wnl. Pain w ell controlled right ankle. Elevating. PMR saw her yesterday for IPR placement yesterday      Review of patient's allergies indicates:   Allergen Reactions    Lamictal [lamotrigine] Rash     Per psychiatry notes       Current Facility-Administered Medications   Medication    acetaminophen tablet 1,000 mg    aspirin chewable tablet 81 mg    atorvastatin tablet 80 mg    dextrose 10% bolus 125 mL 125 mL    dextrose 10% bolus 250 mL 250 mL    dextrose 40 % gel 15,000 mg    dextrose 40 % gel 30,000 mg    EScitalopram oxalate tablet 20 mg    glucagon (human recombinant) injection 1  "mg    hydrOXYzine pamoate capsule 25 mg    insulin aspart U-100 pen 0-5 Units    insulin detemir U-100 (Levemir) pen 5 Units    levothyroxine tablet 100 mcg    naloxone 0.4 mg/mL injection 0.02 mg    ondansetron disintegrating tablet 8 mg    oxyCODONE immediate release tablet 5 mg    oxyCODONE immediate release tablet Tab 10 mg    polyethylene glycol packet 17 g    senna-docusate 8.6-50 mg per tablet 1 tablet    tiZANidine tablet 2 mg    traZODone tablet 150 mg     Objective:     Vital Signs (Most Recent):  Temp: 98.6 °F (37 °C) (05/08/23 0557)  Pulse: 66 (05/08/23 0557)  Resp: 18 (05/08/23 0557)  BP: (!) 173/79 (05/08/23 0557)  SpO2: (!) 92 % (05/08/23 0557)   Vital Signs (24h Range):  Temp:  [97.2 °F (36.2 °C)-98.9 °F (37.2 °C)] 98.6 °F (37 °C)  Pulse:  [62-73] 66  Resp:  [18-19] 18  SpO2:  [92 %-98 %] 92 %  BP: (118-173)/(65-79) 173/79     Weight: 80.3 kg (177 lb 0.5 oz)  Height: 5' 11" (180.3 cm)  Body mass index is 24.69 kg/m².      Intake/Output Summary (Last 24 hours) at 5/8/2023 0634  Last data filed at 5/8/2023 0016  Gross per 24 hour   Intake --   Output 1200 ml   Net -1200 ml         Ortho/SPM Exam     Gen: NAD, WDWN  CV: peripherally well perfused  Resp: unlabored respirations, symmetric chest rise  Neck: TM  Neuro: CN 2-12 grossly intact. No FND.    RLE  Ex fix in place, pin sites clead/dry/intact  Mild swelling of ankle, skin wrinkles  Wiggles toes, SILT all distributions  Dp pulse 2+    LUE  Incisions well healed  Intact all distiributions motor and  sensation including radial nerve  Hand wwp    Significant Labs: CBC:   Recent Labs   Lab 05/07/23  0711   WBC 8.68   HGB 11.1*   HCT 34.8*          CMP:   Recent Labs   Lab 05/07/23  0711      K 3.9      CO2 28   *   BUN 8   CREATININE 0.7   CALCIUM 9.2   ANIONGAP 5*       All pertinent labs within the past 24 hours have been reviewed.    Significant Imaging: I have reviewed all pertinent imaging " results/findings.    Assessment/Plan:     * Closed trimalleolar fracture of right ankle with routine healing s/p ex fix on 5/5/2023  Alix Patel is a 67 y.o. female sp recent left grade 1 open humerus fracture treated wt intramedullary nail 3/2023 now fell 5/3/23 and has right trimall ankle fx sp    ex fix 5/5/23.    Soft tissues may be amendable to surgery today, will confirm w staff. Npo for possible OR today      - NWB RLE in ex fix, elevate, ice  - Pain control: MM  - DVTppx: ASA 81 mg BID  - Pin site care q shift  - bactrim while ex fix in place                    Marisela Davis MD  Orthopedics  [facility] - [facility]

## 2023-05-08 NOTE — ANESTHESIA PROCEDURE NOTES
Right adductor single shot    Patient location during procedure: pre-op   Block not for primary anesthetic.  Reason for block: at surgeon's request and post-op pain management   Post-op Pain Location: Right ankle pain   Start time: 5/8/2023 1:39 PM  Timeout: 5/8/2023 1:38 PM   End time: 5/8/2023 1:50 PM    Staffing  Authorizing Provider: Andriy Watson MD  Performing Provider: Baltazar Murrieta MD    Preanesthetic Checklist  Completed: patient identified, IV checked, site marked, risks and benefits discussed, surgical consent, monitors and equipment checked, pre-op evaluation and timeout performed  Peripheral Block  Patient position: supine  Prep: ChloraPrep  Patient monitoring: heart rate, cardiac monitor, continuous pulse ox, continuous capnometry and frequent blood pressure checks  Block type: adductor canal  Laterality: right  Injection technique: single shot  Needle  Needle type: Stimuplex   Needle gauge: 21 G  Needle length: 4 in  Needle localization: anatomical landmarks and ultrasound guidance   -ultrasound image captured on disc.  Assessment  Injection assessment: negative aspiration, negative parasthesia and local visualized surrounding nerve  Paresthesia pain: none  Heart rate change: no  Slow fractionated injection: yes  Pain Tolerance: no complaints and comfortable throughout block  Medications:    Medications: ropivacaine (NAROPIN) injection 0.5% - Perineural   20 mL - 5/8/2023 1:50:00 PM    Additional Notes  VSS.  [facility] RN monitoring vitals throughout procedure.  Patient tolerated procedure well.

## 2023-05-08 NOTE — PT/OT/SLP PROGRESS
Physical Therapy      Patient Name:  Alix Patel   MRN:  [id number]    Patient not seen today secondary to patient declined session due to waiting to go to surgery. Will follow-up again tomorrow after surgery.

## 2023-05-08 NOTE — OP NOTE
OP NOTE    DOS:  05/08/2023    Preop Dx: Right trimalleolar ankle fracture dislocation status post external fixation    Right ankle syndesmosis disruption    Postop Dx: Right trimalleolar ankle fracture dislocation status post external fixation    Right ankle syndesmosis disruption    Procedure: Open reduction internal fixation right trimalleolar ankle fracture lateral and posterior malleoli - 18253    Open treatment right ankle syndesmosis disruption with reduction and internal fixation - 50155    Neurolysis right superficial peroneal nerve - 38617    Removal, under anesthesia, of external fixation right ankle - 11124    Surgeon: Johnathan Contreras M.D.    Asst:  Noah Davis M.D    Anesthesia: General plus regional    EBL:  20 cc    IVF:  1500 cc crystalloid    Implants: Synthes locking fibular plate.  4.0 mm cannulated screw x1.  Syndesmosis screw x1.    Specimens: None    Findings: Stable fixation.  Stable to valgus tilt    Dispo:  To PACU extubated/stable       Indications for Procedure:      67-year-old female sustained a right ankle fracture dislocation which was 2 days out when she presented to our ER.  She was taken back that time for closed reduction and spanning external fixation.  She is been iced and elevated and is now has soft tissue status amenable to definitive fixation.  The risks, benefits and alternatives to surgery discussed at length.  Informed consent was obtained.    Procedure in Detail:    Patient was identified the preoperative holding area and the site was marked.  Regional analgesia was administered.  Patient was wheeled to the operating room and placed on the operating table in supine position.  General endotracheal anesthesia induced and preoperative antibiotics were administered to include Ancef and clindamycin.  A time-out was undertaken to confirm patient, side, site, surgery, surgeon and administration of preoperative antibiotics.  All agreed we proceeded.      I deconstructed the  external fixator bars and clamps.  I removed the pins from the tibia and from the calcaneus cleansing with alcohol.  Nonsterile tourniquet was placed and the right lower extremity prepped draped in sterile fashion.      A lateral incision was made dissected down to the level of the fibular fracture which was oblique.  The superficial peroneal nerve was directly in line with the bone more proximally.  In order to protect this and to keep it from being least in the closure area and giving her nerve symptoms I dissected this out through its entire length with Metzenbaum scissors to the distal aspect of the ankle so that it was mobile and could be moved anteriorly.  This was reduced with several clamps and a 2 mm K-wire placed from posterior to anterior.  I turned my attention to the posterior malleolar fracture fragment which was smaller but attached the PI TFL.  I placed a guidewire for a 4 mm cannulated screw and fixed this.      I then selected a 6 hole Synthes locking fibular plate and placed a pin distally to hold in position.  I then placed a single cortical screw distally and a shaft screw more proximally.  This wanted to displace the fracture as the plate contour was a bit too prominent.  I removed the plate and then took a little bit of the contour out of it.  I replaced this with 3 cortical screws proximally and the cortical screw distally and held the reduction nicely.  I then placed locking screws distally and removed the initial bicortical screw and replaced this with unicortical locking screw.  The anterior ligamentous structures were still unstable although had a screw in the posterior malleolus.      At this point I reduced the fibula into the incisura and placed a single syndesmosis screw given her poor bone quality rather than dynamic fixation.  I visualized the entire construct under fluoroscopy and had good reduction of the fractures as well as the ankle mortise.  The medial malleolar fracture was a  very small avulsion fragment off the tip.  She would this point was stable to external rotation.  On valgus stress she did not have significant opening on the medial side and did not deem open deltoid repair necessary.      The tourniquet was let down hemostasis was obtained with electrocautery.  The wound was copiously irrigated normal saline solution.  The deep fascia was closed 0 Vicryl suture over a combination of vancomycin cefepime powder protecting the superficial peroneal nerve anteriorly.  The subcutaneous tissues were closed with 3-0 Vicryl suture and the skin with 3-0 nylon suture in running fashion.  A sterile dressing was applied followed by short-leg posterior splint with stirrups.      All instrument and sponge counts were reported correct at the end of the case.  There were no complications.  The patient was extubated, awakened and taken to the recovery room stable condition.      Plan the patient:     Nonweightbearing right lower extremity times 4-6 weeks pending fracture healing.  Multimodal pain management limiting narcotics.  Sutures out in 2-3 weeks with immediate range of motion of the ankle.  Plantar fasciitis boot when not undergoing range of motion to prevent equinus.  Anticoagulation times 4-6 weeks for DVT prophylaxis.    Johnathan Contreras MD

## 2023-05-08 NOTE — ASSESSMENT & PLAN NOTE
Alix Patel is a 67 y.o. female sp recent left grade 1 open humerus fracture treated wt intramedullary nail 3/2023 now fell 5/3/23 and has right trimall ankle fx sp    ex fix 5/5/23.    Soft tissues may be amendable to surgery today, will confirm w staff. Npo for possible OR today      - NWB RLE in ex fix, elevate, ice  - Pain control: MM  - DVTppx: ASA 81 mg BID  - Pin site care q shift  - bactrim while ex fix in place

## 2023-05-08 NOTE — SUBJECTIVE & OBJECTIVE
"Principal Problem:Closed trimalleolar fracture of right ankle with routine healing    Principal Orthopedic Problem: same, s/p R ankle ex fix 5/5/23    Interval History: Pt seen and examined at bedside. CELINA TOPETE. Pain w ell controlled right ankle. Elevating. PMR saw her yesterday for IPR placement yesterday      Review of patient's allergies indicates:   Allergen Reactions    Lamictal [lamotrigine] Rash     Per psychiatry notes       Current Facility-Administered Medications   Medication    acetaminophen tablet 1,000 mg    aspirin chewable tablet 81 mg    atorvastatin tablet 80 mg    dextrose 10% bolus 125 mL 125 mL    dextrose 10% bolus 250 mL 250 mL    dextrose 40 % gel 15,000 mg    dextrose 40 % gel 30,000 mg    EScitalopram oxalate tablet 20 mg    glucagon (human recombinant) injection 1 mg    hydrOXYzine pamoate capsule 25 mg    insulin aspart U-100 pen 0-5 Units    insulin detemir U-100 (Levemir) pen 5 Units    levothyroxine tablet 100 mcg    naloxone 0.4 mg/mL injection 0.02 mg    ondansetron disintegrating tablet 8 mg    oxyCODONE immediate release tablet 5 mg    oxyCODONE immediate release tablet Tab 10 mg    polyethylene glycol packet 17 g    senna-docusate 8.6-50 mg per tablet 1 tablet    tiZANidine tablet 2 mg    traZODone tablet 150 mg     Objective:     Vital Signs (Most Recent):  Temp: 98.6 °F (37 °C) (05/08/23 0557)  Pulse: 66 (05/08/23 0557)  Resp: 18 (05/08/23 0557)  BP: (!) 173/79 (05/08/23 0557)  SpO2: (!) 92 % (05/08/23 0557)   Vital Signs (24h Range):  Temp:  [97.2 °F (36.2 °C)-98.9 °F (37.2 °C)] 98.6 °F (37 °C)  Pulse:  [62-73] 66  Resp:  [18-19] 18  SpO2:  [92 %-98 %] 92 %  BP: (118-173)/(65-79) 173/79     Weight: 80.3 kg (177 lb 0.5 oz)  Height: 5' 11" (180.3 cm)  Body mass index is 24.69 kg/m².      Intake/Output Summary (Last 24 hours) at 5/8/2023 0634  Last data filed at 5/8/2023 0016  Gross per 24 hour   Intake --   Output 1200 ml   Net -1200 ml          Ortho/SPM Exam     Gen: NAD, " WDWN  CV: peripherally well perfused  Resp: unlabored respirations, symmetric chest rise  Neck: TM  Neuro: CN 2-12 grossly intact. No FND.    RLE  Ex fix in place, pin sites clead/dry/intact  Mild swelling of ankle, skin wrinkles  Wiggles toes, SILT all distributions  Dp pulse 2+    LUE  Incisions well healed  Intact all distiributions motor and  sensation including radial nerve  Hand wwp    Significant Labs: CBC:   Recent Labs   Lab 05/07/23  0711   WBC 8.68   HGB 11.1*   HCT 34.8*          CMP:   Recent Labs   Lab 05/07/23  0711      K 3.9      CO2 28   *   BUN 8   CREATININE 0.7   CALCIUM 9.2   ANIONGAP 5*       All pertinent labs within the past 24 hours have been reviewed.    Significant Imaging: I have reviewed all pertinent imaging results/findings.

## 2023-05-08 NOTE — ANESTHESIA PROCEDURE NOTES
Intubation    Date/Time: 5/8/2023 2:15 PM  Performed by: Dave Bourgeois CRNA  Authorized by: Darren Saavedra MD     Intubation:     Induction:  Intravenous    Intubated:  Postinduction    Mask Ventilation:  Easy with oral airway    Attempts:  1    Attempted By:  Student    Method of Intubation:  Direct    Blade:  Goel 2    Laryngeal View Grade: Grade IIA - cords partially seen      Difficult Airway Encountered?: No      Complications:  None    Airway Device:  Oral endotracheal tube    Airway Device Size:  7.0    Style/Cuff Inflation:  Cuffed (inflated to minimal occlusive pressure)    Inflation Amount (mL):  6    Tube secured:  21    Secured at:  The lips    Placement Verified By:  Capnometry    Complicating Factors:  None    Findings Post-Intubation:  BS equal bilateral and atraumatic/condition of teeth unchanged

## 2023-05-09 PROBLEM — Z74.09 IMPAIRED MOBILITY: Status: ACTIVE | Noted: 2023-05-09

## 2023-05-09 LAB
POCT GLUCOSE: 173 MG/DL (ref 70–110)
POCT GLUCOSE: 228 MG/DL (ref 70–110)
POCT GLUCOSE: 240 MG/DL (ref 70–110)
POCT GLUCOSE: 240 MG/DL (ref 70–110)
POCT GLUCOSE: 356 MG/DL (ref 70–110)

## 2023-05-09 PROCEDURE — 63600175 PHARM REV CODE 636 W HCPCS: Performed by: STUDENT IN AN ORGANIZED HEALTH CARE EDUCATION/TRAINING PROGRAM

## 2023-05-09 PROCEDURE — 99233 SBSQ HOSP IP/OBS HIGH 50: CPT | Mod: ,,, | Performed by: INTERNAL MEDICINE

## 2023-05-09 PROCEDURE — 25000003 PHARM REV CODE 250: Performed by: STUDENT IN AN ORGANIZED HEALTH CARE EDUCATION/TRAINING PROGRAM

## 2023-05-09 PROCEDURE — 63600175 PHARM REV CODE 636 W HCPCS: Performed by: INTERNAL MEDICINE

## 2023-05-09 PROCEDURE — 97168 OT RE-EVAL EST PLAN CARE: CPT

## 2023-05-09 PROCEDURE — 99233 PR SUBSEQUENT HOSPITAL CARE,LEVL III: ICD-10-PCS | Mod: ,,, | Performed by: INTERNAL MEDICINE

## 2023-05-09 PROCEDURE — 25000003 PHARM REV CODE 250

## 2023-05-09 PROCEDURE — 11000001 HC ACUTE MED/SURG PRIVATE ROOM

## 2023-05-09 PROCEDURE — 97164 PT RE-EVAL EST PLAN CARE: CPT

## 2023-05-09 PROCEDURE — 97535 SELF CARE MNGMENT TRAINING: CPT

## 2023-05-09 RX ORDER — MORPHINE SULFATE 2 MG/ML
2 INJECTION, SOLUTION INTRAMUSCULAR; INTRAVENOUS ONCE
Status: COMPLETED | OUTPATIENT
Start: 2023-05-09 | End: 2023-05-09

## 2023-05-09 RX ADMIN — INSULIN ASPART 2 UNITS: 100 INJECTION, SOLUTION INTRAVENOUS; SUBCUTANEOUS at 12:05

## 2023-05-09 RX ADMIN — INSULIN DETEMIR 5 UNITS: 100 INJECTION, SOLUTION SUBCUTANEOUS at 10:05

## 2023-05-09 RX ADMIN — TIZANIDINE 2 MG: 2 TABLET ORAL at 03:05

## 2023-05-09 RX ADMIN — CEFAZOLIN 2 G: 2 INJECTION, POWDER, FOR SOLUTION INTRAMUSCULAR; INTRAVENOUS at 06:05

## 2023-05-09 RX ADMIN — SENNOSIDES AND DOCUSATE SODIUM 1 TABLET: 50; 8.6 TABLET ORAL at 10:05

## 2023-05-09 RX ADMIN — LEVOTHYROXINE SODIUM 100 MCG: 50 TABLET ORAL at 06:05

## 2023-05-09 RX ADMIN — TIZANIDINE 2 MG: 2 TABLET ORAL at 10:05

## 2023-05-09 RX ADMIN — INSULIN ASPART 3 UNITS: 100 INJECTION, SOLUTION INTRAVENOUS; SUBCUTANEOUS at 12:05

## 2023-05-09 RX ADMIN — OXYCODONE HYDROCHLORIDE 10 MG: 10 TABLET ORAL at 07:05

## 2023-05-09 RX ADMIN — SENNOSIDES AND DOCUSATE SODIUM 1 TABLET: 50; 8.6 TABLET ORAL at 08:05

## 2023-05-09 RX ADMIN — TRAZODONE HYDROCHLORIDE 150 MG: 50 TABLET ORAL at 10:05

## 2023-05-09 RX ADMIN — CEFAZOLIN 2 G: 2 INJECTION, POWDER, FOR SOLUTION INTRAMUSCULAR; INTRAVENOUS at 03:05

## 2023-05-09 RX ADMIN — MORPHINE SULFATE 2 MG: 2 INJECTION, SOLUTION INTRAMUSCULAR; INTRAVENOUS at 04:05

## 2023-05-09 RX ADMIN — ASPIRIN 81 MG CHEWABLE TABLET 81 MG: 81 TABLET CHEWABLE at 08:05

## 2023-05-09 RX ADMIN — ACETAMINOPHEN 1000 MG: 500 TABLET ORAL at 10:05

## 2023-05-09 RX ADMIN — TIZANIDINE 2 MG: 2 TABLET ORAL at 06:05

## 2023-05-09 RX ADMIN — ACETAMINOPHEN 1000 MG: 500 TABLET ORAL at 06:05

## 2023-05-09 RX ADMIN — HYDROXYZINE PAMOATE 25 MG: 25 CAPSULE ORAL at 10:05

## 2023-05-09 RX ADMIN — OXYCODONE HYDROCHLORIDE 10 MG: 10 TABLET ORAL at 06:05

## 2023-05-09 RX ADMIN — ESCITALOPRAM OXALATE 20 MG: 20 TABLET ORAL at 08:05

## 2023-05-09 RX ADMIN — INSULIN ASPART 2 UNITS: 100 INJECTION, SOLUTION INTRAVENOUS; SUBCUTANEOUS at 04:05

## 2023-05-09 RX ADMIN — POLYETHYLENE GLYCOL 3350 17 G: 17 POWDER, FOR SOLUTION ORAL at 08:05

## 2023-05-09 RX ADMIN — MORPHINE SULFATE 2 MG: 2 INJECTION, SOLUTION INTRAMUSCULAR; INTRAVENOUS at 10:05

## 2023-05-09 RX ADMIN — OXYCODONE HYDROCHLORIDE 10 MG: 10 TABLET ORAL at 03:05

## 2023-05-09 RX ADMIN — ATORVASTATIN CALCIUM 80 MG: 40 TABLET, FILM COATED ORAL at 08:05

## 2023-05-09 RX ADMIN — ASPIRIN 81 MG CHEWABLE TABLET 81 MG: 81 TABLET CHEWABLE at 10:05

## 2023-05-09 RX ADMIN — INSULIN ASPART 1 UNITS: 100 INJECTION, SOLUTION INTRAVENOUS; SUBCUTANEOUS at 10:05

## 2023-05-09 RX ADMIN — ACETAMINOPHEN 1000 MG: 500 TABLET ORAL at 03:05

## 2023-05-09 NOTE — PT/OT/SLP RE-EVAL
Occupational Therapy   Re-evaluation    Name: Alix Patel  MRN: [id number]  Admitting Diagnosis:  Closed trimalleolar fracture of right ankle with routine healing  Recent Surgery: Procedure(s) (LRB):  ORIF, ANKLE - RIGHT. EX FIX REMOVAL. SYNTHES. C ARM DOOR SIDE.  BONE FOAM. (Right)  FIXATION, SYNDESMOSIS, ANKLE (Right) 1 Day Post-Op    Recommendations:     Discharge Recommendations: rehabilitation facility  Discharge Equipment Recommendations: to be determined by next level of care  Barriers to discharge:  Inaccessible home environment, Decreased caregiver support    Assessment:     Alix Patel is a 67 y.o. female with a medical diagnosis of Closed trimalleolar fracture of right ankle with routine healing due to a 2nd fall on 5/5/23. On 0/2023 - ORIF/IM nail left humerus NWB for 8 weeks due to a fall. Pt. Was re-evaluated due to the removal of RLE ex-fix on 5/8/23 and is NWB.  She presents with motivated and willing attitude to get up to chair and completed ADLs seated. WB Precautions maintained throughout session. Performance deficits affecting function are weakness, impaired endurance, impaired self care skills, impaired functional mobility, impaired balance, decreased lower extremity function, decreased safety awareness, orthopedic precautions.      Rehab Prognosis:  Good; patient would benefit from acute skilled OT services to address these deficits and reach maximum level of function.       Plan:     Patient to be seen 3 x/week to address the above listed problems via therapeutic activities, therapeutic exercises, neuromuscular re-education  Plan of Care Expires: 06/08/23  Plan of Care Reviewed with: patient    Subjective     Chief Complaint: no complaints   Patient/Family stated goals: Pt. Reports lets get up bagley d get to the chair now  Communicated with: RN prior to session.  Pain/Comfort:  Pain Rating 1:  (unrated)  Location - Side 1: Left  Location - Orientation 1: proximal  Location 1:  shoulder  Pain Addressed 1: Reposition, Distraction    Objective:     Communicated with: RN prior to session.  Patient found HOB elevated with: PureWick (soft cast RLE) upon OT entry to room.    General Precautions: Standard, fall  Orthopedic Precautions: RLE non weight bearing, LUE non weight bearing  Braces: N/A   Respiratory Status: Room air    Occupational Performance:    Bed Mobility:    Patient completed Rolling/Turning to Right with stand by assistance  Patient completed Scooting/Bridging with stand by assistance  Patient completed Supine to Sit with stand by assistance    Functional Mobility/Transfers:  Patient completed Sit <> Stand Transfer with minimum assistance  with  hand-held assist   Patient completed Bed > Chair Transfer using Stand Pivot technique with minimum assistance with hand-held assist    Activities of Daily Living:  Grooming: minimum assistance seated upright in chair to facial and hair care routine   Upper Body Dressing: minimum assistance seated up right to don gown     Cognitive/Visual Perceptual:  Cognitive/Psychosocial Skills:     -       Oriented to: Person, Place, and Situation   -       Follows Commands/attention:Follows multistep  commands  -       Communication: clear/fluent  -       Memory: No Deficits noted  -       Safety awareness/insight to disability: impaired   -       Mood/Affect/Coping skills/emotional control: Appropriate to situation  Visual/Perceptual:      -Wears eyeglasses      Physical Exam:  Static Sitting:  Sup  Static Standing: Min A - CGA   Dynamic Standing: Min A  Dominant hand:    -       Left hand  Upper Extremity Range of Motion:     -       Right Upper Extremity: WFL  -       Left Upper Extremity: WFL   Upper Extremity Strength:    -       Right Upper Extremity: WFL  -       Left Upper Extremity: MARK 2/2 precautions   Strength:    -       Right Upper Extremity: WFL  -       Left Upper Extremity: MARK 2/2 precautions  Gross motor coordination:    WFL    [facility] 6 Click:  [facility] Total Score: 17    Treatment & Education:  Pt.educated and reviewed WB precautions  Pt educated on role of occupational therapy, POC, and safety during ADLs and functional mobility. Pt and OT discussed importance of safe, continued mobility to optimize daily living skills. Pt verbalized understanding. Pt given instruction to call for medical staff/nurse for assistance.     Patient left up in chair with all lines intact, call button in reach, and RN notified    GOALS:   Multidisciplinary Problems       Occupational Therapy Goals          Problem: Occupational Therapy    Goal Priority Disciplines Outcome Interventions   Occupational Therapy Goal     OT, PT/OT Ongoing, Progressing    Description: Goals to be met by: 5/14/23     Patient will increase functional independence with ADLs by performing:    UE Dressing with Set-up Assistance.  LE Dressing with Stand-by Assistance.  Grooming while seated at sink with Modified [place].  Toileting from bedside commode with Modified [place] for hygiene and clothing management.   Toilet transfer to bedside commode with Modified [place].                         History:     Past Medical History:   Diagnosis Date    Anxiety     Bipolar 1 disorder     COPD (chronic obstructive pulmonary disease)     Depression     Diabetes mellitus     GERD (gastroesophageal reflux disease)     Grade I open displaced transverse fracture of shaft of left humerus 3/20/2023    HEARING LOSS     pt states she has loss slighty in rt ear.    Hypertension     Insomnia     Rash          Past Surgical History:   Procedure Laterality Date    APPENDECTOMY      APPLICATION, EXTERNAL FIXATION DEVICE, FOR ANKLE FRACTURE Right 5/5/2023    Procedure: APPLICATION, EXTERNAL FIXATION DEVICE, FOR ANKLE FRACTURE - right, mariusz, ancef, bone foam, slider, C arm door side;  Surgeon: Yanely Guerra MD;  Location: [facility] OR [address] [place] [address];  Service: Orthopedics;  Laterality: Right;  right,  mariusz, ancef, bone foam, slider, C arm door side    BREAST BIOPSY      COLONOSCOPY      COLONOSCOPY N/A 10/31/2019    Procedure: COLONOSCOPY;  Surgeon: Philippe Monteiro MD;  Location: [facility] ENDO (4TH FLR);  Service: Endoscopy;  Laterality: N/A;  PM prep-MH    CYST REMOVAL      ESOPHAGOGASTRODUODENOSCOPY N/A 10/31/2019    Procedure: EGD (ESOPHAGOGASTRODUODENOSCOPY);  Surgeon: Philippe Monteiro MD;  Location: [facility] ENDO (4TH FLR);  Service: Endoscopy;  Laterality: N/A;  Pm prep-MH    FIXATION OF SYNDESMOSIS OF ANKLE Right 5/8/2023    Procedure: FIXATION, SYNDESMOSIS, ANKLE;  Surgeon: Johnathan Contreras MD;  Location: [facility] OR [address] [place] [address];  Service: Orthopedics;  Laterality: Right;    HYSTERECTOMY      IRRIGATION AND DEBRIDEMENT OF UPPER EXTREMITY Left 3/21/2023    Procedure: IRRIGATION AND DEBRIDEMENT, UPPER EXTREMITY;  Surgeon: Ousmane Crawford MD;  Location: [address];  Service: Orthopedics;  Laterality: Left;    OPEN REDUCTION AND INTERNAL FIXATION (ORIF) OF INJURY OF ANKLE Right 5/8/2023    Procedure: ORIF, ANKLE - RIGHT. EX FIX REMOVAL. SYNTHES. C ARM DOOR SIDE.  BONE FOAM.;  Surgeon: Johnathan Contreras MD;  Location: [address];  Service: Orthopedics;  Laterality: Right;    ORIF HUMERUS FRACTURE Left 3/21/2023    Procedure: ORIF, FRACTURE, HUMERUS;  Surgeon: Ousmane Crawford MD;  Location: [address];  Service: Orthopedics;  Laterality: Left;       Time Tracking:     OT Date of Treatment: 05/09/23  OT Start Time: 0959  OT Stop Time: 1022  OT Total Time (min): 23 min    Billable Minutes:Re-eval 8  Self Care/Home Management 15    5/9/2023

## 2023-05-09 NOTE — HOSPITAL COURSE
5/6/23: participated w/ PT. Bed mob SBA. Sit to stand Jhon w/ erlinda. [facility] 15.

## 2023-05-09 NOTE — PROGRESS NOTES
[facility] - Surgery  Orthopedics  Progress Note    Patient Name: Alix Patel  MRN: [id number]  Admission Date: 5/5/2023  Hospital Length of Stay: 2 days  Attending Provider: Camilla Hernandes MD  Primary Care Provider: Angélica Barakat MD  Follow-up For: Procedure(s) (LRB):  ORIF, ANKLE - RIGHT. EX FIX REMOVAL. SYNTHES. C ARM DOOR SIDE.  BONE FOAM. (Right)  FIXATION, SYNDESMOSIS, ANKLE (Right)    Post-Operative Day: 1 Day Post-Op  Subjective:     Principal Problem:Closed trimalleolar fracture of right ankle with routine healing    Principal Orthopedic Problem: same sp ex fix removal and ORIF 5/8    Interval History: pod1. Naeon. Vs  wnl. Pain well controlled. Elevating.    Review of patient's allergies indicates:   Allergen Reactions    Lamictal [lamotrigine] Rash     Per psychiatry notes       Current Facility-Administered Medications   Medication    acetaminophen tablet 1,000 mg    aspirin chewable tablet 81 mg    atorvastatin tablet 80 mg    ceFAZolin 2 g in dextrose 5 % in water (D5W) 5 % 50 mL IVPB (MB+)    dextrose 10% bolus 125 mL 125 mL    dextrose 10% bolus 250 mL 250 mL    dextrose 40 % gel 15,000 mg    dextrose 40 % gel 30,000 mg    EScitalopram oxalate tablet 20 mg    glucagon (human recombinant) injection 1 mg    hydrOXYzine pamoate capsule 25 mg    insulin aspart U-100 pen 0-5 Units    insulin detemir U-100 (Levemir) pen 5 Units    levothyroxine tablet 100 mcg    naloxone 0.4 mg/mL injection 0.02 mg    ondansetron disintegrating tablet 8 mg    oxyCODONE immediate release tablet 5 mg    oxyCODONE immediate release tablet Tab 10 mg    polyethylene glycol packet 17 g    senna-docusate 8.6-50 mg per tablet 1 tablet    tiZANidine tablet 2 mg    traZODone tablet 150 mg     Objective:     Vital Signs (Most Recent):  Temp: 98.6 °F (37 °C) (05/09/23 0527)  Pulse: 70 (05/09/23 0527)  Resp: 16 (05/09/23 0527)  BP: 110/61 (05/09/23 0527)  SpO2: (!) 93 % (05/09/23 0527) Vital Signs  "(24h Range):  Temp:  [97 °F (36.1 °C)-98.6 °F (37 °C)] 98.6 °F (37 °C)  Pulse:  [63-82] 70  Resp:  [14-29] 16  SpO2:  [92 %-100 %] 93 %  BP: ()/(46-79) 110/61     Weight: 80.3 kg (177 lb 0.5 oz)  Height: 5' 11" (180.3 cm)  Body mass index is 24.69 kg/m².      Intake/Output Summary (Last 24 hours) at 5/9/2023 0642  Last data filed at 5/8/2023 1620  Gross per 24 hour   Intake 1000 ml   Output 15 ml   Net 985 ml        Ortho/SPM Exam  A&ox3, NAD    RLE  Splint cdi  Decreased sensation and unable to wiggle toes sp nerve block  Foot and toes wwp     Significant Labs: All pertinent labs within the past 24 hours have been reviewed.    Significant Imaging: I have reviewed and interpreted all pertinent imaging results/findings.    Assessment/Plan:     * Closed trimalleolar fracture of right ankle with routine healing s/p ex fix removal and ORIF 5/8/23  Alix Patel is a 67 y.o. female sp recent left grade 1 open humerus fracture treated wt intramedullary nail 3/2023 now fell 5/3/23 and has right trimall ankle fx sp    ex fix 5/5/23. Now sp ex fix removal and ORIF 5/8/23    pod1- doing well no issues. PT/OT today.      - NWB RLE in short leg splint, elevate, ice  - Pain control: MM  - DVTppx: ASA 81 mg BID  -PT/OT daily   -dispo: okay to dc from ortho perspective,  PT/OT recs IPR  -FU  2 wks                    Marisela Davis MD  Orthopedics  [facility] - Surgery  "

## 2023-05-09 NOTE — PT/OT/SLP RE-EVAL
Physical Therapy Reevaluation and Treatment  Patient Name:  Alix Patel   MRN:  [id number]  Admit Date: 5/5/2023  Admitting Diagnosis:  Closed trimalleolar fracture of right ankle with routine healing   Length of Stay: 2 days  Recent Surgery: Procedure(s) (LRB):  ORIF, ANKLE - RIGHT. EX FIX REMOVAL. SYNTHES. C ARM DOOR SIDE.  BONE FOAM. (Right)  FIXATION, SYNDESMOSIS, ANKLE (Right) 1 Day Post-Op    Hospital Course:  5/5: Admit date  5/6: PT initial evaluation    Please refer to PT initial evaluation for PLOF and social history.  Recommendations:     Discharge Recommendations:  rehabilitation facility   Discharge Equipment Recommendations: to be determined by next level of care, walker, genia   Barriers to discharge: Evolving Clinical Presentation    Assessment:     Alix Patel is a 67 y.o. female admitted with a medical diagnosis of Closed trimalleolar fracture of right ankle with routine healing.     Pt agreeable to therapy, reports her leg has not yet resolved from surgery and she cannot move or feel her toes. Pt is motivated to get better as she hopes to get well enough to discharge home instead of to a facility. Pt sits EOB from supine with SBA, stands from EOB with CGA, makes a few small hops at bedside with CGA. Pt with difficulty hopping without using her LUE so gait terminated at this time to protect NWB shoulder. Continue to maximize mobility within WB limitations to facilitate return to PLOF.     Problem List: weakness, impaired functional mobility, gait instability, impaired endurance, impaired balance, impaired self care skills, decreased lower extremity function, decreased upper extremity function, pain, orthopedic precautions, decreased safety awareness    Rehab Prognosis: Fair     GOALS:   Multidisciplinary Problems       Physical Therapy Goals          Problem: Physical Therapy    Goal Priority Disciplines Outcome Goal Variances Interventions   Physical Therapy Goal     PT, PT/OT  "Ongoing, Progressing     Description: Goals to be met by: 2023     Patient will increase functional independence with mobility by performin. Supine to sit with Set-up [place]  2. Sit to supine with Set-up [place]  3. Sit to stand transfer with Supervision  4. Bed to chair transfer with Supervision using Keanu-walker  5. Gait  x 10 feet with Contact Guard Assistance using Keanu-walker.   6. Wheelchair propulsion x150 feet with Supervision using  right upper extremity and left lower extremity  7. Lower extremity exercise program x15 reps per handout, with supervision                         Treatment Tolerated: Fairly Well    Highest level of mobility achieved this visit: few hops at bedside with CGA    Activity with RN/PCT: transfer with 1 person assist  Plan:     During this hospitalization, patient to be seen 6 x/week to address the above listed problems via therapeutic activities, therapeutic exercises, gait training, neuromuscular re-education  Plan of Care Expires:  23  Plan of Care Reviewed with: patient    Subjective     RN Giselle notified prior to session. No family present upon PT entrance into room.    Chief Complaint: no feeling in RLE  Patient/Family Comments/goals: "I wanna do good and get out of here"  Pain/Comfort:  Pain Rating 1: 0/10    Objective:     Patient found HOB elevated with: PureWick     General Precautions: Standard, Cardiac fall   Orthopedic Precautions:RLE non weight bearing, LUE non weight bearing   Braces: N/A   Respiratory Status: Room Air    Vitals: /70   Pulse 77   Temp 99 °F (37.2 °C)   Resp 16   Ht 5' 11" (1.803 m)   Wt 80.3 kg (177 lb 0.5 oz)   SpO2 97%   Breastfeeding No   BMI 24.69 kg/m²      Exam:  Cognition:   Alert and Cooperative  Command following: Follows one-step verbal commands  Tries to use LUE frequently despite encouragement not to  Fluency: clear/fluent  Hearing: Intact  Vision:  Intact  Skin Integrity: Visible skin " intact  Physical Exam:   Edema: None noted  ROM - KATHERINE LEs WFL (R ankle not tested)  Strength - KATHERINE LEs WFL (R ankle not tested)  Sensation - Intact to light touch except RLE toes  Coordination - No deficits noted    Outcome Measures:  AM-PAC 6 CLICK MOBILITY  Turning over in bed (including adjusting bedclothes, sheets and blankets)?: 3  Sitting down on and standing up from a chair with arms (e.g., wheelchair, bedside commode, etc.): 3  Moving from lying on back to sitting on the side of the bed?: 3  Moving to and from a bed to a chair (including a wheelchair)?: 3  Need to walk in hospital room?: 3  Climbing 3-5 steps with a railing?: 1  Basic Mobility Total Score: 16     Functional Mobility:  Additional staff present: none    Bed Mobility:   Rolling/Turning to Left: stand by assistance  Rolling/Turning to Right: stand by assistance  Supine to Sit: stand by assistance; from L side of bed  Scooting anteriorly to EOB to have both feet planted on floor: stand by assistance  Sit to Supine: stand by assistance; to R side of bed    Sitting Balance at Edge of Bed:  Assistance Level Required: Supervision  Time: 5 min  Postural deviations noted: no deviations noted    Transfers:   Sit <> Stand Transfer: contact guard assistance with hemiwalker   Stand <> Sit Transfer: contact guard assistance with hemiwalker   [id number] from EOB    Standing Balance:  Assistance Level Required: Contact Guard Assistance  Patient used: hemiwalker   Time: 1 min x 2  Postural deviations noted: no deviations noted  Encouraged: not using LUE  Comments: difficulty standing without using LUE      Gait:  Patient ambulated: 2-3 hops fwd/bck   Patient required: contact guard  Patient used:  Hemiwalker  Gait Pattern observed: non-weightbearing   Gait Deviation(s): occasional unsteady gait, decreased step length, and decreased ron  Impairments due to: impaired balance and decreased endurance  Gait belt utilized  Comments: difficulty not using  CHELSEA    Education:  Time provided for education, counseling and discussion of health disposition in regards to patient's current status  All questions answered within PT scope of practice and to patient's satisfaction  PT role in POC to address current functional deficits  Pt educated on proper body mechanics, safety techniques, and energy conservation with PT facilitation and cueing throughout session    Patient left HOB elevated with all lines intact and call button in reach.    Time Tracking:     PT Received On: 05/09/23  PT Start Time: 0911     PT Stop Time: 0923  PT Total Time (min): 12 min     Billable Minutes: Re-eval 1 procedure    Lei Lucero, PT, DPT  5/9/2023

## 2023-05-09 NOTE — PLAN OF CARE
Problem: Physical Therapy  Goal: Physical Therapy Goal  Description: Goals to be met by: 2023     Patient will increase functional independence with mobility by performin. Supine to sit with Set-up [place]  2. Sit to supine with Set-up [place]  3. Sit to stand transfer with Supervision  4. Bed to chair transfer with Supervision using Keanu-walker  5. Gait  x 10 feet with Contact Guard Assistance using Keanu-walker.   6. Wheelchair propulsion x150 feet with Supervision using  right upper extremity and left lower extremity  7. Lower extremity exercise program x15 reps per handout, with supervision    PT Eval: 2023

## 2023-05-09 NOTE — ASSESSMENT & PLAN NOTE
Patient's FSGs are controlled on current regimen. Patient on Metformin 1000 mg po BID as outpatient to treat and will resume on discharge.   Last A1c reviewed-   Lab Results   Component Value Date    HGBA1C 7.5 (H) 03/20/2023       Blood Sugars (AccuCheck):    Recent Labs     05/08/23  1131 05/08/23  1658 05/08/23  1829 05/09/23  0005 05/09/23  0744 05/09/23  1121   POCTGLUCOSE 132* 202* 228* 356* 173* 228*     Continue correctional scale  Low  Maintain anti-hyperglycemic dose as follows-   Antihyperglycemics (From admission, onward)    Start     Stop Route Frequency Ordered    05/06/23 2100  insulin detemir U-100 (Levemir) pen 5 Units         -- SubQ Nightly 05/06/23 1055    05/05/23 1758  insulin aspart U-100 pen 0-5 Units         -- SubQ Before meals & nightly PRN 05/05/23 1659        Hold Oral hypoglycemics while patient is in the hospital.  Diabetic diet.  Target blood sugars 140-180 in hospital.  Monitor blood sugars 4 times daily with meals and at bedtime.

## 2023-05-09 NOTE — CONSULTS
[facility] - [facility]  Physical Medicine & Rehab  Consult Note    Patient Name: Alix Patel  MRN: [id number]  Admission Date: 5/5/2023  Hospital Length of Stay: 2 days  Attending Physician: Camilla Hernandes MD     Inpatient consult to Physical Medicine & Rehabilitation  Consult performed by: Sadaf Lopez NP  Consult requested by:  Camilla Hernandes MD    Collaborating Physician: Tasneem Gonzalez MD  Reason for Consult:  Assess rehabilitation needs     Consults  Subjective:     Principal Problem: Closed trimalleolar fracture of right ankle with routine healing    HPI: Alix Patel is a 67-year-old female with PMHx of sp recent left grade 1 open humerus fracture treated wt intramedullary nail 3/2023. [place] [facility] [place] after fall on 5/3/23 and has right trimall ankle fx. S/p ex fix 5/5/23. Pending ex fix removal and ORIF 5/8/23.     Functional History: Patient lives in 3rd floor apt with 3 flights of stairs, no elevator access. Prior to admission, I. DME: none.       Hospital Course: 5/6/23: participated w/ PT. Bed mob SBA. [facility]       Past Medical History:   Diagnosis Date    Anxiety     Bipolar 1 disorder     COPD (chronic obstructive pulmonary disease)     Depression     Diabetes mellitus     GERD (gastroesophageal reflux disease)     Grade I open displaced transverse fracture of shaft of left humerus 3/20/2023    HEARING LOSS     pt states she has loss slighty in rt ear.    Hypertension     Insomnia     Rash      Past Surgical History:   Procedure Laterality Date    APPENDECTOMY      APPLICATION, EXTERNAL FIXATION DEVICE, FOR ANKLE FRACTURE Right 5/5/2023    Procedure: APPLICATION, EXTERNAL FIXATION DEVICE, FOR ANKLE FRACTURE - right, mariusz, ancef, bone foam, slider, C arm door side;  Surgeon: Yanely Guerra MD;  Location: [facility] OR [address] [place] [address];  Service: Orthopedics;  Laterality: Right;  right, mariusz, ancef, bone foam, slider, C arm door  side    BREAST BIOPSY      COLONOSCOPY      COLONOSCOPY N/A 10/31/2019    Procedure: COLONOSCOPY;  Surgeon: Philippe Monteiro MD;  Location: [facility] ENDO (4TH FLR);  Service: Endoscopy;  Laterality: N/A;  PM prep-MH    CYST REMOVAL      ESOPHAGOGASTRODUODENOSCOPY N/A 10/31/2019    Procedure: EGD (ESOPHAGOGASTRODUODENOSCOPY);  Surgeon: Philippe Monteiro MD;  Location: [facility] ENDO (4TH FLR);  Service: Endoscopy;  Laterality: N/A;  Pm prep-MH    FIXATION OF SYNDESMOSIS OF ANKLE Right 5/8/2023    Procedure: FIXATION, SYNDESMOSIS, ANKLE;  Surgeon: Johnathan Contreras MD;  Location: [facility] OR [address] [place] [address];  Service: Orthopedics;  Laterality: Right;    HYSTERECTOMY      IRRIGATION AND DEBRIDEMENT OF UPPER EXTREMITY Left 3/21/2023    Procedure: IRRIGATION AND DEBRIDEMENT, UPPER EXTREMITY;  Surgeon: Ousmane Crawford MD;  Location: [facility] OR [address] [place] [address];  Service: Orthopedics;  Laterality: Left;    OPEN REDUCTION AND INTERNAL FIXATION (ORIF) OF INJURY OF ANKLE Right 5/8/2023    Procedure: ORIF, ANKLE - RIGHT. EX FIX REMOVAL. SYNTHES. C ARM DOOR SIDE.  BONE FOAM.;  Surgeon: Johnathan Contreras MD;  Location: [address];  Service: Orthopedics;  Laterality: Right;    ORIF HUMERUS FRACTURE Left 3/21/2023    Procedure: ORIF, FRACTURE, HUMERUS;  Surgeon: Ousmane Crawford MD;  Location: [address];  Service: Orthopedics;  Laterality: Left;     Review of patient's allergies indicates:   Allergen Reactions    Lamictal [lamotrigine] Rash     Per psychiatry notes       Scheduled Medications:    acetaminophen  1,000 mg Oral Q8H    aspirin  81 mg Oral BID    atorvastatin  80 mg Oral Daily    ceFAZolin (ANCEF) IVPB  2 g Intravenous Q8H    EScitalopram oxalate  20 mg Oral Daily    hydrOXYzine pamoate  25 mg Oral QHS    insulin detemir U-100  5 Units Subcutaneous QHS    levothyroxine  100 mcg Oral Before breakfast    polyethylene glycol  17 g Oral Daily    senna-docusate 8.6-50 mg  1 tablet Oral BID    tiZANidine  2  mg Oral Q8H    traZODone  150 mg Oral Nightly       PRN Medications: dextrose 10%, dextrose 10%, dextrose, dextrose, glucagon (human recombinant), insulin aspart U-100, naloxone, ondansetron, oxyCODONE, oxyCODONE    Family History       Problem Relation (Age of Onset)    Alzheimer's disease Mother, Maternal Cousin, Maternal Cousin    Anxiety disorder Daughter    Colon cancer Maternal Aunt    Depression Daughter    Heart attack Father    Heart failure Father    Hyperlipidemia Father    No Known Problems Sister    Sarcoidosis Sister    Sinus disease Mother          Tobacco Use    Smoking status: Former     Packs/day: 0.50     Years: 34.00     Pack years: 17.00     Types: Cigarettes     Start date:      Quit date:      Years since quittin.3     Passive exposure: Never    Smokeless tobacco: Never   Substance and Sexual Activity    Alcohol use: Yes     Comment: Rare    Drug use: Not Currently     Types: Marijuana, Cocaine    Sexual activity: Not Currently     Partners: Male     Birth control/protection: None     Review of Systems   Constitutional:  Positive for activity change. Negative for fatigue and fever.   HENT:  Negative for sore throat and trouble swallowing.    Eyes:  Negative for visual disturbance.   Respiratory:  Negative for cough and shortness of breath.    Cardiovascular:  Negative for chest pain and leg swelling.   Gastrointestinal:  Negative for abdominal distention and abdominal pain.   Genitourinary:  Negative for difficulty urinating.   Musculoskeletal:  Positive for gait problem. Negative for back pain.   Skin:  Negative for color change.   Neurological:  Positive for weakness. Negative for dizziness, light-headedness and headaches.   Psychiatric/Behavioral:  Negative for agitation and confusion.      Objective:     Vital Signs (Most Recent):  Temp: 97.9 °F (36.6 °C) (23)  Pulse: 77 (23)  Resp: 18 (23)  BP: (!) 140/73 (23)  SpO2: 95 %  (05/09/23 0728)    Vital Signs (24h Range):  Temp:  [97 °F (36.1 °C)-98.6 °F (37 °C)] 97.9 °F (36.6 °C)  Pulse:  [63-82] 77  Resp:  [14-29] 18  SpO2:  [92 %-100 %] 95 %  BP: ()/(46-79) 140/73     Body mass index is 24.69 kg/m².     Physical Exam  Vitals and nursing note reviewed.   Constitutional:       Appearance: Normal appearance. She is well-developed.   HENT:      Mouth/Throat:      Mouth: Mucous membranes are moist.   Eyes:      Pupils: Pupils are equal, round, and reactive to light.   Pulmonary:      Effort: Pulmonary effort is normal.      Breath sounds: Normal breath sounds.   Musculoskeletal:      Cervical back: Normal range of motion and neck supple.      Comments: Ex fix R ankle   Skin:     General: Skin is warm and dry.   Neurological:      Mental Status: She is alert.      Motor: Weakness present.      Gait: Gait abnormal.   Psychiatric:         Mood and Affect: Mood normal.         Behavior: Behavior normal.     Diagnostic Results:   Labs: Reviewed  ECG: Reviewed  CT: Reviewed    Assessment/Plan:     * Closed trimalleolar fracture of right ankle with routine healing s/p ex fix removal and ORIF 5/8/23  - S/p ex fix 5/5/23.   - Pending ex fix removal and ORIF 5/8/23.     Impaired mobility  - Related to prolonged/acute hospital course.     Recommendations  -  Encourage mobility, OOB in chair at least 3 hours per day, and early ambulation as appropriate  -  PT/OT evaluate and treat  -  Pain management  -  Monitor for and prevent skin breakdown and pressure ulcers  · Early mobility, repositioning/weight shifting every 20-30 minutes when sitting, turn patient every 2 hours, proper mattress/overlay and chair cushioning, pressure relief/heel protector boots  -  DVT prophylaxis    -  Reviewed discharge options (IP rehab, SNF, HH therapy, and OP therapy)    PM&R Recommendation:     At this time, the PM&R team has reviewed this patient's ongoing medical case including inpatient diagnosis, medical history,  clinical examination, labs, vitals, current social and functional history to provide the post-acute recommendation as follows:     RECOMMENDATIONS: inpatient rehabilitation due to good motivation/participation with therapies, has been determined to tolerate 3 hours of therapy and good potential for recovery.     The patient will be admitted for comprehensive interdisciplinary inpatient rehabilitation to address the impairments due to medical diagnosis of Closed trimalleolar fracture of right ankle with routine healing s/p ex fix removal and ORIF 5/8/23. The patient will benefit from an inpatient rehabilitation program to promote functional recovery, implement compensatory strategies and will undergo assessment for needs for durable medical equipment for safe discharge to the community. This patient will benefit from a coordinated interdisciplinary rehabilitation program services that require close monitoring and treatment with 24-hour rehabilitative nursing and physical/occupational therapies for 3 hours/day for 5 days/week.This interdisciplinary program will be performed under the direction of a physiatrist.    MEDICAL STABILITY:     At this time, barriers for post-acute rehab admission include ex fix removal, pain control, and post op BM.     We will continue to follow.    Thank you for your consult.     Sadaf Lopez NP  Department of Physical Medicine & Rehab  [facility] - Surgery

## 2023-05-09 NOTE — HPI
Alix Patel is a 67-year-old female with PMHx of sp recent left grade 1 open humerus fracture treated wt intramedullary nail 3/2023. Patient presented to [facility] on 5/5/23 after fall on 5/3/23 and has right trimall ankle fx. S/p ex fix 5/5/23.s/p ex fix removal and ORIF 5/8/23. NWB RLE in short leg splint, elevate, ice  Last BM 5/7/23 and PT & Ot to see today 5/9  Pain controlled    Functional History: Patient lives in 3rd floor apt with 3 flights of stairs, no elevator access. Prior to admission, I. DME: none.

## 2023-05-09 NOTE — ASSESSMENT & PLAN NOTE
Alix Patel is a 67 y.o. female sp recent left grade 1 open humerus fracture treated wt intramedullary nail 3/2023 now fell 5/3/23 and has right trimall ankle fx sp    ex fix 5/5/23. Now sp ex fix removal and ORIF 5/8/23    pod1- doing well no issues. PT/OT today.      - NWB RLE in short leg splint, elevate, ice  - Pain control: MM  - DVTppx: ASA 81 mg BID  -PT/OT daily   -dispo: okay to dc from ortho perspective,  PT/OT recs IPR  -FU  2 wks

## 2023-05-09 NOTE — SUBJECTIVE & OBJECTIVE
Interval History: Patient went to OR yesterday and underwent removal of ex fix and ORIF of right ankle by Dr. Johnathan Contreras. Post-op, patient non weight bearing to right lower extremity for 4-6 weeks pending fracture healing as per Ortho. Per Ortho, sutures out in 2-3 weeks with immediate range of motion of the ankle. Plantar fasciitis boot when not undergoing range of motion to prevent equinus. Patient resumed on Aspirin 81 mg po BID for DVT prophylaxis post-op and will need for 4-6 weeks as per Ortho. PT/OT resumed post-op and recommending IP Rehab and case management working on placement. Pain controlled to right ankle post-op. Patient accepted to [facility] and auth sent and hopeful discharge on 5/10. Patient reports no pain in right ankle and still numb from nerve block done during surgery yesterday.     Review of Systems   Constitutional:  Negative for fever.   Respiratory:  Negative for cough and shortness of breath.    Cardiovascular:  Negative for chest pain.   Gastrointestinal:  Negative for abdominal pain, diarrhea and nausea.   Musculoskeletal:  Negative for arthralgias (Right ankle).   Neurological:  Negative for dizziness.   Psychiatric/Behavioral:  Negative for agitation and confusion.    Objective:     Vital Signs (Most Recent):  Temp: 99 °F (37.2 °C) (05/09/23 1119)  Pulse: 77 (05/09/23 1119)  Resp: 16 (05/09/23 1119)  BP: 113/70 (05/09/23 1119)  SpO2: 97 % (05/09/23 1119) on room air Vital Signs (24h Range):  Temp:  [97 °F (36.1 °C)-99 °F (37.2 °C)] 99 °F (37.2 °C)  Pulse:  [70-82] 77  Resp:  [14-29] 16  SpO2:  [92 %-100 %] 97 %  BP: ()/(46-79) 113/70     Weight: 80.3 kg (177 lb 0.5 oz)  Body mass index is 24.69 kg/m².    Intake/Output Summary (Last 24 hours) at 5/9/2023 1256  Last data filed at 5/8/2023 1620  Gross per 24 hour   Intake 1000 ml   Output 15 ml   Net 985 ml         Physical Exam  Vitals and nursing note reviewed.   Constitutional:       General: She is awake. She is not in  acute distress.     Appearance: Normal appearance. She is well-developed. She is not ill-appearing.   Eyes:      Conjunctiva/sclera: Conjunctivae normal.   Cardiovascular:      Rate and Rhythm: Normal rate and regular rhythm.      Heart sounds: Normal heart sounds. No murmur heard.    No friction rub. No gallop.   Pulmonary:      Effort: Pulmonary effort is normal. No respiratory distress.      Breath sounds: Normal breath sounds. No wheezing.   Abdominal:      General: Abdomen is flat. Bowel sounds are normal. There is no distension.      Palpations: Abdomen is soft.      Tenderness: There is no abdominal tenderness.   Skin:     General: Skin is warm.      Findings: No erythema.   Neurological:      Mental Status: She is alert and oriented to person, place, and time.   Psychiatric:         Mood and Affect: Mood normal.         Behavior: Behavior normal. Behavior is cooperative.         Thought Content: Thought content normal.         Judgment: Judgment normal.           Significant Labs: I have reviewed all pertinent labs in past 24 hours.     Significant Imaging: I have reviewed all pertinent imaging results/findings within the past 24 hours.

## 2023-05-09 NOTE — SUBJECTIVE & OBJECTIVE
Past Medical History:   Diagnosis Date    Anxiety     Bipolar 1 disorder     COPD (chronic obstructive pulmonary disease)     Depression     Diabetes mellitus     GERD (gastroesophageal reflux disease)     Grade I open displaced transverse fracture of shaft of left humerus 3/20/2023    HEARING LOSS     pt states she has loss slighty in rt ear.    Hypertension     Insomnia     Rash      Past Surgical History:   Procedure Laterality Date    APPENDECTOMY      APPLICATION, EXTERNAL FIXATION DEVICE, FOR ANKLE FRACTURE Right 5/5/2023    Procedure: APPLICATION, EXTERNAL FIXATION DEVICE, FOR ANKLE FRACTURE - right, mariusz, ancef, bone foam, slider, C arm door side;  Surgeon: Yanely Guerra MD;  Location: [address];  Service: Orthopedics;  Laterality: Right;  right, mariusz, ancef, bone foam, slider, C arm door side    BREAST BIOPSY      COLONOSCOPY      COLONOSCOPY N/A 10/31/2019    Procedure: COLONOSCOPY;  Surgeon: Philippe Monteiro MD;  Location: [facility] ([address] [place] [address]);  Service: Endoscopy;  Laterality: N/A;  PM prep-MH    CYST REMOVAL      ESOPHAGOGASTRODUODENOSCOPY N/A 10/31/2019    Procedure: EGD (ESOPHAGOGASTRODUODENOSCOPY);  Surgeon: Philippe Monteiro MD;  Location: [facility] ([address] [place] [address]);  Service: Endoscopy;  Laterality: N/A;  Pm prep-MH    FIXATION OF SYNDESMOSIS OF ANKLE Right 5/8/2023    Procedure: FIXATION, SYNDESMOSIS, ANKLE;  Surgeon: Johnathan Contreras MD;  Location: [address];  Service: Orthopedics;  Laterality: Right;    HYSTERECTOMY      IRRIGATION AND DEBRIDEMENT OF UPPER EXTREMITY Left 3/21/2023    Procedure: IRRIGATION AND DEBRIDEMENT, UPPER EXTREMITY;  Surgeon: Ousmane Crawford MD;  Location: [address];  Service: Orthopedics;  Laterality: Left;    OPEN REDUCTION AND INTERNAL FIXATION (ORIF) OF INJURY OF ANKLE Right 5/8/2023    Procedure: ORIF, ANKLE - RIGHT. EX FIX REMOVAL. SYNTHES. C ARM DOOR SIDE.  BONE FOAM.;  Surgeon: Johnathan Contreras MD;  Location: [address];  Service:  Orthopedics;  Laterality: Right;    ORIF HUMERUS FRACTURE Left 3/21/2023    Procedure: ORIF, FRACTURE, HUMERUS;  Surgeon: Ousmane Crawford MD;  Location: [facility] OR [address] [place] [address];  Service: Orthopedics;  Laterality: Left;     Review of patient's allergies indicates:   Allergen Reactions    Lamictal [lamotrigine] Rash     Per psychiatry notes       Scheduled Medications:    acetaminophen  1,000 mg Oral Q8H    aspirin  81 mg Oral BID    atorvastatin  80 mg Oral Daily    ceFAZolin (ANCEF) IVPB  2 g Intravenous Q8H    EScitalopram oxalate  20 mg Oral Daily    hydrOXYzine pamoate  25 mg Oral QHS    insulin detemir U-100  5 Units Subcutaneous QHS    levothyroxine  100 mcg Oral Before breakfast    polyethylene glycol  17 g Oral Daily    senna-docusate 8.6-50 mg  1 tablet Oral BID    tiZANidine  2 mg Oral Q8H    traZODone  150 mg Oral Nightly       PRN Medications: dextrose 10%, dextrose 10%, dextrose, dextrose, glucagon (human recombinant), insulin aspart U-100, naloxone, ondansetron, oxyCODONE, oxyCODONE    Family History       Problem Relation (Age of Onset)    Alzheimer's disease Mother, Maternal Cousin, Maternal Cousin    Anxiety disorder Daughter    Colon cancer Maternal Aunt    Depression Daughter    Heart attack Father    Heart failure Father    Hyperlipidemia Father    No Known Problems Sister    Sarcoidosis Sister    Sinus disease Mother          Tobacco Use    Smoking status: Former     Packs/day: 0.50     Years: 34.00     Pack years: 17.00     Types: Cigarettes     Start date:      Quit date:      Years since quittin.3     Passive exposure: Never    Smokeless tobacco: Never   Substance and Sexual Activity    Alcohol use: Yes     Comment: Rare    Drug use: Not Currently     Types: Marijuana, Cocaine    Sexual activity: Not Currently     Partners: Male     Birth control/protection: None     Review of Systems   Constitutional:  Positive for activity change. Negative for fatigue and fever.   HENT:   Negative for sore throat and trouble swallowing.    Eyes:  Negative for visual disturbance.   Respiratory:  Negative for cough and shortness of breath.    Cardiovascular:  Negative for chest pain and leg swelling.   Gastrointestinal:  Negative for abdominal distention and abdominal pain.   Genitourinary:  Negative for difficulty urinating.   Musculoskeletal:  Positive for gait problem. Negative for back pain.   Skin:  Negative for color change.   Neurological:  Positive for weakness. Negative for dizziness, light-headedness and headaches.   Psychiatric/Behavioral:  Negative for agitation and confusion.    Objective:     Vital Signs (Most Recent):  Temp: 97.9 °F (36.6 °C) (05/09/23 0728)  Pulse: 77 (05/09/23 0728)  Resp: 18 (05/09/23 0728)  BP: (!) 140/73 (05/09/23 0728)  SpO2: 95 % (05/09/23 0728)    Vital Signs (24h Range):  Temp:  [97 °F (36.1 °C)-98.6 °F (37 °C)] 97.9 °F (36.6 °C)  Pulse:  [63-82] 77  Resp:  [14-29] 18  SpO2:  [92 %-100 %] 95 %  BP: ()/(46-79) 140/73     Body mass index is 24.69 kg/m².     Physical Exam  Vitals and nursing note reviewed.   Constitutional:       Appearance: Normal appearance. She is well-developed.   HENT:      Mouth/Throat:      Mouth: Mucous membranes are moist.   Eyes:      Pupils: Pupils are equal, round, and reactive to light.   Pulmonary:      Effort: Pulmonary effort is normal.      Breath sounds: Normal breath sounds.   Musculoskeletal:      Cervical back: Normal range of motion and neck supple.      Comments: Ex fix R ankle   Skin:     General: Skin is warm and dry.   Neurological:      Mental Status: She is alert.      Motor: Weakness present.      Gait: Gait abnormal.   Psychiatric:         Mood and Affect: Mood normal.         Behavior: Behavior normal.        NEUROLOGICAL EXAMINATION:     CRANIAL NERVES     CN III, IV, VI   Pupils are equal, round, and reactive to light.    Diagnostic Results: Labs: Reviewed  ECG: Reviewed  CT: Reviewed

## 2023-05-09 NOTE — ASSESSMENT & PLAN NOTE
· Controlled. Present on admit. Hgb 10-11 in hospital.   · Patient has no signs of active bleeding and hemodynamically stable. Patient is asymptomatic related to anemia.   · Goal is keep Hgb >7 and consider blood transfusion if Hgb < 7 if asymptomatic or < 8 if patient becomes symptomatic.  · Plan is to monitor daily CBC.

## 2023-05-09 NOTE — PLAN OF CARE
Problem: Occupational Therapy  Goal: Occupational Therapy Goal  Description: Goals to be met by: 5/16/23     Patient will increase functional independence with ADLs by performing:    UE Dressing with Set-up Assistance.  LE Dressing with Stand-by Assistance.  Grooming while seated at sink with Modified [place].  Toileting from bedside commode with Modified [place] for hygiene and clothing management.   Toilet transfer to bedside commode with Modified [place].    Outcome: Ongoing, Progressing  Pt. Re-Evaluated and goals established

## 2023-05-09 NOTE — PLAN OF CARE
Spoke with patient at bedside regarding accepting Rehab facilities. Patient has chosen [facility] as first choice. Facility to submit auth for admission on 5/10/23. Will continue to follow for needs.      Lydia FREEMAN  Case Management  [facility]  [phone]

## 2023-05-09 NOTE — SUBJECTIVE & OBJECTIVE
"Principal Problem:Closed trimalleolar fracture of right ankle with routine healing    Principal Orthopedic Problem: same sp ex fix removal and ORIF 5/8    Interval History: pod1. Naeon. Vs  wnl. Pain well controlled. Elevating.    Review of patient's allergies indicates:   Allergen Reactions    Lamictal [lamotrigine] Rash     Per psychiatry notes       Current Facility-Administered Medications   Medication    acetaminophen tablet 1,000 mg    aspirin chewable tablet 81 mg    atorvastatin tablet 80 mg    ceFAZolin 2 g in dextrose 5 % in water (D5W) 5 % 50 mL IVPB (MB+)    dextrose 10% bolus 125 mL 125 mL    dextrose 10% bolus 250 mL 250 mL    dextrose 40 % gel 15,000 mg    dextrose 40 % gel 30,000 mg    EScitalopram oxalate tablet 20 mg    glucagon (human recombinant) injection 1 mg    hydrOXYzine pamoate capsule 25 mg    insulin aspart U-100 pen 0-5 Units    insulin detemir U-100 (Levemir) pen 5 Units    levothyroxine tablet 100 mcg    naloxone 0.4 mg/mL injection 0.02 mg    ondansetron disintegrating tablet 8 mg    oxyCODONE immediate release tablet 5 mg    oxyCODONE immediate release tablet Tab 10 mg    polyethylene glycol packet 17 g    senna-docusate 8.6-50 mg per tablet 1 tablet    tiZANidine tablet 2 mg    traZODone tablet 150 mg     Objective:     Vital Signs (Most Recent):  Temp: 98.6 °F (37 °C) (05/09/23 0527)  Pulse: 70 (05/09/23 0527)  Resp: 16 (05/09/23 0527)  BP: 110/61 (05/09/23 0527)  SpO2: (!) 93 % (05/09/23 0527) Vital Signs (24h Range):  Temp:  [97 °F (36.1 °C)-98.6 °F (37 °C)] 98.6 °F (37 °C)  Pulse:  [63-82] 70  Resp:  [14-29] 16  SpO2:  [92 %-100 %] 93 %  BP: ()/(46-79) 110/61     Weight: 80.3 kg (177 lb 0.5 oz)  Height: 5' 11" (180.3 cm)  Body mass index is 24.69 kg/m².      Intake/Output Summary (Last 24 hours) at 5/9/2023 0642  Last data filed at 5/8/2023 1620  Gross per 24 hour   Intake 1000 ml   Output 15 ml   Net 985 ml        Ortho/SPM Exam  A&ox3, NAD    RLE  Splint cdi  Decreased " sensation and unable to wiggle toes sp nerve block  Foot and toes wwp     Significant Labs: All pertinent labs within the past 24 hours have been reviewed.    Significant Imaging: I have reviewed and interpreted all pertinent imaging results/findings.

## 2023-05-09 NOTE — PROGRESS NOTES
[facility]  Progress Note    Patient Name: Alix Patel  MRN: [id number]  Patient Class: IP- Inpatient   Admission Date: 5/5/2023  Length of Stay: 2 days  Attending Physician: Camilla Hernandes MD  Primary Care Provider: Angélica Barakat MD        Subjective:     Principal Problem:Closed trimalleolar fracture of right ankle with routine healing        HPI:  67 y.o.female with major depression, generalized anxiety disorder, COPD not on home oxygen or inhalers, Type 2 diabetes not on home insulin and just takes oral Metformin to treat, hyperlipidemia and previous open left humerus fracture s/p ORIF on 3/21/2023 by Dr. Ousmane Crawford presented to the [facility] ER with complaint of right ankle pain. Patient states pain is sharp/stabbing in the right ankle and is aggravated by any weight bearing. She reports onset of symptoms was 2 day(s) after a fall  while ambulating  at home. Patient reports that she was tripped and fell on a tile on her bathroom on 5/3 and twisted her right ankle. Patient denied head trauma. Patient denied to loss of consciousness, denied syncope, denied chest pain, denied dizziness prior to or after fall. Patient noted immediate pain to right ankle when she tripped and twisted ankle. Patient went to [facility] ED and according to records there was found to have a minimally displaced right distal fibula fracture even though patient told me that she was told she had just sprained her ankle. The ED records from [facility] stated actually they noted a right distal fibula fracture. Patient was placed in a tall walking boot after consultation with Dr. Townsend from Ortho at [facility] with plans for patient to follow-up with his office on 5/5. Patient reports when she got home from the ED the walking boot was not on right and she slipped again and this time she noted severe pain in her right ankle and unable to bear any weight and scooted herself up the stairs and has  pretty much been in bed for past 2 days unable to bear weight to her right ankle ad noted significant swelling to her right ankle due. Due to continued pain and swelling patient decided to come to ED today to get right ankle re-evaluated here at [facility]. X-ray obtained in ER revealed right distal fibula fracture and possible posterior malleolus with dislocation. Orthopedics consulted in ED and plan to take to OR tonight to reduce dislocation and place ex fix to right ankle.   Prior to admission patient's functional mobility was independent with no assistive device for community and home distances. Patient does not have history of gait or balance problems. Patient does have history of previous falls or fractures. Patient does not have previous cardiac history such as MI or CAD. Patient does not have previous history of TIA or stroke. Patient does not have previous history of compensated heart failure. Patient does have history of diabetes but is not insulin requiring. Patient does not have history of advanced kidney disease with creatinine > 2.   Patient reports due to her lack of mobility he has not taken even of her home meds since 5/3.         Overview/Hospital Course:  Patient taken to OR and had reduction of dislocation and ex fix placed on right ankle for fracture by Dr. Yanely Guerra on 5/5/2023. Patient post-op, non-weight bearing to right lower extremity. Patient to elavte and ice right ankle to keep swelling down as will need a second definitive fixation surgery to repair fracture once swelling in ankle improves. Patient placed on Aspirin 81 mg po BID for DVT prophylaxis. Patient to continue multimodal pain management for post-op pain control with scheduled Tylenol 1000 mg po every 8 hours and Tizanidine 2 mg po every 8 hours  with Oxycodone IR prn for breakthrough pain. PT/OT consulted post-op and recommending IP Rehab when medically ready. Patient went to OR on 5/8 and underwent removal of ex fix and ORIF of  right ankle by Dr. Johnathan Contreras. Post-op, patient non weight bearing to right lower extremity for 4-6 weeks pending fracture healing.  Per Ortho, sutures out in 2-3 weeks with immediate range of motion of the ankle. Plantar fasciitis boot when not undergoing range of motion to prevent equinus. Patient resumed on Aspirin 81 mg po BID for DVT prophylaxis post-op and will need for 4-6 weeks as per Ortho. PT/OT resumed post-op and recommending IP Rehab and case management working on placement. Pain controlled to right ankle post-op.         Interval History: Patient went to OR yesterday and underwent removal of ex fix and ORIF of right ankle by Dr. Johnathan Contreras. Post-op, patient non weight bearing to right lower extremity for 4-6 weeks pending fracture healing as per Ortho. Per Ortho, sutures out in 2-3 weeks with immediate range of motion of the ankle. Plantar fasciitis boot when not undergoing range of motion to prevent equinus. Patient resumed on Aspirin 81 mg po BID for DVT prophylaxis post-op and will need for 4-6 weeks as per Ortho. PT/OT resumed post-op and recommending IP Rehab and case management working on placement. Pain controlled to right ankle post-op. Patient accepted to [facility] and auth sent and hopeful discharge on 5/10. Patient reports no pain in right ankle and still numb from nerve block done during surgery yesterday.     Review of Systems   Constitutional:  Negative for fever.   Respiratory:  Negative for cough and shortness of breath.    Cardiovascular:  Negative for chest pain.   Gastrointestinal:  Negative for abdominal pain, diarrhea and nausea.   Musculoskeletal:  Negative for arthralgias (Right ankle).   Neurological:  Negative for dizziness.   Psychiatric/Behavioral:  Negative for agitation and confusion.    Objective:     Vital Signs (Most Recent):  Temp: 99 °F (37.2 °C) (05/09/23 1119)  Pulse: 77 (05/09/23 1119)  Resp: 16 (05/09/23 1119)  BP: 113/70 (05/09/23 1119)  SpO2: 97 %  (05/09/23 1119) on room air Vital Signs (24h Range):  Temp:  [97 °F (36.1 °C)-99 °F (37.2 °C)] 99 °F (37.2 °C)  Pulse:  [70-82] 77  Resp:  [14-29] 16  SpO2:  [92 %-100 %] 97 %  BP: ()/(46-79) 113/70     Weight: 80.3 kg (177 lb 0.5 oz)  Body mass index is 24.69 kg/m².    Intake/Output Summary (Last 24 hours) at 5/9/2023 1256  Last data filed at 5/8/2023 1620  Gross per 24 hour   Intake 1000 ml   Output 15 ml   Net 985 ml         Physical Exam  Vitals and nursing note reviewed.   Constitutional:       General: She is awake. She is not in acute distress.     Appearance: Normal appearance. She is well-developed. She is not ill-appearing.   Eyes:      Conjunctiva/sclera: Conjunctivae normal.   Cardiovascular:      Rate and Rhythm: Normal rate and regular rhythm.      Heart sounds: Normal heart sounds. No murmur heard.    No friction rub. No gallop.   Pulmonary:      Effort: Pulmonary effort is normal. No respiratory distress.      Breath sounds: Normal breath sounds. No wheezing.   Abdominal:      General: Abdomen is flat. Bowel sounds are normal. There is no distension.      Palpations: Abdomen is soft.      Tenderness: There is no abdominal tenderness.   Skin:     General: Skin is warm.      Findings: No erythema.   Neurological:      Mental Status: She is alert and oriented to person, place, and time.   Psychiatric:         Mood and Affect: Mood normal.         Behavior: Behavior normal. Behavior is cooperative.         Thought Content: Thought content normal.         Judgment: Judgment normal.           Significant Labs: I have reviewed all pertinent labs in past 24 hours.     Significant Imaging: I have reviewed all pertinent imaging results/findings within the past 24 hours.      Assessment/Plan:      * Closed trimalleolar fracture of right ankle with routine healing s/p ex fix removal and ORIF 5/8/23  · Patient went back to OR on 5/8 with Dr. Johnathan Contreras and underwent removal of ex fix and ORIF of right  ankle.   · Patient with closed trimalleolar fracture and dislocation of distal right fibula and likely posterior malleolus with posterior dislocation.   · Orthopedics consulted and patient taken to OR on 5/5/2023 by Dr. Yanely Guerra and underwent closed reduction of dislocation and ex fix for right ankle fracture.   · Non weight bearing to right lower extremity as per Ortho for 4-6 weeks.  · Per Ortho, sutures out in 2-3 weeks with immediate range of motion of the ankle. Plantar fasciitis boot when not undergoing range of motion to prevent equinus.   · Pain controlled. Continue multimodal pain management with Tylenol 1000 mg po every 8 hours + Tizanidine 2 mg po every 8 hours with Oxy IR 5 mg po every 4 hours prn for breakthrough pain.  · Continue Aspirin 81 mg po BID for DVT prophylaxis for 4-6 weeks.   · PT/OT consulted and recommending IP Rehab on discharge when medically ready. Patient accepted to [facility] and plan discharge on 5/10. Awaiting insurance auth.     Type 2 diabetes mellitus without complication, without long-term current use of insulin  Patient's FSGs are controlled on current regimen. Patient on Metformin 1000 mg po BID as outpatient to treat and will resume on discharge.   Last A1c reviewed-   Lab Results   Component Value Date    HGBA1C 7.5 (H) 03/20/2023       Blood Sugars (AccuCheck):    Recent Labs     05/08/23  1131 05/08/23  1658 05/08/23  1829 05/09/23  0005 05/09/23  0744 05/09/23  1121   POCTGLUCOSE 132* 202* 228* 356* 173* 228*     Continue correctional scale  Low  Maintain anti-hyperglycemic dose as follows-   Antihyperglycemics (From admission, onward)    Start     Stop Route Frequency Ordered    05/06/23 2100  insulin detemir U-100 (Levemir) pen 5 Units         -- SubQ Nightly 05/06/23 1055    05/05/23 1758  insulin aspart U-100 pen 0-5 Units         -- SubQ Before meals & nightly PRN 05/05/23 1659        Hold Oral hypoglycemics while patient is in the hospital.  Diabetic  diet.  Target blood sugars 140-180 in hospital.  Monitor blood sugars 4 times daily with meals and at bedtime.     Anemia of chronic disease  · Controlled. Present on admit. Hgb 10-11 in hospital.   · Patient has no signs of active bleeding and hemodynamically stable. Patient is asymptomatic related to anemia.   · Goal is keep Hgb >7 and consider blood transfusion if Hgb < 7 if asymptomatic or < 8 if patient becomes symptomatic.  · Plan is to monitor daily CBC.    Pulmonary emphysema  · Controlled with no signs or symptoms to suggest active COPD exacerbation.   · Patient is not on any inhalers at home such as LAMA, LABA/Inhaled corticosteroid combination or short acting beta agonist at home.   · Patient is not on oxygen at home.   · Plan OOB to chair and encourage IS use post-op to decrease risk of postoperative pulmonary complications.    Acquired hypothyroidism  Chronic and controlled. Continue Levothyroxine 100 mcg po daily to treat.       Hyperlipidemia with target low density lipoprotein (LDL) cholesterol less than 100 mg/dL  Present on admission. Patient states she is supposed to be on Crestor 20 mg po daily at home but has not been taking recently. Will place on Lipitor 80 mg po daily a Crestor not on formulary.       LILLY (generalized anxiety disorder)  Recurrent major depressive disorder, in full remission   Chronic and controlled. Conitnue home Lexapro 20 mg po daily + Trazodone 150 mg po nightly to treat chronic depression and anxiety.         VTE Risk Mitigation (From admission, onward)         Ordered     Place JUAN hose  Until discontinued         05/05/23 1659     Reason for No Pharmacological VTE Prophylaxis  Once        Question:  Reasons:  Answer:  Risk of Bleeding    05/05/23 1659     IP VTE HIGH RISK PATIENT  Once         05/05/23 1659     Place sequential compression device  Until discontinued         05/05/23 1659                Discharge Planning   GILLES: 5/10/2023     Code Status: Full Code   Is  the patient medically ready for discharge?: No    Reason for patient still in hospital (select all that apply): Patient trending condition  Discharge Plan A: Rehab ([facility]) for 5/10; Insurance authorization pending.        Camilla Hernandes MD  Department of Hospital Medicine   [facility] - Surgery

## 2023-05-09 NOTE — ASSESSMENT & PLAN NOTE
· Controlled with no signs or symptoms to suggest active COPD exacerbation.   · Patient is not on any inhalers at home such as LAMA, LABA/Inhaled corticosteroid combination or short acting beta agonist at home.   · Patient is not on oxygen at home.   · Plan OOB to chair and encourage IS use post-op to decrease risk of postoperative pulmonary complications.   Aisha

## 2023-05-09 NOTE — ASSESSMENT & PLAN NOTE
· Patient went back to OR on 5/8 with Dr. Johnathan Contreras and underwent removal of ex fix and ORIF of right ankle.   · Patient with closed trimalleolar fracture and dislocation of distal right fibula and likely posterior malleolus with posterior dislocation.   · Orthopedics consulted and patient taken to OR on 5/5/2023 by Dr. Yanely Guerra and underwent closed reduction of dislocation and ex fix for right ankle fracture.   · Non weight bearing to right lower extremity as per Ortho for 4-6 weeks.  · Per Ortho, sutures out in 2-3 weeks with immediate range of motion of the ankle. Plantar fasciitis boot when not undergoing range of motion to prevent equinus.   · Pain controlled. Continue multimodal pain management with Tylenol 1000 mg po every 8 hours + Tizanidine 2 mg po every 8 hours with Oxy IR 5 mg po every 4 hours prn for breakthrough pain.  · Continue Aspirin 81 mg po BID for DVT prophylaxis for 4-6 weeks.   · PT/OT consulted and recommending IP Rehab on discharge when medically ready. Patient accepted to [facility] and plan discharge on 5/10. Awaiting insurance auth.

## 2023-05-10 VITALS
RESPIRATION RATE: 18 BRPM | HEART RATE: 80 BPM | DIASTOLIC BLOOD PRESSURE: 58 MMHG | WEIGHT: 177 LBS | SYSTOLIC BLOOD PRESSURE: 102 MMHG | OXYGEN SATURATION: 94 % | TEMPERATURE: 99 F | HEIGHT: 71 IN | BODY MASS INDEX: 24.78 KG/M2

## 2023-05-10 DIAGNOSIS — M25.532 LEFT WRIST PAIN: Primary | ICD-10-CM

## 2023-05-10 LAB
BASOPHILS # BLD AUTO: 0.03 K/UL (ref 0–0.2)
BASOPHILS NFR BLD: 0.3 % (ref 0–1.9)
DIFFERENTIAL METHOD: ABNORMAL
EOSINOPHIL # BLD AUTO: 0.1 K/UL (ref 0–0.5)
EOSINOPHIL NFR BLD: 1.5 % (ref 0–8)
ERYTHROCYTE [DISTWIDTH] IN BLOOD BY AUTOMATED COUNT: 12.7 % (ref 11.5–14.5)
HCT VFR BLD AUTO: 32.8 % (ref 37–48.5)
HGB BLD-MCNC: 10.5 G/DL (ref 12–16)
IMM GRANULOCYTES # BLD AUTO: 0.03 K/UL (ref 0–0.04)
IMM GRANULOCYTES NFR BLD AUTO: 0.3 % (ref 0–0.5)
LYMPHOCYTES # BLD AUTO: 3.3 K/UL (ref 1–4.8)
LYMPHOCYTES NFR BLD: 36.8 % (ref 18–48)
MCH RBC QN AUTO: 30.3 PG (ref 27–31)
MCHC RBC AUTO-ENTMCNC: 32 G/DL (ref 32–36)
MCV RBC AUTO: 95 FL (ref 82–98)
MONOCYTES # BLD AUTO: 0.6 K/UL (ref 0.3–1)
MONOCYTES NFR BLD: 6.8 % (ref 4–15)
NEUTROPHILS # BLD AUTO: 4.8 K/UL (ref 1.8–7.7)
NEUTROPHILS NFR BLD: 54.3 % (ref 38–73)
NRBC BLD-RTO: 0 /100 WBC
PLATELET # BLD AUTO: 247 K/UL (ref 150–450)
PMV BLD AUTO: 10.4 FL (ref 9.2–12.9)
POCT GLUCOSE: 178 MG/DL (ref 70–110)
POCT GLUCOSE: 282 MG/DL (ref 70–110)
RBC # BLD AUTO: 3.47 M/UL (ref 4–5.4)
WBC # BLD AUTO: 8.83 K/UL (ref 3.9–12.7)

## 2023-05-10 PROCEDURE — 85025 COMPLETE CBC W/AUTO DIFF WBC: CPT | Performed by: INTERNAL MEDICINE

## 2023-05-10 PROCEDURE — 25000003 PHARM REV CODE 250

## 2023-05-10 PROCEDURE — 99239 HOSP IP/OBS DSCHRG MGMT >30: CPT | Mod: ,,, | Performed by: INTERNAL MEDICINE

## 2023-05-10 PROCEDURE — 36415 COLL VENOUS BLD VENIPUNCTURE: CPT | Performed by: INTERNAL MEDICINE

## 2023-05-10 PROCEDURE — 25000003 PHARM REV CODE 250: Performed by: STUDENT IN AN ORGANIZED HEALTH CARE EDUCATION/TRAINING PROGRAM

## 2023-05-10 PROCEDURE — 97116 GAIT TRAINING THERAPY: CPT

## 2023-05-10 PROCEDURE — 99239 PR HOSPITAL DISCHARGE DAY,>30 MIN: ICD-10-PCS | Mod: ,,, | Performed by: INTERNAL MEDICINE

## 2023-05-10 PROCEDURE — 1111F DSCHRG MED/CURRENT MED MERGE: CPT | Mod: CPTII,,, | Performed by: INTERNAL MEDICINE

## 2023-05-10 PROCEDURE — 97530 THERAPEUTIC ACTIVITIES: CPT

## 2023-05-10 PROCEDURE — 25000003 PHARM REV CODE 250: Performed by: INTERNAL MEDICINE

## 2023-05-10 PROCEDURE — 97535 SELF CARE MNGMENT TRAINING: CPT

## 2023-05-10 PROCEDURE — 1111F PR DISCHARGE MEDS RECONCILED W/ CURRENT OUTPATIENT MED LIST: ICD-10-PCS | Mod: CPTII,,, | Performed by: INTERNAL MEDICINE

## 2023-05-10 RX ORDER — OXYCODONE HYDROCHLORIDE 5 MG/1
5 TABLET ORAL EVERY 4 HOURS PRN
Start: 2023-05-10 | End: 2023-06-02 | Stop reason: ALTCHOICE

## 2023-05-10 RX ORDER — ACETAMINOPHEN 500 MG
1000 TABLET ORAL EVERY 8 HOURS
Refills: 0
Start: 2023-05-10 | End: 2023-06-15

## 2023-05-10 RX ORDER — OXYCODONE HYDROCHLORIDE 5 MG/1
5 TABLET ORAL ONCE
Status: COMPLETED | OUTPATIENT
Start: 2023-05-10 | End: 2023-05-10

## 2023-05-10 RX ORDER — TIZANIDINE 2 MG/1
2 TABLET ORAL EVERY 8 HOURS
Start: 2023-05-10 | End: 2023-06-05 | Stop reason: SDUPTHER

## 2023-05-10 RX ORDER — ASPIRIN 81 MG/1
81 TABLET ORAL 2 TIMES DAILY
COMMUNITY
Start: 2023-05-10 | End: 2023-06-19 | Stop reason: ALTCHOICE

## 2023-05-10 RX ORDER — BISACODYL 10 MG
10 SUPPOSITORY, RECTAL RECTAL ONCE
Status: COMPLETED | OUTPATIENT
Start: 2023-05-10 | End: 2023-05-10

## 2023-05-10 RX ORDER — ROSUVASTATIN CALCIUM 20 MG/1
20 TABLET, COATED ORAL DAILY
Refills: 3
Start: 2023-05-10

## 2023-05-10 RX ADMIN — OXYCODONE HYDROCHLORIDE 10 MG: 10 TABLET ORAL at 01:05

## 2023-05-10 RX ADMIN — INSULIN ASPART 3 UNITS: 100 INJECTION, SOLUTION INTRAVENOUS; SUBCUTANEOUS at 12:05

## 2023-05-10 RX ADMIN — OXYCODONE HYDROCHLORIDE 10 MG: 10 TABLET ORAL at 12:05

## 2023-05-10 RX ADMIN — TIZANIDINE 2 MG: 2 TABLET ORAL at 01:05

## 2023-05-10 RX ADMIN — ACETAMINOPHEN 1000 MG: 500 TABLET ORAL at 01:05

## 2023-05-10 RX ADMIN — ASPIRIN 81 MG CHEWABLE TABLET 81 MG: 81 TABLET CHEWABLE at 09:05

## 2023-05-10 RX ADMIN — SENNOSIDES AND DOCUSATE SODIUM 1 TABLET: 50; 8.6 TABLET ORAL at 09:05

## 2023-05-10 RX ADMIN — OXYCODONE HYDROCHLORIDE 10 MG: 10 TABLET ORAL at 08:05

## 2023-05-10 RX ADMIN — ESCITALOPRAM OXALATE 20 MG: 20 TABLET ORAL at 09:05

## 2023-05-10 RX ADMIN — TIZANIDINE 2 MG: 2 TABLET ORAL at 06:05

## 2023-05-10 RX ADMIN — OXYCODONE 5 MG: 5 TABLET ORAL at 11:05

## 2023-05-10 RX ADMIN — ATORVASTATIN CALCIUM 80 MG: 40 TABLET, FILM COATED ORAL at 09:05

## 2023-05-10 RX ADMIN — LEVOTHYROXINE SODIUM 100 MCG: 50 TABLET ORAL at 06:05

## 2023-05-10 RX ADMIN — POLYETHYLENE GLYCOL 3350 17 G: 17 POWDER, FOR SOLUTION ORAL at 09:05

## 2023-05-10 RX ADMIN — ACETAMINOPHEN 1000 MG: 500 TABLET ORAL at 06:05

## 2023-05-10 RX ADMIN — BISACODYL 10 MG: 10 SUPPOSITORY RECTAL at 09:05

## 2023-05-10 NOTE — PLAN OF CARE
[facility]  Discharge Final Note    Primary Care Provider: Angélica Barakat MD    Expected Discharge Date: 5/10/2023    Final Discharge Note (most recent)       Final Note - 05/10/23 1513          Final Note    Assessment Type Final Discharge Note     Anticipated Discharge Disposition Rehab Facility     What phone number can be called within the next 1-3 days to see how you are doing after discharge? --   [phone]    Hospital Resources/Appts/Education Provided Appointments scheduled and added to AVS        Post-Acute Status    Post-Acute Authorization Placement     Post-Acute Placement Status Set-up Complete/Auth obtained                     Important Message from Medicare      Future Appointments   Date Time Provider Department Center   5/16/2023  9:00 AM [facility] [id number] 485 LB LIMIT [facility] XRAYORT [address]   5/16/2023  9:30 AM Trey Kan MD [facility] ORTHO [address]   5/16/2023  2:45 PM Varsha Nieves DPM N[facility] POD [address]   5/23/2023 10:15 AM Brenda Yang PA-C [facility] ORTHO [address]   6/15/2023  9:15 AM Johnathan Rae MD [facility] NEURO[address]   6/19/2023  1:30 PM Sebastian Kaplan NP [facility] ORTHO [address]     Patient discharged to [facility] on 5/9/23.    Lydia Birmingham RNCM  Case Management  [facility]  [phone]

## 2023-05-10 NOTE — PT/OT/SLP PROGRESS
Physical Therapy Treatment    Patient Name:  Alix Patel   MRN:  [id number]  Admitting Diagnosis:  Closed trimalleolar fracture of right ankle with routine healing   Recent Surgery: Procedure(s) (LRB):  ORIF, ANKLE - RIGHT. EX FIX REMOVAL. SYNTHES. C ARM DOOR SIDE.  BONE FOAM. (Right)  FIXATION, SYNDESMOSIS, ANKLE (Right) 2 Days Post-Op  Admit Date: 5/5/2023  Length of Stay: 3 days    Recommendations:     Discharge Recommendations:  rehabilitation facility   Discharge Equipment Recommendations: to be determined by next level of care   Barriers to discharge: None    Assessment:     Alix Patel is a 67 y.o. female admitted with a medical diagnosis of Closed trimalleolar fracture of right ankle with routine healing.  She presents with the following impairments/functional limitations:  weakness, impaired functional mobility, gait instability, impaired endurance, impaired balance, impaired self care skills, decreased lower extremity function, decreased ROM, pain, orthopedic precautions, decreased safety awareness.     Pt agreeable to therapy, wants to get to bedside commode for bowel movement. Pt mod A stand pivot transfer to bedside commode, some difficulty keeping RLE off ground today. Pt SUP for toileting, then mod A for squat pivot back to chair with better compliance with WB precautions. Pt then stands with mod A and hops 3 fwd and backward hops with hemiwalker and mod A.    Rehab Prognosis: Good; patient would benefit from acute skilled PT services to address these deficits and reach maximum level of function.    Recent Surgery: Procedure(s) (LRB):  ORIF, ANKLE - RIGHT. EX FIX REMOVAL. SYNTHES. C ARM DOOR SIDE.  BONE FOAM. (Right)  FIXATION, SYNDESMOSIS, ANKLE (Right) 2 Days Post-Op    Treatment Tolerated: Fairly Well    Highest level of mobility achieved this visit: 3 hops fwd/bck w/ hemiwalker and mod A    Activity with RN/PCT: transfers with 2 person assist    Plan:     During this  "hospitalization, patient to be seen 6 x/week to address the identified rehab impairments via therapeutic exercises, neuromuscular re-education, therapeutic activities, gait training and progress toward the following goals:    Plan of Care Expires:  06/09/23    Subjective     RN Gabbi JOHNSON notified prior to session. No family present upon PT entrance into room.    Chief Complaint: pain  Patient/Family Comments/goals: "I'm leaving for the rehab today"  Pain/Comfort:  Pain Rating 1:  (unrated pain with movement)  Location - Side 1: Right  Location - Orientation 1: generalized  Location 1: ankle  Pain Addressed 1: Distraction      Objective:     Additional staff present: none    Patient found up in chair with:  (no active lines)   Cognition:   Alert and Cooperative  Command following: Follows one-step verbal commands  Fluency: clear/fluent  General Precautions: Standard, Cardiac fall   Orthopedic Precautions:LUE non weight bearing, RLE non weight bearing   Braces: Sling and swathe (L)   Body mass index is 24.69 kg/m².  Oxygen Device: Room Air    Vitals: BP (!) 102/58 (BP Location: Right arm, Patient Position: Sitting)   Pulse 80   Temp 98.5 °F (36.9 °C) (Oral)   Resp 18   Ht 5' 11" (1.803 m)   Wt 80.3 kg (177 lb 0.5 oz)   SpO2 (!) 94%   Breastfeeding No   BMI 24.69 kg/m²     Outcome Measures:  AM-PAC 6 CLICK MOBILITY  Turning over in bed (including adjusting bedclothes, sheets and blankets)?: 3  Sitting down on and standing up from a chair with arms (e.g., wheelchair, bedside commode, etc.): 2  Moving from lying on back to sitting on the side of the bed?: 3  Moving to and from a bed to a chair (including a wheelchair)?: 2  Need to walk in hospital room?: 2  Climbing 3-5 steps with a railing?: 1  Basic Mobility Total Score: 13     Functional Mobility:    Bed Mobility:   Pt found/returned to bedside chair    Transfers:   Sit <> Stand Transfer: moderate assistance with no assistive device and hemiwalker   Stand <> " Sit Transfer: moderate assistance with no assistive device   [id number] from bedside chair  Bed <> Commode Transfer: Stand Pivot technique with moderate assistance with no assistive device  Chair on patient's R  Difficulty maintaining WB precautions  Commode <> Bed Transfer: Squat Pivot technique with moderate assistance with no assistive device  Bed on patient's L  Maintains WB precautions    Standing Balance:  Assistance Level Required: Minimal Assistance  Patient used: hemiwalker   Time: 3 min  Postural deviations noted: no deviations noted  Encouraged: NWB stance      Gait:  Patient ambulated: 3 hops fwd/bck   Patient required: moderate assist  Patient used:  hemiwalker   Gait Pattern observed: non-weightbearing   Gait Deviation(s): occasional unsteady gait and decreased ron  Impairments due to: impaired balance, decreased strength, and decreased endurance  Chair follow for patient safety  Gait belt utilized  Comments: difficulty with full clearance with hops    Education:  Time provided for education, counseling and discussion of health disposition in regards to patient's current status  All questions answered within PT scope of practice and to patient's satisfaction  PT role in POC to address current functional deficits  Pt educated on proper body mechanics, safety techniques, and energy conservation with PT facilitation and cueing throughout session    Patient left up in chair with call button in reach.    GOALS:   Multidisciplinary Problems       Physical Therapy Goals          Problem: Physical Therapy    Goal Priority Disciplines Outcome Goal Variances Interventions   Physical Therapy Goal     PT, PT/OT Ongoing, Progressing     Description: Goals to be met by: 2023     Patient will increase functional independence with mobility by performin. Supine to sit with Set-up Olga  2. Sit to supine with Set-up Olga  3. Sit to stand transfer with Supervision  4. Bed to chair transfer  with Supervision using Keanu-walker  5. Gait  x 10 feet with Contact Guard Assistance using Keanu-walker.   6. Wheelchair propulsion x150 feet with Supervision using  right upper extremity and left lower extremity  7. Lower extremity exercise program x15 reps per handout, with supervision                       Time Tracking:     PT Received On: 05/10/23  PT Start Time: 1216     PT Stop Time: 1239  PT Total Time (min): 23 min     Billable Minutes:   Gait Training 8 min and Therapeutic Activity 15 min    Treatment Type: Treatment  PT/PTA: PT       Lei Lucero, PT, DPT  5/10/2023

## 2023-05-10 NOTE — NURSING
AAOx4. RR even and regular. Plan of care discussed. Pt lying in bed, rle elevated on pillows. Bed lowest ps, srx3, call light in reach.Will cont to monitor.

## 2023-05-10 NOTE — PLAN OF CARE
05/10/23 1105   Post-Acute Status   Post-Acute Authorization Placement   Post-Acute Placement Status Set-up Complete/Auth obtained   Hospital Resources/Appts/Education Provided Provided education on problems/symptoms using teachback   Discharge Plan   Discharge Plan A Rehab   Discharge Plan B Rehab     Patient's set-up complete. PFC order placed for Wheelchair transfer to [facility] at 12PM. Bedside nurse to call report after 1130PM to [phone]. Updated patient at bedside of transfer plan.    Lydia Birmingham RNCM  Case Management  [facility]  [phone]

## 2023-05-10 NOTE — PLAN OF CARE
[facility]     Department of Hospital Medicine     [address] [place] [address]     [phone] after hours  [phone] fax                                        FACILITY TRANSFER ORDERS     05/10/2023    [facility]    Diagnoses:  Active Hospital Problems    Diagnosis  POA    *Closed trimalleolar fracture of right ankle with routine healing s/p ex fix removal and ORIF 5/8/23 [S82.851D]  Not Applicable     Priority: 1 - High    Type 2 diabetes mellitus without complication, without long-term current use of insulin [E11.9]  Yes     Priority: 2     Anemia of chronic disease [D63.8]  Yes     Priority: 4     Pulmonary emphysema [J43.9]  Yes     Priority: 4     Acquired hypothyroidism [E03.9]  Yes     Priority: 5     Hyperlipidemia with target low density lipoprotein (LDL) cholesterol less than 100 mg/dL [E78.5]  Yes     Priority: 5     LILLY (generalized anxiety disorder) [F41.1]  Yes     Priority: 6     Impaired mobility [Z74.09]  Yes    Closed trimalleolar fracture of ankle, right, initial encounter [S82.851A]  Yes    Closed dislocation of ankle, right, initial encounter [S93.04XA]  Yes    Recurrent major depressive disorder, in full remission [F33.42]  Yes      Resolved Hospital Problems    Diagnosis Date Resolved POA    Hypokalemia [E87.6] 05/07/2023 Yes     Priority: 3        Vital Signs: Routine.    Allergies:  Review of patient's allergies indicates:   Allergen Reactions    Lamictal [lamotrigine] Rash     Per psychiatry notes       Code Status: Full Code     Diet: diabetic diet: 2000 calorie      Supplement: House supplement one can every by mouth with meals                             Activities:   - Activity as tolerated   - Up in a chair each morning as tolerated   - Ambulate with assistance to bathroom   - May use walker, cane, or self-propelled wheelchair    Weight Bearing Status:   Non weight bearing to right lower extremity as per  Ortho for 4-6 weeks.    Non weight bearing until 5/16/2023 to left upper extremity then can start at 2 lbs x 4 weeks and increase by 2 lbs/month thereafter as per Ortho. Sling for comfort to left upper extremity. Recovering from humerus fracture surgery.    Nursing: Out of bed BID, Up with assistance  Measure height and weight on admit    Nursing Precautions:          - Fall precautions per nursing home protocol       Labs: Per facility protocol    CONSULTS:      Physical Therapy to evaluate and treat 5 times a week      Occupational Therapy to evaluate and treat 5 times a week       MISCELLANEOUS CARE:  Routine Skin for Bedridden Patients: Instruct patient/caregiver to apply moisture barrier cream to all skin folds and wet areas in perineal area daily and after baths and all bowel movements.      WOUND CARE ORDERS:  Keep short splint in place to right lower extremity until Orthopedic clinic follow-up. Orthopedic clinic follow-up. Assess surgical dressing with each treatment. Call MD if any drainage reaches border to border of dressing horizontally, signs or symptoms of infection, temp >101 F, induration, swelling or redness.      Per Ortho, sutures out in 2-3 weeks with immediate range of motion of the ankle.     DIABETES CARE:     SN to perform and educate Diabetic management with blood glucose monitoring:, Fingerstick blood sugar before meals and at bedtime and Report CBG < 60 or > 350 to physician.                                          Insulin Sliding Scale          Glucose  Novolog Insulin Subcutaneous        0 - 60   Orange juice or glucose tablet, hold insulin      No insulin   201-250  2 units   251-300  4 units   301-350  6 units   351-400  8 units   >400   10 units then call physician    Medications:     Current Discharge Medication List        START taking these medications    Details   acetaminophen (TYLENOL) 500 MG tablet Take 2 tablets (1,000 mg total) by mouth every 8 (eight) hours.  Refills:  0      insulin detemir U-100, Levemir, 100 unit/mL (3 mL) SubQ InPn pen Inject 5 Units into the skin every evening.  Refills: 0           CONTINUE these medications which have CHANGED    Details   aspirin (ECOTRIN) 81 MG EC tablet Take 1 tablet (81 mg total) by mouth 2 (two) times a day. DVT prophylaxis after ankle fracture surgery. End date 6/20/2023.      oxyCODONE (ROXICODONE) 5 MG immediate release tablet Take 1 tablet (5 mg total) by mouth every 4 (four) hours as needed (Moderate to severe pain).    Comments: N/A      rosuvastatin (CRESTOR) 20 MG tablet Take 1 tablet (20 mg total) by mouth once daily.  Refills: 3      tiZANidine (ZANAFLEX) 2 MG tablet Take 1 tablet (2 mg total) by mouth every 8 (eight) hours.           CONTINUE these medications which have NOT CHANGED    Details      dulaglutide (TRULICITY) 0.75 mg/0.5 mL pen injector Inject 0.75 mg into the skin every Sunday. Okay to hold while patient is at inpatient rehab.       EScitalopram oxalate (LEXAPRO) 20 MG tablet Take 1 tablet (20 mg total) by mouth once daily.  Qty: 90 tablet, Refills: 3                levothyroxine (SYNTHROID) 100 MCG tablet Take 1 tablet (100 mcg total) by mouth once daily.  Qty: 30 tablet, Refills: 1      metFORMIN (GLUCOPHAGE) 500 MG tablet Take 2 tablets (1,000 mg total) by mouth 2 (two) times daily with meals.  Qty: 120 tablet, Refills: 1      traZODone (DESYREL) 150 MG tablet TAKE 1 TABLET(150 MG) BY MOUTH EVERY EVENING  Qty: 90 tablet, Refills: 2      zolpidem (AMBIEN) 10 mg Tab Take 1 tablet (10 mg total) by mouth nightly as needed for sleep.  Qty: 30 tablet, Refills: 2                 Follow-up:   Future Appointments   Date Time Provider Department Center   5/16/2023  9:30 AM Trey Kan MD [facility] ORTHO [address]   5/16/2023  2:45 PM Varsha Nieves DPM [facility] [address]   5/23/2023 10:15 AM Brenda Yang PA-C [facility] [address]   6/15/2023  9:15 AM Johnathan Rae MD [facility] NEURO[address]   6/19/2023   1:30 PM GALI Gar [address]        _________________________________  Camilla Hernandes, MD  05/10/2023

## 2023-05-10 NOTE — PT/OT/SLP PROGRESS
Occupational Therapy   Treatment    Name: Alix Patel  MRN: [id number]  Admitting Diagnosis:  Closed trimalleolar fracture of right ankle with routine healing  2 Days Post-Op    Recommendations:     Discharge Recommendations: rehabilitation facility  Discharge Equipment Recommendations:  to be determined by next level of care  Barriers to discharge:  Inaccessible home environment, Decreased caregiver support    Assessment:     Alix Patel is a 67 y.o. female with a medical diagnosis of Closed trimalleolar fracture of right ankle with routine healing.  Performance deficits affecting function are weakness, impaired endurance, impaired self care skills, impaired functional mobility, gait instability, impaired balance, decreased upper extremity function, decreased lower extremity function, pain, orthopedic precautions, decreased safety awareness. Patient would benefit from continued skilled acute OT x/wk to improve functional mobility, increase independence with ADLs, and address established goals. Recommending inpatient rehabilitation once medically appropriate for discharge to increase maximal independence, reduce burden of care, and ensure safety.     Rehab Prognosis:  Good; patient would benefit from acute skilled OT services to address these deficits and reach maximum level of function.       Plan:     Patient to be seen 3 x/week to address the above listed problems via self-care/home management, therapeutic activities, therapeutic exercises, neuromuscular re-education  Plan of Care Expires: 06/08/23  Plan of Care Reviewed with: patient    Subjective     Chief Complaint: pain and just had a suppository  Patient/Family Comments/goals: patient agreed to therapy  Pain/Comfort:  Pain Rating 1:  (13/10)  Location - Orientation 1: generalized  Location 1:  (L shoulder and R foot)  Pain Addressed 1: Reposition, Distraction  Pain Rating Post-Intervention 1:  (13/10)    Objective:     Communicated with: DEA  prior to session.  Patient found HOB elevated with PureWick upon OT entry to room.    General Precautions: Standard, fall    Orthopedic Precautions:RLE non weight bearing, LUE non weight bearing  Braces:  (L shoulder brace)  Respiratory Status: Room air     Occupational Performance:     Bed Mobility:    Patient completed Supine to Sit with stand by assistance     Functional Mobility/Transfers:  Patient completed Sit <> Stand Transfer with minimum assistance  with  hand-held assist   Patient completed Bed > Chair Transfer using Stand Pivot technique with minimum assistance with hand-held assist  Patient completed Toilet Transfer bed<>BSC Stand Pivot technique with minimum assistance with  hand-held assist    Activities of Daily Living:  Grooming: setup for seated up in bedside chair for oral care and washing face  Toileting: stand by assistance while on BSC (excluding clothing management, but patient may need help with that). Patient was able to clean self. Extra time needed for toileting.       [facility] 6 Click ADL: 16    Treatment & Education:  Role of OT and POC  ADL retraining  Functional mobility training  Safety  Importance EOB/OOB activity    Patient left up in chair with all lines intact, call button in reach, and all needs met.   Nurse aware. R LE elevated and L LE FCD applied.    GOALS:   Multidisciplinary Problems       Occupational Therapy Goals          Problem: Occupational Therapy    Goal Priority Disciplines Outcome Interventions   Occupational Therapy Goal     OT, PT/OT Ongoing, Progressing    Description: Goals to be met by: 5/16/23     Patient will increase functional independence with ADLs by performing:    UE Dressing with Set-up Assistance.  LE Dressing with Stand-by Assistance.  Grooming while seated at sink with Modified [place].  Toileting from bedside commode with Modified [place] for hygiene and clothing management.   Toilet transfer to bedside commode with Modified [place].                          Time Tracking:     OT Date of Treatment: 05/10/23  OT Start Time: 1005  OT Stop Time: 1054  OT Total Time (min): 49 min    Billable Minutes:Self Care/Home Management 49               5/10/2023

## 2023-05-10 NOTE — NURSING
Discharging to Rehab, transport here for pt. All  belongings together with pt. IV removed. Pt ready for discharge.

## 2023-05-10 NOTE — PROGRESS NOTES
[facility]  Orthopedics  Progress Note    Patient Name: Alix Patel  MRN: [id number]  Admission Date: 5/5/2023  Hospital Length of Stay: 3 days  Attending Provider: Camilla Hernandes MD  Primary Care Provider: Angélica Barakat MD  Follow-up For: Procedure(s) (LRB):  ORIF, ANKLE - RIGHT. EX FIX REMOVAL. SYNTHES. C ARM DOOR SIDE.  BONE FOAM. (Right)  FIXATION, SYNDESMOSIS, ANKLE (Right)    Post-Operative Day: 2 Days Post-Op  Subjective:     Principal Problem:Closed trimalleolar fracture of right ankle with routine healing    Principal Orthopedic Problem: sp ex fix removal and ORIF 5/8    Interval History:  pod2. Naeon. Vs  wnl. Pain controlled. Slight in crease this morning. Elevating. Pt went ok yesterday. Rec IPR     Review of patient's allergies indicates:   Allergen Reactions    Lamictal [lamotrigine] Rash     Per psychiatry notes       Current Facility-Administered Medications   Medication    acetaminophen tablet 1,000 mg    aspirin chewable tablet 81 mg    atorvastatin tablet 80 mg    dextrose 10% bolus 125 mL 125 mL    dextrose 10% bolus 250 mL 250 mL    dextrose 40 % gel 15,000 mg    dextrose 40 % gel 30,000 mg    EScitalopram oxalate tablet 20 mg    glucagon (human recombinant) injection 1 mg    hydrOXYzine pamoate capsule 25 mg    insulin aspart U-100 pen 0-5 Units    insulin detemir U-100 (Levemir) pen 5 Units    levothyroxine tablet 100 mcg    naloxone 0.4 mg/mL injection 0.02 mg    ondansetron disintegrating tablet 8 mg    oxyCODONE immediate release tablet 5 mg    oxyCODONE immediate release tablet Tab 10 mg    polyethylene glycol packet 17 g    senna-docusate 8.6-50 mg per tablet 1 tablet    tiZANidine tablet 2 mg    traZODone tablet 150 mg     Objective:     Vital Signs (Most Recent):  Temp: 98 °F (36.7 °C) (05/10/23 0457)  Pulse: 77 (05/10/23 0457)  Resp: 17 (05/10/23 0457)  BP: 110/63 (05/10/23 0457)  SpO2: 96 % (05/10/23 0457) Vital Signs (24h Range):  Temp:  [97.9 °F (36.6 °C)-99  "°F (37.2 °C)] 98 °F (36.7 °C)  Pulse:  [72-82] 77  Resp:  [0-19] 17  SpO2:  [94 %-97 %] 96 %  BP: (110-140)/(63-80) 110/63     Weight: 80.3 kg (177 lb 0.5 oz)  Height: 5' 11" (180.3 cm)  Body mass index is 24.69 kg/m².      Intake/Output Summary (Last 24 hours) at 5/10/2023 0637  Last data filed at 5/10/2023 0224  Gross per 24 hour   Intake --   Output 1500 ml   Net -1500 ml       Ortho/SPM Exam  A&ox3, NAD     RLE  Splint cdi  Decreased sensation and unable to wiggle toes sp nerve block  Foot and toes wwp    Significant Labs: CBC:   Recent Labs   Lab 05/10/23  0429   WBC 8.83   HGB 10.5*   HCT 32.8*        All pertinent labs within the past 24 hours have been reviewed.    Significant Imaging: I have reviewed all pertinent imaging results/findings.    Assessment/Plan:     * Closed trimalleolar fracture of right ankle with routine healing s/p ex fix removal and ORIF 5/8/23  Alix Patel is a 67 y.o. female sp recent left grade 1 open humerus fracture treated wt intramedullary nail 3/2023 now fell 5/3/23 and has right trimall ankle fx sp    ex fix 5/5/23. Now sp ex fix removal and ORIF 5/8/23          - NWB RLE in short leg splint, elevate, ice  - Pain control: MM  - DVTppx: ASA 81 mg BID  -PT/OT daily   -dispo: okay to dc from ortho perspective,  PT/OT recs IPR  -FU  2 wks    Brenda Yang PA-C  Orthopedics  [facility] - Surgery  "

## 2023-05-11 ENCOUNTER — TELEPHONE (OUTPATIENT)
Dept: OTOLARYNGOLOGY | Facility: CLINIC | Age: 68
End: 2023-05-11
Payer: COMMERCIAL

## 2023-05-11 NOTE — TELEPHONE ENCOUNTER
Pt is admitted to [facility] and requests rx for fluticasone. Spoke to Roby at the facility who stated he will request the rx from the provider at the facility.

## 2023-05-11 NOTE — TELEPHONE ENCOUNTER
----- Message from Harvinder Otero sent at 5/11/2023  4:04 PM CDT -----  Regarding: RX Refill / inquiry  Contact: PT @ [phone]  Pt is calling again asking to speak with someone in the office. Pt states that she is currently in [facility], room 505 and is in need of her rx: Fluticasone. Pt is asking for a return call back to advise if this can be sent to her room. Thanks.

## 2023-05-15 ENCOUNTER — PATIENT MESSAGE (OUTPATIENT)
Dept: ORTHOPEDICS | Facility: CLINIC | Age: 68
End: 2023-05-15
Payer: COMMERCIAL

## 2023-05-15 NOTE — ASSESSMENT & PLAN NOTE
Present on admission. Patient states she is supposed to be on Crestor 20 mg po daily at home but has not been taking recently. Will place on Lipitor 80 mg po daily a Crestor not on formulary. Resume Crestor on discharge.

## 2023-05-15 NOTE — ASSESSMENT & PLAN NOTE
· Resoilved to 4.1 on 5/6.   · Present on admit. Potassium 3.0 on admit. Will replace with Potassium chloride 40 mEq po daily x 2 days to replace.

## 2023-05-15 NOTE — ASSESSMENT & PLAN NOTE
Patient's FSGs are controlled on current regimen. Patient on Metformin 1000 mg po BID as outpatient to treat and resumed on discharge.   Last A1c reviewed-   Lab Results   Component Value Date    HGBA1C 7.5 (H) 03/20/2023     Diabetic diet.  Monitor blood sugars 4 times daily with meals and at bedtime.

## 2023-05-15 NOTE — ASSESSMENT & PLAN NOTE
Recurrent major depressive disorder, in full remission   Chronic and controlled. Continue home Lexapro 20 mg po daily + Trazodone 150 mg po nightly to treat chronic depression and anxiety on discharge.

## 2023-05-15 NOTE — ASSESSMENT & PLAN NOTE
· Patient went back to OR on 5/8 with Dr. Johnathan Contreras and underwent removal of ex fix and ORIF of right ankle.   · Patient with closed trimalleolar fracture and dislocation of distal right fibula and likely posterior malleolus with posterior dislocation on admit.   · Orthopedics consulted and patient taken to OR on 5/5/2023 by Dr. Yanely Guerra and underwent closed reduction of dislocation and ex fix for right ankle fracture.   · Non weight bearing to right lower extremity as per Ortho for 4-6 weeks after her second surgery done on 5/8.  · Per Ortho, sutures out in 2-3 weeks with immediate range of motion of the ankle after her second surgery on 5/8. Plantar fasciitis boot when not undergoing range of motion to prevent equinus.   · Pain controlled. Continue multimodal pain management with Tylenol 1000 mg po every 8 hours + Tizanidine 2 mg po every 8 hours with Oxy IR 5 mg po every 4 hours prn for breakthrough pain on discharge.  · Continue Aspirin 81 mg po BID for DVT prophylaxis for 4-6 weeks after second surgery on discharge.   · PT/OT consulted and recommending IP Rehab on discharge when medically ready. Patient accepted to [facility] and discharged on 5/10. Soft splint and sutures to remain in place to right leg until Orthopedic clinic follow-up.

## 2023-05-15 NOTE — DISCHARGE SUMMARY
Cristi eric - [facility]  Discharge Summary      Patient Name: Alix Patel  MRN: [id number]  JENNIFER: [id number]  Patient Class: IP- Inpatient  Admission Date: 5/5/2023  Hospital Length of Stay: 3 days  Discharge Date and Time: 5/10/2023  4:32 PM  Attending Physician: Camilla Hernandes MD   Discharging Provider: Camilla Hernandes MD  Primary Care Provider: Angélica Barakat MD  [facility] Medicine Team: [facility] MED K Camilla Hernandes MD  Primary Care Team: [facility] K    HPI:   67 y.o.female with major depression, generalized anxiety disorder, COPD not on home oxygen or inhalers, Type 2 diabetes not on home insulin and just takes oral Metformin to treat, hyperlipidemia and previous open left humerus fracture s/p ORIF on 3/21/2023 by Dr. Ousmane Crawford presented to the [facility] ER with complaint of right ankle pain. Patient states pain is sharp/stabbing in the right ankle and is aggravated by any weight bearing. She reports onset of symptoms was 2 day(s) after a fall  while ambulating  at home. Patient reports that she was tripped and fell on a tile on her bathroom on 5/3 and twisted her right ankle. Patient denied head trauma. Patient denied to loss of consciousness, denied syncope, denied chest pain, denied dizziness prior to or after fall. Patient noted immediate pain to right ankle when she tripped and twisted ankle. Patient went to [facility] ED and according to records there was found to have a minimally displaced right distal fibula fracture even though patient told me that she was told she had just sprained her ankle. The ED records from [facility] stated actually they noted a right distal fibula fracture. Patient was placed in a tall walking boot after consultation with Dr. Townsend from Ortho at [facility] with plans for patient to follow-up with his office on 5/5. Patient reports when she got home from the ED the walking boot was not on right and she slipped again and this time  she noted severe pain in her right ankle and unable to bear any weight and scooted herself up the stairs and has pretty much been in bed for past 2 days unable to bear weight to her right ankle ad noted significant swelling to her right ankle due. Due to continued pain and swelling patient decided to come to ED today to get right ankle re-evaluated here at [facility]. X-ray obtained in ER revealed right distal fibula fracture and possible posterior malleolus with dislocation. Orthopedics consulted in ED and plan to take to OR tonight to reduce dislocation and place ex fix to right ankle.   Prior to admission patient's functional mobility was independent with no assistive device for community and home distances. Patient does not have history of gait or balance problems. Patient does have history of previous falls or fractures. Patient does not have previous cardiac history such as MI or CAD. Patient does not have previous history of TIA or stroke. Patient does not have previous history of compensated heart failure. Patient does have history of diabetes but is not insulin requiring. Patient does not have history of advanced kidney disease with creatinine > 2.   Patient reports due to her lack of mobility he has not taken even of her home meds since 5/3.       5/8/2023  Procedure(s) (LRB):  ORIF, ANKLE - RIGHT. EX FIX REMOVAL. SYNTHES. C ARM DOOR SIDE.  BONE FOAM. (Right)  FIXATION, SYNDESMOSIS, ANKLE (Right)    Surgeon(s):  MD Johnathan Sky MD      5/05/2023  Procedure:        Application uniplanar external fixator   Closed reduction right trimalleolar ankle fracture dislocation with manipulation under anesthesia    Surgeon:           Yanely Guerra M.D.  MEENA Edgar MD      Hospital Course:   Patient taken to OR and had reduction of dislocation and ex fix placed on right ankle for fracture by Dr. Yanely Guerra on 5/5/2023. Patient post-op, non-weight bearing to right lower extremity.  Patient to elavte and ice right ankle to keep swelling down as will need a second definitive fixation surgery to repair fracture once swelling in ankle improves. Patient placed on Aspirin 81 mg po BID for DVT prophylaxis. Patient to continue multimodal pain management for post-op pain control with scheduled Tylenol 1000 mg po every 8 hours and Tizanidine 2 mg po every 8 hours  with Oxycodone IR prn for breakthrough pain. PT/OT consulted post-op and recommending IP Rehab when medically ready. Patient went to OR on 5/8 and underwent removal of ex fix and ORIF of right ankle by Dr. Johnathan Contreras. Post-op, patient non weight bearing to right lower extremity for 4-6 weeks pending fracture healing. Per Ortho, sutures out in 2-3 weeks with immediate range of motion of the ankle. Plantar fasciitis boot when not undergoing range of motion to prevent equinus. Patient resumed on Aspirin 81 mg po BID for DVT prophylaxis post-op and will need for 4-6 weeks as per Ortho. PT/OT resumed post-op and recommending IP Rehab and case management working on placement. Pain controlled to right ankle post-op. Patient accepted and discharged to [facility] in good condition. Non-weight bearing to right leg as per Ortho with Aspirin BID for DVT prophylaxis on discharge with soft splint in place and not to be removed until Ortho clinic follow-up. Patient to maintain non-weight bearing to left upper arm as per previous Ortho recs for recent left humerus fracture surgery done in 3/2023.           Goals of Care Treatment Preferences:  Code Status: Full Code    Living Will: Yes              Consults:   Consults (From admission, onward)        Status Ordering Provider     Inpatient consult to Physical Medicine Rehab  Once        Provider:  (Not yet assigned)    Completed JAMAR VIVAR     Inpatient consult to Orthopedic Surgery  Once        Provider:  (Not yet assigned)    Completed JAMAR VIVAR          Psychiatric  LILLY  (generalized anxiety disorder)  Recurrent major depressive disorder, in full remission   Chronic and controlled. Continue home Lexapro 20 mg po daily + Trazodone 150 mg po nightly to treat chronic depression and anxiety on discharge.       Pulmonary  Pulmonary emphysema  · Controlled with no signs or symptoms to suggest active COPD exacerbation.   · Patient is not on any inhalers at home such as LAMA, LABA/Inhaled corticosteroid combination or short acting beta agonist at home.   · Patient is not on oxygen at home.   · Plan OOB to chair and encourage IS use post-op to decrease risk of postoperative pulmonary complications.    Cardiac/Vascular  Hyperlipidemia with target low density lipoprotein (LDL) cholesterol less than 100 mg/dL  Present on admission. Patient states she is supposed to be on Crestor 20 mg po daily at home but has not been taking recently. Will place on Lipitor 80 mg po daily a Crestor not on formulary. Resume Crestor on discharge.       Renal/  Hypokalemia-resolved as of 5/7/2023  · Resoilved to 4.1 on 5/6.   · Present on admit. Potassium 3.0 on admit. Will replace with Potassium chloride 40 mEq po daily x 2 days to replace.       Oncology  Anemia of chronic disease  · Controlled. Present on admit. Hgb 10-11 in hospital.   · Patient has no signs of active bleeding and hemodynamically stable. Patient is asymptomatic related to anemia.   · Goal is keep Hgb >7 and consider blood transfusion if Hgb < 7 if asymptomatic or < 8 if patient becomes symptomatic.      Endocrine  Acquired hypothyroidism  Chronic and controlled. Continue Levothyroxine 100 mcg po daily to treat on discharge.      Type 2 diabetes mellitus without complication, without long-term current use of insulin  Patient's FSGs are controlled on current regimen. Patient on Metformin 1000 mg po BID as outpatient to treat and resumed on discharge.   Last A1c reviewed-   Lab Results   Component Value Date    HGBA1C 7.5 (H) 03/20/2023      Diabetic diet.  Monitor blood sugars 4 times daily with meals and at bedtime.     Orthopedic  * Closed trimalleolar fracture of right ankle with routine healing s/p ex fix removal and ORIF 5/8/23  · Patient went back to OR on 5/8 with Dr. Johnathan Contreras and underwent removal of ex fix and ORIF of right ankle.   · Patient with closed trimalleolar fracture and dislocation of distal right fibula and likely posterior malleolus with posterior dislocation on admit.   · Orthopedics consulted and patient taken to OR on 5/5/2023 by Dr. Yanely Guerra and underwent closed reduction of dislocation and ex fix for right ankle fracture.   · Non weight bearing to right lower extremity as per Ortho for 4-6 weeks after her second surgery done on 5/8.  · Per Ortho, sutures out in 2-3 weeks with immediate range of motion of the ankle after her second surgery on 5/8. Plantar fasciitis boot when not undergoing range of motion to prevent equinus.   · Pain controlled. Continue multimodal pain management with Tylenol 1000 mg po every 8 hours + Tizanidine 2 mg po every 8 hours with Oxy IR 5 mg po every 4 hours prn for breakthrough pain on discharge.  · Continue Aspirin 81 mg po BID for DVT prophylaxis for 4-6 weeks after second surgery on discharge.   · PT/OT consulted and recommending IP Rehab on discharge when medically ready. Patient accepted to [facility] and discharged on 5/10. Soft splint and sutures to remain in place to right leg until Orthopedic clinic follow-up.     Final Active Diagnoses:    Diagnosis Date Noted POA    PRINCIPAL PROBLEM:  Closed trimalleolar fracture of right ankle with routine healing s/p ex fix removal and ORIF 5/8/23 [S82.851D] 05/05/2023 Not Applicable    Type 2 diabetes mellitus without complication, without long-term current use of insulin [E11.9] 07/21/2021 Yes    Anemia of chronic disease [D63.8] 05/05/2023 Yes    Pulmonary emphysema [J43.9] 01/07/2022 Yes    Acquired hypothyroidism [E03.9]  04/26/2017 Yes    Hyperlipidemia with target low density lipoprotein (LDL) cholesterol less than 100 mg/dL [E78.5] 09/13/2013 Yes    LILLY (generalized anxiety disorder) [F41.1] 03/15/2021 Yes    Impaired mobility [Z74.09] 05/09/2023 Yes    Closed trimalleolar fracture of ankle, right, initial encounter [S82.851A] 05/05/2023 Yes    Closed dislocation of ankle, right, initial encounter [S93.04XA] 05/05/2023 Yes    Recurrent major depressive disorder, in full remission [F33.42] 04/28/2013 Yes      Problems Resolved During this Admission:    Diagnosis Date Noted Date Resolved POA    Hypokalemia [E87.6] 05/05/2023 05/07/2023 Yes       Discharged Condition: good    Disposition: Rehab Facility ([facility])    Follow Up:     Future Appointments   Date Time Provider Department Center   5/16/2023  9:00 AM [facility] [id number] 485 LB LIMIT [facility] XRAYORT [address]   5/16/2023  9:30 AM Trey Kan MD [facility] ORTHO [address]   5/16/2023  2:45 PM Varsha Nieves DPM [facility] POD [address]   5/23/2023 10:15 AM Brenda Yang PA-C [facility] ORTHO [address]   6/15/2023  9:15 AM Johnathan Rae MD [facility] [address]URO[address]   6/19/2023  1:30 PM Sebastian Kaplan NP [facility] ORTHO [address]       Patient Instructions:      Diet diabetic     Notify your health care provider if you experience any of the following:  temperature >100.4     Notify your health care provider if you experience any of the following:  persistent nausea and vomiting or diarrhea     Notify your health care provider if you experience any of the following:  severe uncontrolled pain     Notify your health care provider if you experience any of the following:  redness, tenderness, or signs of infection (pain, swelling, redness, odor or green/yellow discharge around incision site)     Notify your health care provider if you experience any of the following:  difficulty breathing or increased cough     Notify your health care provider if you experience any of the  following:  severe persistent headache     Notify your health care provider if you experience any of the following:  worsening rash     Notify your health care provider if you experience any of the following:  persistent dizziness, light-headedness, or visual disturbances     Notify your health care provider if you experience any of the following:  increased confusion or weakness     Leave dressing on - Keep it clean, dry, and intact until clinic visit     Activity as tolerated     Weight bearing restrictions (specify):   Order Comments: Non weight bearing to right lower extremity for 4-6 weeks       Significant Diagnostic Studies:   Hemoglobin   Date Value Ref Range Status   05/10/2023 10.5 (L) 12.0 - 16.0 g/dL Final   05/08/2023 10.9 (L) 12.0 - 16.0 g/dL Final   05/07/2023 11.1 (L) 12.0 - 16.0 g/dL Final   05/06/2023 10.8 (L) 12.0 - 16.0 g/dL Final   05/05/2023 10.6 (L) 12.0 - 16.0 g/dL Final     POC Hematocrit   Date Value Ref Range Status   11/05/2022 42 36 - 54 %PCV Final   07/30/2021 42 36 - 54 %PCV Final   07/23/2021 40 36 - 54 %PCV Final     Hematocrit   Date Value Ref Range Status   05/10/2023 32.8 (L) 37.0 - 48.5 % Final   05/08/2023 34.7 (L) 37.0 - 48.5 % Final   05/07/2023 34.8 (L) 37.0 - 48.5 % Final   05/06/2023 33.9 (L) 37.0 - 48.5 % Final   05/05/2023 31.9 (L) 37.0 - 48.5 % Final     BUN   Date Value Ref Range Status   05/08/2023 10 8 - 23 mg/dL Final   05/07/2023 8 8 - 23 mg/dL Final   05/06/2023 6 (L) 8 - 23 mg/dL Final   05/05/2023 3 (L) 8 - 23 mg/dL Final   03/24/2023 7 (L) 8 - 23 mg/dL Final     Creatinine   Date Value Ref Range Status   05/08/2023 0.7 0.5 - 1.4 mg/dL Final   05/07/2023 0.7 0.5 - 1.4 mg/dL Final   05/06/2023 0.7 0.5 - 1.4 mg/dL Final   05/05/2023 0.6 0.5 - 1.4 mg/dL Final   03/24/2023 0.7 0.5 - 1.4 mg/dL Final         Pending Diagnostic Studies:     None         Medications:  Reconciled Home Medications:      Medication List      START taking these medications    acetaminophen  500 MG tablet  Commonly known as: TYLENOL  Take 2 tablets (1,000 mg total) by mouth every 8 (eight) hours.     insulin detemir U-100 (Levemir) 100 unit/mL (3 mL) Inpn pen  Inject 5 Units into the skin every evening.        CHANGE how you take these medications    aspirin 81 MG EC tablet  Commonly known as: ECOTRIN  Take 1 tablet (81 mg total) by mouth 2 (two) times a day. DVT prophylaxis after ankle fracture surgery. End date 6/20/2023.  What changed:   · when to take this  · additional instructions     oxyCODONE 5 MG immediate release tablet  Commonly known as: ROXICODONE  Take 1 tablet (5 mg total) by mouth every 4 (four) hours as needed (Moderate to severe pain).  What changed:   · when to take this  · reasons to take this     tiZANidine 2 MG tablet  Commonly known as: ZANAFLEX  Take 1 tablet (2 mg total) by mouth every 8 (eight) hours.  What changed:   · when to take this  · reasons to take this        CONTINUE taking these medications    diphenhydrAMINE-acetaminophen  mg Tab  Commonly known as: TYLENOL PM  Take 2 tablets by mouth nightly as needed (SLEEP).     EScitalopram oxalate 20 MG tablet  Commonly known as: LEXAPRO  Take 1 tablet (20 mg total) by mouth once daily.     hydrOXYzine pamoate 25 MG Cap  Commonly known as: VISTARIL  Take 1 to 2 caps q hs prn difficulty sleeping     ibuprofen 800 MG tablet  Commonly known as: ADVIL,MOTRIN  Take 1 tablet (800 mg total) by mouth 3 (three) times daily as needed for Pain.     levothyroxine 100 MCG tablet  Commonly known as: SYNTHROID  Take 1 tablet (100 mcg total) by mouth once daily.     metFORMIN 500 MG tablet  Commonly known as: GLUCOPHAGE  Take 2 tablets (1,000 mg total) by mouth 2 (two) times daily with meals.     rosuvastatin 20 MG tablet  Commonly known as: CRESTOR  Take 1 tablet (20 mg total) by mouth once daily.     traZODone 150 MG tablet  Commonly known as: DESYREL  TAKE 1 TABLET(150 MG) BY MOUTH EVERY EVENING     TRUE METRIX AIR GLUCOSE METER  MISC  True Metrix Air Glucose Meter   USE UNDER THE SKIN TWICE DAILY AS DIRECTED     TRUE METRIX GLUCOSE TEST STRIP MISC  True Metrix Glucose Test Strip   TEST TWICE DAILY AS DIRECTED     TRULICITY 0.75 mg/0.5 mL pen injector  Generic drug: dulaglutide  Inject 0.75 mg into the skin every Sunday.     zolpidem 10 mg Tab  Commonly known as: AMBIEN  Take 1 tablet (10 mg total) by mouth nightly as needed.            Indwelling Lines/Drains at time of discharge:   Lines/Drains/Airways     None                 Time spent on the discharge of patient: 31 minutes         Camilla Hernandes MD  Department of Hospital Medicine  [facility] - Surgery

## 2023-05-16 ENCOUNTER — HOSPITAL ENCOUNTER (OUTPATIENT)
Dept: RADIOLOGY | Facility: HOSPITAL | Age: 68
Discharge: HOME | End: 2023-05-16
Attending: STUDENT IN AN ORGANIZED HEALTH CARE EDUCATION/TRAINING PROGRAM
Payer: MEDICARE

## 2023-05-16 PROCEDURE — 73030 XR SHOULDER COMPLETE 2 OR MORE VIEWS LEFT: ICD-10-PCS | Mod: 26,LT,, | Performed by: RADIOLOGY

## 2023-05-16 PROCEDURE — 73030 X-RAY EXAM OF SHOULDER: CPT | Mod: 26,LT,, | Performed by: RADIOLOGY

## 2023-05-23 ENCOUNTER — HOSPITAL ENCOUNTER (OUTPATIENT)
Dept: RADIOLOGY | Facility: HOSPITAL | Age: 68
Discharge: HOME | End: 2023-05-23
Attending: PHYSICIAN ASSISTANT
Payer: COMMERCIAL

## 2023-05-23 ENCOUNTER — OFFICE VISIT (OUTPATIENT)
Dept: ORTHOPEDICS | Facility: CLINIC | Age: 68
End: 2023-05-23
Payer: MEDICARE

## 2023-05-23 DIAGNOSIS — S82.851D CLOSED TRIMALLEOLAR FRACTURE OF RIGHT ANKLE WITH ROUTINE HEALING: ICD-10-CM

## 2023-05-23 DIAGNOSIS — S42.322D: Primary | ICD-10-CM

## 2023-05-23 DIAGNOSIS — S42.322D: ICD-10-CM

## 2023-05-23 PROCEDURE — 99999 PR PBB SHADOW E&M-EST. PATIENT-LVL III: CPT | Mod: PBBFAC,,, | Performed by: PHYSICIAN ASSISTANT

## 2023-05-23 PROCEDURE — 99024 PR POST-OP FOLLOW-UP VISIT: ICD-10-PCS | Mod: S$GLB,,, | Performed by: PHYSICIAN ASSISTANT

## 2023-05-23 PROCEDURE — 99999 PR PBB SHADOW E&M-EST. PATIENT-LVL III: ICD-10-PCS | Mod: PBBFAC,,, | Performed by: PHYSICIAN ASSISTANT

## 2023-05-23 PROCEDURE — 99499 UNLISTED E&M SERVICE: CPT | Mod: S$GLB,,, | Performed by: ORTHOPAEDIC SURGERY

## 2023-05-23 PROCEDURE — 3051F HG A1C>EQUAL 7.0%<8.0%: CPT | Mod: CPTII,S$GLB,, | Performed by: PHYSICIAN ASSISTANT

## 2023-05-23 PROCEDURE — 1159F MED LIST DOCD IN RCRD: CPT | Mod: CPTII,S$GLB,, | Performed by: PHYSICIAN ASSISTANT

## 2023-05-23 PROCEDURE — 73060 X-RAY EXAM OF HUMERUS: CPT | Mod: 26,LT,, | Performed by: RADIOLOGY

## 2023-05-23 PROCEDURE — 73060 X-RAY EXAM OF HUMERUS: CPT | Mod: TC,LT

## 2023-05-23 PROCEDURE — 99024 POSTOP FOLLOW-UP VISIT: CPT | Mod: S$GLB,,, | Performed by: PHYSICIAN ASSISTANT

## 2023-05-23 PROCEDURE — 1159F PR MEDICATION LIST DOCUMENTED IN MEDICAL RECORD: ICD-10-PCS | Mod: CPTII,S$GLB,, | Performed by: PHYSICIAN ASSISTANT

## 2023-05-23 PROCEDURE — 1125F PR PAIN SEVERITY QUANTIFIED, PAIN PRESENT: ICD-10-PCS | Mod: CPTII,S$GLB,, | Performed by: PHYSICIAN ASSISTANT

## 2023-05-23 PROCEDURE — 73060 XR HUMERUS 2 VIEW LEFT: ICD-10-PCS | Mod: 26,LT,, | Performed by: RADIOLOGY

## 2023-05-23 PROCEDURE — 99499 NO LOS: ICD-10-PCS | Mod: S$GLB,,, | Performed by: ORTHOPAEDIC SURGERY

## 2023-05-23 PROCEDURE — 1157F PR ADVANCE CARE PLAN OR EQUIV PRESENT IN MEDICAL RECORD: ICD-10-PCS | Mod: CPTII,S$GLB,, | Performed by: PHYSICIAN ASSISTANT

## 2023-05-23 PROCEDURE — 1125F AMNT PAIN NOTED PAIN PRSNT: CPT | Mod: CPTII,S$GLB,, | Performed by: PHYSICIAN ASSISTANT

## 2023-05-23 PROCEDURE — 1157F ADVNC CARE PLAN IN RCRD: CPT | Mod: CPTII,S$GLB,, | Performed by: PHYSICIAN ASSISTANT

## 2023-05-23 PROCEDURE — 3051F PR MOST RECENT HEMOGLOBIN A1C LEVEL 7.0 - < 8.0%: ICD-10-PCS | Mod: CPTII,S$GLB,, | Performed by: PHYSICIAN ASSISTANT

## 2023-05-23 NOTE — PROGRESS NOTES
Patient presented to cast room for removal of a short leg plaster posterior slab with stirrups. Splint was removed, skin and incisions intact, no abnormalities noted.

## 2023-05-24 NOTE — PROGRESS NOTES
03/21/23: : ORIF left humerus.   FALL  05/05/23: : external fixation right anklr fracture  05/08/23: : ORIF right tramalleolar ankle fracture with syndesmosis fixation.     Ms. Patel is here today for a post-operative visit    Interval History:  she reports that she is doing ok.   she is at home . she is not participating in PT/OT. Just discharged form Rehab  Pain is controlled.  she is  taking pain medication.    she denies fever, chills, and sweats .     Physical exam:    Patient arrives to exam room: wheelchair.  Patient is un accompanied    ANKLE: Dressing taken down.  Incision is clean, dry and intact.  Sutures removed without difficulty.   Healing well no signs of breakdown or infection    RADS: All pertinent images were reviewed by myself:   Humerus: Postsurgical changes status post internal fixation for fracture of the left humeral shaft and proximal humeral metaphysis.  Fracture lines are still present.  No new fracture.  Hardware appears intact.  No soft tissue abnormality.    Assessment:  Post-op visit ( 2 ankle/ 9 humerus weeks)    Plan:  Current care, treatment plan, precautions, activity level/ modifications, limitations, rehabilitation exercises and proposed future treatment were discussed with the patient. We discussed the need to monitor for changes in symptoms and condition and report them to the physician.  Discussed importance of compliance with all appointments and follow up examinations.     WOUND CARE ORDERS  - The patient was advised to keep the incision clean and dry for the next 24 hours after which she may wash the area with antibacterial soap in the shower. Will not submerge until the incision is completely healed  -Patient was advised to monitor wound closely and multiple times daily for any problems. Call clinic immediately or report to ED for immediate medical attention for any complications including reopening of wound, drainage, purulence, redness,  streaking, odor, pain out of proportion, fever, chills, etc.       ACTIVITY:   - light  -range of motion as tolerated    - NWB 6 week pending healing, ankle     -PT/OT, Patient is responsible to establish and continue care      PAIN MEDICATION:   - Multimodal pain control  - Pain medication: refill was not needed  - Pain medication refill policy provided to patient for review, yes.    - Patient was informed a multi-modal approach is used to treat their pain. With the goal to get off of narcotic pain medication and discontinue as soon as possible.   - ice and elevation to reduce pain and swelling     DVT PROPHYLAXIS:   - ASA 81 mg bid    FOLLOW UP:   - Patient will follow up in the clinic in 4 weeks.  - X-ray of her ankle and humerus is needed.  NWB OOB  - Pending healing at time consider advance weight bearing      If there are any questions prior to scheduled follow up, the patient was instructed to contact the office

## 2023-05-25 PROCEDURE — G0180 MD CERTIFICATION HHA PATIENT: HCPCS | Mod: ,,, | Performed by: STUDENT IN AN ORGANIZED HEALTH CARE EDUCATION/TRAINING PROGRAM

## 2023-05-25 PROCEDURE — G0180 PR HOME HEALTH MD CERTIFICATION: ICD-10-PCS | Mod: ,,, | Performed by: STUDENT IN AN ORGANIZED HEALTH CARE EDUCATION/TRAINING PROGRAM

## 2023-06-02 DIAGNOSIS — S82.851D CLOSED TRIMALLEOLAR FRACTURE OF RIGHT ANKLE WITH ROUTINE HEALING: Primary | ICD-10-CM

## 2023-06-02 RX ORDER — OXYCODONE HYDROCHLORIDE 10 MG/1
10 TABLET ORAL EVERY 6 HOURS PRN
Qty: 28 TABLET | Refills: 0 | Status: SHIPPED | OUTPATIENT
Start: 2023-06-02 | End: 2023-06-05

## 2023-06-05 DIAGNOSIS — S82.851D CLOSED TRIMALLEOLAR FRACTURE OF RIGHT ANKLE WITH ROUTINE HEALING: Primary | ICD-10-CM

## 2023-06-05 RX ORDER — TIZANIDINE 2 MG/1
2 TABLET ORAL EVERY 8 HOURS
Start: 2023-06-05 | End: 2023-06-09 | Stop reason: SDUPTHER

## 2023-06-05 RX ORDER — OXYCODONE HYDROCHLORIDE 10 MG/1
10 TABLET ORAL EVERY 8 HOURS PRN
Qty: 21 TABLET | Refills: 0 | Status: SHIPPED | OUTPATIENT
Start: 2023-06-05 | End: 2023-06-09 | Stop reason: SDUPTHER

## 2023-06-08 ENCOUNTER — EXTERNAL HOME HEALTH (OUTPATIENT)
Dept: HOME HEALTH SERVICES | Facility: HOSPITAL | Age: 68
End: 2023-06-08
Payer: COMMERCIAL

## 2023-06-08 ENCOUNTER — NURSE TRIAGE (OUTPATIENT)
Dept: ADMINISTRATIVE | Facility: CLINIC | Age: 68
End: 2023-06-08
Payer: COMMERCIAL

## 2023-06-08 NOTE — TELEPHONE ENCOUNTER
Patient states she has a broken right ankle and completed surgical repair and an inpatient rehabilitation stay. Patient c/o severe ankle pain rated >10 not relieved by Oxycodone. Patient denies fever and swelling.     Care Advice given to Go to ED Now for evaluation/treatment and to have another adult drive to ED. Patient states understanding of care advice and cites no additional concerns at this time.     Reason for Disposition   [1] SEVERE pain (e.g., excruciating, unable to walk) AND [2] not improved after 2 hours of pain medicine    Additional Information   Negative: Followed an ankle injury   Negative: Foot pain is main symptom   Negative: Thigh or calf pain is main symptom   Negative: Ankle swelling is main symptom   Negative: Thigh or calf swelling is main symptom   Negative: Entire foot is cool or blue in comparison to other side   Negative: [1] Swollen joint AND [2] fever   Negative: [1] Red area or streak AND [2] fever   Negative: Patient sounds very sick or weak to the triager    Protocols used: Ankle Pain-A-AH

## 2023-06-09 ENCOUNTER — TELEPHONE (OUTPATIENT)
Dept: ORTHOPEDICS | Facility: CLINIC | Age: 68
End: 2023-06-09
Payer: COMMERCIAL

## 2023-06-09 ENCOUNTER — DOCUMENT SCAN (OUTPATIENT)
Dept: HOME HEALTH SERVICES | Facility: HOSPITAL | Age: 68
End: 2023-06-09
Payer: COMMERCIAL

## 2023-06-09 DIAGNOSIS — S82.851D CLOSED TRIMALLEOLAR FRACTURE OF RIGHT ANKLE WITH ROUTINE HEALING: Primary | ICD-10-CM

## 2023-06-09 DIAGNOSIS — S42.322D: ICD-10-CM

## 2023-06-09 DIAGNOSIS — S82.851D CLOSED TRIMALLEOLAR FRACTURE OF RIGHT ANKLE WITH ROUTINE HEALING: ICD-10-CM

## 2023-06-09 RX ORDER — OXYCODONE HYDROCHLORIDE 10 MG/1
10 TABLET ORAL EVERY 8 HOURS PRN
Qty: 21 TABLET | Refills: 0 | Status: SHIPPED | OUTPATIENT
Start: 2023-06-09 | End: 2023-07-28

## 2023-06-09 RX ORDER — TIZANIDINE 2 MG/1
2 TABLET ORAL EVERY 8 HOURS
Start: 2023-06-09 | End: 2023-06-12 | Stop reason: SDUPTHER

## 2023-06-09 RX ORDER — IBUPROFEN 800 MG/1
800 TABLET ORAL 3 TIMES DAILY PRN
Qty: 30 TABLET | Refills: 0 | Status: SHIPPED | OUTPATIENT
Start: 2023-06-09 | End: 2023-06-27 | Stop reason: SDUPTHER

## 2023-06-09 NOTE — TELEPHONE ENCOUNTER
Your Rx was sent. Please call your pharmacy. Patient states verbal understanding and has no further questions.

## 2023-06-09 NOTE — TELEPHONE ENCOUNTER
----- Message from Shimonronak Chandler sent at 6/9/2023  2:32 PM CDT -----  Contact: Patient  Type:  Patient Call          Who Called: Patient         Does the patient know what this is regarding?: Requesting a call back to discuss her Rx   oxyCODONE (ROXICODONE) 10 mg Tab immediate release tablet; pt said that she need her medication to read as needed for pain  and not every 8 hours ;she said that she's been calling and it's not being done ; pt said that she can't get out of her bed and she need her meds ready ASAP;  please advise         Would the patient rather a call back or a response via [facility]? call          Best Call Back Number:[phone] (home) [phone] (work)             Additional Information:  [id number] - [place], [place] - [address] [place] [address] AT [address]  [address] [facility] [address]  Phone: [phone] Fax: [phone]

## 2023-06-12 DIAGNOSIS — S82.851D CLOSED TRIMALLEOLAR FRACTURE OF RIGHT ANKLE WITH ROUTINE HEALING: ICD-10-CM

## 2023-06-12 RX ORDER — TIZANIDINE 2 MG/1
2 TABLET ORAL EVERY 8 HOURS PRN
Qty: 21 TABLET | Refills: 0 | Status: SHIPPED | OUTPATIENT
Start: 2023-06-12 | End: 2023-06-19 | Stop reason: SDUPTHER

## 2023-06-13 ENCOUNTER — TELEPHONE (OUTPATIENT)
Dept: ORTHOPEDICS | Facility: CLINIC | Age: 68
End: 2023-06-13
Payer: COMMERCIAL

## 2023-06-13 ENCOUNTER — PATIENT MESSAGE (OUTPATIENT)
Dept: ORTHOPEDICS | Facility: CLINIC | Age: 68
End: 2023-06-13
Payer: COMMERCIAL

## 2023-06-13 NOTE — TELEPHONE ENCOUNTER
----- Message from Sebastian Kaplan NP sent at 6/13/2023 10:28 AM CDT -----  Regarding: RE: Advise:doctor's note  Contact: @[phone]  She needs this stated in FMLA.  You can provide her a note for 6/12/23 but not for whenever she needs off.    Sebastian  ----- Message -----  From: Kerri Ahmadi MA  Sent: 6/12/2023   4:59 PM CDT  To: Sebastian Kaplan NP  Subject: FW: Advise:doctor's note                         Sebastian    Pt is filling out forms for FMLA but they are not completed yet.  Pt missed work today 6/12/23 and needs a letter stating that she may miss work when in pain.    Angie    ----- Message -----  From: Kimberli Jones  Sent: 6/12/2023   2:32 PM CDT  To: [facility] Staff  Subject: Advise:doctor's note                             Pt called in and is requesting a call back. Pt states her right foot is in pain and she had to miss work. Pt would like a doctors note addressing her boss that whenever she is in pain, she is ordered to stay off that foot. Pt provided boss (Samantha Samano) email which is [email or web address]. Please call to discuss further. Thank you

## 2023-06-13 NOTE — ADDENDUM NOTE
Addendum  created 06/13/23 1327 by Lu Amato MD    Clinical Note Signed, Intraprocedure Blocks edited, SmartForm saved

## 2023-06-13 NOTE — TELEPHONE ENCOUNTER
Spoke with pt.   Advised that missing work intermittently will need to be in her FMLA paperwork.  I am able to give her a note for no work yesterday.  Pt requested that letter be emailed to her at: [email or web address]     Emailed as requested

## 2023-06-14 ENCOUNTER — OUTPATIENT CASE MANAGEMENT (OUTPATIENT)
Dept: NEUROLOGY | Facility: CLINIC | Age: 68
End: 2023-06-14
Payer: COMMERCIAL

## 2023-06-14 DIAGNOSIS — R46.89 COGNITIVE AND BEHAVIORAL CHANGES: Primary | ICD-10-CM

## 2023-06-14 DIAGNOSIS — R41.89 COGNITIVE AND BEHAVIORAL CHANGES: Primary | ICD-10-CM

## 2023-06-14 DIAGNOSIS — S82.851D CLOSED TRIMALLEOLAR FRACTURE OF RIGHT ANKLE WITH ROUTINE HEALING: ICD-10-CM

## 2023-06-14 PROCEDURE — 99358 PROLONG SERVICE W/O CONTACT: CPT | Mod: S$GLB,,, | Performed by: PSYCHIATRY & NEUROLOGY

## 2023-06-14 PROCEDURE — 99358 PR PROLONGED SERV,NO CONTACT,1ST HR: ICD-10-PCS | Mod: S$GLB,,, | Performed by: PSYCHIATRY & NEUROLOGY

## 2023-06-14 RX ORDER — OXYCODONE HYDROCHLORIDE 10 MG/1
10 TABLET ORAL EVERY 8 HOURS PRN
Qty: 21 TABLET | Refills: 0 | OUTPATIENT
Start: 2023-06-14

## 2023-06-14 NOTE — PROGRESS NOTES
[facility]    PATIENT: Alix Lui [place]  DATE: 06/14/2023  MRN: [id number]  PRIMARY PROVIDER: Angélica Barakat MD    Future Appointments   Date Time Provider Department Center   6/15/2023  9:15 AM Johnathan Rae MD [facility] NEURO8 Cristi Gonzalez   6/19/2023  1:30 PM Sebastian Kaplan NP [facility] ORTHO [facility]           I reviewed old records and/or communicated with other professionals or the patient's family from 6:43 PM until 7:18 PM on 06/14/2023. This is directly related to a face-to-face visit encounter with the patient (Evaluation and Management service) conducted on 2023-06-15.  I reviewed the following documentation for a total of 35 minutes.    CPT codes for billing for prolonged evaluation and management service (non-face-to-face review of records or communications with patient's family or other medical professionals):  [id number]  Old [facility] EMR records I reviewed include:     Relevant Background/Context  Known Relevant Family history:  No Known Relevant Family History.  Neurocognitive Disorder:  Mother (70's) - LOAD  2 cousins with alzheimer's diseas  Movement Disorder:  The patient/family denies a history of PD, PDD, tremor.  Motorneuron Disorder:  The patient/family denies a history of ALS, MND, PLS.  Developmental Disorder:  The patient/family denies a history of Dyslexia, ADHD, ASD.  Psychiatric Disorder:  The patient/family denies a history of MDD, BD, LILLY, Schizophrenia.  Known Relevant Genetics:  There is no relevant genetic biomarkers available on record.  Developmental Milestones:  The patient/family report no known birth complications or early life problems. The patient met all developmental milestones.  Education/Learning Capacity:  The patient/family report no signs or symptoms suggestive of developmental learning disorder.  HS  Estimated Educational Experience: 12 years of formal education.     Neurocognitive Disorder  "Features  Onset/Duration:  Jun 2012 (~11-year)  First Symptom:  Memory impairment  Progression:  Gradually Progressive  Clinical Course:  Neurologist (01/20/2020)  Type: Chart Review. 64 y. O. Female with a medical issues significant for bipolar (on depakote), DM2, headaches, hypothyroidism, who presents with balance and memory issues. She notes since 8 years ago she started noticing she could not remember conversations which happened 1 day prior. Forgets items around the house. Forgets what she's doing between rooms. She works as an admin support person. She's been having trouble multitasking recently. Forgets new people's names, old names are intact. Does not get lost, no leaving burners on the stove. When she reads, she must re-read lines in succession. No trouble managing her bills. She lives alone. Balance has been poor since 1 year. When she stands, she usually pitches to the left. She notes it feels like she's "been drinking alcohol. " She feels in no control of the direction she walk in. Does not falls. Struggles to go down stairs. She's also noted a propensity to move her feet. She's had bilateral postural hand tremors since 1 year ago. Struggles to sign her name.  Neurologist (02/05/2020)  Type: Chart Review. 64 y. O. Female with a medical issues significant for bipolar (on depakote), DM2, headaches, hypothyroidism, who presents with balance and memory issues. She notes since 8 years ago she started noticing she could not remember conversations which happened 1 day prior. Forgets items around the house. Forgets what she's doing between rooms. She works as an admin support person. She's been having trouble multitasking recently. Forgets new people's names, old names are intact. Does not get lost, no leaving burners on the stove. When she reads, she must re-read lines in succession. No trouble managing her bills. She lives alone. Balance has been poor since 1 year. When she stands, she usually pitches to the " "left. She notes it feels like she's "been drinking alcohol. " She feels in no control of the direction she walk in. Does not falls. Struggles to go down stairs. She's also noted a propensity to move her feet. She's had bilateral postural hand tremors since 1 year ago. Struggles to sign her name. Interval; hx. Mild worsening of ataxia and tremor. MRI showed mild cerebellar and frontotemporal atrophy. Labs WNL.  Psychiatrist (07/29/2021)  Type: Chart Review. The patient has seen Dr Bazzi since she last saw me in May of this year. 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 The patient mentioned recent accident for which she was hospitalized. The patient states she "is feeling OK". The patient rambles and often gets off the topic at times during the interview. The patient speaks more slowly than in previous visits. The patient has a recent fall in [employer] and states she hit her eye and "hurt myself". The patient is alert at this time and admits to having taken a handful of medicine, thinking that when the meds were placed in her hand, she was supposed to take the medicine in her hands. The patient gives vague and incomplete responses. The patient denies intent to harm herself when she took the medicines and denies the intake of the meds was not a suicide attempt. The patient denies current intent to harm herself at this time and has no signs of mallory. The patient states she is sleeping well and also claims she has a good appetite at this time. The patient unrealistically plans to return to work this week. The patient was inaccurate with the date. The patient has a hard time calculating. The patient could not thing sequentially in an organized fashion. The patient does have memory deficits. The patient had delayed responses to serial 7's and after her first calculation the patient could not proceed from the number 93. The patient could not remember details of her neighborhood. The patient could " not recall 3 objects after a brief delay. The patient has a hard time remembering her medicines and the doses of medicines and cannot be accurate on her doses. Current list of meds after discharge from the hospital are: Buspar 15 mg tid, Lexapro 20 mg daily, Depakote 2. 0 gm q hs, trazadone 25-50 prn sleep. The patient has remained tired during the day and takes naps during the day and has trouble holding her head up in the daytime.  Electronic Medical Record (07/30/2021)  Type: Chart Review. 64yo F with PMH of bipolar disorder, COPD, uncontrolled DM, and HTN who presents to the ED for a fall and for accidentally ingesting too many of her home medications at one time. The patient is accompanied by her daughter (Kayla) who provided most of the history. her daughter states she recently fell and hit L cheek. She was brought to [facility] [place] ER and underwent MRI. Results showed evidence of vascular dementia. At one point, the patient was handed her home and inpatient prescriptions, and she ingested all of them at once saying she blacked out during that time. She was rushed to ICU, given charcoal treatment. She was discharged home to the daughter after being seen by both neurology and psychiatry saying the patient will need 24hr care. Since that time, the patient has had significant increase in memory loss. In addition, her mother started experiencing urinary and fecal incontinence causing distress to the patient. Her daughter, who lives out of town, is unable to provide constant care. The patient was found last night by her daughter putting a large quantity of home meds into her mouth without ingestion. The patient denies SI/HI, saying she took the medications because she hadn't taken them today (although she did take her morning dose). She also fell, hitting the back of hear head.  . The team has spoken to the daughter in depth, who believes the patient will seriously harm herself if left alone, due to history of accidental  ingestion, falls, unable to cook, and other household hazards.  . Upon admission, vitals were stable. her daughter and the patient at bedside.  Psychiatrist (08/10/2021)  Type: Chart Review. The patient is alert and cooperative with her medications. Her daughter Kayla([phone]) is helpful with the patient's meds and scheduling programs for her meals and daily activities. The patient discussed her habits in the past of taking more medicines than she actually needs. The patient states she had done that when she feels too anxious and uses extra medicines to help with the anxiety. I suggested the patient continue with Dr Bazzi and discuss being in one of his groups that may help with this impulsive behavior re taking too much medicine when she feels stressed. I stressed that the patient could not be left alone to care for her own needs at this time. I strongly urged that the patient never be left alone at this time in order to assure her own safety. her daughter indicates she feels her mother is depressed as manifested by lack of desire to be active and get going in the mornings.  Neurologist (08/12/2021)  Type: Chart Review. 65 y. O. Left handed female with a history of bipolar disorder, alcohol use disorder, depression, anxiety, mTBI, HTN, DM2, HLD, and COPD who presents today to the [facility] due to memory difficulty. Ms. the patient is accompanied by her daughter, Kayla, who participates in providing history. Additional information is obtained by reviewing available medical records.  . Ms. the patient was born in [place] on 1955. She grew up in [place] and was raised by her parents. She went to high school at [employer] and graduated in 1973. After completing high school, Ms. the patient attended [employer] but did not graduate. She went on to study music at [employer] in [place] [facility] [place] and spends much of her time performing as a . 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 She stopped working due to sustaining a head injury (discussed below).  . Ms. the patient reports that she began experiencing trouble with memory around mid-2019, however, on her previous evaluation by Dr. Vital, she reported that she began experiencing memory trouble around 2012. Ms. the patient describes initially experiencing trouble with remembering details of conversations and remembering to complete tasks. She reports that on one occasion in late-2019, she cooked a rabbit and left it in during conversation, and repeatedly asking the same questions and making the same statements. She has forgotten to pay bills for rent, electricity, and water which has led to her owing $5000 in missed payments. Additionally, her vehicle was repossessed due to forgetting to pay the note.  . In addition to memory difficulty, Ms. Patel's cognitive processing speed has slowed since 2019, and she describes progressive trouble with attention, concentration, and distractibility. Her ability to multitask has worsened and she has become increasingly disorganized. She also describes experiencing frequent episodes of depression and significant anxiety. Her balance has been poor since early-2019 and she describes feeling as if she is drunk.  She has trouble going up and down stairs and reportedly shuffles when she walks. She previously had a high frequency, low amplitude postural tremor predominantly in her right hand, though this has largely resolved. She describes frequently experiencing lightheadedness when she gets out of bed or gets up from a chair.  . Ms. the patient was recently admitted to [facility] on 07/21/21 after falling in a department store while shopping. The patient reportedly felt dizzy, could not maintain her balance, fell into the shelves, and bruised the left side of her face. She denied losing consciousness at the time. On  admission, she reported that she did not take her oral diabetic medication that morning and her blood sugars were noted to be high by EMS. Ms. the patient improved and was going to be discharged, however, after pharmacy brought her medications to bedside, the patient overdosed on metformin, glipizide, levothyroxine, and buspirone for an unspecified reason. She does not recall taking these medications. She improved over time and was discharged on 07/27/21. On 07/30/21, Ms. the patient was brought to the ED by her daughter after she was seen attempting to ingest a large quantity of medications. At the time of her presentation, she said that she attempted to take the medications because she had not taken any of her medications that day and had no thoughts of wanting to harm herself or end her life.  . She reports that she was diagnosed with bipolar disorder about 10 years ago. The earliest documentation of this in the medical record is from August 2012 by Dr. Naresh Colunga. She is currently being treated with Depakote ER 1000 mg daily.  65 y. O. Left handed female with a history of bipolar disorder, alcohol use disorder, depression, anxiety, mTBI, HTN, DM2, HLD, and COPD who presents today to the [facility] due to memory difficulty. Ms. the patient has been experiencing progressively worsening cognitive impairment for an unspecified period of time. On prior evaluation, she reported memory trouble since 2012, however, on today's evaluation she reports trouble since mid-2019. Regardless, her initial symptom was memory difficulty and later exhibited symptoms of executive dysfunction. These symptoms led to her forgetting to pay numerous bills, resulting in her owing $5000, and having her vehicle repossessed. Ms. Dickersons also experienced trouble with walking and balance as well as lightheadedness when rising from a chair or getting out of bed since early-2019.  . Her exam was notable for a prominent  right intention tremor and a mildly wide-based gait, but was otherwise unremarkable. On the MMSE, she scored 26/30, losing points on orientation to time (-1), delayed recall (-2), and figure copying (-1). Her labs were notable for recently elevated AST/ALT and A1c of 11. 1. MRI from January 2020 and July 2021 showed a similar degree of generalized volume loss, with involvement of the medial temporal lobes (MTA score ~1-2), compensatory enlargement of the lateral ventricles, and T2/FLAIR hyperintensity in the santa likely secondary to chronic microvascular ischemia.  . The underlying cause of Ms. Dickersons cognitive symptoms is unclear at this time, however, there is concern for a neurodegenerative disorder given her progressively worsening cognitive symptoms over the past several years. Her initial report of memory impairment and her family history of Alzheimer's disease in her mother and two maternal cousins raises suspicion for AD, however, is not enough evidence at this time to make this diagnosis. Additionally, other contributors may include heavy alcohol use, overtaking medications, valproic acid (particularly in the setting of carnitine deficiency), and uncontrolled diabetes. Ms. the patient does not fulfill any of the McKeith core criteria for DLB. There is no compelling evidence for any variant of frontotemporal dementia. She has a history of elevated thyroperoxidase antibodies, however, there is no other evidence to suggest Hashimoto's encephalopathy. Lastly, given the very mild degree of white matter disease, Ms. Dickersons symptoms are unlikely to be secondary to vascular disease.  . Additional workup will be performed, including neuropsychological testing. We will consider a lumbar puncture for AD biomarkers vs amyloid PET.  Neurologist (01/07/2022)  Type: Chart Review. 66 y. O. Left handed female with a history of bipolar disorder, alcohol use disorder, depression, anxiety, mTBI, HTN, DM2, HLD, and COPD who  presents today to the [facility] due to memory difficulty. MsJazlyn the patient has been experiencing progressively worsening cognitive impairment for an unspecified period of time. On prior evaluation, she reported memory trouble since 2012, however, on today's evaluation she reports trouble since mid-2019. Regardless, her initial symptom was memory difficulty and later exhibited symptoms of executive dysfunction. These symptoms led to her forgetting to pay numerous bills, resulting in her owing $5000, and having her vehicle repossessed. Ms. Swann also experienced trouble with walking and balance as well as lightheadedness when rising from a chair or getting out of bed since early-2019.  . Physical exam today was unremarkable. On the MMSE, she scored 26/30, losing points on orientation to time (-1), delayed recall (-2), and figure copying (-1). Her labs were notable for recently elevated AST/ALT and A1c of 11. 1. Additionally, carnitine performed on 08/12/2021 was low. MRI from January 2020 and July 2021 showed a similar degree of generalized volume loss, with involvement of the medial temporal lobes (MTA score ~1-2), compensatory enlargement of the lateral ventricles, and T2/FLAIR hyperintensity in the santa likely secondary to chronic microvascular ischemia.  . The underlying cause of Ms. Swann cognitive symptoms is unclear at this time, however, there is concern for a neurodegenerative disorder given her progressively worsening cognitive symptoms over the past several years. Her initial report of memory impairment and her family history of Alzheimer's disease in her mother and two maternal cousins raises suspicion for AD, however, is not enough evidence at this time to make this diagnosis. However, her reported improvement in cognition since her last appointment is uncharacteristic of a neurodegenerative disease. Other potential contributors include heavy alcohol use, overtaking medications,  "valproic acid (particularly in the setting of carnitine deficiency), and uncontrolled diabetes. Ms. the patient does not fulfill any of the McKeith core criteria for DLB. There is no compelling evidence for any variant of frontotemporal dementia. She has a history of elevated thyroperoxidase antibodies, however, there is no other evidence to suggest Hashimoto's encephalopathy. Lastly, given the very mild degree of white matter disease, Ms. Patel's symptoms are unlikely to be secondary to vascular disease.  . Additional workup will be performed, including neuropsychological testing. Amyloid PET would be helpful and determining if Alzheimer's disease was the underlying cause.  Neurologist (07/20/2022)  Type: Chart Review. Pt reports feeling "loopy" and describes having dizziness when she gets up out of bed and stand from sitting. She says that she is now having to take a moment to get her bearings. It began around one year ago. It has worsened then plateaued. Intermittent hand tremor bilaterally which is worse in heat. The patient says that she now takes Trulicity. She denies trouble with attention, concentration, or problem solving. Assessment and plan:. No changes in plan at this time. Pt consented to amyloid PET through [facility] study on 7/14/22.  .  . This service was not originating from a related E/M service provided within the previous 7 days nor will  to an E/M service or procedure within the next 24 hours or my soonest available appointment. Prevailing standard of care was able to be met in this audio-only visit.  Neurologist (08/09/2022)  Type: Chart Review. 67 y. O. Left handed female with a history of bipolar disorder, alcohol use disorder, depression, anxiety, mTBI, HTN, DM2, HLD, and COPD who presents today to the [facility] due to memory difficulty. Ms. the patient has been experiencing progressively worsening cognitive impairment for an unspecified period of time. On prior " evaluation, she reported memory trouble since 2012, however, on today's evaluation she reports trouble since mid-2019. Regardless, her initial symptom was memory difficulty and later exhibited symptoms of executive dysfunction. These symptoms led to her forgetting to pay numerous bills, resulting in her owing $5000, and having her vehicle repossessed. Ms. Swann also experienced trouble with walking and balance as well as lightheadedness when rising from a chair or getting out of bed since early-2019.  . On initial eval, MMSE 26/30 with points lost on orientation to time (-1), delayed recall (-2), and figure copying (-1). Her labs were notable for recently elevated AST/ALT and A1c of 11. 1. Additionally, carnitine performed on 08/12/2021 was low. MRI from January 2020 and July 2021 showed a similar degree of generalized volume loss, with involvement of the medial temporal lobes (MTA score ~1-2), compensatory enlargement of the lateral ventricles, and T2/FLAIR hyperintensity in the santa likely secondary to chronic microvascular ischemia.  . The underlying cause of Ms. Swann cognitive symptoms is unclear at this time, however, there is concern for a neurodegenerative disorder given her progressively worsening cognitive symptoms over the past several years. Her initial report of memory impairment and her family history of Alzheimer's disease in her mother and two maternal cousins raises suspicion for AD, however, is not enough evidence at this time to make this diagnosis. However, her reported improvement in cognition since her last appointment is uncharacteristic of a neurodegenerative disease. Other potential contributors include heavy alcohol use, overtaking medications, valproic acid (particularly in the setting of carnitine deficiency), and uncontrolled diabetes. Additional workup will be performed, including neuropsychological testing. Amyloid PET.     Current Presentation  Recent/Interim History:  On January  20, 2020, a 64-year-old woman with a medical history of bipolar disorder, diabetes, headaches, and hypothyroidism presents herself with issues related to balance and memory. She struggles to recall conversations, frequently forgets items, and finds multitasking challenging. Additionally, she experiences problems with her balance and has hand tremors. By February 5, 2020, the patient's symptoms of ataxia and tremor have slightly worsened. An MRI reveals mild atrophy in the cerebellum and frontotemporal region, while her laboratory test results fall within the normal range. On July 29, 2021, the patient seeks help from a psychiatrist due to cognitive deficits, memory loss, and rambling speech. She also suffers from a recent fall and inadvertently ingests a large quantity of medications. Concerns are raised about her potential for self-harm, indicating the necessity for continuous care. The following day, on July 30, 2021, the patient is taken to the emergency department after attempting to consume more medications. Although she denies any suicidal intentions, she displays memory deficits and impaired calculations. As a precautionary measure, it is advised that she should not be left alone to ensure her safety. By August 10, 2021, the patient's her daughter assists with medication management and scheduling. This is because the patient has a history of impulsive behavior relating to anxiety and excessive medication intake. The importance of not leaving her unattended for her own well-being is strongly emphasized. On August 12, 2021, accompanied by her daughter, the patient visits a neurologist. She reports a progressive decline in memory since 2019, along with cognitive impairment, depression, anxiety, balance issues, and dizziness. Previous incidents include falls and overdosing on medications. An MRI reveals volume loss and evidence of chronic microvascular ischemia, leading to a suspicion of a neurodegenerative  "disorder. On January 7, 2022, at the age of 66, the patient's cognitive impairment continues to worsen progressively. While her physical examination does not yield any notable findings, memory deficits are observed. Laboratory tests show elevated liver enzymes and poorly controlled diabetes. On July 20, 2022, the patient describes feeling "loopy" and experiencing dizziness upon standing up or getting out of bed. This problem began around a year ago and has worsened, although it eventually reached a plateau. Additionally, the patient has intermittent hand tremors on both sides, which are exacerbated by heat. She discloses that she is currently taking Trulicity. Interestingly, the patient denies any difficulties with attention, concentration, or problem-solving. No changes are made to her treatment plan during this time, but she consents to undergo an amyloid PET through the [facility] study. Then, on August 9, 2022, a 67-year-old left-handed woman visits the [facility] due to memory difficulties. Her medical history is quite complex, involving bipolar disorder, alcohol use disorder, depression, anxiety, mTBI, HTN, DM2, HLD, and COPD. The patient has been experiencing a progressive decline in cognitive function, with memory troubles reported since 2012 but becoming more prominent since mid-2019. Initially, her primary symptom was memory difficulty, but she later developed symptoms related to executive dysfunction. This cognitive decline led to significant issues, including forgetting to pay bills, resulting in a debt of $5000 and ultimately having her vehicle repossessed. Since early 2019, she has also been dealing with walking problems, balance issues, and lightheadedness.  Unresolved Concern(s) reported by patient/family:  2012-00 :: SCI  2013-00 :: AMS in hospital  2019-00 :: walking problems  2020-01 :: MCI per neurology; balance and memory issues. She experiences difficulty remembering " conversations, forgets items, and has trouble multitasking. She also has poor balance and hand tremors.  2020-02 :: ataxia and tremor have mildly worsened.  2021-07 :: cognitive deficits, memory loss, and rambling speech  2021-07 :: emergency department after attempting to ingest more medications. She denies suicidal intent but exhibits memory deficits and impaired calculations.  2021-08 :: dementia - unable to handle medications  2021-08 :: progressively worsening memory difficulty since 2019, along with cognitive impairment, depression, anxiety, balance problems, and lightheadedness  2022-01 :: dementia per neurology  2022-02 :: mild cerebellar and frontotemporal atrophy  2022-08 :: MMSE 26/30          Neuroimaging:    MRI brain/head without contrast on 7/21/2023  Formal interpretation by Radiology:  1. No convincing acute intracranial abnormalities, no findings to suggest acute major vascular territory infarct or hemorrhage. 2. High T2 signal within the santa and brainstem, the appearance of which suggests that of chronic microvascular ischemic change or possible artifact caudally. 3. Sequela of chronic microvascular ischemic change and senescent change. 4. Sinus disease. 5. Please see separately dictated CT for details of the maxillofacial injuries.  Independently reviewed radiological imaging by Johnathan Harris MD. MPH. Behavioral Neurologist  T1: mild-to-moderate generalized cortical atrophy with regional atrophy most well appreciated the anterior cingulate and occipital lobe. No significant cortical atrophy parietal lobule retro splenial cortex. Mild thinning of the corpus callosum throughout. Intact midbrain and brainstem. mild-to-moderate bilateral hippocampal volume loss with widening of the anterior parking him and hydrocephalus ex vacuo concerning for regional atrophy of the subcortical nuclei prefrontal territories. No significant orbital frontal or frontal cortex atrophy.  T2/FLAIR: Mild bilateral  anterior greater than posterior right greater left periventricular capping wallerian degeneration across the midline. No significant burden of subcortical microvascular disease. Mild gliosis bilateral hippocampi.  DWI/ADC: No Significant DWI hyperintensities/hypointensities. No ADC correlation.  SWI/GRE: No Significant hypointensities to suggest cortical/subcortical hemosiderin deposition.  Impression: : Mild-to-moderate generalized cortical atrophy regional atrophy most well appreciated the anterior cingulate occipital lobe concerning for Lewy body disease. Mild hydrocephalus ex vacuo.     Working Diagnosis/Differential  EOAD VS DLB     Sincerely,  Johnathan Rae MD. MPH.    [facility]  [facility]

## 2023-06-15 ENCOUNTER — OFFICE VISIT (OUTPATIENT)
Dept: NEUROLOGY | Facility: CLINIC | Age: 68
End: 2023-06-15
Payer: COMMERCIAL

## 2023-06-15 ENCOUNTER — PATIENT MESSAGE (OUTPATIENT)
Dept: ORTHOPEDICS | Facility: CLINIC | Age: 68
End: 2023-06-15
Payer: COMMERCIAL

## 2023-06-15 VITALS
WEIGHT: 177 LBS | BODY MASS INDEX: 24.78 KG/M2 | HEART RATE: 85 BPM | DIASTOLIC BLOOD PRESSURE: 69 MMHG | HEIGHT: 71 IN | SYSTOLIC BLOOD PRESSURE: 95 MMHG

## 2023-06-15 DIAGNOSIS — R29.6 RECURRENT FALLS: ICD-10-CM

## 2023-06-15 DIAGNOSIS — F34.9 PERSISTENT MOOD (AFFECTIVE) DISORDER, UNSPECIFIED: ICD-10-CM

## 2023-06-15 DIAGNOSIS — J31.0 CHRONIC RHINITIS: ICD-10-CM

## 2023-06-15 DIAGNOSIS — G31.84 MCI (MILD COGNITIVE IMPAIRMENT): Primary | ICD-10-CM

## 2023-06-15 DIAGNOSIS — R26.81 GAIT INSTABILITY: ICD-10-CM

## 2023-06-15 DIAGNOSIS — R42 EPISODIC LIGHTHEADEDNESS: ICD-10-CM

## 2023-06-15 DIAGNOSIS — R42 VERTIGO: ICD-10-CM

## 2023-06-15 DIAGNOSIS — E11.49 TYPE 2 DIABETES MELLITUS WITH OTHER NEUROLOGIC COMPLICATION, WITHOUT LONG-TERM CURRENT USE OF INSULIN: ICD-10-CM

## 2023-06-15 DIAGNOSIS — G31.9 NEURODEGENERATIVE COGNITIVE IMPAIRMENT: ICD-10-CM

## 2023-06-15 DIAGNOSIS — G47.52 RBD (REM BEHAVIORAL DISORDER): ICD-10-CM

## 2023-06-15 DIAGNOSIS — F09 MILD COGNITIVE DISORDER: ICD-10-CM

## 2023-06-15 DIAGNOSIS — K59.00 CONSTIPATION, UNSPECIFIED CONSTIPATION TYPE: ICD-10-CM

## 2023-06-15 PROBLEM — F03.918 DEMENTIA WITH BEHAVIORAL DISTURBANCE: Status: ACTIVE | Noted: 2023-06-15

## 2023-06-15 PROBLEM — F03.918 DEMENTIA WITH BEHAVIORAL DISTURBANCE: Status: RESOLVED | Noted: 2023-06-15 | Resolved: 2023-06-15

## 2023-06-15 PROCEDURE — 3074F SYST BP LT 130 MM HG: CPT | Mod: CPTII,S$GLB,, | Performed by: PSYCHIATRY & NEUROLOGY

## 2023-06-15 PROCEDURE — 3051F PR MOST RECENT HEMOGLOBIN A1C LEVEL 7.0 - < 8.0%: ICD-10-PCS | Mod: CPTII,S$GLB,, | Performed by: PSYCHIATRY & NEUROLOGY

## 2023-06-15 PROCEDURE — 3051F HG A1C>EQUAL 7.0%<8.0%: CPT | Mod: CPTII,S$GLB,, | Performed by: PSYCHIATRY & NEUROLOGY

## 2023-06-15 PROCEDURE — 99215 OFFICE O/P EST HI 40 MIN: CPT | Mod: S$GLB,,, | Performed by: PSYCHIATRY & NEUROLOGY

## 2023-06-15 PROCEDURE — 96132 PR NEUROPSYCHOLOGIC TEST EVAL SVCS, 1ST HR: ICD-10-PCS | Mod: 59,S$GLB,, | Performed by: PSYCHIATRY & NEUROLOGY

## 2023-06-15 PROCEDURE — 1159F MED LIST DOCD IN RCRD: CPT | Mod: CPTII,S$GLB,, | Performed by: PSYCHIATRY & NEUROLOGY

## 2023-06-15 PROCEDURE — 3078F DIAST BP <80 MM HG: CPT | Mod: CPTII,S$GLB,, | Performed by: PSYCHIATRY & NEUROLOGY

## 2023-06-15 PROCEDURE — 1100F PTFALLS ASSESS-DOCD GE2>/YR: CPT | Mod: CPTII,S$GLB,, | Performed by: PSYCHIATRY & NEUROLOGY

## 2023-06-15 PROCEDURE — 3008F BODY MASS INDEX DOCD: CPT | Mod: CPTII,S$GLB,, | Performed by: PSYCHIATRY & NEUROLOGY

## 2023-06-15 PROCEDURE — 3074F PR MOST RECENT SYSTOLIC BLOOD PRESSURE < 130 MM HG: ICD-10-PCS | Mod: CPTII,S$GLB,, | Performed by: PSYCHIATRY & NEUROLOGY

## 2023-06-15 PROCEDURE — 99999 PR PBB SHADOW E&M-EST. PATIENT-LVL V: CPT | Mod: PBBFAC,,, | Performed by: PSYCHIATRY & NEUROLOGY

## 2023-06-15 PROCEDURE — 3078F PR MOST RECENT DIASTOLIC BLOOD PRESSURE < 80 MM HG: ICD-10-PCS | Mod: CPTII,S$GLB,, | Performed by: PSYCHIATRY & NEUROLOGY

## 2023-06-15 PROCEDURE — 99417 PR PROLONGED SVC, OUTPT, W/WO DIRECT PT CONTACT,  EA ADDTL 15 MIN: ICD-10-PCS | Mod: S$GLB,,, | Performed by: PSYCHIATRY & NEUROLOGY

## 2023-06-15 PROCEDURE — 3008F PR BODY MASS INDEX (BMI) DOCUMENTED: ICD-10-PCS | Mod: CPTII,S$GLB,, | Performed by: PSYCHIATRY & NEUROLOGY

## 2023-06-15 PROCEDURE — 3288F FALL RISK ASSESSMENT DOCD: CPT | Mod: CPTII,S$GLB,, | Performed by: PSYCHIATRY & NEUROLOGY

## 2023-06-15 PROCEDURE — 1160F RVW MEDS BY RX/DR IN RCRD: CPT | Mod: CPTII,S$GLB,, | Performed by: PSYCHIATRY & NEUROLOGY

## 2023-06-15 PROCEDURE — 1159F PR MEDICATION LIST DOCUMENTED IN MEDICAL RECORD: ICD-10-PCS | Mod: CPTII,S$GLB,, | Performed by: PSYCHIATRY & NEUROLOGY

## 2023-06-15 PROCEDURE — 99417 PROLNG OP E/M EACH 15 MIN: CPT | Mod: S$GLB,,, | Performed by: PSYCHIATRY & NEUROLOGY

## 2023-06-15 PROCEDURE — 1157F ADVNC CARE PLAN IN RCRD: CPT | Mod: CPTII,S$GLB,, | Performed by: PSYCHIATRY & NEUROLOGY

## 2023-06-15 PROCEDURE — 1125F PR PAIN SEVERITY QUANTIFIED, PAIN PRESENT: ICD-10-PCS | Mod: CPTII,S$GLB,, | Performed by: PSYCHIATRY & NEUROLOGY

## 2023-06-15 PROCEDURE — 96116 PR NEUROBEHAVIORAL STATUS EXAM BY PSYCH/PHYS: ICD-10-PCS | Mod: 59,S$GLB,, | Performed by: PSYCHIATRY & NEUROLOGY

## 2023-06-15 PROCEDURE — 1125F AMNT PAIN NOTED PAIN PRSNT: CPT | Mod: CPTII,S$GLB,, | Performed by: PSYCHIATRY & NEUROLOGY

## 2023-06-15 PROCEDURE — 1160F PR REVIEW ALL MEDS BY PRESCRIBER/CLIN PHARMACIST DOCUMENTED: ICD-10-PCS | Mod: CPTII,S$GLB,, | Performed by: PSYCHIATRY & NEUROLOGY

## 2023-06-15 PROCEDURE — 1157F PR ADVANCE CARE PLAN OR EQUIV PRESENT IN MEDICAL RECORD: ICD-10-PCS | Mod: CPTII,S$GLB,, | Performed by: PSYCHIATRY & NEUROLOGY

## 2023-06-15 PROCEDURE — 3288F PR FALLS RISK ASSESSMENT DOCUMENTED: ICD-10-PCS | Mod: CPTII,S$GLB,, | Performed by: PSYCHIATRY & NEUROLOGY

## 2023-06-15 PROCEDURE — 99215 PR OFFICE/OUTPT VISIT, EST, LEVL V, 40-54 MIN: ICD-10-PCS | Mod: S$GLB,,, | Performed by: PSYCHIATRY & NEUROLOGY

## 2023-06-15 PROCEDURE — 96116 NUBHVL XM PHYS/QHP 1ST HR: CPT | Mod: 59,S$GLB,, | Performed by: PSYCHIATRY & NEUROLOGY

## 2023-06-15 PROCEDURE — 99999 PR PBB SHADOW E&M-EST. PATIENT-LVL V: ICD-10-PCS | Mod: PBBFAC,,, | Performed by: PSYCHIATRY & NEUROLOGY

## 2023-06-15 PROCEDURE — 1100F PR PT FALLS ASSESS DOC 2+ FALLS/FALL W/INJURY/YR: ICD-10-PCS | Mod: CPTII,S$GLB,, | Performed by: PSYCHIATRY & NEUROLOGY

## 2023-06-15 PROCEDURE — 96132 NRPSYC TST EVAL PHYS/QHP 1ST: CPT | Mod: 59,S$GLB,, | Performed by: PSYCHIATRY & NEUROLOGY

## 2023-06-15 RX ORDER — SUVOREXANT 10 MG/1
1 TABLET, FILM COATED ORAL NIGHTLY
Qty: 30 TABLET | Refills: 3 | Status: SHIPPED | OUTPATIENT
Start: 2023-06-15 | End: 2023-07-28 | Stop reason: SDUPTHER

## 2023-06-15 NOTE — PROGRESS NOTES
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 trazodone for sleep, with only mild symptomatic relief. Due to the patient's escalating psychiatric and cognitive concerns, as noted by the patient and her family, she underwent an evaluation for cognitive impairment in early 2020. On January 20, 2020, a 64-year-old woman with a medical history of bipolar disorder, diabetes, headaches, and hypothyroidism presented herself with issues related to balance and memory. She experiences difficulty recalling conversations, frequently forgets items, and struggles with multitasking. Additionally, she faces problems with balance and exhibits hand tremors. By February 5, 2020, the patient's symptoms of ataxia and tremor had slightly worsened. An MRI revealed mild atrophy in the cerebellum and frontotemporal region, while her laboratory test results fell within the normal range. On July 29, 2021, the patient sought help from a psychiatrist due to cognitive deficits, memory loss, and rambling speech. Additionally, she experienced a recent fall and unintentionally ingested a large quantity  "of medications. Concerns were raised about her potential for self-harm, highlighting the need for continuous care. The following day, on July 30, 2021, the patient was taken to the emergency department after attempting to consume more medications. Although she denied any suicidal intentions, she displayed memory deficits and impaired calculations. To ensure her safety, it was advised that she should not be left alone. By August 10, 2021, the patient's daughter began assisting with medication management and scheduling due to the patient's history of impulsive behavior related to anxiety and excessive medication intake. The importance of not leaving the patient unattended for her own well-being was strongly emphasized. On August 12, 2021, accompanied by her daughter, the patient visited a neurologist. She reported a progressive decline in memory since 2019, along with cognitive impairment, depression, anxiety, balance issues, and dizziness. Previous incidents included falls and medication overdoses. An MRI revealed volume loss and evidence of chronic microvascular ischemia, raising suspicion of a neurodegenerative disorder. On January 7, 2022, at the age of 66, the patient's cognitive impairment continued to progressively worsen. While her physical examination did not reveal any notable findings, memory deficits were observed. Laboratory tests showed elevated liver enzymes and poorly controlled diabetes. On July 20, 2022, the patient described feeling "loopy" and experiencing dizziness upon standing up or getting out of bed. This problem began approximately a year ago and has worsened, although it eventually reached a plateau. Additionally, the patient has intermittent hand tremors on both sides, which are exacerbated by heat. She mentioned that she is currently taking Trulicity. Interestingly, the patient denied experiencing any difficulties with attention, concentration, or problem-solving. No changes were made to " her treatment plan during this time, but she consented to undergo an amyloid PET scan as part of the [facility] [employer] study. Then, on August 9, 2022, she presented to [facility] due to memory difficulties. The patient has been experiencing a progressive decline in cognitive function, with memory problems reported since 2012, but becoming more prominent since mid-2019. Initially, her primary symptom was memory difficulty, but she later developed symptoms related to executive dysfunction. This cognitive decline has led to significant issues, including forgetting to pay bills, resulting in a debt of $5000, and ultimately having her vehicle repossessed. Since early 2019, she has also experienced walking problems, balance issues, and lightheadedness. During the current presentation, the patient is unable to provide meaningful details about the progression of her cognitive impairment over the past three years. Multiple inconsistencies were identified between her reported history and the information available in the medical chart. Furthermore, there is no historian available to provide additional history. The patient states that she continues to work, although this information cannot be confirmed. She also continues to live alone, but her regional support from a friend may be affected by her planned move to [place]. The level of regional support available to her upon relocation remains unclear. The patient's primary concerns during the current visit include exploring other potential causes for her progressive gait instability and lightheadedness. Upon clinical examination, the patient's left arm shows multifocal fractures, and there is a fracture in her right lower extremity. These injuries impair the assessment of gait and evaluation of movement in her left upper extremity. 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 Neurocognitive assessment indicates relatively stable mild cognitive impairment, predominantly affecting executive functions. We discussed these features collectively as potentially indicative of early Lewy body disease. Additional diagnostic testing was recommended, with a focus on sleep, mood control, and polypharmacy. The patient agreed with this plan. We will refer her to Psychiatry and Psychology for ongoing counseling regarding childhood trauma and medication management, as well as to sleep medicine for symptoms suggestive of sleep apnea. Referrals to ENT and Allergy will address her chronic sinusitis and recurrent sinus infections. A skin biopsy is scheduled to investigate concerns of an alpha-synuclein-related process.  Unresolved Concern(s) reported by patient/family:  Lightheadedness  Multiple ground level falls recent fractures  Limited regional social support     Review of cognitive, visuospatial, motor, sensory, and behavioral systems:     Memory:   The patient's memory has worsened in the past few years.  She does repeat statements or asks the same question repeatedly.  She does have difficulty remembering recent important conversations.  She does not have difficulty remembering recent events.  She does not forget information within minutes.  Her recent retrograde memory is intact.  Her remote memory is intact.  Attention:   The patient's attention and concentration are impaired.  She does not have attentional fluctuations.  She does not have difficulty maintaining selective attention.  She does not become easily distracted.  She does not have difficulty dividing their attention.  Executive:   The patient's cognitive processing speed is slower.  She does have difficulty with working memory.  She does not misplace personal items (e.g., keys, cell phone, wallet) more frequently.  She does not have difficulty keeping track of her medications.  She does not have difficulty  with planning/organizing/completing multistep tasks.  She does have not difficulty with executive attention.  She does have difficulty with flexible thinking.  She does not have difficulty with response inhibition.  She denies new impulsivity or rash/careless actions.  Her judgment is intact.  Language:   The patient's speech is not affected.  She does not forget people's names more frequently.  She does not have word-finding difficulties.  Her speech is fluent and non-effortful.  Her speech is grammatically intact.  She does not make word substitutions.  She does not have difficulty reading.  She does not appear to have impaired comprehension.  Visuospatial:   The patient does not have new visuospatial difficulty.  She does not become confused or disoriented in *new*, unfamiliar places.  She does not have trouble with navigation.  She does not get lost in familiar places.  She does not have visuospatial disorientation.  She does not have difficulty recognizing objects or faces.  She denies problems with driving or parking.  Motor/Coordination:   The patient does have difficulty with walking.  She does feel imbalanced.  She has fallen.  She reports new muscle weakness.  She does not have difficulty buttoning shirts, operating zippers, or manipulating tools/utensils.  Her handwriting has not become micrographic.  She does not have a resting tremor.  She does report having new involuntary movements and/or muscle jerking.  She does not have swallowing difficulty.  She denies new muscle cramps and twitching.  Sensory:   The patient denies new numbness, tingling, paresthesias, or pain.  The patient denies a loss of vision, blurry vision, or double vision.  The patient reports a recent loss of hearing and/or worsening tinnitus.  The patient denies anosmia.  Sleep:   The patient reports difficulty sleeping.  The patient does have difficulty going to sleep.  The patient reports difficulty staying asleep and/or frequently  awakening at night.  The patient does not snore or have witnessed apneas while sleeping.  When she wakes up in the morning, she does not feel well-rested.  She has been reported to have dream-enactment behavior. Comment: parasomnia since age 7 sleep walking  She denies symptoms suggestive of restless leg syndrome.  Behavior:   The patient's personality has changed.  She does not have symptoms of disinhibition and social inappropriateness.  She does not have symptoms to suggest a loss of manners or decorum.  She does not appear apathetic or has decreased motivation.  She does not appear to have a change in inertia.  There is no report that The patient has had a change in their emotional expression.  She does have emotional blunting or lability.  She does not have symptoms of irritability and mood lability.  She does not have symptoms of agitation, aggression, or violent outbursts.  Her insight into his health and situation is intact.  Her personal hygiene is intact.  She is not exhibiting a diminished response to other people's needs and feelings.  She is not exhibiting a diminished social interest, interrelatedness, or personal warmth.  She denies restlessness.  She denies new and/or worsening simple repetitive behaviors.  Her speech has not become simplified or become repetitive/stereotyped.  She denies new/worsening complex repetitive/ritualistic compulsions and behaviors.  She does not have symptoms of hyper-religiosity or dogmatism.  Her interests/pleasures have not become restrictive, simplified, interrupting, or repetitive.  She denies a change of self-stimulating behavior.  She denies any changes in eating behavior.  She denies increased consumption of food or substances.  She denies oral exploration or consumption of inedible objects.  Psychiatric:   She does feel depressed.  She is not exhibiting symptoms of social withdrawal/indifference.  She does have anxiety.  She does exhibit cycling behavior.  She  does not exhibit hyperactive behavior.  She is exhibited symptoms of paranoia.  She does have delusions.  She does not have hallucinations.  She does not have a history of sensitivity to neuroleptic/psychotropic medications.  Medical Review of Systems:   The patient does have constipation.  The patient does not have urinary incontinence.  The patient reports symptoms that are suggestive of orthostatic lightheadedness.  The patient's weight is stable.  Functional status:  Difficulty performing the following Instrumental ADLs:  Housekeeping: No  Food Preparation: No  Shopping: No  Ability to Handle Finances: No  Transportation/Driving: No  Household Appliances/Stove: No  Laundry: No  Difficulty performing the following Basic ADLs:  Dressing: No  Bathing: No  Toileting: No  Personal hygiene and grooming: No  Feeding: No  Care Management:  Patient/Family Safety Concerns:  Medication Adherence: No  Home Safety: No  Wandered: No  Firearms: No  Fall Risk: No  Home Alone: No          Past Medical History:   Diagnosis Date    Anxiety     Bipolar 1 disorder     COPD (chronic obstructive pulmonary disease)     Depression     Diabetes mellitus     GERD (gastroesophageal reflux disease)     Grade I open displaced transverse fracture of shaft of left humerus 3/20/2023    HEARING LOSS     pt states she has loss slighty in rt ear.    Hypertension     Insomnia     Rash        Past Surgical History:   Procedure Laterality Date    APPENDECTOMY      APPLICATION, EXTERNAL FIXATION DEVICE, FOR ANKLE FRACTURE Right 5/5/2023    Procedure: APPLICATION, EXTERNAL FIXATION DEVICE, FOR ANKLE FRACTURE - right, mariusz, ancef, bone foam, slider, C arm door side;  Surgeon: Yanely Guerra MD;  Location: [facility] OR [address] [place] [address];  Service: Orthopedics;  Laterality: Right;  right, mariusz, ancef, bone foam, slider, C arm door side    BREAST BIOPSY      COLONOSCOPY      COLONOSCOPY N/A 10/31/2019    Procedure: COLONOSCOPY;  Surgeon: Philippe Monteiro MD;   Location: [facility] (4TH FLR);  Service: Endoscopy;  Laterality: N/A;  PM prep-MH    CYST REMOVAL      ESOPHAGOGASTRODUODENOSCOPY N/A 10/31/2019    Procedure: EGD (ESOPHAGOGASTRODUODENOSCOPY);  Surgeon: Philippe Monteiro MD;  Location: [facility] (4TH FLR);  Service: Endoscopy;  Laterality: N/A;  Pm prep-MH    FIXATION OF SYNDESMOSIS OF ANKLE Right 5/8/2023    Procedure: FIXATION, SYNDESMOSIS, ANKLE;  Surgeon: Johnathan Contreras MD;  Location: [address];  Service: Orthopedics;  Laterality: Right;    HYSTERECTOMY      IRRIGATION AND DEBRIDEMENT OF UPPER EXTREMITY Left 3/21/2023    Procedure: IRRIGATION AND DEBRIDEMENT, UPPER EXTREMITY;  Surgeon: Ousmane Crawford MD;  Location: [address];  Service: Orthopedics;  Laterality: Left;    OPEN REDUCTION AND INTERNAL FIXATION (ORIF) OF INJURY OF ANKLE Right 5/8/2023    Procedure: ORIF, ANKLE - RIGHT. EX FIX REMOVAL. SYNTHES. C ARM DOOR SIDE.  BONE FOAM.;  Surgeon: Johnathan Contreras MD;  Location: [address];  Service: Orthopedics;  Laterality: Right;    ORIF HUMERUS FRACTURE Left 3/21/2023    Procedure: ORIF, FRACTURE, HUMERUS;  Surgeon: Ousmane Crawford MD;  Location: [address];  Service: Orthopedics;  Laterality: Left;       Family History   Problem Relation Age of Onset    Alzheimer's disease Mother     Sinus disease Mother     Heart failure Father     Hyperlipidemia Father     Heart attack Father     No Known Problems Sister     Sarcoidosis Sister     Colon cancer Maternal Aunt     Alzheimer's disease Maternal Cousin     Alzheimer's disease Maternal Cousin     Anxiety disorder Daughter     Depression Daughter     Esophageal cancer Neg Hx        Social History     Socioeconomic History    Marital status:      Spouse name: N/A    Number of children: 1    Years of education: 13    Highest education level: Some college, no degree   Occupational History    Occupation:      Employer: [employer]  WORKS   Tobacco Use    Smoking status: Former     Packs/day: 0.50     Years: 34.00     Pack years: 17.00     Types: Cigarettes     Start date:      Quit date:      Years since quittin.4     Passive exposure: Never    Smokeless tobacco: Never   Substance and Sexual Activity    Alcohol use: Yes     Comment: Rare    Drug use: Not Currently     Types: Marijuana, Cocaine    Sexual activity: Not Currently     Partners: Male     Birth control/protection: None   Social History Narrative    Ms. Patel was born in [place] on 1955. She grew up in [place] and was raised by her parents. She went to high school at [employer] and graduated in . After completing high school, Ms. Patel attended [employer] but did not graduate. 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 She would like to return to [employer] in the future to get degree in music.      Social Determinants of Health     Financial Resource Strain: Low Risk     Difficulty of Paying Living Expenses: Not hard at all   Food Insecurity: No Food Insecurity    Worried About Running Out of Food in the Last Year: Never true    Ran Out of Food in the Last Year: Never true   Transportation Needs: No Transportation Needs    Lack of Transportation (Medical): No    Lack of Transportation (Non-Medical): No   Physical Activity: Insufficiently Active    Days of Exercise per Week: 3 days    Minutes of Exercise per Session: 30 min   Stress: No Stress Concern Present    Feeling of Stress : Only a little   Social Connections: Unknown    Frequency of Communication with Friends and Family: Patient refused    Frequency of Social Gatherings with Friends and Family: Patient refused    Attends Druze Services: Patient refused    Active Member of Clubs or Organizations: Patient refused    Attends Club or Organization Meetings: Patient refused    Marital Status: Patient refused   Housing Stability: Low  Risk     Unable to Pay for Housing in the Last Year: No    Number of Places Lived in the Last Year: 2    Unstable Housing in the Last Year: No       Medication:     Current Outpatient Medications on File Prior to Visit   Medication Sig Dispense Refill    aspirin (ECOTRIN) 81 MG EC tablet Take 1 tablet (81 mg total) by mouth 2 (two) times a day. DVT prophylaxis after ankle fracture surgery. End date 6/20/2023.      blood sugar diagnostic (TRUE METRIX GLUCOSE TEST STRIP MISC) True Metrix Glucose Test Strip   TEST TWICE DAILY AS DIRECTED      blood-glucose meter (TRUE METRIX AIR GLUCOSE METER MISC) True Metrix Air Glucose Meter   USE UNDER THE SKIN TWICE DAILY AS DIRECTED      dulaglutide (TRULICITY) 0.75 mg/0.5 mL pen injector Inject 0.75 mg into the skin every Sunday.      EScitalopram oxalate (LEXAPRO) 20 MG tablet Take 1 tablet (20 mg total) by mouth once daily. 90 tablet 3    ibuprofen (ADVIL,MOTRIN) 800 MG tablet Take 1 tablet (800 mg total) by mouth 3 (three) times daily as needed for Pain. 30 tablet 0    insulin detemir U-100, Levemir, 100 unit/mL (3 mL) SubQ InPn pen Inject 5 Units into the skin every evening.  0    levothyroxine (SYNTHROID) 100 MCG tablet Take 1 tablet (100 mcg total) by mouth once daily. 30 tablet 1    metFORMIN (GLUCOPHAGE) 500 MG tablet Take 2 tablets (1,000 mg total) by mouth 2 (two) times daily with meals. 120 tablet 1    oxyCODONE (ROXICODONE) 10 mg Tab immediate release tablet Take 1 tablet (10 mg total) by mouth every 8 (eight) hours as needed for Pain. 21 tablet 0    rosuvastatin (CRESTOR) 20 MG tablet Take 1 tablet (20 mg total) by mouth once daily.  3    tiZANidine (ZANAFLEX) 2 MG tablet Take 1 tablet (2 mg total) by mouth every 8 (eight) hours as needed (muscle spasms). 21 tablet 0    traZODone (DESYREL) 150 MG tablet TAKE 1 TABLET(150 MG) BY MOUTH EVERY EVENING 90 tablet 2    [DISCONTINUED] acetaminophen (TYLENOL) 500 MG tablet Take 2 tablets (1,000 mg total) by mouth every 8  (eight) hours.  0    [DISCONTINUED] diphenhydrAMINE-acetaminophen (TYLENOL PM)  mg Tab Take 2 tablets by mouth nightly as needed (SLEEP).      [DISCONTINUED] hydrOXYzine pamoate (VISTARIL) 25 MG Cap Take 1 to 2 caps q hs prn difficulty sleeping 30 capsule 3    [DISCONTINUED] zolpidem (AMBIEN) 10 mg Tab Take 1 tablet (10 mg total) by mouth nightly as needed. 30 tablet 2     Current Facility-Administered Medications on File Prior to Visit   Medication Dose Route Frequency Provider Last Rate Last Admin    [DISCONTINUED] GENERIC EXTERNAL MEDICATION     Generic External Data Provider        [DISCONTINUED] GENERIC EXTERNAL MEDICATION     Generic External Data Provider            Review of patient's allergies indicates:   Allergen Reactions    Lamictal [lamotrigine] Rash     Per psychiatry notes       Medications Reconciliation:   I have reconciled the patient's home medications and discharge medications with the patient/family. I have updated all changes.  Refer to After-Visit Medication List.    Objective:  Vital Signs:  Vitals:    06/15/23 0916   BP: 95/69   Pulse: 85     Wt Readings from Last 3 Encounters:   06/15/23 0916 80.3 kg (177 lb)   05/05/23 2330 80.3 kg (177 lb 0.5 oz)   05/05/23 1714 72.6 kg (160 lb)   05/05/23 0954 72.6 kg (160 lb)   05/02/23 0849 72.6 kg (160 lb 0.9 oz)     Body mass index is 24.69 kg/m².           Neurological examination:  Mental Status:   Her appearance is normal (hygiene is appropriate; attire is proper and clean).  Throughout the interview, she is cooperative, her eye contact is appropriate.  Her behavior is appropriate to the clinical context without impropriety or improper language/conduct.  Her behavior was consistent with pseudobulbar affect i.e. episodes of sudden contextually inappropriate/uncontrollable laughing or crying.  The patient's energy level is abnormal. Comment: Hyperactive, Fluctuating;  Her orientation is normal; Spatial 5/5 (location, the floor of building,  city, county, state) and temporal 5/5 (month, day, year, LESTER) dimensions are accurate.  Her attention/concentration is impaired.  She can complete three-step commands.  Her fund of knowledge was appropriate for age, culture, and level of education.  Her thought process is not logical or goal-oriented. Comment: tangential, circumstantial, perseveration;  She demonstrated appropriate insight based on actions, awareness of her illness, plans for the future.  She demonstrated good judgment based on actions and plans for the future.  She has no evidence of hallucinations (auditory, visual, olfactory).  She has no evidence of delusions (paranoid, grandiose, bizarre).  Cranial Nerves:   Her pupils were normal.  Her visual fields were full to confrontation in all quadrants.  Her ocular pursuit in the horizontal and vertical plane was complete.  Her saccadic initiation, velocity, and amplitude are normal.  Her facial strength was normal.  Her facial expression was symmetric and appropriate to the context.  Her hearing was normal bilaterally.  Her oropharynx and soft palate appeared abnormal. Comment: mallampati 3;  Her tongue showed no evidence of scalloping.  She tongue movement with normal.  She had no significant evidence of anterocollis or retrocollis.  Speech/Language:   The patient's speech was not completely fluent and non-effortful.  Her speech volume is within normal range and appropriate to the context.  Her speech rate is normal.  Her respirations are within normal range and appropriate to context.  Her speech timbre is normal.  She made articulation (segmental features) errors.  The patient's speech is dysarthric.  She has ataxic dysarthria. Comment: Imprecise consonants, Irregular articulatory breakdowns, Prolonged phonemes, Prolonged intervals, Slow rate; Imprecise consonants, Irregular articulatory breakdowns, Prolonged phonemes, Prolonged intervals, Slow rate  The patient's speech was without evidence of  anomia.  She showed no evidence of anomia during spontaneous speech.  She showed no evidence of anomia during confrontational naming; 12/12 (correct chocolate bar, kangaroo, theater, Buddhist, doctor, potato, battery, ice cube tray, thermometer, flower, bomb, and calendar).  She makes no phonological loop errors.  She can comprehend commands that cross the midline (e.g., with your left thumb, touch your right ear).  She can comprehend syntactically complex sentences.  Motor:   The patient's bilateral upper extremity muscle bulk is appropriate.  The patient's upper extremity muscle tone is increased. Comment: tone testing of the left upper extremity is limited due to recent fractures; tone testing of the left upper extremity is limited due to recent fractures  The patient's bilateral upper extremity muscle tone does not suggest spasticity.  There is evidence of rigidity/cogwheeling. Comment: R, Mild; Muscle tone is increased and there is evidence of rigidity/cogwheeling.  The patient's bilateral upper extremity muscle tone has no evidence of paratonia.  Assessment of motor strength showed evidence of abnormal weakness.  Focal muscle weakness was appreciated Comment: assessment of strength in the left upper extremity and right lower extremity are limited due to recent fractures; assessment of strength in the left upper extremity and right lower extremity are limited due to recent fractures  There is no pronator or downward drift.  There is no myoclonus observed in The patient's bilateral upper and lower extremities.  There are no fasciculations observed in The patient's bilateral upper and lower extremities.  Coordination:   She has no bilateral upper extremity limb dysmetria or past pointing on finger-nose-finger bilaterally.  She has no limb dysdiadochokinesia of the upper extremity on the pronation/supination test and screwing in a light bulb or lower extremity during tapping ball of each foot bilaterally.  She has  no visible tremor.  She has no evidence of interhemispheric motor control deficits.  She has no motor overflow bilaterally.  She has no akathisia.  The patient's upper extremity fine motor coordination was abnormal. Comment: R, Mild;  The patient's upper extremity fine motor coordination was not slow. Comment: finger tapping, pronation/supination, and the open-close fist was slow.; finger tapping, pronation/supination, and the open-close fist was slow.  The patient's upper extremity fine motor coordination was not hypometric. Comment: finger tapping, pronation/supination, and the open-close fist showed hypometria.; finger tapping, pronation/supination, and the open-close fist showed hypometria.  The patient's upper extremity fine motor coordination was not dysrhythmic. Comment: finger tapping, pronation/supination, and the open-close fist showed dysrhythmia.; finger tapping, pronation/supination, and the open-close fist showed dysrhythmia.  Higher Cortical Function:   The patient showed no evidence of simultanagnosia (Navon hierarchical letters).  The patient showed no evidence of visuospatial constructional dysfunction.  The patient showed no evidence of agnosia.  She has no left-right confusion.  She has no evidence of dysgraphia.  The patient showed no evidence of apraxia.  She showed no dysexecutive behavior.  Sensory:   Her cortical sensory assessment demonstrated no neglect bilaterally.  She he had no astereognosis (paper clip, ring, dime) bilaterally in the palms.  She he had no agraphesthesia (drawing numbers) in the palms.  Her sensation was diminished to light touch, and vibratory sense. Comment: in the bilateral upper and lower extremities in a length-dependant pattern.; in the bilateral upper and lower extremities in a length-dependant pattern.  Reflexes:   Reflexes were symmetric and 2+ at biceps, 2+ triceps, and 2+ brachioradialis, 2+ at the knees bilaterally, there was no cross-abductor sign, 2+ in  the bilateral ankles.  Gait:   She has abnormal posture/sway while sitting unaided.  She is unable to rise from a chair and sit back down without using their arms. Comment: unable to assess gait today's appointment due to recent injuries with cast on right lower extremity; unable to assess gait today's appointment due to recent injuries with cast on right lower extremity  She has no evidence of a specific gait disorder.  Neuropsychological Evaluation Summary:  Prior Neurocognitive/Neurobehavioral Evaluation(s)  No Prior Testing Available  Neurocognitive/Neurobehavioral Evaluation completed on 2023-06-15    Memory    Registration-3 3/3 Within Normal Limits.   Recall-3 3/3 Within Normal Limits.   Recall-5 5/5 Within Normal Limits.   Registration-5 5/0     T1 5/9 Within Normal Limits.   T2 7/9 Within Normal Limits.   T3 6/9 Impairment: -2.6 STDs below the average score based on age and education.   T4 7/9 Impairment: -1.9 STDs below the average score based on age and education.   T5 7/9 Impairment: -1.9 STDs below the average score based on age and education.   DR-30 Sec 7/9 Within Normal Limits.   DR-10 min 6/9 Within Normal Limits.   DR-Cued 7/9 Within Normal Limits.   Recognition 9/9 Within Normal Limits.   Executive    Three-step command 3/3 Within Normal Limits.   Trials-1 1/1 Within Normal Limits.   WORLD Backward 5/5 Within Normal Limits.   Digit Span - 2 2/2 Within Normal Limits.   Serial Sevens 2/3 Impairment: Moderate.   Fluency 0/1 Impairment: Significant.   Digit Span Backwards 5 Within Normal Limits.   Lexical Fluency - F 9 Impairment: -1.6 STDs below the average score based on age and education.   Lexical Fluency - A 8 Impairment: -1.8 STDs below the average score based on age and education.   Lexical Fluency - S 12 Within Normal Limits.   Visuospatial    Intersecting Pentagons 1/1 Within Normal Limits.   3D Cube Copy 0/1 Impairment: Significant.   Clock Draw 3/3 Within Normal Limits.   Mathew Copy 15/17  Impairment: Mild to Normal.   Overlapping Images - Update 12/12 Within Normal Limits.   Picture Synthesis 3/3 Within Normal Limits.   Noise Pareidolia Test 5/5 Within Normal Limits.   Attention    Orientation-10 10/10 Within Normal Limits.   Orientation-6 6/6 Within Normal Limits.   Alternating Sequence 1/1 Within Normal Limits.   Digit Span Forwards 7 Within Normal Limits.   Language    Repetition-1 1/1 Within Normal Limits.   Naming-2 2/2 Within Normal Limits.   Following written command 1/1 Within Normal Limits.   Writing a complete sentence 1/1 Within Normal Limits.   Naming-3 3/3 Within Normal Limits.   Repetition-2 2/2 Within Normal Limits.   Abstraction 2/2 Within Normal Limits.   15-Item BNT 15/15 Within Normal Limits.   Repetition of Phrases 5/5 Within Normal Limits.   Verbal Agility 6/6 Within Normal Limits.   SYDBAT - Semantic Association 30/30 Within Normal Limits.   Repeat & Point - Nonfluent 10/10 Within Normal Limits.   Repeat & Point - Semantics 10/10 Within Normal Limits.   Neurocognitive Focused Evaluation Aggregate Score(s)    MMSE 30/30 MMSE Score suggestive of normal to questionable cognitive impairment.   MOCA 27/30 MOCA Score suggestive of normal to questionable cognitive impairment.   Neuropsychiatric/Behavioral Focused Evaluation Assessment    BEHAV5+ 2/6 See ROS section for a full description   Laboratories:     Lab Date Value [Reference]   Metabolic Screening           Acetylcarnitine 2021, Aug-12    9 [5 - 29 umol/L]      Carnitine, Free 2021, Aug-12    18 (L) [25 - 60 umol/L]      Carnitine, Plasma 07/26/2021  27 (L) [34 - 86 umol/L]      Free T4 07/21/2021  1.11 [0.71 - 1.51 ng/dL]      Hemoglobin A1C External 08/12/2021  7.5 (H) [4.0 - 5.6 %]      Homocysteine 2021, Aug-12    4.4 [4.0 - 15.5 umol/L]      Lactate, Brian 08/12/2021  1.5 [0.5 - 2.2 mmol/L]      Methlymalonic Acid 03/21/2023  0.12      TSH 01/20/2020  3.070 [0.400 - 4.000 uIU/mL]      Albumin 2019, Mar-11    3.8 [3.5 - 5.2  g/dL]      Alkaline Phosphatase 2019, Mar-11    98 [55 - 135 U/L]      ALT 2019, Mar-11    31 [10 - 44 U/L]      AST 2019, Mar-11    23 [10 - 40 U/L]      BILIRUBIN TOTAL 2019, Mar-11    0.2 [0.1 - 1.0 mg/dL]      PROTEIN TOTAL 2019, Mar-11    7.0 [6.0 - 8.4 g/dL]      Cholesterol 2023, Mar-21    245 (H) [120 - 199 mg/dL]      HDL 2023, Mar-21    36 (L) [40 - 75 mg/dL]      Non-HDL Cholesterol 2023, Mar-21    209 [mg/dL]      Triglycerides 08/12/2021  112 [30 - 150 mg/dL]      Folate 01/20/2020  18.2 [4.0 - 24.0 ng/mL]      Thiamine 2020, Jan-20    77 [38 - 122 ug/L]      Vit D, 25-Hydroxy 08/12/2021  14 (L) [30 - 96 ng/mL]      Vitamin B-12 01/20/2020  736 [210 - 950 pg/mL]      Vitamin E 2020, Jan-20    1619 [500 - 1800 ug/dL]      Autoimmune/Paraneoplastic Screening   AChR Ganglionic Neuronal Ab 2020, Feb-05    0.00      AMPA-R Ab CBA, Serum 2020, Feb-05    Negative      CASPR2-IgG CBA 2020, Feb-05    Negative      CRMP-5-IgG WB, Serum 2020, Feb-05    Negative      CRP 2021, Jul-23    6.3 [0.0 - 8.2 mg/L]      DPPIS Ab IFA, Serum 2020, Feb-05    Negative      AJ-B-R Ab CBA, Serum 2020, Feb-05    Negative      GFAP IFA, Serum 2020, Feb-05    Negative      Glutamic Acid Decarb Ab 2020, Feb-05    0.05 (H)      LGI1-IgG CBA 2020, Feb-05    Negative      mGluR1 Ab, IFA, Serum 2020, Feb-05    Negative      N-Type Calcium Channel Ab 2020, Feb-05    0.00      NMDA-R Ab CBA, Serum 2020, Feb-05    Negative      P/Q Type Calcium Channel Ab 2020, Feb-05    0.00      PAVAL AGNA-1, Serum 2020, Feb-05    Negative      PAVAL KASSIDY-1, Serum 2020, Feb-05    Negative      PAVAL KASSIDY-2, Serum 2020, Feb-05    Negative      PAVAL KASSIDY-3, Serum 2020, Feb-05    Negative      PAVAL, Amphiphysin Ab, Serum 2020, Feb-05    Negative      PAVAL, PCA-1, Serum 2020, Feb-05    Negative      PAVAL, PCA-2, Serum 2020, Feb-05    Negative      PAVAL, PCA-Tr, Serum 2020, Feb-05    Negative      Sed Rate 2021, Jul-23    0 [0 - 20 mm/Hr]       Thyroperoxidase Antibodies 2020, Jan-20    302.1 (H)      Myopathy/Myalgia   CPK 2021, Jul-23    48 [20 - 180 U/L]      Metabolic/Digestive Screening   Retinol, serum 2020, Jan-20    73 [38 - 106 ug/dL]      Infectious Disease/Immunocompromised Screening   SARS-CoV-2 RNA, Amplification, Qual 2022, Nov-05    Negative      SARS-CoV2 (COVID-19) Qualitative PCR 2020, Jul-30    Not Detected [Not Detected]      Hep A IgM 2021, Jul-23    Negative      Hep B C IgM 2021, Jul-23    Negative      Hepatitis B Surface Ag 2021, Jul-23    Negative      Hepatitis C Ab 11/05/2022  Non-reactive      HIV 1/2 Ag/Ab 07/23/2021  Non-reactive      RPR 01/20/2020  Non-reactive      Toxin/Heavy Metal Screening   Valproic Acid Lvl 2021, Aug-12    56.9 [50.0 - 100.0 ug/mL]      Amphetamine Screen, Ur 2021, Jul-30    Negative      Barbiturate Screen, Ur 2021, Jul-30    Negative      Benzodiazepines 2021, Jul-30    Negative      Cocaine (Metab.) 2021, Jul-30    Negative      Marijuana (THC) Metabolite 2021, Jul-30    Negative      Methadone metabolites 2021, Jul-30    Negative      Opiate Scrn, Ur 2021, Jul-30    Negative      Phencyclidine 2021, Jul-30    Negative      Neuroendocrine/Electrolyte Screening   Creatinine 2020, Jan-20    0.8 [0.5 - 1.4 mg/dL]           Neuroimaging:    MRI brain/head without contrast on 7/21/2023  Formal interpretation by Radiology:  1. No convincing acute intracranial abnormalities, no findings to suggest acute major vascular territory infarct or hemorrhage. 2. High T2 signal within the santa and brainstem, the appearance of which suggests that of chronic microvascular ischemic change or possible artifact caudally. 3. Sequela of chronic microvascular ischemic change and senescent change. 4. Sinus disease. 5. Please see separately dictated CT for details of the maxillofacial injuries.  Independently reviewed radiological imaging by Johnathan Harris MD. MPH. Behavioral Neurologist  T1: mild-to-moderate  generalized cortical atrophy with regional atrophy most well appreciated the anterior cingulate and occipital lobe. No significant cortical atrophy parietal lobule retro splenial cortex. Mild thinning of the corpus callosum throughout. Intact midbrain and brainstem. mild-to-moderate bilateral hippocampal volume loss with widening of the anterior parking him and hydrocephalus ex vacuo concerning for regional atrophy of the subcortical nuclei prefrontal territories. No significant orbital frontal or frontal cortex atrophy.  T2/FLAIR: Mild bilateral anterior greater than posterior right greater left periventricular capping wallerian degeneration across the midline. No significant burden of subcortical microvascular disease. Mild gliosis bilateral hippocampi.  DWI/ADC: No Significant DWI hyperintensities/hypointensities. No ADC correlation.  SWI/GRE: No Significant hypointensities to suggest cortical/subcortical hemosiderin deposition.  Impression: : Mild-to-moderate generalized cortical atrophy regional atrophy most well appreciated the anterior cingulate occipital lobe concerning for Lewy body disease. Mild hydrocephalus ex vacuo.     Procedures:    Electrocardiogram on 3/20/2023  Formal interpretation:  Vent. Rate : 088 BPM     Atrial Rate : 088 BPM    P-R Int : 168 ms          QRS Dur : 130 ms     QT Int : 398 ms       P-R-T Axes : 066 094 048 degrees    QTc Int : 481 ms Normal sinus rhythm Right bundle branch block Abnormal ECG  Independently reviewed Electrocardiogram by Johnathan Harris MD. MPH. Behavioral Neurologist  Impression: : Received ECG has evidence of sinus node disease. HR (>=50-60). Prolonged HI interval (>0.22 s). Broad QRS complex (> 0.12 s).     Clinical Summary:     The patient is a 67-year-old left-handed female with a relevant past medical history of BD, DM2, MDD, hypothyroidism, who presents reporting a 11-year history of gradually progressive neurocognitive impairment.       The clinical  history is suggestive of:  Memory Impairment: STM encoding impairment, LTM encoding-retrieval impairment  Attention Impairment: Attention  Executive Impairment: Energization, Working Memory  Motor/Coordination Impairment: Sensory motor integration, Motor weakness, Cortical-Striatal Loop Dysfunction  Sensory Impairment: Sensory Deprivation  Behavior Impairment: Response Inhibition, Emotional Regulation  Psychiatric Impairment: Neurovegetative, Signal-Noise Dysregulation, Mood Regulation, Paranoia, Fixed-False Beliefs  Medical Review of Systems Impairment: Autonomic Dysfunction  The neurological examination is significant for:  Cerebellar Dysfunction: truncal ataxia (sitting)  Cortical Frontal Dysfunction: non-fluent aphasia (fluency)  Executive Impairment: thought disorder  Motor Dysfunction: weakness  Movement Disorder (Gait): strength (difficulty rising)  Movement Disorder (Hypokinetic): parkinsonism (tone, bradykinesia), dyskinesia (slowing, hypometria, dysrhythmia)  Movement Disorder (Speech): abnormal vocal features (articulation), dysarthria (ataxic)  Sensory Dysfunction: peripheral (A? fibers)  The neurocognitive battery is significant (based on age and education) for:  Executive/Attention predominant multidomain mild cognitive impairment - relatively stable since 2021  Moderate Executive Impairment: working memory, lexical fluency.  Moderate Visuospatial Impairment: visuospatial construction.  Very Mild Memory Impairment: She scored >2 standard deviations below the norm on at least one measure. She had difficulty with encoding. She had a incomplete learning curve. Her free recall was significantly impaired by time.  MMSE 30/30: MMSE Score suggestive of normal to questionable cognitive impairment.  MOCA 27/30: MOCA Score suggestive of normal to questionable cognitive impairment.  BEHAV5+ 2/6: See ROS section for a full description  The neurologically relevant imaging is significant for  MRI brain/head without  contrast (7/21/2023): Mild-to-moderate generalized cortical atrophy regional atrophy most well appreciated the anterior cingulate occipital lobe concerning for Lewy body disease. Mild hydrocephalus ex vacuo.        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     The patient's clinical presentation meets the criteria for Mild Cognitive Impairment (MCI-ADRC) (Nabeel MS, et al. 2011 Alzheimer's & Dementia).     Concern regarding an intraindividual change in cognition  Impairment in one or more cognitive domains  Preservation of independence in functional abilities.  Not demented     The patient's clinical syndrome is concerning for early signs of a Lewy Body Disease process (Ozzy et al., Neurology 2005)  without meeting criteria for any specific syndrome at this time  A progressive cognitive decline that interferes with normal social or occupational function.  Prominent or persistent memory impairment may not necessarily occur in the early stages but is usually evident with progression.  Deficits on tests of attention, executive function, and visuospatial ability may be especially prominent.  Fluctuating cognition with pronounced variations in attention and alertness.  Spontaneous motor features of parkinsonism.  REM sleep behavior disorder.  Recurrent ground level falls  Progressive dysautonomia  Constipation      At present, all neurodegenerative diseases can only be diagnosed with 100% certainty through a brain autopsy. The suspected neuropathology underlying the patient's neurocognitive impairment is likely a mixture of pathologies (Alzheimer's Disease Related Pathology, Lewy body disease/alpha-synucleinopathy, Vascular Contributions to Cognitive Impairment and Dementia).  There are no plasma protein biomarkers  available on record.  There are no CSF protein biomarkers available on record.  There are no dermatological protein biomarkers available on record.  There is no relevant genetic biomarkers available on record.        The observations made above, were discussed with the patient. We have discussed the additional diagnostic(s) and/or managenent below.     Care Management Plan:    #Diagnostic Screening for reversible forms of neurocognitive disorders  We recommend screening for reversible causes of neurocognitive impairment with plasma laboratories  We have ordered plasma CBC, CMP, Vitamins (B1, B9, B12), Mg, RPR, MMA, TSH, T4, Nfl  #Diagnostic Screening for measurable forms of neurodegenerative pathology.  We have discussed opportunities for biomarker testing (CSF Fermin biomarkers, IDEAs Amyloid-PET, Syn-One skin biopsy).  We scheduled a skin biopsy for assessment of Syn-One alpha-synuclein related pathology  #Optimize Neurocognitive Impairment and Quality  We have discussed the MIND Diet and other lifestyle behavior that may help maintain brain health.  We have provided written/digital reading material  deferred donepezil at this time. Following management of lightheadedness and dizziness likely start.  #Optimize Behavioral Management and Quality.  No indication for memantine at this time  We have placed referrals to Psychiatry and Psychology per the request of patient due to longstanding PTSD and depression  continue Lexapro 20 mg q.day  #Optimize Sleep Hygiene and Quality  We discussed and recommended additional diagnostic/management of sleep disorder to optimize brain health and longevity.  We have placed referrals to sleep medicine due to signs and symptoms suggestive of sleep apnea.  we will place referral to ENT and allergy per the request of the patient due to longstanding sinusitis as interferes with symptoms suggestive of sleep apnea  start Belsomra 10 mg q.h.s.  due to cognitive side effects recommend  tapering off trazodone 150 mg. Decrease to 100 mg for 1 week to 50 mg at night for 1 week then 25 mg once at night for 1 week then stop  #Optimize Cerebrovascular Health.  The patient has a documented history of hyperlipidemia and/or hypercholesteremia with long-term complications such as cerebrovascular disease, peripheral vascular disease, and/or aortic atherosclerosis. Collectively these risk factors may contribute to cerebral atherosclerosis, and cerebral hypoperfusion compounded neurocognitive disorder. We discussed maximizing cerebrovascular-related medical therapy, including but not limited to cholesterol medications and antiplatelet agents. We have discussed the value of aggressively controlling vascular risk factors like hypertension, hyperlipidemia, and Diabetes SBP<130, LDL<100, and A1C<7.0. We discussed the need to optimize lifestyle choices, including a heart-healthy diet (e.g., Mediterranean or DASH), increased cardiovascular exercise (goal 150 minutes of moderate-intensity per week), and staying cognitively and socially active.  Continue aspirin 81 mg  Continue Crestor 20 mg q.day  #Behavioral/Environmental Strategies  We recommend engaging in activities that stimulate cognitively and socially while avoiding excessive stimulation and fatigue in overwhelmingly complex situations.  We recommend integrating routine and schedule into your daily life. https://www.alzheimersproject.org/news/the-importance-of-routine-and-familiarity-to-persons-with-dementia/  #Health Maintenance/Lifestyle Advice  We have discussed the value in aggressively controlling vascular risk factors like hypertension, hyperlipidemia, and Diabetes SBP<130, LDL<100, A1C<7.0.  We discussed the need to optimize lifestyle choices including a heart-healthy diet (e.g., Mediterranean or DASH), increased cardiovascular exercise (goal 150 minutes of moderate-intensity per week), and stay cognitively and socially active.  We recommend the MIND  "diet, a combination of two healthy diets: the Mediterranean diet and the DASH (Dietary Approaches to Stop Hypertension) diet, and includes a variety of brain-friendly foods to optimize cognitive health and longevity.  #Support  We all need support sometimes. Get easy access to local resources, community programs, and services. https://www.communityresourcefinder.org/  Learn more about Cognitive Impairment in [place]: https://www.alz.org/professionals/public-health/state-overview/[place]  #Safety  The Alzheimer's Association administers the nationwide "Safe Return" program with identification bracelets, necklaces, or clothing tags and 24-hour assistance. More information is available online at https://www.alz.org/help-support/caregiving/safety/medicalert-with-24-7-wandering-support  #Follow up:  Follow-up in 4 weeks (Jul 2023).    Thank you for allowing us to participate in the care of your patient. Please do not hesitate to contact us with any questions or concerns.     It was a pleasure seeing The patient and we look forward to seeing them at their follow-up visit.     This note is dictated on M*Modal Fluency Direct word recognition program. There are word recognition mistakes that are occasionally missed on review.         Scheduled Follow-up :  Future Appointments   Date Time Provider Department Center   6/19/2023  1:30 PM Sebastian Kaplan NP [facility]       After Visit Medication List :     Medication List            Accurate as of Bernarda 15, 2023 10:17 AM. If you have any questions, ask your nurse or doctor.                CONTINUE taking these medications      aspirin 81 MG EC tablet  Commonly known as: ECOTRIN  Take 1 tablet (81 mg total) by mouth 2 (two) times a day. DVT prophylaxis after ankle fracture surgery. End date 6/20/2023.     EScitalopram oxalate 20 MG tablet  Commonly known as: LEXAPRO  Take 1 tablet (20 mg total) by mouth once daily.     ibuprofen 800 MG tablet  Commonly known " as: ADVIL,MOTRIN  Take 1 tablet (800 mg total) by mouth 3 (three) times daily as needed for Pain.     insulin detemir U-100 (Levemir) 100 unit/mL (3 mL) Inpn pen  Inject 5 Units into the skin every evening.     levothyroxine 100 MCG tablet  Commonly known as: SYNTHROID  Take 1 tablet (100 mcg total) by mouth once daily.     metFORMIN 500 MG tablet  Commonly known as: GLUCOPHAGE  Take 2 tablets (1,000 mg total) by mouth 2 (two) times daily with meals.     oxyCODONE 10 mg Tab immediate release tablet  Commonly known as: ROXICODONE  Take 1 tablet (10 mg total) by mouth every 8 (eight) hours as needed for Pain.     rosuvastatin 20 MG tablet  Commonly known as: CRESTOR  Take 1 tablet (20 mg total) by mouth once daily.     tiZANidine 2 MG tablet  Commonly known as: ZANAFLEX  Take 1 tablet (2 mg total) by mouth every 8 (eight) hours as needed (muscle spasms).     traZODone 150 MG tablet  Commonly known as: DESYREL  TAKE 1 TABLET(150 MG) BY MOUTH EVERY EVENING     TRUE METRIX AIR GLUCOSE METER MISC     TRUE METRIX GLUCOSE TEST STRIP MISC     TRULICITY 0.75 mg/0.5 mL pen injector  Generic drug: dulaglutide            STOP taking these medications      acetaminophen 500 MG tablet  Commonly known as: TYLENOL  Stopped by: Johnathan Rae MD     diphenhydrAMINE-acetaminophen  mg Tab  Commonly known as: TYLENOL PM  Stopped by: Johnathan Rae MD     hydrOXYzine pamoate 25 MG Cap  Commonly known as: VISTARIL  Stopped by: Johnathan Rae MD     zolpidem 10 mg Tab  Commonly known as: AMBIEN  Stopped by: Johnathan Rae MD              Signing Physician:  Johnathan Rae MD    Billing:        -----------------------------------------------------------------------------    I spent a total of 105 minutes (time-in: 09:15 AM; time-out: 11:00 AM) on 2023-06-15, in person face-to-face with the patient and caregiver(s), >50% of that time was spent counseling regarding the symptoms, treatment plan, risks, therapeutic options, lifestyle  modifications, and/or safety issues for the diagnoses above.    10/14 Review of Systems completed and is negative except as stated above in HPI (Systems reviewed: Const, Eyes, ENT, Resp, CV, GI, , MSK, Skin, Neuro)    I reviewed previous labs for a total of 5 minutes on 2023-06-15. This is directly related to the face-to-face encounter. Review of previous labs was performed all negative except as stated above in HPI    I reviewed previous diagnostic testing for a total of 5 minutes on 2023-06-15. This is directly related to the face-to-face encounter. A review of previous diagnostic testing was performed was noted to be within normal limits except as is stated above in HPI    I performed a neurobehavioral status examination that included a clinical assessment of thinking, reasoning, and judgment. Please see above HPI and ROS for full details. This exam was performed on 2023-06-15 and included 11 minutes spent on direct face-to-face clinical observation and interview with the patient and 21 minutes spent interpreting test results and preparing the report. The total time of 32 minutes spent on the neurobehavioral status examination is not included in the time spent on evaluation and management coding.    I performed a neuropsychological evaluation that included the application of a series of standardized neurocognitive tests. Please see the informal neuropsychological assessment above for full details. This evaluation was performed on 2023-06-15 and included 15 minutes spent on direct face-to-face clinical standardized test administration with the patient and 18 minutes spent on interpreting standardized test results, integrating patient data into a treatment plan, and providing feedback to the patient and caregiver. The total time of 33 minutes spent on the neuropsychological evaluation is not included in the time spent on evaluation and management coding.    Total Billing time spent on encounter/documentation for  this patient's evaluation and management, not including the neurobehavioral status examination and neuropsychological evaluation: 89 minutes.

## 2023-06-16 DIAGNOSIS — G62.9 NEUROPATHY: Primary | ICD-10-CM

## 2023-06-16 DIAGNOSIS — S82.851D CLOSED TRIMALLEOLAR FRACTURE OF RIGHT ANKLE WITH ROUTINE HEALING: ICD-10-CM

## 2023-06-16 RX ORDER — GABAPENTIN 300 MG/1
300 CAPSULE ORAL NIGHTLY
Qty: 30 CAPSULE | Refills: 0 | Status: SHIPPED | OUTPATIENT
Start: 2023-06-16 | End: 2023-06-27

## 2023-06-16 RX ORDER — TRAZODONE HYDROCHLORIDE 50 MG/1
TABLET ORAL
Qty: 30 TABLET | Refills: 0 | Status: SHIPPED | OUTPATIENT
Start: 2023-06-16 | End: 2023-11-16

## 2023-06-16 NOTE — PROGRESS NOTES
I spoke with patient, she reports her pain is mostly a shooting pain worse at night and is not allowing her to get sleep.  She is been using oxycodone 10 and Zanaflex.  I will send in gabapentin 300 mg q.h.s..  She has a scheduled appointment with me on Monday June 19th at 1:30 a.m..  Patient verbalized understanding.

## 2023-06-19 ENCOUNTER — HOSPITAL ENCOUNTER (OUTPATIENT)
Dept: RADIOLOGY | Facility: HOSPITAL | Age: 68
Discharge: HOME | End: 2023-06-19
Attending: NURSE PRACTITIONER
Payer: COMMERCIAL

## 2023-06-19 ENCOUNTER — OFFICE VISIT (OUTPATIENT)
Dept: ORTHOPEDICS | Facility: CLINIC | Age: 68
End: 2023-06-19
Payer: COMMERCIAL

## 2023-06-19 ENCOUNTER — DOCUMENT SCAN (OUTPATIENT)
Dept: HOME HEALTH SERVICES | Facility: HOSPITAL | Age: 68
End: 2023-06-19
Payer: COMMERCIAL

## 2023-06-19 VITALS — WEIGHT: 177 LBS | BODY MASS INDEX: 24.78 KG/M2 | HEIGHT: 71 IN

## 2023-06-19 DIAGNOSIS — S82.851D CLOSED TRIMALLEOLAR FRACTURE OF RIGHT ANKLE WITH ROUTINE HEALING: Primary | ICD-10-CM

## 2023-06-19 DIAGNOSIS — R52 PAIN: Primary | ICD-10-CM

## 2023-06-19 DIAGNOSIS — R52 PAIN: ICD-10-CM

## 2023-06-19 DIAGNOSIS — S42.322D: ICD-10-CM

## 2023-06-19 DIAGNOSIS — Z98.890 POST-OPERATIVE STATE: ICD-10-CM

## 2023-06-19 PROCEDURE — 1159F MED LIST DOCD IN RCRD: CPT | Mod: CPTII,S$GLB,, | Performed by: NURSE PRACTITIONER

## 2023-06-19 PROCEDURE — 99999 PR PBB SHADOW E&M-EST. PATIENT-LVL III: ICD-10-PCS | Mod: PBBFAC,,, | Performed by: NURSE PRACTITIONER

## 2023-06-19 PROCEDURE — 73610 XR ANKLE COMPLETE 3 VIEW RIGHT: ICD-10-PCS | Mod: 26,RT,, | Performed by: RADIOLOGY

## 2023-06-19 PROCEDURE — 73060 X-RAY EXAM OF HUMERUS: CPT | Mod: TC,LT

## 2023-06-19 PROCEDURE — 73610 X-RAY EXAM OF ANKLE: CPT | Mod: TC,RT

## 2023-06-19 PROCEDURE — 3051F HG A1C>EQUAL 7.0%<8.0%: CPT | Mod: CPTII,S$GLB,, | Performed by: NURSE PRACTITIONER

## 2023-06-19 PROCEDURE — 1101F PR PT FALLS ASSESS DOC 0-1 FALLS W/OUT INJ PAST YR: ICD-10-PCS | Mod: CPTII,S$GLB,, | Performed by: NURSE PRACTITIONER

## 2023-06-19 PROCEDURE — 1125F AMNT PAIN NOTED PAIN PRSNT: CPT | Mod: CPTII,S$GLB,, | Performed by: NURSE PRACTITIONER

## 2023-06-19 PROCEDURE — 1160F RVW MEDS BY RX/DR IN RCRD: CPT | Mod: CPTII,S$GLB,, | Performed by: NURSE PRACTITIONER

## 2023-06-19 PROCEDURE — 1125F PR PAIN SEVERITY QUANTIFIED, PAIN PRESENT: ICD-10-PCS | Mod: CPTII,S$GLB,, | Performed by: NURSE PRACTITIONER

## 2023-06-19 PROCEDURE — 73060 XR HUMERUS 2 VIEW LEFT: ICD-10-PCS | Mod: 26,LT,, | Performed by: RADIOLOGY

## 2023-06-19 PROCEDURE — 73610 X-RAY EXAM OF ANKLE: CPT | Mod: 26,RT,, | Performed by: RADIOLOGY

## 2023-06-19 PROCEDURE — 1101F PT FALLS ASSESS-DOCD LE1/YR: CPT | Mod: CPTII,S$GLB,, | Performed by: NURSE PRACTITIONER

## 2023-06-19 PROCEDURE — 1157F ADVNC CARE PLAN IN RCRD: CPT | Mod: CPTII,S$GLB,, | Performed by: NURSE PRACTITIONER

## 2023-06-19 PROCEDURE — 99024 PR POST-OP FOLLOW-UP VISIT: ICD-10-PCS | Mod: S$GLB,,, | Performed by: NURSE PRACTITIONER

## 2023-06-19 PROCEDURE — 3288F FALL RISK ASSESSMENT DOCD: CPT | Mod: CPTII,S$GLB,, | Performed by: NURSE PRACTITIONER

## 2023-06-19 PROCEDURE — 3008F BODY MASS INDEX DOCD: CPT | Mod: CPTII,S$GLB,, | Performed by: NURSE PRACTITIONER

## 2023-06-19 PROCEDURE — 3051F PR MOST RECENT HEMOGLOBIN A1C LEVEL 7.0 - < 8.0%: ICD-10-PCS | Mod: CPTII,S$GLB,, | Performed by: NURSE PRACTITIONER

## 2023-06-19 PROCEDURE — 99024 POSTOP FOLLOW-UP VISIT: CPT | Mod: S$GLB,,, | Performed by: NURSE PRACTITIONER

## 2023-06-19 PROCEDURE — 73060 X-RAY EXAM OF HUMERUS: CPT | Mod: 26,LT,, | Performed by: RADIOLOGY

## 2023-06-19 PROCEDURE — 1157F PR ADVANCE CARE PLAN OR EQUIV PRESENT IN MEDICAL RECORD: ICD-10-PCS | Mod: CPTII,S$GLB,, | Performed by: NURSE PRACTITIONER

## 2023-06-19 PROCEDURE — 3288F PR FALLS RISK ASSESSMENT DOCUMENTED: ICD-10-PCS | Mod: CPTII,S$GLB,, | Performed by: NURSE PRACTITIONER

## 2023-06-19 PROCEDURE — 3008F PR BODY MASS INDEX (BMI) DOCUMENTED: ICD-10-PCS | Mod: CPTII,S$GLB,, | Performed by: NURSE PRACTITIONER

## 2023-06-19 PROCEDURE — 1159F PR MEDICATION LIST DOCUMENTED IN MEDICAL RECORD: ICD-10-PCS | Mod: CPTII,S$GLB,, | Performed by: NURSE PRACTITIONER

## 2023-06-19 PROCEDURE — 99999 PR PBB SHADOW E&M-EST. PATIENT-LVL III: CPT | Mod: PBBFAC,,, | Performed by: NURSE PRACTITIONER

## 2023-06-19 PROCEDURE — 1160F PR REVIEW ALL MEDS BY PRESCRIBER/CLIN PHARMACIST DOCUMENTED: ICD-10-PCS | Mod: CPTII,S$GLB,, | Performed by: NURSE PRACTITIONER

## 2023-06-19 RX ORDER — TIZANIDINE 2 MG/1
2 TABLET ORAL EVERY 8 HOURS PRN
Qty: 21 TABLET | Refills: 0 | Status: SHIPPED | OUTPATIENT
Start: 2023-06-19 | End: 2023-06-29 | Stop reason: SDUPTHER

## 2023-06-19 NOTE — PROGRESS NOTES
03/21/23: : ORIF left humerus.   FALL  05/05/23: : external fixation right ankle fracture  05/08/23: : ORIF right tramalleolar ankle fracture with syndesmosis fixation.     Ms. Patel is here today for a post-operative visit    Interval History:  she reports that she is doing much better since starting Neurontin.   she is at home . she is not participating in PT/OT.   Pain is controlled.  she is taking pain medication.    she denies fever, chills, and sweats .     Physical exam:    Patient arrives to exam room: wheelchair.  Patient is un accompanied    ANKLE: Plantar fasciitis boot removed.  Her incision is healing without signs of infection.  Her skin is very dry and flaky.  He can flex and curl her toes.  Plantar flexion and dorsiflexion is approximately 20° and internal and external rotation is approximately 15°.    She has normal range of motion of her left shoulder.  Muscle strength in the biceps and triceps is 4/5.    RADS: All pertinent images were reviewed by myself:   Humerus: Postsurgical changes status post internal fixation for fracture of the left humeral shaft and proximal humeral metaphysis.  Fracture lines are still present.  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  No new fracture.  Hardware appears intact.  No soft tissue abnormality.  Her right ankle x-ray shows her lateral plate and screw construct appears to be in good position and alignment.  Fracture appears to be stable.  Ankle mortise appears to be symmetrical.    Assessment:  Post-op visit (6 ankle/ 12 humerus weeks)    Plan:  Current care, treatment plan, precautions, activity level/ modifications, limitations, rehabilitation exercises and proposed future treatment were discussed with the patient. We discussed the need to monitor for changes in symptoms and condition and report them to the physician.  Discussed importance of compliance with all appointments and follow up  examinations.       ACTIVITY:   - I will refer the patient to outpatient physical therapy with [facility].  - She will be weight-bearing as tolerated to the left upper extremity and range of motion as tolerated to left upper extremity.  I will advance her to weight-bearing as tolerated to the right lower extremity and range of motion as tolerated to the right lower extremity.  - I advised the patient that she must ambulate in a tall cam boot minimum 4 weeks.  Then will attempt to wean her from the boot.      PAIN MEDICATION:   - Multimodal pain control  - Pain medication: refill was not needed  - Pain medication refill policy provided to patient for review, yes.    - Patient was informed a multi-modal approach is used to treat their pain. With the goal to get off of narcotic pain medication and discontinue as soon as possible.   - ice and elevation to reduce pain and swelling     DVT PROPHYLAXIS:   - ASA 81 mg bid completed therapy    FOLLOW UP:   - Patient will follow up in the clinic in 6 weeks.  - X-ray of her right ankle is needed.  OOB      If there are any questions prior to scheduled follow up, the patient was instructed to contact the office

## 2023-06-20 ENCOUNTER — PATIENT MESSAGE (OUTPATIENT)
Dept: ORTHOPEDICS | Facility: CLINIC | Age: 68
End: 2023-06-20
Payer: COMMERCIAL

## 2023-06-21 ENCOUNTER — TELEPHONE (OUTPATIENT)
Dept: PHARMACY | Facility: CLINIC | Age: 68
End: 2023-06-21
Payer: COMMERCIAL

## 2023-06-23 ENCOUNTER — PATIENT MESSAGE (OUTPATIENT)
Dept: OTOLARYNGOLOGY | Facility: CLINIC | Age: 68
End: 2023-06-23
Payer: COMMERCIAL

## 2023-06-26 DIAGNOSIS — E53.8 B12 DEFICIENCY: Primary | ICD-10-CM

## 2023-06-27 DIAGNOSIS — G62.9 NEUROPATHY: ICD-10-CM

## 2023-06-27 DIAGNOSIS — S82.851D CLOSED TRIMALLEOLAR FRACTURE OF RIGHT ANKLE WITH ROUTINE HEALING: ICD-10-CM

## 2023-06-27 DIAGNOSIS — S42.322D: ICD-10-CM

## 2023-06-27 RX ORDER — GABAPENTIN 300 MG/1
300 CAPSULE ORAL 3 TIMES DAILY
Qty: 90 CAPSULE | Refills: 0 | Status: SHIPPED | OUTPATIENT
Start: 2023-06-27 | End: 2023-07-28

## 2023-06-27 RX ORDER — ACETAMINOPHEN 500 MG
1000 TABLET ORAL EVERY 8 HOURS PRN
Qty: 90 TABLET | Refills: 0 | Status: SHIPPED | OUTPATIENT
Start: 2023-06-27

## 2023-06-27 RX ORDER — IBUPROFEN 800 MG/1
800 TABLET ORAL 3 TIMES DAILY PRN
Qty: 30 TABLET | Refills: 0 | Status: SHIPPED | OUTPATIENT
Start: 2023-06-27 | End: 2023-07-07

## 2023-06-28 ENCOUNTER — DOCUMENT SCAN (OUTPATIENT)
Dept: HOME HEALTH SERVICES | Facility: HOSPITAL | Age: 68
End: 2023-06-28
Payer: COMMERCIAL

## 2023-06-28 ENCOUNTER — CLINICAL SUPPORT (OUTPATIENT)
Dept: REHABILITATION | Facility: HOSPITAL | Age: 68
End: 2023-06-28
Payer: COMMERCIAL

## 2023-06-28 DIAGNOSIS — S82.851D CLOSED TRIMALLEOLAR FRACTURE OF RIGHT ANKLE WITH ROUTINE HEALING: ICD-10-CM

## 2023-06-28 DIAGNOSIS — S42.322D: ICD-10-CM

## 2023-06-28 PROCEDURE — 97110 THERAPEUTIC EXERCISES: CPT | Mod: PO

## 2023-06-28 PROCEDURE — 97162 PT EVAL MOD COMPLEX 30 MIN: CPT | Mod: PO

## 2023-06-28 NOTE — PLAN OF CARE
[facility]   Physical Therapy Initial Evaluation     Date: 6/28/2023   Name: Alix Lui [place]lin  Clinic Number: [id number]    Therapy Diagnosis:   Encounter Diagnoses   Name Primary?    Closed trimalleolar fracture of right ankle with routine healing     Open displaced transverse fracture of shaft of left humerus with routine healing, subsequent encounter      Physician: Sebastian Kaplan, NP    Physician Orders: PT Eval and Treat  Medical Diagnosis from Referral:   S82.851D (ICD-10-CM) - Closed trimalleolar fracture of right ankle with routine healing   S42.322D (ICD-10-CM) - Open displaced transverse fracture of shaft of left humerus with routine healing, subsequent encounter     Evaluation Date: 6/28/2023  Authorization Period Expiration: 06/18/2024  Plan of Care Expiration: 10/30/2023  Progress Note Due: 7/28/2023  Visit # / Visits authorized: 1/ 1  FOTO: 1/ 3     Precautions: Weight bear as tolerated and ROMAT to LUE and RLE.  Must use tall cam boot when ambulating for minimal 4 weeks then can start to wean.    Time In: 200  Time Out: 245  Total Appointment Time (timed & untimed codes): 45 minutes      SUBJECTIVE   Date of onset: March 21, 2023    History of current condition - Alix reports: Pt is a 67 y.o. female  presenting with s/p closed R trimalleolar fracture. Pt states that she slipped and fell when navigating the stairs on a sprained ankle and broke her ankle. Pt reports that about a week ago she started walking around her office and home with her boot intermittently.    Falls: ALEJANDRO    Imaging: See chart    Prior Therapy: Yes for L hip pain  Social History: Lives in a 1st floor apartment lives alone  Occupation:  for [employer]  Prior Level of Function: No limitations  Current Level of Function: limited with ambulation, 3 months s/p trimalleolar fracture    Pain:  Current 8/10, worst 10/10, best 2/10   Location: right ankles  Description:  Aching  Aggravating Factors: Standing and Walking  Easing Factors: relaxation    Patients goals: Return to all activities     Medical History:   Past Medical History:   Diagnosis Date    Anxiety     Bipolar 1 disorder     COPD (chronic obstructive pulmonary disease)     Depression     Diabetes mellitus     GERD (gastroesophageal reflux disease)     Grade I open displaced transverse fracture of shaft of left humerus 3/20/2023    HEARING LOSS     pt states she has loss slighty in rt ear.    Hypertension     Insomnia     Rash        Surgical History:   Alix Patel  has a past surgical history that includes Appendectomy; Hysterectomy; Colonoscopy; Cyst Removal; Esophagogastroduodenoscopy (N/A, 10/31/2019); Colonoscopy (N/A, 10/31/2019); Breast biopsy; ORIF humerus fracture (Left, 3/21/2023); Irrigation and debridement of upper extremity (Left, 3/21/2023); application, external fixation device, for ankle fracture (Right, 5/5/2023); Open reduction and internal fixation (ORIF) of injury of ankle (Right, 5/8/2023); and Fixation of syndesmosis of ankle (Right, 5/8/2023).    Medications:   Alix has a current medication list which includes the following prescription(s): acetaminophen, blood sugar diagnostic, blood-glucose meter, trulicity, escitalopram oxalate, gabapentin, ibuprofen, insulin detemir u-100 (levemir), levothyroxine, metformin, oxycodone, rosuvastatin, belsomra, tizanidine, and trazodone.    Allergies:   Review of patient's allergies indicates:   Allergen Reactions    Lamictal [lamotrigine] Rash     Per psychiatry notes          OBJECTIVE     Observation: Pt presents to clinic in manual wheelchair, L UE sling, and R LE cam boot    Posture: FHP, rounded shoulders    Gait: Pt amb with decreased stance time on RLE, antalgic gait, and trendelenburg gait    ROM:  Ankle Left Right Goals   Dorsiflexion 20 (20) degrees 5 (10) degrees 20 degrees   Plantarflexion 45 (50) degrees 30 (35) degrees 50 degrees    Inversion 30 (35) degrees 10 (15) degrees 35 degrees   Eversion 20 (25) degrees 15 (20) degrees 25 degrees     MMT:  Right LE  Left LE  Goals   Knee extension: 4+/5 Knee extension: 5/5 5/5   Knee flexion: 4+/5 Knee flexion: 4+/5 5/5   Hip flexion: 4/5 Hip flexion: 4/5 5/5   Hip extension:  4-/5 Hip extension: 4-/5 5/5   Hip abduction: 3+/5 Hip abduction: 3+/5 5/5   Hip adduction: 5/5 Hip adduction 5/5 5/5         Ankle Left Right Goals   Dorsiflexion 5/5 4+/5 5/5   Plantarflexion 5/5 4/5 5/5   Inversion 5/5 4+/5 5/5   Eversion 5/5 4+/5 5/5     Joint Mobility: Hypomobile R talocrural and subtalar joint    Palpation: TTP noted at Medial malleolus and dorsum of foot of R LE    Sensation: Mild numbness/neuropathy noted at R foot    Edema: About 2+ pitting edema at R foot/ankle complex      Balance:   SLS EO: NT   SLS EC: NT      Limitation/Restriction for FOTO Ankle Survey    Therapist reviewed FOTO scores for Alix Patel on 6/28/2023.   FOTO documents entered into EPIC - see Media section.    Intake Score: 61         TREATMENT     Total Treatment time (time-based codes) separate from Evaluation: 23 minutes     THERAPEUTIC EXERCISES to develop  strength, endurance, and ROM for 23 minutes including.    Quad sets 3x20  Ankle pumps 3x20  Ankle circles clockwise/counterclockwise x20  Ankle alphabet x1    PATIENT EDUCATION AND HOME EXERCISES     Education provided:   Role of Physical Therapist  Physical Therapy Plan Of Care  HEP    Written Home Exercises Provided: yes. Exercises were reviewed and Alix was able to demonstrate them prior to the end of the session.  Alix demonstrated good  understanding of the education provided. See EMR under Patient Instructions for exercises provided during therapy sessions.    See EMR under Patient Instructions for exercises provided 6/28/2023    ASSESSMENT     Alix is a 67 y.o. female referred to outpatient Physical Therapy with a medical diagnosis of   S82.851D  (ICD-10-CM) - Closed trimalleolar fracture of right ankle with routine healing   S42.322D (ICD-10-CM) - Open displaced transverse fracture of shaft of left humerus with routine healing, subsequent encounter   Pt's ALEJANDRO and level of functional mobility indicate that she is appropriate for PT services. During today's session patient demonstrated decreased ankle RANGE OF MOTION, decreased strength, and increased edema.    Patient prognosis is Fair.   Patientt will benefit from skilled outpatient Physical Therapy to address the deficits stated above and in the chart below, provide patient /family education, and to maximize patientt's level of independence.     Plan of care discussed with patient: Yes  Patient's spiritual, cultural and educational needs considered and patient is agreeable to the plan of care and goals as stated below:     Anticipated Barriers for therapy: pt related limitations, scheduling    Medical Necessity is demonstrated by the following  History  Co-morbidities and personal factors that may impact the plan of care [] LOW: no personal factors / co-morbidities  [x] MODERATE: 1-2 personal factors / co-morbidities  [] HIGH: 3+ personal factors / co-morbidities     Co-morbidities affecting plan of care: Bipolar disorder, T2DM, Hypothyroidism    Personal Factors:   social background  character     Examination  Body Structures and Functions, activity limitations and participation restrictions that may impact the plan of care [] LOW: addressing 1-2 elements  [x] MODERATE: 3+ elements  [] HIGH: 4+ elements (please support below)    Moderate / High Support Documentation: swelling, symmetry, strength     Clinical Presentation [] LOW: stable  [x] MODERATE: Evolving  [] HIGH: Unstable     Decision Making/ Complexity Score: moderate       Goals:  SHORT TERM GOALS:  5 weeks 6/28/2023   Recent signs and systems trend is improving in order to progress towards LTG's.    Patient will be independent with HEP in order  to further progress and return to maximal function.    Pain rating at Worst: 5/10 in order to progress towards increased independence with activity.    Patient will be able to correct postural deviations in sitting and standing, to decrease pain and promote postural awareness for injury prevention.       LONG TERM GOALS: 10 weeks 6/28/2023   Patient will return to normal ADL, recreational, and work related activities with less pain and limitation.     Patient will improve AROM to stated goals in order to return to maximal functional potential.     Patient will improve Strength to stated goals of appropriate musculature in order to improve functional independence.     Pain Rating at Best: 1/10 to improve Quality of Life.     Patient will meet predicted functional outcome (FOTO) score: 71% to increase self-worth & perceived functional ability.    Patient will have met/partially met personal goal of: returning to walking with max pain 1/10 in order to demonstrate return to PLOF        PLAN   Plan of care Certification: 6/28/2023 to 10/30/2023.    Outpatient Physical Therapy 2 times weekly for 10 weeks to include the following interventions: Gait Training, Manual Therapy, Moist Heat/ Ice, Neuromuscular Re-ed, Therapeutic Activities, and Therapeutic Exercise.     Jovanny Kelley, PT, DPT      I CERTIFY THE NEED FOR THESE SERVICES FURNISHED UNDER THIS PLAN OF TREATMENT AND WHILE UNDER MY CARE   Physician's comments:     Physician's Signature: ___________________________________________________

## 2023-06-29 ENCOUNTER — TELEPHONE (OUTPATIENT)
Dept: ADMINISTRATIVE | Facility: HOSPITAL | Age: 68
End: 2023-06-29
Payer: COMMERCIAL

## 2023-06-29 ENCOUNTER — TELEPHONE (OUTPATIENT)
Dept: ORTHOPEDICS | Facility: CLINIC | Age: 68
End: 2023-06-29
Payer: COMMERCIAL

## 2023-06-29 DIAGNOSIS — S82.851D CLOSED TRIMALLEOLAR FRACTURE OF RIGHT ANKLE WITH ROUTINE HEALING: ICD-10-CM

## 2023-06-29 RX ORDER — TIZANIDINE 2 MG/1
2 TABLET ORAL EVERY 8 HOURS PRN
Qty: 21 TABLET | Refills: 0 | Status: SHIPPED | OUTPATIENT
Start: 2023-06-29 | End: 2023-07-28

## 2023-06-29 NOTE — TELEPHONE ENCOUNTER
Called and informed pt Sebastian says he will send in the muscle relaxer as requested. However, it will be his last time and pt the will have to continue with pain management. Pt verbally understood and was satisfied.

## 2023-07-05 ENCOUNTER — TELEPHONE (OUTPATIENT)
Dept: ORTHOPEDICS | Facility: CLINIC | Age: 68
End: 2023-07-05
Payer: COMMERCIAL

## 2023-07-05 NOTE — TELEPHONE ENCOUNTER
Per LOBO Kaplan, NP states it is too soon to refill these medications.  She can purchase compression stockings from [employer]. Patient states verbal understanding and has no further questions.

## 2023-07-05 NOTE — TELEPHONE ENCOUNTER
----- Message from Sebastian Kaplan NP sent at 7/5/2023 11:07 AM CDT -----  Regarding: RE: vale dejesus  Contact: [phone]  Please let patient know it is too soon to refill these medications.  She can purchase compression stockings from [employer]  ----- Message -----  From: Viji Lezama MA  Sent: 7/5/2023  10:47 AM CDT  To: Sebastian Kaplan NP  Subject: FW: vale dejesus                                      ----- Message -----  From: Aliza Stallings  Sent: 7/5/2023  10:36 AM CDT  To: Rosaura LAM Staff  Subject: pt oleg                                        Pt calling in refill for gabapentin (NEURONTIN) 300 MG capsule    &   ibuprofen (ADVIL,MOTRIN) 800 MG tablet  Pt needing know where she can get a compressor sock. Pls call            [id number] [address]  [address] [facility] [address]  Phone: [phone] Fax: [phone]

## 2023-07-07 ENCOUNTER — HOSPITAL ENCOUNTER (OUTPATIENT)
Dept: RADIOLOGY | Facility: HOSPITAL | Age: 68
Discharge: HOME | End: 2023-07-07
Attending: PHYSICIAN ASSISTANT
Payer: COMMERCIAL

## 2023-07-07 ENCOUNTER — OFFICE VISIT (OUTPATIENT)
Dept: ORTHOPEDICS | Facility: CLINIC | Age: 68
End: 2023-07-07
Payer: COMMERCIAL

## 2023-07-07 VITALS — TEMPERATURE: 99 F

## 2023-07-07 DIAGNOSIS — S82.851D CLOSED TRIMALLEOLAR FRACTURE OF RIGHT ANKLE WITH ROUTINE HEALING: ICD-10-CM

## 2023-07-07 DIAGNOSIS — R52 PAIN: ICD-10-CM

## 2023-07-07 DIAGNOSIS — R52 PAIN: Primary | ICD-10-CM

## 2023-07-07 PROCEDURE — 1125F PR PAIN SEVERITY QUANTIFIED, PAIN PRESENT: ICD-10-PCS | Mod: CPTII,S$GLB,, | Performed by: PHYSICIAN ASSISTANT

## 2023-07-07 PROCEDURE — 1159F MED LIST DOCD IN RCRD: CPT | Mod: CPTII,S$GLB,, | Performed by: PHYSICIAN ASSISTANT

## 2023-07-07 PROCEDURE — 73610 X-RAY EXAM OF ANKLE: CPT | Mod: TC,RT

## 2023-07-07 PROCEDURE — 99024 POSTOP FOLLOW-UP VISIT: CPT | Mod: S$GLB,,, | Performed by: PHYSICIAN ASSISTANT

## 2023-07-07 PROCEDURE — 99999 PR PBB SHADOW E&M-EST. PATIENT-LVL III: ICD-10-PCS | Mod: PBBFAC,,, | Performed by: PHYSICIAN ASSISTANT

## 2023-07-07 PROCEDURE — 99024 PR POST-OP FOLLOW-UP VISIT: ICD-10-PCS | Mod: S$GLB,,, | Performed by: PHYSICIAN ASSISTANT

## 2023-07-07 PROCEDURE — 1125F AMNT PAIN NOTED PAIN PRSNT: CPT | Mod: CPTII,S$GLB,, | Performed by: PHYSICIAN ASSISTANT

## 2023-07-07 PROCEDURE — 3051F HG A1C>EQUAL 7.0%<8.0%: CPT | Mod: CPTII,S$GLB,, | Performed by: PHYSICIAN ASSISTANT

## 2023-07-07 PROCEDURE — 73610 X-RAY EXAM OF ANKLE: CPT | Mod: 26,RT,, | Performed by: RADIOLOGY

## 2023-07-07 PROCEDURE — 1157F ADVNC CARE PLAN IN RCRD: CPT | Mod: CPTII,S$GLB,, | Performed by: PHYSICIAN ASSISTANT

## 2023-07-07 PROCEDURE — 1159F PR MEDICATION LIST DOCUMENTED IN MEDICAL RECORD: ICD-10-PCS | Mod: CPTII,S$GLB,, | Performed by: PHYSICIAN ASSISTANT

## 2023-07-07 PROCEDURE — 73610 XR ANKLE COMPLETE 3 VIEW RIGHT: ICD-10-PCS | Mod: 26,RT,, | Performed by: RADIOLOGY

## 2023-07-07 PROCEDURE — 3051F PR MOST RECENT HEMOGLOBIN A1C LEVEL 7.0 - < 8.0%: ICD-10-PCS | Mod: CPTII,S$GLB,, | Performed by: PHYSICIAN ASSISTANT

## 2023-07-07 PROCEDURE — 99999 PR PBB SHADOW E&M-EST. PATIENT-LVL III: CPT | Mod: PBBFAC,,, | Performed by: PHYSICIAN ASSISTANT

## 2023-07-07 PROCEDURE — 1157F PR ADVANCE CARE PLAN OR EQUIV PRESENT IN MEDICAL RECORD: ICD-10-PCS | Mod: CPTII,S$GLB,, | Performed by: PHYSICIAN ASSISTANT

## 2023-07-07 RX ORDER — MELOXICAM 15 MG/1
15 TABLET ORAL DAILY
Qty: 30 TABLET | Refills: 0 | Status: SHIPPED | OUTPATIENT
Start: 2023-07-07 | End: 2023-07-27

## 2023-07-13 DIAGNOSIS — F51.01 PRIMARY INSOMNIA: Primary | ICD-10-CM

## 2023-07-13 DIAGNOSIS — R41.3 MEMORY DISORDER: ICD-10-CM

## 2023-07-13 DIAGNOSIS — R52 PAIN: ICD-10-CM

## 2023-07-13 DIAGNOSIS — S82.851D CLOSED TRIMALLEOLAR FRACTURE OF RIGHT ANKLE WITH ROUTINE HEALING: ICD-10-CM

## 2023-07-18 ENCOUNTER — TELEPHONE (OUTPATIENT)
Dept: NEUROLOGY | Facility: CLINIC | Age: 68
End: 2023-07-18
Payer: COMMERCIAL

## 2023-07-18 NOTE — TELEPHONE ENCOUNTER
----- Message from Satnam Florence sent at 7/18/2023  2:41 PM CDT -----  Regarding: Urgent: Medication Concerns  Contact: @ [phone]  Pt is calling to speak with someone in regards about the free coupon that was given to her for her prescription suvorexant (BELSOMRA) 10 mg Tab. Pt states the [facility] pharmacy did not give her the medication due to her insurance declining the prescription. Pt has not taken the medicine nor does she have it. Pt states that the provider needs to call the [facility] pharmacy so that she can get her medication. Please call pt to discuss further.          [facility]

## 2023-07-18 NOTE — PROGRESS NOTES
03/21/23: : ORIF left humerus.   FALL  05/05/23: : external fixation right ankle fracture  05/08/23: : ORIF right tramalleolar ankle fracture with syndesmosis fixation.     Ms. Patel is here today for a post-operative visit    Interval History:  she reports that she is doing ok.  She reports continued intermittent swelling and redness to lower leg. .   she is at home . she is participating in PT/OT.   Pain is controlled.  she is taking pain medication.    she denies fever, chills, and sweats .     Physical exam:    Patient arrives to exam room: wheelchair.  Patient is un accompanied    ANKLE: CAM boot removed.  Her incision is healing without signs of infection.  Her skin is very dry and flaky.  He can flex and curl her toes.  Plantar flexion and dorsiflexion is approximately 20° and internal and external rotation is approximately 15°.    RADS: All pertinent images were reviewed by myself:   ANKLE: Reconfirmed findings of hardware fixation of subacute distal fibula and posterior malleolar fractures, no detrimental changes in the appearance of fracture sites, position alignment of fracture fragments, or fixating hardware.  Preserved tibiotalar articulation and talar dome.  Bimalleolar soft tissue swelling.  Stable focus of suspected dystrophic soft tissue calcification subjacent to the medial malleolus.  Stable mild spurring at the plantar fascia attachment to the calcaneus.  Total tract involving tibial shaft as before.       Assessment:  Post-op visit    Plan:  Current care, treatment plan, precautions, activity level/ modifications, limitations, rehabilitation exercises and proposed future treatment were discussed with the patient. We discussed the need to monitor for changes in symptoms and condition and report them to the physician.  Discussed importance of compliance with all appointments and follow up examinations.       ACTIVITY:   -  outpatient physical therapy with [facility].  -  She will be weight-bearing as tolerated to the left upper extremity and range of motion as tolerated to left upper extremity.   weight-bearing as tolerated to the right lower extremity and range of motion as tolerated to the right lower extremity.  - ok to d/c CAM boot will use lace up ankle brace .      PAIN MEDICATION:   - Multimodal pain control  - Pain medication: refill was not needed  - Pain medication refill policy provided to patient for review, yes.    - Patient was informed a multi-modal approach is used to treat their pain. With the goal to get off of narcotic pain medication and discontinue as soon as possible.   - ice and elevation to reduce pain and swelling     DVT PROPHYLAXIS:   - ASA 81 mg bid completed therapy    FOLLOW UP:   - Patient will follow up in the clinic with   - X-ray of her right ankle is needed.  OOB      If there are any questions prior to scheduled follow up, the patient was instructed to contact the office

## 2023-07-25 ENCOUNTER — TELEPHONE (OUTPATIENT)
Dept: NEUROLOGY | Facility: CLINIC | Age: 68
End: 2023-07-25
Payer: COMMERCIAL

## 2023-07-25 NOTE — TELEPHONE ENCOUNTER
Call placed to patient and family regarding Skin Biopsy scheduled for Friday.  Voicemail left.  Instructed to stop all blood thinning medication including ASA, Ibuprofen, and Naproxen and to wear comfortable clothing.

## 2023-07-27 RX ORDER — MELOXICAM 15 MG/1
TABLET ORAL
Qty: 30 TABLET | Refills: 0 | Status: SHIPPED | OUTPATIENT
Start: 2023-07-27 | End: 2023-08-22

## 2023-07-28 ENCOUNTER — TELEPHONE (OUTPATIENT)
Dept: NEUROLOGY | Facility: CLINIC | Age: 68
End: 2023-07-28

## 2023-07-28 ENCOUNTER — CLINICAL SUPPORT (OUTPATIENT)
Dept: REHABILITATION | Facility: HOSPITAL | Age: 68
End: 2023-07-28
Payer: COMMERCIAL

## 2023-07-28 ENCOUNTER — PROCEDURE VISIT (OUTPATIENT)
Dept: NEUROLOGY | Facility: CLINIC | Age: 68
End: 2023-07-28
Payer: COMMERCIAL

## 2023-07-28 VITALS
DIASTOLIC BLOOD PRESSURE: 69 MMHG | HEIGHT: 70 IN | HEART RATE: 96 BPM | BODY MASS INDEX: 21.64 KG/M2 | SYSTOLIC BLOOD PRESSURE: 111 MMHG | WEIGHT: 151.13 LBS

## 2023-07-28 DIAGNOSIS — G20.C PARKINSONISM, UNSPECIFIED PARKINSONISM TYPE: Primary | ICD-10-CM

## 2023-07-28 DIAGNOSIS — G47.00 INSOMNIA, UNSPECIFIED TYPE: ICD-10-CM

## 2023-07-28 DIAGNOSIS — S82.851A CLOSED TRIMALLEOLAR FRACTURE OF ANKLE, RIGHT, INITIAL ENCOUNTER: Primary | ICD-10-CM

## 2023-07-28 PROCEDURE — 97110 THERAPEUTIC EXERCISES: CPT | Mod: PO

## 2023-07-28 PROCEDURE — 97112 NEUROMUSCULAR REEDUCATION: CPT | Mod: PO

## 2023-07-28 PROCEDURE — 97140 MANUAL THERAPY 1/> REGIONS: CPT | Mod: PO

## 2023-07-28 RX ORDER — SUVOREXANT 10 MG/1
1 TABLET, FILM COATED ORAL NIGHTLY
Qty: 30 TABLET | Refills: 3 | Status: SHIPPED | OUTPATIENT
Start: 2023-07-28 | End: 2023-07-28 | Stop reason: SDUPTHER

## 2023-07-28 RX ORDER — SUVOREXANT 10 MG/1
1 TABLET, FILM COATED ORAL NIGHTLY
Qty: 10 TABLET | Refills: 2 | Status: SHIPPED | OUTPATIENT
Start: 2023-07-28 | End: 2023-08-01 | Stop reason: SDUPTHER

## 2023-07-28 NOTE — PROCEDURES
[facility]     PATIENT: Alix Lui Willis  VISIT DATE: 2023  MRN: [id number]  PRIMARY PROVIDER: Angélica Barakat MD  : 1955    PRE-OP DIAGNOSIS: parkinsonism  POST-OP DIAGNOSIS: Same   PROCEDURE: skin punch biopsy    Performing Physician: Johnathan Rae MD.  Supervising Physician (if applicable): N/A     PROCEDURE:   _  Shave Biopsy  X  Punch Biopsy  _  Incisional Biopsy    DESCRIPTION:  - Punch Size: 0.5 cm  - Number of Punch Biopsies: 3  + CPT 60516 (punch biopsy, neck) 1st procedure  + CPT 72106 (punch biopsy, each additional lesion, thigh) 2nd procedure  + CPT 43201 (punch biopsy, each additional lesion, ankle) 3rd procedure    The area surrounding the skin lesion was prepared and draped in the  usual sterile manner. The lesion was removed in the usual manner by punch biopsy method noted above. Three full thickness cylindrical samples of the skin were removed from the upper back, lower thigh, and lower leg. The intent of the biopsy is to remove a sample of a cutaneous lesion for [facility] diagnostic pathologic examination. Hemostasis was assured. The patient tolerated  the procedure well.     Closure:       _  Monsels for hemostasis                _  Suture   X Simple closure  _ None    Followup: The patient tolerated the procedure well without  complications.  Standard post-procedure care is explained and return  precautions are given.    Pathology/biopsy specimens were sent directly to [facility] for processing. Pathology was not sent via in-house [facility] laboratory. Pathology results will be returned within 4-6 weeks and scanned into [facility] Electronic Medical Record.

## 2023-07-28 NOTE — TELEPHONE ENCOUNTER
----- Message from Johnathan Rae MD sent at 7/28/2023  4:42 PM CDT -----  Good Afternoon,  Sent belsomra script to [facility] - not covered - sent to [facility] with coupon - need to start on PA for belsomra for this pt.  Johnathan

## 2023-07-28 NOTE — PROGRESS NOTES
"  [facility]   Physical Therapy Treatment Note/Progress Note     Name: Alix Lui [place]  Clinic Number: [id number]    Therapy Diagnosis:   Encounter Diagnosis   Name Primary?    Closed trimalleolar fracture of ankle, right, initial encounter Yes     Physician: Sebastian Kaplan NP    Visit Date: 7/28/2023    Evaluation Date: 6/28/2023  Authorization Period Expiration: 06/18/2024  Plan of Care Expiration: 10/30/2023  Progress Note Due: 8/28/2023  Visit # / Visits authorized: 1/ 10  FOTO: 1/ 3      Precautions: Weight bear as tolerated and ROMAT to LUE and RLE.  Must use tall cam boot when ambulating for minimal 4 weeks then can start to wean.     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 Pt is out of boot and into a brace. "It allows me to walk."  She was compliant with home exercise program.  Response to previous treatment: Initial evaluation  Functional change: Improved gait with without boot    Pain: 1/10  Location: right ankles     Objective      Objective Measures updated at progress report unless specified.     Treatment     Alix received the treatments listed below:      therapeutic exercises to develop strength, endurance, and ROM for 15 minutes including:  Upright bike 8'  Ankle 4 way with red theraband x30 in all directions    manual therapy techniques: Joint mobilizations were applied to the: R ankle for 15 minutes, including:  Talocrural mobilization grade III  Subtalar inversion/eversion mobilization grade III  ANTEROPOSTERIOR distal tib/fib mob grade III    neuromuscular re-education activities to improve: Balance, Coordination, and Sense for 15 minutes. The following activities were included:  Tandem stance 2' with UE support during fatigue  Single leg stance 2' with UE support during fatigue  Ankle " circles x20 CW/CCW for proprioception    Patient Education and Home Exercises       Education provided:   - HEP    Written Home Exercises Provided: yes. Exercises were reviewed and Alix was able to demonstrate them prior to the end of the session.  Alix demonstrated good  understanding of the education provided. See EMR under Patient Instructions for exercises provided during therapy sessions    Assessment     Alix Patel tolerated PT session well with minimal  complaints of pain or discomfort, secondary to increased activity. Objective findings show no change with of range of motion and functional mobility.  Therapy exercises were reviewed by revisiting exercises given from previous home exercise program.  Handouts were not issued during today's visit. Alix demonstrated good understanding of new exercises and will continue to progress at home until next follow-up.       Alix Is progressing well towards her goals.   Pt prognosis is Good.     Pt will continue to benefit from skilled outpatient physical therapy to address the deficits listed in the problem list box on initial evaluation, provide pt/family education and to maximize pt's level of independence in the home and community environment.     Pt's spiritual, cultural and educational needs considered and pt agreeable to plan of care and goals.     Anticipated barriers to physical therapy: scheduling, pt attendance       Goals:  SHORT TERM GOALS:  5 weeks 6/28/2023   Recent signs and systems trend is improving in order to progress towards LTG's. Met  7/28/2023    Patient will be independent with HEP in order to further progress and return to maximal function. Met  7/28/2023    Pain rating at Worst: 5/10 in order to progress towards increased independence with activity.  Met  7/28/2023   Patient will be able to correct postural deviations in sitting and standing, to decrease pain and promote postural awareness for injury prevention.   Progressing  7/28/2023       LONG TERM GOALS: 10 weeks 6/28/2023   Patient will return to normal ADL, recreational, and work related activities with less pain and limitation.  Progressing  7/28/2023     Patient will improve AROM to stated goals in order to return to maximal functional potential.  Progressing  7/28/2023     Patient will improve Strength to stated goals of appropriate musculature in order to improve functional independence.  Progressing  7/28/2023     Pain Rating at Best: 1/10 to improve Quality of Life.   Progressing  7/28/2023    Patient will meet predicted functional outcome (FOTO) score: 71% to increase self-worth & perceived functional ability.  Progressing  7/28/2023    Patient will have met/partially met personal goal of: returning to walking with max pain 1/10 in order to demonstrate return to PLOF  Progressing  7/28/2023         Plan     Continue to progress per PLAN OF CARE     Jovanny Kelley, PT

## 2023-08-01 DIAGNOSIS — G20.C PARKINSONISM, UNSPECIFIED PARKINSONISM TYPE: ICD-10-CM

## 2023-08-01 DIAGNOSIS — G47.00 INSOMNIA, UNSPECIFIED TYPE: ICD-10-CM

## 2023-08-01 RX ORDER — SUVOREXANT 10 MG/1
1 TABLET, FILM COATED ORAL NIGHTLY
Qty: 30 TABLET | Refills: 1 | Status: SHIPPED | OUTPATIENT
Start: 2023-08-01

## 2023-08-01 NOTE — TELEPHONE ENCOUNTER
----- Message from Ellis Spencer sent at 8/1/2023  9:30 AM CDT -----  Regarding: Refill request  Contact: [phone]  Hi, pt called to request a refill on the suvorexant (BELSOMRA) 10 mg Tab. Pt is asking to send the refill to [address]

## 2023-08-02 ENCOUNTER — OFFICE VISIT (OUTPATIENT)
Dept: PODIATRY | Facility: CLINIC | Age: 68
End: 2023-08-02
Payer: COMMERCIAL

## 2023-08-02 VITALS
DIASTOLIC BLOOD PRESSURE: 77 MMHG | SYSTOLIC BLOOD PRESSURE: 117 MMHG | HEART RATE: 96 BPM | RESPIRATION RATE: 16 BRPM | BODY MASS INDEX: 21.27 KG/M2 | HEIGHT: 70 IN | WEIGHT: 148.56 LBS | TEMPERATURE: 99 F | OXYGEN SATURATION: 97 %

## 2023-08-02 DIAGNOSIS — B35.1 TINEA UNGUIUM: Primary | ICD-10-CM

## 2023-08-02 DIAGNOSIS — E11.9 TYPE 2 DIABETES MELLITUS WITHOUT COMPLICATION, WITH LONG-TERM CURRENT USE OF INSULIN: ICD-10-CM

## 2023-08-02 DIAGNOSIS — Z79.4 TYPE 2 DIABETES MELLITUS WITHOUT COMPLICATION, WITH LONG-TERM CURRENT USE OF INSULIN: ICD-10-CM

## 2023-08-02 PROCEDURE — 99214 OFFICE O/P EST MOD 30 MIN: CPT | Mod: S$GLB,,, | Performed by: STUDENT IN AN ORGANIZED HEALTH CARE EDUCATION/TRAINING PROGRAM

## 2023-08-02 PROCEDURE — 3078F PR MOST RECENT DIASTOLIC BLOOD PRESSURE < 80 MM HG: ICD-10-PCS | Mod: CPTII,S$GLB,, | Performed by: STUDENT IN AN ORGANIZED HEALTH CARE EDUCATION/TRAINING PROGRAM

## 2023-08-02 PROCEDURE — 1159F MED LIST DOCD IN RCRD: CPT | Mod: CPTII,S$GLB,, | Performed by: STUDENT IN AN ORGANIZED HEALTH CARE EDUCATION/TRAINING PROGRAM

## 2023-08-02 PROCEDURE — 3008F BODY MASS INDEX DOCD: CPT | Mod: CPTII,S$GLB,, | Performed by: STUDENT IN AN ORGANIZED HEALTH CARE EDUCATION/TRAINING PROGRAM

## 2023-08-02 PROCEDURE — 1125F PR PAIN SEVERITY QUANTIFIED, PAIN PRESENT: ICD-10-PCS | Mod: CPTII,S$GLB,, | Performed by: STUDENT IN AN ORGANIZED HEALTH CARE EDUCATION/TRAINING PROGRAM

## 2023-08-02 PROCEDURE — 3288F FALL RISK ASSESSMENT DOCD: CPT | Mod: CPTII,S$GLB,, | Performed by: STUDENT IN AN ORGANIZED HEALTH CARE EDUCATION/TRAINING PROGRAM

## 2023-08-02 PROCEDURE — 1125F AMNT PAIN NOTED PAIN PRSNT: CPT | Mod: CPTII,S$GLB,, | Performed by: STUDENT IN AN ORGANIZED HEALTH CARE EDUCATION/TRAINING PROGRAM

## 2023-08-02 PROCEDURE — 3008F PR BODY MASS INDEX (BMI) DOCUMENTED: ICD-10-PCS | Mod: CPTII,S$GLB,, | Performed by: STUDENT IN AN ORGANIZED HEALTH CARE EDUCATION/TRAINING PROGRAM

## 2023-08-02 PROCEDURE — 99999 PR PBB SHADOW E&M-EST. PATIENT-LVL IV: ICD-10-PCS | Mod: PBBFAC,,, | Performed by: STUDENT IN AN ORGANIZED HEALTH CARE EDUCATION/TRAINING PROGRAM

## 2023-08-02 PROCEDURE — 1157F PR ADVANCE CARE PLAN OR EQUIV PRESENT IN MEDICAL RECORD: ICD-10-PCS | Mod: CPTII,S$GLB,, | Performed by: STUDENT IN AN ORGANIZED HEALTH CARE EDUCATION/TRAINING PROGRAM

## 2023-08-02 PROCEDURE — 1157F ADVNC CARE PLAN IN RCRD: CPT | Mod: CPTII,S$GLB,, | Performed by: STUDENT IN AN ORGANIZED HEALTH CARE EDUCATION/TRAINING PROGRAM

## 2023-08-02 PROCEDURE — 1100F PR PT FALLS ASSESS DOC 2+ FALLS/FALL W/INJURY/YR: ICD-10-PCS | Mod: CPTII,S$GLB,, | Performed by: STUDENT IN AN ORGANIZED HEALTH CARE EDUCATION/TRAINING PROGRAM

## 2023-08-02 PROCEDURE — 1100F PTFALLS ASSESS-DOCD GE2>/YR: CPT | Mod: CPTII,S$GLB,, | Performed by: STUDENT IN AN ORGANIZED HEALTH CARE EDUCATION/TRAINING PROGRAM

## 2023-08-02 PROCEDURE — 3074F PR MOST RECENT SYSTOLIC BLOOD PRESSURE < 130 MM HG: ICD-10-PCS | Mod: CPTII,S$GLB,, | Performed by: STUDENT IN AN ORGANIZED HEALTH CARE EDUCATION/TRAINING PROGRAM

## 2023-08-02 PROCEDURE — 3288F PR FALLS RISK ASSESSMENT DOCUMENTED: ICD-10-PCS | Mod: CPTII,S$GLB,, | Performed by: STUDENT IN AN ORGANIZED HEALTH CARE EDUCATION/TRAINING PROGRAM

## 2023-08-02 PROCEDURE — 99214 PR OFFICE/OUTPT VISIT, EST, LEVL IV, 30-39 MIN: ICD-10-PCS | Mod: S$GLB,,, | Performed by: STUDENT IN AN ORGANIZED HEALTH CARE EDUCATION/TRAINING PROGRAM

## 2023-08-02 PROCEDURE — 99999 PR PBB SHADOW E&M-EST. PATIENT-LVL IV: CPT | Mod: PBBFAC,,, | Performed by: STUDENT IN AN ORGANIZED HEALTH CARE EDUCATION/TRAINING PROGRAM

## 2023-08-02 PROCEDURE — 3078F DIAST BP <80 MM HG: CPT | Mod: CPTII,S$GLB,, | Performed by: STUDENT IN AN ORGANIZED HEALTH CARE EDUCATION/TRAINING PROGRAM

## 2023-08-02 PROCEDURE — 1159F PR MEDICATION LIST DOCUMENTED IN MEDICAL RECORD: ICD-10-PCS | Mod: CPTII,S$GLB,, | Performed by: STUDENT IN AN ORGANIZED HEALTH CARE EDUCATION/TRAINING PROGRAM

## 2023-08-02 PROCEDURE — 3051F HG A1C>EQUAL 7.0%<8.0%: CPT | Mod: CPTII,S$GLB,, | Performed by: STUDENT IN AN ORGANIZED HEALTH CARE EDUCATION/TRAINING PROGRAM

## 2023-08-02 PROCEDURE — 3074F SYST BP LT 130 MM HG: CPT | Mod: CPTII,S$GLB,, | Performed by: STUDENT IN AN ORGANIZED HEALTH CARE EDUCATION/TRAINING PROGRAM

## 2023-08-02 PROCEDURE — 3051F PR MOST RECENT HEMOGLOBIN A1C LEVEL 7.0 - < 8.0%: ICD-10-PCS | Mod: CPTII,S$GLB,, | Performed by: STUDENT IN AN ORGANIZED HEALTH CARE EDUCATION/TRAINING PROGRAM

## 2023-08-02 RX ORDER — CICLOPIROX 80 MG/ML
SOLUTION TOPICAL NIGHTLY
Qty: 6.6 ML | Refills: 3 | Status: SHIPPED | OUTPATIENT
Start: 2023-08-02 | End: 2024-08-01

## 2023-08-02 NOTE — PROGRESS NOTES
Subjective:     Patient    Alix Patel is a 67 y.o. female.    Problem    Previously seen by Dr Kelley. Presents with discoloration, thickness of all 10 nails. Has attempted OTC Funginail without significant improvement, has also attempted other OTC antifungals without improvement. States she has spoken to other providers about prescription antifungals but they have not prescribed any for her. Also with fractured right ankle being managed by Dr Contreras, s/p ORIF, wearing right ankle brace.     Primary Care Provider    Primary Care Provider: Angélica Barakat MD   Last Seen: Patient does not have a PCP or has not yet seen their PCP     History    History obtained from patient and review of medical records.     Past Medical History:   Diagnosis Date    Anxiety     Bipolar 1 disorder     COPD (chronic obstructive pulmonary disease)     Depression     Diabetes mellitus     GERD (gastroesophageal reflux disease)     Grade I open displaced transverse fracture of shaft of left humerus 3/20/2023    HEARING LOSS     pt states she has loss slighty in rt ear.    Hypertension     Insomnia     Rash        Past Surgical History:   Procedure Laterality Date    APPENDECTOMY      APPLICATION, EXTERNAL FIXATION DEVICE, FOR ANKLE FRACTURE Right 5/5/2023    Procedure: APPLICATION, EXTERNAL FIXATION DEVICE, FOR ANKLE FRACTURE - right, mariusz, ancef, bone foam, slider, C arm door side;  Surgeon: Yanely Guerra MD;  Location: [facility] OR [address] [place] [address];  Service: Orthopedics;  Laterality: Right;  right, mariusz, ancef, bone foam, slider, C arm door side    BREAST BIOPSY      COLONOSCOPY      COLONOSCOPY N/A 10/31/2019    Procedure: COLONOSCOPY;  Surgeon: Philippe Monteiro MD;  Location: [facility] (4TH FLR);  Service: Endoscopy;  Laterality: N/A;  PM prep-    CYST REMOVAL      ESOPHAGOGASTRODUODENOSCOPY N/A 10/31/2019    Procedure: EGD (ESOPHAGOGASTRODUODENOSCOPY);  Surgeon: Philippe Monteiro MD;  Location: [facility] (4TH FLR);   Service: Endoscopy;  Laterality: N/A;  Pm prep-MH    FIXATION OF SYNDESMOSIS OF ANKLE Right 5/8/2023    Procedure: FIXATION, SYNDESMOSIS, ANKLE;  Surgeon: Johnathan Contreras MD;  Location: [facility] OR [address] [place] [address];  Service: Orthopedics;  Laterality: Right;    HYSTERECTOMY      IRRIGATION AND DEBRIDEMENT OF UPPER EXTREMITY Left 3/21/2023    Procedure: IRRIGATION AND DEBRIDEMENT, UPPER EXTREMITY;  Surgeon: Ousmane Crawford MD;  [facility];  Service: Orthopedics;  Laterality: Left;    OPEN REDUCTION AND INTERNAL FIXATION (ORIF) OF INJURY OF ANKLE Right 5/8/2023    Procedure: ORIF, ANKLE - RIGHT. EX FIX REMOVAL. SYNTHES. C ARM DOOR SIDE.  BONE FOAM.;  Surgeon: Johnathan Contreras MD;  Location: [facility] OR [address] [place] [address];  Service: Orthopedics;  Laterality: Right;    ORIF HUMERUS FRACTURE Left 3/21/2023    Procedure: ORIF, FRACTURE, HUMERUS;  Surgeon: Ousmane Crawford MD;  Location: [facility] OR [address] [place] [address];  Service: Orthopedics;  Laterality: Left;        Objective:     Vitals  Wt Readings from Last 1 Encounters:   08/02/23 67.4 kg (148 lb 9.4 oz)     Temp Readings from Last 1 Encounters:   08/02/23 98.7 °F (37.1 °C)     BP Readings from Last 1 Encounters:   08/02/23 117/77     Pulse Readings from Last 1 Encounters:   08/02/23 96       Dermatological Exam    Skin:  Pedal hair growth, skin color, and skin texture normal on right; skin dry  Pedal hair growth, skin color, and skin texture normal on left; skin dry    Nails:  All 10 nail(s) elongated, all 10 nail(s) thickened, and all 10 nail(s) discolored    Vascular Exam    Arteries:  Posterior tibial artery palpable on right  Dorsalis pedis artery palpable on right  Posterior tibial artery palpable on left  Dorsalis pedis artery palpable on left    Veins:  Superficial veins unremarkable on right  Superficial veins unremarkable on left    Swelling:  None on right  None on left    Neurological Exam    [place] touch test:  6/6 sites sensed, light touch intact      Musculoskeletal Exam    Footwear:  Casual on right  Casual on left    Gait Exam:   Ambulatory Status: Ambulatory  Gait: Antalgic  Assistive Devices: None    Foot Progression Angle:  Normal on right  Normal on left     Right Lower Extremity Additional Findings:  Right foot and ankle function, strength, and range of motion unremarkable except as noted above.     Left Lower Extremity Additional Findings:  Left foot and ankle function, strength, and range of motion unremarkable except as noted above.    Imaging and Other Tests    Imaging:  Independently reviewed and interpreted imaging, findings are as follows:   07/07/23 right ankle X ray: s/p ORIF with hardware intact, no breakage or loosening; fibular plate with syndesmotic screw for distal fibular fracture and syndesmotic injury, fibular fracture well healed; posterior malleolar screw with mild malalignment of posterior malleolar fragment    Other Tests: The following A1c results were reviewed.   Hemoglobin A1C   Date Value Ref Range Status   03/20/2023 7.5 (H) 4.0 - 5.6 % Final     Comment:     ADA Screening Guidelines:  5.7-6.4%  Consistent with prediabetes  >or=6.5%  Consistent with diabetes    High levels of fetal hemoglobin interfere with the HbA1C  assay. Heterozygous hemoglobin variants (HbS, HgC, etc)do  not significantly interfere with this assay.   However, presence of multiple variants may affect accuracy.     09/09/2022 6.4 (H) 4.0 - 5.6 % Final     Comment:     ADA Screening Guidelines:  5.7-6.4%  Consistent with prediabetes  >or=6.5%  Consistent with diabetes    High levels of fetal hemoglobin interfere with the HbA1C  assay. Heterozygous hemoglobin variants (HbS, HgC, etc)do  not significantly interfere with this assay.   However, presence of multiple variants may affect accuracy.     07/30/2021 11.1 (H) 4.0 - 5.6 % Final     Comment:     ADA Screening Guidelines:  5.7-6.4%  Consistent with prediabetes  >or=6.5%  Consistent with diabetes    High  levels of fetal hemoglobin interfere with the HbA1C  assay. Heterozygous hemoglobin variants (HbS, HgC, etc)do  not significantly interfere with this assay.   However, presence of multiple variants may affect accuracy.           Assessment:     Encounter Diagnoses   Name Primary?    Type 2 diabetes mellitus without complication, with long-term current use of insulin     Tinea unguium Yes        Plan:     I counseled the patient on her conditions, their implications and medical management.    Diabetic foot with no complications: chronic stable  -Diabetic foot exam performed.  -Performed shoe inspection and diabetic foot education. Reviewed importance of blood glucose control, proper nutrition, and foot hygiene to minimize risk of complications of diabetes. Recommended daily foot inspections, daily moisturizer to feet, avoiding sharp instruments to feet, appropriate footwear at all times when ambulating, and following up regularly for routine foot care.   -Diabetic foot risk: 2023 IWGDF very low ulcer risk, recommend followup yearly for foot exams.   -Next foot exam due August 2024.    Tinea unguium of both feet: chronic stable  -Ordered ciclopirox lacquer to be applied to affected nails daily for 6-12 months, clean off once per week with alcohol.        Return to clinic in 6 months, call sooner PRN.

## 2023-08-03 ENCOUNTER — CLINICAL SUPPORT (OUTPATIENT)
Dept: REHABILITATION | Facility: HOSPITAL | Age: 68
End: 2023-08-03
Payer: COMMERCIAL

## 2023-08-03 DIAGNOSIS — S82.851A CLOSED TRIMALLEOLAR FRACTURE OF ANKLE, RIGHT, INITIAL ENCOUNTER: Primary | ICD-10-CM

## 2023-08-03 PROCEDURE — 97112 NEUROMUSCULAR REEDUCATION: CPT | Mod: PO,CQ

## 2023-08-03 PROCEDURE — 97140 MANUAL THERAPY 1/> REGIONS: CPT | Mod: PO,CQ

## 2023-08-03 PROCEDURE — 97110 THERAPEUTIC EXERCISES: CPT | Mod: PO,CQ

## 2023-08-03 NOTE — PROGRESS NOTES
[facility]   Physical Therapy Treatment Note     Name: Alix Lui [place]  Clinic Number: [id number]    Therapy Diagnosis:   Encounter Diagnosis   Name Primary?    Closed trimalleolar fracture of ankle, right, initial encounter Yes       Physician: Sebastian Kaplan NP    Visit Date: 8/3/2023    Evaluation Date: 6/28/2023  Authorization Period Expiration: 06/18/2024  Plan of Care Expiration: 10/30/2023  Progress Note Due: 8/28/2023  Visit # / Visits authorized: 2/ 10   FOTO: 1/ 3      Precautions: Weight bear as tolerated and ROMAT to LUE and RLE.  Must use tall cam boot when ambulating for minimal 4 weeks then can start to wean.     Time In: 2:03 pm  Time Out: 2:45 pm  Total billable time: 42 minutes    PTA Visit #: 1/5       Subjective     Pt reports: she's been doing her exercises daily  She was compliant with home exercise program.  Response to previous treatment: tolerated well  Functional change: Improved gait with without boot    Pain: 5/10  Location: right ankles     Objective      Objective Measures updated at progress report unless specified.     Treatment     Alix received the treatments listed below:      therapeutic exercises to develop strength, endurance, and ROM for 10 minutes including:  Upright bike 8'  Ankle 4 way with red theraband x30 in all directions    manual therapy techniques: Joint mobilizations were applied to the: R ankle for 15 minutes, including:  Talocrural mobilization grade III  Subtalar inversion/eversion mobilization grade III  ANTEROPOSTERIOR distal tib/fib mob grade III    neuromuscular re-education activities to improve: Balance, Coordination, and Sense for 17 minutes. The following activities were included:  Tandem stance 2x1' with UE support during fatigue  Single leg stance 2' with UE support during fatigue  Ankle circles x30 CW/CCW for proprioception  Towel crunches 3'  Toe yoga 3'    Patient Education and Home Exercises       Education  provided:   - HEP    Written Home Exercises Provided: yes. Exercises were reviewed and Alix was able to demonstrate them prior to the end of the session.  Alix demonstrated good  understanding of the education provided. See EMR under Patient Instructions for exercises provided during therapy sessions    Assessment     Alix presents to treatment with some discomfort in right ankle. She presents ambulating with tennis shoes with right ankle brace donned. She has been compliant with home exercises. Instructed patient on foot intrinsics with good tolerance. Balance deficits noted with UE support required during tandem standing. Continue to progress as tolerated.     Alix Is progressing well towards her goals.   Pt prognosis is Good.     Pt will continue to benefit from skilled outpatient physical therapy to address the deficits listed in the problem list box on initial evaluation, provide pt/family education and to maximize pt's level of independence in the home and community environment.     Pt's spiritual, cultural and educational needs considered and pt agreeable to plan of care and goals.     Anticipated barriers to physical therapy: scheduling, pt attendance       Goals:  SHORT TERM GOALS:  5 weeks 6/28/2023   Recent signs and systems trend is improving in order to progress towards LTG's. Met  7/28/2023    Patient will be independent with HEP in order to further progress and return to maximal function. Met  7/28/2023    Pain rating at Worst: 5/10 in order to progress towards increased independence with activity.  Met  7/28/2023   Patient will be able to correct postural deviations in sitting and standing, to decrease pain and promote postural awareness for injury prevention.  Progressing  7/28/2023       LONG TERM GOALS: 10 weeks 6/28/2023   Patient will return to normal ADL, recreational, and work related activities with less pain and limitation.  Progressing  7/28/2023     Patient will improve AROM to  stated goals in order to return to maximal functional potential.  Progressing  7/28/2023     Patient will improve Strength to stated goals of appropriate musculature in order to improve functional independence.  Progressing  7/28/2023     Pain Rating at Best: 1/10 to improve Quality of Life.   Progressing  7/28/2023    Patient will meet predicted functional outcome (FOTO) score: 71% to increase self-worth & perceived functional ability.  Progressing  7/28/2023    Patient will have met/partially met personal goal of: returning to walking with max pain 1/10 in order to demonstrate return to PLOF  Progressing  7/28/2023         Plan     Continue to progress per PLAN OF CARE     Lynne [place], PTA

## 2023-08-04 ENCOUNTER — NURSE TRIAGE (OUTPATIENT)
Dept: ADMINISTRATIVE | Facility: CLINIC | Age: 68
End: 2023-08-04
Payer: COMMERCIAL

## 2023-08-04 RX ORDER — TRAZODONE HYDROCHLORIDE 150 MG/1
150 TABLET ORAL NIGHTLY
Qty: 15 TABLET | Refills: 0 | Status: SHIPPED | OUTPATIENT
Start: 2023-08-04 | End: 2023-11-16 | Stop reason: SDUPTHER

## 2023-08-04 NOTE — TELEPHONE ENCOUNTER
Pt states that her pharmacy does not have Belsomra in stock. She has not been able to find a pharmacy with the medication. She tried contacting the clinic earlier today for a refill for Trazodone until she can start the new medication. Contacted Dr. Hwang, on call, who states he will electronically prescribe Trazodone for the patient. Verified pt's pharmacy. Instructed pt to call back with further questions or concerns.     Reason for Disposition   [1] Prescription refill request for ESSENTIAL medicine (i.e., likelihood of harm to patient if not taken) AND [2] triager unable to refill per department policy    Protocols used: Medication Refill and Renewal Call-A-

## 2023-08-08 ENCOUNTER — TELEPHONE (OUTPATIENT)
Dept: NEUROLOGY | Facility: CLINIC | Age: 68
End: 2023-08-08
Payer: COMMERCIAL

## 2023-08-08 NOTE — TELEPHONE ENCOUNTER
----- Message from Tori Hortencia Bolaños sent at 8/4/2023  3:11 PM CDT -----  Regarding: RX refill ( Urgent pt out of medication)  Contact: [phone]      Rx Refill/Request           Is this a Refill or New Rx: refill         Rx Name and Strength:suvorexant (BELSOMRA) 10 mg Tab 30 tablet              Communication Preference: Pt @ [phone]          Additional Information: Pt would like the substitute medication sent to [facility]!       Pt request that the coupon for Belsomra is sent,  Pt also needs a substitute sleep medication until suvorexant (BELSOMRA) is available. Also send pt's insurance information to       [facility] [id number] [facility] [address]  Phone: [phone] Fax: [phone]

## 2023-08-10 ENCOUNTER — HOSPITAL ENCOUNTER (OUTPATIENT)
Dept: RADIOLOGY | Facility: HOSPITAL | Age: 68
Discharge: HOME | End: 2023-08-10
Attending: ORTHOPAEDIC SURGERY
Payer: COMMERCIAL

## 2023-08-10 ENCOUNTER — TELEPHONE (OUTPATIENT)
Dept: NEUROLOGY | Facility: CLINIC | Age: 68
End: 2023-08-10
Payer: COMMERCIAL

## 2023-08-10 ENCOUNTER — OFFICE VISIT (OUTPATIENT)
Dept: ORTHOPEDICS | Facility: CLINIC | Age: 68
End: 2023-08-10
Payer: COMMERCIAL

## 2023-08-10 DIAGNOSIS — S82.851D CLOSED TRIMALLEOLAR FRACTURE OF RIGHT ANKLE WITH ROUTINE HEALING: ICD-10-CM

## 2023-08-10 DIAGNOSIS — S82.851D CLOSED TRIMALLEOLAR FRACTURE OF RIGHT ANKLE WITH ROUTINE HEALING: Primary | ICD-10-CM

## 2023-08-10 PROCEDURE — 99999 PR PBB SHADOW E&M-EST. PATIENT-LVL I: ICD-10-PCS | Mod: PBBFAC,,, | Performed by: ORTHOPAEDIC SURGERY

## 2023-08-10 PROCEDURE — 1160F PR REVIEW ALL MEDS BY PRESCRIBER/CLIN PHARMACIST DOCUMENTED: ICD-10-PCS | Mod: CPTII,S$GLB,, | Performed by: ORTHOPAEDIC SURGERY

## 2023-08-10 PROCEDURE — 73610 X-RAY EXAM OF ANKLE: CPT | Mod: TC,RT

## 2023-08-10 PROCEDURE — 1159F MED LIST DOCD IN RCRD: CPT | Mod: CPTII,S$GLB,, | Performed by: ORTHOPAEDIC SURGERY

## 2023-08-10 PROCEDURE — 99214 OFFICE O/P EST MOD 30 MIN: CPT | Mod: S$GLB,,, | Performed by: ORTHOPAEDIC SURGERY

## 2023-08-10 PROCEDURE — 1159F PR MEDICATION LIST DOCUMENTED IN MEDICAL RECORD: ICD-10-PCS | Mod: CPTII,S$GLB,, | Performed by: ORTHOPAEDIC SURGERY

## 2023-08-10 PROCEDURE — 99999 PR PBB SHADOW E&M-EST. PATIENT-LVL I: CPT | Mod: PBBFAC,,, | Performed by: ORTHOPAEDIC SURGERY

## 2023-08-10 PROCEDURE — 1157F PR ADVANCE CARE PLAN OR EQUIV PRESENT IN MEDICAL RECORD: ICD-10-PCS | Mod: CPTII,S$GLB,, | Performed by: ORTHOPAEDIC SURGERY

## 2023-08-10 PROCEDURE — 1157F ADVNC CARE PLAN IN RCRD: CPT | Mod: CPTII,S$GLB,, | Performed by: ORTHOPAEDIC SURGERY

## 2023-08-10 PROCEDURE — 3051F HG A1C>EQUAL 7.0%<8.0%: CPT | Mod: CPTII,S$GLB,, | Performed by: ORTHOPAEDIC SURGERY

## 2023-08-10 PROCEDURE — 3051F PR MOST RECENT HEMOGLOBIN A1C LEVEL 7.0 - < 8.0%: ICD-10-PCS | Mod: CPTII,S$GLB,, | Performed by: ORTHOPAEDIC SURGERY

## 2023-08-10 PROCEDURE — 73610 XR ANKLE COMPLETE 3 VIEW RIGHT: ICD-10-PCS | Mod: 26,RT,, | Performed by: RADIOLOGY

## 2023-08-10 PROCEDURE — 99214 PR OFFICE/OUTPT VISIT, EST, LEVL IV, 30-39 MIN: ICD-10-PCS | Mod: S$GLB,,, | Performed by: ORTHOPAEDIC SURGERY

## 2023-08-10 PROCEDURE — 1160F RVW MEDS BY RX/DR IN RCRD: CPT | Mod: CPTII,S$GLB,, | Performed by: ORTHOPAEDIC SURGERY

## 2023-08-10 PROCEDURE — 73610 X-RAY EXAM OF ANKLE: CPT | Mod: 26,RT,, | Performed by: RADIOLOGY

## 2023-08-10 NOTE — TELEPHONE ENCOUNTER
----- Message from Natty Mccracken MA sent at 8/10/2023  5:18 PM CDT -----  Regarding: FW: pt advice  Contact: [phone]    ----- Message -----  From: Dari Hamlin  Sent: 8/10/2023   8:31 AM CDT  To: Kyra JOHNSON Staff  Subject: pt advice                                        HAILEY GONZALEZ calling regarding Patient Advice (message) for #suvorexant (BELSOMRA) 10 mg Tab. She would like to know if it at the [facility]. She is also requesting that her insurance cards and the coupon be sent over as well. Please call      [facility] [address] [place] [address] [address] [place] [id number]  Phone: [phone] Fax: [phone]

## 2023-08-10 NOTE — TELEPHONE ENCOUNTER
Called and spoke to pt and she will come get coupon in the morning. She did not use the coupon before.

## 2023-08-10 NOTE — TELEPHONE ENCOUNTER
----- Message from Kimberli Jones sent at 8/10/2023  3:37 PM CDT -----  Regarding: Multiple attempts:Rx  Contact: [phone]  Pt called in and is requesting a call back. Pt is frustrated she has called in a dozen times to get a coupon sent to the pharmacy for suvorexant (BELSOMRA) 10 mg Tab and pt stated she needs a copy of her [facility] Insurance info sent to the pharmacy. Please call to further discuss. Thanks      [facility] [address] [place] [address] [address] [place] [address]  [address] [place] [address]  Phone: [phone] Fax: [phone]

## 2023-08-10 NOTE — PROGRESS NOTES
HPI: 67-year-old female status post external fixation and then ex fix removal and ORIF of right bimalleolar ankle fracture/trimalleolar equivalent on 05/08/2023.  She is been doing well in physical therapy and working on strengthening.  She is ambulating with no assistive devices.  She does still wear a lace-up ankle splint.  She has little pain along the posteromedial aspect of the ankle.  She was taking gabapentin but has not done that about a month.    Past Medical History:   Diagnosis Date    Anxiety     Bipolar 1 disorder     COPD (chronic obstructive pulmonary disease)     Depression     Diabetes mellitus     GERD (gastroesophageal reflux disease)     Grade I open displaced transverse fracture of shaft of left humerus 3/20/2023    HEARING LOSS     pt states she has loss slighty in rt ear.    Hypertension     Insomnia     Rash        Current Outpatient Medications on File Prior to Visit   Medication Sig Dispense Refill    acetaminophen (TYLENOL) 500 MG tablet Take 2 tablets (1,000 mg total) by mouth every 8 (eight) hours as needed for Pain. 90 tablet 0    blood sugar diagnostic (TRUE METRIX GLUCOSE TEST STRIP MISC) True Metrix Glucose Test Strip   TEST TWICE DAILY AS DIRECTED      blood-glucose meter (TRUE METRIX AIR GLUCOSE METER MISC) True Metrix Air Glucose Meter   USE UNDER THE SKIN TWICE DAILY AS DIRECTED      ciclopirox (PENLAC) 8 % Soln Apply topically nightly. Paint on affected nail(s) once per night, remove residue once per week with alcohol 6.6 mL 3    dulaglutide (TRULICITY) 0.75 mg/0.5 mL pen injector Inject 0.75 mg into the skin every Sunday.      EScitalopram oxalate (LEXAPRO) 20 MG tablet Take 1 tablet (20 mg total) by mouth once daily. 90 tablet 3    insulin detemir U-100, Levemir, 100 unit/mL (3 mL) SubQ InPn pen Inject 5 Units into the skin every evening.  0    levothyroxine (SYNTHROID) 100 MCG tablet Take 1 tablet (100 mcg total) by mouth once daily. 30 tablet 1    meloxicam (MOBIC) 15 MG  tablet TAKE 1 TABLET(15 MG) BY MOUTH EVERY DAY 30 tablet 0    metFORMIN (GLUCOPHAGE) 500 MG tablet Take 2 tablets (1,000 mg total) by mouth 2 (two) times daily with meals. 120 tablet 1    rosuvastatin (CRESTOR) 20 MG tablet Take 1 tablet (20 mg total) by mouth once daily.  3    suvorexant (BELSOMRA) 10 mg Tab Take 1 tablet by mouth every evening. 30 tablet 1    traZODone (DESYREL) 150 MG tablet Take 1 tablet (150 mg total) by mouth every evening. 15 tablet 0    traZODone (DESYREL) 50 MG tablet Take 2 tablets by mouth at night for 1 week, then 1 tablet at night for 1 week, then 1/2 tablet at night for 1 week then stop 30 tablet 0     No current facility-administered medications on file prior to visit.       Social History     Socioeconomic History    Marital status:      Spouse name: N/A    Number of children: 1    Years of education: 13    Highest education level: Some college, no degree   Occupational History    Occupation:      Employer: [employer]   Tobacco Use    Smoking status: Former     Current packs/day: 0.00     Average packs/day: 0.5 packs/day for 47.0 years (23.5 ttl pk-yrs)     Types: Cigarettes     Start date:      Quit date:      Years since quittin.6     Passive exposure: Never    Smokeless tobacco: Never   Substance and Sexual Activity    Alcohol use: Yes     Comment: Rare    Drug use: Not Currently     Types: Marijuana, Cocaine    Sexual activity: Not Currently     Partners: Male     Birth control/protection: None   Social History Narrative    Ms. Patel was born in [place] on 1955. She grew up in [place] and was raised by her parents. She went to high school at [employer] and graduated in . After completing high school, Ms. Patel attended [employer] but did not graduate. 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 She would like to return to [employer] in  the future to get degree in music.      Social Determinants of Health     Financial Resource Strain: Low Risk  (5/7/2023)    Overall Financial Resource Strain (CARDIA)     Difficulty of Paying Living Expenses: Not hard at all   Food Insecurity: No Food Insecurity (5/7/2023)    Hunger Vital Sign     Worried About Running Out of Food in the Last Year: Never true     Ran Out of Food in the Last Year: Never true   Transportation Needs: No Transportation Needs (5/7/2023)    PRAPARE - Transportation     Lack of Transportation (Medical): No     Lack of Transportation (Non-Medical): No   Physical Activity: Insufficiently Active (5/7/2023)    Exercise Vital Sign     Days of Exercise per Week: 3 days     Minutes of Exercise per Session: 30 min   Stress: No Stress Concern Present (5/7/2023)    [place] [employer] - Occupational Stress Questionnaire     Feeling of Stress : Only a little   Social Connections: Unknown (5/7/2023)    Social Connection and Isolation Panel [NHANES]     Frequency of Communication with Friends and Family: Patient refused     Frequency of Social Gatherings with Friends and Family: Patient refused     Attends Confucianist Services: Patient refused     Active Member of Clubs or Organizations: Patient refused     Attends Club or Organization Meetings: Patient refused     Marital Status: Patient refused   Housing Stability: Low Risk  (5/7/2023)    Housing Stability Vital Sign     Unable to Pay for Housing in the Last Year: No     Number of Places Lived in the Last Year: 2     Unstable Housing in the Last Year: No         ROS:  Patient denies constitutional symptoms, cardiac symptoms, respiratory symptoms, GI symptoms, Urinary symptoms, skin issues, allergic issues  The remainder of the musculoskeletal ROS is included in the HPI.    PE:    AA&O x 4.  NAD.  Normal mood and affect.  HEENT:  NCAT, sclera nonicteric  Lungs:  Respirations are equal and unlabored.  Abd: Non distended  :  No  evidence of incontinence  CV:  2+ bilateral upper and lower extremity pulses.  Skin:  Intact throughout.    MS:  Right ankle 10° dorsiflexion and 20° plantar flexion.  Well-healed lateral incision.  No distinct tenderness to palpation about the ATFL, CFL or deltoid.  Minor tenderness to palpation about the posterior tibial tendon.    Rads:  Reviewed and interpreted today by me.  X-rays of the left ankle show well-healed fractures with well-aligned mortise.    A/P:  67-year-old female 3+ months status post ORIF of left ankle fracture.  She is doing very well overall.  She will continue out her course of physical therapy.  I encouraged her to work her way out of the lace-up ankle brace and continue her therapy exercises.  I do not think she needs to restart the Neurontin.  I think she needs to work on her stability exercises and she will use Tylenol and Aleve for pain.  Follow up PRN.

## 2023-08-10 NOTE — TELEPHONE ENCOUNTER
----- Message from [place] Haddad sent at 8/10/2023 12:00 PM CDT -----  Type:  Patient Returning Call    Who Called:pt   Who Left Message for Patient:  Does the patient know what this is regarding?:rx   Would the patient rather a call back or a response via [facility]? Call  Best Call Back Number:[phone]  Additional Information: pt is calling in regards to an prescription  was supposed to call in to the suvorexant (BELSOMRA) 10 mg Tab   [address]   Phone:  [phone]  Pt also sates to add the coupon as well once the prescription has been sent in, please give the pt an call

## 2023-08-11 DIAGNOSIS — M79.602 LEFT ARM PAIN: ICD-10-CM

## 2023-08-11 DIAGNOSIS — Z98.890 POST-OPERATIVE STATE: Primary | ICD-10-CM

## 2023-08-21 ENCOUNTER — OFFICE VISIT (OUTPATIENT)
Dept: PSYCHIATRY | Facility: CLINIC | Age: 68
End: 2023-08-21
Payer: COMMERCIAL

## 2023-08-21 VITALS
HEART RATE: 103 BPM | SYSTOLIC BLOOD PRESSURE: 129 MMHG | DIASTOLIC BLOOD PRESSURE: 89 MMHG | WEIGHT: 150.81 LBS | BODY MASS INDEX: 21.64 KG/M2

## 2023-08-21 DIAGNOSIS — E03.9 ACQUIRED HYPOTHYROIDISM: ICD-10-CM

## 2023-08-21 DIAGNOSIS — F41.9 ANXIETY DISORDER, UNSPECIFIED TYPE: ICD-10-CM

## 2023-08-21 DIAGNOSIS — E11.65 TYPE 2 DIABETES MELLITUS WITH HYPERGLYCEMIA, WITHOUT LONG-TERM CURRENT USE OF INSULIN: ICD-10-CM

## 2023-08-21 DIAGNOSIS — Z63.9 FAMILY DYNAMICS PROBLEM: ICD-10-CM

## 2023-08-21 DIAGNOSIS — S82.851D CLOSED TRIMALLEOLAR FRACTURE OF RIGHT ANKLE WITH ROUTINE HEALING: ICD-10-CM

## 2023-08-21 DIAGNOSIS — F10.21 ALCOHOL USE DISORDER, MODERATE, IN SUSTAINED REMISSION: ICD-10-CM

## 2023-08-21 DIAGNOSIS — F31.81 BIPOLAR II DISORDER: Primary | ICD-10-CM

## 2023-08-21 PROBLEM — F39 UNSPECIFIED MOOD (AFFECTIVE) DISORDER: Status: RESOLVED | Noted: 2023-08-21 | Resolved: 2023-08-21

## 2023-08-21 PROBLEM — F39 UNSPECIFIED MOOD (AFFECTIVE) DISORDER: Status: ACTIVE | Noted: 2023-08-21

## 2023-08-21 PROCEDURE — 3051F PR MOST RECENT HEMOGLOBIN A1C LEVEL 7.0 - < 8.0%: ICD-10-PCS | Mod: CPTII,S$GLB,, | Performed by: PSYCHIATRY & NEUROLOGY

## 2023-08-21 PROCEDURE — 1157F PR ADVANCE CARE PLAN OR EQUIV PRESENT IN MEDICAL RECORD: ICD-10-PCS | Mod: CPTII,S$GLB,, | Performed by: PSYCHIATRY & NEUROLOGY

## 2023-08-21 PROCEDURE — 99215 PR OFFICE/OUTPT VISIT, EST, LEVL V, 40-54 MIN: ICD-10-PCS | Mod: S$GLB,,, | Performed by: PSYCHIATRY & NEUROLOGY

## 2023-08-21 PROCEDURE — 99417 PR PROLONGED SVC, OUTPT, W/WO DIRECT PT CONTACT,  EA ADDTL 15 MIN: ICD-10-PCS | Mod: S$GLB,,, | Performed by: PSYCHIATRY & NEUROLOGY

## 2023-08-21 PROCEDURE — 99999 PR PBB SHADOW E&M-EST. PATIENT-LVL II: CPT | Mod: PBBFAC,,, | Performed by: PSYCHIATRY & NEUROLOGY

## 2023-08-21 PROCEDURE — 3051F HG A1C>EQUAL 7.0%<8.0%: CPT | Mod: CPTII,S$GLB,, | Performed by: PSYCHIATRY & NEUROLOGY

## 2023-08-21 PROCEDURE — 3008F BODY MASS INDEX DOCD: CPT | Mod: CPTII,S$GLB,, | Performed by: PSYCHIATRY & NEUROLOGY

## 2023-08-21 PROCEDURE — 3079F PR MOST RECENT DIASTOLIC BLOOD PRESSURE 80-89 MM HG: ICD-10-PCS | Mod: CPTII,S$GLB,, | Performed by: PSYCHIATRY & NEUROLOGY

## 2023-08-21 PROCEDURE — 3008F PR BODY MASS INDEX (BMI) DOCUMENTED: ICD-10-PCS | Mod: CPTII,S$GLB,, | Performed by: PSYCHIATRY & NEUROLOGY

## 2023-08-21 PROCEDURE — 99999 PR PBB SHADOW E&M-EST. PATIENT-LVL II: ICD-10-PCS | Mod: PBBFAC,,, | Performed by: PSYCHIATRY & NEUROLOGY

## 2023-08-21 PROCEDURE — 99215 OFFICE O/P EST HI 40 MIN: CPT | Mod: S$GLB,,, | Performed by: PSYCHIATRY & NEUROLOGY

## 2023-08-21 PROCEDURE — 1157F ADVNC CARE PLAN IN RCRD: CPT | Mod: CPTII,S$GLB,, | Performed by: PSYCHIATRY & NEUROLOGY

## 2023-08-21 PROCEDURE — 3074F PR MOST RECENT SYSTOLIC BLOOD PRESSURE < 130 MM HG: ICD-10-PCS | Mod: CPTII,S$GLB,, | Performed by: PSYCHIATRY & NEUROLOGY

## 2023-08-21 PROCEDURE — 99417 PROLNG OP E/M EACH 15 MIN: CPT | Mod: S$GLB,,, | Performed by: PSYCHIATRY & NEUROLOGY

## 2023-08-21 PROCEDURE — 3074F SYST BP LT 130 MM HG: CPT | Mod: CPTII,S$GLB,, | Performed by: PSYCHIATRY & NEUROLOGY

## 2023-08-21 PROCEDURE — 3079F DIAST BP 80-89 MM HG: CPT | Mod: CPTII,S$GLB,, | Performed by: PSYCHIATRY & NEUROLOGY

## 2023-08-21 RX ORDER — ESCITALOPRAM OXALATE 20 MG/1
20 TABLET ORAL DAILY
Qty: 90 TABLET | Refills: 1 | Status: SHIPPED | OUTPATIENT
Start: 2023-08-21 | End: 2023-11-28 | Stop reason: SDUPTHER

## 2023-08-21 SDOH — SOCIAL DETERMINANTS OF HEALTH (SDOH): PROBLEM RELATED TO PRIMARY SUPPORT GROUP, UNSPECIFIED: Z63.9

## 2023-08-21 NOTE — PROGRESS NOTES
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       Psje KELLY flowed request to scheduling for Oct 9 2023 @ 1:30p to 2:30p THO PATIENT  NEEDS TO CONFIRM APPOINTMENT  by seeing such appointment  appear on their "My Chart" john.      NOTE:  IF  appointment is not visible, THEN patient is  instructed to call [facility] ([facility]) at:  [phone]  and coordinate appointment scheduling with . Understanding Expressed.      Encouraged to call [phone] to request a counselor     Meds: renewed Lexapro 20 mg daily as referred to me in on / discussed with her bipolar aspect of her diagnsosi; she enphasizes that tho upbeat not (particularly these days displaying manic or elevated mood); she was referrd in on Lexapro 20 mg and tho discussed adding other mood stabilizer ; she desired to remain on just Lexapro as has been on and revisit med adjust  if needed. Risk / benefit discussed. As wel; MD emphasized IF expansive or impulsive or manc elements she was informed and well expresss understanding to reach " out to tpsyc MD or 911 or ER if acute. / re : (noting shared decision making) / no SI / no HI    She will speak with Neurology who she reports is looking at Belsomra or other meds for insomnia     Encouraged  continued sobriety says no alcohol since Oct 2022     References:     Relaxation stress reduction workbook: NINA Otero PhD ( used: $7-10)    Feeling Good Website: Tim Montemayor MD / [email or web address] website (free) / patel. PODCASTS    Anxiety &  phobia workbook by SHANNAN Copeland PhD  (web retailers: used: $ 7-10)    [place]: Path to Better Sleep : https://www.veterantraining.[place]gov[place]insomnia/ (free)     Pt expressed appreciation for the visit today and did not have further question at this time though pt  was still informed to:     Call  if problems.    Call / Report Side Effects to Psyc MD     Encouraged to follow up with primary care / Gen Med MD for continued monitoring of general health and wellness.    Understanding was expressed; and no further concerns nor questions were raised at this time.     Remember healthy self care:   eat right  attempt adequate rest   HANDWASHING / encourage such patel. During this corona virus time   walk or light exercise within reason and as your general med team approves  read or explore any of reference materials / homework mentioned  reach out (I.e.,  connect with)  others who nuture and bring out best in you  avoid risky behaviors    Keep your appointments:    IF you  cannot make your appt THEN please call [phone]  or go online (via My Chart john) to reschedule.    It is the responsibility of the patient to reschedule an appointment if an appointment has been canceled or missed.    Avoid  alcohol and illicit substances.  Look for the positive.  All is often relative-seek balance  Call sooner if needed : [phone]  Call 911 or go to Emergency Room  (ER)  if  any acute concerns

## 2023-08-21 NOTE — PATIENT INSTRUCTIONS
"  PLAN:       Eliseo KELLY flowed request to scheduling for Oct 9 2023 @ 1:30p to 2:30p THO PATIENT  NEEDS TO CONFIRM APPOINTMENT  by seeing such appointment  appear on their "My Chart" john.      NOTE:  IF  appointment is not visible, THEN patient is  instructed to call [facility] ([facility]) at:  [phone]  and coordinate appointment scheduling with . Understanding Expressed.      Encouraged to call [phone] to request a counselor     Meds: renewed Lexapro 20 mg daily as referred to me in on / discussed with her bipolar aspect of her diagnsosi; she enphasizes that tho upbeat not (particularly these days displaying manic or elevated mood); she was referrd in on Lexapro 20 mg and tho discussed adding other mood stabilizer ; she desired to remain on just Lexapro as has been on and revisit med adjust  if needed. Risk / benefit discussed. As wel; MD emphasized IF expansive or impulsive or manc elements she was informed and well expresss understanding to reach out to viviane KELLY or 911 or ER if acute. / re : (noting shared decision making) / no SI / no HI    She will speak with Neurology who she reports is looking at Belsomra or other meds for insomnia     Encouraged  continued sobriety says no alcohol since Oct 2022     References:     Relaxation stress reduction workbook: NINA Otero PhD ( used: $7-10)    Feeling Good Website: Tim Montemayor MD / [email or web address] website (free) / patel. PODCASTS    Anxiety &  phobia workbook by SHANNAN Copeland PhD  (web retailers: used: $ 7-10)    [place]: Path to Better Sleep : https://www.veter[place]raining.[place]insomnia/ (free)     Pt expressed appreciation for the visit today and did not have further question at this time though pt  was still informed to:     Call  if problems.    Call / Report Side Effects to Eliseo KELLY     Encouraged to follow up with primary care / Gen Med MD for continued monitoring of general health and wellness.    Understanding was " expressed; and no further concerns nor questions were raised at this time.     Remember healthy self care:   eat right  attempt adequate rest   HANDWASHING / encourage such patel. During this corona virus time   walk or light exercise within reason and as your general med team approves  read or explore any of reference materials / homework mentioned  reach out (I.e.,  connect with)  others who nuture and bring out best in you  avoid risky behaviors    Keep your appointments:    IF you  cannot make your appt THEN please call [phone]  or go online (via My Chart john) to reschedule.    It is the responsibility of the patient to reschedule an appointment if an appointment has been canceled or missed.    Avoid  alcohol and illicit substances.  Look for the positive.  All is often relative-seek balance  Call sooner if needed : [phone]  Call [phone] or go to Emergency Room  (ER)  if  any acute concerns

## 2023-08-22 RX ORDER — MELOXICAM 15 MG/1
TABLET ORAL
Qty: 30 TABLET | Refills: 0 | Status: SHIPPED | OUTPATIENT
Start: 2023-08-22 | End: 2023-09-27

## 2023-08-24 ENCOUNTER — DOCUMENTATION ONLY (OUTPATIENT)
Dept: REHABILITATION | Facility: HOSPITAL | Age: 68
End: 2023-08-24
Payer: COMMERCIAL

## 2023-08-24 NOTE — PROGRESS NOTES
Called patient regarding multiple missed visits. She reports she was exposed to covid last week and could not attend last week and this week. She plans to attend her next scheduled appointment on 8/28/23.     Lynne Ferrell PTA

## 2023-08-28 NOTE — TELEPHONE ENCOUNTER
Nico Araiza RN  You; Johnathan Rae MD 3 days ago     [facility] Dr. Rae.  [employer] results have been uploaded to Media for your review.  Lina, would you please schedule a f/u to discuss results?  Appreciate you both, Nico

## 2023-08-29 ENCOUNTER — TELEPHONE (OUTPATIENT)
Dept: NEUROLOGY | Facility: CLINIC | Age: 68
End: 2023-08-29
Payer: COMMERCIAL

## 2023-08-29 NOTE — TELEPHONE ENCOUNTER
I received a fax w/ [employer] results     I left for Dr. Rae to review, then I will send to be scanned to medical record

## 2023-09-01 NOTE — TELEPHONE ENCOUNTER
Called pt as second attempt  No option for VM   Called pt's daughter as second attempt  LVM   Called pt's other number as third attempt   LVM

## 2023-09-27 RX ORDER — MELOXICAM 15 MG/1
TABLET ORAL
Qty: 30 TABLET | Refills: 0 | Status: SHIPPED | OUTPATIENT
Start: 2023-09-27 | End: 2023-09-29 | Stop reason: ALTCHOICE

## 2023-09-29 ENCOUNTER — TELEPHONE (OUTPATIENT)
Dept: ORTHOPEDICS | Facility: CLINIC | Age: 68
End: 2023-09-29

## 2023-09-29 ENCOUNTER — HOSPITAL ENCOUNTER (OUTPATIENT)
Dept: RADIOLOGY | Facility: HOSPITAL | Age: 68
Discharge: HOME | End: 2023-09-29
Attending: NURSE PRACTITIONER
Payer: COMMERCIAL

## 2023-09-29 ENCOUNTER — TELEPHONE (OUTPATIENT)
Dept: NEUROLOGY | Facility: CLINIC | Age: 68
End: 2023-09-29
Payer: COMMERCIAL

## 2023-09-29 ENCOUNTER — OFFICE VISIT (OUTPATIENT)
Dept: ORTHOPEDICS | Facility: CLINIC | Age: 68
End: 2023-09-29
Payer: COMMERCIAL

## 2023-09-29 VITALS — BODY MASS INDEX: 21.59 KG/M2 | HEIGHT: 70 IN | WEIGHT: 150.81 LBS

## 2023-09-29 DIAGNOSIS — S82.851D CLOSED TRIMALLEOLAR FRACTURE OF RIGHT ANKLE WITH ROUTINE HEALING: Primary | ICD-10-CM

## 2023-09-29 DIAGNOSIS — S42.322D: ICD-10-CM

## 2023-09-29 DIAGNOSIS — R52 PAIN: Primary | ICD-10-CM

## 2023-09-29 DIAGNOSIS — R52 PAIN: ICD-10-CM

## 2023-09-29 PROCEDURE — 1159F PR MEDICATION LIST DOCUMENTED IN MEDICAL RECORD: ICD-10-PCS | Mod: CPTII,S$GLB,, | Performed by: NURSE PRACTITIONER

## 2023-09-29 PROCEDURE — 99214 PR OFFICE/OUTPT VISIT, EST, LEVL IV, 30-39 MIN: ICD-10-PCS | Mod: S$GLB,,, | Performed by: NURSE PRACTITIONER

## 2023-09-29 PROCEDURE — 3288F PR FALLS RISK ASSESSMENT DOCUMENTED: ICD-10-PCS | Mod: CPTII,S$GLB,, | Performed by: NURSE PRACTITIONER

## 2023-09-29 PROCEDURE — 73060 X-RAY EXAM OF HUMERUS: CPT | Mod: 26,LT,, | Performed by: RADIOLOGY

## 2023-09-29 PROCEDURE — 1125F PR PAIN SEVERITY QUANTIFIED, PAIN PRESENT: ICD-10-PCS | Mod: CPTII,S$GLB,, | Performed by: NURSE PRACTITIONER

## 2023-09-29 PROCEDURE — 3008F BODY MASS INDEX DOCD: CPT | Mod: CPTII,S$GLB,, | Performed by: NURSE PRACTITIONER

## 2023-09-29 PROCEDURE — 73610 X-RAY EXAM OF ANKLE: CPT | Mod: TC,RT

## 2023-09-29 PROCEDURE — 99999 PR PBB SHADOW E&M-EST. PATIENT-LVL III: CPT | Mod: PBBFAC,,, | Performed by: NURSE PRACTITIONER

## 2023-09-29 PROCEDURE — 73060 XR HUMERUS 2 VIEW LEFT: ICD-10-PCS | Mod: 26,LT,, | Performed by: RADIOLOGY

## 2023-09-29 PROCEDURE — 1157F PR ADVANCE CARE PLAN OR EQUIV PRESENT IN MEDICAL RECORD: ICD-10-PCS | Mod: CPTII,S$GLB,, | Performed by: NURSE PRACTITIONER

## 2023-09-29 PROCEDURE — 73060 X-RAY EXAM OF HUMERUS: CPT | Mod: TC,LT

## 2023-09-29 PROCEDURE — 99214 OFFICE O/P EST MOD 30 MIN: CPT | Mod: S$GLB,,, | Performed by: NURSE PRACTITIONER

## 2023-09-29 PROCEDURE — 1101F PR PT FALLS ASSESS DOC 0-1 FALLS W/OUT INJ PAST YR: ICD-10-PCS | Mod: CPTII,S$GLB,, | Performed by: NURSE PRACTITIONER

## 2023-09-29 PROCEDURE — 1160F RVW MEDS BY RX/DR IN RCRD: CPT | Mod: CPTII,S$GLB,, | Performed by: NURSE PRACTITIONER

## 2023-09-29 PROCEDURE — 1159F MED LIST DOCD IN RCRD: CPT | Mod: CPTII,S$GLB,, | Performed by: NURSE PRACTITIONER

## 2023-09-29 PROCEDURE — 3051F PR MOST RECENT HEMOGLOBIN A1C LEVEL 7.0 - < 8.0%: ICD-10-PCS | Mod: CPTII,S$GLB,, | Performed by: NURSE PRACTITIONER

## 2023-09-29 PROCEDURE — 1157F ADVNC CARE PLAN IN RCRD: CPT | Mod: CPTII,S$GLB,, | Performed by: NURSE PRACTITIONER

## 2023-09-29 PROCEDURE — 3051F HG A1C>EQUAL 7.0%<8.0%: CPT | Mod: CPTII,S$GLB,, | Performed by: NURSE PRACTITIONER

## 2023-09-29 PROCEDURE — 3288F FALL RISK ASSESSMENT DOCD: CPT | Mod: CPTII,S$GLB,, | Performed by: NURSE PRACTITIONER

## 2023-09-29 PROCEDURE — 1101F PT FALLS ASSESS-DOCD LE1/YR: CPT | Mod: CPTII,S$GLB,, | Performed by: NURSE PRACTITIONER

## 2023-09-29 PROCEDURE — 1125F AMNT PAIN NOTED PAIN PRSNT: CPT | Mod: CPTII,S$GLB,, | Performed by: NURSE PRACTITIONER

## 2023-09-29 PROCEDURE — 99999 PR PBB SHADOW E&M-EST. PATIENT-LVL III: ICD-10-PCS | Mod: PBBFAC,,, | Performed by: NURSE PRACTITIONER

## 2023-09-29 PROCEDURE — 73610 X-RAY EXAM OF ANKLE: CPT | Mod: 26,RT,, | Performed by: RADIOLOGY

## 2023-09-29 PROCEDURE — 73610 XR ANKLE COMPLETE 3 VIEW RIGHT: ICD-10-PCS | Mod: 26,RT,, | Performed by: RADIOLOGY

## 2023-09-29 PROCEDURE — 1160F PR REVIEW ALL MEDS BY PRESCRIBER/CLIN PHARMACIST DOCUMENTED: ICD-10-PCS | Mod: CPTII,S$GLB,, | Performed by: NURSE PRACTITIONER

## 2023-09-29 PROCEDURE — 3008F PR BODY MASS INDEX (BMI) DOCUMENTED: ICD-10-PCS | Mod: CPTII,S$GLB,, | Performed by: NURSE PRACTITIONER

## 2023-09-29 RX ORDER — IBUPROFEN 800 MG/1
800 TABLET ORAL EVERY 8 HOURS PRN
Qty: 30 TABLET | Refills: 2 | Status: SHIPPED | OUTPATIENT
Start: 2023-09-29 | End: 2023-11-13 | Stop reason: SDUPTHER

## 2023-09-29 NOTE — PROGRESS NOTES
03/21/23: : ORIF left humerus.   FALL  05/05/23: : external fixation right ankle fracture  05/08/23: : ORIF right tramalleolar ankle fracture with syndesmosis fixation.     Ms. Patel is here today for a follow up visit    Interval History:  She comes in today for follow-up.  She endorses some intermittent right ankle swelling after working all day.  She states the ankle was read but then it subsided after resting and icing.  She is also having some intermittent pain to her left shoulder.  Denies any falls or injuries since last seen in clinic.  She is no longer in physical therapy.  She has been using Motrin 800 mg which alleviates her symptoms.  Denies any fever chills.    Physical exam:    Patient arrives to exam room:  Ambulatory unassisted.    ANKLE:  There is no redness or swelling noted about the ankle.  Her plantar flexion and dorsiflexion is 40° and internal and external rotation is 35°.  She can flex and curl her toes.  There is no pain with range of motion of the ankle.  Her incision is healed.  There is no erythema present.    She has normal range of motion of her left shoulder.  Muscle strength in the biceps and triceps is 5/5.    RADS: All pertinent images were reviewed by myself:   Humerus: Postsurgical changes status post internal fixation for fracture of the left humeral shaft and proximal humeral metaphysis.  Fracture lines are still present.  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  This is unchanged when compared to prior x-ray.  No new fracture.  Hardware appears intact.  No soft tissue abnormality.  Her right ankle x-ray shows her lateral plate and screw construct appears to be in good position and alignment.  Fracture has healed.  Hardware appears to be in good position and alignment without evidence of hardware failure.  No new fracture seen in the ankle.  Ankle mortise appears to be symmetrical.    Assessment:  Three-month  follow-up visit for ankle and six-month follow-up visit for her humerus.    Plan:  Current care, treatment plan, precautions, activity level/ modifications, limitations, rehabilitation exercises and proposed future treatment were discussed with the patient. We discussed the need to monitor for changes in symptoms and condition and report them to the physician.  Discussed importance of compliance with all appointments and follow up examinations.     I advised the patient of the x-ray findings.  I recommend she take Motrin 800 mg 1 tablet 3 times a day it may supplement with over-the-counter Tylenol.  She may also try Aspercreme with lidocaine p.r.n..  I cautioned her not to take 2400 mg of ibuprofen at 1 time as this may cause issues with her GI system.    Recommendation is for her to ice and elevate when the ankle swells.  Continue to wear the compression sock to her ankle.    I will check an ESR and CRP given her complaints of redness.  I expect them to be normal.      FOLLOW UP:   - Patient will follow up in the clinic in 12 weeks.  - X-ray of her right ankle and left humerus is needed.  OOB      If there are any questions prior to scheduled follow up, the patient was instructed to contact the office

## 2023-09-29 NOTE — TELEPHONE ENCOUNTER
----- Message from Brittany Perdomo sent at 9/29/2023 10:50 AM CDT -----  Contact: [phone]  HAILEY GONZALEZ calling regarding Patient Advice (message) stated she is experiences dizziness.  She also want to let him know she have not been able to get the be suvorexant (BELSOMRA) 10 mg Tab

## 2023-10-09 ENCOUNTER — TELEPHONE (OUTPATIENT)
Dept: SPINE | Facility: CLINIC | Age: 68
End: 2023-10-09
Payer: COMMERCIAL

## 2023-10-10 ENCOUNTER — OFFICE VISIT (OUTPATIENT)
Dept: SPINE | Facility: CLINIC | Age: 68
End: 2023-10-10
Attending: ANESTHESIOLOGY
Payer: COMMERCIAL

## 2023-10-10 VITALS
HEIGHT: 70 IN | DIASTOLIC BLOOD PRESSURE: 65 MMHG | RESPIRATION RATE: 18 BRPM | WEIGHT: 150.81 LBS | BODY MASS INDEX: 21.59 KG/M2 | SYSTOLIC BLOOD PRESSURE: 122 MMHG | HEART RATE: 83 BPM | OXYGEN SATURATION: 100 %

## 2023-10-10 DIAGNOSIS — Z87.81 STATUS POST OPEN REDUCTION AND INTERNAL FIXATION (ORIF) OF FRACTURE: ICD-10-CM

## 2023-10-10 DIAGNOSIS — G90.521 COMPLEX REGIONAL PAIN SYNDROME TYPE 1 OF RIGHT LOWER EXTREMITY: Primary | ICD-10-CM

## 2023-10-10 DIAGNOSIS — Z87.81 STATUS POST OPEN REDUCTION WITH INTERNAL FIXATION (ORIF) OF FRACTURE OF ANKLE: ICD-10-CM

## 2023-10-10 DIAGNOSIS — Z98.890 STATUS POST OPEN REDUCTION AND INTERNAL FIXATION (ORIF) OF FRACTURE: ICD-10-CM

## 2023-10-10 DIAGNOSIS — S82.851D CLOSED TRIMALLEOLAR FRACTURE OF RIGHT ANKLE WITH ROUTINE HEALING: ICD-10-CM

## 2023-10-10 DIAGNOSIS — Z98.890 STATUS POST OPEN REDUCTION WITH INTERNAL FIXATION (ORIF) OF FRACTURE OF ANKLE: ICD-10-CM

## 2023-10-10 PROCEDURE — 1125F AMNT PAIN NOTED PAIN PRSNT: CPT | Mod: CPTII,S$GLB,, | Performed by: STUDENT IN AN ORGANIZED HEALTH CARE EDUCATION/TRAINING PROGRAM

## 2023-10-10 PROCEDURE — 1101F PR PT FALLS ASSESS DOC 0-1 FALLS W/OUT INJ PAST YR: ICD-10-PCS | Mod: CPTII,S$GLB,, | Performed by: STUDENT IN AN ORGANIZED HEALTH CARE EDUCATION/TRAINING PROGRAM

## 2023-10-10 PROCEDURE — 3008F PR BODY MASS INDEX (BMI) DOCUMENTED: ICD-10-PCS | Mod: CPTII,S$GLB,, | Performed by: STUDENT IN AN ORGANIZED HEALTH CARE EDUCATION/TRAINING PROGRAM

## 2023-10-10 PROCEDURE — 99204 OFFICE O/P NEW MOD 45 MIN: CPT | Mod: S$GLB,,, | Performed by: STUDENT IN AN ORGANIZED HEALTH CARE EDUCATION/TRAINING PROGRAM

## 2023-10-10 PROCEDURE — 3288F FALL RISK ASSESSMENT DOCD: CPT | Mod: CPTII,S$GLB,, | Performed by: STUDENT IN AN ORGANIZED HEALTH CARE EDUCATION/TRAINING PROGRAM

## 2023-10-10 PROCEDURE — 1160F PR REVIEW ALL MEDS BY PRESCRIBER/CLIN PHARMACIST DOCUMENTED: ICD-10-PCS | Mod: CPTII,S$GLB,, | Performed by: STUDENT IN AN ORGANIZED HEALTH CARE EDUCATION/TRAINING PROGRAM

## 2023-10-10 PROCEDURE — 99204 PR OFFICE/OUTPT VISIT, NEW, LEVL IV, 45-59 MIN: ICD-10-PCS | Mod: S$GLB,,, | Performed by: STUDENT IN AN ORGANIZED HEALTH CARE EDUCATION/TRAINING PROGRAM

## 2023-10-10 PROCEDURE — 1160F RVW MEDS BY RX/DR IN RCRD: CPT | Mod: CPTII,S$GLB,, | Performed by: STUDENT IN AN ORGANIZED HEALTH CARE EDUCATION/TRAINING PROGRAM

## 2023-10-10 PROCEDURE — 1101F PT FALLS ASSESS-DOCD LE1/YR: CPT | Mod: CPTII,S$GLB,, | Performed by: STUDENT IN AN ORGANIZED HEALTH CARE EDUCATION/TRAINING PROGRAM

## 2023-10-10 PROCEDURE — 3008F BODY MASS INDEX DOCD: CPT | Mod: CPTII,S$GLB,, | Performed by: STUDENT IN AN ORGANIZED HEALTH CARE EDUCATION/TRAINING PROGRAM

## 2023-10-10 PROCEDURE — 3288F PR FALLS RISK ASSESSMENT DOCUMENTED: ICD-10-PCS | Mod: CPTII,S$GLB,, | Performed by: STUDENT IN AN ORGANIZED HEALTH CARE EDUCATION/TRAINING PROGRAM

## 2023-10-10 PROCEDURE — 1125F PR PAIN SEVERITY QUANTIFIED, PAIN PRESENT: ICD-10-PCS | Mod: CPTII,S$GLB,, | Performed by: STUDENT IN AN ORGANIZED HEALTH CARE EDUCATION/TRAINING PROGRAM

## 2023-10-10 PROCEDURE — 3051F PR MOST RECENT HEMOGLOBIN A1C LEVEL 7.0 - < 8.0%: ICD-10-PCS | Mod: CPTII,S$GLB,, | Performed by: STUDENT IN AN ORGANIZED HEALTH CARE EDUCATION/TRAINING PROGRAM

## 2023-10-10 PROCEDURE — 1157F ADVNC CARE PLAN IN RCRD: CPT | Mod: CPTII,S$GLB,, | Performed by: STUDENT IN AN ORGANIZED HEALTH CARE EDUCATION/TRAINING PROGRAM

## 2023-10-10 PROCEDURE — 3074F PR MOST RECENT SYSTOLIC BLOOD PRESSURE < 130 MM HG: ICD-10-PCS | Mod: CPTII,S$GLB,, | Performed by: STUDENT IN AN ORGANIZED HEALTH CARE EDUCATION/TRAINING PROGRAM

## 2023-10-10 PROCEDURE — 99999 PR PBB SHADOW E&M-EST. PATIENT-LVL V: ICD-10-PCS | Mod: PBBFAC,,, | Performed by: STUDENT IN AN ORGANIZED HEALTH CARE EDUCATION/TRAINING PROGRAM

## 2023-10-10 PROCEDURE — 99999 PR PBB SHADOW E&M-EST. PATIENT-LVL V: CPT | Mod: PBBFAC,,, | Performed by: STUDENT IN AN ORGANIZED HEALTH CARE EDUCATION/TRAINING PROGRAM

## 2023-10-10 PROCEDURE — 3051F HG A1C>EQUAL 7.0%<8.0%: CPT | Mod: CPTII,S$GLB,, | Performed by: STUDENT IN AN ORGANIZED HEALTH CARE EDUCATION/TRAINING PROGRAM

## 2023-10-10 PROCEDURE — 1157F PR ADVANCE CARE PLAN OR EQUIV PRESENT IN MEDICAL RECORD: ICD-10-PCS | Mod: CPTII,S$GLB,, | Performed by: STUDENT IN AN ORGANIZED HEALTH CARE EDUCATION/TRAINING PROGRAM

## 2023-10-10 PROCEDURE — 1159F PR MEDICATION LIST DOCUMENTED IN MEDICAL RECORD: ICD-10-PCS | Mod: CPTII,S$GLB,, | Performed by: STUDENT IN AN ORGANIZED HEALTH CARE EDUCATION/TRAINING PROGRAM

## 2023-10-10 PROCEDURE — 3078F PR MOST RECENT DIASTOLIC BLOOD PRESSURE < 80 MM HG: ICD-10-PCS | Mod: CPTII,S$GLB,, | Performed by: STUDENT IN AN ORGANIZED HEALTH CARE EDUCATION/TRAINING PROGRAM

## 2023-10-10 PROCEDURE — 1159F MED LIST DOCD IN RCRD: CPT | Mod: CPTII,S$GLB,, | Performed by: STUDENT IN AN ORGANIZED HEALTH CARE EDUCATION/TRAINING PROGRAM

## 2023-10-10 PROCEDURE — 3074F SYST BP LT 130 MM HG: CPT | Mod: CPTII,S$GLB,, | Performed by: STUDENT IN AN ORGANIZED HEALTH CARE EDUCATION/TRAINING PROGRAM

## 2023-10-10 PROCEDURE — 3078F DIAST BP <80 MM HG: CPT | Mod: CPTII,S$GLB,, | Performed by: STUDENT IN AN ORGANIZED HEALTH CARE EDUCATION/TRAINING PROGRAM

## 2023-10-10 RX ORDER — GABAPENTIN 300 MG/1
300 CAPSULE ORAL 3 TIMES DAILY
Qty: 90 CAPSULE | Refills: 2 | Status: SHIPPED | OUTPATIENT
Start: 2023-10-10

## 2023-10-10 NOTE — PROGRESS NOTES
Chronic Pain - New Consult    Referring Physician: Marni Adam    Chief Complaint:   Chief Complaint   Patient presents with    Shoulder Pain    Foot Pain        SUBJECTIVE:    Alix Patel presents to the clinic for the evaluation of right arm and right ankle pain. The pain started 5-6 months ago following ORIF left humerus and ORIF right ankle (had fall in April for the arm fracture and a slip in May which broke the ankle) and symptoms have been worse for the ankle and about the same for the shoulder.  She notices swelling of the right ankle around August/September.  The pain is located in the right ankle and radiating  area and radiates to the toes and up toward her knee.  She also describes for her left shoulder that she has pain along the back of her shoulder and down to the mid-point of her upper arm. The pain is described as burning, sharp, and shooting and is rated as 8/10. The pain is rated with a score of  3/10 on the BEST day and a score of 9/10 on the WORST day.  Symptoms interfere with daily activity and work. The pain is exacerbated by movement and anything touching the right ankle.  The pain is mitigated by medications (800mg ibuprofen and pedicure (just helped psychologically).  The patient reports 7-8 hours of uninterrupted sleep per night.     She reports redness, swelling, and decreased movement (tightness) of the right ankle. She also reports increased sensitivity to touch.  She reports icing the right ankle without relief.    Physical Therapy/Home Exercise: yes, had physical therapy for left shoulder after surgery and is starting again. She never stopped physical therapy for the right ankle.      Pain Disability Index Review:       No data to display                Pain Medications:   - gabapentin 300mg   - norco 5-325mg     report:  Reviewed and consistent with medication use as prescribed.    Pain Procedures:   Had epidural in back a number of years ago for back injury at   [facility]    Imaging:   XR ANKLE COMPLETE 3 VIEW RIGHT     CLINICAL HISTORY:  Pain, unspecified     TECHNIQUE:  AP, lateral, and oblique images of the right ankle were performed.     COMPARISON:  Non 08/10/2023 e     FINDINGS:  Postoperative changes of internal fixation of the distal tibia and fibula identified.  The position and alignment is satisfactory and unchanged as compared to the previous study.     Impression:     See above        Electronically signed by: Irwin Haskins MD  Date:                                            09/29/2023  Time:                                           11:52    Past Medical History:   Diagnosis Date    Anxiety     Bipolar 1 disorder     COPD (chronic obstructive pulmonary disease)     Depression     Diabetes mellitus     GERD (gastroesophageal reflux disease)     Grade I open displaced transverse fracture of shaft of left humerus 3/20/2023    HEARING LOSS     pt states she has loss slighty in rt ear.    Hypertension     Insomnia     Rash      Past Surgical History:   Procedure Laterality Date    APPENDECTOMY      APPLICATION, EXTERNAL FIXATION DEVICE, FOR ANKLE FRACTURE Right 5/5/2023    Procedure: APPLICATION, EXTERNAL FIXATION DEVICE, FOR ANKLE FRACTURE - right, mariusz, ancef, bone foam, slider, C arm door side;  Surgeon: Yanely Guerra MD;  Location: [address];  Service: Orthopedics;  Laterality: Right;  right, mariusz, ancef, bone foam, slider, C arm door side    BREAST BIOPSY      COLONOSCOPY      COLONOSCOPY N/A 10/31/2019    Procedure: COLONOSCOPY;  Surgeon: Philippe Monteiro MD;  [facility]  Service: Endoscopy;  Laterality: N/A;  PM prep-    CYST REMOVAL      ESOPHAGOGASTRODUODENOSCOPY N/A 10/31/2019    Procedure: EGD (ESOPHAGOGASTRODUODENOSCOPY);  Surgeon: Philippe Monteiro MD;  Location: [facility] ([address] [place] [address]);  Service: Endoscopy;  Laterality: N/A;  Pm prep-MH    FIXATION OF SYNDESMOSIS OF ANKLE Right 5/8/2023    Procedure: FIXATION,  SYNDESMOSIS, ANKLE;  Surgeon: Johnathan Contreras MD;  Location: [facility] OR [address] [place] [address];  Service: Orthopedics;  Laterality: Right;    HYSTERECTOMY      IRRIGATION AND DEBRIDEMENT OF UPPER EXTREMITY Left 3/21/2023    Procedure: IRRIGATION AND DEBRIDEMENT, UPPER EXTREMITY;  Surgeon: Ousmane Crawford MD;  Location: [facility] OR [address] [place] [address];  Service: Orthopedics;  Laterality: Left;    OPEN REDUCTION AND INTERNAL FIXATION (ORIF) OF INJURY OF ANKLE Right 2023    Procedure: ORIF, ANKLE - RIGHT. EX FIX REMOVAL. SYNTHES. C ARM DOOR SIDE.  BONE FOAM.;  Surgeon: Johnathan Contreras MD;  [facility];  Service: Orthopedics;  Laterality: Right;    ORIF HUMERUS FRACTURE Left 3/21/2023    Procedure: ORIF, FRACTURE, HUMERUS;  Surgeon: Ousmane Crawford MD;  Location: [facility] OR [address] [place] [address];  Service: Orthopedics;  Laterality: Left;     Social History     Socioeconomic History    Marital status:      Spouse name: N/A    Number of children: 1    Years of education: 13    Highest education level: Some college, no degree   Occupational History    Occupation:      Employer: [employer]   Tobacco Use    Smoking status: Former     Current packs/day: 0.00     Average packs/day: 0.5 packs/day for 47.0 years (23.5 ttl pk-yrs)     Types: Cigarettes     Start date:      Quit date:      Years since quittin.7     Passive exposure: Never    Smokeless tobacco: Never   Substance and Sexual Activity    Alcohol use: Yes     Comment: Rare    Drug use: Not Currently     Types: Marijuana, Cocaine    Sexual activity: Not Currently     Partners: Male     Birth control/protection: None   Social History Narrative    Ms. Patel was born in [place] on 1955. She grew up in [place] and was raised by her parents. She went to high school at [employer] and graduated in . After completing high school, Ms. Patel attended [employer] but did not graduate. 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 She would like to return to [employer] in the future to get degree in music.      Social Determinants of Health     Financial Resource Strain: Low Risk  (5/7/2023)    Overall Financial Resource Strain (CARDIA)     Difficulty of Paying Living Expenses: Not hard at all   Food Insecurity: No Food Insecurity (5/7/2023)    Hunger Vital Sign     Worried About Running Out of Food in the Last Year: Never true     Ran Out of Food in the Last Year: Never true   Transportation Needs: No Transportation Needs (5/7/2023)    PRAPARE - Transportation     Lack of Transportation (Medical): No     Lack of Transportation (Non-Medical): No   Physical Activity: Insufficiently Active (5/7/2023)    Exercise Vital Sign     Days of Exercise per Week: 3 days     Minutes of Exercise per Session: 30 min   Stress: No Stress Concern Present (5/7/2023)    [place] citizen [place] [employer] - Occupational Stress Questionnaire     Feeling of Stress : Only a little   Social Connections: Unknown (5/7/2023)    Social Connection and Isolation Panel [NHANES]     Frequency of Communication with Friends and Family: Patient refused     Frequency of Social Gatherings with Friends and Family: Patient refused     Attends Buddhism Services: Patient refused     Active Member of Clubs or Organizations: Patient refused     Attends Club or Organization Meetings: Patient refused     Marital Status: Patient refused   Housing Stability: Low Risk  (5/7/2023)    Housing Stability Vital Sign     Unable to Pay for Housing in the Last Year: No     Number of Places Lived in the Last Year: 2     Unstable Housing in the Last Year: No     Family History   Problem Relation Age of Onset    Alzheimer's disease Mother     Sinus disease Mother     Heart failure Father     Hyperlipidemia Father     Heart attack Father     No Known Problems Sister     Sarcoidosis Sister     Colon cancer  Maternal Aunt     Alzheimer's disease Maternal Cousin     Alzheimer's disease Maternal Cousin     Anxiety disorder Daughter     Depression Daughter     Esophageal cancer Neg Hx        Review of patient's allergies indicates:   Allergen Reactions    Lamictal [lamotrigine] Rash     Per psychiatry notes       Current Outpatient Medications   Medication Sig    acetaminophen (TYLENOL) 500 MG tablet Take 2 tablets (1,000 mg total) by mouth every 8 (eight) hours as needed for Pain.    blood sugar diagnostic (TRUE METRIX GLUCOSE TEST STRIP MISC) True Metrix Glucose Test Strip   TEST TWICE DAILY AS DIRECTED    blood-glucose meter (TRUE METRIX AIR GLUCOSE METER MISC) True Metrix Air Glucose Meter   USE UNDER THE SKIN TWICE DAILY AS DIRECTED    ciclopirox (PENLAC) 8 % Soln Apply topically nightly. Paint on affected nail(s) once per night, remove residue once per week with alcohol    dulaglutide (TRULICITY) 0.75 mg/0.5 mL pen injector Inject 0.75 mg into the skin every Sunday.    EScitalopram oxalate (LEXAPRO) 20 MG tablet Take 1 tablet (20 mg total) by mouth once daily.    ibuprofen (ADVIL,MOTRIN) 800 MG tablet Take 1 tablet (800 mg total) by mouth every 8 (eight) hours as needed for Pain.    insulin detemir U-100, Levemir, 100 unit/mL (3 mL) SubQ InPn pen Inject 5 Units into the skin every evening.    levothyroxine (SYNTHROID) 100 MCG tablet Take 1 tablet (100 mcg total) by mouth once daily.    metFORMIN (GLUCOPHAGE) 500 MG tablet Take 2 tablets (1,000 mg total) by mouth 2 (two) times daily with meals.    rosuvastatin (CRESTOR) 20 MG tablet Take 1 tablet (20 mg total) by mouth once daily.    suvorexant (BELSOMRA) 10 mg Tab Take 1 tablet by mouth every evening.    traZODone (DESYREL) 150 MG tablet Take 1 tablet (150 mg total) by mouth every evening.    traZODone (DESYREL) 50 MG tablet Take 2 tablets by mouth at night for 1 week, then 1 tablet at night for 1 week, then 1/2 tablet at night for 1 week then stop    gabapentin  (NEURONTIN) 300 MG capsule Take 1 capsule (300 mg total) by mouth 3 (three) times daily.     No current facility-administered medications for this visit.     CMP  Sodium   Date Value Ref Range Status   06/19/2023 144 136 - 145 mmol/L Final     Potassium   Date Value Ref Range Status   06/19/2023 3.6 3.5 - 5.1 mmol/L Final     Chloride   Date Value Ref Range Status   06/19/2023 104 95 - 110 mmol/L Final     CO2   Date Value Ref Range Status   06/19/2023 25 23 - 29 mmol/L Final     Glucose   Date Value Ref Range Status   06/19/2023 306 (H) 70 - 110 mg/dL Final     BUN   Date Value Ref Range Status   06/19/2023 6 (L) 8 - 23 mg/dL Final     Creatinine   Date Value Ref Range Status   06/19/2023 0.8 0.5 - 1.4 mg/dL Final     Calcium   Date Value Ref Range Status   06/19/2023 9.9 8.7 - 10.5 mg/dL Final     Total Protein   Date Value Ref Range Status   06/19/2023 7.5 6.0 - 8.4 g/dL Final     Albumin   Date Value Ref Range Status   06/19/2023 4.4 3.5 - 5.2 g/dL Final     Total Bilirubin   Date Value Ref Range Status   06/19/2023 0.4 0.1 - 1.0 mg/dL Final     Comment:     For infants and newborns, interpretation of results should be based  on gestational age, weight and in agreement with clinical  observations.    Premature Infant recommended reference ranges:  Up to 24 hours.............<8.0 mg/dL  Up to 48 hours............<12.0 mg/dL  3-5 days..................<15.0 mg/dL  6-29 days.................<15.0 mg/dL       Alkaline Phosphatase   Date Value Ref Range Status   06/19/2023 68 55 - 135 U/L Final     AST   Date Value Ref Range Status   06/19/2023 14 10 - 40 U/L Final     ALT   Date Value Ref Range Status   06/19/2023 10 10 - 44 U/L Final     Anion Gap   Date Value Ref Range Status   06/19/2023 15 8 - 16 mmol/L Final     eGFR   Date Value Ref Range Status   06/19/2023 >60.0 >60 mL/min/1.73 m^2 Final         REVIEW OF SYSTEMS:    GENERAL:  No weight loss, malaise or fevers.  HEENT:  Negative for frequent or significant  "headaches.  NECK:  Negative for lumps, goiter, pain and significant neck swelling.  RESPIRATORY:  Negative for cough, wheezing or shortness of breath.  CARDIOVASCULAR:  Negative for chest pain, leg swelling or palpitations.  GI:  Negative for abdominal discomfort, blood in stools or black stools or change in bowel habits.  MUSCULOSKELETAL:  See HPI.  SKIN:  Negative for lesions, rash, and itching.  PSYCH:  Negative for sleep disturbance, mood disorder and recent psychosocial stressors.  HEMATOLOGY/LYMPHOLOGY:  Negative for prolonged bleeding, bruising easily or swollen nodes.  NEURO:   No history of headaches, syncope, paralysis, seizures or tremors.  All other reviewed and negative other than HPI.    OBJECTIVE:    /65 (BP Location: Right arm, Patient Position: Sitting, BP Method: Small (Automatic))   Pulse 83   Resp 18   Ht 5' 10" (1.778 m)   Wt 68.4 kg (150 lb 12.7 oz)   SpO2 100%   BMI 21.64 kg/m²     PHYSICAL EXAMINATION:  General appearance: Well appearing, in no acute distress, alert and appropriately communicative.  Psych:  Mood and affect appropriate.  Skin: Skin color, texture, turgor normal, no rashes or lesions, in both upper and lower body.  Head/face:  Atraumatic, normocephalic.  Cor: regular rate  Pulm: non-labored breathing  GI: Abdomen non-distended and non-tender.  Back: Straight leg raising in the sitting  positions is negative to radicular pain. No pain to palpation over the spine or paraspinal muscles. Normal range of motion without pain reproduction.  Extremities: Peripheral joint ROM is full and pain free without obvious instability or laxity in all four extremities. No deformities, edema, or skin discoloration. Good capillary refill.  Musculoskeletal:  Shoulder, hip, sacroiliac and knee provocative maneuvers are negative with the exception of right ankle swelling, redness, color change from just above medial maleolus compared to upper leg, allodynia of medial maleolus of left " ankle. Inability to fully dorsiflex/plantarflex right foot.   Bilateral upper and lower extremity strength is normal and symmetric.  No atrophy or tone abnormalities are noted.  Neuro: Bilateral upper and lower extremity coordination and muscle stretch reflexes are physiologic and symmetric.  Negative Clonus. No loss of sensation is noted.  Gait: Normal.      ASSESSMENT: 67 y.o. year old female with right ankle and left shoulder pain, consistent with:     1. Complex regional pain syndrome type 1 of right lower extremity        2. Closed trimalleolar fracture of right ankle with routine healing  Ambulatory referral/consult to Pain Clinic    gabapentin (NEURONTIN) 300 MG capsule      3. Status post open reduction with internal fixation (ORIF) of fracture of ankle        4. Status post open reduction and internal fixation (ORIF) of fracture          IMPRESSION: Ms. Patel is a lady with right ankle and left shoulder pain after two falls this year which resulted in fractures and left humerus ORIF and right ankle ORIF. History and physical are consistent with post-operative state for right ankle and left humerus as well as right ankle signs/symptoms concerning for complex regional pain syndrome type 1.  Imaging is consistent with post-operative state.  She continues to actively participate in physical therapy for her right ankle, and is set to start up physical therapy again for her left shoulder.  She has not fully tried medication management to help with her pain.    IASP BUDAPEST CRITERIA:  1) Continuing Pain that is Disproportionate to any inciting event  2) At least One Symptom Reported in at least three of the following categories:   A) Sensory (hyperasthesia or allodynia): left ankle hypersensitivity   B) Vasomotor (temperature asymmetry, skin color changes, skin color asymmetry):left ankle color change   C) Sudomotor (edema, sweating changes, sweating asymmetry): left ankle swelling   D) Motor/Trophic (decreased  range of motion, motor dysfunction (weakness, tremor dystonia), trophic changes (hair, nail, skin)): decreased right ankle range of motion  3) At least one sign at time of evaluation in at least two of the following categories:   A) Sensory (evidence of hyperalgesia (to pinprick), allodynia (to light touch, temperature sensation, deep somatic pressure or joint movement)): left ankle allodynia   B) Vasomotor (evidence of temperature asymmetry (>1 deg C), skin color changes or asymmetry): left medial malleolus color change   C) Sudomotor (evidence of edema, sweating changes, or sweating asymmetry): left medial malleolus swelling   D) Motor/Trophic (evidence of decreased range of motion, motor dysfunction (weakness, tremor dystonia), trophic changes (hair, nail, skin)): decreased range of motion of right ankle compared to left ankle  4) No other diagnosis can better explain the symptoms and signs    PLAN:   - I have stressed the importance of physical activity and a home exercise plan to help with pain and improve health.  - Patient can continue with medications for now since they are providing benefits, using them appropriately, and without side effects.  - start gabapentin 300mg three times a day; 1 per day for week 1, 2 per day for week 2, 3 per day for week 3  - advised not to take meloxicam and ibuprofen together.  She can supplement with acetaminophen 1000mg up to 3 times a day as needed.  - RTC 8 weeks to discuss progress of physical therapy and medication management; we can consider interventions (lumbar sympathetic block) if she fails conservative management  - Counseled patient regarding the importance of activity modification and physical therapy.    The above plan and management options were discussed at length with patient. Patient is in agreement with the above and verbalized understanding. It will be communicated with the referring physician via electronic record, fax, or mail.    Maciel  Irwin  10/10/2023

## 2023-10-13 ENCOUNTER — TELEPHONE (OUTPATIENT)
Dept: PAIN MEDICINE | Facility: CLINIC | Age: 68
End: 2023-10-13
Payer: COMMERCIAL

## 2023-10-13 NOTE — TELEPHONE ENCOUNTER
Staff notified patient with updated medication instructions per Dr. Gayle. Patient verbalized perales understanding.

## 2023-10-13 NOTE — TELEPHONE ENCOUNTER
Staff spoke  with patient regarding a med increase and injections. Staff verbalized to the patient that her concerns has been sent over to  to further assist and advise.

## 2023-10-13 NOTE — TELEPHONE ENCOUNTER
----- Message from Maciel Gayle MD sent at 10/13/2023  3:24 PM CDT -----  Regarding: RE: Gabapentin  dosage Incrtease  Teressa Ahumada!    Gabapentin 300mg three times a day.  I'm having her titrate it up (once at night, then twice a day, then 3 times a day).  She's gotta increase it over the next 3 weeks until she's at three times a day. If she's not having side effects, I may continue increasing the dose at her next visit. However, that will be decided at her next office visit.    Maciel    ----- Message -----  From: Zita Grey MA  Sent: 10/13/2023   9:36 AM CDT  To: Maciel Gayle MD  Subject: Gabapentin  dosage Incrtease                     Teressa Stafford, I spoke with Mrs. Patel and she stated that you all discussed a med increase during ya'll last visit on 10/10/2023. Please advise. Thanks!

## 2023-10-13 NOTE — TELEPHONE ENCOUNTER
----- Message from Lexi Saavedra sent at 10/13/2023  9:13 AM CDT -----  Regarding: call back  Name of caller: jeffrey       What is the requesting detail: pt is requesting a call back to schedule an injection. States she is in a lot of pain and can barely walk       Can the clinic reply by [facility]:       What number to call back:[phone]

## 2023-10-16 ENCOUNTER — CLINICAL SUPPORT (OUTPATIENT)
Dept: REHABILITATION | Facility: HOSPITAL | Age: 68
End: 2023-10-16
Payer: COMMERCIAL

## 2023-10-16 DIAGNOSIS — S82.851A CLOSED TRIMALLEOLAR FRACTURE OF ANKLE, RIGHT, INITIAL ENCOUNTER: Primary | ICD-10-CM

## 2023-10-16 DIAGNOSIS — R53.1 WEAKNESS: ICD-10-CM

## 2023-10-16 DIAGNOSIS — M25.671 DECREASED RANGE OF MOTION OF RIGHT ANKLE: ICD-10-CM

## 2023-10-16 DIAGNOSIS — M25.612 DECREASED ROM OF LEFT SHOULDER: ICD-10-CM

## 2023-10-16 PROCEDURE — 97161 PT EVAL LOW COMPLEX 20 MIN: CPT | Mod: PO

## 2023-10-16 PROCEDURE — 97110 THERAPEUTIC EXERCISES: CPT | Mod: PO

## 2023-10-16 NOTE — PLAN OF CARE
[facility]   Physical Therapy Initial Evaluation      Name: Alix Lui [facility] Number: [id number]    Therapy Diagnosis:   Encounter Diagnoses   Name Primary?    Closed trimalleolar fracture of ankle, right, initial encounter Yes    Weakness     Decreased ROM of left shoulder     Decreased range of motion of right ankle         Physician: Brenda Yang PA-C    Physician Orders: PT Eval and Treat   Medical Diagnosis from Referral: Post-operative state [Z98.890], Left arm pain [M79.602]  Evaluation Date: 10/16/2023  Authorization Period Expiration: 8/10/24  Plan of Care Expiration: 1/16/24  Progress Note Due: 11/16/23  Date of Surgery: 5/8/23 R ankle ORIF  Visit # / Visits authorized: 1/ 1   FOTO: 1/ 3    Precautions: Standard, GERD, T2D    Time In: 3:00  Time Out: 4:00  Total Billable Time: 60 minutes    Subjective     Date of onset: April 21, 2023    History of current condition - Alix reports: was previously being seen for ankle ORIF but stopped due to salt barge issue in the city. She reports the right medial ankle is swollen and discolored but her shoulder is giving her the most issue. She is wearing a brace on the ankle and states she cannot feel her toes. 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)  Prior Level of Function: limited due to ankle and shoulder  Current Level of Function: remains limited an painful     Pain:  Current 7.5/10, worst 9/10, best 5/10   Location: right ankle, left shoulder   Description: shooting in the medial foot,   Aggravating Factors: walking or standing, rain/cold weather,   Easing Factors: soak in a hot tub    Patients goals: reduce pain and improve function in the shoulder and ankle     Medical History:   Past Medical History:   Diagnosis Date     Anxiety     Bipolar 1 disorder     COPD (chronic obstructive pulmonary disease)     Depression     Diabetes mellitus     GERD (gastroesophageal reflux disease)     Grade I open displaced transverse fracture of shaft of left humerus 3/20/2023    HEARING LOSS     pt states she has loss slighty in rt ear.    Hypertension     Insomnia     Rash        Surgical History:   Alix Patel  has a past surgical history that includes Appendectomy; Hysterectomy; Colonoscopy; Cyst Removal; Esophagogastroduodenoscopy (N/A, 10/31/2019); Colonoscopy (N/A, 10/31/2019); Breast biopsy; ORIF humerus fracture (Left, 3/21/2023); Irrigation and debridement of upper extremity (Left, 3/21/2023); application, external fixation device, for ankle fracture (Right, 5/5/2023); Open reduction and internal fixation (ORIF) of injury of ankle (Right, 5/8/2023); and Fixation of syndesmosis of ankle (Right, 5/8/2023).    Medications:   Alix has a current medication list which includes the following prescription(s): acetaminophen, blood sugar diagnostic, blood-glucose meter, ciclopirox, trulicity, escitalopram oxalate, gabapentin, ibuprofen, insulin detemir u-100 (levemir), levothyroxine, metformin, rosuvastatin, belsomra, trazodone, and trazodone.    Allergies:   Review of patient's allergies indicates:   Allergen Reactions    Lamictal [lamotrigine] Rash     Per psychiatry notes        Objective      Observation: forward head, rounded shoulders   Ankle brace donned, antalgic gait    Posture: see above    Passive Range of Motion: NT today  Shoulder Right Left   Flexion     Abduction     ER at 0     ER at 90     IR        Active Range of Motion:   Shoulder Right Left   Flexion    Abduction    ER at 0 WFL 69   ER at 90 WFL 64   IR WFL 66   Reach behind head yes yes   Reach behind back  yes yes     Strength:  Shoulder Right Left   Flexion 5/5 4-/5   Abduction 5/5 4-/5   ER 4/5 3+/5 *   IR 4/5 3+/5 *       Joint Mobility: left GH  "joint is normal    Palpation: scar tissue buildup at incision on left arm    Sensation: NT    Flexibility:   Lat: R mod limitation ; L mod limitation   Pec Minor: R mod limitation ; L mod limitation          Active Range of Motion:   Ankle Right Left   DF (knee extended) 10 17   Plantarflexion 30 54   Inversion 25 35   Eversion 15 20      Strength:  Ankle Right Left   Dorsiflexion 5/5 5/5   Plantarflexion 5/5 5/5   Inversion 5/5 5/5   Eversion 5/5 5/5       Joint Mobility: talocrural mobility is limited on right     Palpation: tenderness and altered sensation at right medial ankle         Intake Outcome Measure for FOTO shoulder and ankle Survey    Therapist reviewed FOTO scores for Alix Patel on 10/16/2023.   FOTO report - see Media section or FOTO account episode details.    Intake Score: 44%         Treatment     Total Treatment time (time-based codes) separate from Evaluation: 15 minutes     Alix received the treatments listed below:      therapeutic exercises to develop strength, endurance, ROM, and flexibility for 15 minutes including:  Supine shoulder flexion arom with dowel 20x3"  Seated scap squeezes 20x3"  Seated bilateral shoulder external rotation with yellow theraband 2x10    manual therapy techniques: Joint mobilizations, Manual traction, and Soft tissue Mobilization were applied for 0 minutes, including:      neuromuscular re-education activities to improve: Balance, Coordination, Kinesthetic, Sense, Proprioception, and Posture for 0 minutes. The following activities were included:      therapeutic activities to improve functional performance for 0  minutes, including:        Patient Education and Home Exercises     Education provided:   - education on condition  -home exercise program    Written Home Exercises Provided: yes. Exercises were reviewed and Alix was able to demonstrate them prior to the end of the session.  Alix demonstrated good  understanding of the education " provided. See EMR under Patient Instructions for exercises provided during therapy sessions.    Assessment     Alix is a 67 y.o. female referred to outpatient Physical Therapy with a medical diagnosis of Post-operative state [Z98.890], Left arm pain [M79.602]. Patient presents with chronic history of shoulder and ankle pain following falls resulting in breaks and surgical fixations of both. She presents today with limited range of motion at both joints along with significant weakness in the shoulder with pain with muscle testing. She also demonstrates gait and postural dysfunction. She will benefit from skilled PT to address these deficits and restore max function to improve quality of life.     Patient prognosis is Fair.   Patient will benefit from skilled outpatient Physical Therapy to address the deficits stated above and in the chart below, provide patient /family education, and to maximize patientt's level of independence.     Plan of care discussed with patient: Yes  Patient's spiritual, cultural and educational needs considered and patient is agreeable to the plan of care and goals as stated below:     Anticipated Barriers for therapy: pain    Medical Necessity is demonstrated by the following  History  Co-morbidities and personal factors that may impact the plan of care [x] LOW: no personal factors / co-morbidities  [] MODERATE: 1-2 personal factors / co-morbidities  [] HIGH: 3+ personal factors / co-morbidities    Moderate / High Support Documentation:   Co-morbidities affecting plan of care:     Personal Factors:   age     Examination  Body Structures and Functions, activity limitations and participation restrictions that may impact the plan of care [x] LOW: addressing 1-2 elements  [] MODERATE: 3+ elements  [] HIGH: 4+ elements (please support below)    Moderate / High Support Documentation:      Clinical Presentation [x] LOW: stable  [] MODERATE: Evolving  [] HIGH: Unstable     Decision Making/  Complexity Score: low       GOALS: (not met, progressing unless otherwise specified)  Short Term Goals:  4 weeks  1.Report decreased left shoulder pain < / =  5/10  to increase tolerance for dressing  2. Increase PROM 140 degrees of shoulder flexion   3. Increased strength by 1/3 MMT grade in left upper extremity to increase tolerance for ADL and work activities.  4. Pt to tolerate HEP to improve ROM and independence with ADL's    Long Term Goals: 8 weeks  1.Report decreased left shoulder pain  < / =  3 /10  to increase tolerance for working  2.Increase AROM to 160 degrees of shoulder flexion  3.Increase strength to >/= 4/5 in bilateral upper extremity to increase tolerance for ADL and work activities.  4. Pt goal: Pt to be able to reach overhead without increase in pain  5. Pt will have improved gcode of CJ (20-40% limited) on FOTO shoulder in order to demonstrate true functional improvement.     GOALS: (not met, progressing unless otherwise specified)   Short Term Goals:  4 weeks  1.Report decreased right ankle pain  < / =  5/10  to increase tolerance for walking  2. Increase ROM by 5-10 degrees in order to walk with min to no compensation.  3. Pt to tolerate HEP to improve ROM and independence with ADL's    Long Term Goals: 8 weeks  1.Report decreased right ankle pain  < / =  3/10  to increase tolerance for prolonged standing  3.Increase strength to 4+/5 for  bilateral lower extremity to increase tolerance for ADL and work activities.  4. Pt will report at CJ level (20-40% impaired) on Modified FIM score for mobility to demonstrate increase in LE function and mobility in home and community environment.      Plan     Plan of care Certification: 10/16/2023 to 1/16/24.    Outpatient Physical Therapy 2 times weekly for 10 weeks to include the following interventions: Aquatic Therapy, Cervical/Lumbar Traction, Electrical Stimulation  , Gait Training, Manual Therapy, Moist Heat/ Ice, Neuromuscular Re-ed, Patient  Education, Therapeutic Activities, Therapeutic Exercise, and Ultrasound.     Susanna Arreola PT        Physician's Signature: _________________________________________ Date: ________________

## 2023-10-24 ENCOUNTER — CLINICAL SUPPORT (OUTPATIENT)
Dept: REHABILITATION | Facility: HOSPITAL | Age: 68
End: 2023-10-24
Payer: COMMERCIAL

## 2023-10-24 DIAGNOSIS — R53.1 WEAKNESS: ICD-10-CM

## 2023-10-24 DIAGNOSIS — M25.671 DECREASED RANGE OF MOTION OF RIGHT ANKLE: ICD-10-CM

## 2023-10-24 DIAGNOSIS — S82.851A CLOSED TRIMALLEOLAR FRACTURE OF ANKLE, RIGHT, INITIAL ENCOUNTER: ICD-10-CM

## 2023-10-24 DIAGNOSIS — M25.612 DECREASED ROM OF LEFT SHOULDER: Primary | ICD-10-CM

## 2023-10-24 PROCEDURE — 97110 THERAPEUTIC EXERCISES: CPT | Mod: PO

## 2023-10-24 NOTE — PROGRESS NOTES
"[facility] OUTPATIENT THERAPY AND WELLNESS   Physical Therapy Treatment Note      Name: Alix Lui [place]  Clinic Number: [id number]    Therapy Diagnosis:   Encounter Diagnoses   Name Primary?    Decreased ROM of left shoulder Yes    Closed trimalleolar fracture of ankle, right, initial encounter     Weakness     Decreased range of motion of right ankle      Physician: Brenda Yang PA-C    Visit Date: 10/24/2023       Physician Orders: PT Eval and Treat   Medical Diagnosis from Referral: Post-operative state [Z98.890], Left arm pain [M79.602]  Evaluation Date: 10/16/2023  Authorization Period Expiration: 8/10/24  Plan of Care Expiration: 1/16/24  Progress Note Due: 11/16/23  Date of Surgery: 5/8/23 R ankle ORIF  Visit # / Visits authorized: 1/ 20   FOTO: 1/ 3     Precautions: Standard, GERD, T2D     Time In: 3:00 p   Time Out: 3:45 p  Total Billable Time: 45 minutes    PTA Visit #: 0/5       Subjective     Patient reports: her ankle is swollen and tight, the shoulder is a little tender.  She was compliant with home exercise program.  Response to previous treatment: ongoing   Functional change: ongoing     Pain: un-rated/10  Location: right ankle left shoulder     Objective      Objective Measures updated at progress report unless specified.     Treatment     Alix received the treatments listed below:      therapeutic exercises to develop strength, endurance, ROM, flexibility, posture, and core stabilization for 45 minutes including:    Supine shoulder flexion arom with dowel 3 x 10  Seated scap squeezes 30x3"  Seated bilateral shoulder external rotation with yellow theraband 3x10  TB row seated, yellow, 3 x 10   Ankle ABCs 2x   Towel crunches 1 min x 2  Seated toe raise 3 x 10         Patient Education and Home Exercises       Education provided:   - HEP progressions verbalized and handout provided   - Ice / elevation/ ankle pumping     Written Home Exercises Provided: Yes. Patient instructed to cont prior HEP. " Exercises were reviewed and Alix was able to demonstrate them prior to the end of the session.  Alix demonstrated good  understanding of the education provided. See Electronic Medical Record under Patient Instructions for exercises provided during therapy sessions    Assessment     Pt demonstrates good tolerance to current interventions. Pt was educated on progressive HEP including L shoulder and R ankle therapeutic exercises to increase her ROM and strength. Plan to progress as tolerated.     Alix Is progressing well towards her goals.   Patient prognosis is Good.     Patient will continue to benefit from skilled outpatient physical therapy to address the deficits listed in the problem list box on initial evaluation, provide pt/family education and to maximize pt's level of independence in the home and community environment.     Patient's spiritual, cultural and educational needs considered and pt agreeable to plan of care and goals.     Anticipated barriers to physical therapy: pain     Goals:   (not met, progressing unless otherwise specified)   Short Term Goals:  4 weeks  1.Report decreased right ankle pain  < / =  5/10  to increase tolerance for walking  2. Increase ROM by 5-10 degrees in order to walk with min to no compensation.  3. Pt to tolerate HEP to improve ROM and independence with ADL's     Long Term Goals: 8 weeks  1.Report decreased right ankle pain  < / =  3/10  to increase tolerance for prolonged standing  3.Increase strength to 4+/5 for  bilateral lower extremity to increase tolerance for ADL and work activities.  4. Pt will report at CJ level (20-40% impaired) on Modified FIM score for mobility to demonstrate increase in LE function and mobility in home and community environment.      Plan     Plan of care Certification: 10/16/2023 to 1/16/24.     Outpatient Physical Therapy 2 times weekly for 10 weeks to include the following interventions: Aquatic Therapy, Cervical/Lumbar Traction,  Electrical Stimulation  , Gait Training, Manual Therapy, Moist Heat/ Ice, Neuromuscular Re-ed, Patient Education, Therapeutic Activities, Therapeutic Exercise, and Ultrasound.     Patricio Duran, PT

## 2023-10-30 ENCOUNTER — TELEPHONE (OUTPATIENT)
Dept: ORTHOPEDICS | Facility: CLINIC | Age: 68
End: 2023-10-30
Payer: COMMERCIAL

## 2023-10-30 ENCOUNTER — PATIENT MESSAGE (OUTPATIENT)
Dept: ORTHOPEDICS | Facility: CLINIC | Age: 68
End: 2023-10-30
Payer: COMMERCIAL

## 2023-10-30 ENCOUNTER — CLINICAL SUPPORT (OUTPATIENT)
Dept: REHABILITATION | Facility: HOSPITAL | Age: 68
End: 2023-10-30
Payer: COMMERCIAL

## 2023-10-30 DIAGNOSIS — R53.1 WEAKNESS: ICD-10-CM

## 2023-10-30 DIAGNOSIS — M25.612 DECREASED ROM OF LEFT SHOULDER: Primary | ICD-10-CM

## 2023-10-30 DIAGNOSIS — M25.671 DECREASED RANGE OF MOTION OF RIGHT ANKLE: ICD-10-CM

## 2023-10-30 DIAGNOSIS — M79.642 LEFT HAND PAIN: Primary | ICD-10-CM

## 2023-10-30 DIAGNOSIS — S82.851A CLOSED TRIMALLEOLAR FRACTURE OF ANKLE, RIGHT, INITIAL ENCOUNTER: ICD-10-CM

## 2023-10-30 PROCEDURE — 97110 THERAPEUTIC EXERCISES: CPT | Mod: PO

## 2023-10-30 NOTE — PROGRESS NOTES
"[facility]   Physical Therapy Treatment Note      Name: Alix Lui [place]  Clinic Number: [id number]    Therapy Diagnosis:   Encounter Diagnoses   Name Primary?    Decreased ROM of left shoulder Yes    Closed trimalleolar fracture of ankle, right, initial encounter     Weakness     Decreased range of motion of right ankle      Physician: Brenda Yang PA-C    Visit Date: 10/30/2023       Physician Orders: PT Eval and Treat   Medical Diagnosis from Referral: Post-operative state [Z98.890], Left arm pain [M79.602]  Evaluation Date: 10/16/2023  Authorization Period Expiration: 8/10/24  Plan of Care Expiration: 1/16/24  Progress Note Due: 11/16/23  Date of Surgery: 5/8/23 R ankle ORIF  Visit # / Visits authorized: 2/ 20   FOTO: 1/ 3     Precautions: Standard, GERD, T2D     Time In: 3:50 p   Time Out: 4:38 p  Total Billable Time: 35 minutes    3:50p to  4:25 p (35 minutes 1-1 with PT; remainder of time with tech)    PTA Visit #: 0/5       Subjective     Patient reports: she states she is doing good. Having some pain in her ankle and a little swelling.   She was compliant with home exercise program.  Response to previous treatment: ongoing   Functional change: ongoing     Pain: un-rated/10  Location: right ankle left shoulder     Objective      Objective Measures updated at progress report unless specified.     Treatment     Alix received the treatments listed below:      therapeutic exercises to develop strength, endurance, ROM, flexibility, posture, and core stabilization for 48 minutes including:    Supine shoulder flexion arom with dowel 3 x 10  Seated scap squeezes 30x3"  Seated bilateral shoulder external rotation with yellow theraband 3x10  TB row , yellow, 3 x 10   Ankle ABCs 2x   Towel crunches 1 min x 2  Seated toe raise 3 x 10   + Ankle submax isometrics inv/ever/pf/df (5sec x 10)        Patient Education and Home Exercises       Education provided:   - HEP progressions " verbalized       Written Home Exercises Provided: . Patient instructed to cont prior HEP. Exercises were reviewed and Alix was able to demonstrate them prior to the end of the session.  Alix demonstrated good  understanding of the education provided. See Electronic Medical Record under Patient Instructions for exercises provided during therapy sessions    Assessment     Pt demonstrates good tolerance to current interventions. The pt was educated on sub-maximal ankle isometrics to improve her right ankle strength. Pt is happy with her progress with physical therapy. Plan to progress as tolerated to improve her left shoulder and right ankle ROM and strength.     Alix Is progressing well towards her goals.   Patient prognosis is Good.     Patient will continue to benefit from skilled outpatient physical therapy to address the deficits listed in the problem list box on initial evaluation, provide pt/family education and to maximize pt's level of independence in the home and community environment.     Patient's spiritual, cultural and educational needs considered and pt agreeable to plan of care and goals.     Anticipated barriers to physical therapy: pain     Goals:   (not met, progressing unless otherwise specified)   Short Term Goals:  4 weeks  1.Report decreased right ankle pain  < / =  5/10  to increase tolerance for walking  2. Increase ROM by 5-10 degrees in order to walk with min to no compensation.  3. Pt to tolerate HEP to improve ROM and independence with ADL's     Long Term Goals: 8 weeks  1.Report decreased right ankle pain  < / =  3/10  to increase tolerance for prolonged standing  3.Increase strength to 4+/5 for  bilateral lower extremity to increase tolerance for ADL and work activities.  4. Pt will report at CJ level (20-40% impaired) on Modified FIM score for mobility to demonstrate increase in LE function and mobility in home and community environment.      Plan     Plan of care Certification:  10/16/2023 to 1/16/24.     Outpatient Physical Therapy 2 times weekly for 10 weeks to include the following interventions: Aquatic Therapy, Cervical/Lumbar Traction, Electrical Stimulation  , Gait Training, Manual Therapy, Moist Heat/ Ice, Neuromuscular Re-ed, Patient Education, Therapeutic Activities, Therapeutic Exercise, and Ultrasound.     Patricio Duran, PT

## 2023-11-01 ENCOUNTER — OFFICE VISIT (OUTPATIENT)
Dept: ORTHOPEDICS | Facility: CLINIC | Age: 68
End: 2023-11-01
Payer: COMMERCIAL

## 2023-11-01 ENCOUNTER — HOSPITAL ENCOUNTER (OUTPATIENT)
Dept: RADIOLOGY | Facility: HOSPITAL | Age: 68
Discharge: HOME | End: 2023-11-01
Attending: PHYSICIAN ASSISTANT
Payer: COMMERCIAL

## 2023-11-01 DIAGNOSIS — M79.642 LEFT HAND PAIN: ICD-10-CM

## 2023-11-01 DIAGNOSIS — G56.02 CARPAL TUNNEL SYNDROME ON LEFT: ICD-10-CM

## 2023-11-01 DIAGNOSIS — M15.2 OSTEOARTHRITIS OF PROXIMAL INTERPHALANGEAL (PIP) JOINT OF LEFT LITTLE FINGER: Primary | ICD-10-CM

## 2023-11-01 PROCEDURE — 99999 PR PBB SHADOW E&M-EST. PATIENT-LVL III: CPT | Mod: PBBFAC,,, | Performed by: PHYSICIAN ASSISTANT

## 2023-11-01 PROCEDURE — 1157F ADVNC CARE PLAN IN RCRD: CPT | Mod: CPTII,S$GLB,, | Performed by: PHYSICIAN ASSISTANT

## 2023-11-01 PROCEDURE — 3288F FALL RISK ASSESSMENT DOCD: CPT | Mod: CPTII,S$GLB,, | Performed by: PHYSICIAN ASSISTANT

## 2023-11-01 PROCEDURE — 1125F AMNT PAIN NOTED PAIN PRSNT: CPT | Mod: CPTII,S$GLB,, | Performed by: PHYSICIAN ASSISTANT

## 2023-11-01 PROCEDURE — 1125F PR PAIN SEVERITY QUANTIFIED, PAIN PRESENT: ICD-10-PCS | Mod: CPTII,S$GLB,, | Performed by: PHYSICIAN ASSISTANT

## 2023-11-01 PROCEDURE — 1157F PR ADVANCE CARE PLAN OR EQUIV PRESENT IN MEDICAL RECORD: ICD-10-PCS | Mod: CPTII,S$GLB,, | Performed by: PHYSICIAN ASSISTANT

## 2023-11-01 PROCEDURE — 99213 PR OFFICE/OUTPT VISIT, EST, LEVL III, 20-29 MIN: ICD-10-PCS | Mod: S$GLB,,, | Performed by: PHYSICIAN ASSISTANT

## 2023-11-01 PROCEDURE — 3051F HG A1C>EQUAL 7.0%<8.0%: CPT | Mod: CPTII,S$GLB,, | Performed by: PHYSICIAN ASSISTANT

## 2023-11-01 PROCEDURE — 1101F PR PT FALLS ASSESS DOC 0-1 FALLS W/OUT INJ PAST YR: ICD-10-PCS | Mod: CPTII,S$GLB,, | Performed by: PHYSICIAN ASSISTANT

## 2023-11-01 PROCEDURE — 73130 XR HAND COMPLETE 3 VIEW LEFT: ICD-10-PCS | Mod: 26,LT,, | Performed by: RADIOLOGY

## 2023-11-01 PROCEDURE — 3051F PR MOST RECENT HEMOGLOBIN A1C LEVEL 7.0 - < 8.0%: ICD-10-PCS | Mod: CPTII,S$GLB,, | Performed by: PHYSICIAN ASSISTANT

## 2023-11-01 PROCEDURE — 3288F PR FALLS RISK ASSESSMENT DOCUMENTED: ICD-10-PCS | Mod: CPTII,S$GLB,, | Performed by: PHYSICIAN ASSISTANT

## 2023-11-01 PROCEDURE — 1101F PT FALLS ASSESS-DOCD LE1/YR: CPT | Mod: CPTII,S$GLB,, | Performed by: PHYSICIAN ASSISTANT

## 2023-11-01 PROCEDURE — 99213 OFFICE O/P EST LOW 20 MIN: CPT | Mod: S$GLB,,, | Performed by: PHYSICIAN ASSISTANT

## 2023-11-01 PROCEDURE — 1159F MED LIST DOCD IN RCRD: CPT | Mod: CPTII,S$GLB,, | Performed by: PHYSICIAN ASSISTANT

## 2023-11-01 PROCEDURE — 99999 PR PBB SHADOW E&M-EST. PATIENT-LVL III: ICD-10-PCS | Mod: PBBFAC,,, | Performed by: PHYSICIAN ASSISTANT

## 2023-11-01 PROCEDURE — 73130 X-RAY EXAM OF HAND: CPT | Mod: TC,LT

## 2023-11-01 PROCEDURE — 73130 X-RAY EXAM OF HAND: CPT | Mod: 26,LT,, | Performed by: RADIOLOGY

## 2023-11-01 PROCEDURE — 1159F PR MEDICATION LIST DOCUMENTED IN MEDICAL RECORD: ICD-10-PCS | Mod: CPTII,S$GLB,, | Performed by: PHYSICIAN ASSISTANT

## 2023-11-01 NOTE — PROGRESS NOTES
[facility]  History & Physical  Orthopedics    SUBJECTIVE:      Chief Complaint: Left small finger pain    Referring Provider: No ref. provider found     Dr. Kan is the supervising physician for this encounter/patient    History of Present Illness:  Patient is a 67 y.o. left hand dominant female who presents today with complaints of left small finger pain. She reports a significant fall in April which caused a humerus fracture surgically treated by Dr. Crawford. She says that her finger was bothering her then, but the humerus fracture became the priority. Pain at the small finger PIP, she does have full motion of the finger. She does reports tingling into the fingers off/on throughout the day, most notable while doing desk work.     Onset of symptoms/DOI was April 2023.    Symptoms are aggravated by activity.    Symptoms are alleviated by rest.    Symptoms consist of pain.    The patient rates their pain as a 7/10.    Attempted treatment(s) and/or interventions include activity modifications, rest, anti-inflammatory medications.     The patient denies any fevers, chills, N/V, D/C and presents for evaluation.       Past Medical History:   Diagnosis Date    Anxiety     Bipolar 1 disorder     COPD (chronic obstructive pulmonary disease)     Depression     Diabetes mellitus     GERD (gastroesophageal reflux disease)     Grade I open displaced transverse fracture of shaft of left humerus 3/20/2023    HEARING LOSS     pt states she has loss slighty in rt ear.    Hypertension     Insomnia     Rash      Past Surgical History:   Procedure Laterality Date    APPENDECTOMY      APPLICATION, EXTERNAL FIXATION DEVICE, FOR ANKLE FRACTURE Right 5/5/2023    Procedure: APPLICATION, EXTERNAL FIXATION DEVICE, FOR ANKLE FRACTURE - right, mariusz, ancef, bone foam, slider, C arm door side;  Surgeon: Yanely Guerra MD;  Location: [facility] OR [address] [place] [address];  Service: Orthopedics;  Laterality: Right;  right, mariusz, ancef,  bone foam, slider, C arm door side    BREAST BIOPSY      COLONOSCOPY      COLONOSCOPY N/A 10/31/2019    Procedure: COLONOSCOPY;  Surgeon: Philippe Monteiro MD;  Location: [facility] (4TH FLR);  Service: Endoscopy;  Laterality: N/A;  PM prep-MH    CYST REMOVAL      ESOPHAGOGASTRODUODENOSCOPY N/A 10/31/2019    Procedure: EGD (ESOPHAGOGASTRODUODENOSCOPY);  Surgeon: Philippe Monteiro MD;  Location: [facility] (4TH FLR);  Service: Endoscopy;  Laterality: N/A;  Pm prep-MH    FIXATION OF SYNDESMOSIS OF ANKLE Right 5/8/2023    Procedure: FIXATION, SYNDESMOSIS, ANKLE;  Surgeon: Johnathan Contreras MD;  [facility];  Service: Orthopedics;  Laterality: Right;    HYSTERECTOMY      IRRIGATION AND DEBRIDEMENT OF UPPER EXTREMITY Left 3/21/2023    Procedure: IRRIGATION AND DEBRIDEMENT, UPPER EXTREMITY;  Surgeon: Ousmane Crawford MD;  Location: [facility] OR [address] [place] [address];  Service: Orthopedics;  Laterality: Left;    OPEN REDUCTION AND INTERNAL FIXATION (ORIF) OF INJURY OF ANKLE Right 5/8/2023    Procedure: ORIF, ANKLE - RIGHT. EX FIX REMOVAL. SYNTHES. C ARM DOOR SIDE.  BONE FOAM.;  Surgeon: Johnathan Contreras MD;  Location: [facility] OR [address] [place] [address];  Service: Orthopedics;  Laterality: Right;    ORIF HUMERUS FRACTURE Left 3/21/2023    Procedure: ORIF, FRACTURE, HUMERUS;  Surgeon: Ousmane Crawford MD;  Location: [facility] OR [address] [place] [address];  Service: Orthopedics;  Laterality: Left;     Review of patient's allergies indicates:   Allergen Reactions    Lamictal [lamotrigine] Rash     Per psychiatry notes     Social History     Social History Narrative    Ms. Patel was born in [place] on 1955. She grew up in [place] and was raised by her parents. She went to high school at [employer] and graduated in 1973. After completing high school, Ms. Patel attended [employer] but did not graduate. 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 She would like to return to [employer] in  the future to get degree in music.      Family History   Problem Relation Age of Onset    Alzheimer's disease Mother     Sinus disease Mother     Heart failure Father     Hyperlipidemia Father     Heart attack Father     No Known Problems Sister     Sarcoidosis Sister     Colon cancer Maternal Aunt     Alzheimer's disease Maternal Cousin     Alzheimer's disease Maternal Cousin     Anxiety disorder Daughter     Depression Daughter     Esophageal cancer Neg Hx          Current Outpatient Medications:     acetaminophen (TYLENOL) 500 MG tablet, Take 2 tablets (1,000 mg total) by mouth every 8 (eight) hours as needed for Pain., Disp: 90 tablet, Rfl: 0    blood sugar diagnostic (TRUE METRIX GLUCOSE TEST STRIP MISC), True Metrix Glucose Test Strip  TEST TWICE DAILY AS DIRECTED, Disp: , Rfl:     blood-glucose meter (TRUE METRIX AIR GLUCOSE METER MISC), True Metrix Air Glucose Meter  USE UNDER THE SKIN TWICE DAILY AS DIRECTED, Disp: , Rfl:     ciclopirox (PENLAC) 8 % Soln, Apply topically nightly. Paint on affected nail(s) once per night, remove residue once per week with alcohol, Disp: 6.6 mL, Rfl: 3    dulaglutide (TRULICITY) 0.75 mg/0.5 mL pen injector, Inject 0.75 mg into the skin every Sunday., Disp: , Rfl:     EScitalopram oxalate (LEXAPRO) 20 MG tablet, Take 1 tablet (20 mg total) by mouth once daily., Disp: 90 tablet, Rfl: 1    gabapentin (NEURONTIN) 300 MG capsule, Take 1 capsule (300 mg total) by mouth 3 (three) times daily., Disp: 90 capsule, Rfl: 2    ibuprofen (ADVIL,MOTRIN) 800 MG tablet, Take 1 tablet (800 mg total) by mouth every 8 (eight) hours as needed for Pain., Disp: 30 tablet, Rfl: 2    insulin detemir U-100, Levemir, 100 unit/mL (3 mL) SubQ InPn pen, Inject 5 Units into the skin every evening., Disp: , Rfl: 0    levothyroxine (SYNTHROID) 100 MCG tablet, Take 1 tablet (100 mcg total) by mouth once daily., Disp: 30 tablet, Rfl: 1    metFORMIN (GLUCOPHAGE) 500 MG tablet, Take 2 tablets (1,000 mg total) by  mouth 2 (two) times daily with meals., Disp: 120 tablet, Rfl: 1    rosuvastatin (CRESTOR) 20 MG tablet, Take 1 tablet (20 mg total) by mouth once daily., Disp: , Rfl: 3    suvorexant (BELSOMRA) 10 mg Tab, Take 1 tablet by mouth every evening., Disp: 30 tablet, Rfl: 1    traZODone (DESYREL) 150 MG tablet, Take 1 tablet (150 mg total) by mouth every evening., Disp: 15 tablet, Rfl: 0    traZODone (DESYREL) 50 MG tablet, Take 2 tablets by mouth at night for 1 week, then 1 tablet at night for 1 week, then 1/2 tablet at night for 1 week then stop, Disp: 30 tablet, Rfl: 0      Review of Systems:  Constitutional: no fever or chills  Eyes: no visual changes  ENT: no nasal congestion or sore throat  Respiratory: no cough or shortness of breath  Cardiovascular: no chest pain  Gastrointestinal: no nausea or vomiting, tolerating diet  Musculoskeletal: pain and soreness    OBJECTIVE:      Vital Signs (Most Recent):  There were no vitals filed for this visit.  There is no height or weight on file to calculate BMI.      Physical Exam:  Constitutional: The patient appears well-developed and well-nourished. No distress.   Skin: No lesions appreciated  Head: Normocephalic and atraumatic.   Nose: Nose normal.   Ears: No deformities seen  Eyes: Conjunctivae and EOM are normal.   Neck: No tracheal deviation present.   Cardiovascular: Normal rate and intact distal pulses.    Pulmonary/Chest: Effort normal. No respiratory distress.   Abdominal: There is no guarding.   Neurological: The patient is alert.   Psychiatric: The patient has a normal mood and affect.     Left Hand/Wrist Examination:    Observation/Inspection:  Swelling  none    Deformity  none  Discoloration  none     Scars   none    Atrophy  none    HAND/WRIST EXAMINATION:  Finkelstein's Test   Neg  WHAT Test    Neg  Snuff box tenderness   Neg  Stewart's Test    Neg  Hook of Hamate Tenderness  Neg  CMC grind    Neg  Circumduction test   Neg  TTP at the small finger PIP, mild over  the dorsal MCP.    Neurovascular Exam:  Digits WWP, brisk CR < 3s throughout  NVI motor/LTS to M/R/U nerves, radial pulse 2+  Tinel's Test - Carpal Tunnel  POS  Tinel's Test - Cubital Tunnel  Neg  Phalen's Test    Neg  Median Nerve Compression Test Neg    ROM hand full, pain to PIP with full , no bessie trigger.    ROM wrist full, painless    ROM elbow full, painless    Abdomen not guarded  Respirations nonlabored  Perfusion intact    Diagnostic Results:     Imaging - I independently viewed the patient's imaging as well as the radiology report.      FINDINGS:  Three views left hand.     There is osteopenia.  There are degenerative changes of the PIP and D IP joints.  There is cortical irregularity about the distal aspect of the proximal phalanx of the 5th digit, findings may reflect sequela of subacute injury noting no edema.  Correlation with any focal tenderness recommended.  No radiopaque foreign body.     Impression:     1. Cortical irregularity involving the distal aspect of the proximal phalanx of the 5th digit.  Findings may reflect sequela of subacute injury given no edema in the region however correlation with any focal tenderness is recommended.  No dislocation.    EMG - none    ASSESSMENT/PLAN:      67 y.o. yo female with Left small finger PIP osteoarthritis, it is possible that a fracture occurred but no comparison films. Likely Left Carpal Tunnel Syndrome as well    Plan: The patient and I had a thorough discussion today.  We discussed the working diagnosis as well as several other potential alternative diagnoses.  Treatment options were discussed, both conservative and surgical.  Conservative treatment options would include things such as activity modifications, workplace modifications, a period of rest, oral vs topical OTC and prescription anti-inflammatory medications, occupational therapy, splinting/bracing, immobilization, corticosteroid injections, and others.  Surgical options were discussed  as well.     At this time, the patient would like to proceed with a trial of wrist brace at night and during the day as needed for suspected CT. We discuss conservative treatment options for the PIP to include NSAIDS/voltaren gel massage and ice/heat. She is not interested in injections, but can RTC on prn basis.    Should the patient's symptoms worsen, persist, or fail to improve they should return for reevaluation and I would be happy to see them back anytime.           Please do not hesitate to reach out to us via email, phone, or MyChart with any questions, concerns, or feedback.

## 2023-11-06 NOTE — ASSESSMENT & PLAN NOTE
· Controlled. Present on admit. Hgb 10-11 in hospital.   · Patient has no signs of active bleeding and hemodynamically stable. Patient is asymptomatic related to anemia.   · Goal is keep Hgb >7 and consider blood transfusion if Hgb < 7 if asymptomatic or < 8 if patient becomes symptomatic.     Inflammation Suggestive Of Cancer Camouflage Histology Text: There was a dense lymphocytic infiltrate which prevented adequate histologic evaluation of adjacent structures.

## 2023-11-09 ENCOUNTER — DOCUMENTATION ONLY (OUTPATIENT)
Dept: REHABILITATION | Facility: HOSPITAL | Age: 68
End: 2023-11-09
Payer: COMMERCIAL
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
## 2023-11-10 ENCOUNTER — TELEPHONE (OUTPATIENT)
Dept: ORTHOPEDICS | Facility: CLINIC | Age: 68
End: 2023-11-10
Payer: COMMERCIAL
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
## 2023-11-13 ENCOUNTER — PATIENT MESSAGE (OUTPATIENT)
Dept: ORTHOPEDICS | Facility: CLINIC | Age: 68
End: 2023-11-13

## 2023-11-13 ENCOUNTER — HOSPITAL ENCOUNTER (OUTPATIENT)
Dept: RADIOLOGY | Facility: HOSPITAL | Age: 68
Discharge: HOME | End: 2023-11-13
Attending: NURSE PRACTITIONER
Payer: COMMERCIAL

## 2023-11-13 ENCOUNTER — OFFICE VISIT (OUTPATIENT)
Dept: ORTHOPEDICS | Facility: CLINIC | Age: 68
End: 2023-11-13
Payer: COMMERCIAL

## 2023-11-13 VITALS — HEIGHT: 70 IN | WEIGHT: 150.81 LBS | BODY MASS INDEX: 21.59 KG/M2

## 2023-11-13 DIAGNOSIS — S42.322K: Primary | ICD-10-CM

## 2023-11-13 DIAGNOSIS — R52 PAIN: Primary | ICD-10-CM

## 2023-11-13 DIAGNOSIS — S82.851D CLOSED TRIMALLEOLAR FRACTURE OF RIGHT ANKLE WITH ROUTINE HEALING: ICD-10-CM

## 2023-11-13 DIAGNOSIS — R52 PAIN: ICD-10-CM

## 2023-11-13 PROCEDURE — 1160F RVW MEDS BY RX/DR IN RCRD: CPT | Mod: CPTII,S$GLB,, | Performed by: NURSE PRACTITIONER

## 2023-11-13 PROCEDURE — 1160F PR REVIEW ALL MEDS BY PRESCRIBER/CLIN PHARMACIST DOCUMENTED: ICD-10-PCS | Mod: CPTII,S$GLB,, | Performed by: NURSE PRACTITIONER

## 2023-11-13 PROCEDURE — 99999 PR PBB SHADOW E&M-EST. PATIENT-LVL III: ICD-10-PCS | Mod: PBBFAC,,, | Performed by: NURSE PRACTITIONER

## 2023-11-13 PROCEDURE — 1157F PR ADVANCE CARE PLAN OR EQUIV PRESENT IN MEDICAL RECORD: ICD-10-PCS | Mod: CPTII,S$GLB,, | Performed by: NURSE PRACTITIONER

## 2023-11-13 PROCEDURE — 1159F PR MEDICATION LIST DOCUMENTED IN MEDICAL RECORD: ICD-10-PCS | Mod: CPTII,S$GLB,, | Performed by: NURSE PRACTITIONER

## 2023-11-13 PROCEDURE — 99214 PR OFFICE/OUTPT VISIT, EST, LEVL IV, 30-39 MIN: ICD-10-PCS | Mod: S$GLB,,, | Performed by: NURSE PRACTITIONER

## 2023-11-13 PROCEDURE — 1101F PR PT FALLS ASSESS DOC 0-1 FALLS W/OUT INJ PAST YR: ICD-10-PCS | Mod: CPTII,S$GLB,, | Performed by: NURSE PRACTITIONER

## 2023-11-13 PROCEDURE — 3051F HG A1C>EQUAL 7.0%<8.0%: CPT | Mod: CPTII,S$GLB,, | Performed by: NURSE PRACTITIONER

## 2023-11-13 PROCEDURE — 99214 OFFICE O/P EST MOD 30 MIN: CPT | Mod: S$GLB,,, | Performed by: NURSE PRACTITIONER

## 2023-11-13 PROCEDURE — 1101F PT FALLS ASSESS-DOCD LE1/YR: CPT | Mod: CPTII,S$GLB,, | Performed by: NURSE PRACTITIONER

## 2023-11-13 PROCEDURE — 1157F ADVNC CARE PLAN IN RCRD: CPT | Mod: CPTII,S$GLB,, | Performed by: NURSE PRACTITIONER

## 2023-11-13 PROCEDURE — 73030 X-RAY EXAM OF SHOULDER: CPT | Mod: TC,LT

## 2023-11-13 PROCEDURE — 3288F PR FALLS RISK ASSESSMENT DOCUMENTED: ICD-10-PCS | Mod: CPTII,S$GLB,, | Performed by: NURSE PRACTITIONER

## 2023-11-13 PROCEDURE — 99999 PR PBB SHADOW E&M-EST. PATIENT-LVL III: CPT | Mod: PBBFAC,,, | Performed by: NURSE PRACTITIONER

## 2023-11-13 PROCEDURE — 1159F MED LIST DOCD IN RCRD: CPT | Mod: CPTII,S$GLB,, | Performed by: NURSE PRACTITIONER

## 2023-11-13 PROCEDURE — 73060 XR HUMERUS 2 VIEW LEFT: ICD-10-PCS | Mod: 26,LT,, | Performed by: RADIOLOGY

## 2023-11-13 PROCEDURE — 3288F FALL RISK ASSESSMENT DOCD: CPT | Mod: CPTII,S$GLB,, | Performed by: NURSE PRACTITIONER

## 2023-11-13 PROCEDURE — 3008F PR BODY MASS INDEX (BMI) DOCUMENTED: ICD-10-PCS | Mod: CPTII,S$GLB,, | Performed by: NURSE PRACTITIONER

## 2023-11-13 PROCEDURE — 73060 X-RAY EXAM OF HUMERUS: CPT | Mod: TC,LT

## 2023-11-13 PROCEDURE — 3008F BODY MASS INDEX DOCD: CPT | Mod: CPTII,S$GLB,, | Performed by: NURSE PRACTITIONER

## 2023-11-13 PROCEDURE — 73030 XR SHOULDER COMPLETE 2 OR MORE VIEWS LEFT: ICD-10-PCS | Mod: 26,LT,, | Performed by: RADIOLOGY

## 2023-11-13 PROCEDURE — 3051F PR MOST RECENT HEMOGLOBIN A1C LEVEL 7.0 - < 8.0%: ICD-10-PCS | Mod: CPTII,S$GLB,, | Performed by: NURSE PRACTITIONER

## 2023-11-13 PROCEDURE — 73060 X-RAY EXAM OF HUMERUS: CPT | Mod: 26,LT,, | Performed by: RADIOLOGY

## 2023-11-13 PROCEDURE — 73030 X-RAY EXAM OF SHOULDER: CPT | Mod: 26,LT,, | Performed by: RADIOLOGY

## 2023-11-13 RX ORDER — VIT C/E/ZN/COPPR/LUTEIN/ZEAXAN 250MG-90MG
1000 CAPSULE ORAL 2 TIMES DAILY
Qty: 90 CAPSULE | Refills: 5 | Status: SHIPPED | OUTPATIENT
Start: 2023-11-13

## 2023-11-13 RX ORDER — IBUPROFEN 800 MG/1
800 TABLET ORAL EVERY 8 HOURS PRN
Qty: 30 TABLET | Refills: 2 | Status: SHIPPED | OUTPATIENT
Start: 2023-11-13 | End: 2023-11-13

## 2023-11-13 RX ORDER — CALCIUM CARBONATE 500(1250)
1 TABLET ORAL 2 TIMES DAILY
Qty: 90 TABLET | Refills: 5 | Status: SHIPPED | OUTPATIENT
Start: 2023-11-13 | End: 2024-11-12
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
## 2023-11-15 DIAGNOSIS — F51.01 PRIMARY INSOMNIA: ICD-10-CM

## 2023-11-15 RX ORDER — TRAZODONE HYDROCHLORIDE 150 MG/1
150 TABLET ORAL NIGHTLY
Qty: 15 TABLET | Refills: 0 | Status: CANCELLED | OUTPATIENT
Start: 2023-11-15 | End: 2024-11-14

## 2023-11-16 ENCOUNTER — TELEPHONE (OUTPATIENT)
Dept: PSYCHIATRY | Facility: CLINIC | Age: 68
End: 2023-11-16
Payer: COMMERCIAL

## 2023-11-16 DIAGNOSIS — F51.01 PRIMARY INSOMNIA: ICD-10-CM

## 2023-11-16 RX ORDER — TRAZODONE HYDROCHLORIDE 150 MG/1
150 TABLET ORAL NIGHTLY PRN
Qty: 30 TABLET | Refills: 0 | Status: SHIPPED | OUTPATIENT
Start: 2023-11-16 | End: 2023-11-28 | Stop reason: SDUPTHER

## 2023-11-16 NOTE — PROGRESS NOTES
Patient sending it message in communication with nursing wanting her trazodone I have seen her all 1 time she has an appointment with me later this month in November     Took me about 15 minutes to do some thorough chart review to sort out details     In short    In short : She was on trazodone and then primary care (or neurology) was trying to get her Belsomra     Yet  as the Belsomra did not come in Dr. Hwang  on an interim measure did place an order for the trazodone  as noted in chart message in     I then called the patient personally myself to clarify status of Belsomra which she states she has not yet received she understands the trazodone is being written on an interim measure if her Belsomra comes through she is not take trazodone.  Understanding Expressed     She has an appointment later this month with me as noted above and we will  will have opportunity to address further if needed no acute concerns she thanked me for calling    LUCIO Muro MD

## 2023-11-27 ENCOUNTER — OFFICE VISIT (OUTPATIENT)
Dept: OTOLARYNGOLOGY | Facility: CLINIC | Age: 68
End: 2023-11-27
Payer: COMMERCIAL

## 2023-11-27 DIAGNOSIS — L29.9 ITCHING OF EAR: ICD-10-CM

## 2023-11-27 DIAGNOSIS — J38.4 VOCAL FOLD EDEMA: ICD-10-CM

## 2023-11-27 DIAGNOSIS — R09.82 POST-NASAL DRIP: ICD-10-CM

## 2023-11-27 DIAGNOSIS — J31.1 CHRONIC NASOPHARYNGITIS: Primary | ICD-10-CM

## 2023-11-27 DIAGNOSIS — F44.4 FUNCTIONAL DYSPHONIA: ICD-10-CM

## 2023-11-27 DIAGNOSIS — Z22.321 COLONIZATION WITH METHICILLIN-SUSCEPTIBLE STAPHYLOCOCCUS AUREUS: ICD-10-CM

## 2023-11-27 PROCEDURE — 1126F AMNT PAIN NOTED NONE PRSNT: CPT | Mod: CPTII,S$GLB,, | Performed by: OTOLARYNGOLOGY

## 2023-11-27 PROCEDURE — 99214 OFFICE O/P EST MOD 30 MIN: CPT | Mod: 25,S$GLB,, | Performed by: OTOLARYNGOLOGY

## 2023-11-27 PROCEDURE — 3051F PR MOST RECENT HEMOGLOBIN A1C LEVEL 7.0 - < 8.0%: ICD-10-PCS | Mod: CPTII,S$GLB,, | Performed by: OTOLARYNGOLOGY

## 2023-11-27 PROCEDURE — 1101F PR PT FALLS ASSESS DOC 0-1 FALLS W/OUT INJ PAST YR: ICD-10-PCS | Mod: CPTII,S$GLB,, | Performed by: OTOLARYNGOLOGY

## 2023-11-27 PROCEDURE — 99999 PR PBB SHADOW E&M-EST. PATIENT-LVL IV: CPT | Mod: PBBFAC,,, | Performed by: OTOLARYNGOLOGY

## 2023-11-27 PROCEDURE — 3051F HG A1C>EQUAL 7.0%<8.0%: CPT | Mod: CPTII,S$GLB,, | Performed by: OTOLARYNGOLOGY

## 2023-11-27 PROCEDURE — 31575 DIAGNOSTIC LARYNGOSCOPY: CPT | Mod: S$GLB,,, | Performed by: OTOLARYNGOLOGY

## 2023-11-27 PROCEDURE — 1101F PT FALLS ASSESS-DOCD LE1/YR: CPT | Mod: CPTII,S$GLB,, | Performed by: OTOLARYNGOLOGY

## 2023-11-27 PROCEDURE — 1126F PR PAIN SEVERITY QUANTIFIED, NO PAIN PRESENT: ICD-10-PCS | Mod: CPTII,S$GLB,, | Performed by: OTOLARYNGOLOGY

## 2023-11-27 PROCEDURE — 1157F PR ADVANCE CARE PLAN OR EQUIV PRESENT IN MEDICAL RECORD: ICD-10-PCS | Mod: CPTII,S$GLB,, | Performed by: OTOLARYNGOLOGY

## 2023-11-27 PROCEDURE — 1159F PR MEDICATION LIST DOCUMENTED IN MEDICAL RECORD: ICD-10-PCS | Mod: CPTII,S$GLB,, | Performed by: OTOLARYNGOLOGY

## 2023-11-27 PROCEDURE — 31575 LARYNGOSCOPY: ICD-10-PCS | Mod: S$GLB,,, | Performed by: OTOLARYNGOLOGY

## 2023-11-27 PROCEDURE — 99214 PR OFFICE/OUTPT VISIT, EST, LEVL IV, 30-39 MIN: ICD-10-PCS | Mod: 25,S$GLB,, | Performed by: OTOLARYNGOLOGY

## 2023-11-27 PROCEDURE — 1157F ADVNC CARE PLAN IN RCRD: CPT | Mod: CPTII,S$GLB,, | Performed by: OTOLARYNGOLOGY

## 2023-11-27 PROCEDURE — 3288F FALL RISK ASSESSMENT DOCD: CPT | Mod: CPTII,S$GLB,, | Performed by: OTOLARYNGOLOGY

## 2023-11-27 PROCEDURE — 3288F PR FALLS RISK ASSESSMENT DOCUMENTED: ICD-10-PCS | Mod: CPTII,S$GLB,, | Performed by: OTOLARYNGOLOGY

## 2023-11-27 PROCEDURE — 99999 PR PBB SHADOW E&M-EST. PATIENT-LVL IV: ICD-10-PCS | Mod: PBBFAC,,, | Performed by: OTOLARYNGOLOGY

## 2023-11-27 PROCEDURE — 1159F MED LIST DOCD IN RCRD: CPT | Mod: CPTII,S$GLB,, | Performed by: OTOLARYNGOLOGY

## 2023-11-27 RX ORDER — MUPIROCIN 20 MG/G
OINTMENT TOPICAL 3 TIMES DAILY
Qty: 22 G | Refills: 0 | Status: SHIPPED | OUTPATIENT
Start: 2023-11-27 | End: 2023-12-04

## 2023-11-27 RX ORDER — FLUOCINOLONE ACETONIDE 0.11 MG/ML
OIL AURICULAR (OTIC)
Qty: 20 ML | Refills: 0 | Status: SHIPPED | OUTPATIENT
Start: 2023-11-27

## 2023-11-27 NOTE — PATIENT INSTRUCTIONS
No suspicious findings on prior imaging or exam and endoscopy today.  There is significant ear itching which should improve with a short course fluocinolone/derm otic oil to the ears and then routine ear care with mineral oil thereafter.  There are findings historically of significant nasopharyngitis with ulceration which appears healed today.  While there is no active infection of the sinuses saline rinses and when flared up mupirocin ointment to the nostrils twice daily for a week is recommended for known staph colonization.      While no suspicious lesion is found in the larynx there is some mid left vocal cord focal edema and to a lesser degree diffuse right-sided vocal edema and left-greater-than-right vocal cord hypervascularity.  I do not believe this represents an underlying vocal cord cyst but more detailed visualization with stroboscopy is recommended with Dr. Maximino Curry.    General throat care measures as previously discussed are outlined again today.  There is a significant elevation in the RSI even in the absence of heartburn this may represent silent reflux for which conservative care rather than acid suppressive medications is strongly recommended.  Throat-clearing avoidance, vocal strain avoidance and continued observance of home exercises provided by speech therapy are recommended for now.    Return with any worsening of symptoms, failure to improve, or any other concerns for further evaluation and treatment.      Voice recognition software was used in the creation of this note/communication and any sound-alike errors which may have occurred from its use should be taken in context when interpreting.  If such errors prevent a clear understanding of the note/communication, please contact the office for clarification.        THROAT CLEARING     Why am I clearing my throat so often?   Irritation of the vocal folds and surrounding area can cause the urge to clear your throat. This irritation can be  caused by acid reflux, allergies, or environmental factors, as well as from a cold or sore throat. Talk with your doctor about the treatment of possible underlying causes. However, throat clearing can turn into a cycle. You clear your throat after feeling an irritation, which causes more irritation, which causes you to continue clearing your throat, which causes more irritation.     What can I do about it?   Ask a friend or family member to help you keep track - you may not even realize how often you do it.   Replace the throat clearing with a different behavior.   Take a sip of water and then swallow. Repeat until the sensation subsides.  Swallow hard (even without water)   Use the silent throat clear method by making the h sound when you exhale  Breathe in deeply through your nose and exhale on shhh   Hum quietly   Keep yourself hydrated.   Drink plenty of water   Eat wet snacks (grapes, apples, melon, cucumber)   Use a humidifier at home or at work   Suck on hard candies, but avoid too much sugar and avoid anything with mint, menthol, camphor, or eucaplyptus, as these will have a more irritating effect.        VOCAL HYGIENE AND HYDRATION RECOMMENDATIONS     DO   Drink plenty of waterabout  oz a day! If your urine is pale, you should be well-hydrated.  Try some of these tips to increase hydration as well.  Eat wet snacks such as grapes, melon, cucumbers, apple slices   Reduce intake coffee and other caffeinated and carbonated beverages   Suck on pastille lozenges or sugar free candies (not mentholated lozenges)   Use a room or personal humidifier   Use sinus rinses/irrigations or nasal saline spray   Inhale steam for a few minutes before speaking or singing   Pay attention to how, and how much, you use your voice.   Give yourself vocal breaks throughout the day.   Breathe when talking, take frequent pauses for breath.   Use a microphone when speaking in front of a group of 15 or more to reduce  voice strain.   Learn how to use your voice correctly to get loud with voice therapy techniques.  Stay healthy. Eat right and get plenty of rest. Follow a reflux precaution diet if indicated.   ____________________________________________________________________    REDUCE   Loud talking, yelling, cheering, or screaming. Voice injury can take place over a long time, or all at once.  If you need to talk loud or yell, be sure you are doing it properly.   Clearing your throat and forceful coughing. The vocal folds collect mucus when irritated or inflamed and this can cause the urge to clear your throat. The trauma of clearing the throat creates more inflammation and mucus. See tips above for keeping hydrated to assist with cutting back on throat clearing.  Instead of clearing your throat, try these behaviors until the sensation passes:   Take a sip of water   Silently clear with a hard exhale making an hhh sound   Swallow hard   Drying agents such as anti-histamines or decongestant medications. You should always take medications as prescribed, but keep in mind these will dry you out, so increase your hydration!   ____________________________________________________________________    AVOID   Inhalant irritants such as smoke, strong fumes, and allergens. Take advantage of 1-800-QUIT-NOW if you are ready to stop smoking.   Unnecessary non-speech noises, such as grunting during exercise, loud noises, or excessively high- or low-pitched sounds. Save your voice for talking and singing!   Whispering. Whispering places your vocal folds in a tenser position than usual.           WHAT TO EXPECT FROM VOICE THERAPY    Purpose  The purpose of voice therapy is to help you find a better, more efficient way to use your voice or to reduce symptoms such as coughing, throat clearing, or difficulty breathing.  Depending on your symptoms, you may learn how to produce clearer voice quality, how to reduce fatigue or pain associated  with speaking, how to take care of your voice, and how to eliminate chronic coughing or throat clearing.      Process: Evaluation  First, you may go through some initial testing.  In most cases, a videostroboscopy will be performed in order to allow your speech pathologist and your physician to look at your vocal cords to aid in deciding if you would benefit from voice therapy.  Next, you may work with the speech pathologist to assess the current capabilities of your voice.  Following evaluation, your speech pathologist will design a therapeutic plan to improve your voice as well as other symptoms that may bother you.  At the time of evaluation, your speech pathologist may provide you with exercises to try at home.      Process: Therapy  Most patients benefit from 2-8 sessions over 1-3 months.  Voice therapy involves changing the behavior of your vocal cords and speaking habits, so it is very important that you attend your appointments and do home practices as instructed by your speech pathologist.  Home practice and participation in therapy are critical to meeting your desired voice goals, so of course, we are able to work with you to schedule appointments that are convenient for you.        SINUS RINSE INSTRUCTIONS    Nasal Saline Irrigation Instructions  You can wash your nasal and sinus passages using nasal saline (salt water) irrigation. This   is simple and effective. Follow the instructions below, as well as the ones provided by your   physician.  Supplies  First, you will need a nasal saline irrigation bottle and rinse solution.   You can purchase nasal rinse kits that include these items (such as   NeilMed®, Ayr®, Simply Saline®, Ocean Complete®) at most drug   stores. You can also make your own saline irrigation solution by   adding kosher (non-iodine) salt and baking soda to distilled water.   Your physician may tell you to add medications like a steroid or   antibiotic to the rinse as needed.  Steps for  nasal irrigation  Step 1. Fill the bottle  ? Wash your hands.  ? Fill the irrigation bottle with lukewarm distilled water or boiled water that has cooled.  Step 2. Mix the solution  ? Put the saline and salt packet contents into the bottle.  ? Tighten the top of the bottle and shake it gently to dissolve the mixture.  ? If you are making your own solution:   - Add 1/4 to 1/2 teaspoon of baking soda and 1/8 teaspoon of kosher (non-iodine) salt   into the bottle.   - Tighten the top of the bottle.   - Shake the bottle gently to dissolve the mixture.  Step 3. Get into position  ?  front of the sink.  ? Unless you were instructed to use another position, bend forward.   Then tilt your face down about 45 degrees so that you are looking   down into the sink.  ? Gently place the spout of the saline bottle against 1 of your nostrils.  [facility]  CARE AND TREATMENT  Patient Education  ©2018 [employer]  ©2018 [employer]  Step 4. Rinse  ? Breathing through your mouth, gently squeeze the   bottle. This will squirt the solution into your nostril. The   solution will start to drain from the other nostril. Some   may drain from your mouth. This is normal.  ? Use 2 ounces (half of the bottle) on each nostril.  ? Afterwards, you may need to blow your nose gently to   help drain any solution that is left behind.  Step 5. Repeat  ? Repeat steps 3 and 4 with the other nostril.  You can watch a video to learn how to do nasal saline irrigation. Go to [email or web address] and   search for NeilMed Sinus Rinse.  Step 6.  Clean the bottle and cap. Air dry the Sinus Rinse bottle, cap, and tube on a clean paper towel, a lint free towel, or use NeilMed® NasaDOCK® or NasaDOCK plus (sold separately) to store the bottle, cap and tube.  Please read Warnings before using.  Our recommendation is to replace the bottle every three months.      NEILMED DEBORAH INSTRUCTIONS    It is very  important to keep these devices clean and free from any contamination. Replace the bottle every 3 months.  NasaDock Plus  NasaDock NeilMed® SINUS RINSE Squeeze Bottle: Please perform routine inspections of the bottle and tube for any discolorations and cracks. If there are any visual signs of deterioration or permanent color changes, please clean thoroughly. If the discolorations remain after cleansing, discard the items and purchase new ones. Please follow these instructions after each use of the product. Be sure to replace your product after three months.  Step 1: Rinse the cap, tube and bottle using running water. Fill the bottle with distilled, micro-filtered (through 0.2 micron), reverse osmosis filtered, commercially bottled or previously boiled and cooled down water at lukewarm or body temperature..  Step 2: Add a few drops of dish washing liquid or baby shampoo.  Step 3: Attach the cap and tube to the bottle; hold your finger over the opening in the cap and shake the bottle vigorously.  Step 4: Squeeze the bottle hard to allow the soapy solution to clean the interior of the tube and the cap. Empty out the bottle completely.  Step 5: Rinse the soap from the bottle, cap and tube thoroughly and place the items on a clean paper towel to dry or use the preferred NasaDOCK® or NasaDOCK plus.    The NasaDOCK® is a simple, hygienic way to dry and store the SINUS RINSE bottle, cap and tube. NasaDOCK® comes with various hanging options and is available in different colors. Our newest model also offers storage for our SINUS RINSE mixture packets. We strongly suggest using NasaDOCK® as an inexpensive, easy way to dry the cap, tube and SINUS RINSE bottle.        Cleaning:  Do not use a  to clean the inside of a bottle. While our bottle is  safe, a  will not adequately clean the SINUS RINSE bottle. The water jets in dishwashers cannot enter the narrow neck of the bottle, and  portions of the bottles interior will not be cleaned thoroughly. Additional methods of cleaning the bottle include the use of concentrated white vinegar or isopropyl alcohol (70% concentration), followed by scrubbing and rinsing as described above.       Microwave Disinfection  Clean the device with soap and water as mentioned above and shake off the excess water. Now place the bottle, cap and tube in the microwave for 40 seconds. This will disinfect the bottle, cap and tube. If the microwave has been used recently, please make sure that the inside of the microwave has cooled back down to room temperature before using it to disinfect the bottle.    NeilMed NasaFlo® Neti Pot Users:  Use the same procedure as above.    Sinugator® Cleaning Directions:  Clean the Sinugator® by running plain water and dry with a clean lint free towel and then air dry the unit by keeping it open to the air. The nasal  tip, blue reservoir and white soft tube can be disinfected by cleaning with soap and water and shaking off the excess water before placing in the home microwave for 60 seconds. Clean the entire unit with a few drops of dishwashing liquid and water every three days to keep the unit clean. As a fi nal rinse to wash off any residual soap or tap water, use either distilled, micro-filtered (through 0.2 micron filter), commercially bottled or previously boiled & cooled down water. Please make sure to rinse thoroughly during each wash so no soap is left behind. DO NOT place the white motor unit in microwave for disinfection. Because of the units stainless steel components, this can cause damage or fire hazards.    General Principles of Maintenance & Storage:  When permissible use a microwave periodically to disinfect devices. Always store NeilMed® products in a cool and dry place with adequate ventilation. NasaDOCK® or NasaDOCK plus offer a simple hygienic way to air dry & neatly store the bottle, cap, tube and NasaFlo.  Do not store the bottle with the cap screwed on, unless both are dry. Do not store the wet parts in a sealed plastic bag. If you travel before they are dry, wrap parts separately in paper towels. Hand soap or shampoo can be used for cleaning parts while away from home.        USE ONLY AS DIRECTED, IF SYMPTOMS PERSIST SEE YOUR DOCTOR/HEALTHCARE PROFESSIONAL. ALWAYS READ THE LABEL.

## 2023-11-27 NOTE — PROGRESS NOTES
[facility]    Subjective:      Patient: Alix Patel Patient PCP: Angélica Barakat MD         :  1955     Sex:  female      MRN:  [id number]          Date of Visit: 2023      Chief Complaint: Sinus Problem, Sore Throat, Headache (Green mucous, sneezing  ), and Hoarse (Pt can't sing, pt seen Dr Griffith in  had has not gotten any relieve )      Patient ID: Alix Patel is a 67 y.o. female former smoker with a past medical history of tension headaches, 3 PD, bipolar disorder, TMJ PDS, pulmonary emphysema, type 2 diabetes, thyroid nodule, GERD self-referred for voice complaints.      She complains of temporal headaches as well as pressure and pain between the eyes as well as discolored nasal drainage which has been ongoing for 2 months (normal CT scan just 3 weeks ago) but more importantly worsening of her voice.  Her reflux symptom index is now elevated to 19 with no symptoms of heartburn/GERD and a dramatic worsening of her voice handicap index at 85 where it was previously as low as 9.  She reports she is compliant with all conservative care as outlined in previous visits as well as speech therapy home exercises.  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  There has been no single inciting event.  These symptoms have been waxing and waning and slowly worsening since her 1st evaluation with Dr. Curry in .  She denies any hemoptysis or otalgia (does have ear itching bilaterally) or any dysphagia. VHI 85 RSI 19.    Seen by Dr. Curry 2017 with a VHI of 9 and RSI of 11.  Endoscopy at that time with bilateral mild membranous edema broad-based on the left and minor mid membranous vocal on the right with bilateral mild symmetric diffuse vascular ectasia premature contact and irregular closure with only mild vibratory asymmetry on stroboscopic evaluation with trace glottal insufficiency and gap and impaired vibration at the upper-most register and  pervasive supraglottic laryngeal hyperfunction at high frequencies (supraglottic laryngeal hyperfunction and mild left-greater-than-right vocal cord edema).  Hydration, conservative measures and speech therapy recommended.  Follow-up 04/19/2018 with findings of acute rhinosinusitis and Medrol.    Seen by Dr. Horta 05/15/2018 with findings of nasopharyngeal crusting cultured.  Treated with rinses.  Culture grew staph aureus treated with doxycycline for 28 days.  08/13/2019 follow-up Dr. Horta.  Nasopharyngoscopy with a midline cleft/depression without crusting (images reviewed).  Based on cultures treated with nasal nebs with levofloxacin through [employer].  Seen again 01/07/2020 with Dr. Horta seen by Dr. Curry with reassurance prior.  Last visit with Dr. Horta 09/20/2022 treated with a three-week course    11/02/2023 CT head [facility] reports paranasal sinuses are clear.  Mastoid air cells are clear.     03/20/2023 CT head without contrast images reviewed are limited but show no appreciable paranasal sinus mucoperiosteal disease, thickening, fluid level or any destructive process.  Frontal sinuses are hypoplastic.    11/05/2022 CTA head and neck images reviewed in bone windowing show no mucoperiosteal thickening fluid level or destructive process of the paranasal sinuses middle ears or mastoids.    CULTURES  09/26/2022 nasal/sinus culture-no significant isolate trauma no anaerobes isolated  01/07/2020 nasal/sinus culture-prevotella and fusobacterium nucleatum  08/13/2019 nasal/sinus culture-Haemophilus influenzae beta lactamase negative and Enterobacter amnigenus 2  05/15/2018 nasal/sinus culture-MSSA pansensitive, no anaerobes    Review of Systems     Past Medical History  She has a past medical history of Anxiety, Bipolar 1 disorder, COPD (chronic obstructive pulmonary disease), Depression, Diabetes mellitus, GERD (gastroesophageal reflux disease), Grade I open displaced transverse fracture of  shaft of left humerus, HEARING LOSS, Hypertension, Insomnia, and Rash.    Family / Surgical / Social History  Her family history includes Alzheimer's disease in her maternal cousin, maternal cousin, and mother; Anxiety disorder in her daughter; Colon cancer in her maternal aunt; Depression in her daughter; Heart attack in her father; Heart failure in her father; Hyperlipidemia in her father; No Known Problems in her sister; Sarcoidosis in her sister; Sinus disease in her mother.    Past Surgical History:   Procedure Laterality Date    APPENDECTOMY      APPLICATION, EXTERNAL FIXATION DEVICE, FOR ANKLE FRACTURE Right 5/5/2023    Procedure: APPLICATION, EXTERNAL FIXATION DEVICE, FOR ANKLE FRACTURE - right, mariusz, ancef, bone foam, slider, C arm door side;  Surgeon: Yanely Guerra MD;  Location: [facility] OR [address] [place] [address];  Service: Orthopedics;  Laterality: Right;  right, mariusz, ancef, bone foam, slider, C arm door side    BREAST BIOPSY      COLONOSCOPY      COLONOSCOPY N/A 10/31/2019    Procedure: COLONOSCOPY;  Surgeon: Philippe Monteiro MD;  [facility]  Service: Endoscopy;  Laterality: N/A;  PM prep-MH    CYST REMOVAL      ESOPHAGOGASTRODUODENOSCOPY N/A 10/31/2019    Procedure: EGD (ESOPHAGOGASTRODUODENOSCOPY);  Surgeon: Philippe Monteiro MD;  [facility]  Service: Endoscopy;  Laterality: N/A;  Pm prep-MH    FIXATION OF SYNDESMOSIS OF ANKLE Right 5/8/2023    Procedure: FIXATION, SYNDESMOSIS, ANKLE;  Surgeon: Johnathan Contreras MD;  Location: [address];  Service: Orthopedics;  Laterality: Right;    HYSTERECTOMY      IRRIGATION AND DEBRIDEMENT OF UPPER EXTREMITY Left 3/21/2023    Procedure: IRRIGATION AND DEBRIDEMENT, UPPER EXTREMITY;  Surgeon: Ousmane Crawford MD;  Location: [address];  Service: Orthopedics;  Laterality: Left;    OPEN REDUCTION AND INTERNAL FIXATION (ORIF) OF INJURY OF ANKLE Right 5/8/2023    Procedure: ORIF, ANKLE - RIGHT. EX FIX REMOVAL. SYNTHES. C  "ARM DOOR SIDE.  BONE FOAM.;  Surgeon: Johnathan Contreras MD;  Location: [facility] OR [address] [place] [address];  Service: Orthopedics;  Laterality: Right;    ORIF HUMERUS FRACTURE Left 3/21/2023    Procedure: ORIF, FRACTURE, HUMERUS;  Surgeon: Ousmane Crawford MD;  Location: [facility] OR [address] [place] [address];  Service: Orthopedics;  Laterality: Left;       Social History     Tobacco Use    Smoking status: Former     Current packs/day: 0.00     Average packs/day: 0.5 packs/day for 47.0 years (23.5 ttl pk-yrs)     Types: Cigarettes     Start date:      Quit date:      Years since quittin.9     Passive exposure: Never    Smokeless tobacco: Never   Substance and Sexual Activity    Alcohol use: Yes     Comment: Rare    Drug use: Not Currently     Types: Marijuana, Cocaine    Sexual activity: Not Currently     Partners: Male     Birth control/protection: None       Medications  She has a current medication list which includes the following prescription(s): acetaminophen, blood sugar diagnostic, blood-glucose meter, calcium carbonate, cholecalciferol (vitamin d3), ciclopirox, trulicity, escitalopram oxalate, gabapentin, insulin detemir u-100 (levemir), levothyroxine, metformin, rosuvastatin, belsomra, and trazodone.      Allergies  Review of patient's allergies indicates:   Allergen Reactions    Lamictal [lamotrigine] Rash     Per psychiatry notes       All medications, allergies, and past history have been reviewed.    Objective:      Vitals:      10/10/2023     8:58 AM 2023     9:35 AM 2023    11:34 AM   Vitals - 1 value per visit   SYSTOLIC 122     DIASTOLIC 65     Pulse 83     Resp 18     SPO2 100 %     Weight (lb) 150.79  150.79   Weight (kg) 68.4  68.4   Height 5' 10" (1.778 m)  5' 10" (1.778 m)   BMI (Calculated) 21.6  21.6   Pain Score Eight Seven        There is no height or weight on file to calculate BSA.    Physical Exam:    GENERAL  APPEARANCE -  alert, appears stated age, and cooperative  BARRIER(S) TO COMMUNICATION -  " none VOICE - mild at times moderate strain and a few breaks    INTEGUMENTARY  no suspicious head and neck lesions    HEENT  HEAD: Normocephalic, without obvious abnormality, atraumatic  FACE: INSPECTION - Symmetric, no signs of trauma, no suspicious lesion(s)  PALPATION -  No masses SALIVARY GLANDS - non-tender with no appreciable mass  STRENGTH - facial symmetry  NECK/THYROID: normal atraumatic, no neck masses, normal thyroid, no jvd    EYES  Normal occular alignment and mobility with no visible nystagmus at rest    EARS/NOSE/MOUTH/THROAT  EARS  PINNAE AND EXTERNAL EARS - no suspicious lesion OTOSCOPIC EXAM (surgical microscopy was not used for visualization/instrumentation): EAR EXAM - Normal ear canals, tympanic membranes and mobility, and middle ear spaces bilaterally.  HEARING - grossly intact to voice/finger rub    NOSE AND SINUSES  EXTERNAL NOSE - Grossly normal for age/sex  SEPTUM - normal/no obstruction on anterior exam without decongestion TURBINATES - within normal limits MUCOSA - within normal limits     MOUTH AND THROAT   ORAL CAVITY, LIPS, TEETH, GUMS & TONGUE - moist, no suspicious lesions  OROPHARYNX /TONSILS/PHARYNGEAL WALLS/HYPOPHARYNX - no erythema or exudates  NASOPHARYNX - limited mirror exam - unable to visualize due to anatomy/gag  LARYNX -  - limited mirror exam - unable to visualize due to anatomy/gag      CHEST AND LUNG   INSPECTION & AUSCULTATION - normal effort, no stridor    CARDIOVASCULAR  AUSCULTATION & PERIPHERAL VASCULAR - regular rate and rhythm.    NEUROLOGIC  MENTAL STATUS - alert, interactive CRANIAL NERVES - normal    LYMPHATIC  HEAD AND NECK - non-palpable      Procedure(s):  Flexible laryngoscopy performed.  See procedure note.              Labs:  WBC   Date Value Ref Range Status   06/19/2023 6.77 3.90 - 12.70 K/uL Final     Hemoglobin   Date Value Ref Range Status   06/19/2023 13.4 12.0 - 16.0 g/dL Final     Platelets   Date Value Ref Range Status   06/19/2023 258 150 - 450  K/uL Final     Creatinine   Date Value Ref Range Status   06/19/2023 0.8 0.5 - 1.4 mg/dL Final     TSH   Date Value Ref Range Status   06/19/2023 0.802 0.400 - 4.000 uIU/mL Final     Glucose   Date Value Ref Range Status   06/19/2023 306 (H) 70 - 110 mg/dL Final     Hemoglobin A1C   Date Value Ref Range Status   03/20/2023 7.5 (H) 4.0 - 5.6 % Final     Comment:     ADA Screening Guidelines:  5.7-6.4%  Consistent with prediabetes  >or=6.5%  Consistent with diabetes    High levels of fetal hemoglobin interfere with the HbA1C  assay. Heterozygous hemoglobin variants (HbS, HgC, etc)do  not significantly interfere with this assay.   However, presence of multiple variants may affect accuracy.           Assessment:      Problem List Items Addressed This Visit          ENT    Vocal fold edema     Other Visit Diagnoses       Chronic nasopharyngitis    -  Primary    Colonization with methicillin-susceptible Staphylococcus aureus        Functional dysphonia        Post-nasal drip        Itching of ear                     Plan:      No suspicious findings on prior imaging or exam and endoscopy today.  There is significant ear itching which should improve with a short course fluocinolone/derm otic oil to the ears and then routine ear care with mineral oil thereafter.  There are findings historically of significant nasopharyngitis with ulceration which appears healed today.  While there is no active infection of the sinuses saline rinses and when flared up mupirocin ointment to the nostrils twice daily for a week is recommended for known staph colonization.      While no suspicious lesion is found in the larynx there is some mid left vocal cord focal edema and to a lesser degree diffuse right-sided vocal edema and left-greater-than-right vocal cord hypervascularity.  I do not believe this represents an underlying vocal cord cyst but more detailed visualization with stroboscopy is recommended with Dr. Maximino Curry.    General  throat care measures as previously discussed are outlined again today.  There is a significant elevation in the RSI even in the absence of heartburn this may represent silent reflux for which conservative care rather than acid suppressive medications is strongly recommended.  Throat-clearing avoidance, vocal strain avoidance and continued observance of home exercises provided by speech therapy are recommended for now.    Return with any worsening of symptoms, failure to improve, or any other concerns for further evaluation and treatment.

## 2023-11-27 NOTE — PROCEDURES
"Laryngoscopy    Date/Time: 11/27/2023 11:20 AM    Performed by: Richi Johnston MD  Authorized by: Richi Johnston MD    Time out: Immediately prior to procedure a "time out" was called to verify the correct patient, procedure, equipment, support staff and site/side marked as required.    Anesthesia:     Local anesthetic:  4% Xylocaine spray with Gerardo-Synephrine    Patient tolerance:  Patient tolerated the procedure well with no immediate complications    Decongestion performed?: Yes    Laryngoscopy:     Areas examined:  Nasal cavities, nasopharynx, oropharynx, hypopharynx, larynx and vocal cords  Larynx/hypopharynx:      After topical aerosolized cessation of both nares the right nares was inspected as was the left with full instrumentation of the right to the nasopharynx which showed a slight depression but no lack of mucosa.  Slight prominent vascularity (See photos) but no granulation tissue, crusting or any other concerning nasopharyngeal findings.  No active nasal or sinus drainage edema or polyps.  Only slight honey crust of the right septum noted.      With respect to the oropharynx and hypopharynx there is mild cobblestoning but no significant posterior interarytenoid edema or hyperemia.  No pooling of secretions in the vallecula or the Piriforms.    With respect to the vocal cords there is some mild vocal strain bilaterally but more so on the left with what appears to be focal edema or convexity of the left vocal cord with only a slight concavity of the right vocal cord.  There does appear to be complete closure at least of the mid 3rd of the true vocal cords more complete but only accompanied by greater strain.  No stroboscopy available.    No mucosal lesion appreciated or any distinct neurologic dysfunction/paresis.    "

## 2023-11-28 ENCOUNTER — OFFICE VISIT (OUTPATIENT)
Dept: PSYCHIATRY | Facility: CLINIC | Age: 68
End: 2023-11-28
Payer: COMMERCIAL

## 2023-11-28 VITALS
DIASTOLIC BLOOD PRESSURE: 68 MMHG | WEIGHT: 153.25 LBS | HEART RATE: 87 BPM | SYSTOLIC BLOOD PRESSURE: 147 MMHG | BODY MASS INDEX: 21.98 KG/M2

## 2023-11-28 DIAGNOSIS — F31.81 BIPOLAR II DISORDER: Primary | ICD-10-CM

## 2023-11-28 DIAGNOSIS — F10.21 ALCOHOL USE DISORDER, MODERATE, IN SUSTAINED REMISSION: ICD-10-CM

## 2023-11-28 DIAGNOSIS — E11.65 TYPE 2 DIABETES MELLITUS WITH HYPERGLYCEMIA, WITHOUT LONG-TERM CURRENT USE OF INSULIN: ICD-10-CM

## 2023-11-28 DIAGNOSIS — F41.9 ANXIETY DISORDER, UNSPECIFIED TYPE: ICD-10-CM

## 2023-11-28 DIAGNOSIS — Z63.9 FAMILY DYNAMICS PROBLEM: ICD-10-CM

## 2023-11-28 DIAGNOSIS — F51.01 PRIMARY INSOMNIA: ICD-10-CM

## 2023-11-28 DIAGNOSIS — E03.9 ACQUIRED HYPOTHYROIDISM: ICD-10-CM

## 2023-11-28 PROCEDURE — 1157F ADVNC CARE PLAN IN RCRD: CPT | Mod: CPTII,S$GLB,, | Performed by: PSYCHIATRY & NEUROLOGY

## 2023-11-28 PROCEDURE — 3078F DIAST BP <80 MM HG: CPT | Mod: CPTII,S$GLB,, | Performed by: PSYCHIATRY & NEUROLOGY

## 2023-11-28 PROCEDURE — 3077F SYST BP >= 140 MM HG: CPT | Mod: CPTII,S$GLB,, | Performed by: PSYCHIATRY & NEUROLOGY

## 2023-11-28 PROCEDURE — 99999 PR PBB SHADOW E&M-EST. PATIENT-LVL II: ICD-10-PCS | Mod: PBBFAC,,, | Performed by: PSYCHIATRY & NEUROLOGY

## 2023-11-28 PROCEDURE — 3051F HG A1C>EQUAL 7.0%<8.0%: CPT | Mod: CPTII,S$GLB,, | Performed by: PSYCHIATRY & NEUROLOGY

## 2023-11-28 PROCEDURE — 99214 OFFICE O/P EST MOD 30 MIN: CPT | Mod: S$GLB,,, | Performed by: PSYCHIATRY & NEUROLOGY

## 2023-11-28 PROCEDURE — 99214 PR OFFICE/OUTPT VISIT, EST, LEVL IV, 30-39 MIN: ICD-10-PCS | Mod: S$GLB,,, | Performed by: PSYCHIATRY & NEUROLOGY

## 2023-11-28 PROCEDURE — 3008F BODY MASS INDEX DOCD: CPT | Mod: CPTII,S$GLB,, | Performed by: PSYCHIATRY & NEUROLOGY

## 2023-11-28 PROCEDURE — 1157F PR ADVANCE CARE PLAN OR EQUIV PRESENT IN MEDICAL RECORD: ICD-10-PCS | Mod: CPTII,S$GLB,, | Performed by: PSYCHIATRY & NEUROLOGY

## 2023-11-28 PROCEDURE — 99999 PR PBB SHADOW E&M-EST. PATIENT-LVL II: CPT | Mod: PBBFAC,,, | Performed by: PSYCHIATRY & NEUROLOGY

## 2023-11-28 PROCEDURE — 3077F PR MOST RECENT SYSTOLIC BLOOD PRESSURE >= 140 MM HG: ICD-10-PCS | Mod: CPTII,S$GLB,, | Performed by: PSYCHIATRY & NEUROLOGY

## 2023-11-28 PROCEDURE — 3008F PR BODY MASS INDEX (BMI) DOCUMENTED: ICD-10-PCS | Mod: CPTII,S$GLB,, | Performed by: PSYCHIATRY & NEUROLOGY

## 2023-11-28 PROCEDURE — 3051F PR MOST RECENT HEMOGLOBIN A1C LEVEL 7.0 - < 8.0%: ICD-10-PCS | Mod: CPTII,S$GLB,, | Performed by: PSYCHIATRY & NEUROLOGY

## 2023-11-28 PROCEDURE — 3078F PR MOST RECENT DIASTOLIC BLOOD PRESSURE < 80 MM HG: ICD-10-PCS | Mod: CPTII,S$GLB,, | Performed by: PSYCHIATRY & NEUROLOGY

## 2023-11-28 RX ORDER — TRAZODONE HYDROCHLORIDE 150 MG/1
150 TABLET ORAL NIGHTLY PRN
Qty: 90 TABLET | Refills: 1 | Status: SHIPPED | OUTPATIENT
Start: 2023-11-28 | End: 2024-03-27 | Stop reason: SDUPTHER

## 2023-11-28 RX ORDER — ESCITALOPRAM OXALATE 20 MG/1
20 TABLET ORAL DAILY
Qty: 90 TABLET | Refills: 1 | Status: SHIPPED | OUTPATIENT
Start: 2023-11-28 | End: 2024-03-27 | Stop reason: SDUPTHER

## 2023-11-28 SDOH — SOCIAL DETERMINANTS OF HEALTH (SDOH): PROBLEM RELATED TO PRIMARY SUPPORT GROUP, UNSPECIFIED: Z63.9
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
## 2023-11-28 NOTE — PATIENT INSTRUCTIONS
PLAN:     Follow UP      2/8/2024  3:00 PM ESTABLISHED PATIENT [address] - Psych [facility] 4th Fl Post, Tim CAMPBELL MD     Encouraged to call [phone] to request a counselor     Meds: renewed as prior    She will speak with Neurology who she reports is looking at Belsomra or other meds for insomnia     Encouraged  continued sobriety says no alcohol since Oct 2022     References:   [facility] [phone]    Relaxation stress reduction workbook: NINA Otero PhD ( used: $7-10)    Feeling Good Website: Tim Montemayor MD / www.[employer] website (free) / patel. PODCASTS    Anxiety &  phobia workbook by SHANNAN Copeland PhD  (web retailers: used: $ 7-10)    [place]: Path to Better Sleep : https://www.veterantraining.va.gov/insomnia/ (free)     Pt expressed appreciation for the visit today and did not have further question at this time though pt  was still informed to:     Call  if problems.    Call / Report Side Effects to Psyc MD     Encouraged to follow up with primary care / Gen Med MD for continued monitoring of general health and wellness.    Understanding was expressed; and no further concerns nor questions were raised at this time.     Remember healthy self care:   eat right  attempt adequate rest   HANDWASHING / encourage such patel. During this corona virus time   walk or light exercise within reason and as your general med team approves  read or explore any of reference materials / homework mentioned  reach out (I.e.,  connect with)  others who nuture and bring out best in you  avoid risky behaviors    Keep your appointments:    IF you  cannot make your appt THEN please call [phone]  or go online (via My Chart john) to reschedule.    It is the responsibility of the patient to reschedule an appointment if an appointment has been canceled or missed.    Avoid  alcohol and illicit substances.  Look for the positive.  All is often relative-seek balance  Call  sooner if needed : [phone]  Call 911 or go to Emergency Room  (ER)  if  any acute concerns

## 2023-11-29 ENCOUNTER — HOSPITAL ENCOUNTER (OUTPATIENT)
Dept: RADIOLOGY | Facility: OTHER | Age: 68
Discharge: HOME | End: 2023-11-29
Attending: NURSE PRACTITIONER
Payer: COMMERCIAL

## 2023-11-29 DIAGNOSIS — Z78.0 ASYMPTOMATIC MENOPAUSAL STATE: ICD-10-CM

## 2023-11-29 DIAGNOSIS — Z12.31 BREAST CANCER SCREENING BY MAMMOGRAM: ICD-10-CM

## 2023-11-29 PROCEDURE — 77063 BREAST TOMOSYNTHESIS BI: CPT | Mod: 26,,, | Performed by: RADIOLOGY

## 2023-11-29 PROCEDURE — 77080 DXA BONE DENSITY AXIAL: CPT | Mod: 26,,, | Performed by: RADIOLOGY

## 2023-11-29 PROCEDURE — 77067 SCR MAMMO BI INCL CAD: CPT | Mod: TC

## 2023-11-29 PROCEDURE — 77063 MAMMO DIGITAL SCREENING BILAT WITH TOMO: ICD-10-PCS | Mod: 26,,, | Performed by: RADIOLOGY

## 2023-11-29 PROCEDURE — 77067 MAMMO DIGITAL SCREENING BILAT WITH TOMO: ICD-10-PCS | Mod: 26,,, | Performed by: RADIOLOGY

## 2023-11-29 PROCEDURE — 77080 DXA BONE DENSITY AXIAL SKELETON 1 OR MORE SITES: ICD-10-PCS | Mod: 26,,, | Performed by: RADIOLOGY

## 2023-11-29 PROCEDURE — 77067 SCR MAMMO BI INCL CAD: CPT | Mod: 26,,, | Performed by: RADIOLOGY

## 2023-11-29 PROCEDURE — 77080 DXA BONE DENSITY AXIAL: CPT | Mod: TC

## 2023-12-23 ENCOUNTER — OFFICE VISIT (OUTPATIENT)
Dept: URGENT CARE | Facility: CLINIC | Age: 68
End: 2023-12-23
Payer: COMMERCIAL

## 2023-12-23 VITALS
SYSTOLIC BLOOD PRESSURE: 127 MMHG | DIASTOLIC BLOOD PRESSURE: 101 MMHG | HEART RATE: 108 BPM | HEIGHT: 70 IN | TEMPERATURE: 98 F | BODY MASS INDEX: 21.62 KG/M2 | WEIGHT: 151 LBS | RESPIRATION RATE: 14 BRPM | OXYGEN SATURATION: 100 %

## 2023-12-23 DIAGNOSIS — U07.1 COVID-19: ICD-10-CM

## 2023-12-23 DIAGNOSIS — U07.1 COVID-19 VIRUS DETECTED: ICD-10-CM

## 2023-12-23 DIAGNOSIS — R05.9 COUGH, UNSPECIFIED TYPE: Primary | ICD-10-CM

## 2023-12-23 LAB
CTP QC/QA: YES
CTP QC/QA: YES
POC MOLECULAR INFLUENZA A AGN: NEGATIVE
POC MOLECULAR INFLUENZA B AGN: NEGATIVE
SARS-COV-2 AG RESP QL IA.RAPID: POSITIVE

## 2023-12-23 PROCEDURE — 87811 SARS CORONAVIRUS 2 ANTIGEN POCT, MANUAL READ: ICD-10-PCS | Mod: QW,S$GLB,, | Performed by: FAMILY MEDICINE

## 2023-12-23 PROCEDURE — 87502 INFLUENZA DNA AMP PROBE: CPT | Mod: QW,S$GLB,, | Performed by: FAMILY MEDICINE

## 2023-12-23 PROCEDURE — 87502 POCT INFLUENZA A/B MOLECULAR: ICD-10-PCS | Mod: QW,S$GLB,, | Performed by: FAMILY MEDICINE

## 2023-12-23 PROCEDURE — 99213 OFFICE O/P EST LOW 20 MIN: CPT | Mod: S$GLB,,, | Performed by: FAMILY MEDICINE

## 2023-12-23 PROCEDURE — 87811 SARS-COV-2 COVID19 W/OPTIC: CPT | Mod: QW,S$GLB,, | Performed by: FAMILY MEDICINE

## 2023-12-23 PROCEDURE — 99213 PR OFFICE/OUTPT VISIT, EST, LEVL III, 20-29 MIN: ICD-10-PCS | Mod: S$GLB,,, | Performed by: FAMILY MEDICINE

## 2023-12-23 NOTE — LETTER
[address] [place] [address] ? [place], [address] ? Phone [phone] ? Fax [phone]           Return to Work/School    Patient: Alix Patel  YOB: 1955   Date: 12/23/2023      To Whom It May Concern:     Alix Patel was in contact with/seen in my office on 12/23/2023. COVID-19 is present in our communities across the state. Not all patients are eligible or appropriate to be tested. In this situation, your employee meets the following criteria:     Alix Patel has met the criteria for COVID-19 testing and has a POSITIVE result. She can return to work once they are asymptomatic for 24 hours without the use of fever reducing medications AND at least five days from the start of symptoms (or from the first positive result if they have no symptoms).      If you have any questions or concerns, or if I can be of further assistance, please do not hesitate to contact me.     Sincerely,    JIM VINCENT MD

## 2024-01-08 ENCOUNTER — OFFICE VISIT (OUTPATIENT)
Dept: OTOLARYNGOLOGY | Facility: CLINIC | Age: 69
End: 2024-01-08
Payer: COMMERCIAL

## 2024-01-08 VITALS — SYSTOLIC BLOOD PRESSURE: 121 MMHG | HEART RATE: 96 BPM | DIASTOLIC BLOOD PRESSURE: 75 MMHG

## 2024-01-08 DIAGNOSIS — F44.4 FUNCTIONAL DYSPHONIA: ICD-10-CM

## 2024-01-08 DIAGNOSIS — J38.4 VOCAL FOLD EDEMA: Primary | ICD-10-CM

## 2024-01-08 DIAGNOSIS — R49.0 DYSPHONIA: ICD-10-CM

## 2024-01-08 DIAGNOSIS — J38.5 LARYNGEAL SPASM: ICD-10-CM

## 2024-01-08 PROCEDURE — 3074F SYST BP LT 130 MM HG: CPT | Mod: CPTII,S$GLB,, | Performed by: OTOLARYNGOLOGY

## 2024-01-08 PROCEDURE — 1125F AMNT PAIN NOTED PAIN PRSNT: CPT | Mod: CPTII,S$GLB,, | Performed by: OTOLARYNGOLOGY

## 2024-01-08 PROCEDURE — 99999 PR PBB SHADOW E&M-EST. PATIENT-LVL IV: CPT | Mod: PBBFAC,,, | Performed by: OTOLARYNGOLOGY

## 2024-01-08 PROCEDURE — 31579 LARYNGOSCOPY TELESCOPIC: CPT | Mod: S$GLB,,, | Performed by: OTOLARYNGOLOGY

## 2024-01-08 PROCEDURE — 1159F MED LIST DOCD IN RCRD: CPT | Mod: CPTII,S$GLB,, | Performed by: OTOLARYNGOLOGY

## 2024-01-08 PROCEDURE — 99213 OFFICE O/P EST LOW 20 MIN: CPT | Mod: 25,S$GLB,, | Performed by: OTOLARYNGOLOGY

## 2024-01-08 PROCEDURE — 3078F DIAST BP <80 MM HG: CPT | Mod: CPTII,S$GLB,, | Performed by: OTOLARYNGOLOGY

## 2024-01-08 PROCEDURE — 1157F ADVNC CARE PLAN IN RCRD: CPT | Mod: CPTII,S$GLB,, | Performed by: OTOLARYNGOLOGY

## 2024-01-08 PROCEDURE — 1160F RVW MEDS BY RX/DR IN RCRD: CPT | Mod: CPTII,S$GLB,, | Performed by: OTOLARYNGOLOGY

## 2024-01-08 NOTE — PROGRESS NOTES
[facility]  Department of Otorhinolaryngology and Communication Sciences     Subjective:      Alix Patel is a 63 y.o. female who presents for follow-up. She has some mild chronic endolaryngeal changes and muscle tension dysphonia. She also has struggled with chronic nasopharyngitis.     She complains of ongoing hoarseness. She complains of inability to access above E above middle C due to voice breaks. She has her voice also breaks in the conversational setting. She had Covid over the Xmas holiday. She is now almost completely recovered and she finds her breathing is much better than it was. She plans to enroll in voice coursework at [facility] this Spring    The patient's medications, allergies, past medical, surgical, social and family histories were reviewed and updated as appropriate.     A detailed review of systems was obtained with pertinent positives as per the above HPI, and otherwise negative.      Objective:      /77 (Patient Position: Sitting)   Pulse 100   Wt 73 kg (161 lb)   BMI 22.45 kg/m²       Constitutional: comfortable, well dressed  Psychiatric: appropriate affect  Respiratory: comfortably breathing, symmetric chest rise, no stridor  Voice: minimal variable roughness/chambers; inconsistent pitch breaks; variable mild strain  Head: normocephalic  Eyes: conjunctivae and sclerae clear  Indirect laryngoscopy is limited due to gag     Procedure  Rigid Laryngeal Videostroboscopy (78791): Laryngeal videostroboscopy is indicated to assess the laryngeal vibratory biomechanics and vocal fold oscillation, which cannot be assessed with a plain light examination. This was carried out with a 70 degree endoscope. After verbal consent was obtained, the patient was positioned and the tongue was gently secured with a gauze sponge. The endoscope was passed transorally and positioned to image the larynx and hypopharynx in detail. The following features were examined: laryngeal and hypopharyngeal  masses; vocal fold range and symmetry of motion; laryngeal mucosal edema, erythema, inflammation, and hydration; salivary pooling; and gross laryngeal sensation. During phonation, the vocal folds were assessed for glottal closure; mucosal wave; vocal fold lesions; vibratory periodicity, amplitude, and phase symmetry; and vertical height match. The equipment was removed. The patient tolerated the procedure well without complication. All findings were normal except:  - left vocal fold with mild midmembranous edema, broad-based  - right vocal fold with minor midmembranous vocal fold edema  - mild, bilateral, diffuse, ~symmetric vascular ectasia  - premature contact, irregular closure  - complete glottal closure at low/modal frequencies, with only mild vibratory asymmetry  - trace glottal insufficiency, with an anterior/posterior gap and impaired vibration, only at uppermost register  - mild supraglottic laryngeal hyperfunction     Assessment:      Alix Patel is a 63 y.o. female with mild chronic endolaryngeal changes and muscle tension dysphonia.      Plan:      Reassurance was provided as to the stability of my findings.    I reviewed her examination with her on the video monitor.     SLP voice evaluation and subsequent therapy sessions are medically necessary for restoration of laryngeal function. We will arrange for this to occur with our team in the coming weeks.    She will follow up with me as needed.    All questions were answered, and the patient is in agreement with the plan.     Maximino Curry M.D.  [facility]  Department of Otorhinolaryngology and Communication Sciences

## 2024-01-17 ENCOUNTER — TELEPHONE (OUTPATIENT)
Dept: SPEECH THERAPY | Facility: HOSPITAL | Age: 69
End: 2024-01-17
Payer: COMMERCIAL

## 2024-01-18 ENCOUNTER — TELEPHONE (OUTPATIENT)
Dept: SPEECH THERAPY | Facility: HOSPITAL | Age: 69
End: 2024-01-18
Payer: COMMERCIAL

## 2024-01-18 NOTE — TELEPHONE ENCOUNTER
Jennifer pt to reschedule appt to 1/24@11am.----- Message from Carlos Mitchell sent at 1/18/2024 12:45 PM CST -----  Regarding: Appt  Contact: [phone]  Pt calling to speak with someone in provider office regards rescheduling appt on 1/18 at 11 am. Pt states she missed her appt because she did not put the date down on her  calendar. Would like to be scheduled soon. No appt available.  Please call pt back at  [phone]

## 2024-02-06 ENCOUNTER — TELEPHONE (OUTPATIENT)
Dept: SPEECH THERAPY | Facility: HOSPITAL | Age: 69
End: 2024-02-06
Payer: COMMERCIAL

## 2024-02-06 NOTE — TELEPHONE ENCOUNTER
"Sw pt to reschedule appt to 2/21@9am.----- Message from Dari Hamlin sent at 2/1/2024 12:02 PM CST -----  Regarding: appt access  Contact: [phone]  Name Of Caller: self     Contact Preference?:  [phone]     What is the nature of the call?: would like to reschedule her appt for speech therapy and she states she has something hanging on the back of her throat as well which she can't cough up      Additional Notes:    "Thank you for all that you do for our patients"        "

## 2024-02-20 ENCOUNTER — CLINICAL SUPPORT (OUTPATIENT)
Dept: SPEECH THERAPY | Facility: HOSPITAL | Age: 69
End: 2024-02-20
Attending: OTOLARYNGOLOGY
Payer: COMMERCIAL

## 2024-02-20 DIAGNOSIS — J38.5 LARYNGEAL SPASM: Primary | ICD-10-CM

## 2024-02-20 DIAGNOSIS — J38.5 LARYNGEAL SPASM: ICD-10-CM

## 2024-02-20 DIAGNOSIS — J38.4 VOCAL FOLD EDEMA: ICD-10-CM

## 2024-02-20 DIAGNOSIS — R49.0 DYSPHONIA: ICD-10-CM

## 2024-02-20 PROCEDURE — 92524 BEHAVRAL QUALIT ANALYS VOICE: CPT | Mod: GN

## 2024-02-20 NOTE — PROGRESS NOTES
Referring provider: Dr. Maximino Curry  Reason for visit:  Behavioral and qualitative analysis of voice and resonance (CPT 79498)    Subjective / History    Alix Patel is a 68 y.o. female referred for voice evaluation (CPT 54297) by Dr. Curry.  She presents with complaints of hoarseness and difficulty singing which began 7 years ago. She reports that her voice cracks in the upper ranges above middle C. The patient also reported the following complaints:   globus sensation, thick nasal secretions, fatigue with use, difficulty with singing, tightness, and reduced volume. 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 She was previously evaluated in 2018 for similar issues that since have not resolved or gotten worse.    Swallowing: no complaints except globus sensation  Breathing:  no complaints     Smokin packs/day   Caffeine: 2 cups/day   Reflux: yes, feeling of things coming back up if eating too close to laying down  Water: 138 oz/day     Stroboscopy findings (per Dr. Curry on 2024)  - left vocal fold with mild midmembranous edema, broad-based  - right vocal fold with minor midmembranous vocal fold edema  - mild, bilateral, diffuse, ~symmetric vascular ectasia  - premature contact, irregular closure  - complete glottal closure at low/modal frequencies, with only mild vibratory asymmetry  - trace glottal insufficiency, with an anterior/posterior gap and impaired vibration, only at uppermost register  - mild supraglottic laryngeal hyperfunction     Past Medical History:   Diagnosis Date    Anxiety     Bipolar 1 disorder     COPD (chronic obstructive pulmonary disease)     Depression     Diabetes mellitus     GERD (gastroesophageal reflux disease)     Grade I open displaced transverse fracture of shaft of left humerus 3/20/2023    HEARING LOSS     pt states she has loss slighty in rt ear.    Hypertension     Insomnia     Rash      Current Outpatient Medications on File Prior  to Visit   Medication Sig Dispense Refill    acetaminophen (TYLENOL) 500 MG tablet Take 2 tablets (1,000 mg total) by mouth every 8 (eight) hours as needed for Pain. 90 tablet 0    blood sugar diagnostic (TRUE METRIX GLUCOSE TEST STRIP MISC) True Metrix Glucose Test Strip   TEST TWICE DAILY AS DIRECTED      blood-glucose meter (TRUE METRIX AIR GLUCOSE METER MISC) True Metrix Air Glucose Meter   USE UNDER THE SKIN TWICE DAILY AS DIRECTED      calcium carbonate (OS-JORDIN) 500 mg calcium (1,250 mg) tablet Take 1 tablet (500 mg total) by mouth 2 (two) times daily. 90 tablet 5    cholecalciferol, vitamin D3, (VITAMIN D3) 25 mcg (1,000 unit) capsule Take 1 capsule (1,000 Units total) by mouth 2 (two) times a day. 90 capsule 5    ciclopirox (PENLAC) 8 % Soln Apply topically nightly. Paint on affected nail(s) once per night, remove residue once per week with alcohol 6.6 mL 3    dulaglutide (TRULICITY) 0.75 mg/0.5 mL pen injector Inject 0.75 mg into the skin every Sunday.      EScitalopram oxalate (LEXAPRO) 20 MG tablet Take 1 tablet (20 mg total) by mouth once daily. 90 tablet 1    fluocinolone acetonide oiL 0.01 % Drop 3-4 drops to the affected ear(s) twice daily no more than a week at a time as needed for itching and scaling 20 mL 0    gabapentin (NEURONTIN) 300 MG capsule Take 1 capsule (300 mg total) by mouth 3 (three) times daily. (Patient not taking: Reported on 12/23/2023) 90 capsule 2    insulin detemir U-100, Levemir, 100 unit/mL (3 mL) SubQ InPn pen Inject 5 Units into the skin every evening.  0    levothyroxine (SYNTHROID) 100 MCG tablet Take 1 tablet (100 mcg total) by mouth once daily. 30 tablet 1    metFORMIN (GLUCOPHAGE) 500 MG tablet Take 2 tablets (1,000 mg total) by mouth 2 (two) times daily with meals. 120 tablet 1    rosuvastatin (CRESTOR) 20 MG tablet Take 1 tablet (20 mg total) by mouth once daily.  3    suvorexant (BELSOMRA) 10 mg Tab Take 1 tablet by mouth every evening. 30 tablet 1    traZODone  "(DESYREL) 150 MG tablet Take 1 tablet (150 mg total) by mouth nightly as needed for Insomnia. 90 tablet 1     No current facility-administered medications on file prior to visit.       Objective    Perceptual/behavioral assessment  -CAPE-V Overall Score: 20  -Quality: chambers/pulse mode phonation and pitch breaks at upper register  -Volume: appropriate for age and gender  -Pitch: appropriate for age and gender identity  -Flexibility: appropriate for age and gender norms  -Habitual respiratory pattern: diaphragmatic    Education / Stimulability Trials  Discussed importance of vocal hygiene including:  humidification, lozenges, and purpose of systemic water intake . Patient was stimulable for improved voice using SOVT exercises on straw phonation. Pt benefited from some cueing to notice differences between "pushing" her voice out and "easy" phonation. Encouraged practicing exercises several times daily in isolation and into short phrases to solidify muscle memory patterns and reduce extralaryngeal tension during speech tasks.  She was amenable to all suggestions.     Assessment     Patient presents with mild dysphonia secondary to mild midmembranous edema as diagnosed by Dr. Curry. Prognosis for continued improvement is good.     Recommendations / POC    -Recommend 4-6 sessions of voice therapy over 6-12 weeks with a speech-language pathologist to optimize glottal postures for improved vocal function, vocal efficiency, and ease of phonation  -Continue exercises as discussed in session  -Contact clinician with any further questions     Functional goals  Length Status Goal   Long term Initiated  Patient and clinician will facilitate changes in vocal function in order to restore functional use of voice for daily occupational, social, and emotional demands.    Long term Initiated  Patient will re-establish phonation with adequate balance of airflow and resonance with decreased muscle tension.    Long term Initiated   Patient " will improve coordination of respiration and phonation for efficient vocal production at a conversational level.    Short term Initiated  Patient will complete SOVT exercises and/or resonant-focused exercises 3-5x daily to strengthen and balance the intrinsic laryngeal musculature and maximize glottic closure without medial hyperfunction.   Short term Initiated  Patient will improve the quality, efficiency, and ease of phonation through resonant and/or airflow exercises from the syllable to conversation level with 80% accuracy.   Short term Initiated  Patient will discriminate between easy and strained phonation with 80% accuracy.    Short term Initiated   Patient will identify the sensations associated with muscle relaxation in the abdominal, thoracic, neck and facial areas during efficient phonation with minimal clinician cue.    Short term Initiated  Patient will demonstrate the ability to increase awareness of voicing behavior through self-monitoring to facilitate generalization in functional speaking situations with 80% accuracy.      Zane Richardson M.A., CF-SLP

## 2024-02-20 NOTE — PLAN OF CARE
Assessment     Patient presents with mild dysphonia secondary to mild midmembranous edema as diagnosed by Dr. Curry. Prognosis for continued improvement is good.      Recommendations / POC    -Recommend 4-6 sessions of voice therapy over 6-12 weeks with a speech-language pathologist to optimize glottal postures for improved vocal function, vocal efficiency, and ease of phonation  -Continue exercises as discussed in session  -Contact clinician with any further questions      Functional goals  Length Status Goal   Long term Initiated  Patient and clinician will facilitate changes in vocal function in order to restore functional use of voice for daily occupational, social, and emotional demands.    Long term Initiated  Patient will re-establish phonation with adequate balance of airflow and resonance with decreased muscle tension.    Long term Initiated   Patient will improve coordination of respiration and phonation for efficient vocal production at a conversational level.    Short term Initiated  Patient will complete SOVT exercises and/or resonant-focused exercises 3-5x daily to strengthen and balance the intrinsic laryngeal musculature and maximize glottic closure without medial hyperfunction.   Short term Initiated  Patient will improve the quality, efficiency, and ease of phonation through resonant and/or airflow exercises from the syllable to conversation level with 80% accuracy.   Short term Initiated  Patient will discriminate between easy and strained phonation with 80% accuracy.    Short term Initiated   Patient will identify the sensations associated with muscle relaxation in the abdominal, thoracic, neck and facial areas during efficient phonation with minimal clinician cue.    Short term Initiated  Patient will demonstrate the ability to increase awareness of voicing behavior through self-monitoring to facilitate generalization in functional speaking situations with 80% accuracy.

## 2024-02-28 ENCOUNTER — TELEPHONE (OUTPATIENT)
Dept: SPEECH THERAPY | Facility: HOSPITAL | Age: 69
End: 2024-02-28
Payer: COMMERCIAL

## 2024-02-28 NOTE — TELEPHONE ENCOUNTER
Jennifer pt to reschedule appt to 3/4@1pm.  ----- Message -----  From: Mallory Merida  Sent: 2/28/2024  10:29 AM CST  To: [facility] Speech Clinical Support  Subject: reschedule appt                                  Pt would like to reschedule her appt on 2/28/24 pt stated she had a fever and didn't want to come in sick to get anyone else sick

## 2024-03-05 ENCOUNTER — TELEPHONE (OUTPATIENT)
Dept: SPEECH THERAPY | Facility: HOSPITAL | Age: 69
End: 2024-03-05
Payer: COMMERCIAL

## 2024-03-05 NOTE — TELEPHONE ENCOUNTER
Jennifer pt to reschedule appointment. Booked 3/6@8am.      ---- Message -----  From: Annalisa Medina  Sent: 3/5/2024  10:58 AM CST  To: [facility] Speech Clinical Support; #  Subject: Appointment                                      Calling in regards to rescheduling appointment as soon as possible. Please call and schedule.

## 2024-03-06 ENCOUNTER — CLINICAL SUPPORT (OUTPATIENT)
Dept: SPEECH THERAPY | Facility: HOSPITAL | Age: 69
End: 2024-03-06
Attending: OTOLARYNGOLOGY
Payer: COMMERCIAL

## 2024-03-06 DIAGNOSIS — J38.5 LARYNGEAL SPASM: ICD-10-CM

## 2024-03-06 DIAGNOSIS — R49.0 DYSPHONIA: ICD-10-CM

## 2024-03-06 DIAGNOSIS — J38.4 VOCAL FOLD EDEMA: ICD-10-CM

## 2024-03-06 PROCEDURE — 92507 TX SP LANG VOICE COMM INDIV: CPT | Mod: GN

## 2024-03-06 NOTE — PROGRESS NOTES
"Referring provider: Dr. Maximino Curry  Reason for visit:  Voice treatment (CPT 66356)  Session #1    History / Subjective   I had the pleasure of seeing Alix Patel for her first treatment session following complete voice evaluation on 2/20/24. During that time, improvements were noted on SOVT voice exercises with straw phonation.   3/6/24: Since evaluation, patient reports that she has been using the straw phonation exercises 3-4 times a day and a few more times on weekends. She feels that her voice is slightly better after doing them, and she has been able to practice singing songs again. Additionally, her globus feeling, possibly caused by thick mucus secretions, has been going away as of the past two days. She attributes this to a change in the seasons.     Objective   The primary goal of todays session was to review SOVT voice exercises and ensure the patient was experiencing success with them. This was targeted using SOVT and forward resonance treatment modalities.    Perceptual/behavioral assessment  -CAPE-V Overall Score (pre session): 20  -Quality: chambers/pulse mode phonation and pitch breaks in upper register  -Volume: appropriate for age and gender identity  -Pitch: appropriate for age and gender identity  -Flexibility: appropriate for age and gender identity  -Habitual respiratory pattern: diaphragmatic    -CAPE-V Post session: 10    Treatment  Clinician instructed patient on straw phonation and wave-in-a-cave SOVT exercises. Patient experienced slight success with these exercises, but still required moderate cueing to transition clear voice into speech. Clinician then instructed patient on using increased forward focus and nasal resonance commonly called "twang" (ref: Billy Goins). Patient experienced greater success with this focus and was able to consciously produce a clear, easy, and resonant voice on command. For home practice, clinician reviewed SOVT exercises and forward focus as practiced " during the session with the patient.     Assessment     Patient presents with mild dysphonia secondary to mild midmembranous edema as diagnosed by Dr. Curry. Prognosis for continued improvement is good.     Recommendations / POC    -Recommend 4-6 sessions of voice therapy over 6-12 weeks with a speech-language pathologist to optimize glottal postures for improved vocal function, vocal efficiency, and ease of phonation  -Continue exercises as discussed in session  -Contact clinician with any further questions     Functional goals  Length Status Goal   Long term In progress Patient and clinician will facilitate changes in vocal function in order to restore functional use of voice for daily occupational, social, and emotional demands.    Long term In progress  Patient will re-establish phonation with adequate balance of airflow and resonance with decreased muscle tension.    Long term In progress   Patient will improve coordination of respiration and phonation for efficient vocal production at a conversational level.    Short term In progress  Patient will complete SOVT exercises and/or resonant-focused exercises 3-5x daily to strengthen and balance the intrinsic laryngeal musculature and maximize glottic closure without medial hyperfunction.   Short term In progress  Patient will improve the quality, efficiency, and ease of phonation through resonant and/or airflow exercises from the syllable to conversation level with 80% accuracy.   Short term In progress  Patient will discriminate between easy and strained phonation with 80% accuracy.    Short term In progress   Patient will identify the sensations associated with muscle relaxation in the abdominal, thoracic, neck and facial areas during efficient phonation with minimal clinician cue.    Short term In progress  Patient will demonstrate the ability to increase awareness of voicing behavior through self-monitoring to facilitate generalization in functional speaking  situations with 80% accuracy.      Zane Richardson M.A., CF-SLP

## 2024-03-13 ENCOUNTER — TELEPHONE (OUTPATIENT)
Dept: ORTHOPEDICS | Facility: CLINIC | Age: 69
End: 2024-03-13
Payer: COMMERCIAL

## 2024-03-13 NOTE — TELEPHONE ENCOUNTER
----- Message from Aliza Stallings sent at 3/13/2024  1:28 PM CDT -----  Regarding: pt advice  Contact: [phone]  Pt requesting provider order another bone growth stimulator the pt has have stop working and pt had it returned. Pls noe

## 2024-03-13 NOTE — TELEPHONE ENCOUNTER
Called and informed pt I spoke with Mr Omero Otero who works with getting our pt's their bone stimulators will be reaching out to her to receive a new one. Pt verbally understood and was satisfied.

## 2024-03-15 ENCOUNTER — TELEPHONE (OUTPATIENT)
Dept: ORTHOPEDICS | Facility: CLINIC | Age: 69
End: 2024-03-15
Payer: COMMERCIAL

## 2024-03-15 NOTE — TELEPHONE ENCOUNTER
Called and Informed pt I spoke with Omero who works with the bone stimulator equipment that he will be contact him shortly.

## 2024-03-15 NOTE — TELEPHONE ENCOUNTER
----- Message from Ralf Kirkpatrick sent at 3/15/2024 11:37 AM CDT -----  Regarding: PT IS CALLING REGARDING GETTING INFORMATION ON REPLACEMENT BONE STIMULATOR  Contact: PT  Pt would like a call back once received     Confirmed contact info below:  Contact Name: Alix Patel  Phone Number: [phone]

## 2024-03-22 ENCOUNTER — TELEPHONE (OUTPATIENT)
Dept: ORTHOPEDICS | Facility: CLINIC | Age: 69
End: 2024-03-22
Payer: COMMERCIAL

## 2024-03-22 NOTE — TELEPHONE ENCOUNTER
----- Message from Suzie Patel sent at 3/22/2024 10:30 AM CDT -----  Regarding: Callback  Contact: [phone]  Patient calling requesting a callback from nurse or provider in regards to the bone growth stimulator that was sent over. She said she was told by Omero Otero they would have to pick it up before issuing another one. Please call back as soon as possible.  Omero Otero telephone number is [phone]

## 2024-03-22 NOTE — TELEPHONE ENCOUNTER
Called and spoke with pt in regards to her bone stimulator being deliver to [facility] around February. Informed pt I spoke with our  staff and they said there was no bag white bag deliver for [facility]. Pt verbally understood and says she will contact Omero Otero.

## 2024-03-25 ENCOUNTER — TELEPHONE (OUTPATIENT)
Dept: PSYCHIATRY | Facility: CLINIC | Age: 69
End: 2024-03-25
Payer: COMMERCIAL

## 2024-03-26 ENCOUNTER — OFFICE VISIT (OUTPATIENT)
Dept: PSYCHIATRY | Facility: CLINIC | Age: 69
End: 2024-03-26
Payer: COMMERCIAL

## 2024-03-26 DIAGNOSIS — F51.01 PRIMARY INSOMNIA: ICD-10-CM

## 2024-03-26 DIAGNOSIS — S82.851D CLOSED TRIMALLEOLAR FRACTURE OF RIGHT ANKLE WITH ROUTINE HEALING: ICD-10-CM

## 2024-03-26 DIAGNOSIS — F10.21 ALCOHOL USE DISORDER, MODERATE, IN SUSTAINED REMISSION: ICD-10-CM

## 2024-03-26 DIAGNOSIS — Z59.9 HOUSING PROBLEMS: ICD-10-CM

## 2024-03-26 DIAGNOSIS — Z63.9 FAMILY DYNAMICS PROBLEM: ICD-10-CM

## 2024-03-26 DIAGNOSIS — E11.65 TYPE 2 DIABETES MELLITUS WITH HYPERGLYCEMIA, WITHOUT LONG-TERM CURRENT USE OF INSULIN: ICD-10-CM

## 2024-03-26 DIAGNOSIS — F41.9 ANXIETY DISORDER, UNSPECIFIED TYPE: ICD-10-CM

## 2024-03-26 DIAGNOSIS — E03.9 ACQUIRED HYPOTHYROIDISM: ICD-10-CM

## 2024-03-26 DIAGNOSIS — F31.81 BIPOLAR II DISORDER: Primary | ICD-10-CM

## 2024-03-26 PROCEDURE — 99999 PR PBB SHADOW E&M-EST. PATIENT-LVL I: CPT | Mod: PBBFAC,,, | Performed by: PSYCHIATRY & NEUROLOGY

## 2024-03-26 PROCEDURE — 90833 PSYTX W PT W E/M 30 MIN: CPT | Mod: S$GLB,,, | Performed by: PSYCHIATRY & NEUROLOGY

## 2024-03-26 PROCEDURE — 99215 OFFICE O/P EST HI 40 MIN: CPT | Mod: S$GLB,,, | Performed by: PSYCHIATRY & NEUROLOGY

## 2024-03-26 RX ORDER — MIRTAZAPINE 15 MG/1
15 TABLET, FILM COATED ORAL NIGHTLY
Qty: 30 TABLET | Refills: 2 | Status: SHIPPED | OUTPATIENT
Start: 2024-03-26 | End: 2024-05-06 | Stop reason: SDUPTHER

## 2024-03-26 SDOH — SOCIAL DETERMINANTS OF HEALTH (SDOH): PROBLEM RELATED TO PRIMARY SUPPORT GROUP, UNSPECIFIED: Z63.9

## 2024-03-26 SDOH — SOCIAL DETERMINANTS OF HEALTH (SDOH): PROBLEM RELATED TO HOUSING AND ECONOMIC CIRCUMSTANCES, UNSPECIFIED: Z59.9
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
## 2024-03-27 ENCOUNTER — PATIENT MESSAGE (OUTPATIENT)
Dept: PSYCHIATRY | Facility: CLINIC | Age: 69
End: 2024-03-27
Payer: COMMERCIAL

## 2024-03-27 DIAGNOSIS — F51.01 PRIMARY INSOMNIA: ICD-10-CM

## 2024-03-27 PROBLEM — Z59.9 HOUSING PROBLEMS: Status: ACTIVE | Noted: 2024-03-27

## 2024-03-27 RX ORDER — TRAZODONE HYDROCHLORIDE 150 MG/1
TABLET ORAL
Qty: 90 TABLET | Refills: 1 | OUTPATIENT
Start: 2024-03-27

## 2024-03-27 RX ORDER — ESCITALOPRAM OXALATE 20 MG/1
20 TABLET ORAL DAILY
Qty: 90 TABLET | Refills: 1 | Status: SHIPPED | OUTPATIENT
Start: 2024-03-27 | End: 2024-09-23

## 2024-03-27 RX ORDER — TRAZODONE HYDROCHLORIDE 150 MG/1
150 TABLET ORAL NIGHTLY PRN
Qty: 90 TABLET | Refills: 1 | Status: SHIPPED | OUTPATIENT
Start: 2024-03-27 | End: 2024-09-23

## 2024-03-27 NOTE — PATIENT INSTRUCTIONS
"    PLAN:   Follow up:    5/6/2024  5:00 PM ESTABLISHED PATIENT VIC Gonzalez - Babs [facility] 4th Fl Post, Tim CAMPBELL MD     STRONGLY RECOMMENDED she seek Counselor ;    one option: (tho encouraged to explore other options via Psychology [employer].com or via his insurance):   Tea [facility] [employer], [address] [address]   [phone]      Other Info on Therapy / Counselors:    1)  [facility], [facility]: ([phone])   and ask to set up an intake eval    Website: ([email or web address]   (Copy and paste to your browser)     Current Conditions Treated:  PTSD  Insomnia  Panic Attacks  Depression  Anxiety  Chronic Pain  Stress    The [facility] is ideal for patients who:  Desire short-term rather than long-term psychotherapy  Want structured psychotherapy sessions for specific targets  Are willing to engage in daily practice assignments  Can commit to weekly psychotherapy for a time-limited period (usually between 6-12 weeks)    Note: Possibility of virtual  (telehealth) participation may exist    call for more information ([phone])     2) Individual Counseling:        A) Call [facility]              [phone] (there may be significant wait times)     B) Contact   your  insurance carrier:  via their Website or call and ask for assistance    C) Psychology Today: [email or web address]     D) [facility] ([facility]) :   [phone] [email or web address]      (website shows accepts "walk ins"; "perhaps double check"    E)  [facility]:  [email or web address]               [phone]    F) [facility] [place]: Contact [facility] :   [phone] [email or web address]      Meds: renewed as prior    Encouraged  continued sobriety says no alcohol since Oct 2022     References: "     Relaxation stress reduction workbook: NINA Otero PhD ( used: $7-10)    Feeling Good Website: Tim Montemayor MD / [email or web address] website (free) / patel. PODCASTS    Anxiety &  phobia workbook by SHANNAN Copeland PhD  (web retailers: used: $ 7-10)    [place]: Path to Better Sleep : https://www.veterantraining.[place].gov/insomnia/ (free)     Pt expressed appreciation for the visit today and did not have further question at this time though pt  was still informed to:     Call  if problems.    Call / Report Side Effects to Psyc MD     Encouraged to follow up with primary care / Gen Med MD for continued monitoring of general health and wellness.    Understanding was expressed; and no further concerns nor questions were raised at this time.     Remember healthy self care:   eat right  attempt adequate rest   HANDWASHING / encourage such patel. During this corona virus time   walk or light exercise within reason and as your general med team approves  read or explore any of reference materials / homework mentioned  reach out (I.e.,  connect with)  others who nuture and bring out best in you  avoid risky behaviors    Keep your appointments:    IF you  cannot make your appt THEN please call [phone]  or go online (via My Chart john) to reschedule.    It is the responsibility of the patient to reschedule an appointment if an appointment has been canceled or missed.    Avoid  alcohol and illicit substances.  Look for the positive.  All is often relative-seek balance  Call sooner if needed : [phone]  Call 911 or go to Emergency Room  (ER)  if  any acute concerns

## 2024-05-06 ENCOUNTER — OFFICE VISIT (OUTPATIENT)
Dept: PSYCHIATRY | Facility: CLINIC | Age: 69
End: 2024-05-06
Payer: COMMERCIAL

## 2024-05-06 DIAGNOSIS — E03.9 ACQUIRED HYPOTHYROIDISM: ICD-10-CM

## 2024-05-06 DIAGNOSIS — Z63.9 FAMILY DYNAMICS PROBLEM: ICD-10-CM

## 2024-05-06 DIAGNOSIS — F10.21 ALCOHOL USE DISORDER, MODERATE, IN SUSTAINED REMISSION: ICD-10-CM

## 2024-05-06 DIAGNOSIS — S82.851D CLOSED TRIMALLEOLAR FRACTURE OF RIGHT ANKLE WITH ROUTINE HEALING: ICD-10-CM

## 2024-05-06 DIAGNOSIS — G47.00 INSOMNIA, UNSPECIFIED TYPE: ICD-10-CM

## 2024-05-06 DIAGNOSIS — F31.81 BIPOLAR II DISORDER: Primary | ICD-10-CM

## 2024-05-06 DIAGNOSIS — F41.9 ANXIETY DISORDER, UNSPECIFIED TYPE: ICD-10-CM

## 2024-05-06 DIAGNOSIS — E11.65 TYPE 2 DIABETES MELLITUS WITH HYPERGLYCEMIA, WITHOUT LONG-TERM CURRENT USE OF INSULIN: ICD-10-CM

## 2024-05-06 DIAGNOSIS — Z59.9 HOUSING PROBLEMS: ICD-10-CM

## 2024-05-06 PROCEDURE — 99999 PR PBB SHADOW E&M-EST. PATIENT-LVL I: CPT | Mod: PBBFAC,,, | Performed by: PSYCHIATRY & NEUROLOGY

## 2024-05-06 PROCEDURE — 90833 PSYTX W PT W E/M 30 MIN: CPT | Mod: S$GLB,,, | Performed by: PSYCHIATRY & NEUROLOGY

## 2024-05-06 PROCEDURE — 99215 OFFICE O/P EST HI 40 MIN: CPT | Mod: S$GLB,,, | Performed by: PSYCHIATRY & NEUROLOGY

## 2024-05-06 RX ORDER — MIRTAZAPINE 15 MG/1
15 TABLET, FILM COATED ORAL NIGHTLY
Qty: 90 TABLET | Refills: 1 | Status: SHIPPED | OUTPATIENT
Start: 2024-05-06 | End: 2024-11-02

## 2024-05-06 SDOH — SOCIAL DETERMINANTS OF HEALTH (SDOH): PROBLEM RELATED TO HOUSING AND ECONOMIC CIRCUMSTANCES, UNSPECIFIED: Z59.9

## 2024-05-06 SDOH — SOCIAL DETERMINANTS OF HEALTH (SDOH): PROBLEM RELATED TO PRIMARY SUPPORT GROUP, UNSPECIFIED: Z63.9

## 2024-05-06 NOTE — PATIENT INSTRUCTIONS
"    PLAN:   Follow up: July 24 2024 @ 4 pm  IN CLINIC     STRONGLY RECOMMENDED she seek Counselor ; says she will explore     one option: (tho encouraged to explore other options via Psychology [employer].com or via his insurance):   Tea Fuller [facility] [email or web address] [facility], [address] [address]   [phone]      Other Info on Therapy / Counselors:    1)  [facility], [facility]: ([phone])   and ask to set up an intake eval    Website: ([email or web address]   (Copy and paste to your browser)     Current Conditions Treated:  PTSD  Insomnia  Panic Attacks  Depression  Anxiety  Chronic Pain  Stress    The [facility] is ideal for patients who:  Desire short-term rather than long-term psychotherapy  Want structured psychotherapy sessions for specific targets  Are willing to engage in daily practice assignments  Can commit to weekly psychotherapy for a time-limited period (usually between 6-12 weeks)    Note: Possibility of virtual  (telehealth) participation may exist    call for more information ([phone])     2) Individual Counseling:        A) Call [facility]              [phone] (there may be significant wait times)     B) Contact   your  insurance carrier:  via their Website or call and ask for assistance    C) Psychology Today: [email or web address]     D) [facility] ([facility]) :   [phone] https://[email or web address]      (website shows accepts "walk ins"; "perhaps double check"    E)  [facility]:  [email or web address]               [phone]    F) [facility]: Contact [facility] :   [phone] [email or web address]      Meds: renewed as prior    Encouraged  continued sobriety says no alcohol since Oct 2022     References:     Relaxation stress reduction workbook: NINA Otero " PhD ( used: $7-10)    Feeling Good Website: Tim Montemayor MD / [email or web address] website (free) / patel. PODCASTS    Anxiety &  phobia workbook by SHANNAN Copeland PhD  (web retailers: used: $ 7-10)    [place]: Path to Better Sleep : https://www.veterantraining.[place].gov/insomnia/ (free)     Pt expressed appreciation for the visit today and did not have further question at this time though pt  was still informed to:     Call  if problems.    Call / Report Side Effects to Psyc MD     Encouraged to follow up with primary care / Gen Med MD for continued monitoring of general health and wellness.    Understanding was expressed; and no further concerns nor questions were raised at this time.     Remember healthy self care:   eat right  attempt adequate rest   HANDWASHING / encourage such patel. During this corona virus time   walk or light exercise within reason and as your general med team approves  read or explore any of reference materials / homework mentioned  reach out (I.e.,  connect with)  others who nuture and bring out best in you  avoid risky behaviors    Keep your appointments:    IF you  cannot make your appt THEN please call [phone]  or go online (via My Chart john) to reschedule.    It is the responsibility of the patient to reschedule an appointment if an appointment has been canceled or missed.    Avoid  alcohol and illicit substances.  Look for the positive.  All is often relative-seek balance  Call sooner if needed : [phone]  Call 211 or go to Emergency Room  (ER)  if  any acute concerns
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
## 2024-05-29 NOTE — PHARMACY MED REC
"Admission Medication History     The home medication history was taken by Hallie Borja.    You may go to "Admission" then "Reconcile Home Medications" tabs to review and/or act upon these items.     The home medication list has been updated by the Pharmacy department.   Please read ALL comments highlighted in yellow.   Please address this information as you see fit.    Feel free to contact us if you have any questions or require assistance.      The medications listed below were removed from the home medication list. Please reorder if appropriate:  Patient reports no longer taking the following medication(s):  ROSUVASTATIN 20 MG  GABAPENTIN 300 MG  PROMETHAZINE 25 MG      Medications listed below were obtained from: Patient/family  PTA Medications   Medication Sig    aspirin (ECOTRIN) 81 MG EC tablet Take 1 tablet by mouth 3 (three) times a week.    diphenhydrAMINE-acetaminophen (TYLENOL PM)  mg Tab Take 2 tablets by mouth nightly as needed (SLEEP).    dulaglutide (TRULICITY) 0.75 mg/0.5 mL pen injector Inject 0.75 mg into the skin every Sunday.    EScitalopram oxalate (LEXAPRO) 20 MG tablet Take 1 tablet (20 mg total) by mouth once daily.    hydrOXYzine pamoate (VISTARIL) 25 MG Cap Take 1 to 2 caps q hs prn difficulty sleeping    ibuprofen (ADVIL,MOTRIN) 800 MG tablet Take 1 tablet (800 mg total) by mouth 3 (three) times daily as needed for Pain.    levothyroxine (SYNTHROID) 100 MCG tablet Take 1 tablet (100 mcg total) by mouth once daily.    metFORMIN (GLUCOPHAGE) 500 MG tablet Take 2 tablets (1,000 mg total) by mouth 2 (two) times daily with meals.    oxyCODONE (ROXICODONE) 5 MG immediate release tablet Take 1 tablet (5 mg total) by mouth every 8 (eight) hours as needed for Pain.    tiZANidine (ZANAFLEX) 2 MG tablet Take 1 tablet (2 mg total) by mouth every 8 (eight) hours as needed (muscle spams).    traZODone (DESYREL) 150 MG tablet TAKE 1 TABLET(150 MG) BY MOUTH EVERY EVENING    zolpidem (AMBIEN) 10 mg " Tab Take 1 tablet (10 mg total) by mouth nightly as needed.    blood sugar diagnostic (TRUE METRIX GLUCOSE TEST STRIP MISC) True Metrix Glucose Test Strip   TEST TWICE DAILY AS DIRECTED    blood-glucose meter (TRUE METRIX AIR GLUCOSE METER MISC) True Metrix Air Glucose Meter   USE UNDER THE SKIN TWICE DAILY AS DIRECTED         Hallie Borja  EXT [id number]                  .         details

## 2024-07-08 ENCOUNTER — OFFICE VISIT (OUTPATIENT)
Dept: URGENT CARE | Facility: CLINIC | Age: 69
End: 2024-07-08
Payer: COMMERCIAL

## 2024-07-08 VITALS
HEIGHT: 70 IN | HEART RATE: 80 BPM | TEMPERATURE: 99 F | SYSTOLIC BLOOD PRESSURE: 129 MMHG | BODY MASS INDEX: 21.62 KG/M2 | DIASTOLIC BLOOD PRESSURE: 80 MMHG | RESPIRATION RATE: 17 BRPM | OXYGEN SATURATION: 98 % | WEIGHT: 151 LBS

## 2024-07-08 DIAGNOSIS — S29.9XXA RIB INJURY: Primary | ICD-10-CM

## 2024-07-08 DIAGNOSIS — R07.81 RIB PAIN ON RIGHT SIDE: ICD-10-CM

## 2024-07-08 DIAGNOSIS — M62.838 MUSCLE SPASM: ICD-10-CM

## 2024-07-08 PROCEDURE — 99213 OFFICE O/P EST LOW 20 MIN: CPT | Mod: S$GLB,,, | Performed by: NURSE PRACTITIONER

## 2024-07-08 RX ORDER — TIZANIDINE 4 MG/1
4 TABLET ORAL NIGHTLY
COMMUNITY
Start: 2024-07-05

## 2024-07-08 RX ORDER — TOPIRAMATE 25 MG/1
TABLET ORAL
COMMUNITY
Start: 2024-01-27

## 2024-07-08 RX ORDER — CELECOXIB 200 MG/1
200 CAPSULE ORAL
COMMUNITY
Start: 2024-05-08

## 2024-07-08 RX ORDER — CYCLOBENZAPRINE HCL 10 MG
10 TABLET ORAL NIGHTLY
COMMUNITY
Start: 2024-05-14

## 2024-07-08 RX ORDER — NAPROXEN 500 MG/1
500 TABLET ORAL 2 TIMES DAILY
COMMUNITY
Start: 2024-05-03

## 2024-07-08 RX ORDER — GABAPENTIN 300 MG/1
300 CAPSULE ORAL 3 TIMES DAILY
COMMUNITY
Start: 2024-05-22

## 2024-07-08 RX ORDER — RIZATRIPTAN BENZOATE 10 MG/1
TABLET ORAL
COMMUNITY
Start: 2024-03-29

## 2024-07-08 RX ORDER — TOPIRAMATE 100 MG/1
100 TABLET, FILM COATED ORAL 2 TIMES DAILY
COMMUNITY
Start: 2024-03-29

## 2024-07-08 RX ORDER — IBUPROFEN 800 MG/1
800 TABLET ORAL
COMMUNITY
Start: 2024-05-03

## 2024-07-08 RX ORDER — POLYETHYLENE GLYCOL 3350, SODIUM SULFATE, POTASSIUM CHLORIDE, MAGNESIUM SULFATE, AND SODIUM CHLORIDE FOR ORAL SOLUTION 178.7-7.3G
KIT ORAL
COMMUNITY
Start: 2024-06-11

## 2024-07-08 RX ORDER — OXYCODONE AND ACETAMINOPHEN 10; 325 MG/1; MG/1
1 TABLET ORAL EVERY 6 HOURS PRN
COMMUNITY
Start: 2024-02-16

## 2024-07-08 NOTE — PROGRESS NOTES
"Subjective:      Patient ID: Alix Patel is a 68 y.o. female.    Vitals:  height is 5' 10" (1.778 m) and weight is 68.5 kg (151 lb 0.2 oz). Her oral temperature is 98.5 °F (36.9 °C). Her blood pressure is 129/80 and her pulse is 80. Her respiration is 17 and oxygen saturation is 98%.     Chief Complaint: Rib Injury    Pt presents possible right side rib injury. On Saturday pt took a sleeping aid and muscle relaxer. Pt went to kitchen and began feeling syncope (usual for sleeping aid) and layed on floor. Pt pulled herself from a counter, using right arm) and went to bed. Pt felt pain the following morning. Pt hears popping from that area.     68-year-old female presents to clinic with complaints of right rib pain after an injury 3 days ago. She reports pulling a muscle after an awkward move without falling.  Medication profile consist of Zanafelx 4 mg #30 tablets 7/5/24, Cyclobenzaprine 10 mg # 30 tablets 5/14/24, Celebrex 200 mg #15 tablets 5/8/24, Gabapentin 300 mg #90 tablets x 1 filled on 5/22/24, ibuprofen 800 mg #270 tablets 5/3/24, naproxen 500 mg #180 tablets 5/3/24, oxycodone-acetaminophen #30 tablets filled on 2/16/24       Musculoskeletal:  Positive for pain, muscle cramps and muscle ache. Negative for trauma, joint pain, joint swelling, abnormal ROM of joint, arthritis, gout and back pain.      Objective:     Physical Exam   Constitutional: She is oriented to person, place, and time. She appears well-developed. She is cooperative. No distress.   HENT:   Head: Normocephalic and atraumatic.   Nose: Nose normal.   Mouth/Throat: Oropharynx is clear and moist and mucous membranes are normal.   Eyes: Lids are normal. Extraocular movement intact   Neck: Trachea normal and phonation normal. Neck supple.   Cardiovascular: Normal rate.   Pulmonary/Chest: Effort normal. She exhibits tenderness. She exhibits no bony tenderness.       Abdominal: Normal appearance.   Musculoskeletal:         General: No " deformity.   Neurological: She is alert and oriented to person, place, and time. She has normal strength and normal reflexes. No sensory deficit.   Skin: Skin is warm, dry, intact and not diaphoretic.   Psychiatric: Her speech is normal and behavior is normal. Judgment and thought content normal.   Nursing note and vitals reviewed.      Assessment:     1. Rib injury    2. Rib pain on right side    3. Muscle spasm        Plan:       Rib injury    Rib pain on right side  -     BANDAGE ELASTIC 6IN ACE    Muscle spasm      Patient Instructions   Ice or heat may help ease your pain. Either one may help stop a spasm, but most people find that heat is more helpful.  Soak the sore area in warm water or using a heating pad can help stop the spasm and lower pain. Heat also helps muscles stretch easier. Do not leave a heating pad on more than 20 minutes at a time. Be sure to check your skin while the heating pad is on to avoid burns. Never go to sleep with a heating pad on.  Putting ice on a muscle that is in spasm can help ease the spasm and reduce pain. Use an ice pack or bag of frozen vegetables wrapped in a towel over the painful part. Never put ice right on the skin. Do not leave the ice on more than 10 to 15 minutes at a time. Do not try to stretch the muscle right after icing.  Massaging the cramping muscle with firm pressure may help ease the spasm.  Drinking extra fluids can help muscle spasms if they are caused by a loss of body fluids. Avoid intense exercise in hot and humid weather to lower the chance of getting muscle spasms.  Sometimes, you may get muscle spasms if you dont get enough of certain nutrients in your diet, like potassium, magnesium, or carbohydrates. If this is the case, changing your diet can help you to avoid muscle cramps. Talk to your doctor about what to eat and drink before and after exercise.  You may want to take medicine like ibuprofen, naproxen, or acetaminophen to help with pain.  If you  were prescribed a narcotic medication, do not drive or operate heavy equipment or machinery while taking these medications.  Please follow up with your primary care doctor or specialist as needed.  Please return here or go to the Emergency Department for any concerns or worsening of condition.

## 2024-08-12 DIAGNOSIS — F51.01 PRIMARY INSOMNIA: ICD-10-CM

## 2024-08-12 RX ORDER — TRAZODONE HYDROCHLORIDE 150 MG/1
150 TABLET ORAL NIGHTLY PRN
Qty: 90 TABLET | Refills: 1 | Status: SHIPPED | OUTPATIENT
Start: 2024-08-12 | End: 2024-08-13 | Stop reason: SDUPTHER

## 2024-08-13 ENCOUNTER — OFFICE VISIT (OUTPATIENT)
Dept: PSYCHIATRY | Facility: CLINIC | Age: 69
End: 2024-08-13
Payer: COMMERCIAL

## 2024-08-13 DIAGNOSIS — F31.81 BIPOLAR II DISORDER: Primary | ICD-10-CM

## 2024-08-13 DIAGNOSIS — F51.01 PRIMARY INSOMNIA: ICD-10-CM

## 2024-08-13 DIAGNOSIS — Z63.9 FAMILY DYNAMICS PROBLEM: ICD-10-CM

## 2024-08-13 DIAGNOSIS — E11.65 TYPE 2 DIABETES MELLITUS WITH HYPERGLYCEMIA, WITHOUT LONG-TERM CURRENT USE OF INSULIN: ICD-10-CM

## 2024-08-13 DIAGNOSIS — F10.21 ALCOHOL USE DISORDER, MODERATE, IN SUSTAINED REMISSION: ICD-10-CM

## 2024-08-13 DIAGNOSIS — F41.9 ANXIETY DISORDER, UNSPECIFIED TYPE: ICD-10-CM

## 2024-08-13 DIAGNOSIS — Z59.9 HOUSING PROBLEMS: ICD-10-CM

## 2024-08-13 PROCEDURE — 99215 OFFICE O/P EST HI 40 MIN: CPT | Mod: 95,,, | Performed by: PSYCHIATRY & NEUROLOGY

## 2024-08-13 PROCEDURE — 90833 PSYTX W PT W E/M 30 MIN: CPT | Mod: 95,,, | Performed by: PSYCHIATRY & NEUROLOGY

## 2024-08-13 RX ORDER — TRAZODONE HYDROCHLORIDE 150 MG/1
150 TABLET ORAL NIGHTLY PRN
Qty: 90 TABLET | Refills: 1 | Status: SHIPPED | OUTPATIENT
Start: 2024-08-13 | End: 2025-02-09

## 2024-08-13 RX ORDER — ESCITALOPRAM OXALATE 20 MG/1
20 TABLET ORAL DAILY
Qty: 90 TABLET | Refills: 1 | Status: SHIPPED | OUTPATIENT
Start: 2024-08-13 | End: 2025-02-09

## 2024-08-13 RX ORDER — MIRTAZAPINE 15 MG/1
15 TABLET, FILM COATED ORAL NIGHTLY
Qty: 90 TABLET | Refills: 1 | Status: SHIPPED | OUTPATIENT
Start: 2024-08-13 | End: 2025-02-09

## 2024-08-13 SDOH — SOCIAL DETERMINANTS OF HEALTH (SDOH): PROBLEM RELATED TO PRIMARY SUPPORT GROUP, UNSPECIFIED: Z63.9

## 2024-08-13 SDOH — SOCIAL DETERMINANTS OF HEALTH (SDOH): PROBLEM RELATED TO HOUSING AND ECONOMIC CIRCUMSTANCES, UNSPECIFIED: Z59.9

## 2024-08-13 NOTE — PATIENT INSTRUCTIONS
PLAN:     Follow up:  November 19, 2024 at 4:30 p.m. in clinic    Follow-up with counselor as doing  Tea Fuller [facility] [employer], [address] [id number] [phone]      Meds: renewed as prior  Gets sleep med from Neurology  Kyra KELLY    Encouraged  continued sobriety says no alcohol since Oct 2022     References:     Relaxation stress reduction workbook: NINA Otero PhD ( used: $7-10)    Feeling Good Website: Tim Montemayor MD / [email or web address] website (free) / [email or web address]. PODCASTS    Anxiety &  phobia workbook by SHANNAN Copeland PhD  (web retailers: used: $ 7-10)    [place]: Path to Better Sleep : https://www.veterantraining.va.gov/insomnia/ (free)     Pt expressed appreciation for the visit today and did not have further question at this time though pt  was still informed to:     Call  if problems.    Call / Report Side Effects to Psyc MD     Encouraged to follow up with primary care / Gen Med MD for continued monitoring of general health and wellness.    Understanding was expressed; and no further concerns nor questions were raised at this time.     Remember healthy self care:   eat right  attempt adequate rest   HANDWASHING / encourage such patel. During this corona virus time   walk or light exercise within reason and as your general med team approves  read or explore any of reference materials / homework mentioned  reach out (I.e.,  connect with)  others who nuture and bring out best in you  avoid risky behaviors    Keep your appointments:    IF you  cannot make your appt THEN please call [phone]  or go online (via My Chart john) to reschedule.    It is the responsibility of the patient to reschedule an appointment if an appointment has been canceled or missed.    Avoid  alcohol and illicit substances.  Look for the positive.  All is often relative-seek balance  Call sooner if needed : [phone]  Call [phone] or go to Emergency Room  (ER)  if  any acute concerns
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
## 2025-07-22 NOTE — TELEPHONE ENCOUNTER
Sw pt to schedule voice eval offered tomorrow, she accepted 1/18@11am. Informed not approved yet she still wanted to schedule appt.     Initial (On Arrival)

## (undated) DEVICE — DRAPE T EXTRM SURG 121X128X90

## (undated) DEVICE — BANDAGE ELAS SOFTWRAP ST 6X5YD

## (undated) DEVICE — DRAPE U SPLIT SHEET 54X76IN

## (undated) DEVICE — CATH SUCTION 10FR

## (undated) DEVICE — BLADE SURG CARBON STEEL #10

## (undated) DEVICE — DRAPE TOP 53X102IN

## (undated) DEVICE — SPONGE LAP 18X18 PREWASHED

## (undated) DEVICE — SUT CTD VICRYL VIL BR UR-6

## (undated) DEVICE — SCREW STRDRV REC T8 2.7X10 SS
Type: IMPLANTABLE DEVICE | Site: ANKLE | Status: NON-FUNCTIONAL
Removed: 2023-05-08

## (undated) DEVICE — SEE MEDLINE ITEM 152515

## (undated) DEVICE — BIT DRILL 2.0MM D DEPTH 110MM

## (undated) DEVICE — BNDG COFLEX FOAM LF2 ST 4X5YD

## (undated) DEVICE — TOWEL OR DISP STRL BLUE 4/PK

## (undated) DEVICE — DRAPE ORTH SPLIT 77X108IN

## (undated) DEVICE — SUT ETHILON 3-0 PS2 18 BLK

## (undated) DEVICE — Device

## (undated) DEVICE — SPONGE COTTON TRAY 4X4IN

## (undated) DEVICE — TRAY MINOR ORTHO OMC

## (undated) DEVICE — DRESSING AQUACEL RIBBON 2X45CM

## (undated) DEVICE — SUT VICRYL PLUS 3-0 SH 18IN

## (undated) DEVICE — APPLICATOR CHLORAPREP ORN 26ML

## (undated) DEVICE — COUPLING HOFF3 RR 5X8X11MM

## (undated) DEVICE — DRAPE INCISE IOBAN 2 23X17IN

## (undated) DEVICE — ELECTRODE REM PLYHSV RETURN 9

## (undated) DEVICE — BANDAGE ESMARK 6X12

## (undated) DEVICE — DRAPE C-ARM ELAS CLIP 42X120IN

## (undated) DEVICE — DRAPE HAND STERILE

## (undated) DEVICE — BIT DRILL QC 135MM 2.5X45MM

## (undated) DEVICE — BANDAGE ELAS SOFTWRAP ST 4X5YD

## (undated) DEVICE — BIT DRILL 3.5 X 130MM STERILE

## (undated) DEVICE — TIP YANKAUERS BULB NO VENT

## (undated) DEVICE — SCREW CORTEX 2.7 X 20.
Type: IMPLANTABLE DEVICE | Site: ANKLE | Status: NON-FUNCTIONAL
Removed: 2023-05-08

## (undated) DEVICE — STOCKINET TUBULAR 1 PLY 6X60IN

## (undated) DEVICE — 3.2 DRILL

## (undated) DEVICE — STOCKINET 4INX48

## (undated) DEVICE — PAD CAST SPECIALIST STRL 4

## (undated) DEVICE — GOWN AERO CHROME W/ TOWEL XL

## (undated) DEVICE — SUT MONOCRYL 3-0 PS-2 UND

## (undated) DEVICE — SCREW CORTEX 2.7 X 18
Type: IMPLANTABLE DEVICE | Site: ANKLE | Status: NON-FUNCTIONAL
Removed: 2023-05-08

## (undated) DEVICE — DRAPE THREE-QTR REINF 53X77IN

## (undated) DEVICE — DRAPE STERI-DRAPE 1000 17X11IN

## (undated) DEVICE — DRAPE STERI U-SHAPED 47X51IN

## (undated) DEVICE — KWIRE NTHRD 1.25X150MM
Type: IMPLANTABLE DEVICE | Site: ANKLE | Status: NON-FUNCTIONAL
Removed: 2023-05-08

## (undated) DEVICE — DRESSING XEROFORM NONADH 1X8IN

## (undated) DEVICE — GAUZE DERMACEA LOW PLY 2X4YRDS

## (undated) DEVICE — DRESSING TRANS 4X4 TEGADERM

## (undated) DEVICE — TOURNIQUET SB QC DP 34X4IN

## (undated) DEVICE — TAPE SURG DURAPORE 2 X10YD

## (undated) DEVICE — DRAPE C-ARMOR EQUIPMENT COVER

## (undated) DEVICE — TAPE CURAD SILK ADH 2INX10YD

## (undated) DEVICE — BIT DRILL CANN QC 2.7X160 SS

## (undated) DEVICE — GAUZE SPONGE 4X4 12PLY

## (undated) DEVICE — GUIDE WIRE SMOOTH TIP 22X800MM
Type: IMPLANTABLE DEVICE | Site: HUMERUS | Status: NON-FUNCTIONAL
Removed: 2023-03-21

## (undated) DEVICE — DRESSING N ADH OIL EMUL 3X8

## (undated) DEVICE — SUT ETHILON 3/0 18IN PS-1

## (undated) DEVICE — DRESSING XEROFORM FOIL PK 1X8

## (undated) DEVICE — REAMER MOD BIXCUT 8X48MM STER.

## (undated) DEVICE — GUIDEWIRE BALL TIP 2.5X800MM
Type: IMPLANTABLE DEVICE | Site: HUMERUS | Status: NON-FUNCTIONAL
Removed: 2023-03-21

## (undated) DEVICE — BANDAGE ROLL COTTN 4.5INX4.1YD

## (undated) DEVICE — SUT VICRYL PLUS 0 CT1 18IN

## (undated) DEVICE — BANDAGE ELASTIC ACE 2IN 10/CA

## (undated) DEVICE — SUT VICRYL PLUS 2-0 CT1 18

## (undated) DEVICE — ADHESIVE DERMABOND ADVANCED

## (undated) DEVICE — WIRE KIRSCHNER T2 3X285MM SS
Type: IMPLANTABLE DEVICE | Site: HUMERUS | Status: NON-FUNCTIONAL
Removed: 2023-03-21